# Patient Record
Sex: FEMALE | Race: WHITE | NOT HISPANIC OR LATINO | Employment: OTHER | URBAN - METROPOLITAN AREA
[De-identification: names, ages, dates, MRNs, and addresses within clinical notes are randomized per-mention and may not be internally consistent; named-entity substitution may affect disease eponyms.]

---

## 2015-06-02 LAB — HUMAN PAPILLOMAVIRUS (HPV): NORMAL

## 2017-03-09 ENCOUNTER — PATIENT MESSAGE (OUTPATIENT)
Dept: FAMILY MEDICINE | Facility: CLINIC | Age: 62
End: 2017-03-09

## 2017-03-10 ENCOUNTER — OFFICE VISIT (OUTPATIENT)
Dept: INTERNAL MEDICINE | Facility: CLINIC | Age: 62
End: 2017-03-10
Payer: COMMERCIAL

## 2017-03-10 ENCOUNTER — OFFICE VISIT (OUTPATIENT)
Dept: HEMATOLOGY/ONCOLOGY | Facility: CLINIC | Age: 62
End: 2017-03-10
Payer: COMMERCIAL

## 2017-03-10 VITALS
TEMPERATURE: 98 F | HEIGHT: 63 IN | BODY MASS INDEX: 30.66 KG/M2 | BODY MASS INDEX: 30.46 KG/M2 | RESPIRATION RATE: 16 BRPM | DIASTOLIC BLOOD PRESSURE: 61 MMHG | SYSTOLIC BLOOD PRESSURE: 124 MMHG | WEIGHT: 173.06 LBS | HEART RATE: 91 BPM | WEIGHT: 171.94 LBS | DIASTOLIC BLOOD PRESSURE: 70 MMHG | SYSTOLIC BLOOD PRESSURE: 137 MMHG | HEART RATE: 66 BPM

## 2017-03-10 DIAGNOSIS — R01.1 MURMUR, HEART: ICD-10-CM

## 2017-03-10 DIAGNOSIS — D05.10 DCIS (DUCTAL CARCINOMA IN SITU) OF BREAST, UNSPECIFIED LATERALITY: Primary | ICD-10-CM

## 2017-03-10 DIAGNOSIS — I10 ESSENTIAL HYPERTENSION: Primary | ICD-10-CM

## 2017-03-10 DIAGNOSIS — E78.5 HYPERLIPIDEMIA, UNSPECIFIED HYPERLIPIDEMIA TYPE: ICD-10-CM

## 2017-03-10 DIAGNOSIS — Z00.00 PREVENTATIVE HEALTH CARE: ICD-10-CM

## 2017-03-10 DIAGNOSIS — E03.9 HYPOTHYROIDISM, UNSPECIFIED TYPE: ICD-10-CM

## 2017-03-10 DIAGNOSIS — Z85.71 HX OF HODGKIN'S DISEASE: ICD-10-CM

## 2017-03-10 PROCEDURE — 3075F SYST BP GE 130 - 139MM HG: CPT | Mod: S$GLB,,, | Performed by: FAMILY MEDICINE

## 2017-03-10 PROCEDURE — 99999 PR PBB SHADOW E&M-EST. PATIENT-LVL III: CPT | Mod: PBBFAC,,, | Performed by: FAMILY MEDICINE

## 2017-03-10 PROCEDURE — 99213 OFFICE O/P EST LOW 20 MIN: CPT | Mod: S$GLB,,, | Performed by: INTERNAL MEDICINE

## 2017-03-10 PROCEDURE — 99999 PR PBB SHADOW E&M-EST. PATIENT-LVL III: CPT | Mod: PBBFAC,,, | Performed by: INTERNAL MEDICINE

## 2017-03-10 PROCEDURE — 1160F RVW MEDS BY RX/DR IN RCRD: CPT | Mod: S$GLB,,, | Performed by: INTERNAL MEDICINE

## 2017-03-10 PROCEDURE — 3078F DIAST BP <80 MM HG: CPT | Mod: S$GLB,,, | Performed by: FAMILY MEDICINE

## 2017-03-10 PROCEDURE — 3078F DIAST BP <80 MM HG: CPT | Mod: S$GLB,,, | Performed by: INTERNAL MEDICINE

## 2017-03-10 PROCEDURE — 3074F SYST BP LT 130 MM HG: CPT | Mod: S$GLB,,, | Performed by: INTERNAL MEDICINE

## 2017-03-10 PROCEDURE — 99215 OFFICE O/P EST HI 40 MIN: CPT | Mod: S$GLB,,, | Performed by: FAMILY MEDICINE

## 2017-03-10 PROCEDURE — 1160F RVW MEDS BY RX/DR IN RCRD: CPT | Mod: S$GLB,,, | Performed by: FAMILY MEDICINE

## 2017-03-10 RX ORDER — FLUTICASONE PROPIONATE 50 MCG
2 SPRAY, SUSPENSION (ML) NASAL DAILY
Qty: 16 G | Refills: 12 | Status: SHIPPED | OUTPATIENT
Start: 2017-03-10 | End: 2017-04-09

## 2017-03-10 RX ORDER — LEVOTHYROXINE SODIUM 75 UG/1
75 TABLET ORAL DAILY
Qty: 90 TABLET | Refills: 3 | Status: SHIPPED | OUTPATIENT
Start: 2017-03-10 | End: 2017-07-18

## 2017-03-10 RX ORDER — BENAZEPRIL HYDROCHLORIDE 10 MG/1
10 TABLET ORAL DAILY
Qty: 90 TABLET | Refills: 3 | Status: SHIPPED | OUTPATIENT
Start: 2017-03-10 | End: 2017-03-23 | Stop reason: SDUPTHER

## 2017-03-10 RX ORDER — ROSUVASTATIN CALCIUM 20 MG/1
20 TABLET, COATED ORAL NIGHTLY
Qty: 90 TABLET | Refills: 3 | Status: SHIPPED | OUTPATIENT
Start: 2017-03-10 | End: 2018-05-26 | Stop reason: SDUPTHER

## 2017-03-10 NOTE — PROGRESS NOTES
Subjective:       Patient ID: Dulce Root is a 61 y.o. female.    Chief Complaint: Follow-up    HPI Ms. Root is a very pleasant 61-year-old female with history of bilateral ductal carcinoma in situ and a remote history of Hodgkin's disease.    Her primary issue continues to be her pemphigus.  She is continuing on weekly methotrexate under the care of  her dermatologist Dr. Xie.      Otherwise, she's been feeling well with no new issues.She has some chronic exertional dyspnea which is unchanged, primarily with climbing stairs.    Her  recently fell off a ladder and broke his ankle  which required surgery with pins and plates, that has incapacitated him.        Breast history:  She  developed ductal      carcinoma in situ of the right breast in 09/2006, treated with lumpectomy     and radiation therapy.  In 08/2009, she was diagnosed with ductal carcinoma  in situ of the left breast and in 08/2009, she had bilateral mastectomy.      The left breast showed widespread high-grade DCIS and the right breast was    without evidence of malignancy.        She has a remote history of Hodgkin's disease, originally diagnosed in   1991, treated with radiation therapy and then treated with salvage   chemotherapy with ABVD on protocol E-5489 in 1996  Review of Systems   Constitutional: Negative for activity change, appetite change and fatigue.   Respiratory: Negative for shortness of breath.    Cardiovascular: Negative for chest pain.   Gastrointestinal: Negative for abdominal pain, diarrhea and nausea.   Genitourinary: Negative for flank pain.   Neurological: Negative for headaches.   Psychiatric/Behavioral: The patient is not nervous/anxious.        Objective:      Physical Exam   Constitutional: She appears well-developed and well-nourished.   Eyes: No scleral icterus.   Cardiovascular: Normal rate and regular rhythm.    Murmur (systolic) heard.  Pulmonary/Chest: She has wheezes in the right upper field.       The  bilateral breast reconstructions show no nodules or erythema   Abdominal: Soft. She exhibits no mass. There is no tenderness.   Lymphadenopathy:     She has no cervical adenopathy.     She has no axillary adenopathy.        Right: No supraclavicular adenopathy present.        Left: No supraclavicular adenopathy present.   Skin: No rash noted.       Assessment:     recent labs are unremarkable-CBC showed white count 13,100 , otherwise normal  1. DCIS (ductal carcinoma in situ) of breast, unspecified laterality    2. Hx of Hodgkin's disease        Plan:     We'll arrange for her to have an echo cardiogram and to be seen in cardiology for follow-up for late effects of treatment.    I'll see her again in one year.

## 2017-03-10 NOTE — PROGRESS NOTES
Subjective:       Patient ID: Dulce Root is a 61 y.o. female.    Chief Complaint: Annual Exam  Dulce Root 61 y.o. female is here for office visit to review care and physical exam, needs usual care.  HAs seen Oncology.  Saw EDUARDA Antonio RECENTLY ALSO.  Has IVIG and MTX, controling derm issue.  HAd good CMP, CBC recently.  Not exercising but did go to [facility], walked a lot.      HPI  Review of Systems   Constitutional: Negative for activity change, fatigue, fever and unexpected weight change.   HENT: Negative for congestion, hearing loss, postnasal drip and rhinorrhea.    Eyes: Negative for redness and visual disturbance.   Respiratory: Negative for chest tightness, shortness of breath and wheezing.    Cardiovascular: Negative for chest pain, palpitations and leg swelling.   Gastrointestinal: Negative for abdominal distention.   Genitourinary: Negative for decreased urine volume, dysuria, flank pain, hematuria, pelvic pain and urgency.   Musculoskeletal: Negative for back pain, gait problem, joint swelling and neck stiffness.   Skin: Negative for color change, rash and wound.   Neurological: Negative for dizziness, syncope, weakness and headaches.   Psychiatric/Behavioral: Negative for behavioral problems, confusion and sleep disturbance. The patient is not nervous/anxious.        Objective:      Physical Exam   Constitutional: She is oriented to person, place, and time. She appears well-developed and well-nourished. No distress.   HENT:   Head: Normocephalic.   Mouth/Throat: No oropharyngeal exudate.   Eyes: EOM are normal. Pupils are equal, round, and reactive to light. No scleral icterus.   Neck: Neck supple. No JVD present. No thyromegaly present.   Cardiovascular: Normal rate, regular rhythm and normal heart sounds.  Exam reveals no gallop and no friction rub.    No murmur heard.  LUSB holystolic murmur, 2/4   Pulmonary/Chest: Effort normal and breath sounds normal. She has no wheezes. She has no rales.    Abdominal: Soft. Bowel sounds are normal. She exhibits no distension and no mass. There is no tenderness. There is no guarding.   Musculoskeletal: Normal range of motion. She exhibits no edema.   Lymphadenopathy:     She has no cervical adenopathy.   Neurological: She is alert and oriented to person, place, and time. She has normal reflexes. She displays normal reflexes. No cranial nerve deficit. She exhibits normal muscle tone.   Skin: Skin is warm. No rash noted. No erythema.   Psychiatric: She has a normal mood and affect. Thought content normal.       Assessment:       No diagnosis found.    Plan:       Dulce was seen today for annual exam.    Diagnoses and all orders for this visit:    Essential hypertension  -     benazepril (LOTENSIN) 10 MG tablet; Take 1 tablet (10 mg total) by mouth once daily.    Hyperlipidemia, unspecified hyperlipidemia type  -     Lipid panel; Future  -     rosuvastatin (CRESTOR) 20 MG tablet; Take 1 tablet (20 mg total) by mouth nightly.    Hypothyroidism, unspecified type  -     TSH; Future  -     levothyroxine (SYNTHROID) 75 MCG tablet; Take 1 tablet (75 mcg total) by mouth once daily.    Preventative health care  - Had CRC scr, due again,  Has letter  - reviewed

## 2017-03-14 ENCOUNTER — PATIENT MESSAGE (OUTPATIENT)
Dept: HEMATOLOGY/ONCOLOGY | Facility: CLINIC | Age: 62
End: 2017-03-14

## 2017-03-15 ENCOUNTER — PATIENT MESSAGE (OUTPATIENT)
Dept: INTERNAL MEDICINE | Facility: CLINIC | Age: 62
End: 2017-03-15

## 2017-03-21 ENCOUNTER — DOCUMENTATION ONLY (OUTPATIENT)
Dept: CARDIOLOGY | Facility: CLINIC | Age: 62
End: 2017-03-21

## 2017-03-21 ENCOUNTER — HOSPITAL ENCOUNTER (OUTPATIENT)
Dept: CARDIOLOGY | Facility: CLINIC | Age: 62
Discharge: HOME | End: 2017-03-21
Payer: COMMERCIAL

## 2017-03-21 DIAGNOSIS — R01.1 MURMUR, HEART: ICD-10-CM

## 2017-03-21 LAB
AORTIC VALVE REGURGITATION: ABNORMAL
AORTIC VALVE STENOSIS: ABNORMAL
DIASTOLIC DYSFUNCTION: YES
ESTIMATED PA SYSTOLIC PRESSURE: 25
GLOBAL PERICARDIAL EFFUSION: ABNORMAL
RETIRED EF AND QEF - SEE NOTES: 55 (ref 55–65)
TRICUSPID VALVE REGURGITATION: ABNORMAL

## 2017-03-21 PROCEDURE — 93306 TTE W/DOPPLER COMPLETE: CPT | Mod: S$GLB,,, | Performed by: INTERNAL MEDICINE

## 2017-03-21 PROCEDURE — 96374 THER/PROPH/DIAG INJ IV PUSH: CPT | Mod: 59,S$GLB,, | Performed by: INTERNAL MEDICINE

## 2017-03-21 NOTE — PROGRESS NOTES
Patient identified by 2 identifiers. Denies previous reactions to blood transfusions, and no known holes in heart.  Allergies reviewed.  Procedure explained & consent obtained.  22 g IV placed to Rt hand, flushed w/ 10cc NS pre & post contrast administration.  3cc Optison administered, echo images obtained.  Pt tolerated procedure well.  IV D/C'ed, preasure dsg applied.  Pt D/C'ed to home.

## 2017-03-22 ENCOUNTER — DOCUMENTATION ONLY (OUTPATIENT)
Dept: INTERNAL MEDICINE | Facility: CLINIC | Age: 62
End: 2017-03-22

## 2017-03-22 ENCOUNTER — PATIENT MESSAGE (OUTPATIENT)
Dept: INTERNAL MEDICINE | Facility: CLINIC | Age: 62
End: 2017-03-22

## 2017-03-22 NOTE — PROGRESS NOTES
CAlled pt, no answer.  LM to call clinic to schedule appt with Cardiology, has aortic stenosis by recent ECHO

## 2017-03-23 ENCOUNTER — OFFICE VISIT (OUTPATIENT)
Dept: CARDIOLOGY | Facility: CLINIC | Age: 62
End: 2017-03-23
Payer: COMMERCIAL

## 2017-03-23 VITALS
WEIGHT: 173.5 LBS | SYSTOLIC BLOOD PRESSURE: 161 MMHG | HEIGHT: 63 IN | DIASTOLIC BLOOD PRESSURE: 87 MMHG | HEART RATE: 103 BPM | BODY MASS INDEX: 30.74 KG/M2

## 2017-03-23 DIAGNOSIS — I35.0 NONRHEUMATIC AORTIC VALVE STENOSIS: ICD-10-CM

## 2017-03-23 DIAGNOSIS — E78.5 HYPERLIPIDEMIA, UNSPECIFIED HYPERLIPIDEMIA TYPE: Primary | ICD-10-CM

## 2017-03-23 DIAGNOSIS — Z85.71 HX OF HODGKIN'S DISEASE: ICD-10-CM

## 2017-03-23 DIAGNOSIS — I10 ESSENTIAL HYPERTENSION: ICD-10-CM

## 2017-03-23 DIAGNOSIS — R07.9 CHEST PAIN, UNSPECIFIED TYPE: ICD-10-CM

## 2017-03-23 PROCEDURE — 3079F DIAST BP 80-89 MM HG: CPT | Mod: S$GLB,,, | Performed by: INTERNAL MEDICINE

## 2017-03-23 PROCEDURE — 1160F RVW MEDS BY RX/DR IN RCRD: CPT | Mod: S$GLB,,, | Performed by: INTERNAL MEDICINE

## 2017-03-23 PROCEDURE — 99204 OFFICE O/P NEW MOD 45 MIN: CPT | Mod: S$GLB,,, | Performed by: INTERNAL MEDICINE

## 2017-03-23 PROCEDURE — 93000 ELECTROCARDIOGRAM COMPLETE: CPT | Mod: S$GLB,,, | Performed by: INTERNAL MEDICINE

## 2017-03-23 PROCEDURE — 3077F SYST BP >= 140 MM HG: CPT | Mod: S$GLB,,, | Performed by: INTERNAL MEDICINE

## 2017-03-23 PROCEDURE — 99999 PR PBB SHADOW E&M-EST. PATIENT-LVL III: CPT | Mod: PBBFAC,,, | Performed by: INTERNAL MEDICINE

## 2017-03-23 RX ORDER — BENAZEPRIL HYDROCHLORIDE 20 MG/1
20 TABLET ORAL DAILY
Qty: 90 TABLET | Refills: 3 | Status: ON HOLD | OUTPATIENT
Start: 2017-03-23 | End: 2017-04-26 | Stop reason: HOSPADM

## 2017-03-23 RX ORDER — CYCLOSPORINE 0.5 MG/ML
1 EMULSION OPHTHALMIC 2 TIMES DAILY
Refills: 4 | COMMUNITY
Start: 2017-03-09 | End: 2021-01-26

## 2017-03-23 NOTE — PROGRESS NOTES
Subjective:    Patient ID:  Dulce oRot is a 61 y.o. female who presents for evaluation of new pt (new pt); ref from . [facility]; Results; and Shortness of Breath      HPI Comments: Pt referred for evaluation after a routine Echo showed severe AS. She has a h/o lymphoma and breast CA with chest radiation and chemotherapy in the remote past. No history of rheumatic fever or family h/o valvular disease. Presently she has occasional MARCH with the only significant episode was rushing [place] street [place] from the subway in [place] recently. Normal activities do not bother her. She denies any palpitations, dizziness, syncope or pre-syncope. She denies any chest pain, PND, orthopnea. She denies claudication, lower extremity edema.      Review of Systems   Constitution: Negative for weight gain and weight loss.   HENT: Negative.    Eyes: Negative.    Cardiovascular: Positive for dyspnea on exertion (occasional). Negative for chest pain, claudication, cyanosis, irregular heartbeat, leg swelling, near-syncope, orthopnea (no PND), palpitations and syncope.   Respiratory: Negative for cough, hemoptysis, shortness of breath and snoring.    Endocrine: Negative.    Skin: Negative.    Musculoskeletal: Positive for back pain. Negative for joint pain, muscle cramps, muscle weakness and myalgias.   Gastrointestinal: Negative for diarrhea, hematemesis, nausea and vomiting.   Genitourinary: Negative.    Neurological: Negative for dizziness, focal weakness, light-headedness, loss of balance, numbness, paresthesias and seizures.   Psychiatric/Behavioral: Negative.         Objective:    Physical Exam   Constitutional: She is oriented to person, place, and time. She appears well-developed and well-nourished.   Eyes: Pupils are equal, round, and reactive to light.   Neck: Normal range of motion. No thyromegaly present.   Cardiovascular: Normal rate, regular rhythm, S1 normal, S2 normal, intact distal pulses and normal pulses.   No extrasystoles are  present. PMI is not displaced.  Exam reveals no friction rub.    Murmur heard.   Harsh midsystolic murmur is present with a grade of 2/6  at the upper right sternal border radiating to the neck  Pulmonary/Chest: Effort normal and breath sounds normal. She has no wheezes. She has no rales. She exhibits no tenderness.   Abdominal: Soft. Bowel sounds are normal. She exhibits no distension and no mass. There is no tenderness.   Musculoskeletal: Normal range of motion. She exhibits no edema.   Neurological: She is alert and oriented to person, place, and time.   Skin: Skin is warm and dry.   Vitals reviewed.      Test(s) Reviewed  I have reviewed the following in detail:  [] Stress test   [] Angiography   [x] Echocardiogram   [] Labs   [x] Other:  ECG       Assessment:       1. Hyperlipidemia, unspecified hyperlipidemia type    2. Nonrheumatic aortic valve stenosis - Severe, JORI = 0.76 cm2, peak velocity = 3.85 m/s, mean gradient = 35.0 mmHg.    3. Essential hypertension    4. Hx of Hodgkin's disease - s/p chemo and radiation         Plan:       We discussed the progressive nature of aortic stenosis and the symptoms asssociated, ie. chest pain, syncope/pre-syncope and worsening dyspnea and a decline in exertional capacity.  We also discussed that at her age and the present degree of stenosis, it is likely there will be need for valve replacement in the future.  Will increase her benazepril to 20 mg daily  F/u in 3 months with repeat echo - sooner if symptoms develop

## 2017-03-28 DIAGNOSIS — R07.9 CHEST PAIN, UNSPECIFIED TYPE: Primary | ICD-10-CM

## 2017-04-24 PROBLEM — R07.9 CHEST PAIN: Status: ACTIVE | Noted: 2017-04-24

## 2017-04-25 ENCOUNTER — TELEPHONE (OUTPATIENT)
Dept: VASCULAR SURGERY | Facility: CLINIC | Age: 62
End: 2017-04-25

## 2017-04-25 PROBLEM — E78.5 DYSLIPIDEMIA: Status: ACTIVE | Noted: 2017-04-25

## 2017-04-25 NOTE — TELEPHONE ENCOUNTER
----- Message from Isrrael Smiley sent at 4/24/2017  4:55 PM CDT -----  Contact: [facility],Zuleyka Gardiner have a hospital consult please call back at [phone]

## 2017-04-27 ENCOUNTER — PATIENT MESSAGE (OUTPATIENT)
Dept: CARDIOLOGY | Facility: CLINIC | Age: 62
End: 2017-04-27

## 2017-04-27 DIAGNOSIS — I25.10 CORONARY ARTERY DISEASE, ANGINA PRESENCE UNSPECIFIED, UNSPECIFIED VESSEL OR LESION TYPE, UNSPECIFIED WHETHER NATIVE OR TRANSPLANTED HEART: Primary | ICD-10-CM

## 2017-04-27 NOTE — TELEPHONE ENCOUNTER
Called Pt and discussed the angiogram, her AS and the recommendation for AVR + CABG.   She would like to be referred to a surgeon at [facility] as all her other physicians are there as well as all her other medical issues have been addressed there.  Will get a disc of her angiogram and make arrangements for her to be seen by CT surgery.     See my note from 03/24 and 's consult from 04/24 in notes.

## 2017-04-28 ENCOUNTER — PATIENT MESSAGE (OUTPATIENT)
Dept: CARDIOLOGY | Facility: CLINIC | Age: 62
End: 2017-04-28

## 2017-04-28 ENCOUNTER — TELEPHONE (OUTPATIENT)
Dept: CARDIOLOGY | Facility: CLINIC | Age: 62
End: 2017-04-28

## 2017-04-28 DIAGNOSIS — I35.0 AORTIC VALVE STENOSIS, UNSPECIFIED ETIOLOGY: Primary | ICD-10-CM

## 2017-04-28 DIAGNOSIS — I25.10 CORONARY ARTERY DISEASE, ANGINA PRESENCE UNSPECIFIED, UNSPECIFIED VESSEL OR LESION TYPE, UNSPECIFIED WHETHER NATIVE OR TRANSPLANTED HEART: ICD-10-CM

## 2017-05-01 ENCOUNTER — TELEPHONE (OUTPATIENT)
Dept: INTERNAL MEDICINE | Facility: CLINIC | Age: 62
End: 2017-05-01

## 2017-05-01 NOTE — TELEPHONE ENCOUNTER
----- Message from Walt Cochran sent at 5/1/2017 10:17 AM CDT -----  Contact: self [phone]  Patient would like a call back from MD to discuss cough an d Xray that was done while in the hospital . please advise , Thanks !

## 2017-05-01 NOTE — TELEPHONE ENCOUNTER
Called pt, no answer so LM to come in in AM (may need x-ray and/or lab) or call cardiology today if concerned may have fluid in lungs

## 2017-05-16 ENCOUNTER — OFFICE VISIT (OUTPATIENT)
Dept: CARDIOTHORACIC SURGERY | Facility: CLINIC | Age: 62
End: 2017-05-16
Payer: COMMERCIAL

## 2017-05-16 ENCOUNTER — HOSPITAL ENCOUNTER (OUTPATIENT)
Dept: RADIOLOGY | Facility: OTHER | Age: 62
Discharge: HOME | End: 2017-05-16
Attending: THORACIC SURGERY (CARDIOTHORACIC VASCULAR SURGERY)
Payer: COMMERCIAL

## 2017-05-16 VITALS
DIASTOLIC BLOOD PRESSURE: 62 MMHG | TEMPERATURE: 98 F | BODY MASS INDEX: 29.77 KG/M2 | HEART RATE: 82 BPM | WEIGHT: 168 LBS | OXYGEN SATURATION: 100 % | SYSTOLIC BLOOD PRESSURE: 120 MMHG | HEIGHT: 63 IN

## 2017-05-16 DIAGNOSIS — I35.0 NONRHEUMATIC AORTIC VALVE STENOSIS: Primary | ICD-10-CM

## 2017-05-16 DIAGNOSIS — I25.10 CORONARY ARTERY DISEASE INVOLVING NATIVE CORONARY ARTERY OF NATIVE HEART WITHOUT ANGINA PECTORIS: ICD-10-CM

## 2017-05-16 DIAGNOSIS — I25.10 CORONARY ARTERY DISEASE INVOLVING NATIVE CORONARY ARTERY OF NATIVE HEART WITHOUT ANGINA PECTORIS: Primary | ICD-10-CM

## 2017-05-16 DIAGNOSIS — I10 ESSENTIAL HYPERTENSION: ICD-10-CM

## 2017-05-16 DIAGNOSIS — I25.83 CORONARY ARTERY DISEASE DUE TO LIPID RICH PLAQUE: ICD-10-CM

## 2017-05-16 DIAGNOSIS — I25.10 CORONARY ARTERY DISEASE DUE TO LIPID RICH PLAQUE: ICD-10-CM

## 2017-05-16 PROCEDURE — 3074F SYST BP LT 130 MM HG: CPT | Mod: S$GLB,,, | Performed by: THORACIC SURGERY (CARDIOTHORACIC VASCULAR SURGERY)

## 2017-05-16 PROCEDURE — 3078F DIAST BP <80 MM HG: CPT | Mod: S$GLB,,, | Performed by: THORACIC SURGERY (CARDIOTHORACIC VASCULAR SURGERY)

## 2017-05-16 PROCEDURE — 99999 PR PBB SHADOW E&M-EST. PATIENT-LVL III: CPT | Mod: PBBFAC,,, | Performed by: THORACIC SURGERY (CARDIOTHORACIC VASCULAR SURGERY)

## 2017-05-16 PROCEDURE — 71250 CT THORAX DX C-: CPT | Mod: TC

## 2017-05-16 PROCEDURE — 99205 OFFICE O/P NEW HI 60 MIN: CPT | Mod: S$GLB,,, | Performed by: THORACIC SURGERY (CARDIOTHORACIC VASCULAR SURGERY)

## 2017-05-16 PROCEDURE — 1160F RVW MEDS BY RX/DR IN RCRD: CPT | Mod: S$GLB,,, | Performed by: THORACIC SURGERY (CARDIOTHORACIC VASCULAR SURGERY)

## 2017-05-16 PROCEDURE — 99213 OFFICE O/P EST LOW 20 MIN: CPT | Mod: PBBFAC,25 | Performed by: THORACIC SURGERY (CARDIOTHORACIC VASCULAR SURGERY)

## 2017-05-16 PROCEDURE — 71250 CT THORAX DX C-: CPT | Mod: 26,,, | Performed by: RADIOLOGY

## 2017-05-16 RX ORDER — CLOBETASOL PROPIONATE 0.05 MG/G
1 GEL TOPICAL 2 TIMES DAILY
Refills: 3 | COMMUNITY
Start: 2017-04-27 | End: 2018-05-29

## 2017-05-16 RX ORDER — HYDROCODONE BITARTRATE AND HOMATROPINE METHYLBROMIDE ORAL SOLUTION 5; 1.5 MG/5ML; MG/5ML
LIQUID ORAL
Refills: 0 | COMMUNITY
Start: 2017-05-02 | End: 2017-06-27

## 2017-05-16 RX ORDER — VALACYCLOVIR HYDROCHLORIDE 1 G/1
TABLET, FILM COATED ORAL
Refills: 3 | COMMUNITY
Start: 2017-04-27 | End: 2022-05-31 | Stop reason: SDUPTHER

## 2017-05-16 RX ORDER — ALPRAZOLAM 0.5 MG/1
TABLET ORAL
COMMUNITY
Start: 2017-05-10 | End: 2017-06-27

## 2017-05-16 NOTE — LETTER
May 16, 2017      Gabe Collazo MD  [address] [facility] [address]           [facility]  [address]  [facility] [address]  Phone: [phone]          Patient: Dulce Root   MR Number: [id number]   YOB: 1955   Date of Visit: 5/16/2017       Dear Dr. Gabe Collazo:    Thank you for referring Dulce Root to me for evaluation. Attached you will find relevant portions of my assessment and plan of care.    If you have questions, please do not hesitate to call me. I look forward to following Dulce Root along with you.    Sincerely,    Raul Flores MD    Enclosure  CC:  No Recipients    If you would like to receive this communication electronically, please contact [email or web address] or [phone] to request more information on [email or web address] Link access.    For providers and/or their staff who would like to refer a patient to [facility], please contact us through our one-stop-shop provider referral line, [facility], at [phone].    If you feel you have received this communication in error or would no longer like to receive these types of communications, please e-mail [email or web address]

## 2017-05-16 NOTE — PROGRESS NOTES
"History & Physical    SUBJECTIVE:     History of Present Illness:  Patient is a 62 y.o. female presents in referral from Dr. Collazo with CAD and severe AS.  She has a past medical history of hodgkins lymphoma in 1991 and 1995 for which she received radiation to the neck, chest and abdomen in 1991.  When she had recurrence in 1995 she had chemotherapy.  She states she has had shortness of breath for several years with exertion but has always been told she had "lung damage" from the radiation.  However recently she began having a heavy feeling in her chest which prompted an ED visit to [facility].  She was admitted and underwent LHC and was found to have LM disease and RCA stenosis.  She has aortic stenosis with a JORI of 0.76 and a MG of 35.  She has persistent shortness of breath and intermittent feelings of "chest heaviness."  She also believes she may have some component of anxiety as these occur for which she has been taking Xanax.  She has not used NTG.      Chief Complaint   Patient presents with    Consult       Review of patient's allergies indicates:   Allergen Reactions    Sulfa (sulfonamide antibiotics) Shortness Of Breath, Itching and Other (See Comments)     elevated blood pressure    Iodinated contrast media - oral and iv dye     Iodine      Other reaction(s): Unknown    Latex      Other reaction(s): Itching    Norvasc  [amlodipine]      Other reaction(s): Swelling       Current Outpatient Prescriptions   Medication Sig Dispense Refill    albuterol 90 mcg/actuation inhaler Inhale 2 puffs into the lungs every 6 (six) hours as needed for Wheezing. 1 each 11    aspirin (ECOTRIN) 81 MG EC tablet Take 1 tablet (81 mg total) by mouth once daily.  0    brimonidine-timolol (COMBIGAN) 0.2-0.5 % Drop Place 1 drop into both eyes once daily.       calcium carbonate (OS-CALVIN) 600 mg (1,500 mg) Tab Take 600 mg by mouth 2 (two) times daily with meals.      difluprednate (DUREZOL) 0.05 % Drop ophthalmic solution " Place 1 drop into the left eye once daily. Instill 1 drop left eye daily      folic acid (FOLVITE) 1 MG tablet Take 1 mg by mouth once daily.   1    levothyroxine (SYNTHROID) 75 MCG tablet Take 1 tablet (75 mcg total) by mouth once daily. 90 tablet 3    lisinopril 10 MG tablet Take 1 tablet (10 mg total) by mouth every morning. 30 tablet 11    methotrexate 2.5 MG Tab Take 2.5 mg by mouth every 7 days. 10 tablets every 7 days on Fridiay  1    metoprolol succinate (TOPROL-XL) 50 MG 24 hr tablet Take 1 tablet (50 mg total) by mouth every evening. 30 tablet 11    RESTASIS 0.05 % ophthalmic emulsion Place 1 drop into both eyes 2 (two) times daily.  4    rosuvastatin (CRESTOR) 20 MG tablet Take 1 tablet (20 mg total) by mouth nightly. (Patient taking differently: Take 20 mg by mouth every other day. ) 90 tablet 3    valacyclovir (VALTREX) 500 MG tablet Take 500 mg by mouth 2 (two) times daily as needed.       No current facility-administered medications for this visit.        Past Medical History:   Diagnosis Date    Allergy     Breast cancer 2008    Hodgkin lymphoma     Hyperlipidemia     Hypertension     Osteoporosis     Thyroid disease      Past Surgical History:   Procedure Laterality Date    BREAST BIOPSY      BREAST RECONSTRUCTION      bilateral mastectomy    COSMETIC SURGERY      bereast reconstruction    EYE SURGERY      eye lids    LYMPHADENECTOMY      MASTECTOMY      SKIN BIOPSY      SPLENECTOMY, TOTAL      TUMOR REMOVAL       Family History   Problem Relation Age of Onset    Hypertension Mother     Hypertension Father     Cancer Neg Hx      Social History   Substance Use Topics    Smoking status: Former Smoker     Packs/day: 1.00     Years: 20.00     Types: Cigarettes     Quit date: 11/6/1981    Smokeless tobacco: None    Alcohol use No      Comment: 2 drinks/month        Review of Systems     Review of Systems   Constitutional: Negative for fever and malaise/fatigue.   HENT:  "Negative for congestion and sore throat.    Eyes: Negative for blurred vision, double vision and discharge.   Respiratory:  She c/o chronic cough for the past 2 months.  See HPI  Cardiovascular: Negative for palpitations, claudication, leg swelling and PND.  She has c/o chest tightness (see HPI)  Gastrointestinal: Negative for abdominal pain, constipation, diarrhea, nausea and vomiting.   Genitourinary: Negative for dysuria, frequency and urgency.   Musculoskeletal: Negative for back pain and myalgias.   Skin: Negative for itching and rash.   Neurological: Negative for dizziness, speech change, seizures, weakness and headaches.   Endo/Heme/Allergies: Does not bruise/bleed easily.   Psychiatric/Behavioral: Negative for depression. C/o anxiety since her diagnosis of CAD    OBJECTIVE:     Vital Signs (Most Recent)  Temp: 98.1 °F (36.7 °C) (05/16/17 1059)  Pulse: 82 (05/16/17 1059)  BP: 120/62 (05/16/17 1059)  SpO2: 100 % (05/16/17 1059)  5' 3" (1.6 m)  76.2 kg (168 lb)       Physical Exam     Physical Exam   Constitutional: oriented to person, place, and time, appears well-developed and well-nourished.   HENT:   Head: Normocephalic and atraumatic.   Eyes: Pupils are equal, round, and reactive to light.   Neck: Normal range of motion. Neck supple.   Cardiovascular: Normal rate, regular rhythm and normal heart sounds.  + RIDGE  Pulmonary/Chest: Effort normal and breath sounds normal.   Abdominal: Soft. Bowel sounds are normal.   Musculoskeletal: Normal range of motion.   Neurological: alert and oriented to person, place, and time.   Skin: Skin is warm and dry.   Psychiatric:  normal mood and affect, her behavior is normal.       Laboratory  reviewed    Diagnostic Results:  2D echo:  CORONARY CIRCULATION:  --  Left main: The vessel was normal sized with a 60% lesion seen in its mid  portion. --  LAD: The vessel was normal sized seen wrapping around the apex. It  gives rise to two diagonals. LAD and diagonals all have mild " disease, less than  20%. --  Circumflex: Circumflex gives rise to two trivial obtuse marginals.  Circumflex and its branches have mild disease less than 30%.  --  Ramus intermedius: The vessel was small sized, normal in appearance.  --  RCA: Right coronary artery is a normal dominant vessel with a short  discrete  60-70% lesion seen in its proximal/mid portion. The remainder has mild disease.    CONCLUSIONS:    NOTATIONS:  Significant left main lesion and mid RCA lesion.  Significant aortic valve stenosis.      2D echo:  1 - Severe aortic stenosis, JORI = 0.76 cm2, peak velocity = 3.85 m/s, mean gradient = 35.0 mmHg.     2 - Mild to moderate aortic regurgitation.     3 - Concentric remodeling.     4 - Normal left ventricular systolic function (EF 55-60%).     5 - Left ventricular diastolic dysfunction.     6 - The estimated PA systolic pressure is greater than 25 mmHg.     7 - Normal right ventricular systolic function .     8 - Trivial tricuspid regurgitation.     9 - Small pericardial effusion.     ASSESSMENT/PLAN:     62 year old female, referred by Dr. Collazo, with CAD and severe AS.  She has a history significant for Hodgkins X 2 with neck, chest, and abdominal radiation in 1991 and chemo in 1995 and history of breast cancer with breast reconstruction presents to discuss CABG/AVR.  Her STS score is 3.5%.  NYHA class II symptoms.    PLAN:    CTS Attending Note:    I have personally taken the history and examined this patient and agree with the NP's note as stated above. 61 yo F with severe MARCH. ECHO demonstrates AS, mean gradient 35. Sx seem more severe. Hx sig for mediastinal irradiation in 1991. LHC demonstrated mid LM lesion and mid RCA lesion. We discussed [place]/PCI vs sAVR/CABG. I am concerned that she may have some degeree of restrictive cardiomyopathy and AS due to her radiation. Plan non con CT to eval asc ao and lungs.

## 2017-05-16 NOTE — MR AVS SNAPSHOT
[facility]  [address]  [facility] [address]  Phone: [phone]                  Dulce Root   2017 10:30 AM   Appointment    Description:  Female : 1955   Provider:  Raul Flores MD   Department:  [facility] - Cardiovascular Surg                To Do List           Future Appointments        Provider Department Dept Phone    2017 10:30 AM Raul Flores MD [facility] - Cardi[facility] [phone]      Goals (5 Years of Data)     None      [facility]     [facility] On [facility] Care Line -  Assistance  Unless otherwise directed by your provider, please contact [facility], our nurse care line that is available for  assistance.     Registered nurses in the [facility] Center provide: appointment scheduling, clinical advisement, health education, and other advisory services.  Call: [phone] (toll free)               Medications           Message regarding Medications     Verify the changes and/or additions to your medication regime listed below are the same as discussed with your clinician today.  If any of these changes or additions are incorrect, please notify your healthcare provider.             Verify that the below list of medications is an accurate representation of the medications you are currently taking.  If none reported, the list may be blank. If incorrect, please contact your healthcare provider. Carry this list with you in case of emergency.           Current Medications     albuterol 90 mcg/actuation inhaler Inhale 2 puffs into the lungs every 6 (six) hours as needed for Wheezing.    aspirin (ECOTRIN) 81 MG EC tablet Take 1 tablet (81 mg total) by mouth once daily.    brimonidine-timolol (COMBIGAN) 0.2-0.5 % Drop Place 1 drop into both eyes once daily.     calcium carbonate (OS-CALVIN) 600 mg (1,500 mg) Tab Take 600 mg by mouth 2 (two) times daily with meals.    difluprednate (DUREZOL) 0.05 % Drop ophthalmic  solution Place 1 drop into the left eye once daily. Instill 1 drop left eye daily    folic acid (FOLVITE) 1 MG tablet Take 1 mg by mouth once daily.     levothyroxine (SYNTHROID) 75 MCG tablet Take 1 tablet (75 mcg total) by mouth once daily.    lisinopril 10 MG tablet Take 1 tablet (10 mg total) by mouth every morning.    methotrexate 2.5 MG Tab Take 2.5 mg by mouth every 7 days. 10 tablets every 7 days on Fridiay    metoprolol succinate (TOPROL-XL) 50 MG 24 hr tablet Take 1 tablet (50 mg total) by mouth every evening.    RESTASIS 0.05 % ophthalmic emulsion Place 1 drop into both eyes 2 (two) times daily.    rosuvastatin (CRESTOR) 20 MG tablet Take 1 tablet (20 mg total) by mouth nightly.    valacyclovir (VALTREX) 500 MG tablet Take 500 mg by mouth 2 (two) times daily as needed.           Clinical Reference Information           Allergies as of 5/16/2017     Sulfa (Sulfonamide Antibiotics)    Iodinated Contrast Media - Oral And Iv Dye    Iodine    Latex    Norvasc  [Amlodipine]      Immunizations Administered on Date of Encounter - 5/16/2017     None      Language Assistance Services     ATTENTION: Language assistance services are available, free of charge. Please call [phone].      ATENCIÓN: Si cesar lauren, tiene a goins disposición servicios gratuitos de asistencia lingüística. Llame al [phone].     ELIESER Ý: N?u b?n nói Ti?ng Vi?t, có các d?ch v? h? tr? ngôn ng? mi?n phí dành cho b?n. G?i s? [phone].         [facility] complies with applicable Federal civil rights laws and does not discriminate on the basis of race, color, national origin, age, disability, or sex.

## 2017-05-19 ENCOUNTER — PATIENT MESSAGE (OUTPATIENT)
Dept: CARDIOTHORACIC SURGERY | Facility: CLINIC | Age: 62
End: 2017-05-19

## 2017-05-23 ENCOUNTER — OFFICE VISIT (OUTPATIENT)
Dept: CARDIOTHORACIC SURGERY | Facility: CLINIC | Age: 62
End: 2017-05-23
Payer: COMMERCIAL

## 2017-05-23 VITALS
HEART RATE: 72 BPM | TEMPERATURE: 98 F | HEIGHT: 63 IN | OXYGEN SATURATION: 100 % | SYSTOLIC BLOOD PRESSURE: 116 MMHG | DIASTOLIC BLOOD PRESSURE: 60 MMHG | BODY MASS INDEX: 30.12 KG/M2 | WEIGHT: 170 LBS

## 2017-05-23 DIAGNOSIS — I35.0 NONRHEUMATIC AORTIC VALVE STENOSIS: Primary | ICD-10-CM

## 2017-05-23 PROCEDURE — 99215 OFFICE O/P EST HI 40 MIN: CPT | Mod: S$GLB,,, | Performed by: THORACIC SURGERY (CARDIOTHORACIC VASCULAR SURGERY)

## 2017-05-23 PROCEDURE — 3078F DIAST BP <80 MM HG: CPT | Mod: S$GLB,,, | Performed by: THORACIC SURGERY (CARDIOTHORACIC VASCULAR SURGERY)

## 2017-05-23 PROCEDURE — 3074F SYST BP LT 130 MM HG: CPT | Mod: S$GLB,,, | Performed by: THORACIC SURGERY (CARDIOTHORACIC VASCULAR SURGERY)

## 2017-05-23 PROCEDURE — 1160F RVW MEDS BY RX/DR IN RCRD: CPT | Mod: S$GLB,,, | Performed by: THORACIC SURGERY (CARDIOTHORACIC VASCULAR SURGERY)

## 2017-05-23 PROCEDURE — 99999 PR PBB SHADOW E&M-EST. PATIENT-LVL III: CPT | Mod: PBBFAC,,, | Performed by: THORACIC SURGERY (CARDIOTHORACIC VASCULAR SURGERY)

## 2017-05-23 NOTE — PROGRESS NOTES
"Subjective:       Patient ID: Dulce Root is a 62 y.o. female.    Chief Complaint: Follow-up    Patient is a 62 y.o. female presents in referral from Dr. Collazo with CAD and severe AS.  She has a past medical history of hodgkins lymphoma in 1991 and 1995 for which she received radiation to the neck, chest and abdomen in 1991.  When she had recurrence in 1995 she had chemotherapy.  She states she has had shortness of breath for several years with exertion but has always been told she had "lung damage" from the radiation.  However recently she began having a heavy feeling in her chest which prompted an ED visit to [facility].  She was admitted and underwent LHC and was found to have LM disease and RCA stenosis.  She has aortic stenosis with a JORI of 0.76 and a MG of 35.            Review of Systems   Constitutional: Positive for fatigue.   Respiratory: Positive for chest tightness.    Cardiovascular: Negative for chest pain, palpitations and leg swelling.   Neurological: Positive for dizziness.       Objective:      Physical Exam   Constitutional: She is oriented to person, place, and time. She appears well-developed and well-nourished.   Cardiovascular: Normal rate, regular rhythm and normal heart sounds.    Pulmonary/Chest: Effort normal and breath sounds normal.   Neurological: She is alert and oriented to person, place, and time.   Skin: Skin is warm and dry.   Psychiatric: She has a normal mood and affect.       Assessment:   1. AS  Plan:   Reviewed CT of chest and patient wants to be seen in valve clinic for low risk trail for TAVR      CTS Attending Note:    I have personally taken the history and examined this patient and agree with the NP's note as stated above.  CT reviewed. Aorta and lungs look ok. We discussed [place] vs sAVR. She would like to be evaluated for the low risk trial.   "

## 2017-06-07 ENCOUNTER — DOCUMENTATION ONLY (OUTPATIENT)
Dept: CARDIOLOGY | Facility: CLINIC | Age: 62
End: 2017-06-07

## 2017-06-07 NOTE — PROGRESS NOTES
Reviewed outside coronary angiogram.  Patient has non obstructive LM disease (less than 50%) and tight mid RCA stenosis.  If patient can be randomized in P3, will do mid RCA PCI with BMS or STEVEN at the time of TAVR or CABG RCA/AVR at time of SAVR..

## 2017-06-08 DIAGNOSIS — I35.0 AORTIC VALVE STENOSIS, UNSPECIFIED ETIOLOGY: Primary | ICD-10-CM

## 2017-06-12 PROBLEM — Z00.00 PREVENTATIVE HEALTH CARE: Status: RESOLVED | Noted: 2017-03-10 | Resolved: 2017-06-12

## 2017-06-14 ENCOUNTER — HOSPITAL ENCOUNTER (OUTPATIENT)
Dept: CARDIOLOGY | Facility: CLINIC | Age: 62
Discharge: HOME | End: 2017-06-14
Payer: COMMERCIAL

## 2017-06-14 DIAGNOSIS — I35.0 AORTIC VALVE STENOSIS, UNSPECIFIED ETIOLOGY: ICD-10-CM

## 2017-06-14 LAB
AORTIC VALVE REGURGITATION: ABNORMAL
AORTIC VALVE STENOSIS: ABNORMAL
DIASTOLIC DYSFUNCTION: NO
ESTIMATED PA SYSTOLIC PRESSURE: 25.09
RETIRED EF AND QEF - SEE NOTES: 65 (ref 55–65)
TRICUSPID VALVE REGURGITATION: ABNORMAL

## 2017-06-14 PROCEDURE — 93306 TTE W/DOPPLER COMPLETE: CPT | Mod: S$GLB,,, | Performed by: INTERNAL MEDICINE

## 2017-06-15 ENCOUNTER — DOCUMENTATION ONLY (OUTPATIENT)
Dept: CARDIOLOGY | Facility: CLINIC | Age: 62
End: 2017-06-15

## 2017-06-15 NOTE — PROGRESS NOTES
This patient's echo does not have high enough velocity or mean gradient to qualify for TAVR in the Partner 3 low risk trial.  I have told Dr. Flores's team that we are happy with them doing SAVR pluse CABG or we can do RCA PCI before or after SAVR if they want to do a mini.  We have not met the patient yet, however, nor discussed any of this with her.

## 2017-06-19 RX ORDER — BENAZEPRIL HYDROCHLORIDE 10 MG/1
10 TABLET ORAL DAILY
Qty: 90 TABLET | Refills: 3 | Status: SHIPPED | OUTPATIENT
Start: 2017-06-19 | End: 2017-07-18

## 2017-06-27 ENCOUNTER — OFFICE VISIT (OUTPATIENT)
Dept: CARDIOTHORACIC SURGERY | Facility: CLINIC | Age: 62
End: 2017-06-27
Payer: COMMERCIAL

## 2017-06-27 VITALS
DIASTOLIC BLOOD PRESSURE: 64 MMHG | SYSTOLIC BLOOD PRESSURE: 116 MMHG | HEIGHT: 63 IN | HEART RATE: 78 BPM | TEMPERATURE: 98 F | WEIGHT: 168 LBS | OXYGEN SATURATION: 100 % | BODY MASS INDEX: 29.77 KG/M2

## 2017-06-27 DIAGNOSIS — I25.10 CORONARY ARTERY DISEASE INVOLVING NATIVE CORONARY ARTERY OF NATIVE HEART WITHOUT ANGINA PECTORIS: ICD-10-CM

## 2017-06-27 DIAGNOSIS — I35.0 NONRHEUMATIC AORTIC VALVE STENOSIS: Primary | ICD-10-CM

## 2017-06-27 PROCEDURE — 99999 PR PBB SHADOW E&M-EST. PATIENT-LVL III: CPT | Mod: PBBFAC,,, | Performed by: THORACIC SURGERY (CARDIOTHORACIC VASCULAR SURGERY)

## 2017-06-27 PROCEDURE — 99215 OFFICE O/P EST HI 40 MIN: CPT | Mod: S$GLB,,, | Performed by: THORACIC SURGERY (CARDIOTHORACIC VASCULAR SURGERY)

## 2017-06-27 NOTE — LETTER
[facility]  [address]  [facility] [address]  Phone: [phone] June 27, 2017        Gabe Clolazo MD  [address] [place] [address]    Patient: Dulce Root   MR Number: [id number]   YOB: 1955   Date of Visit: 6/27/2017     Dear Dr. Collazo:    I had the pleasure of seeing your patient seeing Ms. Dulce Root in clinic today.  As you know, she is a very pleasant 62-year-old woman with a fairly significant past medical history of cancer and thoracic radiation.  Unfortunately, she has also developed aortic stenosis as well as coronary disease.  She has undergone a thoughtful and thorough evaluation, which included echocardiography as well as coronary angiography.  The angiogram demonstrated 60% lesions in the left main and in the right coronary.  The echo demonstrated moderate to severe aortic stenosis.  Ms. Root is steadfast in that she does not have symptoms.  She is quite apprehensive about having this surgically addressed.  We initially evaluated her for the low-risk transcatheter valve trial, but she did not qualify.  She and I met again today and discussed her options.  We discussed potential of treating at least the right coronary lesion percutaneously, versus medical management for now and evaluation in another six months.  She would like to hold off on having her cardiac issues surgically treated, at least for now.  I did offer her operation at this time, but she is currently uninterested in going that route. She would like to be reevaluated in six months, and at that time determine whether or not she would like to proceed with surgery or not. I recommended that she follow up with you in order to pursue that course, but I told her that I would be happy to see her back at any time should you or she wish me to see her again.      Thank you again for sending her to me.    Sincerely,        Raul Flores MD   Chief, Division of Thoracic &  Cardiovascular Surgery  [facility]    PEP/ecs         CC  Marky Manrique MD

## 2017-06-27 NOTE — PROGRESS NOTES
Pt seen and examined. Studies re-reviewed. We discussed her degree of AS, and her CAD. We discussed PCI vs CABG, and her reported lack of symptoms. I told her it would be reasonable to proceed with operation now, or she could wait. Waiting would entail some risk, but not likely high risk. She would like to wait 6 months.

## 2017-07-10 RX ORDER — LEVOTHYROXINE SODIUM 75 UG/1
TABLET ORAL
Qty: 90 TABLET | Refills: 3 | Status: SHIPPED | OUTPATIENT
Start: 2017-07-10 | End: 2018-07-11 | Stop reason: SDUPTHER

## 2017-07-17 ENCOUNTER — TELEPHONE (OUTPATIENT)
Dept: CARDIOLOGY | Facility: CLINIC | Age: 62
End: 2017-07-17

## 2017-07-17 NOTE — TELEPHONE ENCOUNTER
Pt had bp 201/111. Pt states came on suddenly with pain shooting to top of head. Came down to 175/102, pulse 90. Pt went to ED 7/14/2017 for increased BP. 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 Pt's head is hurting and throbbing. Please advise if pt should go back to ED for immediate treatment.

## 2017-07-17 NOTE — TELEPHONE ENCOUNTER
Called Pt and discussed her BP and symptoms.  She will increase the lisinopril to 20 mg and come in to the office tomorrow at 10 AM

## 2017-07-17 NOTE — TELEPHONE ENCOUNTER
----- Message from Myrna Whatley sent at 7/17/2017  1:56 PM CDT -----  Contact: pt [phone]  States she's calling rg having bp spikes and has been happening for the last week or so and can be reached at [phone]//[email or web address]

## 2017-07-18 ENCOUNTER — OFFICE VISIT (OUTPATIENT)
Dept: CARDIOLOGY | Facility: CLINIC | Age: 62
End: 2017-07-18
Payer: COMMERCIAL

## 2017-07-18 VITALS
WEIGHT: 168.63 LBS | BODY MASS INDEX: 29.88 KG/M2 | SYSTOLIC BLOOD PRESSURE: 79 MMHG | DIASTOLIC BLOOD PRESSURE: 54 MMHG | HEIGHT: 63 IN | HEART RATE: 84 BPM

## 2017-07-18 DIAGNOSIS — I10 ESSENTIAL HYPERTENSION: ICD-10-CM

## 2017-07-18 DIAGNOSIS — I25.10 CORONARY ARTERY DISEASE INVOLVING NATIVE CORONARY ARTERY OF NATIVE HEART WITHOUT ANGINA PECTORIS: ICD-10-CM

## 2017-07-18 DIAGNOSIS — Z85.71 HX OF HODGKIN'S DISEASE: ICD-10-CM

## 2017-07-18 DIAGNOSIS — I35.0 NONRHEUMATIC AORTIC VALVE STENOSIS: ICD-10-CM

## 2017-07-18 PROCEDURE — 99214 OFFICE O/P EST MOD 30 MIN: CPT | Mod: S$GLB,,, | Performed by: INTERNAL MEDICINE

## 2017-07-18 PROCEDURE — 99999 PR PBB SHADOW E&M-EST. PATIENT-LVL III: CPT | Mod: PBBFAC,,, | Performed by: INTERNAL MEDICINE

## 2017-07-18 RX ORDER — CLONIDINE HYDROCHLORIDE 0.1 MG/1
0.1 TABLET ORAL 2 TIMES DAILY PRN
Qty: 60 TABLET | Refills: 5 | Status: SHIPPED | OUTPATIENT
Start: 2017-07-18 | End: 2017-07-21 | Stop reason: SDUPTHER

## 2017-07-18 RX ORDER — PREDNISONE 10 MG/1
20 TABLET ORAL DAILY
Refills: 2 | COMMUNITY
Start: 2017-07-13 | End: 2017-08-15

## 2017-07-18 NOTE — PROGRESS NOTES
Subjective:    Patient ID:  Dulce Root is a 62 y.o. female who presents for evaluation of Hypertension      Pt has had 2 recent trips to the ER with high BP spikes with severe HA's. Her BP was 230/140 in the ambulance last night. Prior to that she took an extra lisinopril. This morning she has not taken her lisinopril as her BP is on the low side. She reports the BP has become an issue since starting prednisone for pemphigoid. She is on a taper and at 10 mg presently. No chest pain or SOB.no syncope.         Review of Systems   Constitution: Negative for weight gain and weight loss.   HENT: Positive for headaches.    Eyes: Negative.    Cardiovascular: Negative for chest pain, claudication, cyanosis, dyspnea on exertion, irregular heartbeat, leg swelling, near-syncope, orthopnea (no PND), palpitations and syncope.   Respiratory: Negative for cough, hemoptysis, shortness of breath and snoring.    Endocrine: Negative.    Skin: Negative.    Musculoskeletal: Negative for joint pain, muscle cramps, muscle weakness and myalgias.   Gastrointestinal: Negative for diarrhea, hematemesis, nausea and vomiting.   Genitourinary: Negative.    Neurological: Negative for dizziness, focal weakness, light-headedness, loss of balance, numbness, paresthesias and seizures.   Psychiatric/Behavioral: Negative.         Objective:    Physical Exam   Constitutional: She is oriented to person, place, and time. She appears well-developed and well-nourished.   Eyes: Pupils are equal, round, and reactive to light.   Neck: Normal range of motion. No thyromegaly present.   Cardiovascular: Normal rate, regular rhythm, S1 normal, S2 normal, intact distal pulses and normal pulses.   No extrasystoles are present. PMI is not displaced.  Exam reveals no friction rub.    Murmur heard.  High-pitched harsh midsystolic murmur is present with a grade of 2/6  at the upper right sternal border radiating to the neck  Pulmonary/Chest: Effort normal and breath  sounds normal. She has no wheezes. She has no rales. She exhibits no tenderness.   Abdominal: Soft. Bowel sounds are normal. She exhibits no distension and no mass. There is no tenderness.   Musculoskeletal: Normal range of motion. She exhibits no edema.   Neurological: She is alert and oriented to person, place, and time.   Skin: Skin is warm and dry.   Vitals reviewed.        Assessment:       1. Nonrheumatic aortic valve stenosis - Severe, JORI = 0.76 cm2, peak velocity = 3.85 m/s, mean gradient = 35.0 mmHg.    2. Essential hypertension    3. Coronary artery disease involving native coronary artery of native heart without angina pectoris    4. Hx of Hodgkin's disease - s/p chemo and radiation         Plan:       She will monitor BP and hold meds for systolic <100  Clonidine 0.1 mg prn for systolic >180  She has been evaluated for AVR and CABG (not a TAVR candidate) and last saw Dr. Flores 3 weeks ago and decided to wait 6 months  She is rethinking the wait  She will call next week and let us know how her BP is doing

## 2017-07-19 ENCOUNTER — PATIENT MESSAGE (OUTPATIENT)
Dept: CARDIOLOGY | Facility: CLINIC | Age: 62
End: 2017-07-19

## 2017-07-19 ENCOUNTER — TELEPHONE (OUTPATIENT)
Dept: CARDIOLOGY | Facility: CLINIC | Age: 62
End: 2017-07-19

## 2017-07-19 NOTE — TELEPHONE ENCOUNTER
----- Message from Rama Ulloa sent at 7/19/2017  9:34 AM CDT -----  Patient would like to talk to office concerning spikes in her blood pressure. Please call back at [phone].

## 2017-07-20 ENCOUNTER — NURSE TRIAGE (OUTPATIENT)
Dept: ADMINISTRATIVE | Facility: CLINIC | Age: 62
End: 2017-07-20

## 2017-07-20 ENCOUNTER — OFFICE VISIT (OUTPATIENT)
Dept: URGENT CARE | Facility: CLINIC | Age: 62
End: 2017-07-20
Payer: COMMERCIAL

## 2017-07-20 ENCOUNTER — TELEPHONE (OUTPATIENT)
Dept: INTERNAL MEDICINE | Facility: CLINIC | Age: 62
End: 2017-07-20

## 2017-07-20 ENCOUNTER — HOSPITAL ENCOUNTER (EMERGENCY)
Facility: HOSPITAL | Age: 62
Discharge: HOME | End: 2017-07-20
Attending: EMERGENCY MEDICINE
Payer: COMMERCIAL

## 2017-07-20 VITALS
OXYGEN SATURATION: 99 % | HEART RATE: 89 BPM | TEMPERATURE: 97 F | RESPIRATION RATE: 18 BRPM | SYSTOLIC BLOOD PRESSURE: 163 MMHG | DIASTOLIC BLOOD PRESSURE: 85 MMHG

## 2017-07-20 VITALS
RESPIRATION RATE: 20 BRPM | SYSTOLIC BLOOD PRESSURE: 100 MMHG | OXYGEN SATURATION: 99 % | HEART RATE: 79 BPM | DIASTOLIC BLOOD PRESSURE: 55 MMHG | TEMPERATURE: 98 F

## 2017-07-20 DIAGNOSIS — I35.0 NONRHEUMATIC AORTIC VALVE STENOSIS: ICD-10-CM

## 2017-07-20 DIAGNOSIS — I15.9 SECONDARY HYPERTENSION: Primary | ICD-10-CM

## 2017-07-20 DIAGNOSIS — I10 UNCONTROLLED STAGE 2 HYPERTENSION: Primary | ICD-10-CM

## 2017-07-20 LAB
ANISOCYTOSIS BLD QL SMEAR: SLIGHT
BASOPHILS # BLD AUTO: 0.02 K/UL
BASOPHILS NFR BLD: 0.2 %
DIFFERENTIAL METHOD: ABNORMAL
EOSINOPHIL # BLD AUTO: 0.2 K/UL
EOSINOPHIL NFR BLD: 1.9 %
ERYTHROCYTE [DISTWIDTH] IN BLOOD BY AUTOMATED COUNT: 16.9 %
HCT VFR BLD AUTO: 40.6 %
HGB BLD-MCNC: 14.1 G/DL
HOWELL-JOLLY BOD BLD QL SMEAR: ABNORMAL
HYPOCHROMIA BLD QL SMEAR: ABNORMAL
LYMPHOCYTES # BLD AUTO: 1.5 K/UL
LYMPHOCYTES NFR BLD: 12.5 %
MCH RBC QN AUTO: 33.3 PG
MCHC RBC AUTO-ENTMCNC: 34.7 G/DL
MCV RBC AUTO: 96 FL
MONOCYTES # BLD AUTO: 1 K/UL
MONOCYTES NFR BLD: 7.8 %
NEUTROPHILS # BLD AUTO: 9.5 K/UL
NEUTROPHILS NFR BLD: 77.6 %
OVALOCYTES BLD QL SMEAR: ABNORMAL
PLATELET # BLD AUTO: 321 K/UL
PLATELET BLD QL SMEAR: ABNORMAL
PMV BLD AUTO: 10.8 FL
POIKILOCYTOSIS BLD QL SMEAR: SLIGHT
RBC # BLD AUTO: 4.23 M/UL
WBC # BLD AUTO: 12.34 K/UL

## 2017-07-20 PROCEDURE — 99283 EMERGENCY DEPT VISIT LOW MDM: CPT | Mod: ,,, | Performed by: EMERGENCY MEDICINE

## 2017-07-20 PROCEDURE — 99283 EMERGENCY DEPT VISIT LOW MDM: CPT

## 2017-07-20 PROCEDURE — 25000003 PHARM REV CODE 250: Performed by: EMERGENCY MEDICINE

## 2017-07-20 PROCEDURE — 99204 OFFICE O/P NEW MOD 45 MIN: CPT | Mod: S$GLB,,, | Performed by: FAMILY MEDICINE

## 2017-07-20 PROCEDURE — 85025 COMPLETE CBC W/AUTO DIFF WBC: CPT

## 2017-07-20 PROCEDURE — 25000003 PHARM REV CODE 250: Performed by: STUDENT IN AN ORGANIZED HEALTH CARE EDUCATION/TRAINING PROGRAM

## 2017-07-20 RX ORDER — ACETAMINOPHEN 325 MG/1
650 TABLET ORAL
Status: COMPLETED | OUTPATIENT
Start: 2017-07-20 | End: 2017-07-20

## 2017-07-20 RX ORDER — CLONIDINE HYDROCHLORIDE 0.1 MG/1
0.1 TABLET ORAL
Status: COMPLETED | OUTPATIENT
Start: 2017-07-20 | End: 2017-07-20

## 2017-07-20 RX ADMIN — ACETAMINOPHEN 650 MG: 325 TABLET ORAL at 07:07

## 2017-07-20 RX ADMIN — CLONIDINE HYDROCHLORIDE 0.1 MG: 0.1 TABLET ORAL at 06:07

## 2017-07-20 NOTE — ED NOTES
Patient identifiers for Dulce Root checked and correct.    LOC: Patient is awake, alert, and aware of environment with an appropriate affect. Patient is oriented x 3 and speaking appropriately.  APPEARANCE: Patient resting comfortably and in no acute distress. Patient is clean and well groomed, patient's clothing is properly fastened.  HEENT: posterior HA   SKIN: The skin is warm and dry. Patient has normal skin turgor and moist mucus membranes. Skin is intact; no bruising or breakdown noted.  MUSKULOSKELETAL: Patient is moving all extremities well, no obvious deformities noted. Pulses intact.   RESPIRATORY: Airway is open and patent. Respirations are spontaneous and non-labored with normal effort and rate, BBS=clear  CARDIAC: Patient has a normal rate and rhythm. SR on cardiac monitor,No peripheral edema noted.   ABDOMEN: No distention noted. Bowel sounds active in all 4 quadrants. Soft and non-tender upon palpation.  NEUROLOGICAL:  Facial expression is symmetrical. Hand grasps are equal bilaterally. Normal sensation in all extremities when touched with finger.    Allergies reported:   Review of patient's allergies indicates:   Allergen Reactions    Norvasc  [amlodipine] Shortness Of Breath     unknown  Other reaction(s): Swelling    Sulfa (sulfonamide antibiotics) Shortness Of Breath, Itching and Other (See Comments)     elevated blood pressure    Iodinated contrast- oral and iv dye     Iodine      Other reaction(s): Unknown    Latex      Other reaction(s): Itching

## 2017-07-20 NOTE — TELEPHONE ENCOUNTER
Reason for Disposition   [1] Caller requesting NON-URGENT health information AND [2] PCP's office is the best resource    Protocols used: ST INFORMATION ONLY CALL-A-AH    Pt  would like Dr. Manrique or someone from his office to call him at [phone].  states they are at the urgent care [place] location which Dr. Burton told him to take his wife per  and the MD he told them to see does not work there per staff.  is worried about pts BP fluctuation and headaches and wants answers to why this is happening. Please call pt to advise.

## 2017-07-20 NOTE — TELEPHONE ENCOUNTER
Additional Information   Commented on: Systolic BP  while taking blood pressure medications and NOT dizzy, lightheaded or weak     EC/Patient states patient has been experiecing fluctuations in blood pressure   107/76 (this morning), asymptotic -250/150 (required recent ED visit).    Protocols used: ST LOW BLOOD PRESSURE-A-OH

## 2017-07-20 NOTE — PROGRESS NOTES
Subjective:       Patient ID: Dulce Root is a 62 y.o. female.    Vitals:  temperature is 96.9 °F (36.1 °C). Her blood pressure is 163/85 (abnormal) and her pulse is 89. Her respiration is 18 and oxygen saturation is 99%.     Chief Complaint: Hypertension    Patient has had high blood pressure to low blood pressure on and off since Friday. Also having severe stabbing headaches. Pt is frustrated bc she is getting conflicting information from her providers with regard to taking BP meds.  Pt is nervous bc all of her providers are out of town and not sure of what to do. She has been to two ER but feels nothing was done for her. Was instructed to come here but is not sure what we would be able to do      Hypertension   This is a recurrent problem. The current episode started in the past 7 days. The problem has been gradually worsening since onset. Associated symptoms include headaches. Pertinent negatives include no blurred vision, chest pain or shortness of breath.     Review of Systems   Constitution: Negative for chills and fever.   HENT: Positive for headaches. Negative for sore throat.    Eyes: Negative for blurred vision.   Cardiovascular: Negative for chest pain.   Respiratory: Negative for shortness of breath.    Skin: Negative for rash.   Musculoskeletal: Negative for back pain and joint pain.   Gastrointestinal: Negative for abdominal pain, diarrhea, nausea and vomiting.   Psychiatric/Behavioral: The patient is not nervous/anxious.        Objective:      Physical Exam   Constitutional: She is oriented to person, place, and time. She appears well-developed and well-nourished. She is cooperative.  Non-toxic appearance. She does not appear ill. She appears distressed (pt is very distressed abt the situation).   HENT:   Head: Normocephalic and atraumatic.   Right Ear: Hearing, tympanic membrane, external ear and ear canal normal.   Left Ear: Hearing, tympanic membrane, external ear and ear canal normal.   Nose:  Nose normal. No mucosal edema, rhinorrhea or nasal deformity. No epistaxis. Right sinus exhibits no maxillary sinus tenderness and no frontal sinus tenderness. Left sinus exhibits no maxillary sinus tenderness and no frontal sinus tenderness.   Mouth/Throat: Uvula is midline, oropharynx is clear and moist and mucous membranes are normal. No trismus in the jaw. Normal dentition. No uvula swelling. No posterior oropharyngeal erythema.   Eyes: Conjunctivae and lids are normal. Right eye exhibits no discharge. Left eye exhibits no discharge. No scleral icterus.   Sclera clear bilat   Neck: Trachea normal, normal range of motion, full passive range of motion without pain and phonation normal. Neck supple. Carotid bruit is present (questionable bruit ).   Cardiovascular: Normal rate, regular rhythm, intact distal pulses and normal pulses.    Murmur heard.  Pulmonary/Chest: Effort normal and breath sounds normal. No respiratory distress.   Abdominal: Soft. Normal appearance and bowel sounds are normal. She exhibits no distension, no pulsatile midline mass and no mass. There is no tenderness.   Musculoskeletal: Normal range of motion. She exhibits no edema or deformity.   Neurological: She is alert and oriented to person, place, and time. She exhibits normal muscle tone. Coordination normal.   Skin: Skin is warm, dry and intact. She is not diaphoretic. No pallor.   Psychiatric: She has a normal mood and affect. Her speech is normal and behavior is normal. Judgment and thought content normal. Cognition and memory are normal.   Nursing note and vitals reviewed.      Assessment:       1. Uncontrolled stage 2 hypertension        Plan:         Uncontrolled stage 2 hypertension       Pt at loss for what to do. Suggested that she go to the ER for proper tx of labile HTN . I d/w pt that if she didn't want to go to hospital today, I would take the medications as described by her cardiologist. Possibly increasing dose if necessary.  If no change in BP- I would go to the ER for proper control for HTN Urgency/emergency.  Pt may benefit from carotid US     Pt does need to f/u with cardiac surgeon to readdress surgery timeline.

## 2017-07-20 NOTE — TELEPHONE ENCOUNTER
----- Message from Melanie Frances sent at 7/20/2017  8:34 AM CDT -----  Contact: Humberto/shanna/[phone]  Arlon called in regards needing to talk with Dr Manrique, (Did not stated what is for) but  said that is URGENT matter. Please call and advise.        Thank you!!!

## 2017-07-20 NOTE — TELEPHONE ENCOUNTER
Patient advised per OOC protocol verbalized understanding, agreed with recommendations to be evaluated [place] [facility] [place] and f/u appointment arranged 7/24/17 with [facility]. Patient advised to call OOC again if symptoms persist, worsen with no obvious signs of improvement or if care advice is ineffective; patient had no further questions at end of call.

## 2017-07-20 NOTE — TELEPHONE ENCOUNTER
Reason for Disposition   Systolic BP  while taking blood pressure medications and NOT dizzy, lightheaded or weak    Protocols used: ST LOW BLOOD PRESSURE-A-OH

## 2017-07-21 RX ORDER — CLONIDINE HYDROCHLORIDE 0.1 MG/1
0.1 TABLET ORAL EVERY 6 HOURS PRN
Qty: 60 TABLET | Refills: 5 | Status: ON HOLD | OUTPATIENT
Start: 2017-07-21 | End: 2017-08-27 | Stop reason: HOSPADM

## 2017-07-21 RX ORDER — LISINOPRIL 20 MG/1
20 TABLET ORAL DAILY
Qty: 90 TABLET | Refills: 3 | Status: SHIPPED | OUTPATIENT
Start: 2017-07-21 | End: 2017-12-11 | Stop reason: DRUGHIGH

## 2017-07-21 NOTE — ED PROVIDER NOTES
"Encounter Date: 7/20/2017    SCRIBE #1 NOTE: I, Priya Aviles, am scribing for, and in the presence of,  Dr. Lan. I have scribed the following portions of the note - the Resident attestation.       History     Chief Complaint   Patient presents with    Hypertension     "having spike in BP since last week." Currently reports throbbing HA. On antihypertensives and taking daily.      Patient is a 62 year old female with aortic valve stenosis who presents to the ED with complaint of high blood pressure and headache. Patient has been seen frequently in the ED for similar complaints of labile blood pressure and resulting severe, throbbing headaches when she has blood pressure spikes. Patient saw Dr. Collazo in cardiology for her history of labile blood pressure, who advised clonidine for blood pressure spikes and to hold blood pressure medications if BP falls below 100. Patient was following this management and claimed that it was helping with her symptoms, but was told by her PCP, Dr. Manrique, not to take her clonidine. Patient then stopped taking her clonidine and experienced 3 episodes of blood pressure spikes with headache today. Patient and  are anxious about managing the patient's condition. Patient denies blurry vision, numbness/tingling, weakness, falls, syncope, wheezing or cough, SOB. Patient states that she is currently trying to schedule an appointment with Dr. Baxter for scheduling of heart surgery.            Review of patient's allergies indicates:   Allergen Reactions    Norvasc  [amlodipine] Shortness Of Breath     unknown  Other reaction(s): Swelling    Sulfa (sulfonamide antibiotics) Shortness Of Breath, Itching and Other (See Comments)     elevated blood pressure    Iodinated contrast- oral and iv dye     Iodine      Other reaction(s): Unknown    Latex      Other reaction(s): Itching     Past Medical History:   Diagnosis Date    Allergy     Breast cancer 2008    Hodgkin lymphoma     " Hyperlipidemia     Hypertension     Osteoporosis     Thyroid disease      Past Surgical History:   Procedure Laterality Date    BREAST BIOPSY      BREAST RECONSTRUCTION      bilateral mastectomy    COSMETIC SURGERY      bereast reconstruction    EYE SURGERY      eye lids    LYMPHADENECTOMY      MASTECTOMY      SKIN BIOPSY      SPLENECTOMY, TOTAL      TUMOR REMOVAL       Family History   Problem Relation Age of Onset    Hypertension Mother     Hypertension Father     Cancer Neg Hx      Social History   Substance Use Topics    Smoking status: Former Smoker     Packs/day: 1.00     Years: 20.00     Types: Cigarettes     Quit date: 11/6/1981    Smokeless tobacco: Never Used    Alcohol use No      Comment: 2 drinks/month     Review of Systems   Constitutional: Negative for chills, diaphoresis, fatigue and fever.   HENT: Negative for trouble swallowing.    Eyes: Negative for visual disturbance.   Respiratory: Negative for chest tightness and shortness of breath.    Cardiovascular: Negative for chest pain, palpitations and leg swelling.   Gastrointestinal: Negative for abdominal distention, abdominal pain, constipation, diarrhea, nausea and vomiting.   Neurological: Positive for headaches. Negative for dizziness, syncope, weakness, light-headedness and numbness.       Physical Exam     Initial Vitals [07/20/17 1609]   BP Pulse Resp Temp SpO2   (!) 193/85 99 18 98.1 °F (36.7 °C) 95 %      MAP       121         Physical Exam    Vitals reviewed.  Constitutional: She appears well-developed and well-nourished. She is not diaphoretic. No distress.   Patient very anxious about condition.   HENT:   Head: Normocephalic and atraumatic.   Mouth/Throat: Oropharynx is clear and moist.   Eyes: Conjunctivae and EOM are normal. Pupils are equal, round, and reactive to light.   Neck: No thyromegaly present. No JVD present.   Cardiovascular: Normal rate, regular rhythm and intact distal pulses. Exam reveals no friction rub.     Murmur heard.  Pulmonary/Chest: Breath sounds normal. No respiratory distress. She has no wheezes. She has no rhonchi. She has no rales.   Abdominal: Soft. Bowel sounds are normal. She exhibits no distension. There is no tenderness.   Musculoskeletal: She exhibits no edema or tenderness.   Neurological: She is alert. She has normal strength. No cranial nerve deficit or sensory deficit.   Skin: Skin is warm and dry. Capillary refill takes less than 2 seconds. No pallor.         ED Course   Procedures  Labs Reviewed   CBC W/ AUTO DIFFERENTIAL - Abnormal; Notable for the following:        Result Value    MCH 33.3 (*)     RDW 16.9 (*)     Gran # 9.5 (*)     Gran% 77.6 (*)     Lymph% 12.5 (*)     All other components within normal limits             Medical Decision Making:   History:   Old Medical Records: I decided to obtain old medical records.  Initial Assessment:   62 year old female with AVS with presents with HTN with systolic 230 associated with headache. Patient has been seen multiple times in this ED and other EDs for similar complaints. Patient follows with cardiologist who has her on a specific treatment regimen for her labile BP, and patient stopped following this regimen today. Patient does not appear to be an emergent case. Will draw core labs and likely discharge to follow up with cardiologist and Dr. Baxter for heart surgery.   Differential Diagnosis:   1) aortic valve stenosis  2) hypertensive urgency  3) stroke  Clinical Tests:   Lab Tests: Ordered and Reviewed            Scribe Attestation:   Scribe #1: I performed the above scribed service and the documentation accurately describes the services I performed. I attest to the accuracy of the note.    Attending Attestation:   Physician Attestation Statement for Resident:  As the supervising MD   Physician Attestation Statement: I have personally seen and examined this patient.   I agree with the above history. -: 62 y.o. female with longstanding HTN  presents for reevaluation of her blood pressure. She has been evaluated multiple times by ED and PCP. The patient seems to have been confused in regards to the appropriate mediation regiment fore her HTN. Although she was initially hypertensive, I have a very low suspicion of acute intracranial process as her symptoms have been persistent to the last 2 weeks and she has had a normal head CT during this time. I gave a dose of clonidine as recommended by her cardiology which resulted in the resolution of her hypertension. Wr encouraged her to continue to follow the recommendations of her cardiologist. She expressed understand and will follow up with CT surgery and cardiology as outpatient.   As the supervising MD I agree with the above PE.    As the supervising MD I agree with the above treatment, course, plan, and disposition.          Physician Attestation for Scribe:  Physician Attestation Statement for Scribe #1: I, Dr. Lan, reviewed documentation, as scribed by Priya Aviles in my presence, and it is both accurate and complete.                 ED Course     Clinical Impression:   The primary encounter diagnosis was Secondary hypertension. A diagnosis of Nonrheumatic aortic valve stenosis was also pertinent to this visit.                           Mendel Loredo MD  Resident  07/20/17 2691       Velma Lan MD  07/22/17 5499

## 2017-07-21 NOTE — DISCHARGE INSTRUCTIONS
Monitor Blood Pressure and hold meds for systolic <100  Clonidine 0.1 mg prn for systolic >180, blood pressure spikes associated with headaches.

## 2017-07-26 ENCOUNTER — PATIENT MESSAGE (OUTPATIENT)
Dept: CARDIOTHORACIC SURGERY | Facility: CLINIC | Age: 62
End: 2017-07-26

## 2017-08-08 DIAGNOSIS — I35.0 AORTIC VALVE STENOSIS, UNSPECIFIED ETIOLOGY: Primary | ICD-10-CM

## 2017-08-08 DIAGNOSIS — I25.10 CORONARY ARTERY DISEASE, ANGINA PRESENCE UNSPECIFIED, UNSPECIFIED VESSEL OR LESION TYPE, UNSPECIFIED WHETHER NATIVE OR TRANSPLANTED HEART: Primary | ICD-10-CM

## 2017-08-08 DIAGNOSIS — I35.0 AORTIC VALVE STENOSIS, UNSPECIFIED ETIOLOGY: ICD-10-CM

## 2017-08-08 DIAGNOSIS — I25.10 CORONARY ARTERY DISEASE, ANGINA PRESENCE UNSPECIFIED, UNSPECIFIED VESSEL OR LESION TYPE, UNSPECIFIED WHETHER NATIVE OR TRANSPLANTED HEART: ICD-10-CM

## 2017-08-15 ENCOUNTER — ANESTHESIA EVENT (OUTPATIENT)
Dept: SURGERY | Facility: HOSPITAL | Age: 62
DRG: 220 | End: 2017-08-15
Payer: COMMERCIAL

## 2017-08-15 ENCOUNTER — HOSPITAL ENCOUNTER (OUTPATIENT)
Dept: PREADMISSION TESTING | Facility: HOSPITAL | Age: 62
Discharge: HOME | End: 2017-08-15
Attending: ANESTHESIOLOGY
Payer: COMMERCIAL

## 2017-08-15 ENCOUNTER — OFFICE VISIT (OUTPATIENT)
Dept: CARDIOTHORACIC SURGERY | Facility: CLINIC | Age: 62
End: 2017-08-15
Payer: COMMERCIAL

## 2017-08-15 ENCOUNTER — HOSPITAL ENCOUNTER (OUTPATIENT)
Dept: VASCULAR SURGERY | Facility: CLINIC | Age: 62
Discharge: HOME | End: 2017-08-15
Attending: THORACIC SURGERY (CARDIOTHORACIC VASCULAR SURGERY)
Payer: COMMERCIAL

## 2017-08-15 ENCOUNTER — HOSPITAL ENCOUNTER (OUTPATIENT)
Dept: PULMONOLOGY | Facility: CLINIC | Age: 62
Discharge: HOME | End: 2017-08-15
Payer: COMMERCIAL

## 2017-08-15 ENCOUNTER — HOSPITAL ENCOUNTER (OUTPATIENT)
Dept: CARDIOLOGY | Facility: CLINIC | Age: 62
Discharge: HOME | End: 2017-08-15
Payer: COMMERCIAL

## 2017-08-15 ENCOUNTER — HOSPITAL ENCOUNTER (OUTPATIENT)
Dept: RADIOLOGY | Facility: HOSPITAL | Age: 62
Discharge: HOME | End: 2017-08-15
Attending: THORACIC SURGERY (CARDIOTHORACIC VASCULAR SURGERY)
Payer: COMMERCIAL

## 2017-08-15 VITALS
WEIGHT: 169.88 LBS | BODY MASS INDEX: 31.26 KG/M2 | TEMPERATURE: 98 F | SYSTOLIC BLOOD PRESSURE: 151 MMHG | HEIGHT: 62 IN | HEART RATE: 79 BPM | DIASTOLIC BLOOD PRESSURE: 71 MMHG | OXYGEN SATURATION: 100 %

## 2017-08-15 DIAGNOSIS — I35.0 AORTIC VALVE STENOSIS, UNSPECIFIED ETIOLOGY: ICD-10-CM

## 2017-08-15 DIAGNOSIS — Z01.818 PRE-OP EXAMINATION: ICD-10-CM

## 2017-08-15 DIAGNOSIS — I25.10 CORONARY ARTERY DISEASE, ANGINA PRESENCE UNSPECIFIED, UNSPECIFIED VESSEL OR LESION TYPE, UNSPECIFIED WHETHER NATIVE OR TRANSPLANTED HEART: ICD-10-CM

## 2017-08-15 DIAGNOSIS — I35.0 NONRHEUMATIC AORTIC VALVE STENOSIS: ICD-10-CM

## 2017-08-15 DIAGNOSIS — I25.10 CORONARY ARTERY DISEASE INVOLVING NATIVE CORONARY ARTERY OF NATIVE HEART WITHOUT ANGINA PECTORIS: Primary | ICD-10-CM

## 2017-08-15 LAB
PRE FEV1 FVC: 83
PRE FEV1: 1.65
PRE FVC: 2
PREDICTED FEV1 FVC: 81
PREDICTED FEV1: 2.24
PREDICTED FVC: 2.79

## 2017-08-15 PROCEDURE — 71020 XR CHEST PA AND LATERAL: CPT | Mod: 26,,, | Performed by: RADIOLOGY

## 2017-08-15 PROCEDURE — 71020 XR CHEST PA AND LATERAL: CPT | Mod: TC

## 2017-08-15 PROCEDURE — 99999 PR PBB SHADOW E&M-EST. PATIENT-LVL III: CPT | Mod: PBBFAC,,, | Performed by: THORACIC SURGERY (CARDIOTHORACIC VASCULAR SURGERY)

## 2017-08-15 PROCEDURE — 93000 ELECTROCARDIOGRAM COMPLETE: CPT | Mod: S$GLB,,, | Performed by: INTERNAL MEDICINE

## 2017-08-15 PROCEDURE — 99499 UNLISTED E&M SERVICE: CPT | Mod: S$GLB,,, | Performed by: THORACIC SURGERY (CARDIOTHORACIC VASCULAR SURGERY)

## 2017-08-15 PROCEDURE — 94010 BREATHING CAPACITY TEST: CPT | Mod: S$GLB,,, | Performed by: INTERNAL MEDICINE

## 2017-08-15 PROCEDURE — 93880 EXTRACRANIAL BILAT STUDY: CPT | Mod: S$GLB,,, | Performed by: SURGERY

## 2017-08-15 NOTE — ANESTHESIA PREPROCEDURE EVALUATION
08/15/2017  Pre-operative evaluation for Procedure(s) (LRB):  REPLACEMENT-VALVE-AORTIC (N/A)  AORTOCORONARY BYPASS-CABG (N/A)  HARVEST-VEIN-ENDOVASCULAR (N/A)    Dulce Root is a 62 y.o. female  referred by Dr. Collazo, with CAD, severe AS  (JORI of 0.76 and a MG of 35), HTN, HLD, pemphigoid (methotrexate), Hodgkins lymphoma X 2 (neck, chest, and abdominal radiation), and bilateral mastectomy with breast reconstruction who presents for above procedure.     Pt denies any recent illness, fever, chills, cough, chest pain, and liver or kidney disease. She does endorse chronic SOB for decades following her radiation; however, recently worsened requiring her to stop after a flight of stairs. She has a history of pemphigoid that causes her to have ulcers in her oropharynx and occasionally dysphagia. Pt currently has single non painful lesion and currently denies any dysphagia.     Prev airway: None on file    Drips:     Patient Active Problem List   Diagnosis    DCIS (ductal carcinoma in situ) of breast    Hx of Hodgkin's disease - s/p chemo and radiation    Essential hypertension    Hyperlipidemia    Hypothyroid    Scleritis    Pemphigoid    Nonrheumatic aortic valve stenosis - Severe, JORI = 0.76 cm2, peak velocity = 3.85 m/s, mean gradient = 35.0 mmHg.    Chest pain    Dyslipidemia    Coronary artery disease    Pre-op examination       Review of patient's allergies indicates:   Allergen Reactions    Norvasc  [amlodipine] Shortness Of Breath     unknown  Other reaction(s): Swelling    Sulfa (sulfonamide antibiotics) Shortness Of Breath, Itching and Other (See Comments)     elevated blood pressure    Iodinated contrast- oral and iv dye     Iodine      Other reaction(s): Unknown    Latex      Other reaction(s): Itching        Current Outpatient Prescriptions on File Prior to Encounter   Medication Sig  Dispense Refill    albuterol 90 mcg/actuation inhaler Inhale 2 puffs into the lungs every 6 (six) hours as needed for Wheezing. 1 each 11    aspirin (ECOTRIN) 81 MG EC tablet Take 1 tablet (81 mg total) by mouth once daily.  0    brimonidine-timolol (COMBIGAN) 0.2-0.5 % Drop Place 1 drop into both eyes once daily.       calcium carbonate (OS-CALVIN) 600 mg (1,500 mg) Tab Take 600 mg by mouth 2 (two) times daily with meals.      clobetasol (TEMOVATE) 0.05 % Gel Apply 1 application topically 2 (two) times daily. Apply to affected area  3    cloNIDine (CATAPRES) 0.1 MG tablet Take 1 tablet (0.1 mg total) by mouth every 6 (six) hours as needed (for systolic bp >160). 60 tablet 5    folic acid (FOLVITE) 1 MG tablet Take 1 mg by mouth once daily.   1    levothyroxine (SYNTHROID) 75 MCG tablet TAKE 1 TABLET EVERY DAY 90 tablet 3    lisinopril (PRINIVIL,ZESTRIL) 20 MG tablet Take 1 tablet (20 mg total) by mouth once daily. 90 tablet 3    methotrexate 2.5 MG Tab Take 2.5 mg by mouth every 7 days. 10 tablets every 7 days on Fridiay  1    metoprolol succinate (TOPROL-XL) 50 MG 24 hr tablet Take 1 tablet (50 mg total) by mouth every evening. 30 tablet 11    RESTASIS 0.05 % ophthalmic emulsion Place 1 drop into both eyes 2 (two) times daily.  4    rosuvastatin (CRESTOR) 20 MG tablet Take 1 tablet (20 mg total) by mouth nightly. (Patient taking differently: Take 20 mg by mouth every other day. ) 90 tablet 3    valacyclovir (VALTREX) 1000 MG tablet TAKE 1 TABLET EVERY 24 HOURS  3     No current facility-administered medications on file prior to encounter.        Past Surgical History:   Procedure Laterality Date    BREAST BIOPSY      BREAST RECONSTRUCTION      bilateral mastectomy    COSMETIC SURGERY      [facility] reconstruction    EYE SURGERY      eye lids    LYMPHADENECTOMY      MASTECTOMY      SKIN BIOPSY      SPLENECTOMY, TOTAL      TUMOR REMOVAL         Social History     Social History    Marital status:       Spouse name: N/A    Number of children: N/A    Years of education: N/A     Occupational History    Not on file.     Social History Main Topics    Smoking status: Former Smoker     Packs/day: 1.00     Years: 20.00     Types: Cigarettes     Quit date: 11/6/1981    Smokeless tobacco: Never Used    Alcohol use No      Comment: 2 drinks/month    Drug use: No    Sexual activity: Yes     Partners: Male     Other Topics Concern    Not on file     Social History Narrative    No narrative on file         Vital Signs Range (Last 24H):  Temp:  [36.7 °C (98 °F)]   Pulse:  [79]   BP: (151)/(71)   SpO2:  [100 %]       CBC:   Recent Labs      08/15/17   1025   WBC  7.15   RBC  3.61*   HGB  11.8*   HCT  34.9*   PLT  395*   MCV  97   MCH  32.7*   MCHC  33.8       CMP:   Recent Labs      08/15/17   1025   NA  143   K  4.4   CL  108   CO2  30*   BUN  21   CREATININE  0.7   GLU  97   CALCIUM  9.3   ALBUMIN  3.3*   PROT  6.5   ALKPHOS  82   ALT  16   AST  18   BILITOT  0.4       INR  Recent Labs      08/15/17   1025   INR  0.9   APTT  22.9           Diagnostic Studies:    US carotid 08/08/17:   RIGHT SIDE:   60 - 79% right ICA stenosis.   Heterogeneous plaque in the right internal carotid artery.   Heterogeneous plaque in the right common carotid artery.   Antegrade flow in the right vertebral artery.   LEFT SIDE:  60 - 79% left ICA stenosis.   Heterogeneous plaque in the left internal carotid artery.   Heterogeneous plaque in the left common carotid artery.  Elevated velocities noted in ECA.   Antegrade flow in the left vertebral artery.    CXR:  Stable radiographic appearance of the chest compared to multiple studies dating back to 7/2009.  No cardiac decompensation identified in this patient with a history of coronary artery atherosclerosis and aortic valve stenosis.    CT chest 05/16/17:   Postoperative changes are noted in the chest wall.  Regional osseous structures demonstrate no aggressive appearing lytic or  blastic lesions.   Impression       1.  Sequela of prior granulomatous infection.  2.  No acute intrathoracic process.  3.  Nonobstructing nephrolithiasis.         EK/15/17:   Normal sinus rhythm  LVH with QRS widening  Inferior infarct (cited on or before 24-OCT-2006)  Abnormal ECG  When compared with ECG of 23-MAR-2017 13:55,  No significant change was found  Confirmed by Cooper Brian MDole (    2D Echo:    17:   CONCLUSIONS     1 - Concentric LVH with normal left ventricular systolic function (EF 60-65%).     2 - No wall motion abnormalities.     3 - Normal left ventricular diastolic function.     4 - Normal right ventricular systolic function .     5 - Mild tricuspid regurgitation.     6 - The estimated PA systolic pressure is 25 mmHg.     7 - Moderate to severe aortic stenosis, JORI = 0.79 cm2, peak velocity = 3.84 m/s, mean gradient = 35 mmHg; mild AI.     Cath lab 17:     NOTATIONS:  Significant left main lesion and mid RCA lesion.  Significant aortic valve stenosis.      Anesthesia Evaluation    I have reviewed the Patient Summary Reports.    I have reviewed the Nursing Notes.      Review of Systems  Anesthesia Hx:  No problems with previous Anesthesia  History of prior surgery of interest to airway management or planning: Previous anesthesia: General Denies Family Hx of Anesthesia complications.   Denies Personal Hx of Anesthesia complications.   Social:  Former Smoker, No Alcohol Use    Hematology/Oncology:  Hematology Normal       -- Cancer in past history:  Breast and Other (see Oncology comments) chemotherapy and radiation    EENT/Dental:   denies chronic allergic rhinitis   Cardiovascular:   Hypertension CAD  CABG/stent   Denies Angina.         MARCH - has to stop after a flight of stairs but is able to walk a block or 2 without getting SOB   - history of palpitations    Pulmonary:   Denies COPD.  Denies Asthma.  Denies Shortness of breath.  Denies Sleep Apnea. - pt has chronic MARCH, has  been present for over a decade and progressively worsened over the last year.     - pt snores at night according to     Renal/:   Denies Chronic Renal Disease.     Hepatic/GI:   Denies GERD. Denies Liver Disease.    Musculoskeletal:   - pemphigoid- treated with methotrexate once a week and is treated by Dr. Polo at [facility].    Neurological:   Denies CVA. Denies Seizures.    Endocrine:   Hypothyroidism    Psych:   anxiety - over procedure and Pt takes xanax at home (0.5 mg- PRN).          Physical Exam  General:  Well nourished    Airway/Jaw/Neck:  Airway Findings: Mouth Opening: Normal Tongue: Normal  General Airway Assessment: Adult  Mallampati: II  TM Distance: Normal, at least 6 cm  Jaw/Neck Findings:     Neck ROM: Normal ROM      Dental:  DENTAL FINDINGS: Normal   Chest/Lungs:  Chest/Lungs Findings: Clear to auscultation     Heart/Vascular:  Heart Findings: Rate: Normal  Rhythm: Regular Rhythm  Sounds: Normal  Heart Murmur  Systolic Heart Murmur Description: Holosystolic, R Upper Sternal Border        Mental Status:  Mental Status Findings:  Alert and Oriented, Cooperative         Anesthesia Plan  Type of Anesthesia, risks & benefits discussed:  Anesthesia Type:  spinal  Patient's Preference:   Intra-op Monitoring Plan: arterial line, central line and standard ASA monitors  Intra-op Monitoring Plan Comments:   Post Op Pain Control Plan: multimodal analgesia and per primary service following discharge from PACU  Post Op Pain Control Plan Comments:   Induction:   IV  Beta Blocker:  Patient is on a Beta-Blocker and has received one dose within the past 24 hours (No further documentation required).       Informed Consent: Patient understands risks and agrees with Anesthesia plan.  Questions answered.   ASA Score: 4     Day of Surgery Review of History & Physical: I have interviewed and examined the patient. I have reviewed the patient's H&P dated:    H&P update referred to the surgeon.         Ready For  Surgery From Anesthesia Perspective.

## 2017-08-15 NOTE — PROGRESS NOTES
"History & Physical    SUBJECTIVE:     History of Present Illness:  Patient is a 62 y.o. female presents in referral from Dr. Collazo with CAD and severe AS.  She has a past medical history of hodgkins lymphoma in 1991 and 1995 for which she received radiation to the neck, chest and abdomen in 1991.  When she had recurrence in 1995 she had chemotherapy.  She states she has had shortness of breath for several years with exertion but has always been told she had "lung damage" from the radiation.  However recently she began having a heavy feeling in her chest which prompted an ED visit to [facility].  She was admitted and underwent LHC and was found to have LM disease and RCA stenosis.  She has aortic stenosis with a JORI of 0.76 and a MG of 35.  She has persistent shortness of breath and intermittent feelings of "chest heaviness."  She also believes she may have some component of anxiety as these occur for which she has been taking Xanax.  She has not used NTG.      Chief Complaint   Patient presents with    Pre-op Exam       Review of patient's allergies indicates:   Allergen Reactions    Sulfa (sulfonamide antibiotics) Shortness Of Breath, Itching and Other (See Comments)     elevated blood pressure    Iodinated contrast media - oral and iv dye     Iodine      Other reaction(s): Unknown    Latex      Other reaction(s): Itching    Norvasc  [amlodipine]      Other reaction(s): Swelling       Current Outpatient Prescriptions   Medication Sig Dispense Refill    aspirin (ECOTRIN) 81 MG EC tablet Take 1 tablet (81 mg total) by mouth once daily.  0    brimonidine-timolol (COMBIGAN) 0.2-0.5 % Drop Place 1 drop into both eyes once daily.       calcium carbonate (OS-CALVIN) 600 mg (1,500 mg) Tab Take 600 mg by mouth 2 (two) times daily with meals.      clobetasol (TEMOVATE) 0.05 % Gel Apply 1 application topically 2 (two) times daily. Apply to affected area  3    folic acid (FOLVITE) 1 MG tablet Take 1 mg by mouth once daily.   " 1    levothyroxine (SYNTHROID) 75 MCG tablet TAKE 1 TABLET EVERY DAY 90 tablet 3    lisinopril (PRINIVIL,ZESTRIL) 20 MG tablet Take 1 tablet (20 mg total) by mouth once daily. 90 tablet 3    methotrexate 2.5 MG Tab Take 2.5 mg by mouth every 7 days. 10 tablets every 7 days on Fridiay  1    metoprolol succinate (TOPROL-XL) 50 MG 24 hr tablet Take 1 tablet (50 mg total) by mouth every evening. 30 tablet 11    RESTASIS 0.05 % ophthalmic emulsion Place 1 drop into both eyes 2 (two) times daily.  4    rosuvastatin (CRESTOR) 20 MG tablet Take 1 tablet (20 mg total) by mouth nightly. (Patient taking differently: Take 20 mg by mouth every other day. ) 90 tablet 3    albuterol 90 mcg/actuation inhaler Inhale 2 puffs into the lungs every 6 (six) hours as needed for Wheezing. 1 each 11    cloNIDine (CATAPRES) 0.1 MG tablet Take 1 tablet (0.1 mg total) by mouth every 6 (six) hours as needed (for systolic bp >160). 60 tablet 5    valacyclovir (VALTREX) 1000 MG tablet TAKE 1 TABLET EVERY 24 HOURS  3     No current facility-administered medications for this visit.        Past Medical History:   Diagnosis Date    Allergy     Breast cancer 2008    Hodgkin lymphoma     Hyperlipidemia     Hypertension     Osteoporosis     Thyroid disease      Past Surgical History:   Procedure Laterality Date    BREAST BIOPSY      BREAST RECONSTRUCTION      bilateral mastectomy    COSMETIC SURGERY      bereast reconstruction    EYE SURGERY      eye lids    LYMPHADENECTOMY      MASTECTOMY      SKIN BIOPSY      SPLENECTOMY, TOTAL      TUMOR REMOVAL       Family History   Problem Relation Age of Onset    Hypertension Mother     Hypertension Father     Cancer Neg Hx      Social History   Substance Use Topics    Smoking status: Former Smoker     Packs/day: 1.00     Years: 20.00     Types: Cigarettes     Quit date: 11/6/1981    Smokeless tobacco: Never Used    Alcohol use No      Comment: 2 drinks/month        Review of  "Systems     Review of Systems   Constitutional: Negative for fever and malaise/fatigue.   HENT: Negative for congestion and sore throat.    Eyes: Negative for blurred vision, double vision and discharge.   Respiratory:  She c/o chronic cough for the past 2 months.  See HPI  Cardiovascular: Negative for palpitations, claudication, leg swelling and PND.  She has c/o chest tightness (see HPI)  Gastrointestinal: Negative for abdominal pain, constipation, diarrhea, nausea and vomiting.   Genitourinary: Negative for dysuria, frequency and urgency.   Musculoskeletal: Negative for back pain and myalgias.   Skin: Negative for itching and rash.   Neurological: Negative for dizziness, speech change, seizures, weakness and headaches.   Endo/Heme/Allergies: Does not bruise/bleed easily.   Psychiatric/Behavioral: Negative for depression. C/o anxiety since her diagnosis of CAD    OBJECTIVE:     Vital Signs (Most Recent)  Temp: 98 °F (36.7 °C) (08/15/17 1141)  Pulse: 79 (08/15/17 1141)  BP: (!) 151/71 (08/15/17 1141)  SpO2: 100 % (08/15/17 1141)  5' 2" (1.575 m)  77.1 kg (169 lb 14.4 oz)       Physical Exam     Physical Exam   Constitutional: oriented to person, place, and time, appears well-developed and well-nourished.   HENT:   Head: Normocephalic and atraumatic.   Eyes: Pupils are equal, round, and reactive to light.   Neck: Normal range of motion. Neck supple.   Cardiovascular: Normal rate, regular rhythm and normal heart sounds.  + RIDGE  Pulmonary/Chest: Effort normal and breath sounds normal.   Abdominal: Soft. Bowel sounds are normal.   Musculoskeletal: Normal range of motion.   Neurological: alert and oriented to person, place, and time.   Skin: Skin is warm and dry.   Psychiatric:  normal mood and affect, her behavior is normal.       Laboratory  reviewed    Diagnostic Results:  2D echo:  CORONARY CIRCULATION:  --  Left main: The vessel was normal sized with a 60% lesion seen in its mid  portion. --  LAD: The vessel was " normal sized seen wrapping around the apex. It  gives rise to two diagonals. LAD and diagonals all have mild disease, less than  20%. --  Circumflex: Circumflex gives rise to two trivial obtuse marginals.  Circumflex and its branches have mild disease less than 30%.  --  Ramus intermedius: The vessel was small sized, normal in appearance.  --  RCA: Right coronary artery is a normal dominant vessel with a short  discrete  60-70% lesion seen in its proximal/mid portion. The remainder has mild disease.    CONCLUSIONS:    NOTATIONS:  Significant left main lesion and mid RCA lesion.  Significant aortic valve stenosis.      2D echo:  1 - Severe aortic stenosis, JORI = 0.76 cm2, peak velocity = 3.85 m/s, mean gradient = 35.0 mmHg.     2 - Mild to moderate aortic regurgitation.     3 - Concentric remodeling.     4 - Normal left ventricular systolic function (EF 55-60%).     5 - Left ventricular diastolic dysfunction.     6 - The estimated PA systolic pressure is greater than 25 mmHg.     7 - Normal right ventricular systolic function .     8 - Trivial tricuspid regurgitation.     9 - Small pericardial effusion.     ASSESSMENT/PLAN:     62 year old female, referred by Dr. Collazo, with CAD and severe AS.  She has a history significant for Hodgkins X 2 with neck, chest, and abdominal radiation in 1991 and chemo in 1995 and history of breast cancer with breast reconstruction presents to discuss CABG/AVR.  Her STS score is 3.5%.  NYHA class II symptoms.    PLAN:    - Proceed with AVR/CABG on 8/21/2017.     CTS Attending Note:    I have personally taken the history and examined this patient and agree with the resident's note as stated above.Plan CABG 2 with LIMA-LAD and SVG-RCA, and AVR with Peever valve. Questions answered, and she wishes to proceed.

## 2017-08-15 NOTE — PROGRESS NOTES
"PREPARING FOR SURGERY  Your surgery has been scheduled for:   Day: Monday Date: 08/21/2017  Arrival Time: 5A  Start Time: 7A  You should report to the second floor surgery center, located on the [facility] side of the second floor of the [facility]. The phone number is [phone].    PLEASE NOTE  · If you are allergic to any medications, please inform your doctor or the nurse responsible for your care.  · Tell the doctor if you take aspirin, products containing aspirin, herbal medications or blood thinners, such as Coumadin, Pradaxa, or Plavix.  · Notify your doctor if you are diabetic and provide information about the medications you take.  · Arrange for someone to drive you home following surgery. You will not be allowed to leave the surgical facility alone or drive yourself home following sedation and anesthesia.  · If you have not already done so, please bring a list of your medications with you the day of your surgery.    BEFORE SURGERY  Stop taking all herbal medications 14 days prior to surgery  Stop taking aspirin, products containing aspirin 0 days before surgery  Stop taking blood thinners 0 days before surgery  Refrain from drinking alcohol beverages for 24 hours before and after surgery  Stop or limit smoking 0 days before surgery  Other: ________________________________________________________    THE NIGHT BEFORE SURGERY  Eat a light supper on the night before your surgery, no greasy or fatty foods.  DO NOT EAT OR DRINK ANYTHING AFTER MIDNIGHT, INCLUDING GUM, HARD CANDY, MINTS, OR CHEWING TOBACCO  Take a complete shower. Wash your body from the neck down to groin with Hibiclens (chlorhexidine gluconate) soap. Hibiclens soap may be purchased over the counter at the pharmacy. Keep the soap away from your eyes, ears, and mouth. After washing with Hibiclens, rinse thoroughly. You may also use any soap labeled "antibacterial". Shampoo your hair with your regular shampoo.    THE DAY OF " SURGERY  Take another shower with Hibiclens or any antibacterial soap, to reduce the change of infection.  Medications to take the morning of surgery: Metoprolol/Synthroid with a small sip of water. Do not take diuretics or fluid pills.  Diabetic medication instructions will be given by the preop center. They will call you before your surgery.  You may brush your teeth and rinse your mouth, but do not shallow any water.  Do not apply perfume, powder, body lotions or deodorant on the day of surgery.  Do not wear any makeup, including mascara and false eyelashes.  Nail polish should be removed.  Wear comfortable clothes, such as button front shirt and loose-fitting pants.  Leave all jewelry, including body piercings and valuables at home.  Hairpins and clasps must be removed before you enter the operating room.  You may wear glasses, dentures and hearing aids before and after surgery. They may need to be removed before going into the operating room. Contact lenses worn before surgery must be removed before entering the operating room. Please bring a case for your hearing aids, glasses and/or contacts.  Bring any devices you will need after surgery such as crutches or canes.  If you have sleep apnea, please bring your CPAP machine.  If you have an implantable device, such as a pacemaker or AICD, please bring the device information card, if you have one.    If you have any questions or concerns, please don't hesitate to call.    SAMMY RedmanN, RN  RN Clinician  Thoracic and Cardiovascular Surgery  [address] [place] [id number]  [phone] fax

## 2017-08-21 ENCOUNTER — HOSPITAL ENCOUNTER (INPATIENT)
Facility: HOSPITAL | Age: 62
LOS: 6 days | Discharge: HOME | DRG: 220 | End: 2017-08-27
Attending: THORACIC SURGERY (CARDIOTHORACIC VASCULAR SURGERY) | Admitting: THORACIC SURGERY (CARDIOTHORACIC VASCULAR SURGERY)
Payer: COMMERCIAL

## 2017-08-21 ENCOUNTER — ANESTHESIA (OUTPATIENT)
Dept: SURGERY | Facility: HOSPITAL | Age: 62
DRG: 220 | End: 2017-08-21
Payer: COMMERCIAL

## 2017-08-21 DIAGNOSIS — I35.0 AORTIC VALVE STENOSIS, UNSPECIFIED ETIOLOGY: ICD-10-CM

## 2017-08-21 DIAGNOSIS — R73.9 ACUTE HYPERGLYCEMIA: ICD-10-CM

## 2017-08-21 DIAGNOSIS — E03.9 ACQUIRED HYPOTHYROIDISM: ICD-10-CM

## 2017-08-21 DIAGNOSIS — I35.0 AORTIC STENOSIS: ICD-10-CM

## 2017-08-21 DIAGNOSIS — I35.0 NONRHEUMATIC AORTIC VALVE STENOSIS: ICD-10-CM

## 2017-08-21 DIAGNOSIS — Z01.818 PRE-OP TESTING: ICD-10-CM

## 2017-08-21 DIAGNOSIS — Z95.2 S/P AVR (AORTIC VALVE REPLACEMENT): ICD-10-CM

## 2017-08-21 DIAGNOSIS — I25.118 CORONARY ARTERY DISEASE OF NATIVE ARTERY OF NATIVE HEART WITH STABLE ANGINA PECTORIS: ICD-10-CM

## 2017-08-21 DIAGNOSIS — Z98.890 HISTORY OF OPEN HEART SURGERY: ICD-10-CM

## 2017-08-21 DIAGNOSIS — Z95.1 S/P CABG X 2: ICD-10-CM

## 2017-08-21 DIAGNOSIS — I49.9 CARDIAC RHYTHM DISORDER OR DISTURBANCE OR CHANGE: ICD-10-CM

## 2017-08-21 PROBLEM — R07.9 CHEST PAIN: Status: RESOLVED | Noted: 2017-04-24 | Resolved: 2017-08-21

## 2017-08-21 LAB
ALLENS TEST: ABNORMAL
ANION GAP SERPL CALC-SCNC: 6 MMOL/L
APTT BLDCRRT: 23.9 SEC
APTT BLDCRRT: 25.8 SEC
BASOPHILS # BLD AUTO: 0.01 K/UL
BASOPHILS NFR BLD: 0.1 %
BUN SERPL-MCNC: 15 MG/DL
CALCIUM SERPL-MCNC: 8.4 MG/DL
CHLORIDE SERPL-SCNC: 112 MMOL/L
CO2 SERPL-SCNC: 23 MMOL/L
CREAT SERPL-MCNC: 0.6 MG/DL
DELSYS: ABNORMAL
DIFFERENTIAL METHOD: ABNORMAL
EOSINOPHIL # BLD AUTO: 0 K/UL
EOSINOPHIL NFR BLD: 0.1 %
ERYTHROCYTE [DISTWIDTH] IN BLOOD BY AUTOMATED COUNT: 15.5 %
ERYTHROCYTE [SEDIMENTATION RATE] IN BLOOD BY WESTERGREN METHOD: 14 MM/H
ERYTHROCYTE [SEDIMENTATION RATE] IN BLOOD BY WESTERGREN METHOD: 14 MM/H
ERYTHROCYTE [SEDIMENTATION RATE] IN BLOOD BY WESTERGREN METHOD: 18 MM/H
EST. GFR  (AFRICAN AMERICAN): >60 ML/MIN/1.73 M^2
EST. GFR  (NON AFRICAN AMERICAN): >60 ML/MIN/1.73 M^2
FIO2: 100
FIO2: 100
FIO2: 40
FIO2: 40
FIO2: 70
FIO2: 80
FIO2: 80
FLOW: 60
FLOW: 60
GLUCOSE SERPL-MCNC: 102 MG/DL (ref 70–110)
GLUCOSE SERPL-MCNC: 122 MG/DL
GLUCOSE SERPL-MCNC: 137 MG/DL (ref 70–110)
GLUCOSE SERPL-MCNC: 138 MG/DL (ref 70–110)
GLUCOSE SERPL-MCNC: 150 MG/DL (ref 70–110)
GLUCOSE SERPL-MCNC: 182 MG/DL (ref 70–110)
GLUCOSE SERPL-MCNC: 202 MG/DL (ref 70–110)
HCO3 UR-SCNC: 20.8 MMOL/L (ref 24–28)
HCO3 UR-SCNC: 21.8 MMOL/L (ref 24–28)
HCO3 UR-SCNC: 24 MMOL/L (ref 24–28)
HCO3 UR-SCNC: 24 MMOL/L (ref 24–28)
HCO3 UR-SCNC: 24.1 MMOL/L (ref 24–28)
HCO3 UR-SCNC: 24.4 MMOL/L (ref 24–28)
HCO3 UR-SCNC: 25.6 MMOL/L (ref 24–28)
HCO3 UR-SCNC: 25.7 MMOL/L (ref 24–28)
HCO3 UR-SCNC: 27.5 MMOL/L (ref 24–28)
HCO3 UR-SCNC: 27.6 MMOL/L (ref 24–28)
HCT VFR BLD AUTO: 27.4 %
HCT VFR BLD CALC: 22 %PCV (ref 36–54)
HCT VFR BLD CALC: 22 %PCV (ref 36–54)
HCT VFR BLD CALC: 23 %PCV (ref 36–54)
HCT VFR BLD CALC: 24 %PCV (ref 36–54)
HCT VFR BLD CALC: 25 %PCV (ref 36–54)
HCT VFR BLD CALC: 26 %PCV (ref 36–54)
HCT VFR BLD CALC: 28 %PCV (ref 36–54)
HGB BLD-MCNC: 9.4 G/DL
INR PPP: 0.9
INR PPP: 1.2
LDH SERPL L TO P-CCNC: 0.85 MMOL/L (ref 0.36–1.25)
LYMPHOCYTES # BLD AUTO: 0.6 K/UL
LYMPHOCYTES NFR BLD: 3.2 %
MAGNESIUM SERPL-MCNC: 2.9 MG/DL
MCH RBC QN AUTO: 33 PG
MCHC RBC AUTO-ENTMCNC: 34.3 G/DL
MCV RBC AUTO: 96 FL
MIN VOL: 4.15
MODE: ABNORMAL
MONOCYTES # BLD AUTO: 0.7 K/UL
MONOCYTES NFR BLD: 3.6 %
NEUTROPHILS # BLD AUTO: 18.4 K/UL
NEUTROPHILS NFR BLD: 92.5 %
PCO2 BLDA: 33.6 MMHG (ref 35–45)
PCO2 BLDA: 34.3 MMHG (ref 35–45)
PCO2 BLDA: 37.6 MMHG (ref 35–45)
PCO2 BLDA: 38.7 MMHG (ref 35–45)
PCO2 BLDA: 40 MMHG (ref 35–45)
PCO2 BLDA: 40 MMHG (ref 35–45)
PCO2 BLDA: 40.9 MMHG (ref 35–45)
PCO2 BLDA: 42.5 MMHG (ref 35–45)
PCO2 BLDA: 43.7 MMHG (ref 35–45)
PCO2 BLDA: 44.1 MMHG (ref 35–45)
PEEP: 5
PH SMN: 7.3 [PH] (ref 7.35–7.45)
PH SMN: 7.37 [PH] (ref 7.35–7.45)
PH SMN: 7.38 [PH] (ref 7.35–7.45)
PH SMN: 7.39 [PH] (ref 7.35–7.45)
PH SMN: 7.4 [PH] (ref 7.35–7.45)
PH SMN: 7.41 [PH] (ref 7.35–7.45)
PH SMN: 7.42 [PH] (ref 7.35–7.45)
PH SMN: 7.44 [PH] (ref 7.35–7.45)
PH SMN: 7.45 [PH] (ref 7.35–7.45)
PH SMN: 7.45 [PH] (ref 7.35–7.45)
PHOSPHATE SERPL-MCNC: 2.8 MG/DL
PIP: 30
PIP: 32
PLATELET # BLD AUTO: 127 K/UL
PMV BLD AUTO: 10.7 FL
PO2 BLDA: 143 MMHG (ref 80–100)
PO2 BLDA: 300 MMHG (ref 80–100)
PO2 BLDA: 371 MMHG (ref 80–100)
PO2 BLDA: 38 MMHG (ref 40–60)
PO2 BLDA: 395 MMHG (ref 80–100)
PO2 BLDA: 402 MMHG (ref 80–100)
PO2 BLDA: 425 MMHG (ref 80–100)
PO2 BLDA: 454 MMHG (ref 80–100)
PO2 BLDA: 457 MMHG (ref 80–100)
PO2 BLDA: 94 MMHG (ref 80–100)
POC BE: -1 MMOL/L
POC BE: -3 MMOL/L
POC BE: -6 MMOL/L
POC BE: 0 MMOL/L
POC BE: 0 MMOL/L
POC BE: 1 MMOL/L
POC BE: 3 MMOL/L
POC BE: 3 MMOL/L
POC IONIZED CALCIUM: 0.97 MMOL/L (ref 1.06–1.42)
POC IONIZED CALCIUM: 1 MMOL/L (ref 1.06–1.42)
POC IONIZED CALCIUM: 1.03 MMOL/L (ref 1.06–1.42)
POC IONIZED CALCIUM: 1.05 MMOL/L (ref 1.06–1.42)
POC IONIZED CALCIUM: 1.05 MMOL/L (ref 1.06–1.42)
POC IONIZED CALCIUM: 1.13 MMOL/L (ref 1.06–1.42)
POC IONIZED CALCIUM: 1.21 MMOL/L (ref 1.06–1.42)
POC SATURATED O2: 100 % (ref 95–100)
POC SATURATED O2: 71 % (ref 95–100)
POC SATURATED O2: 96 % (ref 95–100)
POC SATURATED O2: 99 % (ref 95–100)
POC TCO2: 22 MMOL/L (ref 23–27)
POC TCO2: 23 MMOL/L (ref 23–27)
POC TCO2: 25 MMOL/L (ref 23–27)
POC TCO2: 25 MMOL/L (ref 23–27)
POC TCO2: 25 MMOL/L (ref 24–29)
POC TCO2: 26 MMOL/L (ref 23–27)
POC TCO2: 27 MMOL/L (ref 23–27)
POC TCO2: 27 MMOL/L (ref 23–27)
POC TCO2: 29 MMOL/L (ref 23–27)
POC TCO2: 29 MMOL/L (ref 23–27)
POCT GLUCOSE: 101 MG/DL (ref 70–110)
POTASSIUM BLD-SCNC: 3.5 MMOL/L (ref 3.5–5.1)
POTASSIUM BLD-SCNC: 4.3 MMOL/L (ref 3.5–5.1)
POTASSIUM BLD-SCNC: 4.8 MMOL/L (ref 3.5–5.1)
POTASSIUM BLD-SCNC: 5.1 MMOL/L (ref 3.5–5.1)
POTASSIUM BLD-SCNC: 5.2 MMOL/L (ref 3.5–5.1)
POTASSIUM BLD-SCNC: 5.3 MMOL/L (ref 3.5–5.1)
POTASSIUM BLD-SCNC: 5.5 MMOL/L (ref 3.5–5.1)
POTASSIUM SERPL-SCNC: 4.4 MMOL/L
PROTHROMBIN TIME: 10 SEC
PROTHROMBIN TIME: 12.3 SEC
PS: 10
RBC # BLD AUTO: 2.85 M/UL
SAMPLE: ABNORMAL
SITE: ABNORMAL
SODIUM BLD-SCNC: 138 MMOL/L (ref 136–145)
SODIUM BLD-SCNC: 138 MMOL/L (ref 136–145)
SODIUM BLD-SCNC: 139 MMOL/L (ref 136–145)
SODIUM BLD-SCNC: 140 MMOL/L (ref 136–145)
SODIUM BLD-SCNC: 140 MMOL/L (ref 136–145)
SODIUM BLD-SCNC: 141 MMOL/L (ref 136–145)
SODIUM BLD-SCNC: 143 MMOL/L (ref 136–145)
SODIUM SERPL-SCNC: 141 MMOL/L
SP02: 10
SP02: 100
SPONT RATE: 17
VT: 500
VT: 500
WBC # BLD AUTO: 19.86 K/UL

## 2017-08-21 PROCEDURE — 82803 BLOOD GASES ANY COMBINATION: CPT

## 2017-08-21 PROCEDURE — A4216 STERILE WATER/SALINE, 10 ML: HCPCS | Performed by: THORACIC SURGERY (CARDIOTHORACIC VASCULAR SURGERY)

## 2017-08-21 PROCEDURE — 36620 INSERTION CATHETER ARTERY: CPT | Mod: 59,,, | Performed by: ANESTHESIOLOGY

## 2017-08-21 PROCEDURE — 25000242 PHARM REV CODE 250 ALT 637 W/ HCPCS: Performed by: THORACIC SURGERY (CARDIOTHORACIC VASCULAR SURGERY)

## 2017-08-21 PROCEDURE — 63600175 PHARM REV CODE 636 W HCPCS: Performed by: STUDENT IN AN ORGANIZED HEALTH CARE EDUCATION/TRAINING PROGRAM

## 2017-08-21 PROCEDURE — 63600175 PHARM REV CODE 636 W HCPCS

## 2017-08-21 PROCEDURE — 27800595 HC HEART VALVES

## 2017-08-21 PROCEDURE — 36000712 HC OR TIME LEV V 1ST 15 MIN: Performed by: THORACIC SURGERY (CARDIOTHORACIC VASCULAR SURGERY)

## 2017-08-21 PROCEDURE — 37799 UNLISTED PX VASCULAR SURGERY: CPT

## 2017-08-21 PROCEDURE — 94799 UNLISTED PULMONARY SVC/PX: CPT

## 2017-08-21 PROCEDURE — S0028 INJECTION, FAMOTIDINE, 20 MG: HCPCS | Performed by: THORACIC SURGERY (CARDIOTHORACIC VASCULAR SURGERY)

## 2017-08-21 PROCEDURE — 63600175 PHARM REV CODE 636 W HCPCS: Performed by: ANESTHESIOLOGY

## 2017-08-21 PROCEDURE — 93010 ELECTROCARDIOGRAM REPORT: CPT | Mod: ,,, | Performed by: INTERNAL MEDICINE

## 2017-08-21 PROCEDURE — 25000003 PHARM REV CODE 250: Performed by: THORACIC SURGERY (CARDIOTHORACIC VASCULAR SURGERY)

## 2017-08-21 PROCEDURE — 5A1221Z PERFORMANCE OF CARDIAC OUTPUT, CONTINUOUS: ICD-10-PCS | Performed by: THORACIC SURGERY (CARDIOTHORACIC VASCULAR SURGERY)

## 2017-08-21 PROCEDURE — 94760 N-INVAS EAR/PLS OXIMETRY 1: CPT

## 2017-08-21 PROCEDURE — 33508 ENDOSCOPIC VEIN HARVEST: CPT | Mod: ,,, | Performed by: THORACIC SURGERY (CARDIOTHORACIC VASCULAR SURGERY)

## 2017-08-21 PROCEDURE — 36000713 HC OR TIME LEV V EA ADD 15 MIN: Performed by: THORACIC SURGERY (CARDIOTHORACIC VASCULAR SURGERY)

## 2017-08-21 PROCEDURE — 06BQ3ZZ EXCISION OF LEFT SAPHENOUS VEIN, PERCUTANEOUS APPROACH: ICD-10-PCS | Performed by: THORACIC SURGERY (CARDIOTHORACIC VASCULAR SURGERY)

## 2017-08-21 PROCEDURE — 85520 HEPARIN ASSAY: CPT

## 2017-08-21 PROCEDURE — 93005 ELECTROCARDIOGRAM TRACING: CPT

## 2017-08-21 PROCEDURE — 37000008 HC ANESTHESIA 1ST 15 MINUTES: Performed by: THORACIC SURGERY (CARDIOTHORACIC VASCULAR SURGERY)

## 2017-08-21 PROCEDURE — 0211093 BYPASS CORONARY ARTERY, TWO ARTERIES FROM CORONARY ARTERY WITH AUTOLOGOUS VENOUS TISSUE, OPEN APPROACH: ICD-10-PCS | Performed by: THORACIC SURGERY (CARDIOTHORACIC VASCULAR SURGERY)

## 2017-08-21 PROCEDURE — 33511 CABG VEIN TWO: CPT | Mod: 51,,, | Performed by: THORACIC SURGERY (CARDIOTHORACIC VASCULAR SURGERY)

## 2017-08-21 PROCEDURE — 63600175 PHARM REV CODE 636 W HCPCS: Performed by: THORACIC SURGERY (CARDIOTHORACIC VASCULAR SURGERY)

## 2017-08-21 PROCEDURE — 93312 ECHO TRANSESOPHAGEAL: CPT | Mod: 59,,, | Performed by: ANESTHESIOLOGY

## 2017-08-21 PROCEDURE — 85025 COMPLETE CBC W/AUTO DIFF WBC: CPT

## 2017-08-21 PROCEDURE — 33405 REPLACEMENT AORTIC VALVE OPN: CPT | Mod: ,,, | Performed by: THORACIC SURGERY (CARDIOTHORACIC VASCULAR SURGERY)

## 2017-08-21 PROCEDURE — 88305 TISSUE EXAM BY PATHOLOGIST: CPT | Mod: 26,,, | Performed by: PATHOLOGY

## 2017-08-21 PROCEDURE — 84295 ASSAY OF SERUM SODIUM: CPT

## 2017-08-21 PROCEDURE — 83735 ASSAY OF MAGNESIUM: CPT

## 2017-08-21 PROCEDURE — 27100088 HC CELL SAVER

## 2017-08-21 PROCEDURE — 85730 THROMBOPLASTIN TIME PARTIAL: CPT

## 2017-08-21 PROCEDURE — 25000003 PHARM REV CODE 250: Performed by: ANESTHESIOLOGY

## 2017-08-21 PROCEDURE — 27200953 HC CARDIOPLEGIA SYSTEM

## 2017-08-21 PROCEDURE — 88305 TISSUE EXAM BY PATHOLOGIST: CPT | Performed by: PATHOLOGY

## 2017-08-21 PROCEDURE — 85730 THROMBOPLASTIN TIME PARTIAL: CPT | Mod: 91

## 2017-08-21 PROCEDURE — 82330 ASSAY OF CALCIUM: CPT

## 2017-08-21 PROCEDURE — 36556 INSERT NON-TUNNEL CV CATH: CPT | Mod: 59,,, | Performed by: ANESTHESIOLOGY

## 2017-08-21 PROCEDURE — 20000000 HC ICU ROOM

## 2017-08-21 PROCEDURE — 94150 VITAL CAPACITY TEST: CPT

## 2017-08-21 PROCEDURE — 36592 COLLECT BLOOD FROM PICC: CPT

## 2017-08-21 PROCEDURE — 27000221 HC OXYGEN, UP TO 24 HOURS

## 2017-08-21 PROCEDURE — 27000175 HC ADULT BYPASS PUMP

## 2017-08-21 PROCEDURE — D9220A PRA ANESTHESIA: Mod: ,,, | Performed by: ANESTHESIOLOGY

## 2017-08-21 PROCEDURE — 80048 BASIC METABOLIC PNL TOTAL CA: CPT

## 2017-08-21 PROCEDURE — 84100 ASSAY OF PHOSPHORUS: CPT

## 2017-08-21 PROCEDURE — 94761 N-INVAS EAR/PLS OXIMETRY MLT: CPT

## 2017-08-21 PROCEDURE — 25000003 PHARM REV CODE 250: Performed by: STUDENT IN AN ORGANIZED HEALTH CARE EDUCATION/TRAINING PROGRAM

## 2017-08-21 PROCEDURE — 02RF0JZ REPLACEMENT OF AORTIC VALVE WITH SYNTHETIC SUBSTITUTE, OPEN APPROACH: ICD-10-PCS | Performed by: THORACIC SURGERY (CARDIOTHORACIC VASCULAR SURGERY)

## 2017-08-21 PROCEDURE — 94640 AIRWAY INHALATION TREATMENT: CPT

## 2017-08-21 PROCEDURE — 85610 PROTHROMBIN TIME: CPT | Mod: 91

## 2017-08-21 PROCEDURE — 27000191 HC C-V MONITORING

## 2017-08-21 PROCEDURE — 85610 PROTHROMBIN TIME: CPT

## 2017-08-21 PROCEDURE — 99900017 HC EXTUBATION W/PARAMETERS (STAT)

## 2017-08-21 PROCEDURE — C1729 CATH, DRAINAGE: HCPCS | Performed by: THORACIC SURGERY (CARDIOTHORACIC VASCULAR SURGERY)

## 2017-08-21 PROCEDURE — 76937 US GUIDE VASCULAR ACCESS: CPT | Mod: 26,,, | Performed by: ANESTHESIOLOGY

## 2017-08-21 PROCEDURE — 85014 HEMATOCRIT: CPT

## 2017-08-21 PROCEDURE — 99900035 HC TECH TIME PER 15 MIN (STAT)

## 2017-08-21 PROCEDURE — 37000009 HC ANESTHESIA EA ADD 15 MINS: Performed by: THORACIC SURGERY (CARDIOTHORACIC VASCULAR SURGERY)

## 2017-08-21 PROCEDURE — 83605 ASSAY OF LACTIC ACID: CPT

## 2017-08-21 PROCEDURE — 94002 VENT MGMT INPAT INIT DAY: CPT

## 2017-08-21 PROCEDURE — 27201423 OPTIME MED/SURG SUP & DEVICES STERILE SUPPLY: Performed by: THORACIC SURGERY (CARDIOTHORACIC VASCULAR SURGERY)

## 2017-08-21 PROCEDURE — 84132 ASSAY OF SERUM POTASSIUM: CPT

## 2017-08-21 PROCEDURE — 99222 1ST HOSP IP/OBS MODERATE 55: CPT | Mod: ,,, | Performed by: NURSE PRACTITIONER

## 2017-08-21 PROCEDURE — C9399 UNCLASSIFIED DRUGS OR BIOLOG: HCPCS | Performed by: ANESTHESIOLOGY

## 2017-08-21 DEVICE — IMPLANTABLE DEVICE: Type: IMPLANTABLE DEVICE | Site: HEART | Status: FUNCTIONAL

## 2017-08-21 RX ORDER — ROCURONIUM BROMIDE 10 MG/ML
INJECTION, SOLUTION INTRAVENOUS
Status: DISCONTINUED | OUTPATIENT
Start: 2017-08-21 | End: 2017-08-21

## 2017-08-21 RX ORDER — MUPIROCIN 20 MG/G
1 OINTMENT TOPICAL 2 TIMES DAILY
Status: DISCONTINUED | OUTPATIENT
Start: 2017-08-21 | End: 2017-08-23

## 2017-08-21 RX ORDER — NICARDIPINE HYDROCHLORIDE 0.2 MG/ML
5 INJECTION INTRAVENOUS CONTINUOUS
Status: DISCONTINUED | OUTPATIENT
Start: 2017-08-21 | End: 2017-08-23

## 2017-08-21 RX ORDER — AMINOCAPROIC ACID 250 MG/ML
INJECTION, SOLUTION INTRAVENOUS
Status: DISCONTINUED | OUTPATIENT
Start: 2017-08-21 | End: 2017-08-21

## 2017-08-21 RX ORDER — PAPAVERINE HYDROCHLORIDE 30 MG/ML
INJECTION INTRAMUSCULAR; INTRAVENOUS
Status: DISCONTINUED | OUTPATIENT
Start: 2017-08-21 | End: 2017-08-21 | Stop reason: HOSPADM

## 2017-08-21 RX ORDER — CEFAZOLIN SODIUM 1 G/3ML
INJECTION, POWDER, FOR SOLUTION INTRAMUSCULAR; INTRAVENOUS
Status: DISCONTINUED | OUTPATIENT
Start: 2017-08-21 | End: 2017-08-21

## 2017-08-21 RX ORDER — FENTANYL CITRATE 50 UG/ML
INJECTION, SOLUTION INTRAMUSCULAR; INTRAVENOUS
Status: COMPLETED
Start: 2017-08-21 | End: 2017-08-21

## 2017-08-21 RX ORDER — OXYCODONE HYDROCHLORIDE 5 MG/1
10 TABLET ORAL
Status: DISCONTINUED | OUTPATIENT
Start: 2017-08-21 | End: 2017-08-27 | Stop reason: HOSPADM

## 2017-08-21 RX ORDER — METOCLOPRAMIDE HYDROCHLORIDE 5 MG/ML
5 INJECTION INTRAMUSCULAR; INTRAVENOUS EVERY 6 HOURS PRN
Status: DISCONTINUED | OUTPATIENT
Start: 2017-08-21 | End: 2017-08-27 | Stop reason: HOSPADM

## 2017-08-21 RX ORDER — LACTULOSE 10 G/15ML
20 SOLUTION ORAL EVERY 6 HOURS PRN
Status: DISCONTINUED | OUTPATIENT
Start: 2017-08-21 | End: 2017-08-27 | Stop reason: HOSPADM

## 2017-08-21 RX ORDER — HEPARIN SODIUM 1000 [USP'U]/ML
INJECTION, SOLUTION INTRAVENOUS; SUBCUTANEOUS
Status: DISCONTINUED | OUTPATIENT
Start: 2017-08-21 | End: 2017-08-21

## 2017-08-21 RX ORDER — MIDAZOLAM HYDROCHLORIDE 1 MG/ML
INJECTION, SOLUTION INTRAMUSCULAR; INTRAVENOUS
Status: DISCONTINUED | OUTPATIENT
Start: 2017-08-21 | End: 2017-08-21

## 2017-08-21 RX ORDER — PROTAMINE SULFATE 10 MG/ML
INJECTION, SOLUTION INTRAVENOUS
Status: DISCONTINUED | OUTPATIENT
Start: 2017-08-21 | End: 2017-08-21

## 2017-08-21 RX ORDER — ONDANSETRON 2 MG/ML
4 INJECTION INTRAMUSCULAR; INTRAVENOUS EVERY 12 HOURS PRN
Status: DISCONTINUED | OUTPATIENT
Start: 2017-08-21 | End: 2017-08-27 | Stop reason: HOSPADM

## 2017-08-21 RX ORDER — POTASSIUM CHLORIDE 14.9 MG/ML
60 INJECTION INTRAVENOUS
Status: DISCONTINUED | OUTPATIENT
Start: 2017-08-21 | End: 2017-08-23

## 2017-08-21 RX ORDER — POTASSIUM CHLORIDE 29.8 MG/ML
40 INJECTION INTRAVENOUS
Status: DISCONTINUED | OUTPATIENT
Start: 2017-08-21 | End: 2017-08-23

## 2017-08-21 RX ORDER — FAMOTIDINE 10 MG/ML
20 INJECTION INTRAVENOUS 2 TIMES DAILY
Status: DISCONTINUED | OUTPATIENT
Start: 2017-08-21 | End: 2017-08-23

## 2017-08-21 RX ORDER — ASPIRIN 325 MG
325 TABLET ORAL DAILY
Status: DISCONTINUED | OUTPATIENT
Start: 2017-08-22 | End: 2017-08-27 | Stop reason: HOSPADM

## 2017-08-21 RX ORDER — ALBUMIN HUMAN 50 G/1000ML
25 SOLUTION INTRAVENOUS ONCE
Status: DISCONTINUED | OUTPATIENT
Start: 2017-08-21 | End: 2017-08-23

## 2017-08-21 RX ORDER — VALACYCLOVIR HYDROCHLORIDE 500 MG/1
1000 TABLET, FILM COATED ORAL DAILY
Status: DISCONTINUED | OUTPATIENT
Start: 2017-08-22 | End: 2017-08-27 | Stop reason: HOSPADM

## 2017-08-21 RX ORDER — IPRATROPIUM BROMIDE AND ALBUTEROL SULFATE 2.5; .5 MG/3ML; MG/3ML
3 SOLUTION RESPIRATORY (INHALATION) EVERY 4 HOURS
Status: DISCONTINUED | OUTPATIENT
Start: 2017-08-21 | End: 2017-08-27 | Stop reason: HOSPADM

## 2017-08-21 RX ORDER — ASPIRIN 325 MG
325 TABLET ORAL ONCE
Status: COMPLETED | OUTPATIENT
Start: 2017-08-21 | End: 2017-08-21

## 2017-08-21 RX ORDER — LIDOCAINE HYDROCHLORIDE 10 MG/ML
1 INJECTION, SOLUTION EPIDURAL; INFILTRATION; INTRACAUDAL; PERINEURAL ONCE
Status: COMPLETED | OUTPATIENT
Start: 2017-08-21 | End: 2017-08-21

## 2017-08-21 RX ORDER — BRIMONIDINE TARTRATE 2 MG/ML
1 SOLUTION/ DROPS OPHTHALMIC DAILY
Status: DISCONTINUED | OUTPATIENT
Start: 2017-08-21 | End: 2017-08-27 | Stop reason: HOSPADM

## 2017-08-21 RX ORDER — MUPIROCIN 20 MG/G
1 OINTMENT TOPICAL
Status: DISCONTINUED | OUTPATIENT
Start: 2017-08-21 | End: 2017-08-21 | Stop reason: HOSPADM

## 2017-08-21 RX ORDER — POTASSIUM CHLORIDE 14.9 MG/ML
20 INJECTION INTRAVENOUS
Status: DISCONTINUED | OUTPATIENT
Start: 2017-08-21 | End: 2017-08-23

## 2017-08-21 RX ORDER — LEVOTHYROXINE SODIUM 75 UG/1
75 TABLET ORAL DAILY
Status: DISCONTINUED | OUTPATIENT
Start: 2017-08-22 | End: 2017-08-27 | Stop reason: HOSPADM

## 2017-08-21 RX ORDER — FENTANYL CITRATE 50 UG/ML
50 INJECTION, SOLUTION INTRAMUSCULAR; INTRAVENOUS ONCE
Status: COMPLETED | OUTPATIENT
Start: 2017-08-21 | End: 2017-08-21

## 2017-08-21 RX ORDER — NOREPINEPHRINE BITARTRATE/D5W 4MG/250ML
0.02 PLASTIC BAG, INJECTION (ML) INTRAVENOUS CONTINUOUS
Status: DISCONTINUED | OUTPATIENT
Start: 2017-08-21 | End: 2017-08-23

## 2017-08-21 RX ORDER — PHENYLEPHRINE HYDROCHLORIDE 10 MG/ML
INJECTION INTRAVENOUS
Status: DISCONTINUED | OUTPATIENT
Start: 2017-08-21 | End: 2017-08-21

## 2017-08-21 RX ORDER — PROPOFOL 10 MG/ML
VIAL (ML) INTRAVENOUS
Status: DISCONTINUED | OUTPATIENT
Start: 2017-08-21 | End: 2017-08-21

## 2017-08-21 RX ORDER — POLYETHYLENE GLYCOL 3350 17 G/17G
17 POWDER, FOR SOLUTION ORAL DAILY
Status: DISCONTINUED | OUTPATIENT
Start: 2017-08-21 | End: 2017-08-27 | Stop reason: HOSPADM

## 2017-08-21 RX ORDER — HYDROMORPHONE HYDROCHLORIDE 1 MG/ML
1 INJECTION, SOLUTION INTRAMUSCULAR; INTRAVENOUS; SUBCUTANEOUS
Status: DISCONTINUED | OUTPATIENT
Start: 2017-08-21 | End: 2017-08-23

## 2017-08-21 RX ORDER — SODIUM CHLORIDE 9 MG/ML
INJECTION, SOLUTION INTRAVENOUS CONTINUOUS PRN
Status: DISCONTINUED | OUTPATIENT
Start: 2017-08-21 | End: 2017-08-21

## 2017-08-21 RX ORDER — FENTANYL CITRATE 50 UG/ML
25 INJECTION, SOLUTION INTRAMUSCULAR; INTRAVENOUS
Status: DISCONTINUED | OUTPATIENT
Start: 2017-08-21 | End: 2017-08-23

## 2017-08-21 RX ORDER — CALCIUM CARBONATE/VITAMIN D3 250-3.125
1 TABLET ORAL DAILY
Status: DISCONTINUED | OUTPATIENT
Start: 2017-08-22 | End: 2017-08-27 | Stop reason: HOSPADM

## 2017-08-21 RX ORDER — ROSUVASTATIN CALCIUM 20 MG/1
20 TABLET, COATED ORAL NIGHTLY
Status: DISCONTINUED | OUTPATIENT
Start: 2017-08-21 | End: 2017-08-27 | Stop reason: HOSPADM

## 2017-08-21 RX ORDER — LIDOCAINE HYDROCHLORIDE 10 MG/ML
1 INJECTION, SOLUTION EPIDURAL; INFILTRATION; INTRACAUDAL; PERINEURAL ONCE
Status: DISCONTINUED | OUTPATIENT
Start: 2017-08-21 | End: 2017-08-21 | Stop reason: HOSPADM

## 2017-08-21 RX ORDER — PROPOFOL 10 MG/ML
5 INJECTION, EMULSION INTRAVENOUS CONTINUOUS
Status: DISCONTINUED | OUTPATIENT
Start: 2017-08-21 | End: 2017-08-23

## 2017-08-21 RX ORDER — METOPROLOL TARTRATE 25 MG/1
25 TABLET, FILM COATED ORAL
Status: DISCONTINUED | OUTPATIENT
Start: 2017-08-21 | End: 2017-08-21 | Stop reason: HOSPADM

## 2017-08-21 RX ORDER — MAGNESIUM SULFATE HEPTAHYDRATE 40 MG/ML
2 INJECTION, SOLUTION INTRAVENOUS
Status: DISCONTINUED | OUTPATIENT
Start: 2017-08-21 | End: 2017-08-23

## 2017-08-21 RX ORDER — FENTANYL CITRATE 50 UG/ML
50 INJECTION, SOLUTION INTRAMUSCULAR; INTRAVENOUS
Status: DISCONTINUED | OUTPATIENT
Start: 2017-08-21 | End: 2017-08-23

## 2017-08-21 RX ORDER — CEFAZOLIN SODIUM 2 G/50ML
2 SOLUTION INTRAVENOUS
Status: COMPLETED | OUTPATIENT
Start: 2017-08-21 | End: 2017-08-23

## 2017-08-21 RX ORDER — OXYCODONE HYDROCHLORIDE 5 MG/1
5 TABLET ORAL
Status: DISCONTINUED | OUTPATIENT
Start: 2017-08-21 | End: 2017-08-27 | Stop reason: HOSPADM

## 2017-08-21 RX ORDER — FENTANYL CITRATE 50 UG/ML
INJECTION, SOLUTION INTRAMUSCULAR; INTRAVENOUS
Status: DISCONTINUED | OUTPATIENT
Start: 2017-08-21 | End: 2017-08-21

## 2017-08-21 RX ORDER — ACETAMINOPHEN 10 MG/ML
INJECTION, SOLUTION INTRAVENOUS
Status: DISCONTINUED | OUTPATIENT
Start: 2017-08-21 | End: 2017-08-21

## 2017-08-21 RX ORDER — TIMOLOL MALEATE 5 MG/ML
1 SOLUTION/ DROPS OPHTHALMIC DAILY
Status: DISCONTINUED | OUTPATIENT
Start: 2017-08-22 | End: 2017-08-27 | Stop reason: HOSPADM

## 2017-08-21 RX ORDER — SODIUM CHLORIDE, SODIUM LACTATE, POTASSIUM CHLORIDE, CALCIUM CHLORIDE 600; 310; 30; 20 MG/100ML; MG/100ML; MG/100ML; MG/100ML
1000 INJECTION, SOLUTION INTRAVENOUS
Status: DISCONTINUED | OUTPATIENT
Start: 2017-08-21 | End: 2017-08-23

## 2017-08-21 RX ORDER — ALPRAZOLAM 0.5 MG/1
0.5 TABLET ORAL 3 TIMES DAILY
COMMUNITY
End: 2018-06-15

## 2017-08-21 RX ORDER — ONDANSETRON 2 MG/ML
INJECTION INTRAMUSCULAR; INTRAVENOUS
Status: DISCONTINUED | OUTPATIENT
Start: 2017-08-21 | End: 2017-08-21

## 2017-08-21 RX ORDER — MUPIROCIN 20 MG/G
1 OINTMENT TOPICAL
Status: COMPLETED | OUTPATIENT
Start: 2017-08-21 | End: 2017-08-21

## 2017-08-21 RX ORDER — SODIUM CHLORIDE 0.9 % (FLUSH) 0.9 %
3 SYRINGE (ML) INJECTION EVERY 8 HOURS
Status: DISCONTINUED | OUTPATIENT
Start: 2017-08-21 | End: 2017-08-23

## 2017-08-21 RX ORDER — HEPARIN SODIUM 1000 [USP'U]/ML
INJECTION, SOLUTION INTRAVENOUS; SUBCUTANEOUS
Status: DISCONTINUED | OUTPATIENT
Start: 2017-08-21 | End: 2017-08-21 | Stop reason: HOSPADM

## 2017-08-21 RX ORDER — NOREPINEPHRINE BITARTRATE 1 MG/ML
INJECTION, SOLUTION INTRAVENOUS
Status: DISCONTINUED | OUTPATIENT
Start: 2017-08-21 | End: 2017-08-21

## 2017-08-21 RX ORDER — ALBUMIN HUMAN 50 G/1000ML
500 SOLUTION INTRAVENOUS
Status: DISCONTINUED | OUTPATIENT
Start: 2017-08-21 | End: 2017-08-24

## 2017-08-21 RX ADMIN — MIDAZOLAM HYDROCHLORIDE 3 MG: 1 INJECTION, SOLUTION INTRAMUSCULAR; INTRAVENOUS at 06:08

## 2017-08-21 RX ADMIN — SODIUM CHLORIDE, SODIUM GLUCONATE, SODIUM ACETATE, POTASSIUM CHLORIDE, MAGNESIUM CHLORIDE, SODIUM PHOSPHATE, DIBASIC, AND POTASSIUM PHOSPHATE: .53; .5; .37; .037; .03; .012; .00082 INJECTION, SOLUTION INTRAVENOUS at 07:08

## 2017-08-21 RX ADMIN — SUGAMMADEX 200 MG: 100 INJECTION, SOLUTION INTRAVENOUS at 02:08

## 2017-08-21 RX ADMIN — CEFAZOLIN SODIUM 2 G: 2 SOLUTION INTRAVENOUS at 09:08

## 2017-08-21 RX ADMIN — ONDANSETRON 4 MG: 2 INJECTION INTRAMUSCULAR; INTRAVENOUS at 07:08

## 2017-08-21 RX ADMIN — FENTANYL CITRATE 200 MCG: 50 INJECTION, SOLUTION INTRAMUSCULAR; INTRAVENOUS at 07:08

## 2017-08-21 RX ADMIN — Medication 3 ML: at 10:08

## 2017-08-21 RX ADMIN — ACETAMINOPHEN 1000 MG: 10 INJECTION, SOLUTION INTRAVENOUS at 02:08

## 2017-08-21 RX ADMIN — PHENYLEPHRINE HYDROCHLORIDE 50 MCG: 10 INJECTION INTRAVENOUS at 10:08

## 2017-08-21 RX ADMIN — CALCIUM CHLORIDE 500 MG: 100 INJECTION, SOLUTION INTRAVENOUS at 12:08

## 2017-08-21 RX ADMIN — FENTANYL CITRATE 50 MCG: 50 INJECTION, SOLUTION INTRAMUSCULAR; INTRAVENOUS at 04:08

## 2017-08-21 RX ADMIN — PHENYLEPHRINE HYDROCHLORIDE 50 MCG: 10 INJECTION INTRAVENOUS at 09:08

## 2017-08-21 RX ADMIN — NOREPINEPHRINE BITARTRATE 0.02 MCG/KG/MIN: 1 INJECTION, SOLUTION, CONCENTRATE INTRAVENOUS at 08:08

## 2017-08-21 RX ADMIN — IPRATROPIUM BROMIDE AND ALBUTEROL SULFATE 3 ML: .5; 3 SOLUTION RESPIRATORY (INHALATION) at 08:08

## 2017-08-21 RX ADMIN — FENTANYL CITRATE 50 MCG: 50 INJECTION, SOLUTION INTRAMUSCULAR; INTRAVENOUS at 06:08

## 2017-08-21 RX ADMIN — PROPOFOL 5 MCG/KG/MIN: 10 INJECTION, EMULSION INTRAVENOUS at 03:08

## 2017-08-21 RX ADMIN — Medication 3 ML: at 03:08

## 2017-08-21 RX ADMIN — FENTANYL CITRATE 50 MCG: 50 INJECTION INTRAMUSCULAR; INTRAVENOUS at 04:08

## 2017-08-21 RX ADMIN — ROCURONIUM BROMIDE 50 MG: 10 INJECTION, SOLUTION INTRAVENOUS at 08:08

## 2017-08-21 RX ADMIN — FENTANYL CITRATE 250 MCG: 50 INJECTION, SOLUTION INTRAMUSCULAR; INTRAVENOUS at 11:08

## 2017-08-21 RX ADMIN — POLYETHYLENE GLYCOL 3350 17 G: 17 POWDER, FOR SOLUTION ORAL at 03:08

## 2017-08-21 RX ADMIN — IPRATROPIUM BROMIDE AND ALBUTEROL SULFATE 3 ML: .5; 3 SOLUTION RESPIRATORY (INHALATION) at 03:08

## 2017-08-21 RX ADMIN — HYDROMORPHONE HYDROCHLORIDE 1 MG: 1 INJECTION, SOLUTION INTRAMUSCULAR; INTRAVENOUS; SUBCUTANEOUS at 09:08

## 2017-08-21 RX ADMIN — PHENYLEPHRINE HYDROCHLORIDE 50 MCG: 10 INJECTION INTRAVENOUS at 08:08

## 2017-08-21 RX ADMIN — CEFAZOLIN 2 G: 1 INJECTION, POWDER, FOR SOLUTION INTRAVENOUS at 01:08

## 2017-08-21 RX ADMIN — FENTANYL CITRATE 25 MCG: 50 INJECTION INTRAMUSCULAR; INTRAVENOUS at 04:08

## 2017-08-21 RX ADMIN — PROTAMINE SULFATE 200 MG: 10 INJECTION, SOLUTION INTRAVENOUS at 01:08

## 2017-08-21 RX ADMIN — CEFAZOLIN 2 G: 1 INJECTION, POWDER, FOR SOLUTION INTRAVENOUS at 08:08

## 2017-08-21 RX ADMIN — SODIUM CHLORIDE, SODIUM LACTATE, POTASSIUM CHLORIDE, AND CALCIUM CHLORIDE 1000 ML: .6; .31; .03; .02 INJECTION, SOLUTION INTRAVENOUS at 06:08

## 2017-08-21 RX ADMIN — CALCIUM CHLORIDE 1000 MG: 100 INJECTION, SOLUTION INTRAVENOUS at 01:08

## 2017-08-21 RX ADMIN — LIDOCAINE HYDROCHLORIDE 10 MG: 10 INJECTION, SOLUTION EPIDURAL; INFILTRATION; INTRACAUDAL; PERINEURAL at 06:08

## 2017-08-21 RX ADMIN — MUPIROCIN 1 G: 20 OINTMENT TOPICAL at 06:08

## 2017-08-21 RX ADMIN — HEPARIN SODIUM 22000 UNITS: 1000 INJECTION, SOLUTION INTRAVENOUS; SUBCUTANEOUS at 09:08

## 2017-08-21 RX ADMIN — PHENYLEPHRINE HYDROCHLORIDE 100 MCG: 10 INJECTION INTRAVENOUS at 08:08

## 2017-08-21 RX ADMIN — SODIUM CHLORIDE 1 UNITS/HR: 9 INJECTION, SOLUTION INTRAVENOUS at 05:08

## 2017-08-21 RX ADMIN — MUPIROCIN 1 G: 20 OINTMENT TOPICAL at 10:08

## 2017-08-21 RX ADMIN — ROCURONIUM BROMIDE 100 MG: 10 INJECTION, SOLUTION INTRAVENOUS at 07:08

## 2017-08-21 RX ADMIN — ROCURONIUM BROMIDE 50 MG: 10 INJECTION, SOLUTION INTRAVENOUS at 11:08

## 2017-08-21 RX ADMIN — FAMOTIDINE 20 MG: 10 INJECTION, SOLUTION INTRAVENOUS at 09:08

## 2017-08-21 RX ADMIN — SODIUM CHLORIDE: 0.9 INJECTION, SOLUTION INTRAVENOUS at 06:08

## 2017-08-21 RX ADMIN — AMINOCAPROIC ACID 10 G: 250 INJECTION, SOLUTION INTRAVENOUS at 08:08

## 2017-08-21 RX ADMIN — ASPIRIN 325 MG ORAL TABLET 325 MG: 325 PILL ORAL at 03:08

## 2017-08-21 RX ADMIN — FENTANYL CITRATE 25 MCG: 50 INJECTION INTRAMUSCULAR; INTRAVENOUS at 09:08

## 2017-08-21 RX ADMIN — FENTANYL CITRATE 250 MCG: 50 INJECTION, SOLUTION INTRAMUSCULAR; INTRAVENOUS at 01:08

## 2017-08-21 RX ADMIN — BRIMONIDINE TARTRATE 1 DROP: 2 SOLUTION OPHTHALMIC at 03:08

## 2017-08-21 RX ADMIN — FENTANYL CITRATE 250 MCG: 50 INJECTION, SOLUTION INTRAMUSCULAR; INTRAVENOUS at 08:08

## 2017-08-21 RX ADMIN — SODIUM CHLORIDE, SODIUM LACTATE, POTASSIUM CHLORIDE, AND CALCIUM CHLORIDE 1000 ML: .6; .31; .03; .02 INJECTION, SOLUTION INTRAVENOUS at 07:08

## 2017-08-21 RX ADMIN — PROPOFOL 50 MG: 10 INJECTION, EMULSION INTRAVENOUS at 07:08

## 2017-08-21 RX ADMIN — PHENYLEPHRINE HYDROCHLORIDE 100 MCG: 10 INJECTION INTRAVENOUS at 07:08

## 2017-08-21 NOTE — SUBJECTIVE & OBJECTIVE
PMH, PSH, FH, SH reviewed     Received in ICU, intubated. Admit BG upon arrival 101 without IV insulin infusing.     Review of Systems   Unable to obtain due to: Sedation,Intubation,Altered mental status,Critical illness,Reviewed ROS from note dated 8/15/17 per Dr. Flores    Current Medications and/or Treatments Impacting Glycemic Control  Pressors:    Autonomic Drugs     Start     Stop Route Frequency Ordered    08/21/17 1500  epinephrine 4 mg in sodium chloride 0.9% 250 mL infusion     Question Answer Comment   Titrate by: (in mcg/kg/min) 0.05    Titrate interval: (in minutes) 5    Titrate to maintain: (SBP or MAP or Cardiac Index) CARDIAC INDEX    Cardiac index greater than: (in L/min) 2.2    Maximum dose: (in mcg/kg/min) 2        -- IV Continuous 08/21/17 1452    08/21/17 1500  norepinephrine 4 mg in dextrose 5% 250 mL infusion (premix) (titrating)     Question Answer Comment   Titrate by: (in mcg/kg/min) 0.05    Titrate interval: (in minutes) 5    Titrate to maintain: (MAP or SBP) MAP    Greater than: (in mmHg) 60    Maximum dose: (in mcg/kg/min) 3        -- IV Continuous 08/21/17 1452        Hyperglycemia/Diabetes Medications:   Antihyperglycemics     Start     Stop Route Frequency Ordered    08/21/17 1500  insulin regular (Humulin R) 100 Units in sodium chloride 0.9% 100 mL infusion      -- IV Continuous 08/21/17 1452          PHYSICAL EXAMINATION:Vitals:    08/21/17 1615   BP: (!) 85/53   Pulse: 73   Resp: 14   Temp:      Body mass index is 29.23 kg/m².    Physical Exam   Constitutional:  Well developed, well nourished, NAD.  ENT: External ears no masses with nose patent   Neck:  Supple; trachea midline.   Cardiovascular: Normal heart sounds, no LE edema.     Lungs:  Normal effort; lungs anterior bilaterally clear to auscultation.  Abdomen:  Soft, no masses,  no hernias. Hypoactive BS  MS: No clubbing or cyanosis of nails noted; unable to assess gait.  Skin: Midsternal dsg C/D/I; No rashes, lesions, or  ulcers; no nodules.

## 2017-08-21 NOTE — ANESTHESIA PROCEDURE NOTES
EDUAR    Diagnosis: severe AS  Patient location during procedure: OR  Procedure start time: 8/21/2017 7:31 AM  Timeout: 8/21/2017 7:30 AM  Procedure end time: 8/21/2017 7:50 AM  Exam type: Baseline  Staffing  Anesthesiologist: GILL VELAZCO  Performed: anesthesiologist   Preanesthetic Checklist  Completed: patient identified, surgical consent, pre-op evaluation, timeout performed, risks and benefits discussed, monitors and equipment checked, anesthesia consent given, oxygen available, suction available, hand hygiene performed and patient being monitored  Setup & Induction  Patient preparation: bite block inserted  Probe Insertion: easy  Exam: complete  Exam         LVAD  Estimated Ejection Fraction: > 55% normal  Regional Wall Abnormalities: no RWMA            Right Heart  Right Ventricle: normal  Right Ventricle Function: normal    Intra Atrial Septum  PFO: no shunt by color flow doppler    lipomatous hypertrophy      Right Ventricle  Size: normal, Free Wall Thickness: normal    Aortic Valve:  Stenosis: severe  Morphology: trileaflet  JORI by Continuity Equation: 0.7 cm2  JORI by planimetry: 0.7 cm2  Regurgitation: mild to moderate aortic regurgitation     Mitral Valve:  Morphology:normal  Jet Description: none    Tricuspid Valve:  Morphology: normal  Regurgitation: none    Pulmonic Valve:  Morphology:normal  Regurgitation(color flow): none    Great Vessels  Ascending Aorta Atherosclerosis: 1=nl-min dz  Aortic Arch Atherosclerosis: 1=nl-min dz  IABP: no  Descending Aorta Atherosclerosis: 3=sessile dz (3-5mm)  Aorta    Descending aorta IABP: no    Effusions    Summary  Findings discussed with surgeon.    Other Findings   Normal biventricular systolic function, EF >55%  Severe AS, JORI = 0.7 by VTI  Mild to moderate AI

## 2017-08-21 NOTE — PROGRESS NOTES
Per Dr. Martinez pre-op labs, chest x-ray, ekg that was completed on 8/15/17 does not need to be re-done this morning.

## 2017-08-21 NOTE — PROGRESS NOTES
Received patient from OR.  Patient placed on 840 vent on documented settings.  Patient and vitals stable.  Will continue to monitor.       08/21/17 1500   Patient Assessment/Suction   Level of Consciousness (AVPU) responds to voice   PRE-TX-O2-ETCO2   O2 Device (Oxygen Therapy) ventilator  (Simultaneous filing. User may not have seen previous data.)   $ Is the patient on Oxygen? Yes   Oxygen Concentration (%) 100   SpO2 100 %  (Simultaneous filing. User may not have seen previous data.)   Pulse Oximetry Type Continuous  (Simultaneous filing. User may not have seen previous data.)   $ Pulse Oximetry - Multiple Charge Pulse Oximetry - Multiple   Pulse 79  (Simultaneous filing. User may not have seen previous data.)   Resp (!) 43  (Simultaneous filing. User may not have seen previous data.)   Vent Select   Conventional Vent Y   Intubation? OR   Ventilator Initiated Yes   $ Ventilator Initial Yes   Preset Conventional Ventilator Settings   Vent ID [id number]   Vent Type    Ventilation Type VC   Vent Mode SIMV   Humidity HME   Set Rate 14 bmp   Vt Set 500 mL   PEEP/CPAP 5 cmH20   Pressure Support 10 cmH20   Waveform RAMP   Peak Flow 60 L/min   Set Inspiratory Pressure 0 cmH20   Insp Time 0 Sec(s)   Plateau Set/Insp. Hold (sec) 0   Insp Rise Time  50 %   Trigger Sensitivity Flow/I-Trigger 3 L/min   P High 0 cm H2O   P Low 0 cm H2O   T High 0 sec   T Low 0 sec   Patient Ventilator Parameters   Resp Rate Total 17 br/min   Peak Airway Pressure 20 cmH2O   Mean Airway Pressure 8.6 cmH20   Plateau Pressure 0 cmH20   Exhaled Vt 517 mL   Total Ve 7.77 mL   Spont Ve 2.2 L   I:E Ratio Measured 1:2.70   Conventional Ventilator Alarms   Alarms On Y   Ve High Alarm 27 L/min   Ve Low Alarm 1.2 L/min   Resp Rate High Alarm 55 br/min   Press High Alarm 60 cmH2O   Apnea Rate 14   Apnea Volume (mL) 500 mL   Apnea Oxygen Concentration  100   Apnea Flow Rate (L/min) 60   T Apnea 30 sec(s)   Ready to Wean/Extubation Screen   FIO2<=50 (chart  decimal) (!) 1   MV<16L (chart vol.) 7.77   PEEP <=8 (chart #) 5   Ready to Wean Parameters   F/VT Ratio<105 (RSBI) (!) 83.17

## 2017-08-21 NOTE — ASSESSMENT & PLAN NOTE
Start IV insulin infusion protocol with BG >140. Requires intensive BG monitoring q1hr, has manjula for frequent BG monitoring. Optimal BG control through POD #3.

## 2017-08-21 NOTE — BRIEF OP NOTE
[facility]  Brief Operative Note    SUMMARY     Surgery Date: 8/21/2017     Surgeon(s) and Role:     * Evan Ribeiro MD - Fellow     * Raul Flores MD - Primary    Assisting Surgeon: None    Pre-op Diagnosis:  Aortic valve stenosis, unspecified etiology [I35.0]  Coronary artery disease, angina presence unspecified, unspecified vessel or lesion type, unspecified whether native or transplanted heart [I25.10]    Post-op Diagnosis:  Post-Op Diagnosis Codes:     * Aortic valve stenosis, unspecified etiology [I35.0]     * Coronary artery disease, angina presence unspecified, unspecified vessel or lesion type, unspecified whether native or transplanted heart [I25.10]    Procedure(s) (LRB):  1)  REPLACEMENT-VALVE-AORTIC with a [place] Medium Pericardial valve  05106  2)  AORTOCORONARY BYPASS-CABG X 2 with SVG-LAD and SVG-distal RCA  81714, 89737      Anesthesia: General        Description of the findings of the procedure: LIMA appeared to have been surgically divided at previous operation, or obliterated by radiation. Not usable in either case. Mean gradient 4 mm Hg post EDUAR.     Estimated Blood Loss: 100 mL         Specimens:   Specimen (12h ago through future)    Start     Ordered    08/21/17 1255  Specimen to Pathology - Surgery  Once     Comments:  Specimen to Pathology1) Aortic valve leaflets (perm)      08/21/17 1255    08/21/17 1136  Specimen to Pathology - Surgery  Once     Comments:  1)  Aortic valve leaflets -permenant      08/21/17 1136          Attestation:  I was present and scrubbed for the procedure.

## 2017-08-21 NOTE — H&P
History & Physical  Surgical Intensive Care    SUBJECTIVE:     Chief Complaint/Reason for Admission: s/p AVR and CABG x2     History of Present Illness:  Patient is a 62 y.o. female CAD, severe AS, HTN, HLD, pemphigoid (methotrexate), Hodgkins lymphoma (s/p radiation to neck, chest, and abdominal radiation 1991 &1995 and chemo 1995), and bilateral mastectomy. She presented to the ED weeks ago for 'chest heaviness' initially presumed to be associated with SOB and lung damage from radiation. Had LHC which revealed LM disease and RCA stenosis. S/p AVR and CABG x2 with SVG-LAD and SVG-distal RCA .     Patient tolerated the procedure well. [id number]. Was transferred to the ICU intubated and stable condition.On propofol for sedation. On levo, epi and cardene gtt. Minimal sanguinous output from mediastinal and pleural chest tubes.     PTA Medications   Medication Sig    alprazolam (XANAX) 0.5 MG tablet Take 0.5 mg by mouth 3 (three) times daily.    aspirin (ECOTRIN) 81 MG EC tablet Take 1 tablet (81 mg total) by mouth once daily.    brimonidine-timolol (COMBIGAN) 0.2-0.5 % Drop Place 1 drop into both eyes once daily.     calcium carbonate (OS-CALVIN) 600 mg (1,500 mg) Tab Take 600 mg by mouth 2 (two) times daily with meals.    clobetasol (TEMOVATE) 0.05 % Gel Apply 1 application topically 2 (two) times daily. Apply to affected area    cloNIDine (CATAPRES) 0.1 MG tablet Take 1 tablet (0.1 mg total) by mouth every 6 (six) hours as needed (for systolic bp >160).    folic acid (FOLVITE) 1 MG tablet Take 1 mg by mouth once daily.     levothyroxine (SYNTHROID) 75 MCG tablet TAKE 1 TABLET EVERY DAY    lisinopril (PRINIVIL,ZESTRIL) 20 MG tablet Take 1 tablet (20 mg total) by mouth once daily.    methotrexate 2.5 MG Tab Take 2.5 mg by mouth every 7 days. 10 tablets every 7 days on Fridiay    metoprolol succinate (TOPROL-XL) 50 MG 24 hr tablet Take 1 tablet (50 mg total) by mouth every evening.    RESTASIS 0.05 % ophthalmic  emulsion Place 1 drop into both eyes 2 (two) times daily.    rosuvastatin (CRESTOR) 20 MG tablet Take 1 tablet (20 mg total) by mouth nightly. (Patient taking differently: Take 20 mg by mouth every other day. )    valacyclovir (VALTREX) 1000 MG tablet TAKE 1 TABLET EVERY 24 HOURS    albuterol 90 mcg/actuation inhaler Inhale 2 puffs into the lungs every 6 (six) hours as needed for Wheezing.       Review of patient's allergies indicates:   Allergen Reactions    Norvasc  [amlodipine] Shortness Of Breath     unknown  Other reaction(s): Swelling    Sulfa (sulfonamide antibiotics) Shortness Of Breath, Itching and Other (See Comments)     elevated blood pressure    Iodinated contrast- oral and iv dye     Iodine      Other reaction(s): Unknown    Latex      Other reaction(s): Itching       Past Medical History:   Diagnosis Date    Allergy     Breast cancer 2008    Hodgkin lymphoma     Hyperlipidemia     Hypertension     Osteoporosis     Pemphigoid     Thyroid disease      Past Surgical History:   Procedure Laterality Date    BREAST BIOPSY      BREAST RECONSTRUCTION      bilateral mastectomy    COSMETIC SURGERY      bereast reconstruction    EYE SURGERY      eye lids    LYMPHADENECTOMY      MASTECTOMY      SKIN BIOPSY      SPLENECTOMY, TOTAL      TUMOR REMOVAL       Family History   Problem Relation Age of Onset    Hypertension Mother     Hypertension Father     Cancer Neg Hx      Social History   Substance Use Topics    Smoking status: Former Smoker     Packs/day: 1.00     Years: 20.00     Types: Cigarettes     Quit date: 11/6/1981    Smokeless tobacco: Never Used    Alcohol use No      Comment: 2 drinks/month        Review of Systems:  Patient intubated- unable to obtain at this time    OBJECTIVE:     Vital Signs (Most Recent)  Temp: (!) 94.8 °F (34.9 °C) (08/21/17 1500)  Pulse: 74 (08/21/17 1515)  Resp: 14 (08/21/17 1515)  BP: (!) 147/89 (08/21/17 1515)  SpO2: 100 % (08/21/17  2462)  Ventilator Data (Last 24H):     Vent Mode: SIMV  Oxygen Concentration (%):  [] 50  Resp Rate Total:  [14 br/min-17 br/min] 14 br/min  Vt Set:  [500 mL] 500 mL  PEEP/CPAP:  [5 cmH20] 5 cmH20  Pressure Support:  [10 cmH20] 10 cmH20  Mean Airway Pressure:  [8.6 rwE06-10 cmH20] 11 cmH20    Physical Exam:  Physical Exam   Constitutional: She is well-developed, well-nourished, and in no distress. No distress.   Intubated and sedated   HENT:   Head: Normocephalic and atraumatic.   Neck: Normal range of motion. Neck supple. No JVD present.   Cardiovascular: Normal rate, regular rhythm, normal heart sounds and intact distal pulses.    Pulmonary/Chest: Effort normal. No stridor. No respiratory distress. She has no wheezes.   Mild course breath sounds appreciated bilaterally. Sternal dressing in place. cdi   Abdominal: Soft. Bowel sounds are normal. She exhibits no distension and no mass. There is no tenderness.   Genitourinary:   Genitourinary Comments: Reid in place   Musculoskeletal:   Ace bandage covering entire L leg.    Neurological:   Intubated and sedated   Skin: Skin is warm and dry. No rash noted. No erythema.     Lines/Drains:       Peripheral IV - Single Lumen 08/21/17 0612 Right Hand (Active)   Site Assessment Clean;Intact;Dry;No redness;No swelling 8/21/2017  6:00 AM   Line Status Blood return noted;Flushed;Infusing 8/21/2017  6:00 AM   Dressing Status Clean;Intact;Dry 8/21/2017  6:00 AM   Dressing Intervention New dressing 8/21/2017  6:00 AM   Number of days: 0            Urethral Catheter 08/21/17 0900 (Active)   Output (mL) 75 mL 8/21/2017  3:00 PM   Number of days: 0            Y Chest Tube 1 and 2 08/21/17 1334 Left Pleural 19 Fr. 19 Fr. (Active)   Output (mL) 10 mL 8/21/2017  3:00 PM   Number of days: 0            Y Chest Tube 3 and 4 08/21/17 1334 Anterior Mediastinal 19 Fr. Anterior Mediastinal (Active)   Output (mL) 85 mL 8/21/2017  3:00 PM   Number of days: 0     Laboratory  CBC:   Recent  Labs  Lab 08/15/17  1025  08/21/17  1502   WBC 7.15  --   --    RBC 3.61*  --   --    HGB 11.8*  --   --    HCT 34.9*  < > 24*   *  --   --    MCV 97  --   --    MCH 32.7*  --   --    MCHC 33.8  --   --    < > = values in this interval not displayed.  BMP:   Recent Labs  Lab 08/15/17  1025   GLU 97      K 4.4      CO2 30*   BUN 21   CREATININE 0.7   CALCIUM 9.3     CMP:   Recent Labs  Lab 08/15/17  1025   GLU 97   CALCIUM 9.3   ALBUMIN 3.3*   PROT 6.5      K 4.4   CO2 30*      BUN 21   CREATININE 0.7   ALKPHOS 82   ALT 16   AST 18   BILITOT 0.4     LFTs:   Recent Labs  Lab 08/15/17  1025   ALT 16   AST 18   ALKPHOS 82   BILITOT 0.4   PROT 6.5   ALBUMIN 3.3*     Coagulation:   Recent Labs  Lab 08/21/17  1453   LABPROT 12.3   INR 1.2   APTT 25.8     ECHO 6/14/2017  CONCLUSIONS     1 - Concentric LVH with normal left ventricular systolic function (EF 60-65%).     2 - No wall motion abnormalities.     3 - Normal left ventricular diastolic function.     4 - Normal right ventricular systolic function .     5 - Mild tricuspid regurgitation.     6 - The estimated PA systolic pressure is 25 mmHg.     7 - Moderate to severe aortic stenosis, JORI = 0.79 cm2, peak velocity = 3.84 m/s, mean gradient = 35 mmHg; mild AI.   ASSESSMENT/PLAN:   Plan:  Neuro:   -Intubated and sedated  -Pain control: PRN dilaudid and fent    Pulmonary:   -Vent settings:  Vent Mode: SIMV  Oxygen Concentration (%):  [] 50  Resp Rate Total:  [14 br/min-17 br/min] 14 br/min  Vt Set:  [500 mL] 500 mL  PEEP/CPAP:  [5 cmH20] 5 cmH20  Pressure Support:  [10 cmH20] 10 cmH20  Mean Airway Pressure:  [8.6 jkC24-01 cmH20] 11 cmH20  -wean vent settings as tolerated- plan to extubate today    Cardiac:  S/p AVR and CABG x2. Hx of CAD and severe AS   - HDS  - epi and levo - wean as tolerated  -amicar gtt - wean as tolerated  -Minimal output from Y connected mediastinal (80) and pleural (10)chest tubes- monitor output closely  - ASA  and statin    /Renal:   - Reid: in place  - Monitor UOP closely   -BUN/Cr: 15/0.6  -Strict I/Os    Infectious Disease:   -Afebrile  -WBC 19.86  -Antibiotics:ancef, valavcylovir   -Cultures:    Hematology/Oncology:  -H/H -  9.4/27.4  -plt: 127  -EBL 100cc   -trend labs    Endocrine:  -endocrine following  -B   -levothyroxine 75mg    Fluids/Electrolytes/Nutrition/GI:   -Nutritional status: NPO  -Lyte replacement as needed  -Nausea meds PRN    -famotidine     Dispo: Continue care in ICU     DAVE Amin MD   General Surgery, PGY1  2017

## 2017-08-21 NOTE — CONSULTS
"[facility]  Endocrinology  Diabetes Consult Note    Consult Requested by: Raul Flores MD   Reason for admit: Coronary artery disease of native artery of native heart with stable angina pectoris    HISTORY OF PRESENT ILLNESS:  Reason for Consult: Management of Hyperglycemia     Surgical Procedure and Date: s/p CABG x 2 with AVR  8/21/17    HPI: Patient is a 62 y.o. female with a diagnosis of CAD and severe AS.  She has a past medical history of hodgkins lymphoma in 1991 and 1995 for which she received radiation to the neck, chest and abdomen in 1991.  When she had recurrence in 1995 she had chemotherapy.  She states she has had shortness of breath for several years with exertion but has always been told she had "lung damage" from the radiation.  However recently she began having a heavy feeling in her chest which prompted an ED visit to [facility].  She was admitted and underwent LHC and was found to have LM disease and RCA stenosis. She has persistent shortness of breath and intermittent feelings of "chest heaviness." Underwent cardiac surgery.   No personal or family hx of DM. Pre op A1c 5.7%.   Endocrine consulted for BG mgmt.           PMH, PSH, FH, SH reviewed     Received in ICU, intubated. Admit BG upon arrival 101 without IV insulin infusing.     Review of Systems   Unable to obtain due to: Sedation,Intubation,Altered mental status,Critical illness,Reviewed ROS from note dated 8/15/17 per Dr. Flores    Current Medications and/or Treatments Impacting Glycemic Control  Pressors:    Autonomic Drugs     Start     Stop Route Frequency Ordered    08/21/17 1500  epinephrine 4 mg in sodium chloride 0.9% 250 mL infusion     Question Answer Comment   Titrate by: (in mcg/kg/min) 0.05    Titrate interval: (in minutes) 5    Titrate to maintain: (SBP or MAP or Cardiac Index) CARDIAC INDEX    Cardiac index greater than: (in L/min) 2.2    Maximum dose: (in mcg/kg/min) 2        -- IV Continuous 08/21/17 " 1452    08/21/17 1500  norepinephrine 4 mg in dextrose 5% 250 mL infusion (premix) (titrating)     Question Answer Comment   Titrate by: (in mcg/kg/min) 0.05    Titrate interval: (in minutes) 5    Titrate to maintain: (MAP or SBP) MAP    Greater than: (in mmHg) 60    Maximum dose: (in mcg/kg/min) 3        -- IV Continuous 08/21/17 1452        Hyperglycemia/Diabetes Medications:   Antihyperglycemics     Start     Stop Route Frequency Ordered    08/21/17 1500  insulin regular (Humulin R) 100 Units in sodium chloride 0.9% 100 mL infusion      -- IV Continuous 08/21/17 1452          PHYSICAL EXAMINATION:Vitals:    08/21/17 1615   BP: (!) 85/53   Pulse: 73   Resp: 14   Temp:      Body mass index is 29.23 kg/m².    Physical Exam   Constitutional:  Well developed, well nourished, NAD.  ENT: External ears no masses with nose patent   Neck:  Supple; trachea midline.   Cardiovascular: Normal heart sounds, no LE edema.     Lungs:  Normal effort; lungs anterior bilaterally clear to auscultation.  Abdomen:  Soft, no masses,  no hernias. Hypoactive BS  MS: No clubbing or cyanosis of nails noted; unable to assess gait.  Skin: Midsternal dsg C/D/I; No rashes, lesions, or ulcers; no nodules.        Labs Reviewed and Include     Recent Labs  Lab 08/21/17  1453   *   CALCIUM 8.4*      K 4.4   CO2 23   *   BUN 15   CREATININE 0.6     Lab Results   Component Value Date    WBC 19.86 (H) 08/21/2017    HGB 9.4 (L) 08/21/2017    HCT 24 (L) 08/21/2017    MCV 96 08/21/2017     (L) 08/21/2017     No results for input(s): TSH, FREET4 in the last 168 hours.  Lab Results   Component Value Date    HGBA1C 5.7 (H) 08/15/2017       Nutritional status:   Body mass index is 29.23 kg/m².  Lab Results   Component Value Date    ALBUMIN 3.3 (L) 08/15/2017    ALBUMIN 4.9 07/14/2017    ALBUMIN 3.7 04/26/2017     No results found for: PREALBUMIN    Estimated Creatinine Clearance: 95.4 mL/min (based on Cr of  0.6).    Accu-Checks  Recent Labs      08/21/17   1454   POCTGLUCOSE  101        ASSESSMENT and PLAN    Acute hyperglycemia    Start IV insulin infusion protocol with BG >140. Requires intensive BG monitoring q1hr, has manjula for frequent BG monitoring. Optimal BG control through POD #3.          * Coronary artery disease of native artery of native heart with stable angina pectoris    avoid hypoglycemia  S/p CABG this admission          Aortic stenosis    avoid hypoglycemia          S/P CABG x 2    Per CTS. avoid hypoglycemia          S/P AVR (aortic valve replacement)    Per CTS. avoid hypoglycemia          Hypothyroid    Lab Results   Component Value Date    TSH 2.570 04/24/2017     Home dose: Synthroid 75 mcg QD              Plan discussed with RN at bedside.     DANIEL Baumann,ANP-C  Endocrinology  [facility]

## 2017-08-21 NOTE — TRANSFER OF CARE
"Anesthesia Transfer of Care Note    Patient: Dulce Root    Procedure(s) Performed: Procedure(s) (LRB):  REPLACEMENT-VALVE-AORTIC (N/A)  AORTOCORONARY BYPASS-CABG (N/A)  HARVEST-VEIN-ENDOVASCULAR (Left)    Patient location: ICU    Anesthesia Type: general    Transport from OR: Transported from OR intubated on 100% O2 by AMBU with adequate controlled ventilation    Post pain: adequate analgesia    Post assessment: no apparent anesthetic complications    Post vital signs: stable    Level of consciousness: sedated    Nausea/Vomiting: no nausea/vomiting    Complications: none    Transfer of care protocol was followed      Last vitals:   Visit Vitals  BP (!) 161/76   Pulse 85   Temp 36.9 °C (98.5 °F) (Oral)   Resp 18   Ht 5' 3" (1.6 m)   Wt 74.8 kg (165 lb)   SpO2 99%   Breastfeeding? No   BMI 29.23 kg/m²     "

## 2017-08-21 NOTE — ANESTHESIA PROCEDURE NOTES
Central Line    Diagnosis: CABG AVR   Patient location during procedure: done in OR  Procedure start time: 8/21/2017 7:10 AM  Timeout: 8/21/2017 7:10 AM  Procedure end time: 8/21/2017 7:23 AM  Staffing  Anesthesiologist: GILL VELAZCO  Resident/CRNA: MARY CUETO  Performed: resident/CRNA   Anesthesiologist was present at the time of the procedure.  Preanesthetic Checklist  Completed: patient identified, site marked, surgical consent, pre-op evaluation, timeout performed, IV checked, risks and benefits discussed, monitors and equipment checked and anesthesia consent given  Indication  Indication: hemodynamic monitoring, vascular access, med administration     Anesthesia   general anesthesia    Central Line  Skin Prep: skin prepped with ChloraPrep, skin prep agent completely dried prior to procedure  maximum sterile barriers used during central venous catheter insertion  hand hygiene performed prior to central venous catheter insertion  Location: right internal jugular,   Catheter type: quad lumen  Catheter Size: 8.5 Fr  Ultrasound: vascular probe with ultrasound  Vessel Caliber: large, patent, compressibility normal  Needle advanced into vessel with real time Ultrasound guidance.  Guidewire confirmed in vessel.  Sterile sheath used.  Image recorded and saved.   Manometry: Venous cannualation confirmed by visual estimation of blood vessel pressure using manometry.  Insertion Attempts: 1   Securement:line sutured, chlorhexidine patch, sterile dressing applied and blood return through all ports     Post-Procedure  X-Ray: successful placement  Adverse Events:none

## 2017-08-21 NOTE — OP NOTE
DATE OF PROCEDURE:  08/21/2017    ATTENDING SURGEON:  Raul Flores M.D.    ASSISTANT:  Evan Ribeiro M.D.(RES) (Cardiothoracic resident).    PREOPERATIVE DIAGNOSES:  1.  Aortic stenosis.  2.  Coronary artery disease.  3.  Hodgkin lymphoma.  4.  Mediastinal and chest radiation.  5.  Previous chemotherapy.  6.  Left main disease.  7.  History of breast cancer.  8.  Hypertension.  9.  Thyroid disease.    POSTOPERATIVE DIAGNOSES:  1.  Aortic stenosis.  2.  Coronary artery disease.  3.  Hodgkin lymphoma.  4.  Mediastinal and chest radiation.  5.  Previous chemotherapy.  6.  Left main disease.  7.  History of breast cancer.  8.  Hypertension.  9.  Thyroid disease.    OPERATIONS PERFORMED:  1.  Aortic valve replacement with a medium Perceval pericardial valve.  2.  Coronary artery bypass grafting x2, with saphenous vein graft to the distal   right coronary, and saphenous vein graft to the left anterior descending.    ANESTHESIA:  General endotracheal.    ESTIMATED BLOOD LOSS:  100 mL.    BRIEF HISTORY:  Ms. Root is a 62-year-old woman with the above past medical   history who initially presented with progressive dyspnea on exertion.  She also   had some chest tightness.  She underwent a thoughtful and thorough evaluation,   which included coronary angiography.  This study demonstrated left main disease   as well as right coronary disease.  She was also found to have aortic stenosis,   which was severe.  She was initially evaluated for the transcatheter valve, but   was in a low risk and was not eligible to participate in the trial.  She now   presents for aortic valve replacement and coronary artery bypass grafting.    PROCEDURE IN DETAIL:  After obtaining informed and written consent, the patient   was brought to the operating room, placed on the operating table in supine   position.  After induction of adequate general endotracheal anesthesia, the   patient's chest, abdomen pelvis and bilateral lower  extremities were prepped and   draped in usual sterile fashion.  An upper midline skin incision was made and a   median sternotomy was performed.  We attempted to dissect the left internal   mammary artery, but it appeared that it had been surgically divided at the time   of a previous [place] operation.  We elected to proceed with using vein for   both bypasses, as skin incision was made in the leg and the greater saphenous   vein was harvested using endoscopic technique.  Once the vein was out, the leg   was closed in multiple layers of absorbable suture material.  A pericardial well   was created.  The patient was systemically heparinized.  Cannulation sutures   were placed in the aorta and in the right atrial appendage.  The aortic cannula   was inserted, followed by the venous.  Antegrade and retrograde cardioplegia   catheters were placed.  The patient was then put on cardiopulmonary bypass.    Once on bypass, the aorta was  from the pulmonary artery.  The aortic   cross-clamp was applied, and the heart was arrested using cold blood enhanced   antegrade cardioplegia.  A prompt electromechanical arrest was achieved.  Once   the heart was arrested, there was some distention due to the aortic   insufficiency.  We promptly switched to retrograde cardioplegia and topical   cooling.  Once the cardioplegia was all in, we first addressed the distal right   coronary.  A site suitable for grafting was located, and an arteriotomy was   made.  The vein was brought up, its end was spatulated, and the anastomosis was   performed using a running 7-0 Prolene suture.  After completion of the   anastomosis, it was tested.  It was hemostatic.  Another dose of cardioplegia   was given retrograde and down this vein.  We then turned our attention to the   anterior wall.  The LAD was identified, and a site suitable for grafting was   located.  An arteriotomy was made, the vein was brought up, its end was   spatulated,  and the anastomosis was performed using a running 7-0 Prolene   suture.  After completion of the anastomosis, it was tested.  It was hemostatic.    Another dose of cardioplegia was given retrograde and down both veins.  We   then turned our attention to the aortic root, where a transverse aortotomy was   made.  Valve exposure sutures were placed in all 3 commissures.  The leaflets   were cut out, and extensive annular debridement was performed.  Once the annulus   was satisfactory, the ventricle was irrigated with a liter of cold saline.  Of   note, a left ventricular vent had been previously placed through the right   superior pulmonary vein.  Once the ventricle had been irrigated, the annulus was   sized and Perceval valve was selected.  Guide sutures were placed at the [place]   of all 3 commissures and then passed through the loop of the valve.  The valve   was slid into position.  It was deployed from the casarez.  It was evaluated and   appeared to be in good position.  The balloon was then brought up, and injecting   warm saline into the aortic root while insufflating the balloon to 4   atmospheres for 30 seconds, the valve was fixed into position.  The guide   sutures and the commissure sutures were then removed.  The aortotomy was then   closed in 2 layers using running 4-0 Prolene suture.  We then prepared to do the   2 venous proximals.  Both were done in a similar fashion.  Using a #11 blade,   we incised the aorta and an aortic punch to enlarge the aortotomies.  The veins   were checked for rotation and cut to an appropriate length.  Their ends were   spatulated and the proximal anastomoses were performed using running 5-0 Prolene   suture.  After completion of the proximals, the hot shot was given retrograde.    Root de-airing maneuvers were performed, and the aortic cross-clamp was   removed.  The patient was subsequently weaned from cardiopulmonary bypass.  The   patient did separate easily from bypass.   Once off bypass, all surgical sites   were inspected.  There was good hemostasis.  The valve was evaluated using EDUAR,   and appeared to be functioning properly.  There were no perivalvular leaks, and   the mean gradient was 4 mmHg.  The test dose of protamine was administered and   this was well tolerated.  The total dose was then given.  [place] through the   total dose, all cannulas were removed.  All surgical sites were again inspected,   and again there was good hemostasis.  Ventricular pacing wires were placed.    Two drains were placed in the mediastinum and one in the left chest.  After   again confirming adequate hemostasis, the patient's chest was closed using eight   #6 stainless steel wires to reapproximate the sternum.  The overlying soft   tissues were reapproximated using absorbable suture material.  The patient's   chest was washed and dried, and a dry dressing was applied.  The patient   tolerated the procedure well, there were no complications.  At the conclusion of   the case, sponge and instrument counts were correct.      KELSY  dd: 08/21/2017 14:07:08 (CDT)  td: 08/21/2017 16:37:45 (CDT)  Doc ID   #[id number]  Job ID #[id number]    CC:

## 2017-08-21 NOTE — ANESTHESIA PROCEDURE NOTES
Arterial    Diagnosis: CABG AVR     Patient location during procedure: done in OR  Procedure start time: 8/21/2017 7:01 AM  Timeout: 8/21/2017 7:01 AM  Procedure end time: 8/21/2017 7:09 AM  Staffing  Anesthesiologist: GILL VELAZCO  Resident/CRNA: MARY CUETO  Performed: resident/CRNA   Anesthesiologist was present at the time of the procedure.  Preanesthetic Checklist  Completed: patient identified, site marked, surgical consent, pre-op evaluation, timeout performed, IV checked, risks and benefits discussed, monitors and equipment checked and anesthesia consent givenArterial  Skin Prep: chlorhexidine gluconate  Local Infiltration: lidocaine  Orientation: left  Location: radial  Catheter Size: 20 G  Catheter placement by Anatomical landmarks. Heme positive aspiration all ports.Insertion Attempts: 1  Assessment  Dressing: secured with tape and tegaderm  Patient: Tolerated well

## 2017-08-21 NOTE — HPI
Reason for Consult: Management of Hyperglycemia     Surgical Procedure and Date: s/p CABG x 2 with AVR  8/21/17    HPI: Patient is a 62 y.o. female with a diagnosis of CAD and severe AS.  She has a past medical history of hodgkins lymphoma in 1991 and 1995 for which she received radiation to the neck, chest and abdomen in 1991.  When she had recurrence in 1995 she had chemotherapy. Now s/p  CABG x 2 with AVR  8/21/17 with stress hyperglycemia. Endocrine consulted for BG mgmt.

## 2017-08-22 LAB
ANION GAP SERPL CALC-SCNC: 9 MMOL/L
APTT BLDCRRT: 21.8 SEC
BASOPHILS # BLD AUTO: 0.01 K/UL
BASOPHILS NFR BLD: 0.1 %
BUN SERPL-MCNC: 16 MG/DL
CALCIUM SERPL-MCNC: 7.9 MG/DL
CHLORIDE SERPL-SCNC: 114 MMOL/L
CO2 SERPL-SCNC: 20 MMOL/L
CREAT SERPL-MCNC: 0.7 MG/DL
DIFFERENTIAL METHOD: ABNORMAL
EOSINOPHIL # BLD AUTO: 0 K/UL
EOSINOPHIL NFR BLD: 0 %
ERYTHROCYTE [DISTWIDTH] IN BLOOD BY AUTOMATED COUNT: 15.6 %
EST. GFR  (AFRICAN AMERICAN): >60 ML/MIN/1.73 M^2
EST. GFR  (NON AFRICAN AMERICAN): >60 ML/MIN/1.73 M^2
GLUCOSE SERPL-MCNC: 135 MG/DL (ref 70–110)
GLUCOSE SERPL-MCNC: 154 MG/DL
HCO3 UR-SCNC: 23.6 MMOL/L (ref 24–28)
HCT VFR BLD AUTO: 25.2 %
HCT VFR BLD CALC: <15 %PCV (ref 36–54)
HGB BLD-MCNC: 8.6 G/DL
INR PPP: 1
LYMPHOCYTES # BLD AUTO: 0.8 K/UL
LYMPHOCYTES NFR BLD: 4.4 %
MCH RBC QN AUTO: 33.5 PG
MCHC RBC AUTO-ENTMCNC: 34.1 G/DL
MCV RBC AUTO: 98 FL
MONOCYTES # BLD AUTO: 0.9 K/UL
MONOCYTES NFR BLD: 4.7 %
NEUTROPHILS # BLD AUTO: 16.7 K/UL
NEUTROPHILS NFR BLD: 90.5 %
PCO2 BLDA: 33.5 MMHG (ref 35–45)
PH SMN: 7.46 [PH] (ref 7.35–7.45)
PLATELET # BLD AUTO: 126 K/UL
PMV BLD AUTO: 10.6 FL
PO2 BLDA: 292 MMHG (ref 80–100)
POC BE: 0 MMOL/L
POC IONIZED CALCIUM: 1.05 MMOL/L (ref 1.06–1.42)
POC SATURATED O2: 100 % (ref 95–100)
POC TCO2: 25 MMOL/L (ref 23–27)
POCT GLUCOSE: 114 MG/DL (ref 70–110)
POCT GLUCOSE: 119 MG/DL (ref 70–110)
POCT GLUCOSE: 138 MG/DL (ref 70–110)
POCT GLUCOSE: 141 MG/DL (ref 70–110)
POCT GLUCOSE: 145 MG/DL (ref 70–110)
POCT GLUCOSE: 148 MG/DL (ref 70–110)
POCT GLUCOSE: 149 MG/DL (ref 70–110)
POCT GLUCOSE: 151 MG/DL (ref 70–110)
POCT GLUCOSE: 154 MG/DL (ref 70–110)
POCT GLUCOSE: 155 MG/DL (ref 70–110)
POCT GLUCOSE: 161 MG/DL (ref 70–110)
POCT GLUCOSE: 161 MG/DL (ref 70–110)
POCT GLUCOSE: 170 MG/DL (ref 70–110)
POCT GLUCOSE: 172 MG/DL (ref 70–110)
POCT GLUCOSE: 177 MG/DL (ref 70–110)
POCT GLUCOSE: 178 MG/DL (ref 70–110)
POCT GLUCOSE: 187 MG/DL (ref 70–110)
POTASSIUM BLD-SCNC: 3.9 MMOL/L (ref 3.5–5.1)
POTASSIUM SERPL-SCNC: 4 MMOL/L
PROTHROMBIN TIME: 10.9 SEC
RBC # BLD AUTO: 2.57 M/UL
SAMPLE: ABNORMAL
SODIUM BLD-SCNC: 143 MMOL/L (ref 136–145)
SODIUM SERPL-SCNC: 143 MMOL/L
WBC # BLD AUTO: 18.46 K/UL

## 2017-08-22 PROCEDURE — 80048 BASIC METABOLIC PNL TOTAL CA: CPT

## 2017-08-22 PROCEDURE — 85610 PROTHROMBIN TIME: CPT

## 2017-08-22 PROCEDURE — 99232 SBSQ HOSP IP/OBS MODERATE 35: CPT | Mod: ,,, | Performed by: NURSE PRACTITIONER

## 2017-08-22 PROCEDURE — 94799 UNLISTED PULMONARY SVC/PX: CPT

## 2017-08-22 PROCEDURE — 25000003 PHARM REV CODE 250: Performed by: THORACIC SURGERY (CARDIOTHORACIC VASCULAR SURGERY)

## 2017-08-22 PROCEDURE — 97802 MEDICAL NUTRITION INDIV IN: CPT

## 2017-08-22 PROCEDURE — 27000221 HC OXYGEN, UP TO 24 HOURS

## 2017-08-22 PROCEDURE — 99232 SBSQ HOSP IP/OBS MODERATE 35: CPT | Mod: ,,, | Performed by: ANESTHESIOLOGY

## 2017-08-22 PROCEDURE — 94761 N-INVAS EAR/PLS OXIMETRY MLT: CPT

## 2017-08-22 PROCEDURE — 20000000 HC ICU ROOM

## 2017-08-22 PROCEDURE — A4216 STERILE WATER/SALINE, 10 ML: HCPCS | Performed by: THORACIC SURGERY (CARDIOTHORACIC VASCULAR SURGERY)

## 2017-08-22 PROCEDURE — 63600175 PHARM REV CODE 636 W HCPCS: Performed by: THORACIC SURGERY (CARDIOTHORACIC VASCULAR SURGERY)

## 2017-08-22 PROCEDURE — 25000242 PHARM REV CODE 250 ALT 637 W/ HCPCS: Performed by: THORACIC SURGERY (CARDIOTHORACIC VASCULAR SURGERY)

## 2017-08-22 PROCEDURE — 94640 AIRWAY INHALATION TREATMENT: CPT

## 2017-08-22 PROCEDURE — 85730 THROMBOPLASTIN TIME PARTIAL: CPT

## 2017-08-22 PROCEDURE — P9045 ALBUMIN (HUMAN), 5%, 250 ML: HCPCS | Performed by: THORACIC SURGERY (CARDIOTHORACIC VASCULAR SURGERY)

## 2017-08-22 PROCEDURE — S0028 INJECTION, FAMOTIDINE, 20 MG: HCPCS | Performed by: THORACIC SURGERY (CARDIOTHORACIC VASCULAR SURGERY)

## 2017-08-22 PROCEDURE — 85025 COMPLETE CBC W/AUTO DIFF WBC: CPT

## 2017-08-22 PROCEDURE — 97161 PT EVAL LOW COMPLEX 20 MIN: CPT

## 2017-08-22 RX ORDER — INSULIN ASPART 100 [IU]/ML
0-5 INJECTION, SOLUTION INTRAVENOUS; SUBCUTANEOUS
Status: DISCONTINUED | OUTPATIENT
Start: 2017-08-22 | End: 2017-08-24

## 2017-08-22 RX ORDER — IBUPROFEN 200 MG
24 TABLET ORAL
Status: DISCONTINUED | OUTPATIENT
Start: 2017-08-22 | End: 2017-08-24

## 2017-08-22 RX ORDER — IBUPROFEN 200 MG
16 TABLET ORAL
Status: DISCONTINUED | OUTPATIENT
Start: 2017-08-22 | End: 2017-08-24

## 2017-08-22 RX ADMIN — ASPIRIN 325 MG ORAL TABLET 325 MG: 325 PILL ORAL at 10:08

## 2017-08-22 RX ADMIN — POTASSIUM CHLORIDE 20 MEQ: 200 INJECTION, SOLUTION INTRAVENOUS at 07:08

## 2017-08-22 RX ADMIN — IPRATROPIUM BROMIDE AND ALBUTEROL SULFATE 3 ML: .5; 3 SOLUTION RESPIRATORY (INHALATION) at 12:08

## 2017-08-22 RX ADMIN — OXYCODONE HYDROCHLORIDE 5 MG: 5 TABLET ORAL at 09:08

## 2017-08-22 RX ADMIN — Medication 3 ML: at 06:08

## 2017-08-22 RX ADMIN — ONDANSETRON 4 MG: 2 INJECTION INTRAMUSCULAR; INTRAVENOUS at 11:08

## 2017-08-22 RX ADMIN — Medication 3 ML: at 10:08

## 2017-08-22 RX ADMIN — OXYCODONE HYDROCHLORIDE 10 MG: 5 TABLET ORAL at 12:08

## 2017-08-22 RX ADMIN — ROSUVASTATIN CALCIUM 20 MG: 20 TABLET, FILM COATED ORAL at 09:08

## 2017-08-22 RX ADMIN — CEFAZOLIN SODIUM 2 G: 2 SOLUTION INTRAVENOUS at 05:08

## 2017-08-22 RX ADMIN — BRIMONIDINE TARTRATE 1 DROP: 2 SOLUTION OPHTHALMIC at 10:08

## 2017-08-22 RX ADMIN — IPRATROPIUM BROMIDE AND ALBUTEROL SULFATE 3 ML: .5; 3 SOLUTION RESPIRATORY (INHALATION) at 04:08

## 2017-08-22 RX ADMIN — MUPIROCIN 1 G: 20 OINTMENT TOPICAL at 10:08

## 2017-08-22 RX ADMIN — Medication 3 ML: at 02:08

## 2017-08-22 RX ADMIN — ALBUMIN (HUMAN) 500 ML: 12.5 SOLUTION INTRAVENOUS at 04:08

## 2017-08-22 RX ADMIN — CALCIUM CARBONATE-CHOLECALCIFEROL TAB 250 MG-125 UNIT 1 TABLET: 250-125 TAB at 10:08

## 2017-08-22 RX ADMIN — VALACYCLOVIR HYDROCHLORIDE 1000 MG: 500 TABLET, FILM COATED ORAL at 10:08

## 2017-08-22 RX ADMIN — CEFAZOLIN SODIUM 2 G: 2 SOLUTION INTRAVENOUS at 09:08

## 2017-08-22 RX ADMIN — FAMOTIDINE 20 MG: 10 INJECTION, SOLUTION INTRAVENOUS at 09:08

## 2017-08-22 RX ADMIN — POLYETHYLENE GLYCOL 3350 17 G: 17 POWDER, FOR SOLUTION ORAL at 10:08

## 2017-08-22 RX ADMIN — IPRATROPIUM BROMIDE AND ALBUTEROL SULFATE 3 ML: .5; 3 SOLUTION RESPIRATORY (INHALATION) at 11:08

## 2017-08-22 RX ADMIN — CEFAZOLIN SODIUM 2 G: 2 SOLUTION INTRAVENOUS at 03:08

## 2017-08-22 RX ADMIN — HYDROMORPHONE HYDROCHLORIDE 1 MG: 1 INJECTION, SOLUTION INTRAMUSCULAR; INTRAVENOUS; SUBCUTANEOUS at 02:08

## 2017-08-22 RX ADMIN — ALBUMIN (HUMAN) 500 ML: 12.5 SOLUTION INTRAVENOUS at 07:08

## 2017-08-22 RX ADMIN — IPRATROPIUM BROMIDE AND ALBUTEROL SULFATE 3 ML: .5; 3 SOLUTION RESPIRATORY (INHALATION) at 08:08

## 2017-08-22 RX ADMIN — HYDROMORPHONE HYDROCHLORIDE 1 MG: 1 INJECTION, SOLUTION INTRAMUSCULAR; INTRAVENOUS; SUBCUTANEOUS at 11:08

## 2017-08-22 RX ADMIN — FAMOTIDINE 20 MG: 10 INJECTION, SOLUTION INTRAVENOUS at 10:08

## 2017-08-22 RX ADMIN — MUPIROCIN 1 G: 20 OINTMENT TOPICAL at 09:08

## 2017-08-22 RX ADMIN — LEVOTHYROXINE SODIUM 75 MCG: 75 TABLET ORAL at 10:08

## 2017-08-22 RX ADMIN — ROSUVASTATIN CALCIUM 20 MG: 20 TABLET, FILM COATED ORAL at 12:08

## 2017-08-22 RX ADMIN — EPINEPHRINE 0.03 MCG/KG/MIN: 1 INJECTION PARENTERAL at 02:08

## 2017-08-22 RX ADMIN — HYDROMORPHONE HYDROCHLORIDE 1 MG: 1 INJECTION, SOLUTION INTRAMUSCULAR; INTRAVENOUS; SUBCUTANEOUS at 07:08

## 2017-08-22 RX ADMIN — OXYCODONE HYDROCHLORIDE 10 MG: 5 TABLET ORAL at 05:08

## 2017-08-22 NOTE — ANESTHESIA POSTPROCEDURE EVALUATION
"Anesthesia Post Evaluation    Patient: Dulce Root    Procedure(s) Performed: Procedure(s) (LRB):  REPLACEMENT-VALVE-AORTIC (N/A)  AORTOCORONARY BYPASS-CABG (N/A)  HARVEST-VEIN-ENDOVASCULAR (Left)    Final Anesthesia Type: general  Patient location during evaluation: ICU  Patient participation: Yes- Able to Participate  Level of consciousness: responds to stimulation  Post-procedure vital signs: reviewed and stable  Pain management: adequate  Airway patency: patent  PONV status at discharge: No PONV  Anesthetic complications: no      Cardiovascular status: hemodynamically stable  Respiratory status: unassisted  Hydration status: euvolemic  Follow-up not needed.        Visit Vitals  BP (!) 93/57   Pulse 90   Temp 36.6 °C (97.9 °F) (Oral)   Resp 14   Ht 5' 3" (1.6 m)   Wt 74.8 kg (165 lb)   SpO2 96%   Breastfeeding? No   BMI 29.23 kg/m²       Pain/Pawan Score: Pain Assessment Performed: Yes (8/21/2017 11:00 PM)  Presence of Pain: denies (8/21/2017 11:00 PM)  Pain Rating Prior to Med Admin: 7 (8/22/2017  5:27 AM)  Pain Rating Post Med Admin: 2 (8/22/2017  2:39 AM)      "

## 2017-08-22 NOTE — PLAN OF CARE
Problem: Physical Therapy Goal  Goal: Physical Therapy Goal  Goals to be met by: 17     Patient will increase functional independence with mobility by performin. Supine to sit with Stand-by Assistance - not met  2. Sit to stand transfer with Supervision - not met  3. Gait  x 220 feet with Supervision - not met    eval completed and goals appropriate. Lorenza Collins, PT 2017

## 2017-08-22 NOTE — PROGRESS NOTES
Progress Note  Surgical Intensive Care    Admit Date: 8/21/2017  Post-operative Day: 1 Day Post-Op  Hospital Day: 2    SUBJECTIVE:     Follow-up For:  Procedure(s) (LRB):  REPLACEMENT-VALVE-AORTIC (N/A)  AORTOCORONARY BYPASS-CABG (N/A)  HARVEST-VEIN-ENDOVASCULAR (Left)    HPI:Patient is a 62 y.o. female CAD, severe AS, HTN, HLD, pemphigoid (methotrexate), Hodgkins lymphoma (s/p radiation to neck, chest, and abdominal radiation 1991 &1995 and chemo 1995), and bilateral mastectomy. She presented to the ED weeks ago for 'chest heaviness' initially presumed to be associated with SOB and lung damage from radiation. Had LHC which revealed LM disease and RCA stenosis. S/p AVR and CABG x2 with SVG-LAD and SVG-distal RCA .      Patient tolerated the procedure well. [id number]. Was transferred to the ICU intubated and stable condition.On propofol for sedation. On levo, epi and cardene gtt. Minimal sanguinous output from mediastinal and pleural chest tubes.     Interval history: NAEON. VSS. Extubated overnight. Currently sating well on 3LNC.  On epi 0.03, wean as tolerated. Chest tube output minimal overnight.  Adequate UOP.     Continuous Infusions:   epinephrine 0.03 mcg/kg/min (08/22/17 0600)    insulin (HUMAN R) infusion (adults) 1.1 Units/hr (08/22/17 0600)    nicardipine Stopped (08/21/17 1500)    norepinephrine bitartrate-D5W Stopped (08/21/17 1500)    propofol Stopped (08/21/17 1700)     Scheduled Meds:   albumin human 5%  25 g Intravenous Once    albuterol-ipratropium 2.5mg-0.5mg/3mL  3 mL Nebulization Q4H    aspirin  325 mg Oral Daily    brimonidine 0.2%  1 drop Both Eyes Daily    calcium carbonate-vitamin D3 250-125 mg  1 tablet Oral Daily    ceFAZolin (ANCEF) IVPB  2 g Intravenous Q8H    famotidine (PF)  20 mg Intravenous BID    levothyroxine  75 mcg Oral Daily    mupirocin  1 g Nasal BID    polyethylene glycol  17 g Oral Daily    rosuvastatin  20 mg Oral Nightly    sodium chloride 0.9%  3 mL  Intravenous Q8H    timolol maleate 0.5%  1 drop Both Eyes Daily    valacyclovir  1,000 mg Oral Daily     PRN Meds:albumin human 5%, artificial tears, dextrose 50%, dextrose 50%, fentaNYL, fentaNYL, HYDROmorphone, lactated Ringers, lactulose, magnesium sulfate IVPB, metoclopramide HCl, ondansetron, oxycodone, oxycodone, potassium chloride **AND** potassium chloride **AND** potassium chloride, sodium phosphate IVPB, sodium phosphate IVPB, sodium phosphate IVPB    Review of patient's allergies indicates:   Allergen Reactions    Norvasc  [amlodipine] Shortness Of Breath     unknown  Other reaction(s): Swelling    Sulfa (sulfonamide antibiotics) Shortness Of Breath, Itching and Other (See Comments)     elevated blood pressure    Iodinated contrast- oral and iv dye     Iodine      Other reaction(s): Unknown    Latex      Other reaction(s): Itching       OBJECTIVE:     Vital Signs (Most Recent)  Temp: 97.9 °F (36.6 °C) (08/22/17 0300)  Pulse: 90 (08/22/17 0600)  Resp: 14 (08/22/17 0600)  BP: (!) 93/57 (08/22/17 0600)  SpO2: 96 % (08/22/17 0600)    Vital Signs Range (Last 24H):  Temp:  [94.8 °F (34.9 °C)-98.3 °F (36.8 °C)]   Pulse:  [71-90]   Resp:  [9-43]   BP: ()/(47-89)   SpO2:  [95 %-100 %]   Arterial Line BP: ()/(42-72)     I & O (Last 24H):  Intake/Output Summary (Last 24 hours) at 08/22/17 0637  Last data filed at 08/22/17 0600   Gross per 24 hour   Intake             7128 ml   Output             2272 ml   Net             4856 ml     Ventilator Data (Last 24H):     Vent Mode: Spont  Oxygen Concentration (%):  [] 40  Resp Rate Total:  [7.6 br/min-45 br/min] 32 br/min  Vt Set:  [500 mL] 500 mL  PEEP/CPAP:  [5 cmH20] 5 cmH20  Pressure Support:  [0 cmH20-10 cmH20] 0 cmH20  Mean Airway Pressure:  [6.4 irS85-41 cmH20] 6.4 cmH20    Hemodynamic Parameters (Last 24H):     Physical Exam:  Physical Exam   Constitutional: She is well-developed, well-nourished, and in no distress. No distress.  Extubated  HENT:   Head: Normocephalic and atraumatic.   Neck: Normal range of motion. Neck supple. No JVD present.   Cardiovascular: Normal rate, regular rhythm, normal heart sounds and intact distal pulses.    Pulmonary/Chest: Effort normal. No stridor. No respiratory distress. She has no wheezes. Mild course breath sounds appreciated bilaterally. Sternal dressing in place. cdi   Abdominal: Soft. Bowel sounds are normal. She exhibits no distension and no mass. There is no tenderness.   Genitourinary:   Genitourinary Comments: Reid in place   Musculoskeletal:   Ace bandage covering entire L leg.    Neurological: AAOx3     Lines/Drains:       Percutaneous Central Line Insertion/Assessment - quad lumen  08/21/17 0710 right internal jugular;other (see comments) (Active)   Dressing biopatch in place;dressing dry and intact 8/21/2017 11:00 PM   Securement secured w/ sutures 8/21/2017 11:00 PM   Additional Site Signs no erythema;no warmth;no edema;no pain;no palpable cord;no streak formation;no drainage 8/21/2017 11:00 PM   Distal Patency/Care infusing 8/21/2017 11:00 PM   Medial 1 Patency/Care infusing 8/21/2017 11:00 PM   Medial 2 Patency/Care infusing 8/21/2017 11:00 PM   Proximal Patency/Care normal saline locked 8/21/2017 11:00 PM   Waveform normal 8/21/2017 11:00 PM   Line Interventions line leveled/zeroed 8/21/2017 11:00 PM   Dressing Change Due 08/28/17 8/21/2017  4:00 PM   Daily Line Review Performed 8/21/2017 11:00 PM   Number of days: 0            Peripheral IV - Single Lumen 08/21/17 0612 Right Hand (Active)   Site Assessment Clean;Dry;Intact 8/21/2017 11:00 PM   Line Status Infusing 8/21/2017 11:00 PM   Dressing Status Clean;Dry;Intact 8/21/2017 11:00 PM   Dressing Intervention Dressing reinforced 8/21/2017  4:00 PM   Dressing Change Due 08/25/17 8/21/2017  4:00 PM   Site Change Due 08/25/17 8/21/2017  4:00 PM   Reason Not Rotated Not due 8/21/2017 11:00 PM   Number of days: 1            Peripheral IV -  Single Lumen 08/21/17 0712 Right Forearm (Active)   Site Assessment Clean;Dry;Intact;No redness;No swelling 8/21/2017 11:00 PM   Line Status Saline locked;Flushed 8/21/2017 11:00 PM   Dressing Status Clean;Dry;Intact 8/21/2017 11:00 PM   Dressing Intervention Dressing reinforced 8/21/2017  4:00 PM   Dressing Change Due 08/25/17 8/21/2017  4:00 PM   Site Change Due 08/25/17 8/21/2017  4:00 PM   Reason Not Rotated Not due 8/21/2017 11:00 PM   Number of days: 0            Arterial Line 08/21/17 0701 Left Radial (Active)   Site Assessment Clean;Dry;Intact 8/21/2017 11:00 PM   Line Status Pulsatile blood flow 8/21/2017 11:00 PM   Art Line Waveform Appropriate 8/21/2017 11:00 PM   Arterial Line Interventions Zeroed and calibrated;Leveled;Connections checked and tightened;Flushed per protocol 8/21/2017 11:00 PM   Color/Movement/Sensation Capillary refill less than 3 sec 8/21/2017 11:00 PM   Dressing Status Clean;Dry;Intact 8/21/2017 11:00 PM   Dressing Intervention Dressing reinforced 8/21/2017  4:00 PM   Dressing Change Due 08/25/17 8/21/2017  4:00 PM   Number of days: 0            Pacer Wires 08/21/17 1200 (Active)   Pacer Wire Status Ventricular wires connected to pacer 8/21/2017 11:00 PM   Site Assessment Other (Comment) 8/21/2017 11:00 PM   How Pacer Wires are Secured Atrial wires secured to dressing 8/21/2017 11:00 PM   Dressing Status Clean;Dry;Intact 8/21/2017 11:00 PM   Dressing Intervention Dressing reinforced 8/21/2017  4:00 PM   Dressing Change Due 08/23/17 8/21/2017  5:17 PM   Number of days: 0            Urethral Catheter 08/21/17 0900 (Active)   Site Assessment Clean;Intact 8/21/2017 11:00 PM   Collection Container Urimeter 8/21/2017 11:00 PM   Securement Method secured to top of thigh w/ adhesive device 8/21/2017 11:00 PM   Catheter Care Performed yes 8/21/2017 11:00 PM   Reason for Continuing Urinary Catheterization Critically ill in ICU requiring intensive monitoring;Post operative 8/21/2017 11:00 PM  "  CAUTI Prevention Bundle StatLock in place w 1" slack;Intact seal between catheter & drainage tubing;Drainage bag off the floor;Green sheeting clip in use;Drainage bag not overfilled (<2/3 full);No dependent loops or kinks;Drainage bag below bladder 8/21/2017  7:01 PM   Output (mL) 30 mL 8/22/2017  6:00 AM   Number of days: 0            Y Chest Tube 1 and 2 08/21/17 1334 Left Pleural 19 Fr. 19 Fr. (Active)   Function -20 cm H2O 8/21/2017 11:00 PM   Air Leak/Fluctuation air leak not present;fluctuation not present 8/21/2017 11:00 PM   Safety all tubing connections taped;2 rubber-tipped hemostats w/ patient;all connections secured;suction checked 8/21/2017 11:00 PM   Securement tubing anchored to body distal to insertion site w/ tape 8/21/2017 11:00 PM   Left Subcutaneous Emphysema none present 8/21/2017 11:00 PM   Right Subcutaneous Emphysema none present 8/21/2017 11:00 PM   Patency Intervention Milked;Stripped;Tip/tilt 8/21/2017 11:00 PM   Drainage Description 1 Sanguineous 8/21/2017 11:00 PM   Tube 1 Dressing Appearance occlusive gauze dressing intact;clean and dry 8/21/2017 11:00 PM   Tube 1 Dressing Care dressing reinforced 8/21/2017  4:00 PM   Site Assessment 1 Not assessed 8/21/2017 11:00 PM   Surrounding Skin 1 Unable to view 8/21/2017 11:00 PM   Drainage Description 2 Sanguineous 8/21/2017 11:00 PM   Tube 2 Dressing Appearance occlusive gauze dressing intact;clean and dry 8/21/2017 11:00 PM   Tube 2 Dressing Care dressing reinforced 8/21/2017  4:00 PM   Site Assessment 2 Not assessed 8/21/2017 11:00 PM   Surrounding Skin Unable to view 8/21/2017 11:00 PM   Output (mL) 14 mL 8/22/2017  6:00 AM   Number of days: 0            Y Chest Tube 3 and 4 08/21/17 1334 Anterior Mediastinal 19 Fr. Anterior Mediastinal (Active)   Function -20 cm H2O 8/21/2017 11:00 PM   Air Leak/Fluctuation air leak not present;fluctuation not present 8/21/2017 11:00 PM   Safety all tubing connections taped;2 rubber-tipped hemostats w/ " patient;all connections secured;suction checked 8/21/2017 11:00 PM   Securement tubing anchored to body distal to insertion site w/ tape 8/21/2017 11:00 PM   Left Subcutaneous Emphysema none present 8/21/2017 11:00 PM   Right Subcutaneous Emphysema none present 8/21/2017 11:00 PM   Patency Intervention Milked;Stripped;Tip/tilt 8/21/2017 11:00 PM   Drainage Description 3 Sanguineous 8/21/2017 11:00 PM   Tube 3 Dressing Appearance occlusive gauze dressing intact;clean and dry 8/21/2017 11:00 PM   Site Assessment 3 Not assessed 8/21/2017 11:00 PM   Surrounding Skin 3 Unable to view 8/21/2017 11:00 PM   Drainage Description 4 Sanguineous 8/21/2017 11:00 PM   Tube 4 Dressing Appearance occlusive gauze dressing intact;clean and dry 8/21/2017 11:00 PM   Site Assessment 4 Not assessed 8/21/2017 11:00 PM   Surrounding Skin 4 Unable to view 8/21/2017 11:00 PM   Output (mL) 150 mL 8/22/2017  6:00 AM   Number of days: 0       Laboratory (Last 24H):  CBC:    Recent Labs  Lab 08/22/17  0415   WBC 18.46*   HGB 8.6*   HCT 25.2*   *     CMP:    Recent Labs  Lab 08/22/17 0415   CALCIUM 7.9*      K 4.0   CO2 20*   *   BUN 16   CREATININE 0.7     ECHO 6/14/2017  CONCLUSIONS     1 - Concentric LVH with normal left ventricular systolic function (EF 60-65%).     2 - No wall motion abnormalities.     3 - Normal left ventricular diastolic function.     4 - Normal right ventricular systolic function .     5 - Mild tricuspid regurgitation.     6 - The estimated PA systolic pressure is 25 mmHg.     7 - Moderate to severe aortic stenosis, JORI = 0.79 cm2, peak velocity = 3.84 m/s, mean gradient = 35 mmHg; mild AI.    ASSESSMENT/PLAN:   Plan:  Neuro:   -AAOx3  -Pain control: PRN dilaudid and fent     Pulmonary:   -extubated yesterday   -sating well on 3L NC      Cardiac:  S/p AVR and CABG x2. Hx of CAD and severe AS   - HDS  - levo 0.03- wean as tolerated  -Overnight minimal output from Y connected mediastinal (282) and  pleural (14)chest tubes- monitor output closely  - ASA and statin     /Renal:   - Reid: in place  - Monitor UOP closely   -BUN/Cr: 16/0.7  -Strict I/Os     Infectious Disease:   -Afebrile  -WBC 18.46  -Antibiotics:ancef  -Cultures:     Hematology/Oncology:  -H/H - 8.6/25.2  -plt: 126   -trend labs     Endocrine:  -endocrine following  -B   -levothyroxine 75mg     Fluids/Electrolytes/Nutrition/GI:   -Nutritional status: NPO. Advance diet at tolerated  -Lyte replacement as needed  -Nausea meds PRN    -famotidine      Dispo: Continue care in ICU. OOB to chair today.      DAVE Amin MD   General Surgery, PGY1  2017

## 2017-08-22 NOTE — PLAN OF CARE
S/P AVR,CABG x2 on 8/21/17. CM visited with pt.  at bedside with eyes closed. Pt was informant for dc planning assessment. CM gave pt heart surgery pillow to use to splint incision to cough and deep breathe. On insulin and epi gtt. Will cont to follow.          08/22/17 0901   Discharge Assessment   Assessment Type Discharge Planning Assessment   Confirmed/corrected address and phone number on facesheet? Yes   Assessment information obtained from? Patient   Expected Length of Stay (days) 4.5   Communicated expected length of stay with patient/caregiver yes   Prior to hospitilization cognitive status: Alert/Oriented   Prior to hospitalization functional status: Independent   Current cognitive status: Alert/Oriented   Current Functional Status: Needs Assistance   Arrived From home or self-care   Lives With spouse   Able to Return to Prior Arrangements yes   Is patient able to care for self after discharge? Yes   How many people do you have in your home that can help with your care after discharge? 1   Who are your caregiver(s) and their phone number(s)? Ozzie/ [phone] cell    Patient's perception of discharge disposition home or selfcare   Readmission Within The Last 30 Days no previous admission in last 30 days   Patient currently being followed by outpatient case management? No   Patient currently receives home health services? No   Does the patient currently use HME? No   Patient currently receives private duty nursing? No   Patient currently receives any other outside agency services? No   Equipment Currently Used at Home none   Do you have any problems affording any of your prescribed medications? No   Is the patient taking medications as prescribed? yes   Do you have any financial concerns preventing you from receiving the healthcare you need? No   Does the patient have transportation to healthcare appointments? Yes   Transportation Available family or friend will provide   On Dialysis? No    Does the patient receive services at the [facility]? No   Are there any open cases? No   Discharge Plan A Home with family   Discharge Plan B Home with family;Home Health   Patient/Family In Agreement With Plan yes

## 2017-08-22 NOTE — PROGRESS NOTES
"[facility]  Endocrinology  Progress Note    Admit Date: 8/21/2017     Reason for Consult: Management of Hyperglycemia     Surgical Procedure and Date: s/p CABG x 2 with AVR  8/21/17    HPI: Patient is a 62 y.o. female with a diagnosis of CAD and severe AS.  She has a past medical history of hodgkins lymphoma in 1991 and 1995 for which she received radiation to the neck, chest and abdomen in 1991.  When she had recurrence in 1995 she had chemotherapy. Now s/p  CABG x 2 with AVR  8/21/17 with stress hyperglycemia. Endocrine consulted for BG mgmt.         Interval HPI:   Extubated, diet to advance this am, minimal insulin needs, no hypoglycemia    BP (!) 79/41 (BP Location: Right arm, Patient Position: Lying)   Pulse 96   Temp 98.3 °F (36.8 °C) (Oral)   Resp (!) 25   Ht 5' 3" (1.6 m)   Wt 84 kg (185 lb 3 oz)   SpO2 95%   Breastfeeding? No   BMI 32.80 kg/m²       Labs Reviewed and Include      Recent Labs  Lab 08/22/17  0415   *   CALCIUM 7.9*      K 4.0   CO2 20*   *   BUN 16   CREATININE 0.7     Lab Results   Component Value Date    WBC 18.46 (H) 08/22/2017    HGB 8.6 (L) 08/22/2017    HCT 25.2 (L) 08/22/2017    MCV 98 08/22/2017     (L) 08/22/2017     No results for input(s): TSH, FREET4 in the last 168 hours.  Lab Results   Component Value Date    HGBA1C 5.7 (H) 08/15/2017       Nutritional status:   Body mass index is 32.8 kg/m².  Lab Results   Component Value Date    ALBUMIN 3.3 (L) 08/15/2017    ALBUMIN 4.9 07/14/2017    ALBUMIN 3.7 04/26/2017     No results found for: PREALBUMIN    Estimated Creatinine Clearance: 86.6 mL/min (based on Cr of 0.7).    Accu-Checks  Recent Labs      08/21/17   2118  08/21/17   2213  08/21/17   2305  08/22/17   0002  08/22/17   0102  08/22/17   0157  08/22/17   0412  08/22/17   0532  08/22/17   0735  08/22/17   0805   POCTGLUCOSE  170*  155*  148*  170*  161*  119*  172*  151*  154*  138*       Current Medications and/or " Treatments Impacting Glycemic Control  Immunotherapy:  Immunosuppressants     None        Steroids:   Hormones     None        Pressors:    Autonomic Drugs     Start     Stop Route Frequency Ordered    08/21/17 1500  epinephrine 4 mg in sodium chloride 0.9% 250 mL infusion     Question Answer Comment   Titrate by: (in mcg/kg/min) 0.05    Titrate interval: (in minutes) 5    Titrate to maintain: (SBP or MAP or Cardiac Index) CARDIAC INDEX    Cardiac index greater than: (in L/min) 2.2    Maximum dose: (in mcg/kg/min) 2        -- IV Continuous 08/21/17 1452    08/21/17 1500  norepinephrine 4 mg in dextrose 5% 250 mL infusion (premix) (titrating)     Question Answer Comment   Titrate by: (in mcg/kg/min) 0.05    Titrate interval: (in minutes) 5    Titrate to maintain: (MAP or SBP) MAP    Greater than: (in mmHg) 60    Maximum dose: (in mcg/kg/min) 3        -- IV Continuous 08/21/17 1452        Hyperglycemia/Diabetes Medications: Antihyperglycemics     Start     Stop Route Frequency Ordered    08/22/17 0910  insulin aspart pen 0-5 Units      -- SubQ Before meals & nightly PRN 08/22/17 0811          ASSESSMENT and PLAN    Acute hyperglycemia    D/c insulin gtt, Start ac/hs bg monitoring with low dose correction           Aortic stenosis    S/p AVR          S/P CABG x 2    Per CTS. avoid hypoglycemia          S/P AVR (aortic valve replacement)    Per CTS. avoid hypoglycemia          Hypothyroid    Lab Results   Component Value Date    TSH 2.570 04/24/2017     Home dose: Synthroid 75 mcg QD          * Coronary artery disease of native artery of native heart with stable angina pectoris    avoid hypoglycemia  S/p CABG               Charito Campbell, SHYANN, NP  Endocrinology  [facility]

## 2017-08-22 NOTE — PROGRESS NOTES
Patient admitted to 6094 from OR. Patient connected to ICU telemetry monitoring/VS monitoring. Epi and Amicar infusing. Patient connected to ventilator. Labs being sent. VSS at this time. Will continue to monitor patient.     Skin note: No skin breakdown noted to bony prominences. Mepilex removed from sacrum and no skin breakdown present. Mepilex replaced.

## 2017-08-22 NOTE — PLAN OF CARE
Problem: Patient Care Overview  Goal: Plan of Care Review  Patient's vital signs stable overnight. Patient extubated around 2130 to nasal cannula. Patient currently on 3L nasal cannula with SaOw 96%. 1 L of LR given per prn order for hypotension overnight. Epi continues at 0.03. Insulin drip continues per order, blood glucose monitored Q1H. Prn pain medication given per order. Patient's  at bedside at beginning of shift. Plan of care reviewed with patient and spouse. Will continue to monitor closely.

## 2017-08-22 NOTE — PLAN OF CARE
Problem: Patient Care Overview  Goal: Plan of Care Review  Outcome: Ongoing (interventions implemented as appropriate)  Epi titrated off. 1L Albumin given for low urine output and hypotension. Patient up to the chair for 10 hours during the day. Tolerating clear liquid diet. Patient on 2L NC and sats >93%. Most recent CVP 11. VSS at this time. Will continue to monitor patient.

## 2017-08-22 NOTE — PLAN OF CARE
Problem: Occupational Therapy Goal  Goal: Occupational Therapy Goal  Goals to be met by: 9/1/17     Patient will increase functional independence with ADLs by performing:    Feeding with Grand.  UE Dressing with Supervision.  LE Dressing with Supervision.  Grooming while standing at sink with Supervision.  Toileting from toilet with Supervision for hygiene and clothing management.   Toilet transfer to toilet with Supervision.    Outcome: Ongoing (interventions implemented as appropriate)  OT eval completed, and above goals established. NEHAL Max  8/22/2017

## 2017-08-22 NOTE — PROGRESS NOTES
Patient extubated to 4L nasal cannula by respiratory therapist per MD order. Patient tolerating well with no signs of respiratory distress noted. Patient SaO2 currently 99%. Will continue to monitor closely.

## 2017-08-22 NOTE — PROGRESS NOTES
Progress Note  CTS    Admit Date: 8/21/2017  Post-operative Day: 1 Day Post-Op  Hospital Day: 2    SUBJECTIVE:     Follow-up For:  Procedure(s) (LRB):  REPLACEMENT-VALVE-AORTIC (N/A)  AORTOCORONARY BYPASS-CABG (N/A)  HARVEST-VEIN-ENDOVASCULAR (Left)    Interval history:  NAEON.  Extubated overnight with no difficulty.  Still on 0.03 of epi.  Pain controlled.  Sating well on 2L NC    Continuous Infusions:   epinephrine 0.03 mcg/kg/min (08/22/17 0805)    nicardipine Stopped (08/21/17 1500)    norepinephrine bitartrate-D5W Stopped (08/21/17 1500)    propofol Stopped (08/21/17 1700)     Scheduled Meds:   albumin human 5%  25 g Intravenous Once    albuterol-ipratropium 2.5mg-0.5mg/3mL  3 mL Nebulization Q4H    aspirin  325 mg Oral Daily    brimonidine 0.2%  1 drop Both Eyes Daily    calcium carbonate-vitamin D3 250-125 mg  1 tablet Oral Daily    ceFAZolin (ANCEF) IVPB  2 g Intravenous Q8H    famotidine (PF)  20 mg Intravenous BID    levothyroxine  75 mcg Oral Daily    mupirocin  1 g Nasal BID    polyethylene glycol  17 g Oral Daily    rosuvastatin  20 mg Oral Nightly    sodium chloride 0.9%  3 mL Intravenous Q8H    timolol maleate 0.5%  1 drop Both Eyes Daily    valacyclovir  1,000 mg Oral Daily     PRN Meds:albumin human 5%, artificial tears, dextrose 50%, dextrose 50%, fentaNYL, fentaNYL, glucose, glucose, HYDROmorphone, insulin aspart, lactated Ringers, lactulose, magnesium sulfate IVPB, metoclopramide HCl, ondansetron, oxycodone, oxycodone, potassium chloride **AND** potassium chloride **AND** potassium chloride, sodium phosphate IVPB, sodium phosphate IVPB, sodium phosphate IVPB    Review of patient's allergies indicates:   Allergen Reactions    Norvasc  [amlodipine] Shortness Of Breath     unknown  Other reaction(s): Swelling    Sulfa (sulfonamide antibiotics) Shortness Of Breath, Itching and Other (See Comments)     elevated blood pressure    Iodinated contrast- oral and iv dye     Iodine       Other reaction(s): Unknown    Latex      Other reaction(s): Itching       OBJECTIVE:     Vital Signs (Most Recent)  Temp: 98.3 °F (36.8 °C) (08/22/17 0700)  Pulse: 91 (08/22/17 0830)  Resp: 18 (08/22/17 0830)  BP: (!) 72/38 (08/22/17 0805)  SpO2: 99 % (08/22/17 0830)    Vital Signs Range (Last 24H):  Temp:  [94.8 °F (34.9 °C)-98.3 °F (36.8 °C)]   Pulse:  [71-91]   Resp:  [9-43]   BP: ()/(38-89)   SpO2:  [95 %-100 %]   Arterial Line BP: ()/(40-72)     I & O (Last 24H):  Intake/Output Summary (Last 24 hours) at 08/22/17 0943  Last data filed at 08/22/17 0800   Gross per 24 hour   Intake             6528 ml   Output             2362 ml   Net             4166 ml          Physical Exam:  Physical Exam   Constitutional: She is well-developed, well-nourished, and in no distress. No distress. Extubated  HENT:   Head: Normocephalic and atraumatic.   Neck: Normal range of motion. Neck supple. No JVD present.   Cardiovascular: Normal rate, regular rhythm, normal heart sounds and intact distal pulses.    Pulmonary/Chest: Effort normal. No stridor. No respiratory distress. She has no wheezes. Mild course breath sounds appreciated bilaterally. Sternal dressing in place. C/d/i.  CTs in place   Abdominal: Soft. Bowel sounds are normal. She exhibits no distension and no mass. There is no tenderness.   Genitourinary:   Genitourinary Comments: Reid in place   Musculoskeletal:   Ace bandage covering entire L leg.    Neurological: AAOx3     Wound/Incision:  clean, dry, intact    Lines/Drains:       Percutaneous Central Line Insertion/Assessment - quad lumen  08/21/17 0710 right internal jugular;other (see comments) (Active)   Dressing biopatch in place;dressing dry and intact 8/22/2017  7:00 AM   Securement secured w/ sutures 8/22/2017  7:00 AM   Additional Site Signs no drainage;no streak formation;no palpable cord;no pain;no edema;no warmth;no erythema 8/22/2017  7:00 AM   Distal Patency/Care infusing 8/22/2017  7:00 AM    Medial 1 Patency/Care infusing 8/22/2017  7:00 AM   Medial 2 Patency/Care infusing 8/22/2017  7:00 AM   Proximal Patency/Care normal saline locked 8/22/2017  7:00 AM   Waveform normal 8/22/2017  7:00 AM   Line Interventions line leveled/zeroed 8/22/2017  7:00 AM   Dressing Change Due 08/28/17 8/22/2017  7:00 AM   Daily Line Review Performed 8/22/2017  7:00 AM   Number of days: 1            Peripheral IV - Single Lumen 08/21/17 0612 Right Hand (Active)   Site Assessment Clean;Dry;Intact 8/22/2017  7:00 AM   Line Status Infusing 8/22/2017  7:00 AM   Dressing Status Clean;Dry;Intact 8/22/2017  7:00 AM   Dressing Intervention Dressing reinforced 8/22/2017  7:00 AM   Dressing Change Due 08/25/17 8/22/2017  7:00 AM   Site Change Due 08/25/17 8/22/2017  7:00 AM   Reason Not Rotated Not due 8/22/2017  7:00 AM   Number of days: 1            Peripheral IV - Single Lumen 08/21/17 0712 Right Forearm (Active)   Site Assessment Clean;Dry;Intact 8/22/2017  7:00 AM   Line Status Saline locked 8/22/2017  7:00 AM   Dressing Status Clean;Dry;Intact 8/22/2017  7:00 AM   Dressing Intervention Dressing reinforced 8/22/2017  7:00 AM   Dressing Change Due 08/25/17 8/22/2017  7:00 AM   Site Change Due 08/25/17 8/22/2017  7:00 AM   Reason Not Rotated Not due 8/22/2017  7:00 AM   Number of days: 1            Arterial Line 08/21/17 0701 Left Radial (Active)   Site Assessment Clean;Dry;Intact;No redness;No swelling 8/22/2017  7:00 AM   Line Status Pulsatile blood flow 8/22/2017  7:00 AM   Art Line Waveform Appropriate 8/22/2017  7:00 AM   Arterial Line Interventions Zeroed and calibrated;Leveled;Connections checked and tightened;Flushed per protocol;Line pulled back 8/22/2017  7:00 AM   Color/Movement/Sensation Capillary refill less than 3 sec 8/22/2017  7:00 AM   Dressing Type Transparent 8/22/2017  7:00 AM   Dressing Status Clean;Dry;Intact 8/22/2017  7:00 AM   Dressing Intervention Dressing reinforced 8/22/2017  7:00 AM   Dressing Change  "Due 08/25/17 8/22/2017  7:00 AM   Number of days: 1            Pacer Wires 08/21/17 1200 (Active)   Pacer Wire Status Ventricular wires connected to pacer 8/22/2017  7:00 AM   Site Assessment Other (Comment) 8/21/2017 11:00 PM   How Pacer Wires are Secured Ventricular wires secured to dressing 8/22/2017  7:00 AM   Dressing Status Clean;Dry;Intact 8/22/2017  7:00 AM   Dressing Intervention Dressing reinforced 8/22/2017  7:00 AM   Dressing Change Due 08/23/17 8/22/2017  7:00 AM   Number of days: 0            Urethral Catheter 08/21/17 0900 (Active)   Site Assessment Clean;Intact 8/22/2017  7:00 AM   Collection Container Urimeter 8/22/2017  7:00 AM   Securement Method secured to top of thigh w/ adhesive device 8/22/2017  7:00 AM   Catheter Care Performed yes 8/22/2017  7:00 AM   Reason for Continuing Urinary Catheterization Critically ill in ICU requiring intensive monitoring 8/22/2017  7:00 AM   CAUTI Prevention Bundle StatLock in place w 1" slack;Intact seal between catheter & drainage tubing;Drainage bag off the floor;Green sheeting clip in use;Drainage bag not overfilled (<2/3 full);No dependent loops or kinks;Drainage bag below bladder 8/22/2017  7:00 AM   Output (mL) 25 mL 8/22/2017  8:00 AM   Number of days: 1            Y Chest Tube 1 and 2 08/21/17 1334 Left Pleural 19 Fr. 19 Fr. (Active)   Function -20 cm H2O 8/22/2017  7:15 AM   Air Leak/Fluctuation air leak not present;fluctuation not present 8/22/2017  7:15 AM   Safety all tubing connections taped;all connections secured;suction checked 8/22/2017  7:15 AM   Securement tubing anchored to body distal to insertion site w/ tape 8/22/2017  7:15 AM   Left Subcutaneous Emphysema none present 8/22/2017  7:15 AM   Right Subcutaneous Emphysema none present 8/22/2017  7:15 AM   Patency Intervention Milked;Stripped;Tip/tilt 8/22/2017  7:15 AM   Drainage Description 1 Serosanguineous 8/22/2017  7:15 AM   Tube 1 Dressing Appearance occlusive gauze dressing intact;clean " and dry 8/22/2017  7:15 AM   Tube 1 Dressing Care dressing reinforced 8/22/2017  7:15 AM   Site Assessment 1 Not assessed 8/22/2017  7:15 AM   Surrounding Skin 1 Unable to view 8/22/2017  7:15 AM   Drainage Description 2 Serosanguineous 8/22/2017  7:15 AM   Tube 2 Dressing Appearance occlusive gauze dressing intact;clean and dry 8/22/2017  7:15 AM   Tube 2 Dressing Care dressing reinforced 8/22/2017  7:15 AM   Site Assessment 2 Not assessed 8/22/2017  7:15 AM   Surrounding Skin Unable to view 8/22/2017  7:15 AM   Output (mL) 5 mL 8/22/2017  7:00 AM   Number of days: 0            Y Chest Tube 3 and 4 08/21/17 1334 Anterior Mediastinal 19 Fr. Anterior Mediastinal (Active)   Function -20 cm H2O 8/22/2017  7:00 AM   Air Leak/Fluctuation air leak not present;fluctuation not present 8/22/2017  7:00 AM   Safety all tubing connections taped;all connections secured;suction checked 8/22/2017  7:00 AM   Securement tubing anchored to body distal to insertion site w/ tape 8/22/2017  7:00 AM   Left Subcutaneous Emphysema none present 8/22/2017  7:00 AM   Right Subcutaneous Emphysema none present 8/22/2017  7:00 AM   Patency Intervention Tip/tilt;Stripped;Milked 8/22/2017  7:00 AM   Drainage Description 3 Serosanguineous 8/22/2017  7:00 AM   Tube 3 Dressing Appearance occlusive gauze dressing intact;clean and dry 8/22/2017  7:00 AM   Tube 3 Dressing Care dressing reinforced 8/22/2017  7:00 AM   Site Assessment 3 Not assessed 8/22/2017  7:00 AM   Surrounding Skin 3 Unable to view 8/22/2017  7:00 AM   Drainage Description 4 Serosanguineous 8/22/2017  7:00 AM   Tube 4 Dressing Appearance occlusive gauze dressing intact;clean and dry 8/22/2017  3:00 AM   Tube 4 Dressing Care dressing reinforced 8/22/2017  7:00 AM   Site Assessment 4 Not assessed 8/22/2017  7:00 AM   Surrounding Skin 4 Unable to view 8/22/2017  7:00 AM   Output (mL) 40 mL 8/22/2017  7:00 AM   Number of days: 0       Laboratory (Last 24H):  CBC:    Recent Labs  Lab  08/22/17  0415   WBC 18.46*   HGB 8.6*   HCT 25.2*   *     CMP:    Recent Labs  Lab 08/22/17  0415   CALCIUM 7.9*      K 4.0   CO2 20*   *   BUN 16   CREATININE 0.7       Chest X-Ray: I personally reviewed the films and findings are: Slightly larger size in R sided pneumothorax        ASSESSMENT/PLAN:         Plan:  Neuro:   - AAOx3  - Cont PRN pain control    Pulmonary:   - Sating well on 2L NC  - Right apical pneumothorax, slightly larger than post-op.  No respiratory distress.  Will repeat xray at noon    Cardiac:  - HDS  - on 0.03 epi  - CT put out 34 (pleural) and 450 (mediastinal)  - CVP 6 this AM    Renal:   - Reid catheter  - strict I/Os  - Making good urine  - Start lasix later today    Infectious Disease:   - Cont post op abx    Hematology/Oncology:   - H/H stable     Endocrine:  - Endocrine following for insulin gtt  - levothyroxine 75mcg      Fluids/Electrolytes/Nutrition/GI:   - LR and albumin bolus overnight  - Cardiac diet today      Dispo:  - Continue ICU care, possible step down later today    Ras Martinez M.D.  General Surgery PGY2  [phone]

## 2017-08-22 NOTE — SUBJECTIVE & OBJECTIVE
"Interval HPI:   Extubated, diet to advance this am, minimal insulin needs, no hypoglycemia    BP (!) 79/41 (BP Location: Right arm, Patient Position: Lying)   Pulse 96   Temp 98.3 °F (36.8 °C) (Oral)   Resp (!) 25   Ht 5' 3" (1.6 m)   Wt 84 kg (185 lb 3 oz)   SpO2 95%   Breastfeeding? No   BMI 32.80 kg/m²     Labs Reviewed and Include      Recent Labs  Lab 08/22/17  0415   *   CALCIUM 7.9*      K 4.0   CO2 20*   *   BUN 16   CREATININE 0.7     Lab Results   Component Value Date    WBC 18.46 (H) 08/22/2017    HGB 8.6 (L) 08/22/2017    HCT 25.2 (L) 08/22/2017    MCV 98 08/22/2017     (L) 08/22/2017     No results for input(s): TSH, FREET4 in the last 168 hours.  Lab Results   Component Value Date    HGBA1C 5.7 (H) 08/15/2017       Nutritional status:   Body mass index is 32.8 kg/m².  Lab Results   Component Value Date    ALBUMIN 3.3 (L) 08/15/2017    ALBUMIN 4.9 07/14/2017    ALBUMIN 3.7 04/26/2017     No results found for: PREALBUMIN    Estimated Creatinine Clearance: 86.6 mL/min (based on Cr of 0.7).    Accu-Checks  Recent Labs      08/21/17   2118  08/21/17   2213  08/21/17   2305  08/22/17   0002  08/22/17   0102  08/22/17   0157  08/22/17   0412  08/22/17   0532  08/22/17   0735  08/22/17   0805   POCTGLUCOSE  170*  155*  148*  170*  161*  119*  172*  151*  154*  138*       Current Medications and/or Treatments Impacting Glycemic Control  Immunotherapy:  Immunosuppressants     None        Steroids:   Hormones     None        Pressors:    Autonomic Drugs     Start     Stop Route Frequency Ordered    08/21/17 1500  epinephrine 4 mg in sodium chloride 0.9% 250 mL infusion     Question Answer Comment   Titrate by: (in mcg/kg/min) 0.05    Titrate interval: (in minutes) 5    Titrate to maintain: (SBP or MAP or Cardiac Index) CARDIAC INDEX    Cardiac index greater than: (in L/min) 2.2    Maximum dose: (in mcg/kg/min) 2        -- IV Continuous 08/21/17 1452    08/21/17 1500  " norepinephrine 4 mg in dextrose 5% 250 mL infusion (premix) (titrating)     Question Answer Comment   Titrate by: (in mcg/kg/min) 0.05    Titrate interval: (in minutes) 5    Titrate to maintain: (MAP or SBP) MAP    Greater than: (in mmHg) 60    Maximum dose: (in mcg/kg/min) 3        -- IV Continuous 08/21/17 0982        Hyperglycemia/Diabetes Medications: Antihyperglycemics     Start     Stop Route Frequency Ordered    08/22/17 0910  insulin aspart pen 0-5 Units      -- SubQ Before meals & nightly PRN 08/22/17 0811

## 2017-08-22 NOTE — PT/OT/SLP EVAL
Occupational Therapy  Evaluation    Dulce Root   MRN: [id number]   Admitting Diagnosis: Coronary artery disease of native artery of native heart with stable angina pectoris    OT Date of Treatment: 08/22/17   OT Start Time: 1032  OT Stop Time: 1043  OT Total Time (min): 11 min    Billable Minutes:  Evaluation 11    Diagnosis: Coronary artery disease of native artery of native heart with stable angina pectoris   S/p AVR and CABG x 8/21/17    Past Medical History:   Diagnosis Date    Allergy     Breast cancer 2008    Hodgkin lymphoma     Hyperlipidemia     Hypertension     Osteoporosis     Pemphigoid     Thyroid disease       Past Surgical History:   Procedure Laterality Date    BREAST BIOPSY      BREAST RECONSTRUCTION      bilateral mastectomy    COSMETIC SURGERY      bereast reconstruction    EYE SURGERY      eye lids    LYMPHADENECTOMY      MASTECTOMY      SKIN BIOPSY      SPLENECTOMY, TOTAL      TUMOR REMOVAL         Referring physician: Quintin  Date referred to OT: 8/21/17    General Precautions: Standard, fall, sternal  Orthopedic Precautions:    Braces:      Do you have any cultural, spiritual, Christian conflicts, given your current situation?: none reported     Patient History:  Living Environment  Lives With: spouse  Living Arrangements: house (no concerns)  Transportation Available: family or friend will provide  Living Environment Comment: Pt's  is retired and will be able to assist at d/c  Equipment Currently Used at Home:  (RW not used)    Prior level of function:   Bed Mobility/Transfers: independent  Grooming: independent  Bathing: independent  Upper Body Dressing: independent  Lower Body Dressing: independent  Toileting: independent  Home Management Skills: independent  Driving License: Yes  Occupation: Full time employment  Type of Occupation:      Dominant hand: right    Subjective:  Communicated with RN prior to session.    Chief Complaint: pt very lethargic  during session  Patient/Family stated goals: to get better    Pain/Comfort  Pain Rating 1: 3/10  Location - Side 1: Bilateral  Location - Orientation 1: midline  Location 1: sternal  Pain Addressed 1: Pre-medicate for activity, Reposition, Distraction  Pain Rating Post-Intervention 1: 3/10    Objective:  Patient found with: arterial line, blood pressure cuff, central line, chest tube, braun catheter, peripheral IV, pulse ox (continuous), telemetry    Cognitive Exam:  Oriented to: Person, Place, Time and Situation  Follows Commands/attention: Follows multistep  commands  Communication: clear/fluent    Physical Exam:  Postural examination/scapula alignment: No postural abnormalities identified  Skin integrity: Visible skin intact  Edema: None noted     Sensation:   Intact    Upper Extremity Range of Motion:  Right Upper Extremity: WFL  Left Upper Extremity: WFL    Upper Extremity Strength:  Right Upper Extremity: WFL  Left Upper Extremity: WFL   Strength: good    Fine motor coordination:   Intact    Gross motor coordination: WFL    Functional Mobility:  Bed Mobility:   Pt UIC upon OT arrival    Transfers:  Sit <> Stand Assistance: Minimum Assistance (from b/s chair)  Sit <> Stand Assistive Device: No Assistive Device    Functional Ambulation: Mod A with B HHA x two steps forward and backwards    Activities of Daily Living:  Feeding Level of Assistance: Activity did not occur  Feeding adaptive equipment:   UE Dressing Level of Assistance: Total assistance  UE adaptive equipment:   LE Dressing Level of Assistance: Total assistance  LE adaptive equipment:   Grooming Position: Seated  Grooming Level of Assistance: Moderate assistance        Therapeutic Activities and Exercises:  Pt ed on role of OT and sternal precautions during ADL    AM-PAC 6 CLICK ADL  How much help from another person does this patient currently need?  1 = Unable, Total/Dependent Assistance  2 = A lot, Maximum/Moderate Assistance  3 = A little,  "Minimum/Contact Guard/Supervision  4 = None, Modified [place]/Independent    Putting on and taking off regular lower body clothing? : 1  Bathing (including washing, rinsing, drying)?: 1  Toileting, which includes using toilet, bedpan, or urinal? : 1  Putting on and taking off regular upper body clothing?: 1  Taking care of personal grooming such as brushing teeth?: 2  Eating meals?: 2  Total Score: 8    AM-PAC Raw Score CMS "G-Code Modifier Level of Impairment Assistance   6 % Total / Unable   7 - 9 CM 80 - 100% Maximal Assist   10-14 CL 60 - 80% Moderate Assist   15 - 19 CK 40 - 60% Moderate Assist   20 - 22 CJ 20 - 40% Minimal Assist   23 CI 1-20% SBA / CGA   24 CH 0% Independent/ Mod I       Patient left up in chair with all lines intact, call button in reach, rn notified and spouse present    Assessment:  Dulce Root is a 62 y.o. female with a medical diagnosis of Coronary artery disease of native artery of native heart with stable angina pectoris and presents with decreased endurance and strength s/p AVR/CABG x 2.    Rehab identified problem list/impairments: Rehab identified problem list/impairments: weakness, impaired endurance, impaired self care skills, gait instability, impaired functional mobilty, impaired balance, decreased upper extremity function, pain, impaired cardiopulmonary response to activity    Rehab potential is good.    Activity tolerance: Good    Discharge recommendations: Discharge Facility/Level Of Care Needs: home with home health     Barriers to discharge: Barriers to Discharge: None    Equipment recommendations: none     GOALS:    Occupational Therapy Goals        Problem: Occupational Therapy Goal    Goal Priority Disciplines Outcome Interventions   Occupational Therapy Goal     OT, PT/OT Ongoing (interventions implemented as appropriate)    Description:  Goals to be met by: 9/1/17     Patient will increase functional independence with ADLs by performing:    Feeding with " [place].  UE Dressing with Supervision.  LE Dressing with Supervision.  Grooming while standing at sink with Supervision.  Toileting from toilet with Supervision for hygiene and clothing management.   Toilet transfer to toilet with Supervision.                      PLAN:  Patient to be seen 5 x/week to address the above listed problems via self-care/home management, therapeutic activities, therapeutic exercises  Plan of Care expires: 09/21/17  Plan of Care reviewed with: patient, spouse         NEHAL Max  08/22/2017

## 2017-08-22 NOTE — CONSULTS
"  [facility]  Adult Nutrition  Consult Note    SUMMARY     Recommendations    Recommendation/Intervention: 1. Cont to ADAT to Cardiac; if meal intake is consistently <50%, order Boost Plus TID   2. CTSU RD to offer diet educ at f/u eval once pt is stepped down out of ICU  Goals: Diet advancement; maintain meal intake >/=75%  Nutrition Goal Status: new  Communication of RD Recs: reviewed with RN    Reason for Assessment    Reason for Assessment: physician consult  Diagnosis: other (see comments) (s/p AVR & CABG x 2)  Relevent Medical History: severe AS, CAD, HLD, HTN   Interdisciplinary Rounds: attended     General Information Comments: Pt extubated last night, on NC. Diet adv'd today. Spoke to pt this a.m. Appears tired & not up for having a detailed conversation. Kala'ing liquids well thus far.         Nutrition Prescription Ordered    Current Diet Order: Bariatric CL        Nutrition/Diet History       Typical Food/Fluid Intake: CL currently  Food Preferences: ROGER        Factors Affecting Nutritional Intake: other (see comments) (POD# 1)                Labs/Tests/Procedures/Meds       Pertinent Labs Reviewed: reviewed  Pertinent Labs Comments: Cl 114, CO2 20, Glu 154, Alb 3.3, Mg 2.9, A1C 5.7  Pertinent Medications Reviewed: reviewed  Pertinent Medications Comments: epi, insulin, KCl, albumin, famotidine, alla-carbonate Vit D3, statin    Physical Findings    Overall Physical Appearance: on oxygen therapy        Skin: incision (Chest incision)    Anthropometrics    Temp: 98.3 °F (36.8 °C)     Height: 5' 3" (160 cm)  Weight Method: Stated  Weight: 84 kg (185 lb 3 oz)     Ideal Body Weight (IBW), Female: 115 lb     % Ideal Body Weight, Female (lb): 161.03 lb  BMI (Calculated): 32.9  BMI Grade: 30 - 34.9- obesity - grade I     Usual Body Weight (UBW), k kg (admit wt)     % Usual Body Weight: 112.23  % Weight Change From Usual Weight: -12.23 %          Estimated/Assessed Needs    Weight Used For " Calorie Calculations: 84 kg (185 lb 3 oz)      Energy Calorie Requirements (kcal): 1717  Energy Need Method: Griffin-St Jeor (x 1.25 (PAL))      RMR (Griffin-St. Jeor Equation): 1374.12        Weight Used For Protein Calculations: 84 kg (185 lb 3 oz)  Protein Requirements:  gms (1-1.2 gms/kg)     Fluid Need Method: RDA Method (1ml/kcal or per MD)        RDA Method (mL): 1717         Assessment and Plan    No nutr dx @ this time.    Monitor and Evaluation    Food and Nutrient Intake: energy intake, food and beverage intake  Food and Nutrient Adminstration: diet order     Physical Activity and Function: nutrition-related ADLs and IADLs  Anthropometric Measurements: weight, weight change  Biochemical Data, Medical Tests and Procedures: electrolyte and renal panel, glucose/endocrine profile  Nutrition-Focused Physical Findings: overall appearance    Nutrition Risk    Level of Risk: other (see comments) (F/u 1 x weekly)    Nutrition Follow-Up    RD Follow-up?: Yes

## 2017-08-22 NOTE — PT/OT/SLP EVAL
Physical Therapy  Evaluation    Dulce Root   MRN: [id number]   Admitting Diagnosis: Coronary artery disease of native artery of native heart with stable angina pectoris    PT Received On: 08/22/17  PT Start Time: 1034     PT Stop Time: 1043    PT Total Time (min): 9 min       Billable Minutes:  Evaluation 9 min    Diagnosis: Coronary artery disease of native artery of native heart with stable angina pectoris  S/p MVR, CABG x 2 8/21/17    Past Medical History:   Diagnosis Date    Allergy     Breast cancer 2008    Hodgkin lymphoma     Hyperlipidemia     Hypertension     Osteoporosis     Pemphigoid     Thyroid disease       Past Surgical History:   Procedure Laterality Date    BREAST BIOPSY      BREAST RECONSTRUCTION      bilateral mastectomy    COSMETIC SURGERY      bereast reconstruction    EYE SURGERY      eye lids    LYMPHADENECTOMY      MASTECTOMY      SKIN BIOPSY      SPLENECTOMY, TOTAL      TUMOR REMOVAL         Referring physician: Evan Ribeiro  Date referred to PT: 8/21/17    General Precautions: Standard, fall, sternal  Orthopedic Precautions:     Braces:         Do you have any cultural, spiritual, Zoroastrianism conflicts, given your current situation?: none    Patient History:  Lives With: spouse (pt works full-time as . pt  is retired and will be able to assist. )  Living Arrangements: house (1 story, slab.)  Equipment Currently Used at Home:  (RW)  DME owned (not currently used): none    Previous Level of Function:  Ambulation Skills: independent  Transfer Skills: independent    Subjective:  Communicated with nurse prior to session.    Chief Complaint: pt c/o dizziness with standing.   Patient goals: to get better and go home.     Pain/Comfort  Pain Rating 1: 3/10  Location 1:  (chest)  Pain Rating Post-Intervention 1: 3/10      Objective:   Patient found with: telemetry, arterial line, pulse ox (continuous), blood pressure cuff, braun catheter, chest tube,  oxygen, central line     Cognitive Exam:  Oriented to: Person, Place, Time and Situation    Follows Commands/attention: Follows multistep  commands  Communication: clear/fluent  Safety awareness/insight to disability: intact    Physical Exam:    Lower Extremity Range of Motion:  Right Lower Extremity: WFL  Left Lower Extremity: WFL    Lower Extremity Strength:  Right Lower Extremity: WFL  Left Lower Extremity: WFL       Functional Mobility:  Bed Mobility:  Rolling/Turning to Left:  (not tested. pt was sitting in chair for treatment. )    Transfers:  Sit <> Stand Assistance: Minimum Assistance (pt needed verbal cues for functional mobility with sternal precautions. )  Sit <> Stand Assistive Device: No Assistive Device    Gait:   Gait Distance: 2 steps forward/backward  Assistance 1: Moderate assistance  Gait Assistive Device: Hand held assist      Therapeutic Activities and Exercises:  Pt and  received verbal and written instruction in sternal precautions and both verbally expressed understanding of such.     AM-PAC 6 CLICK MOBILITY  How much help from another person does this patient currently need?   1 = Unable, Total/Dependent Assistance  2 = A lot, Maximum/Moderate Assistance  3 = A little, Minimum/Contact Guard/Supervision  4 = None, Modified [place]/Independent    Turning over in bed (including adjusting bedclothes, sheets and blankets)?: 2  Sitting down on and standing up from a chair with arms (e.g., wheelchair, bedside commode, etc.): 3  Moving from lying on back to sitting on the side of the bed?: 2  Moving to and from a bed to a chair (including a wheelchair)?: 3  Need to walk in hospital room?: 2  Climbing 3-5 steps with a railing?: 1  Total Score: 13     AM-PAC Raw Score CMS G-Code Modifier Level of Impairment Assistance   6 % Total / Unable   7 - 9 CM 80 - 100% Maximal Assist   10 - 14 CL 60 - 80% Moderate Assist   15 - 19 CK 40 - 60% Moderate Assist   20 - 22 CJ 20 - 40% Minimal  Assist   23 CI 1-20% SBA / CGA   24 CH 0% Independent/ Mod I     Patient left up in chair with all lines intact, call button in reach and  notified.    Assessment:   Dulce Root is a 62 y.o. female with a medical diagnosis of Coronary artery disease of native artery of native heart with stable angina pectoris and presents with decreased mobility, transfers and decreased distance ambulated. Pt would benefit from cont PT to address deficits and will be able to discharge home with HHPT. Pt will benefit from skilled PT 5x/wk to progress to Independent status. Pt is s/p AVR, CABG x 2 17.    Rehab identified problem list/impairments: Rehab identified problem list/impairments: impaired endurance, impaired functional mobilty, gait instability, pain    Rehab potential is good.    Activity tolerance: Good    Discharge recommendations: Discharge Facility/Level Of Care Needs:  (no further PT will be needed after discharge.)     Barriers to discharge: Barriers to Discharge: None    Equipment recommendations: Equipment Needed After Discharge: none     GOALS:    Physical Therapy Goals        Problem: Physical Therapy Goal    Goal Priority Disciplines Outcome Goal Variances Interventions   Physical Therapy Goal     PT/OT, PT      Description:  Goals to be met by: 17     Patient will increase functional independence with mobility by performin. Supine to sit with Stand-by Assistance - not met  2. Sit to stand transfer with Supervision - not met  3. Gait  x 220 feet with Supervision - not met                      PLAN:    Patient to be seen 5 x/week to address the above listed problems via gait training, therapeutic activities, therapeutic exercises  Plan of Care expires: 17  Plan of Care reviewed with: patient, spouse          Lorenza Collins, PT  2017

## 2017-08-22 NOTE — PLAN OF CARE
Problem: Patient Care Overview  Goal: Plan of Care Review  Recommendations     Recommendation/Intervention: 1. Cont to ADAT to Cardiac; if meal intake is consistently <50%, order Boost Plus TID   2. CTSU RD to offer diet educ at f/u eval once pt is stepped down out of ICU  Goals: Diet advancement; maintain meal intake >/=75%  Nutrition Goal Status: new  Communication of RD Recs: reviewed with RN

## 2017-08-23 LAB
ANION GAP SERPL CALC-SCNC: 9 MMOL/L
ANISOCYTOSIS BLD QL SMEAR: SLIGHT
ANISOCYTOSIS BLD QL SMEAR: SLIGHT
BASOPHILS # BLD AUTO: 0.02 K/UL
BASOPHILS # BLD AUTO: 0.02 K/UL
BASOPHILS NFR BLD: 0.1 %
BASOPHILS NFR BLD: 0.1 %
BUN SERPL-MCNC: 19 MG/DL
BURR CELLS BLD QL SMEAR: ABNORMAL
CALCIUM SERPL-MCNC: 8 MG/DL
CHLORIDE SERPL-SCNC: 108 MMOL/L
CO2 SERPL-SCNC: 22 MMOL/L
CREAT SERPL-MCNC: 0.6 MG/DL
DIFFERENTIAL METHOD: ABNORMAL
DIFFERENTIAL METHOD: ABNORMAL
EOSINOPHIL # BLD AUTO: 0.1 K/UL
EOSINOPHIL # BLD AUTO: 0.1 K/UL
EOSINOPHIL NFR BLD: 0.3 %
EOSINOPHIL NFR BLD: 0.3 %
ERYTHROCYTE [DISTWIDTH] IN BLOOD BY AUTOMATED COUNT: 15.9 %
ERYTHROCYTE [DISTWIDTH] IN BLOOD BY AUTOMATED COUNT: 16 %
EST. GFR  (AFRICAN AMERICAN): >60 ML/MIN/1.73 M^2
EST. GFR  (NON AFRICAN AMERICAN): >60 ML/MIN/1.73 M^2
GLUCOSE SERPL-MCNC: 112 MG/DL
HCT VFR BLD AUTO: 19.6 %
HCT VFR BLD AUTO: 20.2 %
HGB BLD-MCNC: 6.6 G/DL
HGB BLD-MCNC: 6.8 G/DL
HOWELL-JOLLY BOD BLD QL SMEAR: ABNORMAL
HYPOCHROMIA BLD QL SMEAR: ABNORMAL
LYMPHOCYTES # BLD AUTO: 0.7 K/UL
LYMPHOCYTES # BLD AUTO: 0.9 K/UL
LYMPHOCYTES NFR BLD: 4.6 %
LYMPHOCYTES NFR BLD: 5.8 %
MAGNESIUM SERPL-MCNC: 1.8 MG/DL
MAGNESIUM SERPL-MCNC: 2 MG/DL
MCH RBC QN AUTO: 33.2 PG
MCH RBC QN AUTO: 33.3 PG
MCHC RBC AUTO-ENTMCNC: 33.7 G/DL
MCHC RBC AUTO-ENTMCNC: 33.7 G/DL
MCV RBC AUTO: 99 FL
MCV RBC AUTO: 99 FL
MONOCYTES # BLD AUTO: 1.3 K/UL
MONOCYTES # BLD AUTO: 1.3 K/UL
MONOCYTES NFR BLD: 8.5 %
MONOCYTES NFR BLD: 8.6 %
NEUTROPHILS # BLD AUTO: 13 K/UL
NEUTROPHILS # BLD AUTO: 13.3 K/UL
NEUTROPHILS NFR BLD: 85.3 %
NEUTROPHILS NFR BLD: 86.4 %
OVALOCYTES BLD QL SMEAR: ABNORMAL
OVALOCYTES BLD QL SMEAR: ABNORMAL
PHOSPHATE SERPL-MCNC: 1.5 MG/DL
PHOSPHATE SERPL-MCNC: 1.5 MG/DL
PLATELET # BLD AUTO: 79 K/UL
PLATELET # BLD AUTO: 79 K/UL
PLATELET BLD QL SMEAR: ABNORMAL
PMV BLD AUTO: 10.5 FL
PMV BLD AUTO: 10.9 FL
POCT GLUCOSE: 90 MG/DL (ref 70–110)
POIKILOCYTOSIS BLD QL SMEAR: SLIGHT
POIKILOCYTOSIS BLD QL SMEAR: SLIGHT
POLYCHROMASIA BLD QL SMEAR: ABNORMAL
POTASSIUM SERPL-SCNC: 3.9 MMOL/L
POTASSIUM SERPL-SCNC: 4 MMOL/L
RBC # BLD AUTO: 1.98 M/UL
RBC # BLD AUTO: 2.05 M/UL
SODIUM SERPL-SCNC: 139 MMOL/L
WBC # BLD AUTO: 15.45 K/UL
WBC # BLD AUTO: 15.53 K/UL

## 2017-08-23 PROCEDURE — 99231 SBSQ HOSP IP/OBS SF/LOW 25: CPT | Mod: ,,, | Performed by: NURSE PRACTITIONER

## 2017-08-23 PROCEDURE — 25000003 PHARM REV CODE 250: Performed by: THORACIC SURGERY (CARDIOTHORACIC VASCULAR SURGERY)

## 2017-08-23 PROCEDURE — A4216 STERILE WATER/SALINE, 10 ML: HCPCS | Performed by: THORACIC SURGERY (CARDIOTHORACIC VASCULAR SURGERY)

## 2017-08-23 PROCEDURE — 63600175 PHARM REV CODE 636 W HCPCS: Performed by: STUDENT IN AN ORGANIZED HEALTH CARE EDUCATION/TRAINING PROGRAM

## 2017-08-23 PROCEDURE — 84132 ASSAY OF SERUM POTASSIUM: CPT

## 2017-08-23 PROCEDURE — 25000003 PHARM REV CODE 250: Performed by: STUDENT IN AN ORGANIZED HEALTH CARE EDUCATION/TRAINING PROGRAM

## 2017-08-23 PROCEDURE — 25000242 PHARM REV CODE 250 ALT 637 W/ HCPCS: Performed by: THORACIC SURGERY (CARDIOTHORACIC VASCULAR SURGERY)

## 2017-08-23 PROCEDURE — 94799 UNLISTED PULMONARY SVC/PX: CPT

## 2017-08-23 PROCEDURE — 94760 N-INVAS EAR/PLS OXIMETRY 1: CPT

## 2017-08-23 PROCEDURE — 63600175 PHARM REV CODE 636 W HCPCS: Performed by: THORACIC SURGERY (CARDIOTHORACIC VASCULAR SURGERY)

## 2017-08-23 PROCEDURE — 20600001 HC STEP DOWN PRIVATE ROOM

## 2017-08-23 PROCEDURE — 99232 SBSQ HOSP IP/OBS MODERATE 35: CPT | Mod: ,,, | Performed by: ANESTHESIOLOGY

## 2017-08-23 PROCEDURE — 80048 BASIC METABOLIC PNL TOTAL CA: CPT

## 2017-08-23 PROCEDURE — 94640 AIRWAY INHALATION TREATMENT: CPT

## 2017-08-23 PROCEDURE — 85025 COMPLETE CBC W/AUTO DIFF WBC: CPT | Mod: 91

## 2017-08-23 PROCEDURE — 27000221 HC OXYGEN, UP TO 24 HOURS

## 2017-08-23 PROCEDURE — 83735 ASSAY OF MAGNESIUM: CPT | Mod: 91

## 2017-08-23 PROCEDURE — 84100 ASSAY OF PHOSPHORUS: CPT

## 2017-08-23 PROCEDURE — 25000003 PHARM REV CODE 250: Performed by: NURSE PRACTITIONER

## 2017-08-23 PROCEDURE — S0028 INJECTION, FAMOTIDINE, 20 MG: HCPCS | Performed by: THORACIC SURGERY (CARDIOTHORACIC VASCULAR SURGERY)

## 2017-08-23 PROCEDURE — 94761 N-INVAS EAR/PLS OXIMETRY MLT: CPT

## 2017-08-23 RX ORDER — FUROSEMIDE 10 MG/ML
20 INJECTION INTRAMUSCULAR; INTRAVENOUS ONCE
Status: COMPLETED | OUTPATIENT
Start: 2017-08-23 | End: 2017-08-23

## 2017-08-23 RX ORDER — ACETAMINOPHEN 325 MG/1
650 TABLET ORAL EVERY 6 HOURS PRN
Status: DISCONTINUED | OUTPATIENT
Start: 2017-08-23 | End: 2017-08-27 | Stop reason: HOSPADM

## 2017-08-23 RX ORDER — FAMOTIDINE 20 MG/1
20 TABLET, FILM COATED ORAL 2 TIMES DAILY
Status: DISCONTINUED | OUTPATIENT
Start: 2017-08-23 | End: 2017-08-27 | Stop reason: HOSPADM

## 2017-08-23 RX ORDER — FUROSEMIDE 10 MG/ML
20 INJECTION INTRAMUSCULAR; INTRAVENOUS 2 TIMES DAILY
Status: DISCONTINUED | OUTPATIENT
Start: 2017-08-23 | End: 2017-08-25

## 2017-08-23 RX ORDER — METOPROLOL TARTRATE 25 MG/1
25 TABLET, FILM COATED ORAL 2 TIMES DAILY
Status: DISCONTINUED | OUTPATIENT
Start: 2017-08-23 | End: 2017-08-25

## 2017-08-23 RX ADMIN — IPRATROPIUM BROMIDE AND ALBUTEROL SULFATE 3 ML: .5; 3 SOLUTION RESPIRATORY (INHALATION) at 08:08

## 2017-08-23 RX ADMIN — CALCIUM CARBONATE-CHOLECALCIFEROL TAB 250 MG-125 UNIT 1 TABLET: 250-125 TAB at 09:08

## 2017-08-23 RX ADMIN — FUROSEMIDE 20 MG: 10 INJECTION, SOLUTION INTRAMUSCULAR; INTRAVENOUS at 05:08

## 2017-08-23 RX ADMIN — Medication 3 ML: at 02:08

## 2017-08-23 RX ADMIN — VALACYCLOVIR HYDROCHLORIDE 1000 MG: 500 TABLET, FILM COATED ORAL at 09:08

## 2017-08-23 RX ADMIN — MUPIROCIN 1 G: 20 OINTMENT TOPICAL at 10:08

## 2017-08-23 RX ADMIN — METOPROLOL TARTRATE 25 MG: 25 TABLET ORAL at 07:08

## 2017-08-23 RX ADMIN — ASPIRIN 325 MG ORAL TABLET 325 MG: 325 PILL ORAL at 09:08

## 2017-08-23 RX ADMIN — FUROSEMIDE 20 MG: 10 INJECTION, SOLUTION INTRAMUSCULAR; INTRAVENOUS at 10:08

## 2017-08-23 RX ADMIN — POLYETHYLENE GLYCOL 3350 17 G: 17 POWDER, FOR SOLUTION ORAL at 09:08

## 2017-08-23 RX ADMIN — TIMOLOL MALEATE 1 DROP: 5 SOLUTION OPHTHALMIC at 10:08

## 2017-08-23 RX ADMIN — FUROSEMIDE 20 MG: 10 INJECTION, SOLUTION INTRAMUSCULAR; INTRAVENOUS at 01:08

## 2017-08-23 RX ADMIN — OXYCODONE HYDROCHLORIDE 5 MG: 5 TABLET ORAL at 03:08

## 2017-08-23 RX ADMIN — IPRATROPIUM BROMIDE AND ALBUTEROL SULFATE 3 ML: .5; 3 SOLUTION RESPIRATORY (INHALATION) at 11:08

## 2017-08-23 RX ADMIN — OXYCODONE HYDROCHLORIDE 5 MG: 5 TABLET ORAL at 08:08

## 2017-08-23 RX ADMIN — Medication 3 ML: at 06:08

## 2017-08-23 RX ADMIN — IPRATROPIUM BROMIDE AND ALBUTEROL SULFATE 3 ML: .5; 3 SOLUTION RESPIRATORY (INHALATION) at 05:08

## 2017-08-23 RX ADMIN — BRIMONIDINE TARTRATE 1 DROP: 2 SOLUTION OPHTHALMIC at 10:08

## 2017-08-23 RX ADMIN — ROSUVASTATIN CALCIUM 20 MG: 20 TABLET, FILM COATED ORAL at 07:08

## 2017-08-23 RX ADMIN — FAMOTIDINE 20 MG: 10 INJECTION, SOLUTION INTRAVENOUS at 09:08

## 2017-08-23 RX ADMIN — METOPROLOL TARTRATE 25 MG: 25 TABLET ORAL at 10:08

## 2017-08-23 RX ADMIN — LEVOTHYROXINE SODIUM 75 MCG: 75 TABLET ORAL at 09:08

## 2017-08-23 RX ADMIN — IPRATROPIUM BROMIDE AND ALBUTEROL SULFATE 3 ML: .5; 3 SOLUTION RESPIRATORY (INHALATION) at 03:08

## 2017-08-23 RX ADMIN — FAMOTIDINE 20 MG: 20 TABLET, FILM COATED ORAL at 07:08

## 2017-08-23 RX ADMIN — ACETAMINOPHEN 650 MG: 325 TABLET ORAL at 09:08

## 2017-08-23 RX ADMIN — CEFAZOLIN SODIUM 2 G: 2 SOLUTION INTRAVENOUS at 07:08

## 2017-08-23 NOTE — PLAN OF CARE
Problem: Patient Care Overview  Goal: Plan of Care Review  Patient's vital signs stable overnight. Patient continues on 2L nasal cannula with SaO2 %. One time dose of 20mg of lasix given once per order overnight for marginal urine output, CVP was 13. Prn pain medication given per order. H/H 6.6/19.6 this morning, MD notified. Patient turned Q2H. Plan of care reviewed with patient. Will continue to monitor.

## 2017-08-23 NOTE — PROGRESS NOTES
Progress Note  Surgical Intensive Care    Admit Date: 8/21/2017  Post-operative Day: 2 Days Post-Op  Hospital Day: 3    SUBJECTIVE:     Follow-up For:  Procedure(s) (LRB):  REPLACEMENT-VALVE-AORTIC (N/A)  AORTOCORONARY BYPASS-CABG (N/A)  HARVEST-VEIN-ENDOVASCULAR (Left)    Interval history: NAEON. VSS. Currently sating well on 2LNC.  Off all pressors. One time dose of 20mg of lasix given once per order overnight for marginal UOP. Appropriate response to lasix. Chest tube output minimal overnight.      Continuous Infusions:   epinephrine Stopped (08/22/17 1700)    nicardipine Stopped (08/21/17 1500)    norepinephrine bitartrate-D5W Stopped (08/21/17 1500)    propofol Stopped (08/21/17 1700)     Scheduled Meds:   albumin human 5%  25 g Intravenous Once    albuterol-ipratropium 2.5mg-0.5mg/3mL  3 mL Nebulization Q4H    aspirin  325 mg Oral Daily    brimonidine 0.2%  1 drop Both Eyes Daily    calcium carbonate-vitamin D3 250-125 mg  1 tablet Oral Daily    famotidine (PF)  20 mg Intravenous BID    furosemide  20 mg Intravenous BID    levothyroxine  75 mcg Oral Daily    metoprolol tartrate  25 mg Oral BID    mupirocin  1 g Nasal BID    polyethylene glycol  17 g Oral Daily    rosuvastatin  20 mg Oral Nightly    sodium chloride 0.9%  3 mL Intravenous Q8H    timolol maleate 0.5%  1 drop Both Eyes Daily    valacyclovir  1,000 mg Oral Daily     PRN Meds:acetaminophen, albumin human 5%, artificial tears, dextrose 50%, dextrose 50%, fentaNYL, fentaNYL, glucose, glucose, HYDROmorphone, insulin aspart, lactated Ringers, lactulose, magnesium sulfate IVPB, metoclopramide HCl, ondansetron, oxycodone, oxycodone, potassium chloride **AND** potassium chloride **AND** potassium chloride    Review of patient's allergies indicates:   Allergen Reactions    Norvasc  [amlodipine] Shortness Of Breath     unknown  Other reaction(s): Swelling    Sulfa (sulfonamide antibiotics) Shortness Of Breath, Itching and Other (See  Comments)     elevated blood pressure    Iodinated contrast- oral and iv dye     Iodine      Other reaction(s): Unknown    Latex      Other reaction(s): Itching       OBJECTIVE:     Vital Signs (Most Recent)  Temp: 99 °F (37.2 °C) (08/23/17 1100)  Pulse: 94 (08/23/17 1145)  Resp: (!) 28 (08/23/17 1145)  BP: (!) 114/54 (08/23/17 1100)  SpO2: 98 % (08/23/17 1145)    Vital Signs Range (Last 24H):  Temp:  [98 °F (36.7 °C)-99.9 °F (37.7 °C)]   Pulse:  []   Resp:  [11-31]   BP: ()/(45-62)   SpO2:  [94 %-100 %]   Arterial Line BP: ()/(39-55)     I & O (Last 24H):    Intake/Output Summary (Last 24 hours) at 08/23/17 1217  Last data filed at 08/23/17 1100   Gross per 24 hour   Intake             1854 ml   Output             1983 ml   Net             -129 ml     Ventilator Data (Last 24H):     Oxygen Concentration (%):  [28] 28    Hemodynamic Parameters (Last 24H):     Physical Exam:  Physical Exam   Constitutional: She is well-developed, well-nourished, and in no distress. No distress. Extubated  HENT:   Head: Normocephalic and atraumatic.   Neck: Normal range of motion. Neck supple. No JVD present.   Cardiovascular: Normal rate, regular rhythm, normal heart sounds and intact distal pulses.    Pulmonary/Chest: Effort normal. No stridor. No respiratory distress. She has no wheezes. Mild course breath sounds appreciated bilaterally. Sternal dressing in place. cdi   Abdominal: Soft. Bowel sounds are normal. She exhibits no distension and no mass. There is no tenderness.   Genitourinary:   Genitourinary Comments: Reid in place   Musculoskeletal:   Ace bandage covering entire L leg.    Neurological: AAOx3     Lines/Drains:       Percutaneous Central Line Insertion/Assessment - quad lumen  08/21/17 0710 right internal jugular;other (see comments) (Active)   Dressing biopatch in place;dressing dry and intact 8/21/2017 11:00 PM   Securement secured w/ sutures 8/21/2017 11:00 PM   Additional Site Signs no  erythema;no warmth;no edema;no pain;no palpable cord;no streak formation;no drainage 8/21/2017 11:00 PM   Distal Patency/Care infusing 8/21/2017 11:00 PM   Medial 1 Patency/Care infusing 8/21/2017 11:00 PM   Medial 2 Patency/Care infusing 8/21/2017 11:00 PM   Proximal Patency/Care normal saline locked 8/21/2017 11:00 PM   Waveform normal 8/21/2017 11:00 PM   Line Interventions line leveled/zeroed 8/21/2017 11:00 PM   Dressing Change Due 08/28/17 8/21/2017  4:00 PM   Daily Line Review Performed 8/21/2017 11:00 PM   Number of days: 0            Peripheral IV - Single Lumen 08/21/17 0612 Right Hand (Active)   Site Assessment Clean;Dry;Intact 8/21/2017 11:00 PM   Line Status Infusing 8/21/2017 11:00 PM   Dressing Status Clean;Dry;Intact 8/21/2017 11:00 PM   Dressing Intervention Dressing reinforced 8/21/2017  4:00 PM   Dressing Change Due 08/25/17 8/21/2017  4:00 PM   Site Change Due 08/25/17 8/21/2017  4:00 PM   Reason Not Rotated Not due 8/21/2017 11:00 PM   Number of days: 1            Peripheral IV - Single Lumen 08/21/17 0712 Right Forearm (Active)   Site Assessment Clean;Dry;Intact;No redness;No swelling 8/21/2017 11:00 PM   Line Status Saline locked;Flushed 8/21/2017 11:00 PM   Dressing Status Clean;Dry;Intact 8/21/2017 11:00 PM   Dressing Intervention Dressing reinforced 8/21/2017  4:00 PM   Dressing Change Due 08/25/17 8/21/2017  4:00 PM   Site Change Due 08/25/17 8/21/2017  4:00 PM   Reason Not Rotated Not due 8/21/2017 11:00 PM   Number of days: 0            Arterial Line 08/21/17 0701 Left Radial (Active)   Site Assessment Clean;Dry;Intact 8/21/2017 11:00 PM   Line Status Pulsatile blood flow 8/21/2017 11:00 PM   Art Line Waveform Appropriate 8/21/2017 11:00 PM   Arterial Line Interventions Zeroed and calibrated;Leveled;Connections checked and tightened;Flushed per protocol 8/21/2017 11:00 PM   Color/Movement/Sensation Capillary refill less than 3 sec 8/21/2017 11:00 PM   Dressing Status Clean;Dry;Intact  "8/21/2017 11:00 PM   Dressing Intervention Dressing reinforced 8/21/2017  4:00 PM   Dressing Change Due 08/25/17 8/21/2017  4:00 PM   Number of days: 0            Pacer Wires 08/21/17 1200 (Active)   Pacer Wire Status Ventricular wires connected to pacer 8/21/2017 11:00 PM   Site Assessment Other (Comment) 8/21/2017 11:00 PM   How Pacer Wires are Secured Atrial wires secured to dressing 8/21/2017 11:00 PM   Dressing Status Clean;Dry;Intact 8/21/2017 11:00 PM   Dressing Intervention Dressing reinforced 8/21/2017  4:00 PM   Dressing Change Due 08/23/17 8/21/2017  5:17 PM   Number of days: 0            Urethral Catheter 08/21/17 0900 (Active)   Site Assessment Clean;Intact 8/21/2017 11:00 PM   Collection Container Urimeter 8/21/2017 11:00 PM   Securement Method secured to top of thigh w/ adhesive device 8/21/2017 11:00 PM   Catheter Care Performed yes 8/21/2017 11:00 PM   Reason for Continuing Urinary Catheterization Critically ill in ICU requiring intensive monitoring;Post operative 8/21/2017 11:00 PM   CAUTI Prevention Bundle StatLock in place w 1" slack;Intact seal between catheter & drainage tubing;Drainage bag off the floor;Green sheeting clip in use;Drainage bag not overfilled (<2/3 full);No dependent loops or kinks;Drainage bag below bladder 8/21/2017  7:01 PM   Output (mL) 30 mL 8/22/2017  6:00 AM   Number of days: 0            Y Chest Tube 1 and 2 08/21/17 1334 Left Pleural 19 Fr. 19 Fr. (Active)   Function -20 cm H2O 8/21/2017 11:00 PM   Air Leak/Fluctuation air leak not present;fluctuation not present 8/21/2017 11:00 PM   Safety all tubing connections taped;2 rubber-tipped hemostats w/ patient;all connections secured;suction checked 8/21/2017 11:00 PM   Securement tubing anchored to body distal to insertion site w/ tape 8/21/2017 11:00 PM   Left Subcutaneous Emphysema none present 8/21/2017 11:00 PM   Right Subcutaneous Emphysema none present 8/21/2017 11:00 PM   Patency Intervention Milked;Stripped;Tip/tilt " 8/21/2017 11:00 PM   Drainage Description 1 Sanguineous 8/21/2017 11:00 PM   Tube 1 Dressing Appearance occlusive gauze dressing intact;clean and dry 8/21/2017 11:00 PM   Tube 1 Dressing Care dressing reinforced 8/21/2017  4:00 PM   Site Assessment 1 Not assessed 8/21/2017 11:00 PM   Surrounding Skin 1 Unable to view 8/21/2017 11:00 PM   Drainage Description 2 Sanguineous 8/21/2017 11:00 PM   Tube 2 Dressing Appearance occlusive gauze dressing intact;clean and dry 8/21/2017 11:00 PM   Tube 2 Dressing Care dressing reinforced 8/21/2017  4:00 PM   Site Assessment 2 Not assessed 8/21/2017 11:00 PM   Surrounding Skin Unable to view 8/21/2017 11:00 PM   Output (mL) 14 mL 8/22/2017  6:00 AM   Number of days: 0            Y Chest Tube 3 and 4 08/21/17 1334 Anterior Mediastinal 19 Fr. Anterior Mediastinal (Active)   Function -20 cm H2O 8/21/2017 11:00 PM   Air Leak/Fluctuation air leak not present;fluctuation not present 8/21/2017 11:00 PM   Safety all tubing connections taped;2 rubber-tipped hemostats w/ patient;all connections secured;suction checked 8/21/2017 11:00 PM   Securement tubing anchored to body distal to insertion site w/ tape 8/21/2017 11:00 PM   Left Subcutaneous Emphysema none present 8/21/2017 11:00 PM   Right Subcutaneous Emphysema none present 8/21/2017 11:00 PM   Patency Intervention Milked;Stripped;Tip/tilt 8/21/2017 11:00 PM   Drainage Description 3 Sanguineous 8/21/2017 11:00 PM   Tube 3 Dressing Appearance occlusive gauze dressing intact;clean and dry 8/21/2017 11:00 PM   Site Assessment 3 Not assessed 8/21/2017 11:00 PM   Surrounding Skin 3 Unable to view 8/21/2017 11:00 PM   Drainage Description 4 Sanguineous 8/21/2017 11:00 PM   Tube 4 Dressing Appearance occlusive gauze dressing intact;clean and dry 8/21/2017 11:00 PM   Site Assessment 4 Not assessed 8/21/2017 11:00 PM   Surrounding Skin 4 Unable to view 8/21/2017 11:00 PM   Output (mL) 150 mL 8/22/2017  6:00 AM   Number of days: 0        Laboratory (Last 24H):  CBC:    Recent Labs  Lab 17  0541   WBC 15.53*   HGB 6.6*   HCT 19.6*   PLT 79*     CMP:    Recent Labs  Lab 17  0428 17  0541   CALCIUM 8.0*  --      --    K 3.9 4.0   CO2 22*  --      --    BUN 19  --    CREATININE 0.6  --      ECHO 2017  CONCLUSIONS     1 - Concentric LVH with normal left ventricular systolic function (EF 60-65%).     2 - No wall motion abnormalities.     3 - Normal left ventricular diastolic function.     4 - Normal right ventricular systolic function .     5 - Mild tricuspid regurgitation.     6 - The estimated PA systolic pressure is 25 mmHg.     7 - Moderate to severe aortic stenosis, JORI = 0.79 cm2, peak velocity = 3.84 m/s, mean gradient = 35 mmHg; mild AI.    ASSESSMENT/PLAN:   Plan:  Neuro:   -AAOx3  -Pain control: PRN dilaudid and fent     Pulmonary:   -sating well on 2L NC      Cardiac:  S/p AVR and CABG x2. Hx of CAD and severe AS   - HDS  - off pressors  -Overnight minimal output from chest tubes   - ASA , statin and lopressor     /Renal:   - Reid: in place  - Monitor UOP closely - given one time dose of lasix overnight.  -BUN/Cr: 19/0.6  -Strict I/Os     Infectious Disease:   -Afebrile  -WBC 15.53 trending down   -Antibiotics:ancef  -Cultures:     Hematology/Oncology:  -H/H - 8.6/25.2  -plt: 79  -trend labs     Endocrine:  -endocrine following  -B  -levothyroxine 75mg     Fluids/Electrolytes/Nutrition/GI:   -Nutritional status: CLD  -Lyte replacement as needed  -Nausea meds PRN    -famotidine      Dispo: Continue care in ICU. OOB to chair today.      DAVE Amin MD   General Surgery, PGY1  2017

## 2017-08-23 NOTE — PROGRESS NOTES
Progress Note  CTS    Admit Date: 8/21/2017  Post-operative Day: 2 Days Post-Op  Hospital Day: 3    SUBJECTIVE:     Follow-up For:  Procedure(s) (LRB):  REPLACEMENT-VALVE-AORTIC (N/A)  AORTOCORONARY BYPASS-CABG (N/A)  HARVEST-VEIN-ENDOVASCULAR (Left)    Interval history:  NAEON.  Off epi.  Pain controlled.  Sating well on 2L NC.      Continuous Infusions:   epinephrine Stopped (08/22/17 1700)    nicardipine Stopped (08/21/17 1500)    norepinephrine bitartrate-D5W Stopped (08/21/17 1500)    propofol Stopped (08/21/17 1700)     Scheduled Meds:   albumin human 5%  25 g Intravenous Once    albuterol-ipratropium 2.5mg-0.5mg/3mL  3 mL Nebulization Q4H    aspirin  325 mg Oral Daily    brimonidine 0.2%  1 drop Both Eyes Daily    calcium carbonate-vitamin D3 250-125 mg  1 tablet Oral Daily    famotidine (PF)  20 mg Intravenous BID    furosemide  20 mg Intravenous BID    levothyroxine  75 mcg Oral Daily    metoprolol tartrate  25 mg Oral BID    mupirocin  1 g Nasal BID    polyethylene glycol  17 g Oral Daily    rosuvastatin  20 mg Oral Nightly    sodium chloride 0.9%  3 mL Intravenous Q8H    timolol maleate 0.5%  1 drop Both Eyes Daily    valacyclovir  1,000 mg Oral Daily     PRN Meds:acetaminophen, albumin human 5%, artificial tears, dextrose 50%, dextrose 50%, fentaNYL, fentaNYL, glucose, glucose, HYDROmorphone, insulin aspart, lactated Ringers, lactulose, magnesium sulfate IVPB, metoclopramide HCl, ondansetron, oxycodone, oxycodone, potassium chloride **AND** potassium chloride **AND** potassium chloride    Review of patient's allergies indicates:   Allergen Reactions    Norvasc  [amlodipine] Shortness Of Breath     unknown  Other reaction(s): Swelling    Sulfa (sulfonamide antibiotics) Shortness Of Breath, Itching and Other (See Comments)     elevated blood pressure    Iodinated contrast- oral and iv dye     Iodine      Other reaction(s): Unknown    Latex      Other reaction(s): Itching        OBJECTIVE:     Vital Signs (Most Recent)  Temp: 99 °F (37.2 °C) (08/23/17 1100)  Pulse: 89 (08/23/17 1300)  Resp: (!) 29 (08/23/17 1300)  BP: (!) 91/49 (08/23/17 1300)  SpO2: 95 % (08/23/17 1300)    Vital Signs Range (Last 24H):  Temp:  [98 °F (36.7 °C)-99.9 °F (37.7 °C)]   Pulse:  []   Resp:  [12-31]   BP: ()/(45-62)   SpO2:  [94 %-100 %]   Arterial Line BP: ()/(39-55)     I & O (Last 24H):    Intake/Output Summary (Last 24 hours) at 08/23/17 1344  Last data filed at 08/23/17 1300   Gross per 24 hour   Intake             1854 ml   Output             2028 ml   Net             -174 ml          Physical Exam:  Physical Exam   Constitutional: She is well-developed, well-nourished, and in no distress. No distress. Extubated  HENT:   Head: Normocephalic and atraumatic.   Neck: Normal range of motion. Neck supple. No JVD present.   Cardiovascular: Normal rate, regular rhythm, normal heart sounds and intact distal pulses.    Pulmonary/Chest: Effort normal. No stridor. No respiratory distress. She has no wheezes. Mild course breath sounds appreciated bilaterally. Sternal dressing in place. C/d/i.  CTs in place   Abdominal: Soft. Bowel sounds are normal. She exhibits no distension and no mass. There is no tenderness.   Genitourinary:   Genitourinary Comments: Reid in place   Musculoskeletal:   Ace bandage covering entire L leg.    Neurological: AAOx3     Wound/Incision:  clean, dry, intact    Lines/Drains:       Percutaneous Central Line Insertion/Assessment - quad lumen  08/21/17 0710 right internal jugular;other (see comments) (Active)   Dressing biopatch in place;dressing dry and intact 8/22/2017  7:00 AM   Securement secured w/ sutures 8/22/2017  7:00 AM   Additional Site Signs no drainage;no streak formation;no palpable cord;no pain;no edema;no warmth;no erythema 8/22/2017  7:00 AM   Distal Patency/Care infusing 8/22/2017  7:00 AM   Medial 1 Patency/Care infusing 8/22/2017  7:00 AM   Medial 2  Patency/Care infusing 8/22/2017  7:00 AM   Proximal Patency/Care normal saline locked 8/22/2017  7:00 AM   Waveform normal 8/22/2017  7:00 AM   Line Interventions line leveled/zeroed 8/22/2017  7:00 AM   Dressing Change Due 08/28/17 8/22/2017  7:00 AM   Daily Line Review Performed 8/22/2017  7:00 AM   Number of days: 1            Peripheral IV - Single Lumen 08/21/17 0612 Right Hand (Active)   Site Assessment Clean;Dry;Intact 8/22/2017  7:00 AM   Line Status Infusing 8/22/2017  7:00 AM   Dressing Status Clean;Dry;Intact 8/22/2017  7:00 AM   Dressing Intervention Dressing reinforced 8/22/2017  7:00 AM   Dressing Change Due 08/25/17 8/22/2017  7:00 AM   Site Change Due 08/25/17 8/22/2017  7:00 AM   Reason Not Rotated Not due 8/22/2017  7:00 AM   Number of days: 1            Peripheral IV - Single Lumen 08/21/17 0712 Right Forearm (Active)   Site Assessment Clean;Dry;Intact 8/22/2017  7:00 AM   Line Status Saline locked 8/22/2017  7:00 AM   Dressing Status Clean;Dry;Intact 8/22/2017  7:00 AM   Dressing Intervention Dressing reinforced 8/22/2017  7:00 AM   Dressing Change Due 08/25/17 8/22/2017  7:00 AM   Site Change Due 08/25/17 8/22/2017  7:00 AM   Reason Not Rotated Not due 8/22/2017  7:00 AM   Number of days: 1            Arterial Line 08/21/17 0701 Left Radial (Active)   Site Assessment Clean;Dry;Intact;No redness;No swelling 8/22/2017  7:00 AM   Line Status Pulsatile blood flow 8/22/2017  7:00 AM   Art Line Waveform Appropriate 8/22/2017  7:00 AM   Arterial Line Interventions Zeroed and calibrated;Leveled;Connections checked and tightened;Flushed per protocol;Line pulled back 8/22/2017  7:00 AM   Color/Movement/Sensation Capillary refill less than 3 sec 8/22/2017  7:00 AM   Dressing Type Transparent 8/22/2017  7:00 AM   Dressing Status Clean;Dry;Intact 8/22/2017  7:00 AM   Dressing Intervention Dressing reinforced 8/22/2017  7:00 AM   Dressing Change Due 08/25/17 8/22/2017  7:00 AM   Number of days: 1             "Pacer Wires 08/21/17 1200 (Active)   Pacer Wire Status Ventricular wires connected to pacer 8/22/2017  7:00 AM   Site Assessment Other (Comment) 8/21/2017 11:00 PM   How Pacer Wires are Secured Ventricular wires secured to dressing 8/22/2017  7:00 AM   Dressing Status Clean;Dry;Intact 8/22/2017  7:00 AM   Dressing Intervention Dressing reinforced 8/22/2017  7:00 AM   Dressing Change Due 08/23/17 8/22/2017  7:00 AM   Number of days: 0            Urethral Catheter 08/21/17 0900 (Active)   Site Assessment Clean;Intact 8/22/2017  7:00 AM   Collection Container Urimeter 8/22/2017  7:00 AM   Securement Method secured to top of thigh w/ adhesive device 8/22/2017  7:00 AM   Catheter Care Performed yes 8/22/2017  7:00 AM   Reason for Continuing Urinary Catheterization Critically ill in ICU requiring intensive monitoring 8/22/2017  7:00 AM   CAUTI Prevention Bundle StatLock in place w 1" slack;Intact seal between catheter & drainage tubing;Drainage bag off the floor;Green sheeting clip in use;Drainage bag not overfilled (<2/3 full);No dependent loops or kinks;Drainage bag below bladder 8/22/2017  7:00 AM   Output (mL) 25 mL 8/22/2017  8:00 AM   Number of days: 1            Y Chest Tube 1 and 2 08/21/17 1334 Left Pleural 19 Fr. 19 Fr. (Active)   Function -20 cm H2O 8/22/2017  7:15 AM   Air Leak/Fluctuation air leak not present;fluctuation not present 8/22/2017  7:15 AM   Safety all tubing connections taped;all connections secured;suction checked 8/22/2017  7:15 AM   Securement tubing anchored to body distal to insertion site w/ tape 8/22/2017  7:15 AM   Left Subcutaneous Emphysema none present 8/22/2017  7:15 AM   Right Subcutaneous Emphysema none present 8/22/2017  7:15 AM   Patency Intervention Milked;Stripped;Tip/tilt 8/22/2017  7:15 AM   Drainage Description 1 Serosanguineous 8/22/2017  7:15 AM   Tube 1 Dressing Appearance occlusive gauze dressing intact;clean and dry 8/22/2017  7:15 AM   Tube 1 Dressing Care dressing " reinforced 8/22/2017  7:15 AM   Site Assessment 1 Not assessed 8/22/2017  7:15 AM   Surrounding Skin 1 Unable to view 8/22/2017  7:15 AM   Drainage Description 2 Serosanguineous 8/22/2017  7:15 AM   Tube 2 Dressing Appearance occlusive gauze dressing intact;clean and dry 8/22/2017  7:15 AM   Tube 2 Dressing Care dressing reinforced 8/22/2017  7:15 AM   Site Assessment 2 Not assessed 8/22/2017  7:15 AM   Surrounding Skin Unable to view 8/22/2017  7:15 AM   Output (mL) 5 mL 8/22/2017  7:00 AM   Number of days: 0            Y Chest Tube 3 and 4 08/21/17 1334 Anterior Mediastinal 19 Fr. Anterior Mediastinal (Active)   Function -20 cm H2O 8/22/2017  7:00 AM   Air Leak/Fluctuation air leak not present;fluctuation not present 8/22/2017  7:00 AM   Safety all tubing connections taped;all connections secured;suction checked 8/22/2017  7:00 AM   Securement tubing anchored to body distal to insertion site w/ tape 8/22/2017  7:00 AM   Left Subcutaneous Emphysema none present 8/22/2017  7:00 AM   Right Subcutaneous Emphysema none present 8/22/2017  7:00 AM   Patency Intervention Tip/tilt;Stripped;Milked 8/22/2017  7:00 AM   Drainage Description 3 Serosanguineous 8/22/2017  7:00 AM   Tube 3 Dressing Appearance occlusive gauze dressing intact;clean and dry 8/22/2017  7:00 AM   Tube 3 Dressing Care dressing reinforced 8/22/2017  7:00 AM   Site Assessment 3 Not assessed 8/22/2017  7:00 AM   Surrounding Skin 3 Unable to view 8/22/2017  7:00 AM   Drainage Description 4 Serosanguineous 8/22/2017  7:00 AM   Tube 4 Dressing Appearance occlusive gauze dressing intact;clean and dry 8/22/2017  3:00 AM   Tube 4 Dressing Care dressing reinforced 8/22/2017  7:00 AM   Site Assessment 4 Not assessed 8/22/2017  7:00 AM   Surrounding Skin 4 Unable to view 8/22/2017  7:00 AM   Output (mL) 40 mL 8/22/2017  7:00 AM   Number of days: 0       Laboratory (Last 24H):  CBC:    Recent Labs  Lab 08/23/17  0541   WBC 15.53*   HGB 6.6*   HCT 19.6*   PLT 79*      CMP:    Recent Labs  Lab 08/23/17  0428 08/23/17  0541   CALCIUM 8.0*  --      --    K 3.9 4.0   CO2 22*  --      --    BUN 19  --    CREATININE 0.6  --        Chest X-Ray: I personally reviewed the films and findings are: Edema         ASSESSMENT/PLAN:         Plan:  Neuro:   - AAOx3  - Cont PRN pain control    Pulmonary:   - Sating well on 2L NC  - Right apical pneumothorax, improving     Cardiac:  - HDS  - Off epi  - CT put out 40 (pleural) and 220 (mediastinal)    Renal:   - Reid catheter  - strict I/Os  - Starting Lasix today    Infectious Disease:   - Cont post op abx    Hematology/Oncology:   - H/H decreased, monitoring     Endocrine:  - Endocrine following for insulin gtt  - levothyroxine 75mcg      Fluids/Electrolytes/Nutrition/GI:   - Cardiac diet today  - Replace lytes PRN    Dispo:  - Stepdown to floor     Ras Martinez M.D.  General Surgery PGY2  [phone]

## 2017-08-23 NOTE — ASSESSMENT & PLAN NOTE
BG goal 140-180. Recommend continuation of BG monitoring Ac/hs with low dose correction.  Will sign off as Bg currently controlled without insulin needs. Thank you for the consult. Please re-consult as needed or for additional BG concerns. Do not anticipate home BG management needs.   Lab Results   Component Value Date    HGBA1C 5.7 (H) 08/15/2017       Endocrine sign off.

## 2017-08-23 NOTE — PROGRESS NOTES
"[facility]  Endocrinology  Progress Note    Admit Date: 8/21/2017     Reason for Consult: Management of Hyperglycemia     Surgical Procedure and Date: s/p CABG x 2 with AVR  8/21/17    HPI: Patient is a 62 y.o. female with a diagnosis of CAD and severe AS.  She has a past medical history of hodgkins lymphoma in 1991 and 1995 for which she received radiation to the neck, chest and abdomen in 1991.  When she had recurrence in 1995 she had chemotherapy. Now s/p  CABG x 2 with AVR  8/21/17 with stress hyperglycemia. Endocrine consulted for BG mgmt.         Interval HPI:   OOB to chair. Tolerating clear liquids. No nausea. BG controlled yesterday with light PO intake. No hypoglycemia. Low grade fever.    BP (!) 114/54   Pulse 94   Temp 99 °F (37.2 °C) (Oral)   Resp (!) 28   Ht 5' 3" (1.6 m)   Wt 86.6 kg (190 lb 14.7 oz)   SpO2 98%   Breastfeeding? No   BMI 33.82 kg/m²       Labs Reviewed and Include      Recent Labs  Lab 08/23/17  0428 08/23/17  0541   *  --    CALCIUM 8.0*  --      --    K 3.9 4.0   CO2 22*  --      --    BUN 19  --    CREATININE 0.6  --      Lab Results   Component Value Date    WBC 15.53 (H) 08/23/2017    HGB 6.6 (L) 08/23/2017    HCT 19.6 (LL) 08/23/2017    MCV 99 (H) 08/23/2017    PLT 79 (L) 08/23/2017     No results for input(s): TSH, FREET4 in the last 168 hours.  Lab Results   Component Value Date    HGBA1C 5.7 (H) 08/15/2017       Nutritional status:   Body mass index is 33.82 kg/m².  Lab Results   Component Value Date    ALBUMIN 3.3 (L) 08/15/2017    ALBUMIN 4.9 07/14/2017    ALBUMIN 3.7 04/26/2017     No results found for: PREALBUMIN    Estimated Creatinine Clearance: 102.7 mL/min (based on Cr of 0.6).    Accu-Checks  Recent Labs      08/22/17   0102  08/22/17   0157  08/22/17   0412  08/22/17   0532  08/22/17   0735  08/22/17   0805  08/22/17   0936  08/22/17   1148  08/22/17   1713  08/22/17   2149   POCTGLUCOSE  161*  119*  172*  151*  154*  " 138*  145*  178*  141*  114*       Current Medications and/or Treatments Impacting Glycemic Control  Immunotherapy:  Immunosuppressants     None        Steroids:   Hormones     None        Pressors:    Autonomic Drugs     Start     Stop Route Frequency Ordered    08/21/17 1500  epinephrine 4 mg in sodium chloride 0.9% 250 mL infusion     Question Answer Comment   Titrate by: (in mcg/kg/min) 0.05    Titrate interval: (in minutes) 5    Titrate to maintain: (SBP or MAP or Cardiac Index) CARDIAC INDEX    Cardiac index greater than: (in L/min) 2.2    Maximum dose: (in mcg/kg/min) 2        -- IV Continuous 08/21/17 1452    08/21/17 1500  norepinephrine 4 mg in dextrose 5% 250 mL infusion (premix) (titrating)     Question Answer Comment   Titrate by: (in mcg/kg/min) 0.05    Titrate interval: (in minutes) 5    Titrate to maintain: (MAP or SBP) MAP    Greater than: (in mmHg) 60    Maximum dose: (in mcg/kg/min) 3        -- IV Continuous 08/21/17 1452        Hyperglycemia/Diabetes Medications: Antihyperglycemics     Start     Stop Route Frequency Ordered    08/22/17 0910  insulin aspart pen 0-5 Units      -- SubQ Before meals & nightly PRN 08/22/17 0811          ASSESSMENT and PLAN    Acute hyperglycemia    BG goal 140-180. Recommend continuation of BG monitoring Ac/hs with low dose correction.  Will sign off as Bg currently controlled without insulin needs. Thank you for the consult. Please re-consult as needed or for additional BG concerns. Do not anticipate home BG management needs.   Lab Results   Component Value Date    HGBA1C 5.7 (H) 08/15/2017       Endocrine sign off.             * Coronary artery disease of native artery of native heart with stable angina pectoris    avoid hypoglycemia  S/p CABG           S/P CABG x 2    Per CTS. avoid hypoglycemia          Aortic stenosis    S/p AVR          S/P AVR (aortic valve replacement)    Per CTS. avoid hypoglycemia          Hypothyroid    Lab Results   Component Value Date     TSH 2.570 04/24/2017     Home dose: Synthroid 75 mcg QD              Dona Keene, SALONI  Endocrinology  [facility]

## 2017-08-23 NOTE — SUBJECTIVE & OBJECTIVE
"Interval HPI:   OOB to chair. Tolerating clear liquids. No nausea. BG controlled yesterday with light PO intake. No hypoglycemia. Low grade fever.    BP (!) 114/54   Pulse 94   Temp 99 °F (37.2 °C) (Oral)   Resp (!) 28   Ht 5' 3" (1.6 m)   Wt 86.6 kg (190 lb 14.7 oz)   SpO2 98%   Breastfeeding? No   BMI 33.82 kg/m²     Labs Reviewed and Include      Recent Labs  Lab 08/23/17  0428 08/23/17  0541   *  --    CALCIUM 8.0*  --      --    K 3.9 4.0   CO2 22*  --      --    BUN 19  --    CREATININE 0.6  --      Lab Results   Component Value Date    WBC 15.53 (H) 08/23/2017    HGB 6.6 (L) 08/23/2017    HCT 19.6 (LL) 08/23/2017    MCV 99 (H) 08/23/2017    PLT 79 (L) 08/23/2017     No results for input(s): TSH, FREET4 in the last 168 hours.  Lab Results   Component Value Date    HGBA1C 5.7 (H) 08/15/2017       Nutritional status:   Body mass index is 33.82 kg/m².  Lab Results   Component Value Date    ALBUMIN 3.3 (L) 08/15/2017    ALBUMIN 4.9 07/14/2017    ALBUMIN 3.7 04/26/2017     No results found for: PREALBUMIN    Estimated Creatinine Clearance: 102.7 mL/min (based on Cr of 0.6).    Accu-Checks  Recent Labs      08/22/17   0102  08/22/17   0157  08/22/17   0412  08/22/17   0532  08/22/17   0735  08/22/17   0805  08/22/17   0936  08/22/17   1148  08/22/17   1713  08/22/17   2149   POCTGLUCOSE  161*  119*  172*  151*  154*  138*  145*  178*  141*  114*       Current Medications and/or Treatments Impacting Glycemic Control  Immunotherapy:  Immunosuppressants     None        Steroids:   Hormones     None        Pressors:    Autonomic Drugs     Start     Stop Route Frequency Ordered    08/21/17 1500  epinephrine 4 mg in sodium chloride 0.9% 250 mL infusion     Question Answer Comment   Titrate by: (in mcg/kg/min) 0.05    Titrate interval: (in minutes) 5    Titrate to maintain: (SBP or MAP or Cardiac Index) CARDIAC INDEX    Cardiac index greater than: (in L/min) 2.2    Maximum dose: (in mcg/kg/min) 2 "        -- IV Continuous 08/21/17 1452    08/21/17 1500  norepinephrine 4 mg in dextrose 5% 250 mL infusion (premix) (titrating)     Question Answer Comment   Titrate by: (in mcg/kg/min) 0.05    Titrate interval: (in minutes) 5    Titrate to maintain: (MAP or SBP) MAP    Greater than: (in mmHg) 60    Maximum dose: (in mcg/kg/min) 3        -- IV Continuous 08/21/17 1452        Hyperglycemia/Diabetes Medications: Antihyperglycemics     Start     Stop Route Frequency Ordered    08/22/17 0910  insulin aspart pen 0-5 Units      -- SubQ Before meals & nightly PRN 08/22/17 0811

## 2017-08-23 NOTE — PLAN OF CARE
Problem: Patient Care Overview  Goal: Plan of Care Review  Outcome: Ongoing (interventions implemented as appropriate)  Patient progressing toward all goals. Plan of care discussed with patient, no questions at this time. Patient ambulating independently with stand-by assist, fall precautions in place. VS WNL. Reid d/c and pt voided; Will monitor.

## 2017-08-24 DIAGNOSIS — Z95.2 HEART VALVE REPLACED BY TRANSPLANT: Primary | ICD-10-CM

## 2017-08-24 PROBLEM — I35.0 AORTIC STENOSIS: Status: RESOLVED | Noted: 2017-08-21 | Resolved: 2017-08-24

## 2017-08-24 PROBLEM — R73.9 ACUTE HYPERGLYCEMIA: Status: RESOLVED | Noted: 2017-08-21 | Resolved: 2017-08-24

## 2017-08-24 PROBLEM — I48.91 ATRIAL FIBRILLATION WITH CONTROLLED VENTRICULAR RESPONSE: Status: ACTIVE | Noted: 2017-08-24

## 2017-08-24 PROBLEM — E83.39 HYPOPHOSPHATEMIA: Status: ACTIVE | Noted: 2017-08-24

## 2017-08-24 PROBLEM — I35.0 NONRHEUMATIC AORTIC VALVE STENOSIS: Status: RESOLVED | Noted: 2017-03-23 | Resolved: 2017-08-24

## 2017-08-24 PROBLEM — D62 ACUTE BLOOD LOSS ANEMIA: Status: ACTIVE | Noted: 2017-08-24

## 2017-08-24 LAB
ABO + RH BLD: NORMAL
ANION GAP SERPL CALC-SCNC: 8 MMOL/L
ANISOCYTOSIS BLD QL SMEAR: ABNORMAL
BASOPHILS # BLD AUTO: 0.01 K/UL
BASOPHILS # BLD AUTO: ABNORMAL K/UL
BASOPHILS NFR BLD: 0.1 %
BASOPHILS NFR BLD: 1 %
BLD GP AB SCN CELLS X3 SERPL QL: NORMAL
BLD PROD TYP BPU: NORMAL
BLOOD UNIT EXPIRATION DATE: NORMAL
BLOOD UNIT TYPE CODE: 5100
BLOOD UNIT TYPE: NORMAL
BUN SERPL-MCNC: 25 MG/DL
CALCIUM SERPL-MCNC: 8.2 MG/DL
CHLORIDE SERPL-SCNC: 104 MMOL/L
CO2 SERPL-SCNC: 26 MMOL/L
CODING SYSTEM: NORMAL
CREAT SERPL-MCNC: 0.6 MG/DL
DIFFERENTIAL METHOD: ABNORMAL
DIFFERENTIAL METHOD: ABNORMAL
DISPENSE STATUS: NORMAL
EOSINOPHIL # BLD AUTO: 0.1 K/UL
EOSINOPHIL # BLD AUTO: ABNORMAL K/UL
EOSINOPHIL NFR BLD: 0 %
EOSINOPHIL NFR BLD: 0.3 %
ERYTHROCYTE [DISTWIDTH] IN BLOOD BY AUTOMATED COUNT: 16 %
ERYTHROCYTE [DISTWIDTH] IN BLOOD BY AUTOMATED COUNT: 16 %
EST. GFR  (AFRICAN AMERICAN): >60 ML/MIN/1.73 M^2
EST. GFR  (NON AFRICAN AMERICAN): >60 ML/MIN/1.73 M^2
GLUCOSE SERPL-MCNC: 98 MG/DL
HCT VFR BLD AUTO: 19.2 %
HCT VFR BLD AUTO: 19.4 %
HGB BLD-MCNC: 6.6 G/DL
HGB BLD-MCNC: 6.7 G/DL
HOWELL-JOLLY BOD BLD QL SMEAR: ABNORMAL
HYPOCHROMIA BLD QL SMEAR: ABNORMAL
LYMPHOCYTES # BLD AUTO: 0.9 K/UL
LYMPHOCYTES # BLD AUTO: ABNORMAL K/UL
LYMPHOCYTES NFR BLD: 4 %
LYMPHOCYTES NFR BLD: 5.8 %
MAGNESIUM SERPL-MCNC: 2 MG/DL
MCH RBC QN AUTO: 33.8 PG
MCH RBC QN AUTO: 34 PG
MCHC RBC AUTO-ENTMCNC: 34.4 G/DL
MCHC RBC AUTO-ENTMCNC: 34.5 G/DL
MCV RBC AUTO: 99 FL
MCV RBC AUTO: 99 FL
MONOCYTES # BLD AUTO: 1 K/UL
MONOCYTES # BLD AUTO: ABNORMAL K/UL
MONOCYTES NFR BLD: 1 %
MONOCYTES NFR BLD: 6.5 %
NEUTROPHILS # BLD AUTO: 12.8 K/UL
NEUTROPHILS NFR BLD: 87.3 %
NEUTROPHILS NFR BLD: 94 %
NRBC BLD-RTO: ABNORMAL /100 WBC
OVALOCYTES BLD QL SMEAR: ABNORMAL
PHOSPHATE SERPL-MCNC: 1.2 MG/DL
PLATELET # BLD AUTO: 58 K/UL
PLATELET # BLD AUTO: 59 K/UL
PMV BLD AUTO: 10.6 FL
PMV BLD AUTO: 11.6 FL
POCT GLUCOSE: 121 MG/DL (ref 70–110)
POIKILOCYTOSIS BLD QL SMEAR: SLIGHT
POLYCHROMASIA BLD QL SMEAR: ABNORMAL
POTASSIUM SERPL-SCNC: 3.8 MMOL/L
RBC # BLD AUTO: 1.95 M/UL
RBC # BLD AUTO: 1.97 M/UL
SODIUM SERPL-SCNC: 138 MMOL/L
TRANS ERYTHROCYTES VOL PATIENT: NORMAL ML
WBC # BLD AUTO: 14.29 K/UL
WBC # BLD AUTO: 14.74 K/UL

## 2017-08-24 PROCEDURE — 25000003 PHARM REV CODE 250: Performed by: THORACIC SURGERY (CARDIOTHORACIC VASCULAR SURGERY)

## 2017-08-24 PROCEDURE — 20600001 HC STEP DOWN PRIVATE ROOM

## 2017-08-24 PROCEDURE — 27000221 HC OXYGEN, UP TO 24 HOURS

## 2017-08-24 PROCEDURE — 25000242 PHARM REV CODE 250 ALT 637 W/ HCPCS: Performed by: THORACIC SURGERY (CARDIOTHORACIC VASCULAR SURGERY)

## 2017-08-24 PROCEDURE — 86901 BLOOD TYPING SEROLOGIC RH(D): CPT

## 2017-08-24 PROCEDURE — 85025 COMPLETE CBC W/AUTO DIFF WBC: CPT

## 2017-08-24 PROCEDURE — 36430 TRANSFUSION BLD/BLD COMPNT: CPT

## 2017-08-24 PROCEDURE — 85027 COMPLETE CBC AUTOMATED: CPT

## 2017-08-24 PROCEDURE — 84100 ASSAY OF PHOSPHORUS: CPT

## 2017-08-24 PROCEDURE — 93010 ELECTROCARDIOGRAM REPORT: CPT | Mod: ,,, | Performed by: INTERNAL MEDICINE

## 2017-08-24 PROCEDURE — 93010 ELECTROCARDIOGRAM REPORT: CPT | Mod: 76,,, | Performed by: INTERNAL MEDICINE

## 2017-08-24 PROCEDURE — P9021 RED BLOOD CELLS UNIT: HCPCS

## 2017-08-24 PROCEDURE — 86900 BLOOD TYPING SEROLOGIC ABO: CPT

## 2017-08-24 PROCEDURE — 25000003 PHARM REV CODE 250: Performed by: GENERAL PRACTICE

## 2017-08-24 PROCEDURE — 25000003 PHARM REV CODE 250: Performed by: STUDENT IN AN ORGANIZED HEALTH CARE EDUCATION/TRAINING PROGRAM

## 2017-08-24 PROCEDURE — 63600175 PHARM REV CODE 636 W HCPCS: Performed by: NURSE PRACTITIONER

## 2017-08-24 PROCEDURE — 86920 COMPATIBILITY TEST SPIN: CPT

## 2017-08-24 PROCEDURE — 85007 BL SMEAR W/DIFF WBC COUNT: CPT

## 2017-08-24 PROCEDURE — 94799 UNLISTED PULMONARY SVC/PX: CPT

## 2017-08-24 PROCEDURE — 80048 BASIC METABOLIC PNL TOTAL CA: CPT

## 2017-08-24 PROCEDURE — 93005 ELECTROCARDIOGRAM TRACING: CPT

## 2017-08-24 PROCEDURE — 94640 AIRWAY INHALATION TREATMENT: CPT

## 2017-08-24 PROCEDURE — 94761 N-INVAS EAR/PLS OXIMETRY MLT: CPT

## 2017-08-24 PROCEDURE — 63600175 PHARM REV CODE 636 W HCPCS: Performed by: STUDENT IN AN ORGANIZED HEALTH CARE EDUCATION/TRAINING PROGRAM

## 2017-08-24 PROCEDURE — 25000003 PHARM REV CODE 250: Performed by: NURSE PRACTITIONER

## 2017-08-24 PROCEDURE — 83735 ASSAY OF MAGNESIUM: CPT

## 2017-08-24 RX ORDER — HYDROCODONE BITARTRATE AND ACETAMINOPHEN 500; 5 MG/1; MG/1
TABLET ORAL
Status: DISCONTINUED | OUTPATIENT
Start: 2017-08-24 | End: 2017-08-25

## 2017-08-24 RX ORDER — METOPROLOL TARTRATE 1 MG/ML
5 INJECTION, SOLUTION INTRAVENOUS EVERY 5 MIN PRN
Status: COMPLETED | OUTPATIENT
Start: 2017-08-24 | End: 2017-08-24

## 2017-08-24 RX ORDER — ALPRAZOLAM 0.25 MG/1
0.25 TABLET ORAL 3 TIMES DAILY PRN
Status: DISCONTINUED | OUTPATIENT
Start: 2017-08-24 | End: 2017-08-27 | Stop reason: HOSPADM

## 2017-08-24 RX ADMIN — AMIODARONE HYDROCHLORIDE 1 MG/MIN: 1.8 INJECTION, SOLUTION INTRAVENOUS at 09:08

## 2017-08-24 RX ADMIN — AMIODARONE HYDROCHLORIDE 0.5 MG/MIN: 1.8 INJECTION, SOLUTION INTRAVENOUS at 03:08

## 2017-08-24 RX ADMIN — FAMOTIDINE 20 MG: 20 TABLET, FILM COATED ORAL at 08:08

## 2017-08-24 RX ADMIN — LEVOTHYROXINE SODIUM 75 MCG: 75 TABLET ORAL at 09:08

## 2017-08-24 RX ADMIN — ALPRAZOLAM 0.25 MG: 0.25 TABLET ORAL at 11:08

## 2017-08-24 RX ADMIN — IPRATROPIUM BROMIDE AND ALBUTEROL SULFATE 3 ML: .5; 3 SOLUTION RESPIRATORY (INHALATION) at 02:08

## 2017-08-24 RX ADMIN — METOPROLOL TARTRATE 5 MG: 5 INJECTION INTRAVENOUS at 03:08

## 2017-08-24 RX ADMIN — ASPIRIN 325 MG ORAL TABLET 325 MG: 325 PILL ORAL at 09:08

## 2017-08-24 RX ADMIN — POTASSIUM PHOSPHATE, MONOBASIC AND POTASSIUM PHOSPHATE, DIBASIC 30 MMOL: 224; 236 INJECTION, SOLUTION, CONCENTRATE INTRAVENOUS at 09:08

## 2017-08-24 RX ADMIN — CALCIUM CARBONATE-CHOLECALCIFEROL TAB 250 MG-125 UNIT 1 TABLET: 250-125 TAB at 09:08

## 2017-08-24 RX ADMIN — ALPRAZOLAM 0.25 MG: 0.25 TABLET ORAL at 08:08

## 2017-08-24 RX ADMIN — FAMOTIDINE 20 MG: 20 TABLET, FILM COATED ORAL at 09:08

## 2017-08-24 RX ADMIN — TIMOLOL MALEATE 1 DROP: 5 SOLUTION OPHTHALMIC at 09:08

## 2017-08-24 RX ADMIN — METOPROLOL TARTRATE 25 MG: 25 TABLET ORAL at 08:08

## 2017-08-24 RX ADMIN — FUROSEMIDE 20 MG: 10 INJECTION, SOLUTION INTRAMUSCULAR; INTRAVENOUS at 09:08

## 2017-08-24 RX ADMIN — BRIMONIDINE TARTRATE 1 DROP: 2 SOLUTION OPHTHALMIC at 09:08

## 2017-08-24 RX ADMIN — FUROSEMIDE 20 MG: 10 INJECTION, SOLUTION INTRAMUSCULAR; INTRAVENOUS at 06:08

## 2017-08-24 RX ADMIN — VALACYCLOVIR HYDROCHLORIDE 1000 MG: 500 TABLET, FILM COATED ORAL at 09:08

## 2017-08-24 RX ADMIN — IPRATROPIUM BROMIDE AND ALBUTEROL SULFATE 3 ML: .5; 3 SOLUTION RESPIRATORY (INHALATION) at 08:08

## 2017-08-24 RX ADMIN — POLYETHYLENE GLYCOL 3350 17 G: 17 POWDER, FOR SOLUTION ORAL at 09:08

## 2017-08-24 RX ADMIN — IPRATROPIUM BROMIDE AND ALBUTEROL SULFATE 3 ML: .5; 3 SOLUTION RESPIRATORY (INHALATION) at 11:08

## 2017-08-24 RX ADMIN — METOPROLOL TARTRATE 25 MG: 25 TABLET ORAL at 09:08

## 2017-08-24 RX ADMIN — ROSUVASTATIN CALCIUM 20 MG: 20 TABLET, FILM COATED ORAL at 08:08

## 2017-08-24 RX ADMIN — AMIODARONE HYDROCHLORIDE 150 MG: 1.5 INJECTION, SOLUTION INTRAVENOUS at 08:08

## 2017-08-24 RX ADMIN — IPRATROPIUM BROMIDE AND ALBUTEROL SULFATE 3 ML: .5; 3 SOLUTION RESPIRATORY (INHALATION) at 04:08

## 2017-08-24 NOTE — PROGRESS NOTES
Dr. Flores and Christina at bedside, notified unable to place 3rd PIV for PRBCs, Amio IV and Potassium Phos IVPB. NP orders to attempt to place PIV on L side. NP orders to HOLD Pot PHos IV until 3rd IV placed. Orders to start PRBCs and Amiodarone IV now. Orders carried out. Will continue to monitor.

## 2017-08-24 NOTE — PROGRESS NOTES
"Results for RIA MORILLO (MRN [id number]) as of 8/24/2017 02:37   Ref. Range 8/23/2017 04:28 8/23/2017 04:41 8/23/2017 05:41   Hemoglobin Latest Ref Range: 12.0 - 16.0 g/dL 6.8 (L)  6.6 (L)   Hematocrit Latest Ref Range: 37.0 - 48.5 % 20.2 (L)  19.6 (LL)     Pt's H&H trend showed above. Received in report that team is aware and not transfusing as Pt was asymptomatic. At 0215 PCT and RN attempted to get Pt OOB to restroom. Pt stated, "I feel weird, like really weak and tired." Pt also noted to be more pale then previously noted. MD Hanna notified. Okay to redraw CBC to see what H&H is currently. Instructed by MD to call back with results.   "

## 2017-08-24 NOTE — PROGRESS NOTES
08/24/17 0219   Vital Signs   Temp 98.8 °F (37.1 °C)   Temp src Oral   Pulse 86   Heart Rate Source Monitor   Resp 16   SpO2 97 %   Flow (L/min) 1   O2 Device (Oxygen Therapy) nasal cannula   /64   MAP (mmHg) 84   BP Location Right arm   BP Method Automatic   Patient Position Lying     Results for RIA MORILLO (MRN [id number]) as of 8/24/2017 02:37   Ref. Range 8/24/2017 01:47   Hemoglobin Latest Ref Range: 12.0 - 16.0 g/dL 6.7 (L)   Hematocrit Latest Ref Range: 37.0 - 48.5 % 19.4 (LL)     MD Hanna notified of new H&H. MD also notified that Pt's HR is fluctuating frequently between 80's-100's and Pt endorses palpitations. VSS. EKG completed and showed LBB, consistent with previous EKG. No new orders received. Will continue to monitor.

## 2017-08-24 NOTE — PROGRESS NOTES
Progress Note  Cardiothoracic Surgery    Admit Date: 8/21/2017  Post-operative Day: 3 Days Post-Op  Hospital Day: 4    SUBJECTIVE:  Atrial fib during the night.  Added amio infusion this morning.       Follow-up For: Procedure(s) (LRB):  REPLACEMENT-VALVE-AORTIC (N/A)  AORTOCORONARY BYPASS-CABG (N/A)  HARVEST-VEIN-ENDOVASCULAR (Left)    Scheduled Meds:   albuterol-ipratropium 2.5mg-0.5mg/3mL  3 mL Nebulization Q4H    aspirin  325 mg Oral Daily    brimonidine 0.2%  1 drop Both Eyes Daily    calcium carbonate-vitamin D3 250-125 mg  1 tablet Oral Daily    famotidine  20 mg Oral BID    furosemide  20 mg Intravenous BID    levothyroxine  75 mcg Oral Daily    metoprolol tartrate  25 mg Oral BID    polyethylene glycol  17 g Oral Daily    potassium phosphate IVPB  30 mmol Intravenous Once    rosuvastatin  20 mg Oral Nightly    timolol maleate 0.5%  1 drop Both Eyes Daily    valacyclovir  1,000 mg Oral Daily     Infusions/Drips:   amiodarone 1 mg/min (08/24/17 0903)    amiodarone       PRN Meds: sodium chloride, sodium chloride, acetaminophen, alprazolam, artificial tears, dextrose 50%, dextrose 50%, glucose, glucose, insulin aspart, lactulose, metoclopramide HCl, ondansetron, oxycodone, oxycodone    Review of patient's allergies indicates:   Allergen Reactions    Norvasc  [amlodipine] Shortness Of Breath     unknown  Other reaction(s): Swelling    Sulfa (sulfonamide antibiotics) Shortness Of Breath, Itching and Other (See Comments)     elevated blood pressure    Iodinated contrast- oral and iv dye     Iodine      Other reaction(s): Unknown    Latex      Other reaction(s): Itching       OBJECTIVE:     Vital Signs (Most Recent)  Temp: 98.3 °F (36.8 °C) (08/24/17 0845)  Pulse: 98 (08/24/17 0845)  Resp: 16 (08/24/17 0845)  BP: (!) 140/73 (08/24/17 0900)  SpO2: 100 % (08/24/17 0845)    Admission Weight: 74.8 kg (165 lb) (08/21/17 0559)   Most Recent Weight: 86.6 kg (190 lb 14.7 oz) (08/23/17 0500)    Vital  Signs Range (Last 24H):  Temp:  [97.5 °F (36.4 °C)-99.3 °F (37.4 °C)]   Pulse:  []   Resp:  [12-29]   BP: ()/(46-73)   SpO2:  [94 %-100 %]     I & O (Last 24H):  Intake/Output Summary (Last 24 hours) at 08/24/17 1028  Last data filed at 08/24/17 0630   Gross per 24 hour   Intake              300 ml   Output              422 ml   Net             -122 ml     Physical Exam:  Physical Exam   Constitutional: Patient pale appearing and without fevers   HENT:   Head: Normocephalic and atraumatic.   Eyes: Pupils are equal, round, and reactive to light.   Neck: Normal range of motion. Neck supple.   Cardiovascular: irregularly irregular rhythm  Pulmonary/Chest: Effort normal and breath sounds normal.   Abdominal: Soft. Bowel sounds are normal.   Musculoskeletal: Normal range of motion.   Neurological: Alert and oriented to person, place, and time.   Skin: Skin is warm and dry. pale  Psychiatric: Normal mood and affect. Behavior is normal.     Wound/Incision:  clean, dry, intact    Laboratory:  CBC:   Recent Labs  Lab 08/24/17  0405   WBC 14.29*   RBC 1.95*   HGB 6.6*   HCT 19.2*   PLT 59*   MCV 99*   MCH 33.8*   MCHC 34.4     BMP:   Recent Labs  Lab 08/24/17  0405   GLU 98      K 3.8      CO2 26   BUN 25*   CREATININE 0.6   CALCIUM 8.2*   MG 2.0         ASSESSMENT/PLAN:     Assessment:   Active Hospital Problems    Diagnosis    *S/P AVR (aortic valve replacement)    Hypophosphatemia    Acute blood loss anemia    Atrial fibrillation with controlled ventricular response    S/P CABG x 2     SVG LAD due to absent LIMA after chest radiation and lymph node biopsy  SVG RCA      Acute hyperglycemia    Coronary artery disease of native artery of native heart with stable angina pectoris    Dyslipidemia    Hypothyroid    Hyperlipidemia    Essential hypertension    Hx of Hodgkin's disease - s/p chemo and radiation    DCIS (ductal carcinoma in situ) of breast       Plan:   Sternal  precautions  Pt/Ot  Ambulate  T&C and give 3 units of PRBC's  accuchecks QID  Daily weights  1800 alla ADA diet  Continue statin  Continue thyroid replacement  bp checks and adjust medications accordingtly  Amiodarone for atrial fib IV and transition to PO tomorrow  Monitor electrolytes and replace prn

## 2017-08-24 NOTE — PLAN OF CARE
Problem: Patient Care Overview  Goal: Plan of Care Review  Outcome: Ongoing (interventions implemented as appropriate)  Pt c/o weakness and palpitations overnight, MD notified. See note for details. Pt's WBC elevated at 15.53, okay to remove RIJ overnight per MD Hanna. H&H 6.7 & 19.4 Sternal precautions and the use of the IS continue to be educated on and encouraged. Meds and Pleural CT's to suction. V-wires I&S. Pt remained free from falls/trauma/injury. VSS. Denies any CP, SOB, palpitations, and dizziness. Pt c/o pain that is full relieved by PRN pain medication. Turning/repositioning independently in bed. Plan of care reviewed with patient and all questions answered, verbalizes understanding. No acute distress noted. Will continue to monitor.

## 2017-08-24 NOTE — CONSULTS
"Cardiac Rehab     Dulce Root   [id number]   8/24/2017       Activity taught: Yes    Cardiac Rehab Phase Taught: Phase I & II    Risk Factors-Modifiable: nutrition, hyperlipidemia, hypertension, sedentary lifestyle, stress, exercise    Risk Factors-Non modifiable: age    Teaching Method: Verbal, Written and Living with Heart Disease book.    Understanding/Response:  Pt lives in [place] and would prefer to go to [facility] - external order given to pt for [facility]. Explained to pt that she would see her physician for post op visit before beginning cardiac rehab program. Pt drowsy during visit but verbalized understanding. No family present at bedside. All questions answered. Pt understands their cardiac rehab order is good for 12 months.       Comments: S/P AVR. Discussed cardiac rehab and risk factor modification. Team to refer patient to [facility] - external order provided to pt.. Educational materials were used in the process and given to the patient. They included "Your Guide to Living with Heart Disease", Phase Two Cardiac Rehabilitation information along with a sample Mediterranean diet.The patient expressed understanding of the teaching and expressed desire to take a role in modifying the risk factors when they return home.    KYUNG Robles RN  Cardiac Rehab Nurse      "

## 2017-08-24 NOTE — PROGRESS NOTES
Pt's HR noted to be fluctuating between 's and PT appears to be in AFib. MD Hanna notified and STAT EKG ordered.     EKG showed A-fib with a LBB. MD Hanna notified. Orders given for Metoprolol 5mg q5min x3. Orders carried out.     0433- Pt's rhythm appears to be sinus rhythm. MD notified and STAT EKG confirmed sinus rhythm with a first degree AV block.

## 2017-08-24 NOTE — PLAN OF CARE
Problem: Patient Care Overview  Goal: Plan of Care Review  Discussed Plan of Care with patient. VS stable. Ambulates well with 1-person assist. Fall precautions in place. Sternal precautions reviewed. Bleeding precautions reviewed and maintained. Pain denied. Amiodarone infusion initiated as patient was in AFib (rate controlled). H?H 6.6/ 19.2; 3Units PRBCs administered. Potassium Phos IVPB administered. IS encouraged. Blood glucose monitoring discontinued. Patient remained free of falls/ injury. All questions and concerns were addressed. Will continue to monitor patient.

## 2017-08-24 NOTE — SUBJECTIVE & OBJECTIVE
"Interval HPI:   Overnight events: afib overnight, started Amio infusion IV. D/c'd IV insulin yesterday AM, BG at or below goal, has not required correction scale coverage.   Eating:  < 50% of cardiac diet  Nausea: No  Hypoglycemia and intervention: No  Fever: No  TPN and/or TF: No    /68 (BP Location: Right arm, Patient Position: Lying)   Pulse 99   Temp 97.9 °F (36.6 °C) (Oral)   Resp 18   Ht 5' 3" (1.6 m)   Wt 86.6 kg (190 lb 14.7 oz)   SpO2 99%   Breastfeeding? No   BMI 33.82 kg/m²     Labs Reviewed and Include      Recent Labs  Lab 08/24/17  0405   GLU 98   CALCIUM 8.2*      K 3.8   CO2 26      BUN 25*   CREATININE 0.6     Lab Results   Component Value Date    WBC 14.29 (H) 08/24/2017    HGB 6.6 (L) 08/24/2017    HCT 19.2 (LL) 08/24/2017    MCV 99 (H) 08/24/2017    PLT 59 (L) 08/24/2017     No results for input(s): TSH, FREET4 in the last 168 hours.  Lab Results   Component Value Date    HGBA1C 5.7 (H) 08/15/2017       Nutritional status:   Body mass index is 33.82 kg/m².  Lab Results   Component Value Date    ALBUMIN 3.3 (L) 08/15/2017    ALBUMIN 4.9 07/14/2017    ALBUMIN 3.7 04/26/2017     No results found for: PREALBUMIN    Estimated Creatinine Clearance: 102.7 mL/min (based on Cr of 0.6).    Accu-Checks  Recent Labs      08/22/17   0412  08/22/17   0532  08/22/17   0735  08/22/17   0805  08/22/17   0936  08/22/17   1148  08/22/17   1713  08/22/17   2149  08/23/17   1745  08/23/17   2318   POCTGLUCOSE  172*  151*  154*  138*  145*  178*  141*  114*  90  121*       Hyperglycemia/Diabetes Medications: Antihyperglycemics     Start     Stop Route Frequency Ordered    08/22/17 0910  insulin aspart pen 0-5 Units      -- SubQ Before meals & nightly PRN 08/22/17 0811        "

## 2017-08-25 LAB
ANION GAP SERPL CALC-SCNC: 10 MMOL/L
BASOPHILS # BLD AUTO: 0.02 K/UL
BASOPHILS NFR BLD: 0.1 %
BUN SERPL-MCNC: 18 MG/DL
CALCIUM SERPL-MCNC: 9.1 MG/DL
CHLORIDE SERPL-SCNC: 102 MMOL/L
CO2 SERPL-SCNC: 28 MMOL/L
CREAT SERPL-MCNC: 0.6 MG/DL
DIFFERENTIAL METHOD: ABNORMAL
EOSINOPHIL # BLD AUTO: 0.1 K/UL
EOSINOPHIL NFR BLD: 0.5 %
ERYTHROCYTE [DISTWIDTH] IN BLOOD BY AUTOMATED COUNT: 17.9 %
EST. GFR  (AFRICAN AMERICAN): >60 ML/MIN/1.73 M^2
EST. GFR  (NON AFRICAN AMERICAN): >60 ML/MIN/1.73 M^2
GLUCOSE SERPL-MCNC: 97 MG/DL
HCT VFR BLD AUTO: 34.8 %
HGB BLD-MCNC: 12.4 G/DL
LYMPHOCYTES # BLD AUTO: 1.3 K/UL
LYMPHOCYTES NFR BLD: 8.1 %
MAGNESIUM SERPL-MCNC: 1.9 MG/DL
MCH RBC QN AUTO: 30.9 PG
MCHC RBC AUTO-ENTMCNC: 35.6 G/DL
MCV RBC AUTO: 87 FL
MONOCYTES # BLD AUTO: 1.4 K/UL
MONOCYTES NFR BLD: 8.8 %
NEUTROPHILS # BLD AUTO: 12.7 K/UL
NEUTROPHILS NFR BLD: 82.5 %
PHOSPHATE SERPL-MCNC: 1.9 MG/DL
PLATELET # BLD AUTO: 78 K/UL
PLATELET BLD QL SMEAR: ABNORMAL
PMV BLD AUTO: 11.5 FL
POTASSIUM SERPL-SCNC: 4.1 MMOL/L
RBC # BLD AUTO: 4.01 M/UL
SODIUM SERPL-SCNC: 140 MMOL/L
WBC # BLD AUTO: 15.5 K/UL

## 2017-08-25 PROCEDURE — 94640 AIRWAY INHALATION TREATMENT: CPT

## 2017-08-25 PROCEDURE — 25000003 PHARM REV CODE 250: Performed by: NURSE PRACTITIONER

## 2017-08-25 PROCEDURE — 99900035 HC TECH TIME PER 15 MIN (STAT)

## 2017-08-25 PROCEDURE — 27000221 HC OXYGEN, UP TO 24 HOURS

## 2017-08-25 PROCEDURE — 36415 COLL VENOUS BLD VENIPUNCTURE: CPT

## 2017-08-25 PROCEDURE — 20600001 HC STEP DOWN PRIVATE ROOM

## 2017-08-25 PROCEDURE — 63600175 PHARM REV CODE 636 W HCPCS: Performed by: NURSE PRACTITIONER

## 2017-08-25 PROCEDURE — 85025 COMPLETE CBC W/AUTO DIFF WBC: CPT

## 2017-08-25 PROCEDURE — 80048 BASIC METABOLIC PNL TOTAL CA: CPT

## 2017-08-25 PROCEDURE — 94761 N-INVAS EAR/PLS OXIMETRY MLT: CPT

## 2017-08-25 PROCEDURE — 25000242 PHARM REV CODE 250 ALT 637 W/ HCPCS: Performed by: THORACIC SURGERY (CARDIOTHORACIC VASCULAR SURGERY)

## 2017-08-25 PROCEDURE — 63600175 PHARM REV CODE 636 W HCPCS: Performed by: STUDENT IN AN ORGANIZED HEALTH CARE EDUCATION/TRAINING PROGRAM

## 2017-08-25 PROCEDURE — 83735 ASSAY OF MAGNESIUM: CPT

## 2017-08-25 PROCEDURE — 25000003 PHARM REV CODE 250: Performed by: THORACIC SURGERY (CARDIOTHORACIC VASCULAR SURGERY)

## 2017-08-25 PROCEDURE — 97116 GAIT TRAINING THERAPY: CPT

## 2017-08-25 PROCEDURE — 25000003 PHARM REV CODE 250: Performed by: STUDENT IN AN ORGANIZED HEALTH CARE EDUCATION/TRAINING PROGRAM

## 2017-08-25 PROCEDURE — 84100 ASSAY OF PHOSPHORUS: CPT

## 2017-08-25 RX ORDER — FUROSEMIDE 10 MG/ML
40 INJECTION INTRAMUSCULAR; INTRAVENOUS 3 TIMES DAILY
Status: DISCONTINUED | OUTPATIENT
Start: 2017-08-25 | End: 2017-08-27 | Stop reason: HOSPADM

## 2017-08-25 RX ORDER — FERROUS SULFATE 325(65) MG
325 TABLET, DELAYED RELEASE (ENTERIC COATED) ORAL DAILY
Status: DISCONTINUED | OUTPATIENT
Start: 2017-08-25 | End: 2017-08-27 | Stop reason: HOSPADM

## 2017-08-25 RX ORDER — METOPROLOL TARTRATE 50 MG/1
50 TABLET ORAL 2 TIMES DAILY
Status: DISCONTINUED | OUTPATIENT
Start: 2017-08-25 | End: 2017-08-27 | Stop reason: HOSPADM

## 2017-08-25 RX ORDER — METOPROLOL TARTRATE 50 MG/1
50 TABLET ORAL 2 TIMES DAILY
Status: DISCONTINUED | OUTPATIENT
Start: 2017-08-25 | End: 2017-08-25

## 2017-08-25 RX ORDER — LISINOPRIL 10 MG/1
10 TABLET ORAL DAILY
Status: DISCONTINUED | OUTPATIENT
Start: 2017-08-25 | End: 2017-08-27 | Stop reason: HOSPADM

## 2017-08-25 RX ORDER — SODIUM,POTASSIUM PHOSPHATES 280-250MG
2 POWDER IN PACKET (EA) ORAL
Status: DISCONTINUED | OUTPATIENT
Start: 2017-08-25 | End: 2017-08-26

## 2017-08-25 RX ORDER — METOPROLOL TARTRATE 25 MG/1
25 TABLET, FILM COATED ORAL ONCE
Status: COMPLETED | OUTPATIENT
Start: 2017-08-25 | End: 2017-08-25

## 2017-08-25 RX ORDER — AMIODARONE HYDROCHLORIDE 200 MG/1
400 TABLET ORAL 2 TIMES DAILY
Status: DISCONTINUED | OUTPATIENT
Start: 2017-08-25 | End: 2017-08-27 | Stop reason: HOSPADM

## 2017-08-25 RX ADMIN — VALACYCLOVIR HYDROCHLORIDE 1000 MG: 500 TABLET, FILM COATED ORAL at 10:08

## 2017-08-25 RX ADMIN — ASPIRIN 325 MG ORAL TABLET 325 MG: 325 PILL ORAL at 10:08

## 2017-08-25 RX ADMIN — AMIODARONE HYDROCHLORIDE 400 MG: 200 TABLET ORAL at 10:08

## 2017-08-25 RX ADMIN — LEVOTHYROXINE SODIUM 75 MCG: 75 TABLET ORAL at 10:08

## 2017-08-25 RX ADMIN — CALCIUM CARBONATE-CHOLECALCIFEROL TAB 250 MG-125 UNIT 1 TABLET: 250-125 TAB at 10:08

## 2017-08-25 RX ADMIN — METOPROLOL TARTRATE 25 MG: 25 TABLET ORAL at 10:08

## 2017-08-25 RX ADMIN — FERROUS SULFATE TAB EC 325 MG (65 MG FE EQUIVALENT) 325 MG: 325 (65 FE) TABLET DELAYED RESPONSE at 03:08

## 2017-08-25 RX ADMIN — FUROSEMIDE 20 MG: 10 INJECTION, SOLUTION INTRAMUSCULAR; INTRAVENOUS at 10:08

## 2017-08-25 RX ADMIN — LISINOPRIL 10 MG: 10 TABLET ORAL at 03:08

## 2017-08-25 RX ADMIN — IPRATROPIUM BROMIDE AND ALBUTEROL SULFATE 3 ML: .5; 3 SOLUTION RESPIRATORY (INHALATION) at 01:08

## 2017-08-25 RX ADMIN — FAMOTIDINE 20 MG: 20 TABLET, FILM COATED ORAL at 10:08

## 2017-08-25 RX ADMIN — IPRATROPIUM BROMIDE AND ALBUTEROL SULFATE 3 ML: .5; 3 SOLUTION RESPIRATORY (INHALATION) at 03:08

## 2017-08-25 RX ADMIN — IPRATROPIUM BROMIDE AND ALBUTEROL SULFATE 3 ML: .5; 3 SOLUTION RESPIRATORY (INHALATION) at 07:08

## 2017-08-25 RX ADMIN — POTASSIUM & SODIUM PHOSPHATES POWDER PACK 280-160-250 MG 2 PACKET: 280-160-250 PACK at 09:08

## 2017-08-25 RX ADMIN — AMIODARONE HYDROCHLORIDE 0.5 MG/MIN: 1.8 INJECTION, SOLUTION INTRAVENOUS at 04:08

## 2017-08-25 RX ADMIN — ALPRAZOLAM 0.25 MG: 0.25 TABLET ORAL at 09:08

## 2017-08-25 RX ADMIN — POTASSIUM & SODIUM PHOSPHATES POWDER PACK 280-160-250 MG 2 PACKET: 280-160-250 PACK at 04:08

## 2017-08-25 RX ADMIN — METOPROLOL TARTRATE 50 MG: 50 TABLET, FILM COATED ORAL at 09:08

## 2017-08-25 RX ADMIN — TIMOLOL MALEATE 1 DROP: 5 SOLUTION OPHTHALMIC at 10:08

## 2017-08-25 RX ADMIN — FUROSEMIDE 40 MG: 10 INJECTION, SOLUTION INTRAVENOUS at 03:08

## 2017-08-25 RX ADMIN — IPRATROPIUM BROMIDE AND ALBUTEROL SULFATE 3 ML: .5; 3 SOLUTION RESPIRATORY (INHALATION) at 11:08

## 2017-08-25 RX ADMIN — FUROSEMIDE 40 MG: 10 INJECTION, SOLUTION INTRAVENOUS at 09:08

## 2017-08-25 RX ADMIN — METOPROLOL TARTRATE 25 MG: 25 TABLET ORAL at 05:08

## 2017-08-25 RX ADMIN — ROSUVASTATIN CALCIUM 20 MG: 20 TABLET, FILM COATED ORAL at 09:08

## 2017-08-25 RX ADMIN — AMIODARONE HYDROCHLORIDE 400 MG: 200 TABLET ORAL at 09:08

## 2017-08-25 RX ADMIN — BRIMONIDINE TARTRATE 1 DROP: 2 SOLUTION OPHTHALMIC at 10:08

## 2017-08-25 RX ADMIN — FAMOTIDINE 20 MG: 20 TABLET, FILM COATED ORAL at 09:08

## 2017-08-25 NOTE — PLAN OF CARE
Problem: Patient Care Overview  Goal: Individualization & Mutuality  Dx:CAD  Hx:HTN, HLD, Breast CA, Lymphoma w/ radiation    8/21: Patient admitted to SICU s/p CABG x2 and AVR, extubated, 3 L LR   8/22: OOB to chair,  1L albumin, epi weaned off    Nursing  MAP 60-80  Accu Checks AC/HS   Plan of care discussed with patient.  Patient ambulating with assistance, fall precautions in place.Continuing to encourage sternal precautions, IS, and ambulation. Patient has no complaints of pain. VS stable. AAOx4. Discussed medications and care. Patient has no questions at this time. Will continue to monitor.

## 2017-08-25 NOTE — PROGRESS NOTES
Progress Note  Cardiothoracic Surgery    Admit Date: 8/21/2017  Post-operative Day: 4 Days Post-Op  Hospital Day: 5    SUBJECTIVE:  No acute events overnight.  Feeling better today after receiving PRBC's.  SR per monitor.     Follow-up For: Procedure(s) (LRB):  REPLACEMENT-VALVE-AORTIC (N/A)  AORTOCORONARY BYPASS-CABG (N/A)  HARVEST-VEIN-ENDOVASCULAR (Left)    Scheduled Meds:   albuterol-ipratropium 2.5mg-0.5mg/3mL  3 mL Nebulization Q4H    amiodarone  400 mg Oral BID    aspirin  325 mg Oral Daily    brimonidine 0.2%  1 drop Both Eyes Daily    calcium carbonate-vitamin D3 250-125 mg  1 tablet Oral Daily    famotidine  20 mg Oral BID    ferrous sulfate  325 mg Oral Daily    furosemide  40 mg Intravenous TID    levothyroxine  75 mcg Oral Daily    lisinopril  10 mg Oral Daily    metoprolol tartrate  50 mg Oral BID    polyethylene glycol  17 g Oral Daily    potassium, sodium phosphates  2 packet Oral QID (AC & HS)    rosuvastatin  20 mg Oral Nightly    timolol maleate 0.5%  1 drop Both Eyes Daily    valacyclovir  1,000 mg Oral Daily     Infusions/Drips:   PRN Meds: acetaminophen, alprazolam, artificial tears, lactulose, metoclopramide HCl, ondansetron, oxycodone, oxycodone    Review of patient's allergies indicates:   Allergen Reactions    Norvasc  [amlodipine] Shortness Of Breath     unknown  Other reaction(s): Swelling    Sulfa (sulfonamide antibiotics) Shortness Of Breath, Itching and Other (See Comments)     elevated blood pressure    Iodinated contrast- oral and iv dye     Iodine      Other reaction(s): Unknown    Latex      Other reaction(s): Itching       OBJECTIVE:     Vital Signs (Most Recent)  Temp: 97.5 °F (36.4 °C) (08/25/17 1100)  Pulse: 87 (08/25/17 1145)  Resp: 18 (08/25/17 1145)  BP: 117/60 (08/25/17 1100)  SpO2: 95 % (08/25/17 1145)    Admission Weight: 74.8 kg (165 lb) (08/21/17 0559)   Most Recent Weight: 80.2 kg (176 lb 12.9 oz) (08/25/17 0500)    Vital Signs Range (Last  24H):  Temp:  [97.5 °F (36.4 °C)-98.7 °F (37.1 °C)]   Pulse:  [80-94]   Resp:  [16-20]   BP: (117-169)/(60-89)   SpO2:  [90 %-100 %]     I & O (Last 24H):  Intake/Output Summary (Last 24 hours) at 08/25/17 1208  Last data filed at 08/25/17 0500   Gross per 24 hour   Intake          2499.26 ml   Output             2071 ml   Net           428.26 ml     Physical Exam:  General: no distress  Lungs:  normal respiratory effort  Heart: regular rate and rhythm  Abdomen: soft/nontender   Extremities: warm, well perfused  Skin: Skin color, texture, turgor normal. No rashes or lesions    Wound/Incision:  clean, dry, intact    Laboratory:  CBC:   Recent Labs  Lab 08/25/17  0817   WBC 15.50*   RBC 4.01   HGB 12.4   HCT 34.8*   PLT 78*   MCV 87   MCH 30.9   MCHC 35.6     BMP:   Recent Labs  Lab 08/25/17  0817   GLU 97      K 4.1      CO2 28   BUN 18   CREATININE 0.6   CALCIUM 9.1   MG 1.9       ASSESSMENT/PLAN:     Assessment:   Active Hospital Problems    Diagnosis    *S/P AVR (aortic valve replacement)    Hypophosphatemia    Acute blood loss anemia    Atrial fibrillation with controlled ventricular response    S/P CABG x 2     SVG LAD due to absent LIMA after chest radiation and lymph node biopsy  SVG RCA      Coronary artery disease of native artery of native heart with stable angina pectoris    Dyslipidemia    Hypothyroid    Hyperlipidemia    Essential hypertension    Hx of Hodgkin's disease - s/p chemo and radiation       Plan:   Sternal precautions  PT/OT  Ambulate  Monitor electrolytes and replace  Monitor H&H and transfuse prn  NSR per monitor, transition to oral amiodarone  Continue statin  Continue thyroid replacement  Add lisinopril to regimen for tighter BP control  Increase lasix to 40 TID  DC mediastinal chest tubes  Discharge planning ongoing

## 2017-08-25 NOTE — PT/OT/SLP PROGRESS
Physical Therapy  Treatment    Dulce Root   MRN: [id number]   Admitting Diagnosis: S/P AVR (aortic valve replacement)    PT Received On: 08/25/17  PT Start Time: 1300     PT Stop Time: 1316    PT Total Time (min): 16 min       Billable Minutes:  Gait [id number]    Treatment Type: Treatment  PT/PTA: PT             General Precautions: Standard, fall, sternal  Orthopedic Precautions: N/A   Braces: N/A    Do you have any cultural, spiritual, Voodoo conflicts, given your current situation?: none    Subjective:  Communicated with pt and nurse prior to session.  Pt agreeable to therapy but stated she was tired and had been sitting up in chair all morning and has already gone for a walk in the hallway    Pain/Comfort  Pain Rating 1: 0/10    Objective:   Patient found with: chest tube, peripheral IV, telemetry, oxygen    Functional Mobility:  Bed Mobility:   Rolling R and L: mod I  Bridging and scooting: mod I  Supine to sit: CGA with HOB slightly elevated (pt has bed that is able to elevate HOB)  Sit to supine: min assist for LE management  Skilled verbal and tactile instruction for proper technique    Transfers:  Sit <> Stand Assistance: Stand By Assistance  Sit <> Stand Assistive Device: No Assistive Device   Performed from bed using sternal pillow    Gait:   Gait Distance: 154 feet  Assistance 1: Stand by Assistance, Contact Guard Assistance  Gait Assistive Device: No device  Gait Pattern: reciprocal  Gait Deviation(s): decreased seven, increased time in double stance, decreased velocity of limb motion, decreased step length, decreased toe-to-floor clearance  Performed in room and hallway demonstrating ability to maneuver in small spaces and making turns      Balance:   Static Sit: FAIR+: Able to take MINIMAL challenges from all directions  Dynamic Sit: FAIR+: Maintains balance through MINIMAL excursions of active trunk motion  Static Stand: FAIR: Maintains without assist but unable to take challenges  Dynamic  stand: FAIR: Needs CONTACT GUARD during gait     Therapeutic Activities and Exercises:  PT provided pt and spouse education concerning importance of OOB activity and to continue amb with assistance.     AM-PAC 6 CLICK MOBILITY  How much help from another person does this patient currently need?   1 = Unable, Total/Dependent Assistance  2 = A lot, Maximum/Moderate Assistance  3 = A little, Minimum/Contact Guard/Supervision  4 = None, Modified [place]/Independent    Turning over in bed (including adjusting bedclothes, sheets and blankets)?: 4  Sitting down on and standing up from a chair with arms (e.g., wheelchair, bedside commode, etc.): 3  Moving from lying on back to sitting on the side of the bed?: 3  Moving to and from a bed to a chair (including a wheelchair)?: 3  Need to walk in hospital room?: 3  Climbing 3-5 steps with a railing?: 2  Total Score: 18    AM-PAC Raw Score CMS G-Code Modifier Level of Impairment Assistance   6 % Total / Unable   7 - 9 CM 80 - 100% Maximal Assist   10 - 14 CL 60 - 80% Moderate Assist   15 - 19 CK 40 - 60% Moderate Assist   20 - 22 CJ 20 - 40% Minimal Assist   23 CI 1-20% SBA / CGA   24 CH 0% Independent/ Mod I     Patient left left sidelying with call button in reach and spouse present.    Assessment:  Dulce Root is a 62 y.o. female with a medical diagnosis of S/P AVR (aortic valve replacement) and presents with generalized weakness, impaired balance, gait, and endurance requiring min assist for bed mobility, SBA for transfers, and SBA/CGA for amb without AD at this time. Pt reported increased fatigue today due to sitting up in chair all morning and already going for a walk down the hallway today. Pt will benefit from continued PT treatment to address remaining impairments and improve functional mobility and independence.       Rehab identified problem list/impairments: Rehab identified problem list/impairments: weakness, impaired endurance, gait instability,  impaired balance, impaired cardiopulmonary response to activity    Rehab potential is good.    Activity tolerance: Good    Discharge recommendations: Discharge Facility/Level Of Care Needs: home with home health     Barriers to discharge: Barriers to Discharge: None    Equipment recommendations: Equipment Needed After Discharge: none     GOALS:    Physical Therapy Goals        Problem: Physical Therapy Goal    Goal Priority Disciplines Outcome Goal Variances Interventions   Physical Therapy Goal     PT/OT, PT Ongoing (interventions implemented as appropriate)     Description:  Goals to be met by: 17     Patient will increase functional independence with mobility by performin. Supine to sit with Stand-by Assistance - not met  2. Sit to stand transfer with Supervision - not met  3. Gait  x 220 feet with Supervision - not met                      PLAN:    Patient to be seen 5 x/week  to address the above listed problems via gait training, therapeutic activities, therapeutic exercises  Plan of Care expires: 17  Plan of Care reviewed with: patient, spouse         Maricruz Collier, PT  2017

## 2017-08-25 NOTE — PROGRESS NOTES
08/25/17 0513 08/25/17 0515   Vital Signs   Pulse --  85   BP (!) 169/85 --    BP Location Right arm --    BP Method Manual --    Patient Position Lying --      Notified Dr. Camp with CTS of pt BP elevated all night. Patient has no PRN or 0600 BP medications scheduled. MD orders to administer 0900 dose of 25mg PO lopressor NOW (0522). Will administer and continue to monitor.

## 2017-08-25 NOTE — PROGRESS NOTES
Spoke with Christina Stewart NP about Amio gtt since pt is getting first PO dose this AM. NP ordered to give PO dose and stop gtt 1 hr after PO dose.

## 2017-08-25 NOTE — PLAN OF CARE
Problem: Physical Therapy Goal  Goal: Physical Therapy Goal  Goals to be met by: 17     Patient will increase functional independence with mobility by performin. Supine to sit with Stand-by Assistance - not met  2. Sit to stand transfer with Supervision - not met  3. Gait  x 220 feet with Supervision - not met     Outcome: Ongoing (interventions implemented as appropriate)  Goals remain appropriate

## 2017-08-25 NOTE — PLAN OF CARE
Problem: Patient Care Overview  Goal: Plan of Care Review  Outcome: Ongoing (interventions implemented as appropriate)  Patient verbalizes no complaints overnight. Denies chest pain, SOB, or other pain discomfort. Amiodarone gtt infusing per MD order. Meds x2 and Pleural CT in place with minimal drainage overnight. Patient remains free of falls or injury. No significant events. Patient verbalizes complete understanding of plan of care. Will continue to monitor.

## 2017-08-26 LAB
ANION GAP SERPL CALC-SCNC: 15 MMOL/L
ANISOCYTOSIS BLD QL SMEAR: SLIGHT
BASOPHILS # BLD AUTO: 0.02 K/UL
BASOPHILS NFR BLD: 0.2 %
BUN SERPL-MCNC: 18 MG/DL
CALCIUM SERPL-MCNC: 9.8 MG/DL
CHLORIDE SERPL-SCNC: 96 MMOL/L
CO2 SERPL-SCNC: 31 MMOL/L
CREAT SERPL-MCNC: 0.7 MG/DL
DACRYOCYTES BLD QL SMEAR: ABNORMAL
DIFFERENTIAL METHOD: ABNORMAL
EOSINOPHIL # BLD AUTO: 0.3 K/UL
EOSINOPHIL NFR BLD: 2 %
ERYTHROCYTE [DISTWIDTH] IN BLOOD BY AUTOMATED COUNT: 17.6 %
EST. GFR  (AFRICAN AMERICAN): >60 ML/MIN/1.73 M^2
EST. GFR  (NON AFRICAN AMERICAN): >60 ML/MIN/1.73 M^2
GLUCOSE SERPL-MCNC: 91 MG/DL
HCT VFR BLD AUTO: 41.9 %
HGB BLD-MCNC: 14.5 G/DL
HYPOCHROMIA BLD QL SMEAR: ABNORMAL
LYMPHOCYTES # BLD AUTO: 1.6 K/UL
LYMPHOCYTES NFR BLD: 12.3 %
MAGNESIUM SERPL-MCNC: 2.1 MG/DL
MCH RBC QN AUTO: 31.4 PG
MCHC RBC AUTO-ENTMCNC: 34.6 G/DL
MCV RBC AUTO: 91 FL
MONOCYTES # BLD AUTO: 1.3 K/UL
MONOCYTES NFR BLD: 9.6 %
NEUTROPHILS # BLD AUTO: 10.1 K/UL
NEUTROPHILS NFR BLD: 75.9 %
OVALOCYTES BLD QL SMEAR: ABNORMAL
PHOSPHATE SERPL-MCNC: 2.3 MG/DL
PLATELET # BLD AUTO: 125 K/UL
PMV BLD AUTO: 10.3 FL
POIKILOCYTOSIS BLD QL SMEAR: SLIGHT
POLYCHROMASIA BLD QL SMEAR: ABNORMAL
POTASSIUM SERPL-SCNC: 3.3 MMOL/L
RBC # BLD AUTO: 4.62 M/UL
SCHISTOCYTES BLD QL SMEAR: ABNORMAL
SODIUM SERPL-SCNC: 142 MMOL/L
WBC # BLD AUTO: 13.31 K/UL

## 2017-08-26 PROCEDURE — 94761 N-INVAS EAR/PLS OXIMETRY MLT: CPT

## 2017-08-26 PROCEDURE — 25000242 PHARM REV CODE 250 ALT 637 W/ HCPCS: Performed by: THORACIC SURGERY (CARDIOTHORACIC VASCULAR SURGERY)

## 2017-08-26 PROCEDURE — 25000003 PHARM REV CODE 250: Performed by: THORACIC SURGERY (CARDIOTHORACIC VASCULAR SURGERY)

## 2017-08-26 PROCEDURE — 94640 AIRWAY INHALATION TREATMENT: CPT

## 2017-08-26 PROCEDURE — 85025 COMPLETE CBC W/AUTO DIFF WBC: CPT

## 2017-08-26 PROCEDURE — 80048 BASIC METABOLIC PNL TOTAL CA: CPT

## 2017-08-26 PROCEDURE — 20600001 HC STEP DOWN PRIVATE ROOM

## 2017-08-26 PROCEDURE — 63600175 PHARM REV CODE 636 W HCPCS: Performed by: NURSE PRACTITIONER

## 2017-08-26 PROCEDURE — 25000003 PHARM REV CODE 250: Performed by: NURSE PRACTITIONER

## 2017-08-26 PROCEDURE — 63600175 PHARM REV CODE 636 W HCPCS: Performed by: STUDENT IN AN ORGANIZED HEALTH CARE EDUCATION/TRAINING PROGRAM

## 2017-08-26 PROCEDURE — 84100 ASSAY OF PHOSPHORUS: CPT

## 2017-08-26 PROCEDURE — 83735 ASSAY OF MAGNESIUM: CPT

## 2017-08-26 PROCEDURE — 36415 COLL VENOUS BLD VENIPUNCTURE: CPT

## 2017-08-26 PROCEDURE — 99900035 HC TECH TIME PER 15 MIN (STAT)

## 2017-08-26 RX ORDER — POTASSIUM CHLORIDE 20 MEQ/1
40 TABLET, EXTENDED RELEASE ORAL 3 TIMES DAILY
Status: DISCONTINUED | OUTPATIENT
Start: 2017-08-26 | End: 2017-08-27 | Stop reason: HOSPADM

## 2017-08-26 RX ORDER — HYDRALAZINE HYDROCHLORIDE 20 MG/ML
10 INJECTION INTRAMUSCULAR; INTRAVENOUS EVERY 6 HOURS PRN
Status: DISCONTINUED | OUTPATIENT
Start: 2017-08-26 | End: 2017-08-27 | Stop reason: HOSPADM

## 2017-08-26 RX ADMIN — IPRATROPIUM BROMIDE AND ALBUTEROL SULFATE 3 ML: .5; 3 SOLUTION RESPIRATORY (INHALATION) at 07:08

## 2017-08-26 RX ADMIN — AMIODARONE HYDROCHLORIDE 400 MG: 200 TABLET ORAL at 10:08

## 2017-08-26 RX ADMIN — ROSUVASTATIN CALCIUM 20 MG: 20 TABLET, FILM COATED ORAL at 09:08

## 2017-08-26 RX ADMIN — CALCIUM CARBONATE-CHOLECALCIFEROL TAB 250 MG-125 UNIT 1 TABLET: 250-125 TAB at 10:08

## 2017-08-26 RX ADMIN — BRIMONIDINE TARTRATE 1 DROP: 2 SOLUTION OPHTHALMIC at 10:08

## 2017-08-26 RX ADMIN — ASPIRIN 325 MG ORAL TABLET 325 MG: 325 PILL ORAL at 10:08

## 2017-08-26 RX ADMIN — FAMOTIDINE 20 MG: 20 TABLET, FILM COATED ORAL at 09:08

## 2017-08-26 RX ADMIN — HYDRALAZINE HYDROCHLORIDE 10 MG: 20 INJECTION INTRAMUSCULAR; INTRAVENOUS at 06:08

## 2017-08-26 RX ADMIN — IPRATROPIUM BROMIDE AND ALBUTEROL SULFATE 3 ML: .5; 3 SOLUTION RESPIRATORY (INHALATION) at 12:08

## 2017-08-26 RX ADMIN — LISINOPRIL 10 MG: 10 TABLET ORAL at 10:08

## 2017-08-26 RX ADMIN — FERROUS SULFATE TAB EC 325 MG (65 MG FE EQUIVALENT) 325 MG: 325 (65 FE) TABLET DELAYED RESPONSE at 10:08

## 2017-08-26 RX ADMIN — VALACYCLOVIR HYDROCHLORIDE 1000 MG: 500 TABLET, FILM COATED ORAL at 10:08

## 2017-08-26 RX ADMIN — FUROSEMIDE 40 MG: 10 INJECTION, SOLUTION INTRAVENOUS at 05:08

## 2017-08-26 RX ADMIN — IPRATROPIUM BROMIDE AND ALBUTEROL SULFATE 3 ML: .5; 3 SOLUTION RESPIRATORY (INHALATION) at 11:08

## 2017-08-26 RX ADMIN — LEVOTHYROXINE SODIUM 75 MCG: 75 TABLET ORAL at 10:08

## 2017-08-26 RX ADMIN — OXYCODONE HYDROCHLORIDE 5 MG: 5 TABLET ORAL at 06:08

## 2017-08-26 RX ADMIN — POTASSIUM CHLORIDE 40 MEQ: 1500 TABLET, EXTENDED RELEASE ORAL at 09:08

## 2017-08-26 RX ADMIN — FAMOTIDINE 20 MG: 20 TABLET, FILM COATED ORAL at 10:08

## 2017-08-26 RX ADMIN — POTASSIUM & SODIUM PHOSPHATES POWDER PACK 280-160-250 MG 2 PACKET: 280-160-250 PACK at 05:08

## 2017-08-26 RX ADMIN — AMIODARONE HYDROCHLORIDE 400 MG: 200 TABLET ORAL at 09:08

## 2017-08-26 RX ADMIN — METOPROLOL TARTRATE 50 MG: 50 TABLET, FILM COATED ORAL at 10:08

## 2017-08-26 RX ADMIN — IPRATROPIUM BROMIDE AND ALBUTEROL SULFATE 3 ML: .5; 3 SOLUTION RESPIRATORY (INHALATION) at 03:08

## 2017-08-26 RX ADMIN — FUROSEMIDE 40 MG: 10 INJECTION, SOLUTION INTRAVENOUS at 09:08

## 2017-08-26 RX ADMIN — POTASSIUM CHLORIDE 40 MEQ: 1500 TABLET, EXTENDED RELEASE ORAL at 02:08

## 2017-08-26 RX ADMIN — FUROSEMIDE 40 MG: 10 INJECTION, SOLUTION INTRAVENOUS at 02:08

## 2017-08-26 RX ADMIN — HYDRALAZINE HYDROCHLORIDE 10 MG: 20 INJECTION INTRAMUSCULAR; INTRAVENOUS at 09:08

## 2017-08-26 RX ADMIN — TIMOLOL MALEATE 1 DROP: 5 SOLUTION OPHTHALMIC at 10:08

## 2017-08-26 RX ADMIN — METOPROLOL TARTRATE 50 MG: 50 TABLET, FILM COATED ORAL at 09:08

## 2017-08-26 NOTE — PROGRESS NOTES
Progress Note  Cardiothoracic Surgery    Admit Date: 8/21/2017  Post-operative Day: 5 Days Post-Op  Hospital Day: 6    SUBJECTIVE:  No acute events overnight.  SR per monitor.     Follow-up For: Procedure(s) (LRB):  REPLACEMENT-VALVE-AORTIC (N/A)  AORTOCORONARY BYPASS-CABG (N/A)  HARVEST-VEIN-ENDOVASCULAR (Left)    Scheduled Meds:   albuterol-ipratropium 2.5mg-0.5mg/3mL  3 mL Nebulization Q4H    amiodarone  400 mg Oral BID    aspirin  325 mg Oral Daily    brimonidine 0.2%  1 drop Both Eyes Daily    calcium carbonate-vitamin D3 250-125 mg  1 tablet Oral Daily    famotidine  20 mg Oral BID    ferrous sulfate  325 mg Oral Daily    furosemide  40 mg Intravenous TID    levothyroxine  75 mcg Oral Daily    lisinopril  10 mg Oral Daily    metoprolol tartrate  50 mg Oral BID    polyethylene glycol  17 g Oral Daily    potassium chloride SA  40 mEq Oral TID    rosuvastatin  20 mg Oral Nightly    timolol maleate 0.5%  1 drop Both Eyes Daily    valacyclovir  1,000 mg Oral Daily     Infusions/Drips:   PRN Meds: acetaminophen, alprazolam, artificial tears, hydrALAZINE, lactulose, metoclopramide HCl, ondansetron, oxycodone, oxycodone    Review of patient's allergies indicates:   Allergen Reactions    Norvasc  [amlodipine] Shortness Of Breath     unknown  Other reaction(s): Swelling    Sulfa (sulfonamide antibiotics) Shortness Of Breath, Itching and Other (See Comments)     elevated blood pressure    Iodinated contrast- oral and iv dye     Iodine      Other reaction(s): Unknown    Latex      Other reaction(s): Itching       OBJECTIVE:     Vital Signs (Most Recent)  Temp: 97.8 °F (36.6 °C) (08/26/17 0800)  Pulse: 91 (08/26/17 1119)  Resp: 20 (08/26/17 1109)  BP: (!) 113/55 (08/26/17 0800)  SpO2: 96 % (08/26/17 1109)    Admission Weight: 74.8 kg (165 lb) (08/21/17 0559)   Most Recent Weight: 77 kg (169 lb 12.1 oz) (08/26/17 0620)    Vital Signs Range (Last 24H):  Temp:  [97.4 °F (36.3 °C)-98 °F (36.7 °C)]   Pulse:   []   Resp:  [16-20]   BP: (103-159)/(53-74)   SpO2:  [92 %-99 %]     I & O (Last 24H):  Intake/Output Summary (Last 24 hours) at 08/26/17 1154  Last data filed at 08/26/17 0620   Gross per 24 hour   Intake              480 ml   Output             1540 ml   Net            -1060 ml     Physical Exam:  General: no distress  Head: normocephalic  Heart: regular rate and rhythm  Abdomen: soft/nontender   Extremities: warm, well perfused    Wound/Incision:  clean, dry, intact    Laboratory:  CBC:   Recent Labs  Lab 08/25/17  0817 08/26/17  0550   WBC 15.50*  --    RBC 4.01 4.62   HGB 12.4 14.5   HCT 34.8* 41.9   PLT 78* 125*   MCV 87 91   MCH 30.9 31.4*   MCHC 35.6 34.6     BMP:   Recent Labs  Lab 08/26/17  0550   GLU 91      K 3.3*   CL 96   CO2 31*   BUN 18   CREATININE 0.7   CALCIUM 9.8   MG 2.1       ASSESSMENT/PLAN:     Assessment:   Active Hospital Problems    Diagnosis    *S/P AVR (aortic valve replacement)    Hypophosphatemia    Acute blood loss anemia    Atrial fibrillation with controlled ventricular response    S/P CABG x 2     SVG LAD due to absent LIMA after chest radiation and lymph node biopsy  SVG RCA      Coronary artery disease of native artery of native heart with stable angina pectoris    Dyslipidemia    Hypothyroid    Hyperlipidemia    Essential hypertension    Hx of Hodgkin's disease - s/p chemo and radiation         Hypokalemia    Plan:   Sternal precautions  PT/OT  Ambulate  Monitor electrolytes and replace  Monitor H&H and transfuse prn  NSR per monitor, transition to oral amiodarone  Continue statin  Continue thyroid replacement  Continue bp optimization  continue lasix 40 TID  DC pleural chest tubes  Discharge planning ongoing

## 2017-08-27 VITALS
OXYGEN SATURATION: 97 % | HEART RATE: 85 BPM | BODY MASS INDEX: 29.18 KG/M2 | SYSTOLIC BLOOD PRESSURE: 120 MMHG | RESPIRATION RATE: 18 BRPM | HEIGHT: 63 IN | DIASTOLIC BLOOD PRESSURE: 60 MMHG | WEIGHT: 164.69 LBS | TEMPERATURE: 98 F

## 2017-08-27 LAB
ANION GAP SERPL CALC-SCNC: 12 MMOL/L
BASOPHILS # BLD AUTO: 0.02 K/UL
BASOPHILS NFR BLD: 0.2 %
BUN SERPL-MCNC: 26 MG/DL
CALCIUM SERPL-MCNC: 9.3 MG/DL
CHLORIDE SERPL-SCNC: 99 MMOL/L
CO2 SERPL-SCNC: 28 MMOL/L
CREAT SERPL-MCNC: 0.7 MG/DL
DIFFERENTIAL METHOD: ABNORMAL
EOSINOPHIL # BLD AUTO: 0.4 K/UL
EOSINOPHIL NFR BLD: 2.8 %
ERYTHROCYTE [DISTWIDTH] IN BLOOD BY AUTOMATED COUNT: 17.5 %
EST. GFR  (AFRICAN AMERICAN): >60 ML/MIN/1.73 M^2
EST. GFR  (NON AFRICAN AMERICAN): >60 ML/MIN/1.73 M^2
GLUCOSE SERPL-MCNC: 93 MG/DL
HCT VFR BLD AUTO: 38 %
HGB BLD-MCNC: 13 G/DL
LYMPHOCYTES # BLD AUTO: 0.9 K/UL
LYMPHOCYTES NFR BLD: 7.2 %
MAGNESIUM SERPL-MCNC: 1.9 MG/DL
MCH RBC QN AUTO: 30.5 PG
MCHC RBC AUTO-ENTMCNC: 34.2 G/DL
MCV RBC AUTO: 89 FL
MONOCYTES # BLD AUTO: 2.2 K/UL
MONOCYTES NFR BLD: 18 %
NEUTROPHILS # BLD AUTO: 8.9 K/UL
NEUTROPHILS NFR BLD: 71.8 %
PHOSPHATE SERPL-MCNC: 3.2 MG/DL
PLATELET # BLD AUTO: 167 K/UL
PMV BLD AUTO: 10.9 FL
POTASSIUM SERPL-SCNC: 4.1 MMOL/L
RBC # BLD AUTO: 4.26 M/UL
SODIUM SERPL-SCNC: 139 MMOL/L
WBC # BLD AUTO: 12.41 K/UL

## 2017-08-27 PROCEDURE — 83735 ASSAY OF MAGNESIUM: CPT

## 2017-08-27 PROCEDURE — 25000003 PHARM REV CODE 250: Performed by: THORACIC SURGERY (CARDIOTHORACIC VASCULAR SURGERY)

## 2017-08-27 PROCEDURE — 94761 N-INVAS EAR/PLS OXIMETRY MLT: CPT

## 2017-08-27 PROCEDURE — 63600175 PHARM REV CODE 636 W HCPCS: Performed by: NURSE PRACTITIONER

## 2017-08-27 PROCEDURE — 36415 COLL VENOUS BLD VENIPUNCTURE: CPT

## 2017-08-27 PROCEDURE — 80048 BASIC METABOLIC PNL TOTAL CA: CPT

## 2017-08-27 PROCEDURE — 84100 ASSAY OF PHOSPHORUS: CPT

## 2017-08-27 PROCEDURE — 85025 COMPLETE CBC W/AUTO DIFF WBC: CPT

## 2017-08-27 PROCEDURE — 25000242 PHARM REV CODE 250 ALT 637 W/ HCPCS: Performed by: THORACIC SURGERY (CARDIOTHORACIC VASCULAR SURGERY)

## 2017-08-27 PROCEDURE — 25000003 PHARM REV CODE 250: Performed by: NURSE PRACTITIONER

## 2017-08-27 PROCEDURE — 94640 AIRWAY INHALATION TREATMENT: CPT

## 2017-08-27 PROCEDURE — 99900035 HC TECH TIME PER 15 MIN (STAT)

## 2017-08-27 RX ORDER — METOPROLOL TARTRATE 50 MG/1
50 TABLET ORAL 2 TIMES DAILY
Qty: 60 TABLET | Refills: 11 | Status: SHIPPED | OUTPATIENT
Start: 2017-08-27 | End: 2018-05-31 | Stop reason: SDUPTHER

## 2017-08-27 RX ORDER — FUROSEMIDE 20 MG/1
20 TABLET ORAL 2 TIMES DAILY
Qty: 60 TABLET | Refills: 11 | Status: SHIPPED | OUTPATIENT
Start: 2017-08-27 | End: 2017-09-19 | Stop reason: ALTCHOICE

## 2017-08-27 RX ORDER — AMIODARONE HYDROCHLORIDE 400 MG/1
400 TABLET ORAL 2 TIMES DAILY
Qty: 60 TABLET | Refills: 11 | Status: SHIPPED | OUTPATIENT
Start: 2017-08-27 | End: 2017-09-19 | Stop reason: ALTCHOICE

## 2017-08-27 RX ORDER — POTASSIUM CHLORIDE 750 MG/1
10 TABLET, EXTENDED RELEASE ORAL 3 TIMES DAILY
Qty: 30 TABLET | Refills: 5 | Status: SHIPPED | OUTPATIENT
Start: 2017-08-27 | End: 2017-09-19 | Stop reason: ALTCHOICE

## 2017-08-27 RX ORDER — ASPIRIN 325 MG
325 TABLET ORAL DAILY
Refills: 0 | Status: ON HOLD | COMMUNITY
Start: 2017-08-27 | End: 2019-02-16 | Stop reason: HOSPADM

## 2017-08-27 RX ORDER — OXYCODONE HYDROCHLORIDE 5 MG/1
5 TABLET ORAL
Qty: 40 TABLET | Refills: 0 | Status: SHIPPED | OUTPATIENT
Start: 2017-08-27 | End: 2017-09-19 | Stop reason: ALTCHOICE

## 2017-08-27 RX ORDER — POLYETHYLENE GLYCOL 3350 17 G/17G
17 POWDER, FOR SOLUTION ORAL DAILY
Qty: 1 EACH | Refills: 0 | COMMUNITY
Start: 2017-08-27 | End: 2017-09-19 | Stop reason: ALTCHOICE

## 2017-08-27 RX ADMIN — BRIMONIDINE TARTRATE 1 DROP: 2 SOLUTION OPHTHALMIC at 10:08

## 2017-08-27 RX ADMIN — TIMOLOL MALEATE 1 DROP: 5 SOLUTION OPHTHALMIC at 10:08

## 2017-08-27 RX ADMIN — FERROUS SULFATE TAB EC 325 MG (65 MG FE EQUIVALENT) 325 MG: 325 (65 FE) TABLET DELAYED RESPONSE at 10:08

## 2017-08-27 RX ADMIN — IPRATROPIUM BROMIDE AND ALBUTEROL SULFATE 3 ML: .5; 3 SOLUTION RESPIRATORY (INHALATION) at 11:08

## 2017-08-27 RX ADMIN — ASPIRIN 325 MG ORAL TABLET 325 MG: 325 PILL ORAL at 10:08

## 2017-08-27 RX ADMIN — FUROSEMIDE 40 MG: 10 INJECTION, SOLUTION INTRAVENOUS at 04:08

## 2017-08-27 RX ADMIN — AMIODARONE HYDROCHLORIDE 400 MG: 200 TABLET ORAL at 10:08

## 2017-08-27 RX ADMIN — IPRATROPIUM BROMIDE AND ALBUTEROL SULFATE 3 ML: .5; 3 SOLUTION RESPIRATORY (INHALATION) at 07:08

## 2017-08-27 RX ADMIN — METOPROLOL TARTRATE 50 MG: 50 TABLET, FILM COATED ORAL at 10:08

## 2017-08-27 RX ADMIN — POTASSIUM CHLORIDE 40 MEQ: 1500 TABLET, EXTENDED RELEASE ORAL at 04:08

## 2017-08-27 RX ADMIN — LISINOPRIL 10 MG: 10 TABLET ORAL at 10:08

## 2017-08-27 RX ADMIN — LEVOTHYROXINE SODIUM 75 MCG: 75 TABLET ORAL at 10:08

## 2017-08-27 RX ADMIN — FAMOTIDINE 20 MG: 20 TABLET, FILM COATED ORAL at 10:08

## 2017-08-27 RX ADMIN — IPRATROPIUM BROMIDE AND ALBUTEROL SULFATE 3 ML: .5; 3 SOLUTION RESPIRATORY (INHALATION) at 03:08

## 2017-08-27 RX ADMIN — VALACYCLOVIR HYDROCHLORIDE 1000 MG: 500 TABLET, FILM COATED ORAL at 10:08

## 2017-08-27 RX ADMIN — CALCIUM CARBONATE-CHOLECALCIFEROL TAB 250 MG-125 UNIT 1 TABLET: 250-125 TAB at 10:08

## 2017-08-27 NOTE — PLAN OF CARE
Problem: Patient Care Overview  Goal: Individualization & Mutuality  Dx:CAD  Hx:HTN, HLD, Breast CA, Lymphoma w/ radiation    8/21: Patient admitted to SICU s/p CABG x2 and AVR, extubated, 3 L LR   8/22: OOB to chair,  1L albumin, epi weaned off    Nursing  MAP 60-80  Accu Checks AC/HS   Plan of care discussed with patient.  Patient ambulating with assistance, fall precautions in place.Continuing to encourage sternal precautions, IS, and ambulation. VS stable. AAOx4. Patient has no complaints of pain. Discussed medications and care. Patient has no questions at this time. Will continue to monitor.

## 2017-08-27 NOTE — PROGRESS NOTES
Patient is ready for discharge. Patient stable alert and oriented. IVs removed. No complaints of pain or signs of distress. VS stable. AAOx4. Discussed discharge plan. Reviewed medications and side effects, appointments, and answered questions with patient and family. Amio, oxycodone, lopressor, lasix, and potassiuim RX given to patient. Left per wheelchair with transport and .

## 2017-08-28 NOTE — PT/OT/SLP DISCHARGE
Physical Therapy Discharge Summary    Dulce Root  MRN: [id number]   S/P AVR (aortic valve replacement)   Patient Discharged from acute Physical Therapy on 17.  Please refer to prior PT noted date on 17 for functional status.     Assessment:   Patient appropriate for care in another setting.  GOALS:    Physical Therapy Goals     Not on file          Multidisciplinary Problems (Resolved)        Problem: Physical Therapy Goal    Goal Priority Disciplines Outcome Goal Variances Interventions   Physical Therapy Goal   (Resolved)     PT/OT, PT Outcome(s) achieved     Description:  Goals to be met by: 17     Patient will increase functional independence with mobility by performin. Supine to sit with Stand-by Assistance - not met  2. Sit to stand transfer with Supervision - not met  3. Gait  x 220 feet with Supervision - not met                    Reasons for Discontinuation of Therapy Services  Transfer to alternate level of care.      Plan:  Patient Discharged to: Home no PT services needed.    Maricruz Collier, PT  17

## 2017-08-29 NOTE — DISCHARGE SUMMARY
[facility]  DISCHARGE SUMMARY  Cardiothoracic Surgery      Admit Date:  8/21/2017    Discharge Date and Time:  8/27/2017    Attending Physician:  Raul Flores MD    Discharge Provider:  Ras Martinez MD     Reason for Admission:  S/P AVR (aortic valve replacement)     Procedures Performed:  Procedure(s) (LRB):  REPLACEMENT-VALVE-AORTIC (N/A)  AORTOCORONARY BYPASS-CABG (N/A)  HARVEST-VEIN-ENDOVASCULAR (Left)    Hospital Course:  Please see the preoperative H&P and other available documentation for full details related to history prior to this admission.  Briefly, Dulce Root is a 62 y.o. female who was admitted following scheduled elective surgery for S/P AVR (aortic valve replacement)    Following a complete preoperative discussion of the risks and benefits of surgery with signed informed consent, the patient was taken to the operating room on 8/21/2017 and underwent the above stated procedures.  The patient tolerated surgery well and there were no complications.  Please see the operative report for full intraoperative findings and details.  Postoperatively, the patient did well and was brought from the OR to the SICU intubated and hemodynamically stable.  She was extubated on the night of POD 0.  She was stepped down to the floor on POD 2.  Post operative course was complicated by A fib and post op anemia requiring transfusion.  Chest tubes were removed when drainage was appropriately low.  Diet was advanced as tolerated and the patient's pain was controlled on oral pain medications without problem.  Currently, the patient is doing well  and is stable and appropriate for discharge home at this time.      Consults:  Endocrine     Significant Diagnostic Studies:     Recent Labs  Lab 08/27/17  0437   WBC 12.41   HGB 13.0   HCT 38.0        Recent Labs  Lab 08/27/17  0437      K 4.1   CL 99   CO2 28   BUN 26*   CREATININE 0.7   GLU 93   CALCIUM 9.3   MG 1.9   PHOS 3.2   No results  for input(s): INR, PTT, LABHEPA, LACTATE, TROPONINI, CPK, CPKMB, MB, BNP in the last 72 hours.No results for input(s): PH, PCO2, PO2, HCO3 in the last 72 hours.      Final Diagnoses:   Principal Problem:  S/P AVR (aortic valve replacement)   Secondary Diagnoses:    Active Hospital Problems    Diagnosis  POA    *S/P AVR (aortic valve replacement) [Z95.2]  Not Applicable    Hypophosphatemia [E83.39]  No    Acute blood loss anemia [D62]  No    Atrial fibrillation with controlled ventricular response [I48.91]  No    S/P CABG x 2 [Z95.1]  Not Applicable     SVG LAD due to absent LIMA after chest radiation and lymph node biopsy  SVG RCA      Coronary artery disease of native artery of native heart with stable angina pectoris [I25.118]  Yes    Dyslipidemia [E78.5]  Yes    Hypothyroid [E03.9]  Yes    Hyperlipidemia [E78.5]  Yes    Essential hypertension [I10]  Yes    Hx of Hodgkin's disease - s/p chemo and radiation [Z85.71]  Not Applicable      Resolved Hospital Problems    Diagnosis Date Resolved POA    Aortic stenosis [I35.0] 08/24/2017 Yes    Acute hyperglycemia [R73.9] 08/24/2017 No    Nonrheumatic aortic valve stenosis - Severe, JORI = 0.76 cm2, peak velocity = 3.85 m/s, mean gradient = 35.0 mmHg. [I35.0] 08/24/2017 Yes       Discharged Condition:  Good    Disposition:  Home or Self Care    Follow Up/Patient Instructions:     Medications:  Reconciled Home Medications:  Discharge Medication List as of 8/27/2017 12:37 PM      START taking these medications    Details   amiodarone (PACERONE) 400 MG tablet Take 1 tablet (400 mg total) by mouth 2 (two) times daily., Starting Sun 8/27/2017, Until Mon 8/27/2018, Print      aspirin 325 MG tablet Take 1 tablet (325 mg total) by mouth once daily., Starting Sun 8/27/2017, Until Mon 8/27/2018, OTC      furosemide (LASIX) 20 MG tablet Take 1 tablet (20 mg total) by mouth 2 (two) times daily., Starting Sun 8/27/2017, Until Mon 8/27/2018, Print      metoprolol tartrate  (LOPRESSOR) 50 MG tablet Take 1 tablet (50 mg total) by mouth 2 (two) times daily., Starting Sun 8/27/2017, Until Mon 8/27/2018, Print      oxycodone (ROXICODONE) 5 MG immediate release tablet Take 1 tablet (5 mg total) by mouth every 3 (three) hours as needed., Starting Sun 8/27/2017, Print      polyethylene glycol (GLYCOLAX) 17 gram PwPk Take 17 g by mouth once daily., Starting Sun 8/27/2017, OTC      potassium chloride SA (K-DUR,KLOR-CON) 10 MEQ tablet Take 1 tablet (10 mEq total) by mouth 3 (three) times daily., Starting Sun 8/27/2017, Print         CONTINUE these medications which have NOT CHANGED    Details   alprazolam (XANAX) 0.5 MG tablet Take 0.5 mg by mouth 3 (three) times daily., Historical Med      brimonidine-timolol (COMBIGAN) 0.2-0.5 % Drop Place 1 drop into both eyes once daily. , Until Discontinued, Historical Med      calcium carbonate (OS-CALVIN) 600 mg (1,500 mg) Tab Take 600 mg by mouth 2 (two) times daily with meals., Until Discontinued, Historical Med      clobetasol (TEMOVATE) 0.05 % Gel Apply 1 application topically 2 (two) times daily. Apply to affected area, Starting 4/27/2017, Until Discontinued, Historical Med      folic acid (FOLVITE) 1 MG tablet Take 1 mg by mouth once daily. , Starting 4/23/2015, Until Discontinued, Historical Med      levothyroxine (SYNTHROID) 75 MCG tablet TAKE 1 TABLET EVERY DAY, Normal      lisinopril (PRINIVIL,ZESTRIL) 20 MG tablet Take 1 tablet (20 mg total) by mouth once daily., Starting Fri 7/21/2017, Until Sat 7/21/2018, Normal      methotrexate 2.5 MG Tab Take 2.5 mg by mouth every 7 days. 10 tablets every 7 days on Fridiay, Starting 5/1/2015, Until Discontinued, Historical Med      RESTASIS 0.05 % ophthalmic emulsion Place 1 drop into both eyes 2 (two) times daily., Starting 3/9/2017, Until Discontinued, Historical Med      rosuvastatin (CRESTOR) 20 MG tablet Take 1 tablet (20 mg total) by mouth nightly., Starting 3/10/2017, Until Discontinued, Normal       valacyclovir (VALTREX) 1000 MG tablet TAKE 1 TABLET EVERY 24 HOURS, Historical Med      albuterol 90 mcg/actuation inhaler Inhale 2 puffs into the lungs every 6 (six) hours as needed for Wheezing., Starting 7/13/2015, Until Discontinued, Normal         STOP taking these medications       aspirin (ECOTRIN) 81 MG EC tablet Comments:   Reason for Stopping:         cloNIDine (CATAPRES) 0.1 MG tablet Comments:   Reason for Stopping:         metoprolol succinate (TOPROL-XL) 50 MG 24 hr tablet Comments:   Reason for Stopping:               Discharge Procedure Orders  Diet general     Other restrictions (specify):   Order Comments: Sternal precautions.  No driving until clinic visit     Call MD for:  temperature >100.4     Call MD for:  severe uncontrolled pain     Call MD for:  redness, tenderness, or signs of infection (pain, swelling, redness, odor or green/yellow discharge around incision site)     No dressing needed       Follow-up Information     Raul Flores MD In 2 weeks.    Specialties:  Cardiothoracic Surgery, Transplant  Contact information:  [address] [place] [address] [id number] [phone]                   Ras Martinez MD

## 2017-08-29 NOTE — PHYSICIAN QUERY
PT Name: Dulce Root  MR #: [id number]    Physician Query Form - Atrial Fibrillation Specificity     CDS/: Yamil Hair Jr, RN               Contact information:[email or web address]     This form is a permanent document in the medical record.     Query Date: August 29, 2017    By submitting this query, we are merely seeking further clarification of documentation. Please utilize your independent clinical judgment when addressing the question(s) below.    The medical record contains the following:   Indicators     Supporting Clinical Findings Location in Medical Record   x Atrial Fibrillation Pt's HR noted to be fluctuating between 's and PT appears to be in AFib. MD Hanna notified and STAT EKG ordered    Atrial fibrillation with controlled ventricular response 8/24 Nursing Note      8/24 Cardiothoracic Surgery Progress Note   x EKG results Atrial fibrillation    Sinus rhythm with 1st degree A-V block 8/24 EKG Report    8/24 EKG Report    Medication     x Treatment EKG showed A-fib with a LBB. MD Hanna notified. Orders given for Metoprolol 5mg q5min x3. Orders carried out.      0433- Pt's rhythm appears to be sinus rhythm. MD notified and STAT EKG confirmed sinus rhythm with a first degree AV block.  8/24 Nursing Note        8/24 Nursing Note    Other         Provider, please further specify the Atrial Fibrillation diagnosis.    [ X ] Paroxysmal  [  ] Other (please specify): ____________________________  [  ] Clinically Undetermined    Please document in your progress notes daily for the duration of treatment until resolved, and include in your discharge summary.

## 2017-08-30 DIAGNOSIS — Z95.1 S/P CABG (CORONARY ARTERY BYPASS GRAFT): ICD-10-CM

## 2017-08-30 DIAGNOSIS — Z95.2 S/P AVR: Primary | ICD-10-CM

## 2017-09-02 ENCOUNTER — NURSE TRIAGE (OUTPATIENT)
Dept: ADMINISTRATIVE | Facility: CLINIC | Age: 62
End: 2017-09-02

## 2017-09-02 NOTE — TELEPHONE ENCOUNTER
Reason for Disposition   Blood in urine, but all triage questions negative  (Exception: could be normal menstrual bleeding)    Protocols used: ST URINE - BLOOD IN-A-    Pt calling with concerns of blood noted in urine, especially while wiping.  Pt states it has been going on for 2 days and becoming worse.  Care advice given.  Pt request to speak with On Call MD regarding medical issue; Pt states she just had open heart surgery 2 weeks ago.  On Call Surg Pager; Spoke to On Call MD stated for pt to get further evaluated by MD.  Called and notified pt for further eval with MD at nearby .

## 2017-09-04 ENCOUNTER — NURSE TRIAGE (OUTPATIENT)
Dept: ADMINISTRATIVE | Facility: CLINIC | Age: 62
End: 2017-09-04

## 2017-09-04 NOTE — TELEPHONE ENCOUNTER
Reason for Disposition   Caller has URGENT medication question about med that PCP prescribed and triager unable to answer question    Protocols used: ST MEDICATION QUESTION CALL-A-AH  pt states her BP has been running low x several days/ today was 73/53, 94/53 and now 109/62. Pt due to take PM cardiac meds but concerned about  Her BP dropping    Referred to on call for Dr Mark White RN

## 2017-09-19 ENCOUNTER — HOSPITAL ENCOUNTER (OUTPATIENT)
Dept: CARDIOLOGY | Facility: CLINIC | Age: 62
Discharge: HOME | End: 2017-09-19
Payer: COMMERCIAL

## 2017-09-19 ENCOUNTER — OFFICE VISIT (OUTPATIENT)
Dept: CARDIOTHORACIC SURGERY | Facility: CLINIC | Age: 62
End: 2017-09-19
Payer: COMMERCIAL

## 2017-09-19 ENCOUNTER — HOSPITAL ENCOUNTER (OUTPATIENT)
Dept: RADIOLOGY | Facility: HOSPITAL | Age: 62
Discharge: HOME | End: 2017-09-19
Attending: THORACIC SURGERY (CARDIOTHORACIC VASCULAR SURGERY)
Payer: COMMERCIAL

## 2017-09-19 VITALS
OXYGEN SATURATION: 98 % | HEIGHT: 63 IN | DIASTOLIC BLOOD PRESSURE: 65 MMHG | HEART RATE: 66 BPM | SYSTOLIC BLOOD PRESSURE: 120 MMHG | WEIGHT: 163.63 LBS | TEMPERATURE: 98 F | BODY MASS INDEX: 28.99 KG/M2

## 2017-09-19 DIAGNOSIS — Z95.1 S/P CABG (CORONARY ARTERY BYPASS GRAFT): ICD-10-CM

## 2017-09-19 DIAGNOSIS — Z95.2 S/P AVR: ICD-10-CM

## 2017-09-19 DIAGNOSIS — Z95.1 S/P CABG X 2: Primary | ICD-10-CM

## 2017-09-19 DIAGNOSIS — Z95.2 S/P AVR (AORTIC VALVE REPLACEMENT): ICD-10-CM

## 2017-09-19 PROCEDURE — 93000 ELECTROCARDIOGRAM COMPLETE: CPT | Mod: S$GLB,,, | Performed by: INTERNAL MEDICINE

## 2017-09-19 PROCEDURE — 99024 POSTOP FOLLOW-UP VISIT: CPT | Mod: S$GLB,,, | Performed by: THORACIC SURGERY (CARDIOTHORACIC VASCULAR SURGERY)

## 2017-09-19 PROCEDURE — 99999 PR PBB SHADOW E&M-EST. PATIENT-LVL III: CPT | Mod: PBBFAC,,, | Performed by: THORACIC SURGERY (CARDIOTHORACIC VASCULAR SURGERY)

## 2017-09-19 PROCEDURE — 71020 XR CHEST PA AND LATERAL: CPT | Mod: TC

## 2017-09-19 PROCEDURE — 71020 XR CHEST PA AND LATERAL: CPT | Mod: 26,,, | Performed by: RADIOLOGY

## 2017-09-19 NOTE — PROGRESS NOTES
Patient seen and examined. Patient is progressively increasing activity. No significant complaints.     Sternum: stable, incision CDI  Chest xray: Acceptable post op chest  EKG: NSR     Assessment:  1)  REPLACEMENT-VALVE-AORTIC with a Prasad Medium Pericardial valve    2)  AORTOCORONARY BYPASS-CABG X 2 with SVG-LAD and SVG-distal RCA       Plan:  Can begin driving as long as he has power steering  Can begin cardiac rehab in the next couple of weeks  We will refer to cardiology to assume care- Dr. Mary MEDEL lasix  DC potassium  DC amiodarone       No scheduled appointment, RTC prn    CTS Attending Note:    I have personally taken the history and examined this patient and agree with the NP's note as stated above.

## 2017-09-19 NOTE — LETTER
September 19, 2017        Gabe Collazo MD  [address] [facility] [address]  [facility] [address]             [facility] - Cardiovascular Surg  [address]  [facility] [address]  Phone: [phone]   Patient: Dulce Root   MR Number: [id number]   YOB: 1955   Date of Visit: 9/19/2017       Dear Dr. Collazo:    Thank you for referring Dulce Root to me for evaluation. Below are the relevant portions of my assessment and plan of care.            If you have questions, please do not hesitate to call me. I look forward to following Dulce along with you.    Sincerely,      MD JOSEPH Ferguson MD George F. Isa, MD

## 2017-09-26 ENCOUNTER — HOSPITAL ENCOUNTER (OUTPATIENT)
Dept: RADIOLOGY | Facility: HOSPITAL | Age: 62
Discharge: HOME | End: 2017-09-26
Attending: INTERNAL MEDICINE
Payer: COMMERCIAL

## 2017-09-26 ENCOUNTER — OFFICE VISIT (OUTPATIENT)
Dept: CARDIOLOGY | Facility: CLINIC | Age: 62
End: 2017-09-26
Payer: COMMERCIAL

## 2017-09-26 ENCOUNTER — TELEPHONE (OUTPATIENT)
Dept: CARDIOLOGY | Facility: CLINIC | Age: 62
End: 2017-09-26

## 2017-09-26 ENCOUNTER — PATIENT MESSAGE (OUTPATIENT)
Dept: CARDIOLOGY | Facility: CLINIC | Age: 62
End: 2017-09-26

## 2017-09-26 ENCOUNTER — NURSE TRIAGE (OUTPATIENT)
Dept: ADMINISTRATIVE | Facility: CLINIC | Age: 62
End: 2017-09-26

## 2017-09-26 VITALS
HEART RATE: 74 BPM | WEIGHT: 166.25 LBS | DIASTOLIC BLOOD PRESSURE: 74 MMHG | SYSTOLIC BLOOD PRESSURE: 132 MMHG | BODY MASS INDEX: 29.46 KG/M2 | HEIGHT: 63 IN

## 2017-09-26 DIAGNOSIS — Z95.1 S/P CABG X 2: ICD-10-CM

## 2017-09-26 DIAGNOSIS — I25.118 CORONARY ARTERY DISEASE OF NATIVE ARTERY OF NATIVE HEART WITH STABLE ANGINA PECTORIS: ICD-10-CM

## 2017-09-26 DIAGNOSIS — R05.9 COUGH: ICD-10-CM

## 2017-09-26 DIAGNOSIS — I10 ESSENTIAL HYPERTENSION: ICD-10-CM

## 2017-09-26 DIAGNOSIS — Z95.2 S/P AVR (AORTIC VALVE REPLACEMENT): ICD-10-CM

## 2017-09-26 PROCEDURE — 71020 XR CHEST PA AND LATERAL: CPT | Mod: TC,PO

## 2017-09-26 PROCEDURE — 99214 OFFICE O/P EST MOD 30 MIN: CPT | Mod: S$GLB,,, | Performed by: INTERNAL MEDICINE

## 2017-09-26 PROCEDURE — 3075F SYST BP GE 130 - 139MM HG: CPT | Mod: S$GLB,,, | Performed by: INTERNAL MEDICINE

## 2017-09-26 PROCEDURE — 3008F BODY MASS INDEX DOCD: CPT | Mod: S$GLB,,, | Performed by: INTERNAL MEDICINE

## 2017-09-26 PROCEDURE — 71020 XR CHEST PA AND LATERAL: CPT | Mod: 26,,, | Performed by: RADIOLOGY

## 2017-09-26 PROCEDURE — 3078F DIAST BP <80 MM HG: CPT | Mod: S$GLB,,, | Performed by: INTERNAL MEDICINE

## 2017-09-26 PROCEDURE — 99999 PR PBB SHADOW E&M-EST. PATIENT-LVL III: CPT | Mod: PBBFAC,,, | Performed by: INTERNAL MEDICINE

## 2017-09-26 RX ORDER — LEVOFLOXACIN 500 MG/1
500 TABLET, FILM COATED ORAL DAILY
Qty: 10 TABLET | Refills: 0 | Status: SHIPPED | OUTPATIENT
Start: 2017-09-26 | End: 2017-10-13

## 2017-09-26 NOTE — PROGRESS NOTES
Subjective:    Patient ID:  Dulce Root is a 62 y.o. female who presents for follow-up of Coronary Artery Disease (edema L foot- cough) and aortic valve disorder (follow up)      Pt here for f/u after AVR and 2V CABG 5 weeks ago. She has done well but over the past few weeks has developed a cough productive of thick, brownish, gray sputum. She has not had fevers or significant SOB.         Review of Systems   Constitution: Negative for weight gain and weight loss.   HENT: Negative.    Eyes: Negative.    Cardiovascular: Negative for chest pain, claudication, cyanosis, dyspnea on exertion, irregular heartbeat, leg swelling, near-syncope, orthopnea (no PND), palpitations and syncope.   Respiratory: Positive for cough and sputum production. Negative for hemoptysis, shortness of breath and snoring.    Endocrine: Negative.    Skin: Negative.    Musculoskeletal: Negative for joint pain, muscle cramps, muscle weakness and myalgias.   Gastrointestinal: Negative for diarrhea, hematemesis, nausea and vomiting.   Genitourinary: Negative.    Neurological: Negative for dizziness, focal weakness, light-headedness, loss of balance, numbness, paresthesias and seizures.   Psychiatric/Behavioral: Negative.         Objective:    Physical Exam   Constitutional: She is oriented to person, place, and time. She appears well-developed and well-nourished.   Eyes: Pupils are equal, round, and reactive to light.   Neck: Normal range of motion. No thyromegaly present.   Cardiovascular: Normal rate, regular rhythm, S1 normal, S2 normal, intact distal pulses and normal pulses.   No extrasystoles are present. PMI is not displaced.  Exam reveals no friction rub.    Murmur heard.   Medium-pitched systolic murmur is present with a grade of 1/6  at the upper right sternal border  Pulmonary/Chest: Effort normal and breath sounds normal. She has no wheezes. She has no rales. She exhibits no tenderness.   Abdominal: Soft. Bowel sounds are normal. She  exhibits no distension and no mass. There is no tenderness.   Musculoskeletal: Normal range of motion. She exhibits no edema.   Neurological: She is alert and oriented to person, place, and time.   Skin: Skin is warm and dry.   Vitals reviewed.      Test(s) Reviewed  I have reviewed the following in detail:  [] Stress test   [] Angiography   [] Echocardiogram   [] Labs   [x] Other:  Hospital record; Op-report       Assessment:       1. Coronary artery disease of native artery of native heart with stable angina pectoris    2. Essential hypertension    3. S/P AVR (aortic valve replacement)    4. S/P CABG x 2    5. Cough         Plan:       Doing well but for what sounds like a bronchitis  CXR  levaquin 500 mg daily x 10 days  Continue other meds  Cardiac rehab  F/u 1 month

## 2017-09-26 NOTE — TELEPHONE ENCOUNTER
Reason for Disposition   Difficulty breathing or wheezing now     Patient states she is panting, and her heart is beating fast. She has taken Benadryl 50mg per advice of pharmacist.    Protocols used: ST HIVES-A-OH

## 2017-09-26 NOTE — TELEPHONE ENCOUNTER
Test(s) Reviewed  I have reviewed the following in detail:  [] Stress test   [] Angiography   [] Echocardiogram   [] Labs   [x] Other:  CXR     Call Pt and tell her tests are ok  No pneumonia

## 2017-09-27 ENCOUNTER — TELEPHONE (OUTPATIENT)
Dept: CARDIOLOGY | Facility: CLINIC | Age: 62
End: 2017-09-27

## 2017-09-27 RX ORDER — AZITHROMYCIN 250 MG/1
500 TABLET, FILM COATED ORAL DAILY
COMMUNITY
End: 2017-09-27

## 2017-09-27 RX ORDER — AZITHROMYCIN 250 MG/1
TABLET, FILM COATED ORAL
Qty: 6 TABLET | Refills: 0 | Status: SHIPPED | OUTPATIENT
Start: 2017-09-27 | End: 2017-10-02

## 2017-09-27 NOTE — TELEPHONE ENCOUNTER
----- Message from Mey Pandey sent at 9/27/2017 11:54 AM CDT -----  Contact: Pt  Pt is requesting to speak to the nurse. Pt states that she had an allergic reaction to the antibiotics that were prescribed for her yesterday. Pt states that she had to go to the ER. Pls call pt back at [phone].

## 2017-09-27 NOTE — TELEPHONE ENCOUNTER
Spoke with patient she was not given an alternative antibiotic in ER. She took benedryl. Got some iv fluids. Feeling better. Please advise if new med needed.

## 2017-10-13 ENCOUNTER — OFFICE VISIT (OUTPATIENT)
Dept: CARDIOLOGY | Facility: CLINIC | Age: 62
End: 2017-10-13
Payer: COMMERCIAL

## 2017-10-13 ENCOUNTER — TELEPHONE (OUTPATIENT)
Dept: CARDIOLOGY | Facility: CLINIC | Age: 62
End: 2017-10-13

## 2017-10-13 VITALS
DIASTOLIC BLOOD PRESSURE: 73 MMHG | HEIGHT: 63 IN | HEART RATE: 64 BPM | BODY MASS INDEX: 27.97 KG/M2 | WEIGHT: 157.88 LBS | SYSTOLIC BLOOD PRESSURE: 123 MMHG

## 2017-10-13 DIAGNOSIS — I25.10 CORONARY ARTERY DISEASE INVOLVING NATIVE CORONARY ARTERY OF NATIVE HEART WITHOUT ANGINA PECTORIS: ICD-10-CM

## 2017-10-13 DIAGNOSIS — Z95.2 S/P AVR (AORTIC VALVE REPLACEMENT): ICD-10-CM

## 2017-10-13 DIAGNOSIS — I10 ESSENTIAL HYPERTENSION: ICD-10-CM

## 2017-10-13 DIAGNOSIS — R07.9 CHEST PAIN, UNSPECIFIED TYPE: ICD-10-CM

## 2017-10-13 DIAGNOSIS — Z95.1 S/P CABG X 2: ICD-10-CM

## 2017-10-13 DIAGNOSIS — I65.23 CAROTID STENOSIS, BILATERAL: ICD-10-CM

## 2017-10-13 DIAGNOSIS — H53.9 MONOCULAR VISUAL DISTURBANCE: ICD-10-CM

## 2017-10-13 PROBLEM — I48.0 PAROXYSMAL ATRIAL FIBRILLATION: Status: ACTIVE | Noted: 2017-08-24

## 2017-10-13 PROCEDURE — 93000 ELECTROCARDIOGRAM COMPLETE: CPT | Mod: S$GLB,,, | Performed by: INTERNAL MEDICINE

## 2017-10-13 PROCEDURE — 99214 OFFICE O/P EST MOD 30 MIN: CPT | Mod: S$GLB,,, | Performed by: INTERNAL MEDICINE

## 2017-10-13 PROCEDURE — 99999 PR PBB SHADOW E&M-EST. PATIENT-LVL III: CPT | Mod: PBBFAC,,, | Performed by: INTERNAL MEDICINE

## 2017-10-13 NOTE — TELEPHONE ENCOUNTER
----- Message from Mally Walker sent at 10/13/2017  9:56 AM CDT -----  Contact: self  Patient [phone] is calling/recently had bypass and valve replacement surgery on 08 21 17/last night she lost her vision (on and off)/saw her Ophthalmologist last night 10 12 17 and nothing is wrong with her vision/she was advised to speak with her cardiologist/please call

## 2017-10-13 NOTE — PROGRESS NOTES
"Subjective:    Patient ID:  Dulce Root is a 62 y.o. female who presents for follow-up of Coronary Artery Disease (episode of black spots) and aortic valve disease      Pt Last night Pt had a large dark spot in her R central visual field. It would come and go several times. She seems to think it may have been associated with standing up but not sure. 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 They were not sure if that was the cause and recommended she see the cardiologist. She has not had a recurrence today. She reports her BP has been somewhat erratic. Denies ant heart rhythm issues. She does have a pre-op carotid study describing 60-79% bilateral ICA stenosis.        Review of Systems   Constitution: Negative for weight gain and weight loss.   HENT: Negative.    Eyes: Positive for visual disturbance.   Cardiovascular: Negative for chest pain, claudication, cyanosis, dyspnea on exertion, irregular heartbeat, leg swelling, near-syncope, orthopnea (no PND), palpitations and syncope.   Respiratory: Negative for cough, hemoptysis, shortness of breath and snoring.    Endocrine: Negative.    Skin: Negative.    Musculoskeletal: Negative for joint pain, muscle cramps, muscle weakness and myalgias.   Gastrointestinal: Negative for diarrhea, hematemesis, nausea and vomiting.   Genitourinary: Negative.    Neurological: Negative for dizziness, focal weakness, light-headedness, loss of balance, numbness, paresthesias and seizures.   Psychiatric/Behavioral: Negative.         Objective:    Physical Exam   Constitutional: She is oriented to person, place, and time. She appears well-developed and well-nourished.   Eyes: Pupils are equal, round, and reactive to light.   Neck: Normal range of motion. No thyromegaly present.   Cardiovascular: Normal rate, regular rhythm, S1 normal, S2 normal, intact distal pulses and normal " pulses.   No extrasystoles are present. PMI is not displaced.  Exam reveals no friction rub.    Murmur heard.   Medium-pitched systolic murmur is present with a grade of 1/6  at the upper right sternal border  Pulmonary/Chest: Effort normal and breath sounds normal. She has no wheezes. She has no rales. She exhibits no tenderness.   Abdominal: Soft. Bowel sounds are normal. She exhibits no distension and no mass. There is no tenderness.   Musculoskeletal: Normal range of motion. She exhibits no edema.   Neurological: She is alert and oriented to person, place, and time.   Skin: Skin is warm and dry.   Vitals reviewed.      Test(s) Reviewed  I have reviewed the following in detail:  [] Stress test   [] Angiography   [] Echocardiogram   [] Labs   [x] Other:  Carotid US; ECG       Assessment:       1. Coronary artery disease involving native coronary artery of native heart without angina pectoris    2. Essential hypertension    3. S/P AVR (aortic valve replacement) - pericardial valve    4. S/P CABG x 2    5. Carotid stenosis, bilateral    6. Monocular visual disturbance         Plan:       Holter  CTA head, neck  Echo

## 2017-10-16 ENCOUNTER — PATIENT MESSAGE (OUTPATIENT)
Dept: CARDIOLOGY | Facility: CLINIC | Age: 62
End: 2017-10-16

## 2017-10-16 ENCOUNTER — TELEPHONE (OUTPATIENT)
Dept: CARDIOLOGY | Facility: CLINIC | Age: 62
End: 2017-10-16

## 2017-10-16 DIAGNOSIS — R07.9 CHEST PAIN, UNSPECIFIED TYPE: Primary | ICD-10-CM

## 2017-10-16 DIAGNOSIS — I25.10 CORONARY ARTERY DISEASE, ANGINA PRESENCE UNSPECIFIED, UNSPECIFIED VESSEL OR LESION TYPE, UNSPECIFIED WHETHER NATIVE OR TRANSPLANTED HEART: Primary | ICD-10-CM

## 2017-10-16 NOTE — TELEPHONE ENCOUNTER
Spoke with ct scan- patient needs iodine allergy prep called in per protocol- 50mg prednisone 13 hours before - 7 hours before and 1 hour before with benadryl 50mg 1 hour before.  [facility]- patient aware and understands.  Ct scanned scheduled tomorrow at 3:15pm. Aware to fast 4 hours prior.

## 2017-10-17 ENCOUNTER — TELEPHONE (OUTPATIENT)
Dept: CARDIOLOGY | Facility: CLINIC | Age: 62
End: 2017-10-17

## 2017-10-17 NOTE — TELEPHONE ENCOUNTER
----- Message from Carmen Vora sent at 10/17/2017 10:09 AM CDT -----  Contact: [phone]  Patient is requesting a call back from the nurse stated she need to reschedule her CT and also have some questions for the nurse.    Please call the patient upon request at phone number [phone].

## 2017-10-18 ENCOUNTER — TELEPHONE (OUTPATIENT)
Dept: CARDIOLOGY | Facility: CLINIC | Age: 62
End: 2017-10-18

## 2017-10-18 NOTE — TELEPHONE ENCOUNTER
Please advise: patient was at the clinic today to have a CT done. They were unable to inject the dye because they could not get a vein. Patient stated that she was stuck 9 times . With no success. Would you like a different test done.

## 2017-10-19 DIAGNOSIS — I65.23 CAROTID STENOSIS, BILATERAL: Primary | ICD-10-CM

## 2017-10-19 NOTE — TELEPHONE ENCOUNTER
Ct scan scheduled for tomorrow at [facility] for 8am arrive for 7:30am they will give her the iodine allergy prep prior to procedure.  Patient aware

## 2017-10-24 ENCOUNTER — CLINICAL SUPPORT (OUTPATIENT)
Dept: CARDIOLOGY | Facility: CLINIC | Age: 62
End: 2017-10-24
Payer: COMMERCIAL

## 2017-10-24 DIAGNOSIS — Z95.2 S/P AVR (AORTIC VALVE REPLACEMENT): ICD-10-CM

## 2017-10-24 DIAGNOSIS — H53.9 MONOCULAR VISUAL DISTURBANCE: ICD-10-CM

## 2017-10-24 DIAGNOSIS — I25.10 CORONARY ARTERY DISEASE INVOLVING NATIVE CORONARY ARTERY OF NATIVE HEART WITHOUT ANGINA PECTORIS: ICD-10-CM

## 2017-10-24 DIAGNOSIS — I10 ESSENTIAL HYPERTENSION: ICD-10-CM

## 2017-10-24 DIAGNOSIS — I65.23 CAROTID STENOSIS, BILATERAL: ICD-10-CM

## 2017-10-24 DIAGNOSIS — Z95.1 S/P CABG X 2: ICD-10-CM

## 2017-10-24 LAB
DIASTOLIC DYSFUNCTION: YES
ESTIMATED PA SYSTOLIC PRESSURE: 38.76
GLOBAL PERICARDIAL EFFUSION: ABNORMAL
RETIRED EF AND QEF - SEE NOTES: 55 (ref 55–65)
TRICUSPID VALVE REGURGITATION: ABNORMAL

## 2017-10-24 PROCEDURE — 93224 XTRNL ECG REC UP TO 48 HRS: CPT | Mod: S$GLB,,, | Performed by: INTERNAL MEDICINE

## 2017-10-24 PROCEDURE — 93306 TTE W/DOPPLER COMPLETE: CPT | Mod: S$GLB,,, | Performed by: INTERNAL MEDICINE

## 2017-10-25 DIAGNOSIS — I77.9 CAROTID ARTERY DISEASE, UNSPECIFIED LATERALITY: Primary | ICD-10-CM

## 2017-10-30 ENCOUNTER — PATIENT MESSAGE (OUTPATIENT)
Dept: CARDIOLOGY | Facility: CLINIC | Age: 62
End: 2017-10-30

## 2017-10-31 ENCOUNTER — TELEPHONE (OUTPATIENT)
Dept: CARDIOLOGY | Facility: CLINIC | Age: 62
End: 2017-10-31

## 2017-10-31 NOTE — TELEPHONE ENCOUNTER
May reduce the metoprolol to 25 mg (1/2 tab) 2x a day    Test(s) Reviewed  I have reviewed the following in detail:  [] Stress test   [] Angiography   [x] Echocardiogram   [] Labs   [x] Other:  Holter     Holter normal  Echo ok, unchanged from previous  Await CTA neck

## 2017-10-31 NOTE — TELEPHONE ENCOUNTER
Also, I need to have my blood pressure medicine checked.  I take Lisinipril 20 mg in the morning.  I am supposed to take the Metropolol 50 mg twice a day.  If I take the 2 together, my bp gets really low and I get dizzy and can't walk if I get up.  I started taking It later in the day and then 1 before bedtime.  Two days I skipped the second one and I have felt better.  I don't want to not take a needed prescription but I am wondering if 2 metropolol is too much.     What about the Echo and Holter Monitor tests?   Thank you,   Dulce Root

## 2017-11-01 ENCOUNTER — PATIENT MESSAGE (OUTPATIENT)
Dept: CARDIOLOGY | Facility: CLINIC | Age: 62
End: 2017-11-01

## 2017-11-02 ENCOUNTER — TELEPHONE (OUTPATIENT)
Dept: CARDIOLOGY | Facility: CLINIC | Age: 62
End: 2017-11-02

## 2017-11-02 NOTE — TELEPHONE ENCOUNTER
Telephone     10/16/2017  [facility] - Cardiology      Gabe Collazo MD   Cardiology    Conversation   (Newest Message First)   Me          10/16/17 8:06 AM   Note      - patient needs iodine allergy prep called in per protocol- 50mg prednisone 13 hours before - 7 hours before and 1 hour before with benadryl 50mg 1 hour before.  [facility] patient aware and understands.  Ct scanned scheduled tomorrow at 3:15pm. Aware to fast 4 hours prior.      Additional Documentation     Encounter Info:    Billing Info,    History,    Detailed Report,    Patient Education,    Care Plan,    Allergies       Orders Placed      None   Medication Renewals and Changes        None      Medication List   Visit Diagnoses        None      Problem List

## 2017-11-07 ENCOUNTER — TELEPHONE (OUTPATIENT)
Dept: CARDIOLOGY | Facility: CLINIC | Age: 62
End: 2017-11-07

## 2017-11-07 ENCOUNTER — TELEPHONE (OUTPATIENT)
Dept: VASCULAR SURGERY | Facility: CLINIC | Age: 62
End: 2017-11-07

## 2017-11-07 DIAGNOSIS — I77.9 CAROTID ARTERY DISEASE, UNSPECIFIED LATERALITY: Primary | ICD-10-CM

## 2017-11-07 DIAGNOSIS — I65.29 STENOSIS OF CAROTID ARTERY, UNSPECIFIED LATERALITY: Primary | ICD-10-CM

## 2017-11-07 NOTE — TELEPHONE ENCOUNTER
----- Message from Chelsey Blair sent at 11/6/2017  4:36 PM CST -----  Contact: self   Placed call to pod, patient missed a call from your office. Please call back at [phone]

## 2017-11-07 NOTE — TELEPHONE ENCOUNTER
Called Pt with results of CTA of neck.  1. Short segment approximately 80% stenosis of the proximal right ICA.  2. Short segment approximately 50% stenosis of the proximal left ICA.    She prefers to see surgeon on [facility].    Will arrange referral to vascular surgery at [facility].

## 2017-11-07 NOTE — TELEPHONE ENCOUNTER
Spoke to the pt ,appt has been scheduled for 11/16 @8;00 at pt request . This appt was mailed to the pt today

## 2017-11-07 NOTE — TELEPHONE ENCOUNTER
Patient needs to be seen at [facility] per patient request( recent Valve surgery Dr. Flores) would like one of his partners to see patient ASAP for right cartotid stenosis recent CTA neck done. Patient having symptoms please set up ASAP per Dr. Collazo. Referral placed.

## 2017-11-16 ENCOUNTER — INITIAL CONSULT (OUTPATIENT)
Dept: VASCULAR SURGERY | Facility: CLINIC | Age: 62
End: 2017-11-16
Payer: COMMERCIAL

## 2017-11-16 ENCOUNTER — HOSPITAL ENCOUNTER (OUTPATIENT)
Dept: VASCULAR SURGERY | Facility: CLINIC | Age: 62
Discharge: HOME | End: 2017-11-16
Payer: COMMERCIAL

## 2017-11-16 VITALS
HEIGHT: 63 IN | TEMPERATURE: 98 F | DIASTOLIC BLOOD PRESSURE: 53 MMHG | HEART RATE: 69 BPM | SYSTOLIC BLOOD PRESSURE: 92 MMHG | BODY MASS INDEX: 27.64 KG/M2 | WEIGHT: 156 LBS

## 2017-11-16 DIAGNOSIS — Z95.1 S/P CABG X 2: ICD-10-CM

## 2017-11-16 DIAGNOSIS — Z95.2 S/P AVR (AORTIC VALVE REPLACEMENT): ICD-10-CM

## 2017-11-16 DIAGNOSIS — I77.9 CAROTID ARTERY DISEASE, UNSPECIFIED LATERALITY: ICD-10-CM

## 2017-11-16 DIAGNOSIS — I65.23 CAROTID STENOSIS, BILATERAL: Primary | ICD-10-CM

## 2017-11-16 PROCEDURE — 99999 PR PBB SHADOW E&M-EST. PATIENT-LVL III: CPT | Mod: PBBFAC,,, | Performed by: SURGERY

## 2017-11-16 PROCEDURE — 99245 OFF/OP CONSLTJ NEW/EST HI 55: CPT | Mod: S$GLB,,, | Performed by: SURGERY

## 2017-11-16 PROCEDURE — 93880 EXTRACRANIAL BILAT STUDY: CPT | Mod: S$GLB,,, | Performed by: SURGERY

## 2017-11-16 NOTE — PROGRESS NOTES
Patient ID: Dulce Root is a 62 y.o. female.    I. HISTORY     Chief Complaint: Consult      62 F who is here for evaluation bilateral carotid stenosis with recent episode of right eye visual deficit.   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     Mid 2017, patient had an episode of central visual deficit that came/went away 3-4 times. This episode was not accompanied by any other lateralizing neurological sx such as, slurred speech, facial droop, weakness of extremities. She works for an ophthalmologist and was immediately evaluated where no obvious embolus was noted. Following day, patient was evaluated by cardiologist where CTA and carotid DUS revealed bilateral carotid stenosis 60-79%. Scans also revealed large right vitreous humor. She is here for evaluation of this carotis stenosis.     She has not had any further episodes of right eye visual deficits since the episode in October. She does have orthostatic hypotension where she is dizzy when she stands up suddenly. Her HTN meds have been decreased already by cardiologist.     She quit smoking in .  Referred from Dr Collazo, Cardiology  PCP - KYUNG Manrique    Review of Systems   Constitution: Negative.   HENT: Negative.    Eyes: Positive for blurred vision and visual disturbance.   Cardiovascular: Positive for near-syncope.   Respiratory: Negative.    Endocrine: Negative.    Hematologic/Lymphatic: Negative.    Skin: Negative.    Musculoskeletal: Negative.    Gastrointestinal: Negative.    Genitourinary: Negative.    Neurological: Positive for dizziness.   Psychiatric/Behavioral: Negative.        II. PHYSICAL EXAM   Right Arm BP - Sittin/53 (17)  Left Arm BP - Sittin/56 (17)  Pulse: 69  Temp: 97.5 °F (36.4 °C)  Body mass index is 27.63 kg/m².        Physical Exam    Constitutional: She is oriented to person, place, and time. She appears well-developed and well-nourished.   HENT:   Head: Normocephalic and atraumatic.   No significant neck skin change given her prior hx of radiation   Eyes: Conjunctivae are normal. Pupils are equal, round, and reactive to light.   Neck: Normal range of motion. Neck supple.   Cardiovascular: Normal rate and regular rhythm.    Pulses:       Radial pulses are 1+ on the right side, and 1+ on the left side.   No carotid bruit  Bilateral radial 1+ and equal   Pulmonary/Chest: Effort normal and breath sounds normal.   Neurological: She is alert and oriented to person, place, and time.   Skin: Skin is warm.   Nursing note and vitals reviewed.      III. ASSESSMENT & PLAN (MEDICAL DECISION MAKING)     Problem List Items Addressed This Visit     S/P AVR (aortic valve replacement) - pericardial valve    S/P CABG x 2    Carotid stenosis, bilateral - Primary              Imaging Results:    Carotid Duplex: 11/16/17  R L   Right  cm/s   ICA/CCA: 2.3 Left  cm/s   ICA/CCA 3.0     CTA head/neck 10/20/17 - reviewed and measured by me - 60-70% right ICA stenosis, 50-60% left ICA    Assessment/Diagnosis and Plan:    1. Bilateral carotid stenosis - right visual deficit likely not related to carotid disease but rather from vitreous humor. Right ICA stenosis likely around 60-70%. She is on ASA/statin. Con't current therapy. F/u carotid DUS in 1 yr.   2. Orthostatic hypotension - on metoprolol and lisinopril. Dose already adjusted by cardiologist. Her office BP was 90/50's. Patient likely needs further dosage adjustment. Per cardiologist.     Mark Horton MD  Vascular/Endovascular Surgery Fellow      Vascular Surgery Staff  I have seen and examined the patient and reviewed the residents note. I agree with their assessment and plan.    Dulce Root is a 62 y.o. female here for evaluation of asymptomatic carotid stenosis.   Denies any history of arm or leg  numbness or weakness, facial droop, vision changes or slurred speech.   No significant complaints today. Had a transient blurred right eye visison that ahs not recurred.    Bilateral moderate ICA stenosis is asymptomatic and there is no role for intervention.  Follow up 1 year with repeat duplex    Marva Ribeiro MD FACS [facility]  Vascular & Endovascular Surgery

## 2017-11-16 NOTE — LETTER
November 16, 2017      Gabe Collazo MD  [address] [facility] [address] [place] [address]           [facility] - Vascular Surgery  [address]  [place] [address]  Phone: [phone]  Fax: [phone]          Patient: Dulce Root   MR Number: [id number]   YOB: 1955   Date of Visit: 11/16/2017       Dear Dr. Gabe Collazo:    Thank you for referring Dulce Root to me for evaluation. Attached you will find relevant portions of my assessment and plan of care.    If you have questions, please do not hesitate to call me. I look forward to following Dulce Root along with you.    Sincerely,    Marva Ribeiro MD    Enclosure  CC:  No Recipients    If you would like to receive this communication electronically, please contact [email or web address] or [phone] to request more information on [employer] access.    For providers and/or their staff who would like to refer a patient to [facility], please contact us through our one-stop-shop provider referral line, [facility], at [phone].    If you feel you have received this communication in error or would no longer like to receive these types of communications, please e-mail [email or web address]

## 2017-11-27 ENCOUNTER — PATIENT MESSAGE (OUTPATIENT)
Dept: CARDIOLOGY | Facility: CLINIC | Age: 62
End: 2017-11-27

## 2017-12-05 ENCOUNTER — PATIENT MESSAGE (OUTPATIENT)
Dept: CARDIOLOGY | Facility: CLINIC | Age: 62
End: 2017-12-05

## 2017-12-05 NOTE — TELEPHONE ENCOUNTER
have discussed my blood pressure medication with you before, however I am still experiencing dizziness and feeling faint after 20mg of Lisinipril.  I had a rx of 10mg of Lisinipril previously and started taking those instead.  I felt much better and did not get dizzy or feel faint.  I am taking the Metropolol dose in half as you instructed.     Could you refill the Lisinipril 10mg, the 20mg's do not break evenly.     Thank you,   Dulce Root    This encounter

## 2017-12-11 RX ORDER — LISINOPRIL 10 MG/1
10 TABLET ORAL DAILY
Qty: 90 TABLET | Refills: 1 | Status: SHIPPED | OUTPATIENT
Start: 2017-12-11 | End: 2017-12-12 | Stop reason: SDUPTHER

## 2017-12-11 RX ORDER — LISINOPRIL 10 MG/1
10 TABLET ORAL DAILY
COMMUNITY
End: 2017-12-11 | Stop reason: SDUPTHER

## 2017-12-12 ENCOUNTER — PATIENT MESSAGE (OUTPATIENT)
Dept: CARDIOLOGY | Facility: CLINIC | Age: 62
End: 2017-12-12

## 2017-12-12 RX ORDER — LISINOPRIL 10 MG/1
10 TABLET ORAL DAILY
Qty: 90 TABLET | Refills: 1 | Status: SHIPPED | OUTPATIENT
Start: 2017-12-12 | End: 2018-02-27 | Stop reason: SDUPTHER

## 2018-02-23 ENCOUNTER — PATIENT MESSAGE (OUTPATIENT)
Dept: HEMATOLOGY/ONCOLOGY | Facility: CLINIC | Age: 63
End: 2018-02-23

## 2018-02-27 ENCOUNTER — OFFICE VISIT (OUTPATIENT)
Dept: CARDIOLOGY | Facility: CLINIC | Age: 63
End: 2018-02-27
Payer: COMMERCIAL

## 2018-02-27 VITALS
BODY MASS INDEX: 26.37 KG/M2 | HEIGHT: 63 IN | WEIGHT: 148.81 LBS | SYSTOLIC BLOOD PRESSURE: 142 MMHG | HEART RATE: 72 BPM | DIASTOLIC BLOOD PRESSURE: 78 MMHG

## 2018-02-27 DIAGNOSIS — I65.23 CAROTID STENOSIS, BILATERAL: ICD-10-CM

## 2018-02-27 DIAGNOSIS — I10 ESSENTIAL HYPERTENSION: Primary | ICD-10-CM

## 2018-02-27 DIAGNOSIS — I25.10 CORONARY ARTERY DISEASE INVOLVING NATIVE CORONARY ARTERY OF NATIVE HEART WITHOUT ANGINA PECTORIS: ICD-10-CM

## 2018-02-27 DIAGNOSIS — Z95.2 S/P AVR (AORTIC VALVE REPLACEMENT): ICD-10-CM

## 2018-02-27 DIAGNOSIS — Z95.1 S/P CABG X 2: ICD-10-CM

## 2018-02-27 PROCEDURE — 99999 PR PBB SHADOW E&M-EST. PATIENT-LVL III: CPT | Mod: PBBFAC,,, | Performed by: INTERNAL MEDICINE

## 2018-02-27 PROCEDURE — 99214 OFFICE O/P EST MOD 30 MIN: CPT | Mod: S$GLB,,, | Performed by: INTERNAL MEDICINE

## 2018-02-27 PROCEDURE — 3008F BODY MASS INDEX DOCD: CPT | Mod: S$GLB,,, | Performed by: INTERNAL MEDICINE

## 2018-02-27 RX ORDER — LISINOPRIL 20 MG/1
20 TABLET ORAL DAILY
Qty: 90 TABLET | Refills: 3 | Status: SHIPPED | OUTPATIENT
Start: 2018-02-27 | End: 2018-05-18 | Stop reason: SDUPTHER

## 2018-02-27 NOTE — PROGRESS NOTES
Subjective:    Patient ID:  Dulce Root is a 62 y.o. female who presents for follow-up of Hypertension; Coronary Artery Disease; Carotid Artery Disease; and Valvular Heart Disease      Pt here for f/u. She is still having some issues with her BP. She has gone back to lisinopril 20 mg daily. However, she is only using the metoprolol tartrate once daily. Also she reports she had been using Rogaine for her hair loss which may be what has contributed to drops in BP at times. Otherwise she has been doing ok and is walking up to a mile for exercise w/o problem.         Review of Systems   Constitution: Negative for weight gain and weight loss.   HENT: Negative.    Eyes: Negative.  Negative for vision loss in left eye and vision loss in right eye.   Cardiovascular: Positive for palpitations. Negative for chest pain, claudication, cyanosis, dyspnea on exertion, irregular heartbeat, leg swelling, near-syncope, orthopnea (no PND) and syncope.   Respiratory: Negative for cough, hemoptysis, shortness of breath and snoring.    Endocrine: Negative.    Skin: Negative.    Musculoskeletal: Negative for joint pain, muscle cramps, muscle weakness and myalgias.   Gastrointestinal: Negative for diarrhea, hematemesis, nausea and vomiting.   Genitourinary: Negative.    Neurological: Negative for dizziness, focal weakness, light-headedness, loss of balance, numbness, paresthesias and seizures.   Psychiatric/Behavioral: Negative.         Objective:    Physical Exam   Constitutional: She is oriented to person, place, and time. She appears well-developed and well-nourished.   Eyes: Pupils are equal, round, and reactive to light.   Neck: Normal range of motion. No thyromegaly present.   Cardiovascular: Normal rate, regular rhythm, S1 normal, S2 normal, intact distal pulses and normal pulses.   No extrasystoles are present. PMI is not displaced.  Exam reveals no friction rub.    Murmur heard.   Medium-pitched systolic murmur is present with a  grade of 2/6  at the upper right sternal border  Pulses:       Carotid pulses are on the right side with bruit, and on the left side with bruit.  Pulmonary/Chest: Effort normal and breath sounds normal. She has no wheezes. She has no rales. She exhibits no tenderness.   Abdominal: Soft. Bowel sounds are normal. She exhibits no distension and no mass. There is no tenderness.   Musculoskeletal: Normal range of motion. She exhibits no edema.   Neurological: She is alert and oriented to person, place, and time.   Skin: Skin is warm and dry.   Vitals reviewed.        Assessment:       1. Essential hypertension    2. Coronary artery disease involving native coronary artery of native heart without angina pectoris    3. S/P AVR (aortic valve replacement) - pericardial valve    4. S/P CABG x 2    5. Carotid stenosis, bilateral         Plan:       We discussed her medicines and BP  She will take the lisinopril 20 mg in the morning and start taking the metoprolol tartrate 25 mg bid  We discussed not using the Rogaine (minoxidil)   F/u 4 months

## 2018-02-27 NOTE — PT/OT/SLP DISCHARGE
Occupational Therapy Discharge Summary    Dulce Root  MRN: [id number]   Principal Problem: S/P AVR (aortic valve replacement)      Patient Discharged from acute Occupational Therapy on 8/28/17.      Assessment:      Patient was discharged unexpectedly.  Information required to complete an accurate discharge summary is unknown.  Refer to therapy initial evaluation and last progress note for initial and most recent functional status and goal achievement.  Recommendations made may be found in medical record.    Objective:     GOALS:    Occupational Therapy Goals     Not on file          Multidisciplinary Problems (Resolved)        Problem: Occupational Therapy Goal    Goal Priority Disciplines Outcome Interventions   Occupational Therapy Goal   (Resolved)     OT, PT/OT Outcome(s) achieved    Description:  Goals to be met by: 9/1/17     Patient will increase functional independence with ADLs by performing:    Feeding with [place].  UE Dressing with Supervision.  LE Dressing with Supervision.  Grooming while standing at sink with Supervision.  Toileting from toilet with Supervision for hygiene and clothing management.   Toilet transfer to toilet with Supervision.                      Reasons for Discontinuation of Therapy Services  Transfer to alternate level of care.      Plan:     Patient Discharged to: Home with Home Health Service    NEHAL Max  2/27/2018

## 2018-03-12 ENCOUNTER — TELEPHONE (OUTPATIENT)
Dept: INTERNAL MEDICINE | Facility: CLINIC | Age: 63
End: 2018-03-12

## 2018-03-12 ENCOUNTER — PATIENT MESSAGE (OUTPATIENT)
Dept: INTERNAL MEDICINE | Facility: CLINIC | Age: 63
End: 2018-03-12

## 2018-03-13 ENCOUNTER — PATIENT MESSAGE (OUTPATIENT)
Dept: CARDIOLOGY | Facility: CLINIC | Age: 63
End: 2018-03-13

## 2018-03-14 ENCOUNTER — TELEPHONE (OUTPATIENT)
Dept: INTERNAL MEDICINE | Facility: CLINIC | Age: 63
End: 2018-03-14

## 2018-03-14 ENCOUNTER — PATIENT MESSAGE (OUTPATIENT)
Dept: HEMATOLOGY/ONCOLOGY | Facility: CLINIC | Age: 63
End: 2018-03-14

## 2018-03-14 NOTE — TELEPHONE ENCOUNTER
----- Message from Zuly Yang sent at 3/14/2018  1:56 PM CDT -----  Contact: Patient [phone]  Patient needs to schedule her annual for 04/12/2018.    Please call and advise.    Thank You

## 2018-04-11 NOTE — PROGRESS NOTES
Subjective:       Patient ID: Dulce Root is a 62 y.o. female.    Chief Complaint: No chief complaint on file.    HPI Ms. Root is a very pleasant 61-year-old female with history of bilateral ductal carcinoma in situ and a remote history of Hodgkin's disease.    In August 2017 she underwent aortic valve replacement and coronary artery bypass grafting by Dr. Baxter.  She has been gradually recovering from that.  She's been exercising regularly and has lost some weight and has been able to reduce her blood pressure medications.  She has some occasional left-sided chest pain which comes and goes.  Has been getting better as well.  She's not having any shortness of breath.  Appetite has been good but she is eating less than she had before her surgery.        Breast history:  She  developed ductal      carcinoma in situ of the right breast in 09/2006, treated with lumpectomy     and radiation therapy.  In 08/2009, she was diagnosed with ductal carcinoma  in situ of the left breast and in 08/2009, she had bilateral mastectomy.      The left breast showed widespread high-grade DCIS and the right breast was    without evidence of malignancy.        She has a remote history of Hodgkin's disease, originally diagnosed in   1991, treated with radiation therapy and then treated with salvage   chemotherapy with ABVD on protocol E-5489 in 1996  Review of Systems   Constitutional: Positive for unexpected weight change. Negative for activity change, appetite change and fatigue.   Eyes: Negative for visual disturbance.   Respiratory: Negative for cough and shortness of breath.    Cardiovascular: Positive for chest pain (occasional left chest wall).   Gastrointestinal: Negative for abdominal pain, diarrhea and nausea.   Genitourinary: Negative for flank pain and frequency.   Musculoskeletal: Negative for back pain.   Skin: Negative for rash.   Neurological: Negative for headaches.   Hematological: Negative for adenopathy.    Psychiatric/Behavioral: Negative for dysphoric mood. The patient is not nervous/anxious.        Objective:      Physical Exam   Constitutional: She is oriented to person, place, and time. She appears well-developed and well-nourished.   Eyes: No scleral icterus.   Cardiovascular: Normal rate and regular rhythm.    Murmur (systolic) heard.  Pulmonary/Chest:       The bilateral breast reconstructions show no nodules or erythema   Abdominal: Soft. She exhibits no mass. There is no tenderness.   Lymphadenopathy:     She has no cervical adenopathy.     She has no axillary adenopathy.        Right: No supraclavicular adenopathy present.        Left: No supraclavicular adenopathy present.   Neurological: She is alert and oriented to person, place, and time.   Skin: No rash noted.   Psychiatric: She has a normal mood and affect. Her behavior is normal. Thought content normal.       Assessment:     CBC and metabolic profile last August were normal.  1. Ductal carcinoma in situ (DCIS) of breast, unspecified laterality    2. Hx of Hodgkin's disease - s/p chemo and radiation        Plan:         I'll see her again in one year.        Distress Screening Results: Psychosocial Distress screening score of Distress Score: 2 noted and reviewed. No intervention indicated.

## 2018-04-12 ENCOUNTER — PATIENT MESSAGE (OUTPATIENT)
Dept: ADMINISTRATIVE | Facility: OTHER | Age: 63
End: 2018-04-12

## 2018-04-12 ENCOUNTER — OFFICE VISIT (OUTPATIENT)
Dept: INTERNAL MEDICINE | Facility: CLINIC | Age: 63
End: 2018-04-12
Payer: COMMERCIAL

## 2018-04-12 ENCOUNTER — OFFICE VISIT (OUTPATIENT)
Dept: HEMATOLOGY/ONCOLOGY | Facility: CLINIC | Age: 63
End: 2018-04-12
Payer: COMMERCIAL

## 2018-04-12 VITALS
TEMPERATURE: 98 F | RESPIRATION RATE: 17 BRPM | BODY MASS INDEX: 25.77 KG/M2 | WEIGHT: 145.5 LBS | DIASTOLIC BLOOD PRESSURE: 62 MMHG | HEART RATE: 77 BPM | SYSTOLIC BLOOD PRESSURE: 131 MMHG

## 2018-04-12 VITALS
BODY MASS INDEX: 25.82 KG/M2 | HEIGHT: 63 IN | SYSTOLIC BLOOD PRESSURE: 118 MMHG | DIASTOLIC BLOOD PRESSURE: 68 MMHG | WEIGHT: 145.75 LBS

## 2018-04-12 DIAGNOSIS — I48.0 PAROXYSMAL ATRIAL FIBRILLATION: ICD-10-CM

## 2018-04-12 DIAGNOSIS — Z00.00 PREVENTATIVE HEALTH CARE: Primary | ICD-10-CM

## 2018-04-12 DIAGNOSIS — I77.9 BILATERAL CAROTID ARTERY DISEASE: ICD-10-CM

## 2018-04-12 DIAGNOSIS — Z85.71 HX OF HODGKIN'S DISEASE: ICD-10-CM

## 2018-04-12 DIAGNOSIS — I10 ESSENTIAL HYPERTENSION: ICD-10-CM

## 2018-04-12 DIAGNOSIS — D05.10 DUCTAL CARCINOMA IN SITU (DCIS) OF BREAST, UNSPECIFIED LATERALITY: Primary | ICD-10-CM

## 2018-04-12 DIAGNOSIS — I25.10 CORONARY ARTERY DISEASE INVOLVING NATIVE CORONARY ARTERY OF NATIVE HEART WITHOUT ANGINA PECTORIS: ICD-10-CM

## 2018-04-12 DIAGNOSIS — E78.00 PURE HYPERCHOLESTEROLEMIA: ICD-10-CM

## 2018-04-12 DIAGNOSIS — E03.9 ACQUIRED HYPOTHYROIDISM: ICD-10-CM

## 2018-04-12 PROCEDURE — 99396 PREV VISIT EST AGE 40-64: CPT | Mod: S$GLB,,, | Performed by: FAMILY MEDICINE

## 2018-04-12 PROCEDURE — 3078F DIAST BP <80 MM HG: CPT | Mod: CPTII,S$GLB,, | Performed by: INTERNAL MEDICINE

## 2018-04-12 PROCEDURE — 99999 PR PBB SHADOW E&M-EST. PATIENT-LVL III: CPT | Mod: PBBFAC,,, | Performed by: FAMILY MEDICINE

## 2018-04-12 PROCEDURE — 99999 PR PBB SHADOW E&M-EST. PATIENT-LVL III: CPT | Mod: PBBFAC,,, | Performed by: INTERNAL MEDICINE

## 2018-04-12 PROCEDURE — 3074F SYST BP LT 130 MM HG: CPT | Mod: CPTII,S$GLB,, | Performed by: FAMILY MEDICINE

## 2018-04-12 PROCEDURE — 3078F DIAST BP <80 MM HG: CPT | Mod: CPTII,S$GLB,, | Performed by: FAMILY MEDICINE

## 2018-04-12 PROCEDURE — 99213 OFFICE O/P EST LOW 20 MIN: CPT | Mod: S$GLB,,, | Performed by: INTERNAL MEDICINE

## 2018-04-12 PROCEDURE — 3075F SYST BP GE 130 - 139MM HG: CPT | Mod: CPTII,S$GLB,, | Performed by: INTERNAL MEDICINE

## 2018-04-12 NOTE — PROGRESS NOTES
Subjective:       Patient ID: Dulce Root is a 62 y.o. female.    Chief Complaint: Annual Exam   Dulce Root 62 y.o. female is here for office visit to review care and physical exam, states actually feeling well, little allergy issue, but that's all.  Notes MARCH improving.  Main concern today is has noted BP goes quit low and then has symptoms, dizziness, light headedness.  HPI  Review of Systems   Constitutional: Negative for activity change, fatigue, fever and unexpected weight change.   HENT: Positive for rhinorrhea. Negative for congestion, hearing loss, postnasal drip and trouble swallowing.    Eyes: Positive for visual disturbance. Negative for discharge and redness.   Respiratory: Negative for chest tightness, shortness of breath and wheezing.    Cardiovascular: Negative for chest pain, palpitations and leg swelling.   Gastrointestinal: Negative for abdominal distention, blood in stool, constipation, diarrhea and vomiting.   Endocrine: Negative for polydipsia and polyuria.   Genitourinary: Negative for decreased urine volume, difficulty urinating, dysuria, flank pain, hematuria, menstrual problem, pelvic pain and urgency.   Musculoskeletal: Negative for arthralgias, back pain, gait problem, joint swelling, neck pain and neck stiffness.   Skin: Negative for color change, rash and wound.   Neurological: Negative for dizziness, syncope, weakness and headaches.   Psychiatric/Behavioral: Negative for behavioral problems, confusion, dysphoric mood and sleep disturbance. The patient is not nervous/anxious.        Objective:      Physical Exam   Constitutional: She is oriented to person, place, and time. She appears well-developed and well-nourished. No distress.   HENT:   Head: Normocephalic.   Mouth/Throat: No oropharyngeal exudate.   Eyes: EOM are normal. Pupils are equal, round, and reactive to light. No scleral icterus.   Neck: Neck supple. No JVD present. No thyromegaly present.   Cardiovascular: Normal  rate, regular rhythm and normal heart sounds.  Exam reveals no gallop and no friction rub.    No murmur heard.  Pulmonary/Chest: Effort normal and breath sounds normal. She has no wheezes. She has no rales.   Abdominal: Soft. Bowel sounds are normal. She exhibits no distension and no mass. There is no tenderness. There is no guarding.   Musculoskeletal: Normal range of motion. She exhibits no edema.   Lymphadenopathy:     She has no cervical adenopathy.   Neurological: She is alert and oriented to person, place, and time. She has normal reflexes. She displays normal reflexes. No cranial nerve deficit. She exhibits normal muscle tone.   Skin: Skin is warm. No rash noted. No erythema.   Psychiatric: She has a normal mood and affect. Thought content normal.       Assessment:       1. Preventative health care    2. Paroxysmal atrial fibrillation - single post-op episode    3. Pure hypercholesterolemia    4. Essential hypertension    5. Coronary artery disease involving native coronary artery of native heart without angina pectoris    6. Bilateral carotid artery disease    7. Acquired hypothyroidism    8. Hx of Hodgkin's disease - s/p chemo and radiation        Plan:       Dulce was seen today for annual exam.    Diagnoses and all orders for this visit:    Preventative health care  -     Comprehensive metabolic panel; Future  -     Lipid panel; Future  -     CBC auto differential; Future  -     Hemoglobin A1c; Future  -     TSH; Future  -     Brain natriuretic peptide; Future    Paroxysmal atrial fibrillation - single post-op episode  - Chart reviewed  Pure hypercholesterolemia  - Follow  Essential hypertension  -     Hypertension Digital Medicine (HDMP) Enrollment Order  -     Hypertension Digital Medicine (HDMP): Assign Onboarding Questionnaires  -     Comprehensive metabolic panel; Future  -     Brain natriuretic peptide; Future    Coronary artery disease involving native coronary artery of native heart without  angina pectoris  -     Brain natriuretic peptide; Future    Bilateral carotid artery disease  - Had u/s recently  Acquired hypothyroidism  - Follow  Hx of Hodgkin's disease - s/p chemo and radiation  - Sees Onc

## 2018-04-13 ENCOUNTER — PATIENT MESSAGE (OUTPATIENT)
Dept: CARDIOLOGY | Facility: CLINIC | Age: 63
End: 2018-04-13

## 2018-04-13 ENCOUNTER — PATIENT MESSAGE (OUTPATIENT)
Dept: INTERNAL MEDICINE | Facility: CLINIC | Age: 63
End: 2018-04-13

## 2018-04-16 ENCOUNTER — TELEPHONE (OUTPATIENT)
Dept: CARDIOLOGY | Facility: CLINIC | Age: 63
End: 2018-04-16

## 2018-04-16 ENCOUNTER — PATIENT MESSAGE (OUTPATIENT)
Dept: INTERNAL MEDICINE | Facility: CLINIC | Age: 63
End: 2018-04-16

## 2018-04-16 NOTE — TELEPHONE ENCOUNTER
5/26/2017    Iggy Hughes Ronald Hoyos  [address] [facility] [place] [address]    Dear Iggy:    [facility] wants to ensure your discharge home is safe and you or your loved ones have had all of your questions answered regarding your care after you leave the hospital.    Below is a list of resources and contact information should you have any questions regarding your hospital stay, follow-up instructions, or active medical symptoms.    Questions or Concerns Regarding… Contact   Medical Questions Related to Your Discharge  (7 days a week, 8am-5pm) Contact a Nurse Care Coordinator   [phone]   Medical Questions Not Related to Your Discharge  (24 hours a day / 7 days a week)  Contact the Nurse Health Line   [phone]    Medications or Discharge Instructions Refer to your discharge packet   or contact your [facility] Primary Care Provider   [phone]   Follow-up Appointment(s) Schedule your appointment via Tag[employer]   or contact Scheduling [phone]   Billing Review your statement via Tagito  or contact Billing [phone]   Medical Records Review your records via Tagito   or contact Medical Records [phone]     You may receive a telephone call within two days of discharge. This call is to make certain you understand your discharge instructions and have the opportunity to have any questions answered. You can also easily access your medical information, test results and upcoming appointments via the [employer] free online health management tool. You can learn more and sign up at [employer]/Tagito. For assistance setting up your Tag[employer] account, please call [phone].    Once again, we want to ensure your discharge home is safe and that you have a clear understanding of any next steps in your care. If you have any questions or concerns, please do not hesitate to contact us, we are here for you. Thank you for choosing [facility] for your healthcare needs.    Sincerely,    Your [facility] Healthcare Team          Spoke with Dr. Collazo advise patient to contact her dentist for the antibiotics from now on. unfamilar with infectious disease meds etc. notifed [facility] pharmacy.

## 2018-04-16 NOTE — TELEPHONE ENCOUNTER
----- Message from Trenton Rivas sent at 4/13/2018  4:31 PM CDT -----  Contact: Keri with [facility]  Keri with [facility], [phone]. Calling in regards to Amoxicillin 500 mg. Says there's a medication interaction with methotrexate 2.5 MG Tab. Please advise. Thanks.

## 2018-04-19 ENCOUNTER — PATIENT MESSAGE (OUTPATIENT)
Dept: ADMINISTRATIVE | Facility: OTHER | Age: 63
End: 2018-04-19

## 2018-04-30 ENCOUNTER — PATIENT OUTREACH (OUTPATIENT)
Dept: OTHER | Facility: OTHER | Age: 63
End: 2018-04-30

## 2018-04-30 DIAGNOSIS — I10 ESSENTIAL HYPERTENSION: Primary | ICD-10-CM

## 2018-04-30 NOTE — PROGRESS NOTES
Mrs. Dulce Root is a 62 y.o. female who is newly enrolled in the [facility].     The following information was reviewed/updated:  Preferred pharmacy   [facility] [id number] [place] [address] of [address]  [address] [address]  Phone: [phone] Fax: [phone]    [id number] - [place], [place] - [address] [place]  [address] [address]  Phone: [phone] Fax: [phone]    Patient prefers a 90 days supply  Review of patient's allergies indicates:   Allergen Reactions    Levaquin [levofloxacin] Hives    Norvasc  [amlodipine] Shortness Of Breath and Other (See Comments)     unknown  Other reaction(s): Swelling  unknown  unknown  Other reaction(s): Swelling    Sulfa (sulfonamide antibiotics) Shortness Of Breath, Itching and Other (See Comments)     elevated blood pressure    Iodinated contrast- oral and iv dye     Iodine      Other reaction(s): Unknown    Latex      Other reaction(s): Itching     Current Outpatient Prescriptions on File Prior to Visit   Medication Sig Dispense Refill    albuterol 90 mcg/actuation inhaler Inhale 2 puffs into the lungs every 6 (six) hours as needed for Wheezing. 1 each 11    alprazolam (XANAX) 0.5 MG tablet Take 0.5 mg by mouth 3 (three) times daily.      aspirin 325 MG tablet Take 1 tablet (325 mg total) by mouth once daily.  0    brimonidine-timolol (COMBIGAN) 0.2-0.5 % Drop Place 1 drop into both eyes once daily.       calcium carbonate (OS-CALVIN) 600 mg (1,500 mg) Tab Take 600 mg by mouth 2 (two) times daily with meals.      clobetasol (TEMOVATE) 0.05 % Gel Apply 1 application topically 2 (two) times daily. Apply to affected area  3    folic acid (FOLVITE) 1 MG tablet Take 1 mg by mouth once daily.   1    levothyroxine (SYNTHROID) 75 MCG tablet TAKE 1 TABLET EVERY DAY 90 tablet 3    lisinopril (PRINIVIL,ZESTRIL) 20 MG tablet  Take 1 tablet (20 mg total) by mouth once daily. 90 tablet 3    methotrexate 2.5 MG Tab Take 2.5 mg by mouth every 7 days. 10 tablets every 7 days on Fridiay  1    metoprolol tartrate (LOPRESSOR) 50 MG tablet Take 1 tablet (50 mg total) by mouth 2 (two) times daily. (Patient taking differently: Take 50 mg by mouth once daily. ) 60 tablet 11    RESTASIS 0.05 % ophthalmic emulsion Place 1 drop into both eyes 2 (two) times daily.  4    rosuvastatin (CRESTOR) 20 MG tablet Take 1 tablet (20 mg total) by mouth nightly. (Patient taking differently: Take 20 mg by mouth every other day. ) 90 tablet 3    valacyclovir (VALTREX) 1000 MG tablet TAKE 1 TABLET EVERY 24 HOURS  3     No current facility-administered medications on file prior to visit.        IF DEPRESSED: Not Indicated       IF ROBYN screen positive: Not Indicated      Reviewed non-pharmacologic therapies and impact on BP:    1. Low-sodium diet- 11 mmHg reduction 2-4 weeks. I have reviewed D.A.S.H diet sodium restrictions (< 2000mg/day). She does not feel that she is doing well in this area. Endorses use of salt when preparing meals she also consumes frozen meals on occasion. Patient does eat out at least 7 or 8 times per week. States that schedule is often hectic.     2. Exercise- 7 mmHg reduction 4-12 weeks. I have recommended patient engage in exercise as tolerated at least 30 minutes 5x per week to improve cardiovascular health. Patient has been walking daily at lunch. She started this last August after heart surgery (valve replacement and 2 bypasses).     3. Alcohol intake- 3 mmHg reduction 4-12 weeks. I have discussed with patient the maximum recommended number of 1 drink(s) per day for men/women. Rarely consumes alcohol maybe 2 drinks per month.     Explained that we expect patient to obtain several blood pressures per week at random times of day.   Our goal is to get  BP to consistently below 130/80mmHg and make the process convenient so patient can avoid  "extra trips to the office. Getting your blood pressure below 130/80mmHg (definition of control) will reduce your risk for heart attack, kidney failure, stroke and death (as well as kidney failure, eye disease, & dementia).     Patient is not meeting the goal already.   When asked what the patient thinks is causing BP to be elevated, she states: stress and diet are contributing factors. Patient has not been feeling well for the last 2 weeks. Describes headaches accompanied by a racing heart.  Symptoms cause patient to wake up at night. She has an upcoming appointment with Dr. Gill for further evaluation. Reports difficulty with blood pressure control in the past. Previously prescribed clonidine prn to be taken when SBP is >160 mmHg however, she has not taken it since her surgery. She endorses adherence to medication regimen for the "most part". She admits that timing and dosing of metoprolol vary. Discussed the importance of not abruptly stopping metoprolol. Patient advised to take medications at the same time each day. Patient resumed metoprolol 50 mg twice daily yesterday because she was concerned with elevated readings and symptoms.     Last 5 Patient Entered Readings                                      Current 30 Day Average: 136/78     Recent Readings 5/18/2018 5/18/2018 5/18/2018 5/17/2018 5/17/2018    SBP (mmHg) 154 104 140 163 162    DBP (mmHg) 81 69 77 83 85    Pulse 77 84 82 75 89        Instructed patient not to allow anyone else to use phone and BP cuff.   I'm not available for emergencies. Patient will call [facility] on-call ([phone] or [phone]) or 911 if needed.     Discussed appropriate BP measuring technique:  Before taking your blood pressure  Find a quiet place. You will need to listen for your heartbeat.  Roll up the sleeve on your left arm or remove any tight-sleeved clothing, if needed. (It's best to take your blood pressure from your left arm if you are right-handed.You can use the " other arm if you have been told by your health care provider to do so.) Patient   Rest in a chair next to a table for 5 to 10 minutes. (Your left arm should rest comfortably at heart level.)  Sit up straight with your back against the chair, legs uncrossed and on the ground.  Rest your forearm on the table with the palm of your hand facing up.  You should not talk, read the newspaper, or watch television during this process.      Patient and I agreed that she will continue to monitor blood pressure and sodium intake, and continue to remain adherent to medications.     Medication regimen is as follows:  Lisinopril 10 mg twice daily at 8 AM and 10 PM  Metoprolol 50 mg twice daily at 10 AM and 10 PM.     I will plan to follow-up with the patient in 2 weeks.   Emailed patient link to [facility] webpage and my contact information in case she has any questions.

## 2018-04-30 NOTE — LETTER
Tsering Serrano PharmD  [address] [place] [address]     Dear Dulce Root,    Welcome to the [facility]!         My name is Tsering Serrano PharmD and I am your dedicated Digital Medicine clinician.  As an expert in medication management, I will help ensure that the medications you are taking continue to provide you with the intended benefits.      I am Lashay Barksdale and I will be your health  for the duration of the program.  My  job is to help you identify lifestyle changes to improve your blood pressure control.  We will talk about nutrition, exercise, and other ways that you may be able to adjust your current habits to better your health. Together, we will work to improve your overall health and encourage you to meet your goals for a healthier lifestyle.    What we expect from YOU:    You will need to take blood pressure readings multiple times a week and no less than one reading per week.   It is important that you take your measurements at different times during the day, when possible.     What you should expect from your Digital Medicine Care Team:   We will provide you with education about high blood pressure, including lifestyle changes that could help you to control your blood pressure.   We will review your weekly readings and provide you with monthly blood pressure progress reports after you have been in the program for more than 30 days.   We will send monthly progress reports on your blood pressure control to your physician so they can follow along with your progress as well.    You will be able to reach me by phone at [phone] or through your [facility] account by clicking my name under Care Team on the right side of the home screen.    I look forward to working with you to achieve your blood pressure goals!    Sincerely,    Tsering Serrano PharmD  Your personal clinician    Please visit [email or web address]  to learn more about high blood pressure and what you can do lower your blood pressure.                                                                                           Dulce Root  [address] [facility] [place] [address]

## 2018-05-01 ENCOUNTER — PATIENT MESSAGE (OUTPATIENT)
Dept: CARDIOLOGY | Facility: CLINIC | Age: 63
End: 2018-05-01

## 2018-05-04 ENCOUNTER — TELEPHONE (OUTPATIENT)
Dept: INTERNAL MEDICINE | Facility: CLINIC | Age: 63
End: 2018-05-04

## 2018-05-04 NOTE — TELEPHONE ENCOUNTER
Called patient no answer left message to call back and schedule an appointment to follow up with Dr Manrique and address high BP readings, no answer left message to call back.

## 2018-05-07 ENCOUNTER — PATIENT MESSAGE (OUTPATIENT)
Dept: CARDIOLOGY | Facility: CLINIC | Age: 63
End: 2018-05-07

## 2018-05-07 NOTE — TELEPHONE ENCOUNTER
Patient Email     2018  [facility] Cardiology      Gabe Collazo MD   Cardiology    Conversation: Prescription   (Newest Message First)              18 2:55 PM   Gabe Collazo MD routed this conversation to Me   Gabe Collazo MD          18 2:50 PM   Note      Amoxicillin 500 mg  #4  Rx0  Take 30-60 minutes prior to procedure                 18 2:33 PM   You routed this conversation to MD Dulce Medina   to Gabe Collazo MD           18 2:22 PM   I have a dentist appointment on .  I was told I needed to take an antibiotic before the procedure.   Could you please call that in to [facility] [place].     Thank you,   Dulce Root    Additional Documentation     Encounter Info:    Billing Info,    History,    Detailed Report,    Patient Education,    Care Plan,    Allergies       Patient Demographics     Patient Name  Simin Rootanne VITA Sex  Female   1955 [facility]   Address  [address] [place] [id number] Phone  [phone] (Home) *Preferred*  [phone] (Mobile)   Orders Placed      None   Medication Renewals and Changes        None      Medication List   Visit Diagnoses        None      Problem List

## 2018-05-10 ENCOUNTER — OFFICE VISIT (OUTPATIENT)
Dept: INTERNAL MEDICINE | Facility: CLINIC | Age: 63
End: 2018-05-10
Payer: COMMERCIAL

## 2018-05-10 VITALS
SYSTOLIC BLOOD PRESSURE: 110 MMHG | WEIGHT: 147.94 LBS | OXYGEN SATURATION: 97 % | BODY MASS INDEX: 26.21 KG/M2 | DIASTOLIC BLOOD PRESSURE: 82 MMHG | HEIGHT: 63 IN | HEART RATE: 79 BPM

## 2018-05-10 DIAGNOSIS — Z00.00 PREVENTATIVE HEALTH CARE: ICD-10-CM

## 2018-05-10 DIAGNOSIS — Z85.71 HX OF HODGKIN'S DISEASE: ICD-10-CM

## 2018-05-10 DIAGNOSIS — I48.0 PAROXYSMAL ATRIAL FIBRILLATION: ICD-10-CM

## 2018-05-10 DIAGNOSIS — R55 SYNCOPE, UNSPECIFIED SYNCOPE TYPE: Primary | ICD-10-CM

## 2018-05-10 DIAGNOSIS — I10 ESSENTIAL HYPERTENSION: ICD-10-CM

## 2018-05-10 DIAGNOSIS — Z95.2 S/P AVR (AORTIC VALVE REPLACEMENT): ICD-10-CM

## 2018-05-10 DIAGNOSIS — I25.10 CORONARY ARTERY DISEASE INVOLVING NATIVE CORONARY ARTERY OF NATIVE HEART WITHOUT ANGINA PECTORIS: ICD-10-CM

## 2018-05-10 PROCEDURE — 99999 PR PBB SHADOW E&M-EST. PATIENT-LVL IV: CPT | Mod: PBBFAC,,, | Performed by: FAMILY MEDICINE

## 2018-05-10 PROCEDURE — 99215 OFFICE O/P EST HI 40 MIN: CPT | Mod: S$GLB,,, | Performed by: FAMILY MEDICINE

## 2018-05-10 RX ORDER — DOXYCYCLINE HYCLATE 100 MG
500 TABLET ORAL 2 TIMES DAILY
COMMUNITY
End: 2018-05-23

## 2018-05-10 NOTE — PROGRESS NOTES
"Subjective:       Patient ID: Dulce Root is a 63 y.o. female.    Chief Complaint: Blood Pressure Check  Dulce Root 63 y.o. female is here for office visit to review care and physical exam, reports being frustrated with BP.  Is in digital program and is followed closely.  Presently reports using an ACEi and BBr BID.  Bps high quite often, but the alarming thing is having dramatic drops in BP, has "heart beating forceful/fast? And gets dizzy to the point of thinks she "will pass out."  HPI  Review of Systems   Constitutional: Negative for activity change, fatigue, fever and unexpected weight change.   HENT: Negative for congestion, hearing loss, postnasal drip and rhinorrhea.    Eyes: Negative for redness and visual disturbance.   Respiratory: Negative for chest tightness, shortness of breath and wheezing.    Cardiovascular: Negative for chest pain, palpitations and leg swelling.   Gastrointestinal: Negative for abdominal distention.   Genitourinary: Negative for decreased urine volume, dysuria, flank pain, hematuria, pelvic pain and urgency.   Musculoskeletal: Negative for back pain, gait problem, joint swelling and neck stiffness.   Skin: Negative for color change, rash and wound.   Neurological: Negative for dizziness, syncope, weakness and headaches.   Psychiatric/Behavioral: Negative for behavioral problems, confusion and sleep disturbance. The patient is not nervous/anxious.        Objective:      Physical Exam   Constitutional: She is oriented to person, place, and time. She appears well-developed and well-nourished. No distress.   HENT:   Head: Normocephalic.   Mouth/Throat: No oropharyngeal exudate.   Eyes: EOM are normal. Pupils are equal, round, and reactive to light. No scleral icterus.   Neck: Neck supple. No JVD present. No thyromegaly present.   Cardiovascular: Normal rate, regular rhythm and normal heart sounds.  Exam reveals no gallop and no friction rub.    No murmur heard.  Pulmonary/Chest: " Effort normal and breath sounds normal. She has no wheezes. She has no rales.   Abdominal: Soft. Bowel sounds are normal. She exhibits no distension and no mass. There is no tenderness. There is no guarding.   Musculoskeletal: Normal range of motion. She exhibits no edema.   Lymphadenopathy:     She has no cervical adenopathy.   Neurological: She is alert and oriented to person, place, and time. She has normal reflexes. She displays normal reflexes. No cranial nerve deficit. She exhibits normal muscle tone.   Skin: Skin is warm. No rash noted. No erythema.   Psychiatric: She has a normal mood and affect. Thought content normal.       Assessment:       1. Syncope, unspecified syncope type    2. Paroxysmal atrial fibrillation - single post-op episode    3. S/P AVR (aortic valve replacement) - pericardial valve    4. Essential hypertension    5. Hx of Hodgkin's disease - s/p chemo and radiation    6. Coronary artery disease involving native coronary artery of native heart without angina pectoris    7. Preventative health care        Plan:       Dulce was seen today for blood pressure check.    Diagnoses and all orders for this visit:    Syncope, unspecified syncope type  -     Ambulatory Referral to Cardiology    Paroxysmal atrial fibrillation - single post-op episode  - See EP  S/P AVR (aortic valve replacement) - pericardial valve  - Chart reviewed  Essential hypertension  - Follow, see EP  Hx of Hodgkin's disease - s/p chemo and radiation  - Chart reviewed  Coronary artery disease involving native coronary artery of native heart without angina pectoris  - Sees [facility]

## 2018-05-18 RX ORDER — METHOTREXATE 25 MG/ML
0.8 INJECTION INTRA-ARTERIAL; INTRAMUSCULAR; INTRATHECAL; INTRAVENOUS WEEKLY
Refills: 3 | COMMUNITY
Start: 2018-04-23 | End: 2018-11-13

## 2018-05-18 RX ORDER — LISINOPRIL 20 MG/1
10 TABLET ORAL DAILY
Qty: 90 TABLET | Refills: 3
Start: 2018-05-18 | End: 2018-06-06 | Stop reason: ALTCHOICE

## 2018-05-20 ENCOUNTER — NURSE TRIAGE (OUTPATIENT)
Dept: ADMINISTRATIVE | Facility: CLINIC | Age: 63
End: 2018-05-20

## 2018-05-20 NOTE — TELEPHONE ENCOUNTER
Reason for Disposition   Shock suspected (e.g., cold/pale/clammy skin, too weak to stand)    Protocols used:  DIZZINESS-A-AH    Pt c/o dizziness that began this morning at approx 10 am this morning with 1 episode of emesis.  Pt checked BP before dizziness and it was approx 158/80. Pt states while on phone she fells somewhat SOB. Pt advised per protocol and verbalizes understanding.  New BP is 179/105 and pulse 80. Pt advised to call 911 per protocol and verbalizes understanding.

## 2018-05-21 ENCOUNTER — TELEPHONE (OUTPATIENT)
Dept: ELECTROPHYSIOLOGY | Facility: CLINIC | Age: 63
End: 2018-05-21

## 2018-05-21 NOTE — TELEPHONE ENCOUNTER
Spoke with patient to make sure she didn't have any outside records prior to her appt with Dr Gill. States she has only seen cardiology in the [facility] system.

## 2018-05-22 PROBLEM — I44.7 LBBB (LEFT BUNDLE BRANCH BLOCK): Status: ACTIVE | Noted: 2018-05-22

## 2018-05-22 PROBLEM — R55 SYNCOPE: Status: ACTIVE | Noted: 2018-05-22

## 2018-05-23 ENCOUNTER — HOSPITAL ENCOUNTER (OUTPATIENT)
Dept: CARDIOLOGY | Facility: CLINIC | Age: 63
Discharge: HOME | End: 2018-05-23
Payer: COMMERCIAL

## 2018-05-23 ENCOUNTER — INITIAL CONSULT (OUTPATIENT)
Dept: ELECTROPHYSIOLOGY | Facility: CLINIC | Age: 63
End: 2018-05-23
Payer: COMMERCIAL

## 2018-05-23 VITALS
SYSTOLIC BLOOD PRESSURE: 152 MMHG | HEART RATE: 71 BPM | BODY MASS INDEX: 26.98 KG/M2 | DIASTOLIC BLOOD PRESSURE: 76 MMHG | HEIGHT: 62 IN | WEIGHT: 146.63 LBS

## 2018-05-23 DIAGNOSIS — Z95.2 S/P AVR (AORTIC VALVE REPLACEMENT): ICD-10-CM

## 2018-05-23 DIAGNOSIS — D05.10 DUCTAL CARCINOMA IN SITU (DCIS) OF BREAST, UNSPECIFIED LATERALITY: ICD-10-CM

## 2018-05-23 DIAGNOSIS — I48.0 PAROXYSMAL ATRIAL FIBRILLATION: ICD-10-CM

## 2018-05-23 DIAGNOSIS — R53.83 OTHER FATIGUE: Primary | ICD-10-CM

## 2018-05-23 DIAGNOSIS — E03.9 ACQUIRED HYPOTHYROIDISM: ICD-10-CM

## 2018-05-23 DIAGNOSIS — I25.10 CORONARY ARTERY DISEASE INVOLVING NATIVE CORONARY ARTERY OF NATIVE HEART WITHOUT ANGINA PECTORIS: ICD-10-CM

## 2018-05-23 DIAGNOSIS — Z85.71 HX OF HODGKIN'S DISEASE: Primary | ICD-10-CM

## 2018-05-23 DIAGNOSIS — Z95.1 S/P CABG X 2: ICD-10-CM

## 2018-05-23 DIAGNOSIS — I48.0 PAF (PAROXYSMAL ATRIAL FIBRILLATION): ICD-10-CM

## 2018-05-23 DIAGNOSIS — R55 SYNCOPE, UNSPECIFIED SYNCOPE TYPE: ICD-10-CM

## 2018-05-23 DIAGNOSIS — I10 ESSENTIAL HYPERTENSION: ICD-10-CM

## 2018-05-23 DIAGNOSIS — I44.7 LBBB (LEFT BUNDLE BRANCH BLOCK): ICD-10-CM

## 2018-05-23 PROCEDURE — 99999 PR PBB SHADOW E&M-EST. PATIENT-LVL III: CPT | Mod: PBBFAC,,, | Performed by: INTERNAL MEDICINE

## 2018-05-23 PROCEDURE — 99244 OFF/OP CNSLTJ NEW/EST MOD 40: CPT | Mod: S$GLB,,, | Performed by: INTERNAL MEDICINE

## 2018-05-23 PROCEDURE — 93000 ELECTROCARDIOGRAM COMPLETE: CPT | Mod: S$GLB,,, | Performed by: INTERNAL MEDICINE

## 2018-05-23 NOTE — LETTER
May 23, 2018      Marky Manrique MD  [address]  [facility] [address]           [facility] Arr[facility]ia  [address]  [facility] [address]  Phone: [phone]  Fax: [phone]          Patient: Dulce Root   MR Number: [id number]   YOB: 1955   Date of Visit: 5/23/2018       Dear Dr. Marky Manrique:    Thank you for referring Dulce Root to me for evaluation. Attached you will find relevant portions of my assessment and plan of care.    If you have questions, please do not hesitate to call me. I look forward to following Dulce Root along with you.    Sincerely,    Palomo Gill MD    Enclosure  CC:  No Recipients    If you would like to receive this communication electronically, please contact [email or web address] or [phone] to request more information on [employer] Link access.    For providers and/or their staff who would like to refer a patient to [facility], please contact us through our one-stop-shop provider referral line, [facility], at [phone].    If you feel you have received this communication in error or would no longer like to receive these types of communications, please e-mail [email or web address]

## 2018-05-23 NOTE — PROGRESS NOTES
"Subjective:    Patient ID:  Dulce Root is a 63 y.o. female who presents for evaluation of Loss of Consciousness      HPI 64 yo female with Aortic Stenosis, CAD, CABGx2 + AVR (pericardial) 8/21/17, atrial fibrillation (post-operative), Htn, hypothyroidism, LBBB (not present prior to surgery), Lymphoma s/p XRT + chemotherapy, Ductal Carcinoma in Situ (bilateral mastectomy).  Primary cardiologist is Dr. Collazo.  Noted to have labile blood pressure.  Is managed by [facility].  When SBP is in the 100's, she feels very poorly, with dizziness, noting "black vision" around her.   This has been occurring over the past 5-6 months.  Worse over the past few weeks.  Also feels poorly and will take her blood pressure, and it is elevated.  This is improved with Xanax.  She notes increasing stress and anxiety.  Some time wakes up in the middle of the night with palpitations and elevated blood pressure.    Echo 10/24/17 normal  Biventricular structure and function, small pericardial effusion.  Holter 10/24/17 nsr without significant arrhythmias.  LUE is bigger than RUE since her bilateral mastectomy.    Review of Systems   Constitution: Negative. Negative for weakness and malaise/fatigue.   Cardiovascular: Positive for palpitations. Negative for chest pain, dyspnea on exertion, irregular heartbeat, leg swelling, near-syncope, orthopnea, paroxysmal nocturnal dyspnea and syncope.   Respiratory: Negative for cough and shortness of breath.    Neurological: Negative for dizziness and light-headedness.   All other systems reviewed and are negative.       Objective:    Physical Exam   Constitutional: She is oriented to person, place, and time. She appears well-developed and well-nourished.   Eyes: Conjunctivae are normal. No scleral icterus.   Neck: No JVD present. No tracheal deviation present.   Cardiovascular: Normal rate and regular rhythm.  PMI is not displaced.    Pulmonary/Chest: Effort normal and breath sounds normal. No " respiratory distress.   Abdominal: Soft. There is no hepatosplenomegaly. There is no tenderness.   Musculoskeletal: She exhibits no edema or tenderness.   Neurological: She is alert and oriented to person, place, and time.   Skin: Skin is warm and dry. No rash noted.   Psychiatric: She has a normal mood and affect. Her behavior is normal.         Assessment:       1. Hx of Hodgkin's disease - s/p chemo and radiation    2. Syncope, unspecified syncope type    3. Coronary artery disease involving native coronary artery of native heart without angina pectoris    4. Essential hypertension    5. Paroxysmal atrial fibrillation - single post-op episode    6. S/P AVR (aortic valve replacement) - pericardial valve    7. S/P CABG x 2    8. Ductal carcinoma in situ (DCIS) of breast, unspecified laterality    9. Acquired hypothyroidism    10. LBBB (left bundle branch block)         Plan:           Labile blood pressures.  I suspect this is related to intermittent elevated sympathetic tone.  My suspicion is this may be related to anxiety.  I indicated that she should speak with Dr. Manrique regarding management of her anxiety.  Can f/u PRN with EP.

## 2018-05-24 ENCOUNTER — PATIENT MESSAGE (OUTPATIENT)
Dept: CARDIOLOGY | Facility: CLINIC | Age: 63
End: 2018-05-24

## 2018-05-24 ENCOUNTER — PATIENT OUTREACH (OUTPATIENT)
Dept: OTHER | Facility: OTHER | Age: 63
End: 2018-05-24

## 2018-05-25 DIAGNOSIS — I48.0 PAF (PAROXYSMAL ATRIAL FIBRILLATION): Primary | ICD-10-CM

## 2018-05-25 NOTE — PROGRESS NOTES
Last 5 Patient Entered Readings                                      Current 30 Day Average: 139/79     Recent Readings 5/25/2018 5/25/2018 5/25/2018 5/24/2018 5/24/2018    SBP (mmHg) 121 123 123 165 188    DBP (mmHg) 67 78 78 106 107    Pulse 84 94 94 94 98          Digital Medicine: Health  Introduction    Introduced Ms. Dulce Root to Digital Medicine. Discussed health  role and recommended lifestyle modifications.    Lifestyle Assessment:  Current Dietary Habits(i.e. low sodium, food labels, dining out): Deferred. Reinforced importance of low sodium less than 2000 mg sodium per day; verbalizes understading. Will want to complete dietary recall at next call if patient open to doing so.     Exercise: States she has been integrating walking while at work in addition to deep breathing techniques. Educated patient on benefit of deep breathing regularly for stress mgmt; verbalizes understanding and reviewed technique. Encouraged patient to continue with light exercise of walking in addition to breathing over next few weeks. Agrees to try to implement deep breathing for 3-5 min each day.     Alcohol/Tobacco:Deferred.     Medication Adherence: has not been compliant with the medication regiment due to the following:  self adjusts medication due to fears of blood pressure dropping too low, specifically adjusts metroprolol due to feelings of dizziness and lightheadedness associated with readings below 120 systolic. States she is feeling more nervous about her blood pressure as she continues to have spikes in her readings and seed different physicans. Notes her morning blood pressure readings are better when she has taken xanax the night before.  States she does not like to do this, but has been doing so recently. Specifically states she doesn't feel comfortable with current regimen and would like to discuss changes by coordinating a plan with all members of care team. Task placed for Anita Serrano.     Other  goals: Patient agreeable to implementing deep breathing strategies regularly to help with decreasing anxiety in addition to maintaining low sodium diet. Will follow up with patient in a few weeks to assess progress. No further questions/concerns at this time; encouraged to call should she need anything sooner.     Reviewed AHA/AACE recommendations:  Limit sodium intake to <2000mg/day  Perform 150 minutes of physical activity per week    Reviewed the importance of self-monitoring, medication adherence, and that the health  can be used as a resource for lifestyle modifications to help reduce or maintain a healthy lifestyle.  Reviewed that the Digital Medicine team is not available for emergencies and instructed the patient to call 911 or [facility] ([phone] or [phone]) if one arises.

## 2018-05-26 DIAGNOSIS — E78.5 HYPERLIPIDEMIA, UNSPECIFIED HYPERLIPIDEMIA TYPE: ICD-10-CM

## 2018-05-28 RX ORDER — ROSUVASTATIN CALCIUM 20 MG/1
TABLET, COATED ORAL
Qty: 90 TABLET | Refills: 3 | Status: SHIPPED | OUTPATIENT
Start: 2018-05-28 | End: 2019-08-30 | Stop reason: SDUPTHER

## 2018-05-29 ENCOUNTER — OFFICE VISIT (OUTPATIENT)
Dept: CARDIOLOGY | Facility: CLINIC | Age: 63
End: 2018-05-29
Payer: COMMERCIAL

## 2018-05-29 VITALS
WEIGHT: 145.5 LBS | HEART RATE: 78 BPM | BODY MASS INDEX: 25.78 KG/M2 | DIASTOLIC BLOOD PRESSURE: 100 MMHG | HEIGHT: 63 IN | SYSTOLIC BLOOD PRESSURE: 160 MMHG

## 2018-05-29 DIAGNOSIS — Z95.1 S/P CABG X 2: ICD-10-CM

## 2018-05-29 DIAGNOSIS — Z95.2 S/P AVR (AORTIC VALVE REPLACEMENT): ICD-10-CM

## 2018-05-29 DIAGNOSIS — I48.0 PAROXYSMAL ATRIAL FIBRILLATION: ICD-10-CM

## 2018-05-29 DIAGNOSIS — I10 ESSENTIAL HYPERTENSION: ICD-10-CM

## 2018-05-29 DIAGNOSIS — I25.10 CORONARY ARTERY DISEASE INVOLVING NATIVE CORONARY ARTERY OF NATIVE HEART WITHOUT ANGINA PECTORIS: Primary | ICD-10-CM

## 2018-05-29 PROCEDURE — 99214 OFFICE O/P EST MOD 30 MIN: CPT | Mod: S$GLB,,, | Performed by: INTERNAL MEDICINE

## 2018-05-29 PROCEDURE — 99999 PR PBB SHADOW E&M-EST. PATIENT-LVL III: CPT | Mod: PBBFAC,,, | Performed by: INTERNAL MEDICINE

## 2018-05-29 RX ORDER — CITALOPRAM 10 MG/1
10 TABLET ORAL DAILY
Qty: 30 TABLET | Refills: 1 | Status: SHIPPED | OUTPATIENT
Start: 2018-05-29 | End: 2018-07-02 | Stop reason: SDUPTHER

## 2018-05-29 NOTE — PROGRESS NOTES
Subjective:    Patient ID:  Dulce Root is a 63 y.o. female who presents for follow-up of Coronary Artery Disease (elevated bp readings); Hypertension; and Valvular Heart Disease      Pt here for f/u. She has been having on going problems with her BP. Her log shows fairly good control most dayd but has days with poor control. She believes may be due to anxiety but has been discouraged from using xanax by her PCP. She reports had good results in the past with celexa but she stopped it on her own because she was on a lot of meds. We have tried to up the BP meds in the past only to bottom her out at times. She admits to being under a lot of stress at home.         Review of Systems   Constitution: Negative for weight gain and weight loss.   HENT: Negative.    Eyes: Negative.    Cardiovascular: Negative for chest pain, claudication, cyanosis, dyspnea on exertion, irregular heartbeat, leg swelling, near-syncope, orthopnea (no PND), palpitations and syncope.   Respiratory: Negative for cough, hemoptysis, shortness of breath and snoring.    Endocrine: Negative.    Skin: Negative.    Musculoskeletal: Negative for joint pain, muscle cramps, muscle weakness and myalgias.   Gastrointestinal: Negative for diarrhea, hematemesis, nausea and vomiting.   Genitourinary: Negative.    Neurological: Negative for dizziness, focal weakness, light-headedness, loss of balance, numbness, paresthesias and seizures.   Psychiatric/Behavioral: The patient is nervous/anxious.         Objective:    Physical Exam   Constitutional: She is oriented to person, place, and time. She appears well-developed and well-nourished.   Eyes: Pupils are equal, round, and reactive to light.   Neck: Normal range of motion. No thyromegaly present.   Cardiovascular: Normal rate, regular rhythm, S1 normal, S2 normal, intact distal pulses and normal pulses.   No extrasystoles are present. PMI is not displaced.  Exam reveals no friction rub.    Murmur heard.    Medium-pitched systolic murmur is present with a grade of 1/6  at the upper right sternal border  Pulmonary/Chest: Effort normal and breath sounds normal. She has no wheezes. She has no rales. She exhibits no tenderness.   Abdominal: Soft. Bowel sounds are normal. She exhibits no distension and no mass. There is no tenderness.   Musculoskeletal: Normal range of motion. She exhibits no edema.   Neurological: She is alert and oriented to person, place, and time.   Skin: Skin is warm and dry.   Vitals reviewed.        Assessment:       1. Coronary artery disease involving native coronary artery of native heart without angina pectoris    2. S/P CABG x 2    3. S/P AVR (aortic valve replacement) - pericardial valve    4. Paroxysmal atrial fibrillation - single post-op episode    5. Essential hypertension         Plan:       We discussed her BP and that most days it is relatively well controlled. It appears to be less well controlled when her anxiety level is high.  We discussed going back on celexa and would she like to get back on it as it had helped in the past.   Will get her started and she will address the issue with her PCP to continue or change to another drug.   F/u here in 1 month

## 2018-05-31 ENCOUNTER — PATIENT OUTREACH (OUTPATIENT)
Dept: OTHER | Facility: OTHER | Age: 63
End: 2018-05-31

## 2018-05-31 DIAGNOSIS — I10 ESSENTIAL HYPERTENSION: Primary | ICD-10-CM

## 2018-05-31 RX ORDER — METOPROLOL TARTRATE 50 MG/1
25 TABLET ORAL 2 TIMES DAILY
Qty: 30 TABLET | Refills: 11
Start: 2018-05-31 | End: 2018-06-06 | Stop reason: ALTCHOICE

## 2018-05-31 NOTE — PROGRESS NOTES
HPI:  Called patient for follow-up. States that she is doing well. Started citalopram 2 days ago for anxiety and has been sleeping better. Patient felt lightheaded today when blood pressure was 99/66 mmHg. Patient concerned that blood pressure will drop to low if she takes her medications consistently. Patient seen cardiologist and should continue metoprolol.     Last 5 Patient Entered Readings                                      Current 30 Day Average: 142/81     Recent Readings 5/31/2018 5/31/2018 5/31/2018 5/31/2018 5/31/2018    SBP (mmHg) 112 113 113 99 99    DBP (mmHg) 66 62 62 60 60    Pulse 73 75 75 75 75        Assessment:  Per 30-day average blood pressure is not well controlled however, readings are trending down since receiving treatment fir anxiety.     Plan:  · Reduce metoprolol to 12.5 mg twice daily. Patient advised to hold second dose of lisinopril if blood pressure is <100/60 mmHg.   · Will continue to monitor and follow-up in 2 weeks to assess BP readings and medication regimen.     Current medication regimen:  Hypertension Medications             lisinopril (PRINIVIL,ZESTRIL) 20 MG tablet Take 0.5 tablets (10 mg total) by mouth once daily.    metoprolol tartrate (LOPRESSOR) 50 MG tablet Take 0.5 tablets (25 mg total) by mouth 2 (two) times daily.

## 2018-06-01 ENCOUNTER — PATIENT MESSAGE (OUTPATIENT)
Dept: CARDIOLOGY | Facility: CLINIC | Age: 63
End: 2018-06-01

## 2018-06-06 ENCOUNTER — PATIENT MESSAGE (OUTPATIENT)
Dept: ADMINISTRATIVE | Facility: OTHER | Age: 63
End: 2018-06-06

## 2018-06-06 ENCOUNTER — PATIENT OUTREACH (OUTPATIENT)
Dept: OTHER | Facility: OTHER | Age: 63
End: 2018-06-06

## 2018-06-06 DIAGNOSIS — I10 ESSENTIAL HYPERTENSION: Primary | ICD-10-CM

## 2018-06-06 RX ORDER — METOPROLOL SUCCINATE 25 MG/1
TABLET, EXTENDED RELEASE ORAL
Qty: 30 TABLET | Refills: 3 | Status: SHIPPED | OUTPATIENT
Start: 2018-06-06 | End: 2018-06-21 | Stop reason: SDUPTHER

## 2018-06-06 RX ORDER — BENAZEPRIL HYDROCHLORIDE 5 MG/1
5 TABLET ORAL DAILY
Qty: 90 TABLET | Refills: 1 | Status: SHIPPED | OUTPATIENT
Start: 2018-06-06 | End: 2018-06-06 | Stop reason: SDUPTHER

## 2018-06-06 RX ORDER — BENAZEPRIL HYDROCHLORIDE 5 MG/1
5 TABLET ORAL DAILY
Qty: 90 TABLET | Refills: 1 | Status: SHIPPED | OUTPATIENT
Start: 2018-06-06 | End: 2018-08-06 | Stop reason: SDUPTHER

## 2018-06-06 RX ORDER — METOPROLOL SUCCINATE 25 MG/1
TABLET, EXTENDED RELEASE ORAL
Qty: 30 TABLET | Refills: 3 | Status: SHIPPED | OUTPATIENT
Start: 2018-06-06 | End: 2018-06-06 | Stop reason: SDUPTHER

## 2018-06-06 NOTE — PROGRESS NOTES
"HPI:  Called patient to discuss concern regarding current regimen and blood pressure fluctuations. States that she has been afraid to take both lisinopril and metoprolol as she feels that her blood pressure drops too low. Spoke with cardiologist and she was told to hold both medications if "blood pressure was low". Patient requesting a consistent regimen and would like to discuss blood pressure goals in further detail. Previously on benazepril and tolerated medication with no complaints.     Last 5 Patient Entered Readings                                      Current 30 Day Average: 141/80     Recent Readings 6/6/2018 6/6/2018 6/6/2018 6/6/2018 6/6/2018    SBP (mmHg) 171 149 149 148 148    DBP (mmHg) 89 78 78 86 86    Pulse 80 79 79 78 78        Assessment:  Per 30-day average blood pressure is not well controlled. Will optimize medication regimen. Blood pressure labile on lisinopril and metoprolol tartrate.    Plan:  · Start metoprolol succinate and benazepril. Patient advised to stop lisinopril and metoprolol tartrate.   · Will continue to monitor and follow-up in 2 weeks to assess BP readings and medication regimen.     Current medication regimen:  Hypertension Medications             benazepril (LOTENSIN) 5 MG tablet Take 1 tablet (5 mg total) by mouth once daily.    metoprolol succinate (TOPROL-XL) 25 MG 24 hr tablet Take one-half tablet (12.5 mg) by mouth once daily.            "

## 2018-06-15 ENCOUNTER — OFFICE VISIT (OUTPATIENT)
Dept: FAMILY MEDICINE | Facility: CLINIC | Age: 63
End: 2018-06-15
Payer: COMMERCIAL

## 2018-06-15 ENCOUNTER — TELEPHONE (OUTPATIENT)
Dept: FAMILY MEDICINE | Facility: CLINIC | Age: 63
End: 2018-06-15

## 2018-06-15 VITALS
WEIGHT: 144 LBS | TEMPERATURE: 99 F | BODY MASS INDEX: 25.52 KG/M2 | SYSTOLIC BLOOD PRESSURE: 98 MMHG | DIASTOLIC BLOOD PRESSURE: 57 MMHG | HEART RATE: 78 BPM | HEIGHT: 63 IN

## 2018-06-15 DIAGNOSIS — J01.10 ACUTE NON-RECURRENT FRONTAL SINUSITIS: Primary | ICD-10-CM

## 2018-06-15 DIAGNOSIS — J01.10 ACUTE NON-RECURRENT FRONTAL SINUSITIS: ICD-10-CM

## 2018-06-15 DIAGNOSIS — D05.10 DUCTAL CARCINOMA IN SITU (DCIS) OF BREAST, UNSPECIFIED LATERALITY: ICD-10-CM

## 2018-06-15 PROCEDURE — 99999 PR PBB SHADOW E&M-EST. PATIENT-LVL III: CPT | Mod: PBBFAC,,, | Performed by: NURSE PRACTITIONER

## 2018-06-15 PROCEDURE — 99213 OFFICE O/P EST LOW 20 MIN: CPT | Mod: S$GLB,,, | Performed by: NURSE PRACTITIONER

## 2018-06-15 RX ORDER — AMOXICILLIN 500 MG/1
500 TABLET, FILM COATED ORAL EVERY 12 HOURS
Qty: 20 TABLET | Refills: 0 | Status: SHIPPED | OUTPATIENT
Start: 2018-06-15 | End: 2018-06-15 | Stop reason: SDUPTHER

## 2018-06-15 RX ORDER — HYDROCODONE POLISTIREX AND CHLORPHENIRAMINE POLISTIREX 10; 8 MG/5ML; MG/5ML
5 SUSPENSION, EXTENDED RELEASE ORAL EVERY 12 HOURS PRN
Qty: 240 ML | Refills: 0 | Status: SHIPPED | OUTPATIENT
Start: 2018-06-15 | End: 2018-06-25

## 2018-06-15 RX ORDER — AMOXICILLIN 500 MG/1
500 TABLET, FILM COATED ORAL EVERY 12 HOURS
Qty: 20 TABLET | Refills: 0 | Status: SHIPPED | OUTPATIENT
Start: 2018-06-15 | End: 2018-06-25

## 2018-06-15 RX ORDER — HYDROCODONE POLISTIREX AND CHLORPHENIRAMINE POLISTIREX 10; 8 MG/5ML; MG/5ML
5 SUSPENSION, EXTENDED RELEASE ORAL EVERY 12 HOURS PRN
Qty: 240 ML | Refills: 0 | Status: SHIPPED | OUTPATIENT
Start: 2018-06-15 | End: 2018-06-15 | Stop reason: SDUPTHER

## 2018-06-15 NOTE — PROGRESS NOTES
"Subjective:      Patient ID: Dulce Root is a 63 y.o. female.    Chief Complaint: Cough and Nasal Congestion    Cough   This is a new problem. The current episode started 1 to 4 weeks ago. The problem has been gradually worsening. The cough is productive of sputum. Associated symptoms include headaches, nasal congestion, postnasal drip, rhinorrhea and a sore throat. Pertinent negatives include no chest pain, chills, ear congestion, ear pain, fever, rash, shortness of breath or wheezing. Associated symptoms comments: Sinus pressure. Nothing aggravates the symptoms. Treatments tried: OTC meds. The treatment provided mild relief. immunocompromised     Review of Systems   Constitutional: Negative for chills, fatigue and fever.   HENT: Positive for congestion, postnasal drip, rhinorrhea, sinus pressure and sore throat. Negative for ear pain and sinus pain.    Respiratory: Positive for cough. Negative for shortness of breath and wheezing.    Cardiovascular: Negative for chest pain, palpitations and leg swelling.   Skin: Negative for rash and wound.   Neurological: Positive for headaches. Negative for weakness and numbness.   Psychiatric/Behavioral: The patient is not nervous/anxious.        Objective:     BP (!) 98/57   Pulse 78   Temp 98.5 °F (36.9 °C) (Oral)   Ht 5' 3" (1.6 m)   Wt 65.3 kg (144 lb)   BMI 25.51 kg/m²     Physical Exam   Constitutional: She appears well-developed and well-nourished.   HENT:   Head: Normocephalic.   Right Ear: Tympanic membrane normal.   Left Ear: Tympanic membrane normal.   Nose: Mucosal edema and rhinorrhea (nasal mucosa erythematous and boggy) present. Right sinus exhibits frontal sinus tenderness. Right sinus exhibits no maxillary sinus tenderness. Left sinus exhibits frontal sinus tenderness. Left sinus exhibits no maxillary sinus tenderness.   Mouth/Throat: Posterior oropharyngeal erythema (clear, post nasal drainage noted to posterior oropharynx) present. No oropharyngeal " exudate or posterior oropharyngeal edema.   Eyes: Conjunctivae and lids are normal. Pupils are equal, round, and reactive to light.   Neck: Normal range of motion and full passive range of motion without pain. Neck supple.   Cardiovascular: Normal rate and regular rhythm.    Murmur heard.   Systolic murmur is present   Pulmonary/Chest: Effort normal and breath sounds normal. No respiratory distress. She has no decreased breath sounds. She has no wheezes. She has no rhonchi. She has no rales.   Lymphadenopathy:     She has no cervical adenopathy.   Skin: Skin is warm and dry. No rash noted.   Psychiatric: She has a normal mood and affect. Her behavior is normal. Judgment and thought content normal.     Assessment:     1. Acute non-recurrent frontal sinusitis    2. Ductal carcinoma in situ (DCIS) of breast, unspecified laterality        Plan:     Problem List Items Addressed This Visit        Oncology    DCIS (ductal carcinoma in situ) of breast      Other Visit Diagnoses     Acute non-recurrent frontal sinusitis    -  Primary    Relevant Medications    amoxicillin (AMOXIL) 500 MG Tab    hydrocodone-chlorpheniramine (TUSSIONEX) 10-8 mg/5 mL suspension      Hydrate well  Postpone methotrexate until complete with antibiotic  Symptomatic care discussed and educational handout provided  Report to ER if symptoms worsen    Follow-up if symptoms worsen or fail to improve.

## 2018-06-15 NOTE — PATIENT INSTRUCTIONS

## 2018-06-15 NOTE — TELEPHONE ENCOUNTER
----- Message from Myrna Whatley sent at 6/15/2018  1:00 PM CDT -----  Contact: Pt   States she's rtn nurses call and can be reached at [phone]//thanks/dbw

## 2018-06-21 ENCOUNTER — PATIENT OUTREACH (OUTPATIENT)
Dept: OTHER | Facility: OTHER | Age: 63
End: 2018-06-21

## 2018-06-21 DIAGNOSIS — I10 ESSENTIAL HYPERTENSION: ICD-10-CM

## 2018-06-21 RX ORDER — METOPROLOL SUCCINATE 25 MG/1
TABLET, EXTENDED RELEASE ORAL
Qty: 30 TABLET | Refills: 3 | Status: SHIPPED | OUTPATIENT
Start: 2018-06-21 | End: 2018-07-24 | Stop reason: SDUPTHER

## 2018-06-21 NOTE — PROGRESS NOTES
"HPI:  Called patient for follow-up. Tolerating change in medication regimen. Patient experienced "fluttering last night and increased metoprolol to one full tablet and states that she feels fine. Taking benazepril in the evening and readings have been elevated prior to medication regimen. Patient denies s/s of hypertension at this time.     Last 5 Patient Entered Readings                                      Current 30 Day Average: 139/81     Recent Readings 6/21/2018 6/21/2018 6/21/2018 6/21/2018 6/21/2018    SBP (mmHg) 152 162 162 162 151    DBP (mmHg) 89 86 86 86 111    Pulse 83 86 86 86 86        Assessment:  Per 30-day average blood pressure is not at goal.     Plan:  · Increase metoprolol succinate to 25 mg once every evening. Continue benazepril however, patient advised to switch timing to morning.  · Encouraged patient to check blood pressure 1 hour or more after medications.  · Discussed increasing benazepril at next encounter if needed.  · Will continue to monitor and follow-up in 1 week as requested by patient.     Current medication regimen:  Hypertension Medications             benazepril (LOTENSIN) 5 MG tablet Take 1 tablet (5 mg total) by mouth once daily.    metoprolol succinate (TOPROL-XL) 25 MG 24 hr tablet Take one tablet (25 mg) by mouth daily.              "

## 2018-06-25 ENCOUNTER — PATIENT OUTREACH (OUTPATIENT)
Dept: OTHER | Facility: OTHER | Age: 63
End: 2018-06-25

## 2018-06-27 ENCOUNTER — LAB VISIT (OUTPATIENT)
Dept: LAB | Facility: HOSPITAL | Age: 63
End: 2018-06-27
Attending: FAMILY MEDICINE
Payer: COMMERCIAL

## 2018-06-27 DIAGNOSIS — Z00.00 PREVENTATIVE HEALTH CARE: ICD-10-CM

## 2018-06-27 LAB — HEMOCCULT STL QL IA: NEGATIVE

## 2018-06-27 PROCEDURE — 82274 ASSAY TEST FOR BLOOD FECAL: CPT

## 2018-06-28 ENCOUNTER — PATIENT OUTREACH (OUTPATIENT)
Dept: OTHER | Facility: OTHER | Age: 63
End: 2018-06-28

## 2018-06-28 NOTE — PROGRESS NOTES
HPI:  Dulce Root is a 63 y.o. female being followed by the hypertension digital medicine program. States that she feels better than the previous weeks before. Currently taking medication as directed and denies medication side effects. Patient is aware that blood pressure readings are slightly above goal however, she expressed concern with making further adjustments at this time. Patient denies s/s of hypertension at this time.     Last 5 Patient Entered Readings                                      Current 30 Day Average: 137/80     Recent Readings 7/4/2018 6/26/2018 6/24/2018 6/23/2018 6/23/2018    SBP (mmHg) 137 139 122 140 148    DBP (mmHg) 77 81 71 81 89    Pulse 83 82 78 78 89        Assessment:  Per 30-day average blood pressure is consistently above goal <130/80 mmHg. Recommend further adjustment of benazepril however, will defer at this time per patient's request.     Plan:  · Continue current regimen.   · Will continue to monitor and follow-up in 3 weeks to assess BP readings and medication regimen.     Current medication regimen:  Hypertension Medications             benazepril (LOTENSIN) 5 MG tablet Take 1 tablet (5 mg total) by mouth once daily.    metoprolol succinate (TOPROL-XL) 25 MG 24 hr tablet Take one tablet (25 mg) by mouth daily.

## 2018-07-02 ENCOUNTER — PATIENT MESSAGE (OUTPATIENT)
Dept: INTERNAL MEDICINE | Facility: CLINIC | Age: 63
End: 2018-07-02

## 2018-07-02 RX ORDER — CITALOPRAM 10 MG/1
10 TABLET ORAL DAILY
Qty: 90 TABLET | Refills: 3 | Status: SHIPPED | OUTPATIENT
Start: 2018-07-02 | End: 2018-07-03 | Stop reason: SDUPTHER

## 2018-07-03 RX ORDER — CITALOPRAM 10 MG/1
10 TABLET ORAL DAILY
Qty: 90 TABLET | Refills: 3 | Status: SHIPPED | OUTPATIENT
Start: 2018-07-03 | End: 2019-07-12 | Stop reason: SDUPTHER

## 2018-07-11 RX ORDER — LEVOTHYROXINE SODIUM 75 UG/1
TABLET ORAL
Qty: 90 TABLET | Refills: 3 | Status: SHIPPED | OUTPATIENT
Start: 2018-07-11 | End: 2019-07-12 | Stop reason: SDUPTHER

## 2018-07-16 PROBLEM — Z00.00 PREVENTATIVE HEALTH CARE: Status: RESOLVED | Noted: 2017-03-10 | Resolved: 2018-07-16

## 2018-07-24 ENCOUNTER — PATIENT MESSAGE (OUTPATIENT)
Dept: ADMINISTRATIVE | Facility: OTHER | Age: 63
End: 2018-07-24

## 2018-07-24 DIAGNOSIS — I10 ESSENTIAL HYPERTENSION: ICD-10-CM

## 2018-07-24 RX ORDER — METOPROLOL SUCCINATE 25 MG/1
TABLET, EXTENDED RELEASE ORAL
Qty: 30 TABLET | Refills: 3 | Status: SHIPPED | OUTPATIENT
Start: 2018-07-24 | End: 2018-11-21 | Stop reason: SDUPTHER

## 2018-07-27 ENCOUNTER — PATIENT OUTREACH (OUTPATIENT)
Dept: OTHER | Facility: OTHER | Age: 63
End: 2018-07-27

## 2018-07-27 DIAGNOSIS — I10 ESSENTIAL HYPERTENSION: ICD-10-CM

## 2018-07-27 NOTE — PROGRESS NOTES
HPI:  Called patient for follow-up. Tolerating medication regimen with no complaints. She endorses adherence to medication regimen as prescribed. States that she does not feel the need to check her blood pressure as often because she feels better than before. Readings remain above goal and patient is unsure of cause.     Last 5 Patient Entered Readings                                      Current 30 Day Average: 139/68     Recent Readings 7/28/2018 7/28/2018 7/27/2018 7/13/2018 7/13/2018    SBP (mmHg) 138 138 147 136 141    DBP (mmHg) 76 76 81 69 80    Pulse 85 85 83 80 87        Patient denies s/s of hypotension (lightheadedness, dizziness, nausea, fatigue) associated with low readings. Instructed patient to inform me if this occurs, patient confirms understanding.    Patient denies s/s of hypertension (SOB, CP, severe headaches, changes in vision) associated with high readings. Instructed patient to go to the ED if BP >180/110 and accompanied by hypertensive s/s, patient confirms understanding.    Assessment:  Per 30-day average blood pressure is not at goal of <130/80 mmHg despite combination therapy.     Plan:  Increase benazepril to 10 mg once daily. Patient advised to take two tablets every morning. She will also check her blood pressure every other day until next follow-up.  Patients health , Lashay Barksdale, will be following up every 3-4 weeks.   I will continue to monitor regularly and will follow-up in 1 weeks, sooner if blood pressure begins to trend upward or downward.     Patient has my contact information and knows to call with any concerns or clinical changes.      Current medication regimen:  Hypertension Medications             benazepril (LOTENSIN) 5 MG tablet Take 1 tablet (5 mg total) by mouth once daily.    metoprolol succinate (TOPROL-XL) 25 MG 24 hr tablet Take one tablet (25 mg) by mouth daily.

## 2018-08-06 RX ORDER — BENAZEPRIL HYDROCHLORIDE 5 MG/1
10 TABLET ORAL DAILY
Qty: 90 TABLET | Refills: 1
Start: 2018-08-06 | End: 2018-08-22

## 2018-08-15 ENCOUNTER — PATIENT OUTREACH (OUTPATIENT)
Dept: OTHER | Facility: OTHER | Age: 63
End: 2018-08-15

## 2018-08-15 DIAGNOSIS — I10 ESSENTIAL HYPERTENSION: ICD-10-CM

## 2018-08-15 NOTE — PROGRESS NOTES
HPI:  Called patient for follow-up. Stopped benazepril 10 mg as blood pressure was 92/59 mmHg and patient felt fatigued. Blood pressure 107/70 mmHg today and patient felt a little tired however, symptoms resolved quickly. Anxiety has improved and patient feels better about blood pressure readings.     Last 5 Patient Entered Readings                                      Current 30 Day Average: 125/72     Recent Readings 8/22/2018 8/11/2018 8/10/2018 8/10/2018 8/7/2018    SBP (mmHg) 107 131 103 92 123    DBP (mmHg) 70 73 65 59 69    Pulse 75 82 71 74 84          Patient denies s/s of hypotension (lightheadedness, dizziness, nausea, fatigue) associated with low readings. Instructed patient to inform me if this occurs, patient confirms understanding.    Patient denies s/s of hypertension (SOB, CP, severe headaches, changes in vision) associated with high readings. Instructed patient to go to the ED if BP >180/110 and accompanied by hypertensive s/s, patient confirms understanding.    Assessment:  Per 30-day average blood pressure is at goal.     Plan:  Continue reduced dose of benazepril.   Patients health , Lashay Barksdale, will be following up every 3-4 weeks.   I will continue to monitor regularly and will follow-up in 8 weeks, sooner if blood pressure begins to trend upward or downward.     Current medication regimen:  Hypertension Medications             benazepril (LOTENSIN) 5 MG tablet Take 1 tablet (5 mg total) by mouth once daily.    metoprolol succinate (TOPROL-XL) 25 MG 24 hr tablet Take one tablet (25 mg) by mouth daily.        Patient has my contact information and knows to call with any concerns or clinical changes.

## 2018-08-20 ENCOUNTER — PATIENT OUTREACH (OUTPATIENT)
Dept: OTHER | Facility: OTHER | Age: 63
End: 2018-08-20

## 2018-08-20 NOTE — PROGRESS NOTES
Last 5 Patient Entered Readings                                      Current 30 Day Average: 128/73     Recent Readings 8/11/2018 8/10/2018 8/10/2018 8/7/2018 8/7/2018    SBP (mmHg) 131 103 92 123 103    DBP (mmHg) 73 65 59 69 60    Pulse 82 71 74 84 78          Digital Medicine: Health  Follow Up    Lifestyle Modifications:    1.Dietary Modifications (Sodium intake <2,000mg/day, food labels, dining out): Patient reports no dietary changes and declines further coaching at this time. Reinforced importance of continued sodium monitoring.     2.Physical Activity: Deferred.     3.Medication Therapy: Patient has not  been compliant with the medication regimen.    4.Patient has the following medication side effects/concerns: Patient reports feeling weak and lightheaded with readings of 92/59 and 103/65 mmHg. States that this occurred after increasing benazepril to 10 mg and that since 8/10 she has resumed 5mg dose instead as she did not feel comfortable. Has not had any other symptoms or concerns since this.     Follow up with Ms. Dulce Root completed. No further questions or concerns. Will continue follow up to achieve health goals.

## 2018-08-22 RX ORDER — BENAZEPRIL HYDROCHLORIDE 5 MG/1
5 TABLET ORAL DAILY
Qty: 90 TABLET | Refills: 1
Start: 2018-08-22 | End: 2018-08-23 | Stop reason: SDUPTHER

## 2018-08-23 ENCOUNTER — PATIENT MESSAGE (OUTPATIENT)
Dept: ADMINISTRATIVE | Facility: OTHER | Age: 63
End: 2018-08-23

## 2018-08-23 RX ORDER — BENAZEPRIL HYDROCHLORIDE 5 MG/1
5 TABLET ORAL DAILY
Qty: 90 TABLET | Refills: 1 | Status: SHIPPED | OUTPATIENT
Start: 2018-08-23 | End: 2019-01-04 | Stop reason: DRUGHIGH

## 2018-09-17 ENCOUNTER — PATIENT OUTREACH (OUTPATIENT)
Dept: OTHER | Facility: OTHER | Age: 63
End: 2018-09-17

## 2018-09-17 NOTE — PROGRESS NOTES
"Last 5 Patient Entered Readings                                      Current 30 Day Average: 121/68     Recent Readings 9/15/2018 9/15/2018 9/15/2018 9/15/2018 9/15/2018    SBP (mmHg) 104 82 82 101 75    DBP (mmHg) 58 47 47 58 50    Pulse 74 73 73 75 75          Digital Medicine: Health  Follow Up    Lifestyle Modifications:    1.Dietary Modifications (Sodium intake <2,000mg/day, food labels, dining out): Patient reports she continues to monitor sodium intake. States she did have low blood pressure readings over the weekend with light-headedness, and that she believes she did not drink enough water. Discussed importance of small salty snack in addition to hydration; patient verbalizes understanding and states drinking enough water continues to be a goal of hers. Hydration remains her primary goal for next 4 weeks.     2.Physical Activity: Deferred.     3.Medication Therapy: Patient has been compliant with the medication regimen.    4.Patient has the following medication side effects/concerns: Patient reports light-headedness with low readings on 9/15 of 75/50, 101/58, 82/47 mmHg. States these resolved on their own and "that sometimes this just happens but it never lasts long". Encouraged to resume regular readings with focus on healthy eating and proper hydration. Denies any other questions/concerns at this time.     Follow up with Ms. Dulce Root completed. No further questions or concerns. Will continue to follow up to achieve health goals.    "

## 2018-09-18 ENCOUNTER — PATIENT OUTREACH (OUTPATIENT)
Dept: OTHER | Facility: OTHER | Age: 63
End: 2018-09-18

## 2018-09-18 NOTE — PROGRESS NOTES
Last 5 Patient Entered Readings                                      Current 30 Day Average: 121/68     Recent Readings 9/15/2018 9/15/2018 9/15/2018 9/15/2018 9/15/2018    SBP (mmHg) 104 82 82 101 75    DBP (mmHg) 58 47 47 58 50    Pulse 74 73 73 75 75      Patient's BP average is controlled based on 2017 ACC/AHA HTN guidelines of goal BP <130/80.      Patient has s/s of hypotension (lightheadedness, dizziness, nausea, fatigue) associated with low readings however she does not want to make adjustments in medications at ths time and asked to wait a week. Instructed patient to inform me if this occurs, patient confirms understanding.      Patient denies s/s of hypertension (SOB, CP, severe headaches, changes in vision) associated with high readings. Instructed patient to go to the ED if BP > 180/110 and accompanied by hypertensive s/s, patient confirms understanding.     Patient's health , Lashay Barksdale, will be following up every 3-4 weeks. I will continue to monitor regularly and will follow up in 1 week, sooner if BP begins to trend upward or downward.    Patient has my contact information and knows to call with any concerns or clinical changes.     Current HTN regimen:  Hypertension Medications             benazepril (LOTENSIN) 5 MG tablet Take 1 tablet (5 mg total) by mouth once daily.    metoprolol succinate (TOPROL-XL) 25 MG 24 hr tablet Take one tablet (25 mg) by mouth daily.

## 2018-10-05 NOTE — TELEPHONE ENCOUNTER
----- Message from Charissa Valero sent at 6/15/2018 11:31 AM CDT -----   Patient proscription was sent to the wrong pharmacy. Needs to be sent [facility]   
Please resend meds from today. Canceled at other pharm  
no

## 2018-10-08 ENCOUNTER — PATIENT OUTREACH (OUTPATIENT)
Dept: OTHER | Facility: OTHER | Age: 63
End: 2018-10-08

## 2018-10-08 NOTE — PROGRESS NOTES
HPI:  Called patient for follow-up. States that she is doing ok. Discussed fluctuation in readings. Patient states higher reading that she had gotten busy and forgotten to take her medication. Discussed lower reading on 9/15/18 and patient states that she can't quite remember. Questioned patient about adequate hydration and she does admit that on some days she does struggle with drinking water.     Last 5 Patient Entered Readings                                      Current 30 Day Average: 126/70     Recent Readings 10/5/2018 10/4/2018 9/21/2018 9/21/2018 9/15/2018    SBP (mmHg) 139 114 145 145 104    DBP (mmHg) 81 64 77 77 58    Pulse 79 83 86 86 74        Patient denies s/s of hypotension (lightheadedness, dizziness, nausea, fatigue) associated with low readings. Instructed patient to inform me if this occurs, patient confirms understanding.    Patient denies s/s of hypertension (SOB, CP, severe headaches, changes in vision) associated with high readings. Instructed patient to go to the ED if BP >180/110 and accompanied by hypertensive s/s, patient confirms understanding.    Assessment:  Per 30-day average blood pressure is goal with some fluctuations.     Plan:  Continue current medication regimen.  Recommend keeping water bottle within vicinity and take small sips frequently. Discuss further with HC.   Patients health , Lashay Barksdale, will be following up every 3-4 weeks.  Needs new PCP.   I will continue to monitor regularly and will follow-up in 4 weeks, sooner if blood pressure  begins to trend upward or downward.     Current medication regimen:  Hypertension Medications             benazepril (LOTENSIN) 5 MG tablet Take 1 tablet (5 mg total) by mouth once daily.    metoprolol succinate (TOPROL-XL) 25 MG 24 hr tablet Take one tablet (25 mg) by mouth daily.        Patient has my contact information and knows to call with any concerns or clinical changes.

## 2018-10-15 ENCOUNTER — PATIENT OUTREACH (OUTPATIENT)
Dept: OTHER | Facility: OTHER | Age: 63
End: 2018-10-15

## 2018-10-15 NOTE — PROGRESS NOTES
Last 5 Patient Entered Readings                                      Current 30 Day Average: 120/68     Recent Readings 10/8/2018 10/5/2018 10/4/2018 9/21/2018 9/21/2018    SBP (mmHg) 97 139 114 145 145    DBP (mmHg) 61 81 64 77 77    Pulse 79 79 83 86 86          Digital Medicine: Health  Follow Up    Lifestyle Modifications:    1.Dietary Modifications (Sodium intake <2,000mg/day, food labels, dining out): Patient states she'd like to continue focusing on hydration. States she's been making more of a conscious effort to drink water by having it with her meals. Discussed having water bottle handy to help promote drinking water throughout the day. States she likely gets less than 40 ounces and would like to focus on trying to get at least 40 ounces of water most days per week. Encouraged her to aim for a glass of water with each  Meal in addition to one water bottle per day to help with hydration.     2.Physical Activity: Deferred.     3.Medication Therapy: Patient has been compliant with the medication regimen.    4.Patient has the following medication side effects/concerns: No side effects/concerns per patient.     Follow up with Ms. Dulce Root completed. No further questions or concerns. Will continue to follow up to achieve health goals.

## 2018-11-06 NOTE — PROGRESS NOTES
Last 5 Patient Entered Readings                                      Current 30 Day Average: 121/71     Recent Readings 11/1/2018 11/1/2018 10/27/2018 10/25/2018 10/8/2018    SBP (mmHg) 108 105 139 141 97    DBP (mmHg) 68 69 71 85 61    Pulse 69 74 88 88 79          11/6/18: Chart reviewed. HC is working with patient to increase intake of fluids. Per 30-day average blood pressure is at goal. I will continue monitoring and follow-up in 6 weeks, sooner if needed.

## 2018-11-12 ENCOUNTER — TELEPHONE (OUTPATIENT)
Dept: FAMILY MEDICINE | Facility: CLINIC | Age: 63
End: 2018-11-12

## 2018-11-12 NOTE — TELEPHONE ENCOUNTER
Pt was informed that Dr Hernandez will accept her back as a patient. Has appointment scheduled today with Ivet Valenzuela NP

## 2018-11-12 NOTE — TELEPHONE ENCOUNTER
----- Message from Aureliano Mejia sent at 11/10/2018 10:14 AM CST -----  Contact: Self/ [phone]  Pt has been having lt side pain since last night, wants to be seen today. Pt was told we have no testing available on Sat.pls call/thanks.

## 2018-11-12 NOTE — TELEPHONE ENCOUNTER
Pt is requesting to be accepted back under Dr. Hernandez's care. Pt states she was previously [facility]. Please contact pt.

## 2018-11-13 ENCOUNTER — OFFICE VISIT (OUTPATIENT)
Dept: FAMILY MEDICINE | Facility: CLINIC | Age: 63
End: 2018-11-13
Payer: COMMERCIAL

## 2018-11-13 VITALS
HEART RATE: 86 BPM | DIASTOLIC BLOOD PRESSURE: 73 MMHG | WEIGHT: 146.38 LBS | HEIGHT: 63 IN | BODY MASS INDEX: 25.94 KG/M2 | TEMPERATURE: 98 F | SYSTOLIC BLOOD PRESSURE: 124 MMHG

## 2018-11-13 DIAGNOSIS — R31.9 URINARY TRACT INFECTION WITH HEMATURIA, SITE UNSPECIFIED: ICD-10-CM

## 2018-11-13 DIAGNOSIS — R31.9 HEMATURIA, UNSPECIFIED TYPE: ICD-10-CM

## 2018-11-13 DIAGNOSIS — N39.0 URINARY TRACT INFECTION WITH HEMATURIA, SITE UNSPECIFIED: ICD-10-CM

## 2018-11-13 DIAGNOSIS — R10.9 LEFT FLANK PAIN: ICD-10-CM

## 2018-11-13 LAB
BACTERIA #/AREA URNS HPF: ABNORMAL /HPF
BILIRUB UR QL STRIP: NEGATIVE
CLARITY UR: CLEAR
COLOR UR: YELLOW
GLUCOSE UR QL STRIP: NEGATIVE
HGB UR QL STRIP: ABNORMAL
KETONES UR QL STRIP: NEGATIVE
LEUKOCYTE ESTERASE UR QL STRIP: ABNORMAL
MICROSCOPIC COMMENT: ABNORMAL
NITRITE UR QL STRIP: NEGATIVE
PH UR STRIP: 6 [PH] (ref 5–8)
PROT UR QL STRIP: NEGATIVE
RBC #/AREA URNS HPF: 8 /HPF (ref 0–4)
SP GR UR STRIP: 1.01 (ref 1–1.03)
SQUAMOUS #/AREA URNS HPF: 3 /HPF
URN SPEC COLLECT METH UR: ABNORMAL
WBC #/AREA URNS HPF: 6 /HPF (ref 0–5)

## 2018-11-13 PROCEDURE — 3078F DIAST BP <80 MM HG: CPT | Mod: CPTII,S$GLB,, | Performed by: NURSE PRACTITIONER

## 2018-11-13 PROCEDURE — 99999 PR PBB SHADOW E&M-EST. PATIENT-LVL III: CPT | Mod: PBBFAC,,, | Performed by: NURSE PRACTITIONER

## 2018-11-13 PROCEDURE — 99213 OFFICE O/P EST LOW 20 MIN: CPT | Mod: S$GLB,,, | Performed by: NURSE PRACTITIONER

## 2018-11-13 PROCEDURE — 81000 URINALYSIS NONAUTO W/SCOPE: CPT | Mod: PO

## 2018-11-13 PROCEDURE — 3074F SYST BP LT 130 MM HG: CPT | Mod: CPTII,S$GLB,, | Performed by: NURSE PRACTITIONER

## 2018-11-13 PROCEDURE — 3008F BODY MASS INDEX DOCD: CPT | Mod: CPTII,S$GLB,, | Performed by: NURSE PRACTITIONER

## 2018-11-13 RX ORDER — NITROFURANTOIN 25; 75 MG/1; MG/1
100 CAPSULE ORAL 2 TIMES DAILY
Qty: 14 CAPSULE | Refills: 0 | Status: SHIPPED | OUTPATIENT
Start: 2018-11-13 | End: 2018-11-20

## 2018-11-13 RX ORDER — FLUCONAZOLE 200 MG/1
200 TABLET ORAL ONCE
Qty: 1 TABLET | Refills: 0 | Status: SHIPPED | OUTPATIENT
Start: 2018-11-13 | End: 2018-11-13

## 2018-11-13 NOTE — PROGRESS NOTES
Subjective:       Patient ID: Dulce Root is a 63 y.o. female.    Chief Complaint: Back Pain    Flank Pain   This is a new problem. The current episode started in the past 7 days. The problem occurs constantly. The problem has been waxing and waning since onset. The pain is present in the costovertebral angle. The quality of the pain is described as aching. The pain does not radiate. The pain is mild. Pertinent negatives include no abdominal pain, chest pain, fever or headaches. (Hematuria) She has tried nothing for the symptoms.       Review of Systems   Constitutional: Negative for fatigue, fever and unexpected weight change.   HENT: Negative for ear pain and sore throat.    Eyes: Negative for pain and visual disturbance.   Respiratory: Negative for cough and shortness of breath.    Cardiovascular: Negative for chest pain and palpitations.   Gastrointestinal: Negative for abdominal pain, diarrhea and vomiting.   Genitourinary: Positive for flank pain and hematuria.   Musculoskeletal: Negative for arthralgias and myalgias.   Skin: Negative for color change and rash.   Neurological: Negative for dizziness and headaches.   Psychiatric/Behavioral: Negative for dysphoric mood and sleep disturbance. The patient is not nervous/anxious.        Vitals:    11/13/18 0825   BP: 124/73   Pulse: 86   Temp: 98.2 °F (36.8 °C)       Objective:     Current Outpatient Medications   Medication Sig Dispense Refill    aspirin 325 MG tablet Take 1 tablet (325 mg total) by mouth once daily.  0    benazepril (LOTENSIN) 5 MG tablet Take 1 tablet (5 mg total) by mouth once daily. 90 tablet 1    brimonidine-timolol (COMBIGAN) 0.2-0.5 % Drop Place 1 drop into both eyes once daily.       calcium carbonate (OS-CALVIN) 600 mg (1,500 mg) Tab Take 600 mg by mouth 2 (two) times daily with meals.      citalopram (CELEXA) 10 MG tablet Take 1 tablet (10 mg total) by mouth once daily. 90 tablet 3    folic acid (FOLVITE) 1 MG tablet Take 1 mg by  mouth once daily.   1    levothyroxine (SYNTHROID) 75 MCG tablet TAKE 1 TABLET EVERY DAY 90 tablet 3    metoprolol succinate (TOPROL-XL) 25 MG 24 hr tablet Take one tablet (25 mg) by mouth daily. 30 tablet 3    RESTASIS 0.05 % ophthalmic emulsion Place 1 drop into both eyes 2 (two) times daily.  4    rosuvastatin (CRESTOR) 20 MG tablet TAKE 1 TABLET NIGHTLY 90 tablet 3    valacyclovir (VALTREX) 1000 MG tablet TAKE 1 TABLET EVERY 24 HOURS  3    fluconazole (DIFLUCAN) 200 MG Tab Take 1 tablet (200 mg total) by mouth once. for 1 dose 1 tablet 0    nitrofurantoin, macrocrystal-monohydrate, (MACROBID) 100 MG capsule Take 1 capsule (100 mg total) by mouth 2 (two) times daily. for 7 days 14 capsule 0     No current facility-administered medications for this visit.        Physical Exam   Constitutional: She is oriented to person, place, and time. She appears well-developed and well-nourished. No distress.   HENT:   Head: Normocephalic and atraumatic.   Eyes: EOM are normal. Pupils are equal, round, and reactive to light.   Neck: Normal range of motion. Neck supple.   Cardiovascular: Normal rate and regular rhythm.   Pulmonary/Chest: Effort normal and breath sounds normal.   Abdominal: Soft. Normal appearance and bowel sounds are normal. There is CVA tenderness.   Musculoskeletal: Normal range of motion.   Neurological: She is alert and oriented to person, place, and time.   Skin: Skin is warm and dry. No rash noted.   Psychiatric: She has a normal mood and affect. Judgment normal.   Nursing note and vitals reviewed.      Lab Results   Component Value Date    COLORU Yellow 11/13/2018    APPEARANCEUA Clear 11/13/2018    GLUCUA Negative 11/13/2018    SPECGRAV 1.010 11/13/2018    PHUR 6.0 11/13/2018    WBCUR trace 07/24/2014    RBCUR 250 07/24/2014    NITRITE Negative 11/13/2018    KETONESU Negative 11/13/2018    BILIRUBINUA Negative 11/13/2018    UROBILINOGEN Negative 08/15/2017    PROTEINUR trace 07/24/2014    OCCULTUA  3+ (A) 11/13/2018    LEUKOCYTESUR Trace (A) 11/13/2018    GLUCOSEUR nl 07/24/2014       Lab Results   Component Value Date    RBCUA 8 (H) 11/13/2018    WBCUA 6 (H) 11/13/2018    BACTERIA Rare 11/13/2018    SQUAMEPITHEL 3 11/13/2018    MICROCMT SEE COMMENT 11/13/2018       Assessment:       1. Urinary tract infection with hematuria, site unspecified    2. Left flank pain    3. Hematuria, unspecified type        Plan:   Urinary tract infection with hematuria, site unspecified    Left flank pain  -     Urinalysis    Hematuria, unspecified type  -     Urinalysis    Other orders  -     Urinalysis Microscopic  -     nitrofurantoin, macrocrystal-monohydrate, (MACROBID) 100 MG capsule; Take 1 capsule (100 mg total) by mouth 2 (two) times daily. for 7 days  Dispense: 14 capsule; Refill: 0  -     fluconazole (DIFLUCAN) 200 MG Tab; Take 1 tablet (200 mg total) by mouth once. for 1 dose  Dispense: 1 tablet; Refill: 0        No Follow-up on file.    There are no Patient Instructions on file for this visit.

## 2018-11-14 ENCOUNTER — PATIENT OUTREACH (OUTPATIENT)
Dept: OTHER | Facility: OTHER | Age: 63
End: 2018-11-14

## 2018-11-14 NOTE — PROGRESS NOTES
Last 5 Patient Entered Readings                                      Current 30 Day Average: 129/73     Recent Readings 11/1/2018 11/1/2018 10/27/2018 10/25/2018 10/8/2018    SBP (mmHg) 108 105 139 141 97    DBP (mmHg) 68 69 71 85 61    Pulse 69 74 88 88 79        Digital Medicine: Health  Follow Up    Lifestyle Modifications:    1.Dietary Modifications (Sodium intake <2,000mg/day, food labels, dining out): Patient reports she has been doing better with hydration and feels she's noticed some improvement in blood pressure and energy since doing so. Encouraged to get at least 50ounces of fluid, ideally water per day. Will continue to focus on this.     2.Physical Activity: Deferred.     3.Medication Therapy: Patient has been compliant with the medication regimen.    4.Patient has the following medication side effects/concerns: Denies side effects/concerns.     Follow up with Ms. Dulce Root completed. No further questions or concerns. Will continue to follow up to achieve health goals.

## 2018-11-21 DIAGNOSIS — I10 ESSENTIAL HYPERTENSION: ICD-10-CM

## 2018-11-21 RX ORDER — METOPROLOL SUCCINATE 25 MG/1
TABLET, EXTENDED RELEASE ORAL
Qty: 30 TABLET | Refills: 3 | Status: CANCELLED | OUTPATIENT
Start: 2018-11-21

## 2018-11-21 RX ORDER — METOPROLOL SUCCINATE 25 MG/1
TABLET, EXTENDED RELEASE ORAL
Qty: 90 TABLET | Refills: 0 | Status: SHIPPED | OUTPATIENT
Start: 2018-11-21 | End: 2019-01-14

## 2018-11-21 NOTE — TELEPHONE ENCOUNTER
----- Message from Marzena Suarez sent at 11/21/2018  3:17 PM CST -----  Contact: Patient  Patient called to request a refill on:    1. What is the name of the medication you are requesting? Metoprolol  2. What is the dose? 25 mg  3. How do you take the medication? Orally, topically, etc? orally  4. How often do you take this medication? 1 tablet daily  5. Do you need a 30 day or 90 day supply? 90 day  6. How many refills are you requesting? 2  7. What is your preferred pharmacy and location of the pharmacy?     [facility]/pharmacy #[id number] - [place], [place] - [address] [place]  [address] [address]  Phone: [phone] Fax: [phone]    8. Who can we contact with further questions? Dulce [phone]    Thanks,  Marzena

## 2018-12-13 ENCOUNTER — PATIENT OUTREACH (OUTPATIENT)
Dept: OTHER | Facility: OTHER | Age: 63
End: 2018-12-13

## 2018-12-13 NOTE — PROGRESS NOTES
Last 5 Patient Entered Readings                                      Current 30 Day Average: 107/88     Recent Readings 11/21/2018 11/21/2018 11/21/2018 11/21/2018 11/20/2018    SBP (mmHg) 83 158 157 160 130    DBP (mmHg) 50 99 94 96 80    Pulse 80 92 85 92 82        12/13-Digital Medicine: Health  Follow Up  Left voicemail to follow up with  Dulce M [place].  Current BP average 107/88 mmHg is at goal, [<130/80].

## 2018-12-17 ENCOUNTER — PATIENT OUTREACH (OUTPATIENT)
Dept: OTHER | Facility: OTHER | Age: 63
End: 2018-12-17

## 2018-12-17 NOTE — PROGRESS NOTES
"HPI:  Called patient for follow-up she endorses adherence to medication regimen. Discussed low blood pressure reading yesterday and patient reports feeling "badly". Symptoms improved after eating olives and drinking a glass of water. Patient's battery was low on device and she was unable repeat her blood pressure reading. She did hold her evening dose of metoprolol. Blood pressure today 146/87 mmHg prior to taking medication.     Last 5 Patient Entered Readings                                      Current 30 Day Average: 119/60     Recent Readings 1/4/2019 1/3/2019 1/3/2019 1/3/2019 1/3/2019    SBP (mmHg) 146 82 82 82 82    DBP (mmHg) 87 47 47 43 42    Pulse 83 67 67 71 72        Patient denies s/s of hypotension (lightheadedness, dizziness, nausea, fatigue) associated with low readings. Instructed patient to inform me if this occurs, patient confirms understanding.    Patient denies s/s of hypertension (SOB, CP, severe headaches, changes in vision) associated with high readings. Instructed patient to go to the ED if BP >180/110 and accompanied by hypertensive s/s, patient confirms understanding.    Assessment:  Patient's current 30-day average is at goal of <130/80 mmHg based on ACC/AHA HTN guidelines. Blood pressure very labile and patient continues to experience symptomatic hypotension.     Plan:  Stop benazepril and continue metoprolol.  Patient will check blood pressure at least 2 to 3 times weekly until next follow-up.   Discussed charging device every 2 to 3 weeks for accurate readings.   Patients health , Annel Shaffer, will be following up every 3-4 weeks.   I will continue to monitor regularly and will follow-up in 2 weeks, sooner if blood pressure begins to trend upward or downward.     Current medication regimen:  Hypertension Medications             metoprolol succinate (TOPROL-XL) 25 MG 24 hr tablet Take one tablet (25 mg) by mouth daily.        Patient has my contact information and knows to " call with any concerns or clinical changes.

## 2019-01-14 ENCOUNTER — PATIENT MESSAGE (OUTPATIENT)
Dept: ADMINISTRATIVE | Facility: OTHER | Age: 64
End: 2019-01-14

## 2019-01-14 ENCOUNTER — PATIENT OUTREACH (OUTPATIENT)
Dept: OTHER | Facility: OTHER | Age: 64
End: 2019-01-14

## 2019-01-14 DIAGNOSIS — I10 ESSENTIAL HYPERTENSION: ICD-10-CM

## 2019-01-14 RX ORDER — METOPROLOL SUCCINATE 25 MG/1
TABLET, EXTENDED RELEASE ORAL
Qty: 90 TABLET | Refills: 0 | Status: ON HOLD | OUTPATIENT
Start: 2019-01-14 | End: 2019-02-16 | Stop reason: HOSPADM

## 2019-01-14 NOTE — PROGRESS NOTES
HPI:  Called patient for follow-up. She endorses adherence to metoprolol succinate nightly with no complaints. Stopped benazepril and has not had further episodes of hypotension.     Last 5 Patient Entered Readings                                      Current 30 Day Average: 132/68     Recent Readings 1/12/2019 1/10/2019 1/9/2019 1/8/2019 1/6/2019    SBP (mmHg) 137 131 129 141 141    DBP (mmHg) 71 72 76 82 81    Pulse 75 85 77 84 78        Patient denies s/s of hypotension (lightheadedness, dizziness, nausea, fatigue) associated with low readings. Instructed patient to inform me if this occurs, patient confirms understanding.    Patient denies s/s of hypertension (SOB, CP, severe headaches, changes in vision) associated with high readings. Instructed patient to go to the ED if BP >180/110 and accompanied by hypertensive s/s, patient confirms understanding.    Assessment:  Patient's current 30-day average is slightly above goal of <130/80 mmHg based on ACC/AHA HTN guidelines.     Plan:  Metoprolol changed to 12.5 mg twice daily to alleviate daytime spikes since stopping ACEI.   Patients health , Annel Shaffer, will be following up every 3-4 weeks.   I will continue to monitor regularly and will follow-up in 4 weeks, sooner if blood pressure begins to trend upward or downward.     Current medication regimen:  Hypertension Medications             metoprolol succinate (TOPROL-XL) 25 MG 24 hr tablet Take one-half tablet (12.5 mg) by mouth twice daily.        Patient has my contact information and knows to call with any concerns or clinical changes.

## 2019-02-10 PROBLEM — J90 PLEURAL EFFUSION: Status: ACTIVE | Noted: 2019-02-10

## 2019-02-10 PROBLEM — J18.9 COMMUNITY ACQUIRED PNEUMONIA: Status: ACTIVE | Noted: 2019-02-10

## 2019-02-11 NOTE — PROGRESS NOTES
Last 5 Patient Entered Readings                                      Current 30 Day Average: 142/88     Recent Readings 2/9/2019 2/9/2019 2/8/2019 2/8/2019 2/7/2019    SBP (mmHg) 155 153 153 155 120    DBP (mmHg) 92 85 97 100 66    Pulse 106 95 90 92 86        2/11/19: Patient currently inpatient for CAP. I will follow-up after discharge.

## 2019-02-12 PROBLEM — I50.21 ACUTE SYSTOLIC HEART FAILURE: Status: ACTIVE | Noted: 2019-02-12

## 2019-02-13 PROBLEM — R06.03 ACUTE RESPIRATORY DISTRESS: Status: ACTIVE | Noted: 2019-02-10

## 2019-02-18 ENCOUNTER — TELEPHONE (OUTPATIENT)
Dept: CARDIOLOGY | Facility: CLINIC | Age: 64
End: 2019-02-18

## 2019-02-18 NOTE — TELEPHONE ENCOUNTER
----- Message from Ralf Kevin sent at 2/18/2019  2:53 PM CST -----  Type:  Sooner Apoointment Request    Caller is requesting a sooner appointment.  Caller declined first available appointment listed below.  Caller will not accept being placed on the waitlist and is requesting a message be sent to doctor.    Name of Caller:  Patient  When is the first available appointment?  05/23  Symptoms:  2 Week Post Hospital FU  Best Call Back Number:  [phone]  Additional Information:

## 2019-02-25 ENCOUNTER — PATIENT OUTREACH (OUTPATIENT)
Dept: OTHER | Facility: OTHER | Age: 64
End: 2019-02-25

## 2019-02-25 RX ORDER — METOPROLOL SUCCINATE 25 MG/1
12.5 TABLET, EXTENDED RELEASE ORAL DAILY
Qty: 90 TABLET | Refills: 1 | Status: SHIPPED | OUTPATIENT
Start: 2019-02-25 | End: 2019-05-08

## 2019-02-25 NOTE — PROGRESS NOTES
HPI:  Called Ms. Dulce Root for hypertension follow-up. Adherent to medication regimen most of the time. Patient has been taking metoprolol 12.5 mg twice daily. States that she takes an additional dose if readings are in the 140's. She continues to hold ACEI secondary to labile BP. Patient is scheduled to follow-up up with PCP on 2/27/19. Morning reading elevated and patient states that it was prior to medication.     Last 5 Patient Entered Readings                                      Current 30 Day Average: 133/80     Recent Readings 2/26/2019 2/25/2019 2/24/2019 2/23/2019 2/23/2019    SBP (mmHg) 147 134 154 115 97    DBP (mmHg) 86 87 92 85 58    Pulse 84 83 99 107 87          Patient denies s/s of hypotension (lightheadedness, dizziness, nausea, fatigue) associated with low readings. Instructed patient to inform me if this occurs, patient confirms understanding.    Patient denies s/s of hypertension (SOB, CP, severe headaches, changes in vision) associated with high readings. Instructed patient to go to the ED if BP >180/110 and accompanied by hypertensive s/s, patient confirms understanding.    Assessment:  Patient's current 30-day average is approaching goal of <130/80 mmHg.    Plan:  Continue current regimen. Advised to hold ACEI until BP is more stable.   Patients health , Annel Shaffer, will be following up every 3-4 weeks.   I will continue to monitor regularly and will follow-up in 3 to 4 weeks, sooner if blood pressure begins to trend upward or downward.     Current medication regimen:  Hypertension Medications             metoprolol succinate (TOPROL-XL) 25 MG 24 hr tablet Take 0.5 tablets (12.5 mg total) by mouth once daily.        Patient has my contact information and knows to call with any concerns or clinical changes.

## 2019-02-26 ENCOUNTER — PATIENT OUTREACH (OUTPATIENT)
Dept: ADMINISTRATIVE | Facility: HOSPITAL | Age: 64
End: 2019-02-26

## 2019-02-27 ENCOUNTER — OFFICE VISIT (OUTPATIENT)
Dept: FAMILY MEDICINE | Facility: CLINIC | Age: 64
End: 2019-02-27
Payer: COMMERCIAL

## 2019-02-27 VITALS
SYSTOLIC BLOOD PRESSURE: 130 MMHG | HEIGHT: 63 IN | WEIGHT: 141 LBS | TEMPERATURE: 98 F | DIASTOLIC BLOOD PRESSURE: 72 MMHG | HEART RATE: 86 BPM | BODY MASS INDEX: 24.98 KG/M2

## 2019-02-27 DIAGNOSIS — E78.00 PURE HYPERCHOLESTEROLEMIA: ICD-10-CM

## 2019-02-27 DIAGNOSIS — Z95.1 S/P CABG X 2: ICD-10-CM

## 2019-02-27 DIAGNOSIS — Z95.5 STENTED CORONARY ARTERY: ICD-10-CM

## 2019-02-27 DIAGNOSIS — Z11.59 NEED FOR HEPATITIS C SCREENING TEST: ICD-10-CM

## 2019-02-27 DIAGNOSIS — I10 ESSENTIAL HYPERTENSION: Primary | ICD-10-CM

## 2019-02-27 PROCEDURE — 99214 PR OFFICE/OUTPT VISIT, EST, LEVL IV, 30-39 MIN: ICD-10-PCS | Mod: S$GLB,,, | Performed by: FAMILY MEDICINE

## 2019-02-27 PROCEDURE — 99999 PR PBB SHADOW E&M-EST. PATIENT-LVL IV: CPT | Mod: PBBFAC,,, | Performed by: FAMILY MEDICINE

## 2019-02-27 PROCEDURE — 99214 OFFICE O/P EST MOD 30 MIN: CPT | Mod: S$GLB,,, | Performed by: FAMILY MEDICINE

## 2019-02-27 PROCEDURE — 99999 PR PBB SHADOW E&M-EST. PATIENT-LVL IV: ICD-10-PCS | Mod: PBBFAC,,, | Performed by: FAMILY MEDICINE

## 2019-02-27 RX ORDER — FOLIC ACID 1 MG/1
1 TABLET ORAL
COMMUNITY
Start: 2015-04-23 | End: 2019-02-27

## 2019-02-27 RX ORDER — DOXYCYCLINE 100 MG/1
100 CAPSULE ORAL 2 TIMES DAILY
Refills: 0 | COMMUNITY
Start: 2019-01-30 | End: 2019-02-27

## 2019-02-27 RX ORDER — ALPRAZOLAM 0.5 MG/1
0.5 TABLET ORAL
COMMUNITY
End: 2019-03-07

## 2019-02-27 RX ORDER — METHOTREXATE 25 MG/ML
50 INJECTION, SOLUTION INTRA-ARTERIAL; INTRAMUSCULAR; INTRAVENOUS
COMMUNITY
End: 2019-02-27

## 2019-02-27 NOTE — PATIENT INSTRUCTIONS
Take 1/2 of a metoprolol twice a day in the AM and PM.     Take a lisinopril at noon with lunch.

## 2019-02-27 NOTE — PROGRESS NOTES
Subjective:      Patient ID: Dulce Root is a 63 y.o. female.    Chief Complaint: Hospital Follow Up      The patient was recently hospitalized at [facility] for CAD.  The discharge summary from the hospitalization is copied below for reference and completeness.      Transitional Care Note    Family and/or Caretaker present at visit?  No.  Diagnostic tests reviewed/disposition: No diagnosic tests pending after this hospitalization.  Disease/illness education: she understands what she had.  Home health/community services discussion/referrals: Patient does not have home health established from hospital visit.  They do not need home health.  If needed, we will set up home health for the patient.   Establishment or re-establishment of referral orders for community resources: No other necessary community resources.   Discussion with other health care providers: No discussion with other health care providers necessary.     Problem List Items Addressed This Visit     Essential hypertension - Primary    Hyperlipidemia    Overview     The patient presents with hyperlipidemia.  The patient reports tolerating the medication well and is in excellent compliance.  There have been no medication side effects.  The patient denies chest pain, neuropathy, and myalgias.  The patient has reduced fat intake and has been exercising.  Current treatment has included the medications listed in the med card.    Lab Results   Component Value Date    CHOL 146 04/12/2018    CHOL 168 01/20/2015    CHOL 166 05/13/2013       Lab Results   Component Value Date    HDL 58 04/12/2018    HDL 42 01/20/2015    HDL 50 05/13/2013       Lab Results   Component Value Date    LDLCALC 70.2 04/12/2018    LDLCALC 95.0 01/20/2015    LDLCALC 89.0 05/13/2013       Lab Results   Component Value Date    TRIG 89 04/12/2018    TRIG 155 (H) 01/20/2015    TRIG 136 05/13/2013       Lab Results   Component Value Date    CHOLHDL 39.7 04/12/2018    CHOLHDL 25.0  01/20/2015    CHOLHDL 30.1 05/13/2013     Lab Results   Component Value Date    ALT 36 02/11/2019    AST 64 (H) 02/11/2019    ALKPHOS 77 02/11/2019    BILITOT 0.6 02/11/2019              S/P CABG x 2    Overview     8/17 CABG X 2 with SVG-LAD and SVG-distal RCA  SVG LAD due to absent LIMA after chest radiation and lymph node biopsy           Stented coronary artery    Overview     She had 2 stents placed in 2/2019.  She has had CAD and bypasses in the past in 2017.             Other Visit Diagnoses     Need for hepatitis C screening test            She has had a pulse in the low 100's and her bp has been controlled. She has increased the toprol to 12.5 mg po bid and she has had improvement in this. She is usually taking her lisinopril.  We discussed parameters to take it most of the time.    Christopher Tariq MD   Physician   Hospital Medicine   Discharge Summary   Signed                             []Hide copied text    []Hover for details      [facility] Medicine  Discharge Summary        Patient Name: Dulce Root  MRN: [id number]  Admission Date: 2/10/2019  Hospital Length of Stay: 6 days  Discharge Date and Time:  02/16/2019 12:26 PM  Attending Physician: Dr. Tariq  Discharging Provider: Christopher Tariq MD  Primary Care Provider: Patrick Hernandez MD        HPI:   The patient is a pleasant 64 y/o F here for SOB with assocaited cough, nature of which is progressive and moderate. She notes the duration of her current symptoms including SOB and cough have been ongoing for about 2-3 weeks. She completed a Zpack and then course of Doxy w/o improvement. She notes subjective fever and chills and productive cough. She denies overt CP. No weight changes/No orthopnea nor PND or edema. She has a hx of CAD and is s/p CABG in 2017 w/AVR at that time. Last TTE from 2017 w/pEF as well, per chart review. Also, she has a hx of ductal carcinoma in situ of the right breast in 09/2006, treated with lumpectomy and  radiation therapy.  In 08/2009, she was diagnosed with ductal carcinoma in situ of the left breast and in 08/2009, she had bilateral mastectomy. The left breast showed widespread high-grade DCIS and the right breast was without evidence of malignancy.  She also has a hx of Hodgkin's dz in early 90s s/p XRT/Chemo. She has some chronic changes on CXR to RUL, specifically bulky mediastinal calcified lymphadenopathy. A CXR today showed a large right pleural effusion and [facility] consulted for possible CAP and effusion.            Procedure(s) (LRB):  Left heart cath (Right)  ANGIOGRAM, CORONARY ARTERY (N/A)  Bypass graft study  Fractional Flow [place] (FFR), Coronary  ARTERIOGRAM, AORTIC ROOT (N/A)  Stent, Drug Eluting, Multi Vessel, Coronary       Hospital Course:   The patient was admitted for a large right pleural effusion, respiratory distress and suspected CAP. She was placed on IV abx and pulmonary was consulted. A repeat TTE was ordered as well which showed a change in her EF (from 2017 EF) to 35%. Cardiology was consulted. She underwent a R sided thoracentesis on 2/11 with about 400cc of yellow pleural fluid removed.  Patient clinically improved. Cardiology evaluated the patient and recommended diuresis and bumex was instituted. She diuresed well. Cardiology recommended proceeding with further ischemic workup with [facility]. Patient had a successful drug-eluting stent to the ostial circumflex and left main. She will be brought back for a staged PCI to RCA in 3 weeks. Patient clinically was doing better. Pleural fluid was consistent with CHF. Patient was eventually discharged home in stable condition.            Consults:           Consults (From admission, onward)         Status Ordering Provider       Inpatient consult to Cardiology  Once     Provider:  Augie Sam MD    Completed ANDREI BELL       Inpatient consult to Pulmonology  Once     Provider:  Jimbo Acosta Jr., DO    Completed ANDREI BELL  F.             No new Assessment & Plan notes have been filed under this hospital service since the last note was generated.  Service: Hospital Medicine           Final Active Diagnoses:     Diagnosis Date Noted POA    PRINCIPAL PROBLEM:  Acute respiratory distress [R06.03] 02/10/2019 Yes    Stented coronary artery [Z95.5]   Not Applicable    Community acquired pneumonia [J18.9]   Unknown    Hypoxia [R09.02]   Unknown    Abnormal EKG [R94.31]   Yes    Elevated brain natriuretic peptide (BNP) level [R79.89]   Yes    Acute systolic heart failure [I50.21] 02/12/2019 Yes    Bilateral pleural effusion [J90] 02/10/2019 Yes    S/P CABG x 2 [Z95.1] 08/21/2017 Not Applicable    S/P AVR (aortic valve replacement) - pericardial valve [Z95.2] 08/21/2017 Not Applicable    Hypothyroid [E03.9] 11/06/2012 Yes    Essential hypertension [I10] 11/06/2012 Yes       Problems Resolved During this Admission:         Discharged Condition: stable     Disposition: Home or Self Care     Follow Up:      Follow-up Information      Arturo Frank MD.    Specialty:  Hospitalist  Why:  Someone will call you within 48 hours for a follow up appointment   Contact information:  [address]  DISCHARGE CLINIC  [facility] [address]  [phone]                 Sujit Chaudhry MD In 2 weeks.    Specialties:  INTERVENTIONAL CARDIOLOGY, Cardiology  Why:  Call the office to schedule follow up appt for 2 weeks from now  Contact information:  [address] [facility] [address]  [phone]                 Brady Vasques MD In 2 weeks.    Specialty:  Pulmonary Disease  Why:  Call the office to schedule follow up appt for 2 wweks from now  Contact information:  [address]  [address]  [facility] [address]  [phone]                      Patient Instructions:           Ambulatory Referral to Discharge Clinic   Referral Priority: Routine Referral Type: Consultation   Referral Reason: Specialty Services Required   Number of Visits  Requested: 1      Diet Cardiac      Call MD for:  temperature >100.4      Call MD for:  persistent nausea and vomiting or diarrhea      Call MD for:  severe uncontrolled pain      Call MD for:  difficulty breathing or increased cough      Call MD for:  persistent dizziness, light-headedness, or visual disturbances      Call MD for:  increased confusion or weakness      No dressing needed      Activity as tolerated         Significant Diagnostic Studies:       Imaging Results                   CTA Chest Non-Coronary (Final result)  Result time 02/10/19 14:42:39                Final result by VANE Bello MD (02/10/19 14:42:39)                               Impression:        No pulmonary embolus seen.     Large pleural effusion on the right, small on the left with dependent atelectasis bilaterally, generalized patchy mosaic pattern ground-glass density suggesting interstitial edema versus underinflation, and streaky thickening along peribronchial vascular structures extending into the upper lobes bilaterally indicating infiltrate.     Bulky mediastinal calcified lymphadenopathy from prior granulomatous disease or chronic treated fungal disease or lymphoma.        Electronically signed by:     Bing Bello MD  Date:                                            02/10/2019  Time:                                            14:42                         Narrative:     EXAMINATION:  CTA CHEST NON CORONARY     CLINICAL HISTORY:  Chest pain, acute, PE suspected, high pretest prob;Hypoxemia     TECHNIQUE:  Low dose axial images, sagittal and coronal reformations were obtained from the thoracic inlet to the lung bases following the IV administration of 100 mL of Omnipaque 350.  Contrast timing was optimized to evaluate the pulmonary arteries.  MIP images were performed.  .  Automated exposure control used.     COMPARISON:  None     FINDINGS:  Postoperative changes are noted indicating prior sternotomy.  No  pulmonary embolus seen.  There is a vascular stent at the aortic origin.  There is extensive arterial vascular calcification.  There are bulky conglomerate calcified lymph nodes in the right paramediastinal paratracheal region and prevascular region consistent with prior granulomatous disease.  No soft tissue density lymphadenopathy evident.  The pericardium, trachea, esophagus are normal.  Soft tissues of the surrounding chest wall are normal.  There are scattered surgical clips in the upper abdomen.  There is a large right-sided pleural effusion and a small left-sided pleural effusion with compressive atelectasis bilaterally.  There is streaky peribronchovascular infiltrate extending along bronchovascular structures within the upper lobes bilaterally.  Patchy geographic pattern ground-glass density noted throughout the lungs.  No discrete nodule evident.  Bones grossly intact otherwise.                                                  X-Ray Chest PA And Lateral (Final result)  Result time 02/10/19 12:48:45                Final result by VANE Bello MD (02/10/19 12:48:45)                               Impression:        Underinflation, bilateral medial basilar streaky density indicating atelectasis with superimposed infiltrate not excluded.        Electronically signed by:     Bing Bello MD  Date:                                            02/10/2019  Time:                                            12:48                     Narrative:     EXAMINATION:  XR CHEST PA AND LATERAL     CLINICAL HISTORY:  Cough     TECHNIQUE:  PA and lateral views of the chest were performed.     COMPARISON:  September 26, 2017     FINDINGS:  Postoperative changes are noted indicating prior CABG.  Intracardiac stent noted.  There is bulky calcified masses in the right para mediastinum and AP window region likely conglomerate lymphadenopathy from prior granulomatous disease, fungal disease, or treated lymphoma.  The  cardiomediastinal silhouette is otherwise normal.  There is arterial vascular calcification.  Mildly prominent interstitial markings noted throughout the lungs.  There is nearly bilaterally symmetric apical pleural based thickening.  Lungs are underinflated and there is mild bilateral basilar atelectasis.  Small areas of infiltrate at the medial lung bases not excluded..                                                    Pending Diagnostic Studies:      None          Medications:  Reconciled Home Medications:       Medication List       START taking these medications    aspirin 81 MG EC tablet  Commonly known as:  ECOTRIN  Take 1 tablet (81 mg total) by mouth once daily.  Replaces:  aspirin 325 MG tablet      benzonatate 100 MG capsule  Commonly known as:  TESSALON  Take 1 capsule (100 mg total) by mouth 3 (three) times daily as needed for Cough.      clopidogrel 75 mg tablet  Commonly known as:  PLAVIX  Take 1 tablet (75 mg total) by mouth once daily.      furosemide 20 MG tablet  Commonly known as:  LASIX  Take 0.5 tablets (10 mg total) by mouth daily as needed (for >2lb weight gain).      lisinopril 2.5 MG tablet  Commonly known as:  PRINIVIL,ZESTRIL  Take 1 tablet (2.5 mg total) by mouth once daily.          CHANGE how you take these medications    metoprolol succinate 25 MG 24 hr tablet  Commonly known as:  TOPROL-XL  Take 0.5 tablets (12.5 mg total) by mouth once daily.  What changed:    · how much to take  · how to take this  · when to take this  · additional instructions          CONTINUE taking these medications    calcium carbonate 600 mg calcium (1,500 mg) Tab  Commonly known as:  OS-CALVIN  Take 600 mg by mouth 2 (two) times daily with meals.      citalopram 10 MG tablet  Commonly known as:  CELEXA  Take 1 tablet (10 mg total) by mouth once daily.      COMBIGAN 0.2-0.5 % Drop  Generic drug:  brimonidine-timolol  Place 1 drop into both eyes once daily.      folic acid 1 MG tablet  Commonly known as:   FOLVITE  Take 1 mg by mouth once daily.      levothyroxine 75 MCG tablet  Commonly known as:  SYNTHROID  TAKE 1 TABLET EVERY DAY      RESTASIS 0.05 % ophthalmic emulsion  Generic drug:  cycloSPORINE  Place 1 drop into both eyes 2 (two) times daily.      rosuvastatin 20 MG tablet  Commonly known as:  CRESTOR  TAKE 1 TABLET NIGHTLY      valACYclovir 1000 MG tablet  Commonly known as:  VALTREX  TAKE 1 TABLET EVERY 24 HOURS          STOP taking these medications    aspirin 325 MG tablet  Replaced by:  aspirin 81 MG EC tablet                Indwelling Lines/Drains at time of discharge:       Lines/Drains/Airways            None             Time spent on the discharge of patient: >30 minutes  Patient was seen and examined on the date of discharge and determined to be suitable for discharge.           Christopher Tariq MD  Department of Hospital Medicine  [facility]          Electronically signed by Christopher Tariq MD at 2/16/2019  2:04 PM   Dr. Chaudhry and Dr. Hernandez were her doctors in the hospital.     Past Medical History:  Past Medical History:   Diagnosis Date    Allergy     Breast cancer 2008    Hodgkin lymphoma     Hyperlipidemia     Hypertension     Osteoporosis     Pemphigoid     Thyroid disease      Past Surgical History:   Procedure Laterality Date    ANGIOGRAM, CORONARY ARTERY N/A 2/15/2019    [facility] CATH    AORTOCORONARY BYPASS-CABG N/A 8/21/2017    [facility] OR [address] [place] [address]    ARTERIOGRAM, AORTIC ROOT N/A 2/15/2019    [facility]    BREAST BIOPSY      BREAST RECONSTRUCTION      bilateral mastectomy    Bypass graft study  2/15/2019    [facility] at [facility]    COSMETIC SURGERY      bereast reconstruction    EYE SURGERY      eye lids    Fractional Flow [place] (FFR), Coronary  2/15/2019    [facility] at [facility] CATH    HARVEST-VEIN-ENDOVASCULAR Left  8/21/2017    [facility] at [facility] OR 2ND FLR    HEART CATH-LEFT Right 4/25/2017    [facility] CATH    Left heart cath Right 2/15/2019    [facility]    LYMPHADENECTOMY      MASTECTOMY      REPLACEMENT-VALVE-AORTIC N/A 8/21/2017    Per[facility] at [facility] OR 2ND FLR    SKIN BIOPSY      SPLENECTOMY, TOTAL      Stent, Drug Eluting, Multi Vessel, Coronary  2/15/2019    [facility] at [facility]    TUMOR REMOVAL       Review of patient's allergies indicates:   Allergen Reactions    Amlodipine Shortness Of Breath and Other (See Comments)     unknown  Other reaction(s): Swelling  unknown  unknown  Other reaction(s): Swelling  unknown  Other reaction(s): Swelling  unknown    Levofloxacin Hives and Swelling    Sulfa (sulfonamide antibiotics) Shortness Of Breath, Itching and Other (See Comments)     elevated blood pressure    Ciprofloxacin Itching    Iodinated contrast- oral and iv dye     Iodine      Other reaction(s): Unknown    Latex      Other reaction(s): Itching    Latex, natural rubber Itching     Current Outpatient Medications on File Prior to Visit   Medication Sig Dispense Refill    aspirin (ECOTRIN) 81 MG EC tablet Take 1 tablet (81 mg total) by mouth once daily.  0    brimonidine-timolol (COMBIGAN) 0.2-0.5 % Drop Place 1 drop into both eyes once daily.       calcium carbonate (OS-CALVIN) 600 mg (1,500 mg) Tab Take 600 mg by mouth 2 (two) times daily with meals.      citalopram (CELEXA) 10 MG tablet Take 1 tablet (10 mg total) by mouth once daily. 90 tablet 3    clopidogrel (PLAVIX) 75 mg tablet Take 1 tablet (75 mg total) by mouth once daily. 30 tablet 1    digoxin (LANOXIN) 125 mcg tablet Take 1 tablet (125 mcg total) by mouth once daily. 30 tablet 11    levothyroxine (SYNTHROID) 75 MCG tablet TAKE 1 TABLET EVERY DAY 90 tablet 3    metoprolol succinate (TOPROL-XL) 25 MG 24  hr tablet Take 0.5 tablets (12.5 mg total) by mouth once daily. 90 tablet 1    multivitamin with minerals tablet Take 1 tablet by mouth once daily.      RESTASIS 0.05 % ophthalmic emulsion Place 1 drop into both eyes 2 (two) times daily.  4    rosuvastatin (CRESTOR) 20 MG tablet TAKE 1 TABLET NIGHTLY (Patient taking differently: TAKE 1 TABLET every OTHER night) 90 tablet 3    valacyclovir (VALTREX) 1000 MG tablet TAKE 1 TABLET EVERY 24 HOURS  3    ALPRAZolam (XANAX) 0.5 MG tablet Take 0.5 mg by mouth.      [] benzonatate (TESSALON) 100 MG capsule Take 1 capsule (100 mg total) by mouth 3 (three) times daily as needed for Cough. 20 capsule 0    [DISCONTINUED] doxycycline (VIBRAMYCIN) 100 MG Cap Take 100 mg by mouth 2 (two) times daily.  0    [DISCONTINUED] folic acid (FOLVITE) 1 MG tablet Take 1 mg by mouth.      [DISCONTINUED] methotrexate 25 mg/mL injection Inject 50 mg/m2 into the muscle.       No current facility-administered medications on file prior to visit.      Social History     Socioeconomic History    Marital status:      Spouse name: Not on file    Number of children: Not on file    Years of education: Not on file    Highest education level: Not on file   Social Needs    Financial resource strain: Not on file    Food insecurity - worry: Not on file    Food insecurity - inability: Not on file    Transportation needs - medical: Not on file    Transportation needs - non-medical: Not on file   Occupational History    Not on file   Tobacco Use    Smoking status: Former Smoker     Packs/day: 1.00     Years: 20.00     Pack years: 20.00     Types: Cigarettes     Last attempt to quit: 1981     Years since quittin.3    Smokeless tobacco: Never Used   Substance and Sexual Activity    Alcohol use: No     Comment: 2 drinks/month    Drug use: No    Sexual activity: Yes     Partners: Male   Other Topics Concern    Not on file   Social History Narrative    Not on file  "    Family History   Problem Relation Age of Onset    Hypertension Mother     Hypertension Father     Cancer Neg Hx        Review of Systems   Constitutional: Negative for fever.   Respiratory: Negative for cough, chest tightness, shortness of breath and wheezing.    Cardiovascular: Positive for palpitations. Negative for chest pain and leg swelling.   Gastrointestinal: Negative for abdominal distention, abdominal pain and blood in stool.   Genitourinary: Negative for dysuria and hematuria.       Objective:     /72   Pulse 86   Temp 98.3 °F (36.8 °C) (Oral)   Ht 5' 3" (1.6 m)   Wt 64 kg (141 lb)   BMI 24.98 kg/m²     Physical Exam   Constitutional: She appears well-developed and well-nourished. She is cooperative.   HENT:   Head: Normocephalic and atraumatic.   Right Ear: Tympanic membrane, external ear and ear canal normal.   Left Ear: Tympanic membrane, external ear and ear canal normal.   Nose: Nose normal.   Mouth/Throat: Uvula is midline and mucous membranes are normal. No oral lesions. No oropharyngeal exudate, posterior oropharyngeal edema or posterior oropharyngeal erythema.   Eyes: EOM and lids are normal. Pupils are equal, round, and reactive to light. Right eye exhibits no discharge. Left eye exhibits no discharge. Right conjunctiva is not injected. Right conjunctiva has no hemorrhage. Left conjunctiva is not injected. Left conjunctiva has no hemorrhage. No scleral icterus. Right eye exhibits no nystagmus. Left eye exhibits no nystagmus.   Neck: Normal range of motion and full passive range of motion without pain. Neck supple. No JVD present. No tracheal tenderness present. Carotid bruit is not present. No tracheal deviation present. No thyroid mass and no thyromegaly present.   Cardiovascular: Normal rate, regular rhythm, S1 normal and S2 normal.   No murmur heard.  Pulses:       Carotid pulses are 2+ on the right side, and 2+ on the left side.       Radial pulses are 2+ on the right side, " and 2+ on the left side.        Posterior tibial pulses are 2+ on the right side, and 2+ on the left side.   Pulmonary/Chest: Effort normal and breath sounds normal. No respiratory distress. She has no wheezes. She has no rhonchi. She has no rales.   Abdominal: Soft. Normal appearance, normal aorta and bowel sounds are normal. She exhibits no distension, no abdominal bruit, no pulsatile midline mass and no mass. There is no hepatosplenomegaly. There is no tenderness. There is no rebound.   Musculoskeletal:        Right knee: She exhibits no swelling. No tenderness found.        Left knee: She exhibits no swelling. No tenderness found.   Lymphadenopathy:        Head (right side): No submental and no submandibular adenopathy present.        Head (left side): No submental and no submandibular adenopathy present.     She has no cervical adenopathy.   Neurological: She is alert. She has normal strength. No cranial nerve deficit or sensory deficit.   Skin: Skin is warm and dry. No rash noted. No cyanosis. Nails show no clubbing.   Psychiatric: She has a normal mood and affect. Her speech is normal and behavior is normal. Thought content normal. Cognition and memory are normal.     Assessment:     1. Essential hypertension    2. Stented coronary artery    3. S/P CABG x 2    4. Need for hepatitis C screening test    5. Pure hypercholesterolemia        Plan:     Problem List Items Addressed This Visit     Essential hypertension - Primary    Relevant Orders    Comprehensive metabolic panel (Completed)    Hyperlipidemia    Relevant Orders    Lipid panel (Completed)    S/P CABG x 2    Stented coronary artery      Other Visit Diagnoses     Need for hepatitis C screening test        Relevant Orders    Hepatitis C antibody        I instructed on how to take her bp meds at this time. She has some lasix and will be tracking her weights daily and take a spot dose prn. She is on the digital HTN list and is tracking her bp this way.   RTC in 3 months.

## 2019-03-01 ENCOUNTER — LAB VISIT (OUTPATIENT)
Dept: LAB | Facility: HOSPITAL | Age: 64
End: 2019-03-01
Attending: FAMILY MEDICINE
Payer: COMMERCIAL

## 2019-03-01 DIAGNOSIS — E78.00 PURE HYPERCHOLESTEROLEMIA: ICD-10-CM

## 2019-03-01 DIAGNOSIS — Z11.59 NEED FOR HEPATITIS C SCREENING TEST: ICD-10-CM

## 2019-03-01 DIAGNOSIS — I10 ESSENTIAL HYPERTENSION: ICD-10-CM

## 2019-03-01 LAB
ALBUMIN SERPL BCP-MCNC: 3.5 G/DL
ALP SERPL-CCNC: 82 U/L
ALT SERPL W/O P-5'-P-CCNC: 15 U/L
ANION GAP SERPL CALC-SCNC: 4 MMOL/L
AST SERPL-CCNC: 24 U/L
BILIRUB SERPL-MCNC: 0.6 MG/DL
BUN SERPL-MCNC: 20 MG/DL
CALCIUM SERPL-MCNC: 9.9 MG/DL
CHLORIDE SERPL-SCNC: 102 MMOL/L
CHOLEST SERPL-MCNC: 166 MG/DL
CHOLEST/HDLC SERPL: 3.3 {RATIO}
CO2 SERPL-SCNC: 31 MMOL/L
CREAT SERPL-MCNC: 0.6 MG/DL
EST. GFR  (AFRICAN AMERICAN): >60 ML/MIN/1.73 M^2
EST. GFR  (NON AFRICAN AMERICAN): >60 ML/MIN/1.73 M^2
GLUCOSE SERPL-MCNC: 83 MG/DL
HDLC SERPL-MCNC: 51 MG/DL
HDLC SERPL: 30.7 %
LDLC SERPL CALC-MCNC: 98.8 MG/DL
NONHDLC SERPL-MCNC: 115 MG/DL
POTASSIUM SERPL-SCNC: 4.6 MMOL/L
PROT SERPL-MCNC: 7.4 G/DL
SODIUM SERPL-SCNC: 137 MMOL/L
TRIGL SERPL-MCNC: 81 MG/DL

## 2019-03-01 PROCEDURE — 86803 HEPATITIS C AB TEST: CPT

## 2019-03-01 PROCEDURE — 36415 COLL VENOUS BLD VENIPUNCTURE: CPT | Mod: PO

## 2019-03-01 PROCEDURE — 80053 COMPREHEN METABOLIC PANEL: CPT

## 2019-03-01 PROCEDURE — 80061 LIPID PANEL: CPT

## 2019-03-04 LAB — HCV AB SERPL QL IA: NEGATIVE

## 2019-03-05 NOTE — PROGRESS NOTES
The labs indicate no sign of viral hepatitis c on the screening test.    The electrolytes and cholesterol panel are all fine at this time.  Please recheck these in 1 year.

## 2019-03-11 ENCOUNTER — PATIENT MESSAGE (OUTPATIENT)
Dept: FAMILY MEDICINE | Facility: CLINIC | Age: 64
End: 2019-03-11

## 2019-03-12 ENCOUNTER — TELEPHONE (OUTPATIENT)
Dept: CARDIOLOGY | Facility: CLINIC | Age: 64
End: 2019-03-12

## 2019-03-12 NOTE — TELEPHONE ENCOUNTER
Spoke to seun, stated pt. Procedure wasn't approved and a [id number] is needed. Informed her I will informed Dr. Chaudhry.

## 2019-03-12 NOTE — TELEPHONE ENCOUNTER
----- Message from Nohemi Amanda sent at 3/12/2019  3:57 PM CDT -----  Good Afternoon,     This patients procedure is still not approved for tomorrow.  It is under medical director review now.  The review nurse said Dr. Chaudhry can call and do a peer to peer or his nurse can call and speak to a review nurse to get this approved. The phone number for the peer to peer and nurse review is (aim) [phone].  Please let me know either way.  Thank you.

## 2019-03-12 NOTE — TELEPHONE ENCOUNTER
----- Message from Ralf Kevin sent at 3/12/2019  3:57 PM CDT -----  Type: Needs Medical Advice    Who Called:  Sydney    Best Call Back Number: [phone]    Additional Information: Caller states that she would like a callback regarding the patient's procedure on 03/13 and that the patient's authorization wasn't approved.

## 2019-03-14 DIAGNOSIS — I20.0 ANGINA PECTORIS, CRESCENDO: ICD-10-CM

## 2019-03-14 DIAGNOSIS — Z95.1 S/P CABG (CORONARY ARTERY BYPASS GRAFT): Primary | ICD-10-CM

## 2019-03-14 DIAGNOSIS — Z95.1 S/P CABG X 2: ICD-10-CM

## 2019-03-20 PROBLEM — Z95.1 S/P CABG (CORONARY ARTERY BYPASS GRAFT): Status: ACTIVE | Noted: 2019-03-20

## 2019-03-20 NOTE — PROGRESS NOTES
Last 5 Patient Entered Readings                                      Current 30 Day Average: 126/74     Recent Readings 3/16/2019 3/14/2019 3/13/2019 3/12/2019 3/11/2019    SBP (mmHg) 123 121 126 129 124    DBP (mmHg) 64 67 69 91 73    Pulse 71 77 82 82 78        Digital Medicine: Health  Follow Up    Lifestyle Modifications:    1.Dietary Modifications (Sodium intake <2,000mg/day, food labels, dining out):Patient report she still watching her sodium intake.     2.Physical Activity: Patient reports her physical activity has not changed.    3.Medication Therapy: Patient has beencompliant with the medication regimen.    4.Patient has the following medication side effects/concerns: Patient denies any s/s of hypotension (dizziness, LH,nausea, or fatigue) with low readings. Patient denies any s/s of hypertension (CP,SOB,severe headaches, or change in vision  (Frequency/Alleviating factors/Precipitating factors, etc.)     Follow up with Mrs. Dulce Root completed. No further questions or concerns.     Plan: Will continue to follow up to achieve health goals.

## 2019-03-22 ENCOUNTER — PATIENT MESSAGE (OUTPATIENT)
Dept: FAMILY MEDICINE | Facility: CLINIC | Age: 64
End: 2019-03-22

## 2019-03-22 DIAGNOSIS — I10 ESSENTIAL HYPERTENSION: Primary | ICD-10-CM

## 2019-03-22 RX ORDER — LISINOPRIL 2.5 MG/1
2.5 TABLET ORAL DAILY
Qty: 30 TABLET | Refills: 0 | Status: SHIPPED | OUTPATIENT
Start: 2019-03-22 | End: 2019-04-22 | Stop reason: SDUPTHER

## 2019-03-26 ENCOUNTER — PATIENT OUTREACH (OUTPATIENT)
Dept: OTHER | Facility: OTHER | Age: 64
End: 2019-03-26

## 2019-03-26 NOTE — PROGRESS NOTES
HPI:  Called Ms. Dulce Root for hypertension follow-up. Patient reports adherence to medication regimen with no complaints. Scheduled to have stent placed on Monday and has no additional questions or concerns at this time.     Last 5 Patient Entered Readings                                      Current 30 Day Average: 130/74     Recent Readings 3/22/2019 3/21/2019 3/20/2019 3/16/2019 3/14/2019    SBP (mmHg) 121 124 157 123 121    DBP (mmHg) 59 72 85 64 67    Pulse 77 69 73 71 77        Patient denies s/s of hypotension (lightheadedness, dizziness, nausea, fatigue) associated with low readings. Instructed patient to inform me if this occurs, patient confirms understanding.    Patient denies s/s of hypertension (SOB, CP, severe headaches, changes in vision) associated with high readings. Instructed patient to go to the ED if BP >180/110 and accompanied by hypertensive s/s, patient confirms understanding.    Assessment:  Patient's current 30-day average is right at goal of <130/80 mmHg.    Plan:  Continue current regimen.  Patients health , Annel Shaffer, will be following up every 3-4 weeks.   I will continue to monitor regularly and will follow-up in 4 to 5 weeks, sooner if blood pressure begins to trend upward or downward.     Current medication regimen:  Hypertension Medications             lisinopril (PRINIVIL,ZESTRIL) 2.5 MG tablet Take 1 tablet (2.5 mg total) by mouth once daily.    metoprolol succinate (TOPROL-XL) 25 MG 24 hr tablet Take 0.5 tablets (12.5 mg total) by mouth once daily.        Patient has my contact information and knows to call with any concerns or clinical changes.

## 2019-04-17 ENCOUNTER — PATIENT MESSAGE (OUTPATIENT)
Dept: FAMILY MEDICINE | Facility: CLINIC | Age: 64
End: 2019-04-17

## 2019-04-17 ENCOUNTER — PATIENT MESSAGE (OUTPATIENT)
Dept: CARDIOLOGY | Facility: CLINIC | Age: 64
End: 2019-04-17

## 2019-04-17 DIAGNOSIS — I10 ESSENTIAL HYPERTENSION: ICD-10-CM

## 2019-04-17 RX ORDER — CLOPIDOGREL BISULFATE 75 MG/1
75 TABLET ORAL DAILY
Qty: 30 TABLET | Refills: 1 | Status: SHIPPED | OUTPATIENT
Start: 2019-04-17 | End: 2019-04-17

## 2019-04-17 RX ORDER — CLOPIDOGREL BISULFATE 75 MG/1
75 TABLET ORAL DAILY
Qty: 30 TABLET | Refills: 12 | Status: SHIPPED | OUTPATIENT
Start: 2019-04-17 | End: 2020-01-28 | Stop reason: SDUPTHER

## 2019-04-17 RX ORDER — LISINOPRIL 2.5 MG/1
2.5 TABLET ORAL DAILY
Qty: 30 TABLET | Refills: 0 | Status: CANCELLED | OUTPATIENT
Start: 2019-04-17

## 2019-04-17 RX ORDER — LISINOPRIL 2.5 MG/1
2.5 TABLET ORAL DAILY
Qty: 30 TABLET | Refills: 0 | OUTPATIENT
Start: 2019-04-17

## 2019-04-17 NOTE — TELEPHONE ENCOUNTER
The patient is requesting an ACE inhibitor to be refilled.   These have been found to have a small risk of cancer if used over a long time.   I am changing folks if they agree to using other meds.   Please discuss with the patient and see if they are agreeable to change to something different and follow up in 2-3 weeks on the blood pressure with my nurse practitioner to ensure that the BP is still controlled?  If so, change the medicine to losartan 25 mg a day and give #30 of each and book an appt for a physical with one of the NP's in 2 weeks.      Medications Ordered This Encounter   Medications    clopidogrel (PLAVIX) 75 mg tablet     Sig: Take 1 tablet (75 mg total) by mouth once daily.     Dispense:  30 tablet     Refill:  1    varicella-zoster gE-AS01B, PF, (SHINGRIX, PF,) 50 mcg/0.5 mL injection     Sig: Inject 0.5 mLs into the muscle once. for 1 dose     Dispense:  0.5 mL     Refill:  0     No orders of the defined types were placed in this encounter.

## 2019-04-17 NOTE — TELEPHONE ENCOUNTER
----- Message from Akila Washington sent at 4/17/2019  3:43 PM CDT -----  Contact: self [phone]  .Type:  RX Refill Request    Who Called: Dulce Root  Refill or New Rx:new  RX Name and Strength:Plavix  How is the patient currently taking it? (ex. 1XDay):Daily  Is this a 30 day or 90 day RX:30  Preferred Pharmacy with phone number:.    [facility]/pharmacy #[id number] - [place], [place]  [address] & [address] [address]  Phone: [phone] Fax: [phone]      Local or Mail Order:local  Ordering Provider:Dr. Hernandez  Would the patient rather a call back or a response via [facility]? Call back  Best Call Back Number:[phone] or [phone]  Additional Information:         .Type:  RX Refill Request    Who Called: Dulce Root  Refill or New Rx:refill  RX Name and Strength:Lisinopril   How is the patient currently taking it? (ex. 1XDay):Daily  Is this a 30 day or 90 day RX:30  Preferred Pharmacy with phone number:.  [facility]/pharmacy #[id number] - [place], [place]  [address] [place] [address] & [address] [address]  Phone: [phone] Fax: [phone]      Local or Mail Order:Local  Ordering Provider:Dr. Hernandez  Would the patient rather a call back or a response via [facility]? Call back  Best Call Back Number:[phone]  Additional Information:

## 2019-04-17 NOTE — TELEPHONE ENCOUNTER
The patient is requesting an ACE inhibitor to be refilled.   These have been found to have a small risk of cancer if used over a long time.   I am changing folks if they agree to using other meds.   Please discuss with the patient and see if they are agreeable to change to something different and follow up in 2-3 weeks on the blood pressure with my nurse practitioner to ensure that the BP is still controlled?  If so, change the medicine to losartan 25 mg a day and give #30 of each and book an appt for a physical with one of the NP's in 2 weeks.

## 2019-04-18 DIAGNOSIS — I10 ESSENTIAL HYPERTENSION: ICD-10-CM

## 2019-04-22 ENCOUNTER — TELEPHONE (OUTPATIENT)
Dept: FAMILY MEDICINE | Facility: CLINIC | Age: 64
End: 2019-04-22

## 2019-04-22 RX ORDER — LISINOPRIL 2.5 MG/1
TABLET ORAL
Qty: 30 TABLET | Refills: 0 | Status: SHIPPED | OUTPATIENT
Start: 2019-04-22 | End: 2019-04-28

## 2019-04-22 NOTE — TELEPHONE ENCOUNTER
----- Message from Kimberley Flores sent at 4/22/2019  3:30 PM CDT -----  Type:  RX Refill Request    Who Called: [facility]   Refill or New Rx:refill  RX Name and Strength: lisinopril (PRINIVIL,ZESTRIL) 2.5 MG tablet  How is the patient currently taking it? (ex. 1XDay): 1 x a day   Is this a 30 day or 90 day RX:30  Preferred Pharmacy with phone number:   [facility]/pharmacy #[id number] - [place] - [address] [place]  [address] [place] [address]  Phone: [phone] Fax: [phone]  Local or Mail Order: local   Ordering Provider: Dr. Hernandez   Would the patient rather a call back or a response via [facility]? Call back   Best Call Back Number: [phone] (home)   Additional Information:

## 2019-04-23 NOTE — TELEPHONE ENCOUNTER
Previously you had asked if patient wanted to change from Lisinopril. Are you not wanting to change the medication at this time?

## 2019-04-28 RX ORDER — TELMISARTAN 20 MG/1
20 TABLET ORAL DAILY
Qty: 90 TABLET | Refills: 3 | Status: SHIPPED | OUTPATIENT
Start: 2019-04-28 | End: 2020-01-28 | Stop reason: SDUPTHER

## 2019-04-28 NOTE — TELEPHONE ENCOUNTER
Stop the lisinopril order at the pharmacy.     Discontinuing will prevent future dispenses.     [facility]/pharmacy #[id number] - [place], [place] - [address] ([phone])       The patient is currently on an ACE inhibitor medication named lisinopril and that type of medicine has been found to have a small risk of cancer.  Due to this, I would like to stop it and start the following medication.  A different blood pressure medicine may treat the blood pressure a little differently than the other medication that is being discontinued.  Therefore, we will need to schedule the patient to be seen in the clinic by my nurse practitioner so that the medication can be adjusted if needed.      I have signed for the following medication to be sent to the pharmacy.  Please call the patient and ask the patient to change to the new medication and stop the old one above.     No orders of the defined types were placed in this encounter.      Medications Ordered This Encounter   Medications    telmisartan (MICARDIS) 20 MG Tab     Sig: Take 1 tablet (20 mg total) by mouth once daily.     Dispense:  90 tablet     Refill:  3       At the same time, if there are any health maintenance items that need to be completed, please arrange for that to be done through the NP at the time of the visit.      Health Maintenance Due   Topic Date Due    TETANUS VACCINE  05/05/1974    Pneumococcal Vaccine (Highest Risk) (1 of 3 - PCV13) 05/05/1975    Zoster Vaccine  05/05/2016

## 2019-05-03 ENCOUNTER — PATIENT MESSAGE (OUTPATIENT)
Dept: HEMATOLOGY/ONCOLOGY | Facility: CLINIC | Age: 64
End: 2019-05-03

## 2019-05-08 ENCOUNTER — PATIENT MESSAGE (OUTPATIENT)
Dept: FAMILY MEDICINE | Facility: CLINIC | Age: 64
End: 2019-05-08

## 2019-05-08 RX ORDER — METOPROLOL SUCCINATE 25 MG/1
12.5 TABLET, EXTENDED RELEASE ORAL 2 TIMES DAILY
Qty: 90 TABLET | Refills: 3 | Status: SHIPPED | OUTPATIENT
Start: 2019-05-08 | End: 2019-05-21

## 2019-05-10 ENCOUNTER — TELEPHONE (OUTPATIENT)
Dept: HEMATOLOGY/ONCOLOGY | Facility: CLINIC | Age: 64
End: 2019-05-10

## 2019-05-10 ENCOUNTER — PATIENT OUTREACH (OUTPATIENT)
Dept: OTHER | Facility: OTHER | Age: 64
End: 2019-05-10

## 2019-05-10 NOTE — PROGRESS NOTES
Last 5 Patient Entered Readings                                      Current 30 Day Average: 135/77     Recent Readings 5/9/2019 5/9/2019 5/7/2019 5/5/2019 5/5/2019    SBP (mmHg) 138 133 146 138 148    DBP (mmHg) 78 74 81 79 85    Pulse 84 84 81 86 81        HC called today to complete introduction. BP is slightly above goal but stable. WCB in 1 week.

## 2019-05-10 NOTE — PROGRESS NOTES
Last 5 Patient Entered Readings                                      Current 30 Day Average: 135/77     Recent Readings 5/9/2019 5/9/2019 5/7/2019 5/5/2019 5/5/2019    SBP (mmHg) 138 133 146 138 148    DBP (mmHg) 78 74 81 79 85    Pulse 84 84 81 86 81          Digital Medicine: Health  Follow Up/Introduction of new health      Left voicemail to follow up with Yazmin Dulce GORMAN Ivett.  Current BP average 135/77 mmHg is not at goal, 130/80.

## 2019-05-15 ENCOUNTER — TELEPHONE (OUTPATIENT)
Dept: HEMATOLOGY/ONCOLOGY | Facility: CLINIC | Age: 64
End: 2019-05-15

## 2019-05-15 NOTE — PROGRESS NOTES
Subjective:       Patient ID: Dulce Root is a 64 y.o. female.    Chief Complaint: No chief complaint on file.    HPI Ms. Root is a very pleasant 64-year-old female with history of bilateral ductal carcinoma in situ and a remote history of Hodgkin's disease.    She was hospitalized for a week in February with heart failure and had angiography with 2 stents placed.  She then returned had a 3rd stent placed subsequently.  She has had her medications adjusted and overall has been doing much better.  She is not able to walk without getting short of breath and just went to [employer].  Appetite and bowel function has been good and she has no unusual pain.    Breast history:  She  developed ductal      carcinoma in situ of the right breast in 09/2006, treated with lumpectomy     and radiation therapy.  In 08/2009, she was diagnosed with ductal carcinoma  in situ of the left breast and in 08/2009, she had bilateral mastectomy.      The left breast showed widespread high-grade DCIS and the right breast was    without evidence of malignancy.        She has a remote history of Hodgkin's disease, originally diagnosed in   1991, treated with radiation therapy and then treated with salvage   chemotherapy with ABVD on protocol E-5489 in 1996.    In August 2017 she underwent aortic valve replacement and coronary artery bypass grafting by Dr. Baxter.  Review of Systems   Constitutional: Negative for activity change, appetite change, fatigue and unexpected weight change.   Eyes: Negative for visual disturbance.   Respiratory: Positive for shortness of breath (With exertion). Negative for cough.    Cardiovascular: Negative for chest pain.   Gastrointestinal: Negative for abdominal pain, diarrhea and nausea.   Genitourinary: Negative for flank pain and frequency.   Musculoskeletal: Negative for back pain.   Skin: Negative for rash.   Neurological: Negative for headaches.   Hematological: Negative for adenopathy.    Psychiatric/Behavioral: Negative for dysphoric mood. The patient is not nervous/anxious.        Objective:      Physical Exam   Constitutional: She is oriented to person, place, and time. She appears well-developed and well-nourished.   Eyes: No scleral icterus.   Cardiovascular: Normal rate and regular rhythm.   Pulmonary/Chest:       The bilateral breast reconstructions show no nodules or erythema   Abdominal: Soft. She exhibits no mass. There is no tenderness.   Lymphadenopathy:     She has no cervical adenopathy.     She has no axillary adenopathy.        Right: No supraclavicular adenopathy present.        Left: No supraclavicular adenopathy present.   Neurological: She is alert and oriented to person, place, and time.   Skin: No rash noted.   Psychiatric: She has a normal mood and affect. Her behavior is normal. Thought content normal.       Assessment:       1. Ductal carcinoma in situ (DCIS) of breast, unspecified laterality        Plan:         Return to clinic in 1 year.

## 2019-05-15 NOTE — TELEPHONE ENCOUNTER
Called and spoke with patient in regards to moving her apt time to 12 as  will be in a meeting at 11. Patient agreed, apt time changed

## 2019-05-16 ENCOUNTER — OFFICE VISIT (OUTPATIENT)
Dept: HEMATOLOGY/ONCOLOGY | Facility: CLINIC | Age: 64
End: 2019-05-16
Payer: COMMERCIAL

## 2019-05-16 VITALS
SYSTOLIC BLOOD PRESSURE: 124 MMHG | OXYGEN SATURATION: 97 % | HEIGHT: 63 IN | HEART RATE: 78 BPM | DIASTOLIC BLOOD PRESSURE: 59 MMHG | WEIGHT: 147.69 LBS | TEMPERATURE: 98 F | RESPIRATION RATE: 20 BRPM | BODY MASS INDEX: 26.17 KG/M2

## 2019-05-16 DIAGNOSIS — D05.10 DUCTAL CARCINOMA IN SITU (DCIS) OF BREAST, UNSPECIFIED LATERALITY: Primary | ICD-10-CM

## 2019-05-16 PROCEDURE — 3008F BODY MASS INDEX DOCD: CPT | Mod: CPTII,S$GLB,, | Performed by: INTERNAL MEDICINE

## 2019-05-16 PROCEDURE — 99213 OFFICE O/P EST LOW 20 MIN: CPT | Mod: S$GLB,,, | Performed by: INTERNAL MEDICINE

## 2019-05-16 PROCEDURE — 3074F SYST BP LT 130 MM HG: CPT | Mod: CPTII,S$GLB,, | Performed by: INTERNAL MEDICINE

## 2019-05-16 PROCEDURE — 3078F DIAST BP <80 MM HG: CPT | Mod: CPTII,S$GLB,, | Performed by: INTERNAL MEDICINE

## 2019-05-16 PROCEDURE — 99213 PR OFFICE/OUTPT VISIT, EST, LEVL III, 20-29 MIN: ICD-10-PCS | Mod: S$GLB,,, | Performed by: INTERNAL MEDICINE

## 2019-05-16 PROCEDURE — 99999 PR PBB SHADOW E&M-EST. PATIENT-LVL IV: ICD-10-PCS | Mod: PBBFAC,,, | Performed by: INTERNAL MEDICINE

## 2019-05-16 PROCEDURE — 3008F PR BODY MASS INDEX (BMI) DOCUMENTED: ICD-10-PCS | Mod: CPTII,S$GLB,, | Performed by: INTERNAL MEDICINE

## 2019-05-16 PROCEDURE — 99999 PR PBB SHADOW E&M-EST. PATIENT-LVL IV: CPT | Mod: PBBFAC,,, | Performed by: INTERNAL MEDICINE

## 2019-05-16 PROCEDURE — 3078F PR MOST RECENT DIASTOLIC BLOOD PRESSURE < 80 MM HG: ICD-10-PCS | Mod: CPTII,S$GLB,, | Performed by: INTERNAL MEDICINE

## 2019-05-16 PROCEDURE — 3074F PR MOST RECENT SYSTOLIC BLOOD PRESSURE < 130 MM HG: ICD-10-PCS | Mod: CPTII,S$GLB,, | Performed by: INTERNAL MEDICINE

## 2019-05-18 DIAGNOSIS — I10 ESSENTIAL HYPERTENSION: ICD-10-CM

## 2019-05-18 RX ORDER — LISINOPRIL 2.5 MG/1
TABLET ORAL
Qty: 30 TABLET | Refills: 0 | OUTPATIENT
Start: 2019-05-18

## 2019-05-21 ENCOUNTER — PATIENT OUTREACH (OUTPATIENT)
Dept: OTHER | Facility: OTHER | Age: 64
End: 2019-05-21

## 2019-05-21 DIAGNOSIS — I10 ESSENTIAL HYPERTENSION: Primary | ICD-10-CM

## 2019-05-21 RX ORDER — METOPROLOL SUCCINATE 25 MG/1
25 TABLET, EXTENDED RELEASE ORAL DAILY
Qty: 90 TABLET | Refills: 3
Start: 2019-05-21 | End: 2020-01-28 | Stop reason: SDUPTHER

## 2019-05-21 NOTE — PROGRESS NOTES
HPI:  Called Ms. Dulce Root for hypertension follow-up. Patient currently taking telmisartan in the morning and metoprolol in the evening with no complaints. She is unsure what caused BP spikes as she is feeling great and has not made any changes. Charged cuff yesterday and readings slightly elevated in the morning prior to medication and much lower after dose.     Last 5 Patient Entered Readings                                      Current 30 Day Average: 139/79     Recent Readings 5/21/2019 5/21/2019 5/20/2019 5/20/2019 5/19/2019    SBP (mmHg) 130 147 167 126 164    DBP (mmHg) 81 80 97 76 84    Pulse 86 82 84 81 91          Patient denies s/s of hypotension (lightheadedness, dizziness, nausea, fatigue) associated with low readings. Instructed patient to inform me if this occurs, patient confirms understanding.    Patient denies s/s of hypertension (SOB, CP, severe headaches, changes in vision) associated with high readings. Instructed patient to go to the ED if BP >180/110 and accompanied by hypertensive s/s, patient confirms understanding.    Assessment:  Patient's current 30-day average is slightly above goal of <130/80 mmHg.    Plan:  Continue current regimen.  Encouraged patient to continue checking BP at least 1 hour or more after medications.   Patients health , Oxana Mccormack, will be following up every 3-4 weeks.   I will continue to monitor regularly and will follow-up in 4 weeks, sooner if blood pressure begins to trend upward or downward.     Current medication regimen:  Hypertension Medications             metoprolol succinate (TOPROL-XL) 25 MG 24 hr tablet Take 1 tablet (25 mg total) by mouth once daily.    telmisartan (MICARDIS) 20 MG Tab Take 1 tablet (20 mg total) by mouth once daily.        Patient has my contact information and knows to call with any concerns or clinical changes.

## 2019-05-24 NOTE — PROGRESS NOTES
Last 5 Patient Entered Readings                                      Current 30 Day Average: 139/79     Recent Readings 5/22/2019 5/21/2019 5/21/2019 5/21/2019 5/21/2019    SBP (mmHg) 119 152 164 130 147    DBP (mmHg) 68 73 88 81 80    Pulse 83 85 86 86 82          Digital Medicine: Health  Follow Up/Introduction of new health      Left voicemail to follow up with Mrs. Dulce Root.  Current BP average 139/79 mmHg is not at goal, 130/80.    Sending [employer] message.

## 2019-05-31 NOTE — PROGRESS NOTES
Last 5 Patient Entered Readings                                      Current 30 Day Average: 140/78     Recent Readings 5/30/2019 5/22/2019 5/21/2019 5/21/2019 5/21/2019    SBP (mmHg) 146 119 152 164 130    DBP (mmHg) 76 68 73 88 81    Pulse 78 83 85 86 86          Digital Medicine: Health  Follow Up/Introduction of new health      Left voicemail to follow up with Yazmin Dulce GORMAN Ivett.  Current BP average 140/78 mmHg is not at goal, 130/80.

## 2019-06-18 ENCOUNTER — PATIENT OUTREACH (OUTPATIENT)
Dept: OTHER | Facility: OTHER | Age: 64
End: 2019-06-18

## 2019-06-18 NOTE — PROGRESS NOTES
HPI:  Called Mrs. Dulce Root for hypertension digital medicine follow-up. Patient reports adherence to medication regimen with no complaints. Discussed upward trend in BP and patient reports increased neck pain and headache that has been bothersome. Feels that this is contributing to higher readings BP yesterday on the lower end of normal and patient feels that initial reading was an error. Repeat BP WNL. Patient has not charged cuff and will do so today.     Last 5 Patient Entered Readings                                      Current 30 Day Average: 137/71     Recent Readings 6/27/2019 6/27/2019 6/26/2019 6/25/2019 6/25/2019    SBP (mmHg) 109 97 139 148 142    DBP (mmHg) 64 47 76 79 76    Pulse 86 86 93 90 80          Patient denies s/s of hypotension (lightheadedness, dizziness, nausea, fatigue) associated with low readings. Instructed patient to inform me if this occurs, patient confirms understanding.    Patient denies s/s of hypertension (SOB, CP, severe headaches, changes in vision) associated with high readings. Instructed patient to go to the ED if BP >180/110 and accompanied by hypertensive s/s, patient confirms understanding.    Assessment:  Patient's current 30-day average is slightly above goal of <130/80 mmHg.       Plan:  Continue current regimen.  Charge cuff and monitor BP. Can increase telmisartan if readings remain elevated.   Patients health , Oxana Mccormack, will follow-up as scheduled.    I will continue to monitor regularly and will follow-up in 2 to 3 weeks, sooner if blood pressure begins to trend upward or downward.     Current medication regimen:  Hypertension Medications             metoprolol succinate (TOPROL-XL) 25 MG 24 hr tablet Take 1 tablet (25 mg total) by mouth once daily.    telmisartan (MICARDIS) 20 MG Tab Take 1 tablet (20 mg total) by mouth once daily.        Patient has my contact information and knows to call with any concerns or clinical changes.

## 2019-07-12 ENCOUNTER — PATIENT OUTREACH (OUTPATIENT)
Dept: OTHER | Facility: OTHER | Age: 64
End: 2019-07-12

## 2019-07-12 RX ORDER — LEVOTHYROXINE SODIUM 75 UG/1
TABLET ORAL
Qty: 90 TABLET | Refills: 3 | Status: SHIPPED | OUTPATIENT
Start: 2019-07-12 | End: 2020-04-07 | Stop reason: SDUPTHER

## 2019-07-12 RX ORDER — CITALOPRAM 10 MG/1
TABLET ORAL
Qty: 90 TABLET | Refills: 3 | Status: SHIPPED | OUTPATIENT
Start: 2019-07-12 | End: 2020-04-07 | Stop reason: SDUPTHER

## 2019-07-12 NOTE — PROGRESS NOTES
Called patient for follow-up on readings after charging cuff. Call disconnected and unable to leave a voicemail. Sent message stating that readings are stable and within range. Continue current regime and monitoring. Will follow-up as scheduled.    Last 5 Patient Entered Readings                                      Current 30 Day Average: 127/68     Recent Readings 7/6/2019 6/29/2019 6/28/2019 6/28/2019 6/27/2019    SBP (mmHg) 118 117 113 111 109    DBP (mmHg) 67 64 70 65 64    Pulse 80 80 85 87 86          Hypertension Medications             metoprolol succinate (TOPROL-XL) 25 MG 24 hr tablet Take 1 tablet (25 mg total) by mouth once daily.    telmisartan (MICARDIS) 20 MG Tab Take 1 tablet (20 mg total) by mouth once daily.

## 2019-07-15 ENCOUNTER — PATIENT OUTREACH (OUTPATIENT)
Dept: OTHER | Facility: OTHER | Age: 64
End: 2019-07-15

## 2019-07-15 NOTE — PROGRESS NOTES
Last 5 Patient Entered Readings                                      Current 30 Day Average: 127/68     Recent Readings 7/6/2019 6/29/2019 6/28/2019 6/28/2019 6/27/2019    SBP (mmHg) 118 117 113 111 109    DBP (mmHg) 67 64 70 65 64    Pulse 80 80 85 87 86            Digital Medicine: Health  Follow Up    Left voicemail to follow up with Yazmin Dulce GORMAN Ivett.  Current BP average 127/68 mmHg is at goal, 130/80.

## 2019-07-23 NOTE — PROGRESS NOTES
Last 5 Patient Entered Readings                                      Current 30 Day Average: 127/70     Recent Readings 7/18/2019 7/18/2019 7/6/2019 6/29/2019 6/28/2019    SBP (mmHg) 144 145 118 117 113    DBP (mmHg) 80 82 67 64 70    Pulse 90 88 80 80 85          Digital Medicine: Health  Follow Up    Left voicemail to follow up with  Dulce GORMAN Ivett.  Current BP average 127/70 mmHg is at goal, 130/80.

## 2019-07-30 NOTE — PROGRESS NOTES
Last 5 Patient Entered Readings                                      Current 30 Day Average: 136/77     Recent Readings 7/26/2019 7/18/2019 7/18/2019 7/6/2019 6/29/2019    SBP (mmHg) 147 144 145 118 117    DBP (mmHg) 79 80 82 67 64    Pulse 80 90 88 80 80          Digital Medicine: Health  Follow Up    Left voicemail to follow up with Mrs. Dulce GORMAN Root.  Current BP average 136/77 mmHg is not at goal, 130/80.    Sending [employer] Message.

## 2019-08-06 ENCOUNTER — PATIENT OUTREACH (OUTPATIENT)
Dept: ADMINISTRATIVE | Facility: HOSPITAL | Age: 64
End: 2019-08-06

## 2019-08-15 NOTE — PROGRESS NOTES
Last 5 Patient Entered Readings                                      Current 30 Day Average: 127/68     Recent Readings 8/3/2019 8/3/2019 8/1/2019 8/1/2019 8/1/2019    SBP (mmHg) 98 105 117 111 85    DBP (mmHg) 58 60 63 72 48    Pulse 72 81 82 80 77          Digital Medicine: Health  Follow Up    Left voicemail to follow up with  Dulce GORMAN Ivett.  Current BP average 127/68 mmHg is at goal, 130/80.

## 2019-08-16 ENCOUNTER — OFFICE VISIT (OUTPATIENT)
Dept: CARDIOLOGY | Facility: CLINIC | Age: 64
End: 2019-08-16
Payer: COMMERCIAL

## 2019-08-16 VITALS
HEIGHT: 63 IN | HEART RATE: 89 BPM | DIASTOLIC BLOOD PRESSURE: 66 MMHG | SYSTOLIC BLOOD PRESSURE: 134 MMHG | BODY MASS INDEX: 26.72 KG/M2 | WEIGHT: 150.81 LBS

## 2019-08-16 DIAGNOSIS — I48.0 PAROXYSMAL ATRIAL FIBRILLATION: ICD-10-CM

## 2019-08-16 DIAGNOSIS — Z95.5 STENTED CORONARY ARTERY: Primary | ICD-10-CM

## 2019-08-16 DIAGNOSIS — I25.810 CORONARY ARTERY DISEASE INVOLVING CORONARY BYPASS GRAFT OF NATIVE HEART WITHOUT ANGINA PECTORIS: ICD-10-CM

## 2019-08-16 DIAGNOSIS — E78.5 DYSLIPIDEMIA: ICD-10-CM

## 2019-08-16 DIAGNOSIS — I10 ESSENTIAL HYPERTENSION: ICD-10-CM

## 2019-08-16 DIAGNOSIS — I44.7 LBBB (LEFT BUNDLE BRANCH BLOCK): ICD-10-CM

## 2019-08-16 DIAGNOSIS — I65.23 CAROTID STENOSIS, BILATERAL: ICD-10-CM

## 2019-08-16 DIAGNOSIS — Z95.1 S/P CABG X 2: ICD-10-CM

## 2019-08-16 DIAGNOSIS — Z95.2 S/P AVR (AORTIC VALVE REPLACEMENT): ICD-10-CM

## 2019-08-16 PROCEDURE — 3075F PR MOST RECENT SYSTOLIC BLOOD PRESS GE 130-139MM HG: ICD-10-PCS | Mod: CPTII,S$GLB,, | Performed by: INTERNAL MEDICINE

## 2019-08-16 PROCEDURE — 3008F PR BODY MASS INDEX (BMI) DOCUMENTED: ICD-10-PCS | Mod: CPTII,S$GLB,, | Performed by: INTERNAL MEDICINE

## 2019-08-16 PROCEDURE — 99214 PR OFFICE/OUTPT VISIT, EST, LEVL IV, 30-39 MIN: ICD-10-PCS | Mod: S$GLB,,, | Performed by: INTERNAL MEDICINE

## 2019-08-16 PROCEDURE — 99999 PR PBB SHADOW E&M-EST. PATIENT-LVL III: CPT | Mod: PBBFAC,,, | Performed by: INTERNAL MEDICINE

## 2019-08-16 PROCEDURE — 3078F PR MOST RECENT DIASTOLIC BLOOD PRESSURE < 80 MM HG: ICD-10-PCS | Mod: CPTII,S$GLB,, | Performed by: INTERNAL MEDICINE

## 2019-08-16 PROCEDURE — 3008F BODY MASS INDEX DOCD: CPT | Mod: CPTII,S$GLB,, | Performed by: INTERNAL MEDICINE

## 2019-08-16 PROCEDURE — 3078F DIAST BP <80 MM HG: CPT | Mod: CPTII,S$GLB,, | Performed by: INTERNAL MEDICINE

## 2019-08-16 PROCEDURE — 3075F SYST BP GE 130 - 139MM HG: CPT | Mod: CPTII,S$GLB,, | Performed by: INTERNAL MEDICINE

## 2019-08-16 PROCEDURE — 99999 PR PBB SHADOW E&M-EST. PATIENT-LVL III: ICD-10-PCS | Mod: PBBFAC,,, | Performed by: INTERNAL MEDICINE

## 2019-08-16 PROCEDURE — 99214 OFFICE O/P EST MOD 30 MIN: CPT | Mod: S$GLB,,, | Performed by: INTERNAL MEDICINE

## 2019-08-16 RX ORDER — ALBUTEROL SULFATE 90 UG/1
2 AEROSOL, METERED RESPIRATORY (INHALATION)
Status: DISCONTINUED | OUTPATIENT
Start: 2019-08-16 | End: 2022-11-22 | Stop reason: CLARIF

## 2019-08-16 NOTE — PROGRESS NOTES
Subjective:    Patient ID:  Dulce Root is a 64 y.o. female who presents for follow-up of AVR F/U      HPI  Here for follow up of CABG-PCI (2/19)/AVR/LBBB/DLP. Walking daily, no further angina as prior to surgery.       Review of Systems   Constitution: Negative for malaise/fatigue.   Eyes: Negative for blurred vision.   Cardiovascular: Negative for chest pain, claudication, cyanosis, dyspnea on exertion, irregular heartbeat, leg swelling, near-syncope, orthopnea, palpitations, paroxysmal nocturnal dyspnea and syncope.   Respiratory: Negative for cough and shortness of breath.    Hematologic/Lymphatic: Does not bruise/bleed easily.   Musculoskeletal: Negative for back pain, falls, joint pain, muscle cramps, muscle weakness and myalgias.   Gastrointestinal: Negative for abdominal pain, change in bowel habit, nausea and vomiting.   Genitourinary: Negative for urgency.   Neurological: Negative for dizziness, focal weakness and light-headedness.       Past Medical History:   Diagnosis Date    Allergy     Breast cancer 2008    Carotid stenosis, bilateral 10/13/2017    Coronary artery disease     Coronary artery disease involving coronary bypass graft of native heart without angina pectoris 8/16/2019 2/19  1.  Left main is a small vessel with a 60%-65% ostial lesion. 2.  LAD is a medium-sized vessel diffuse 70% proximally  Ramus intermedius is a medium size vessel with a 40%-50% ostial lesion. 3.  Circumflex 60%-70% ostial  remainder of circumflex and obtuse marginal normal in appearance. Right coronary artery is normal size vessel, short discrete 70%mid 4.  Vein graft to right coronary is occ    Coronary artery disease involving native coronary artery of native heart without angina pectoris 6/27/2017    DCIS (ductal carcinoma in situ) of breast 11/6/2012    Essential hypertension 11/6/2012    Hodgkin lymphoma     Hx of Hodgkin's disease - s/p chemo and radiation 11/6/2012    Hyperlipidemia      Hyperlipidemia 11/6/2012    The patient presents with hyperlipidemia.  The patient reports tolerating the medication well and is in excellent compliance.  There have been no medication side effects.  The patient denies chest pain, neuropathy, and myalgias.  The patient has reduced fat intake and has been exercising.  Current treatment has included the medications listed in the med card.   Lab Results Component Value Date  CH    Hypertension     LBBB (left bundle branch block) 5/22/2018    Osteoporosis     Paroxysmal atrial fibrillation - single post-op episode 8/24/2017    Pemphigoid     S/P AVR (aortic valve replacement) - pericardial valve 8/21/2017    S/P CABG x 2 8/21/2017 8/17 CABG X 2 with SVG-LAD and SVG-distal RCA  SVG LAD due to absent LIMA after chest radiation and lymph node biopsy     Stented coronary artery     She had 2 stents placed in 2/2019.  She has had CAD and bypasses in the past in 2017.      Thyroid disease           Objective:     Vitals:    08/16/19 1056   BP: 134/66   Pulse: 89        Physical Exam   Constitutional: She is oriented to person, place, and time. She appears well-developed and well-nourished.   HENT:   Head: Normocephalic.   Eyes: Conjunctivae are normal.   Neck: Normal range of motion. Neck supple. No JVD present.   Cardiovascular: Normal rate, regular rhythm, normal heart sounds and intact distal pulses.   Pulses:       Carotid pulses are 2+ on the right side, and 2+ on the left side.       Radial pulses are 2+ on the right side, and 2+ on the left side.        Dorsalis pedis pulses are 2+ on the right side, and 2+ on the left side.        Posterior tibial pulses are 2+ on the right side, and 2+ on the left side.   Well healed midline sternal incision.     Pulmonary/Chest: Effort normal and breath sounds normal.   Abdominal: Soft. Bowel sounds are normal.   Musculoskeletal: She exhibits no edema or tenderness.   Neurological: She is alert and oriented to person,  place, and time. Gait normal.   Skin: Skin is warm, dry and intact. No cyanosis. Nails show no clubbing.   Psychiatric: She has a normal mood and affect. Her speech is normal and behavior is normal. Thought content normal.   Nursing note and vitals reviewed.            ..    Chemistry        Component Value Date/Time     04/01/2019 0645    K 4.3 04/01/2019 0645     04/01/2019 0645    CO2 28 04/01/2019 0645    BUN 18 04/01/2019 0645    CREATININE 0.48 (L) 04/01/2019 0645     04/01/2019 0645        Component Value Date/Time    CALCIUM 9.7 04/01/2019 0645    ALKPHOS 82 03/01/2019 0902    AST 24 03/01/2019 0902    ALT 15 03/01/2019 0902    BILITOT 0.6 03/01/2019 0902    ESTGFRAFRICA >60 04/01/2019 0645    EGFRNONAA >60 04/01/2019 0645            ..  Lab Results   Component Value Date    CHOL 166 03/01/2019    CHOL 146 04/12/2018    CHOL 168 01/20/2015     Lab Results   Component Value Date    HDL 51 03/01/2019    HDL 58 04/12/2018    HDL 42 01/20/2015     Lab Results   Component Value Date    LDLCALC 98.8 03/01/2019    LDLCALC 70.2 04/12/2018    LDLCALC 95.0 01/20/2015     Lab Results   Component Value Date    TRIG 81 03/01/2019    TRIG 89 04/12/2018    TRIG 155 (H) 01/20/2015     Lab Results   Component Value Date    CHOLHDL 30.7 03/01/2019    CHOLHDL 39.7 04/12/2018    CHOLHDL 25.0 01/20/2015     ..  Lab Results   Component Value Date    WBC 5.58 04/01/2019    HGB 12.6 04/01/2019    HCT 38.6 04/01/2019    MCV 89 04/01/2019     04/01/2019       Test(s) Reviewed  I have reviewed the following in detail:  [] Stress test   [] Angiography   [x] Echocardiogram   [x] Labs   [] Other:       Assessment:         ICD-10-CM ICD-9-CM   1. Stented coronary artery Z95.5 V45.82   2. S/P CABG x 2 Z95.1 V45.81   3. S/P AVR (aortic valve replacement) - pericardial valve Z95.2 V43.3   4. Dyslipidemia E78.5 272.4   5. Paroxysmal atrial fibrillation - single post-op episode I48.0 427.31   6. LBBB (left bundle  branch block) I44.7 426.3   7. Coronary artery disease involving coronary bypass graft of native heart without angina pectoris I25.810 414.05   8. Carotid stenosis, bilateral I65.23 433.10     433.30   9. Essential hypertension I10 401.9     Problem List Items Addressed This Visit     Stented coronary artery - Primary    Overview     2/19   ostial circumflex synergy 30 x 12 with a balloon in the ramus and LAD as a T-technique.  Synergy left main, using a 3.5 x 8 post-dilated to 4.0,             S/P CABG x 2    Overview     8/17 CABG X 2 with SVG-LAD and SVG-distal RCA  SVG LAD due to absent LIMA after chest radiation and lymph node biopsy           S/P AVR (aortic valve replacement) - pericardial valve    Paroxysmal atrial fibrillation - single post-op episode    LBBB (left bundle branch block)    Essential hypertension    Dyslipidemia    Coronary artery disease involving coronary bypass graft of native heart without angina pectoris    Overview     2/19   1.  Left main is a small vessel with a 60%-65% ostial lesion.  2.  LAD is a medium-sized vessel diffuse 70% proximally  Ramus intermedius is a medium size vessel with a 40%-50% ostial lesion.  3.  Circumflex 60%-70% ostial  remainder of circumflex and obtuse marginal normal in appearance. Right coronary artery is normal size vessel, short discrete 70%mid  4.  Vein graft to right coronary is occluded.  5.  Vein graft to LAD is patent.  Large vein graft to the ostial lesion of approximately 60%.   Fractional flow reserve of the vein graft to LAD was 0.83.   Fractional flow reserve of the circumflex was 0.77 at 1 minute.   Fractional flow reserve of the ramus was negative.            Carotid stenosis, bilateral           Plan:           Return to clinic 6 months   Low level/low impact aerobic exercise 5x's/wk. Heart healthy diet and risk factor modification.    See labs and med orders.      Portions of this note may have been created with voice recognition software.   Grammatical, syntax and spelling errors may be inevitable.

## 2019-08-30 ENCOUNTER — PATIENT MESSAGE (OUTPATIENT)
Dept: FAMILY MEDICINE | Facility: CLINIC | Age: 64
End: 2019-08-30

## 2019-08-30 DIAGNOSIS — E78.5 HYPERLIPIDEMIA, UNSPECIFIED HYPERLIPIDEMIA TYPE: ICD-10-CM

## 2019-08-30 RX ORDER — ROSUVASTATIN CALCIUM 20 MG/1
TABLET, COATED ORAL
Qty: 45 TABLET | Refills: 1 | Status: SHIPPED | OUTPATIENT
Start: 2019-08-30 | End: 2020-01-28 | Stop reason: SDUPTHER

## 2019-08-30 NOTE — PROGRESS NOTES
Last 5 Patient Entered Readings                                      Current 30 Day Average: 110/60     Recent Readings 8/22/2019 8/3/2019 8/3/2019 8/1/2019 8/1/2019    SBP (mmHg) 115 98 105 117 111    DBP (mmHg) 59 58 60 63 72    Pulse 77 72 81 82 80        Digital Medicine: Health  Follow Up    Lifestyle Modifications:    Physical Activity: patient stated that she has gotten out of her habit of walking regularly at work due to the heat, I suggested that she walk inside at work or go to a store or the mall and walk laps in the AC, patient stated she knew this was an option but that things have been hectic, she stated she would get back into her walking routine soon though. Patient denied wanting to adjust her PA goals at this time.     Medication Therapy: Patient has been compliant with the medication regimen.    Follow up with Mrs. Dulce Root completed. No further questions or concerns. Will continue to follow up to achieve health goals.

## 2019-08-30 NOTE — TELEPHONE ENCOUNTER
I have signed for the following orders AND/OR meds.  Please call the patient and ask the patient to schedule the testing AND/OR inform about any medications that were sent.      No orders of the defined types were placed in this encounter.      Medications Ordered This Encounter   Medications    rosuvastatin (CRESTOR) 20 MG tablet     Sig: TAKE 1 TABLET every OTHER night     Dispense:  45 tablet     Refill:  1

## 2019-09-27 ENCOUNTER — TELEPHONE (OUTPATIENT)
Dept: FAMILY MEDICINE | Facility: CLINIC | Age: 64
End: 2019-09-27

## 2019-10-11 ENCOUNTER — PATIENT OUTREACH (OUTPATIENT)
Dept: OTHER | Facility: OTHER | Age: 64
End: 2019-10-11

## 2019-10-11 NOTE — PROGRESS NOTES
Digital Medicine: Health  Follow-Up    Called to follow up with patient, current average 124/64 is at goal, <130/<80. Patient stated she is having tech issues with her device not connecting, she refused to be transferred to tech support or to have them reach out to her. She requested the support line number so she could call at a time that worked for her. Patients recent readings all appear to be the same reading over and over again.         Follow Up  Follow-up reason(s): reading review      Readings are missing.   tech support needed.      Assessment/Plan    SDOH    INTERVENTION(S)  encouragement/support    PLAN  patient verbalizes understanding and additional monitoring needed    Patient refused tech support at this time, but requested the number to call on her own when she has the time. Patient agreed to resolve the issue soon. I will follow up in 1-2 weeks to see if patient has been able to resolve the issue.     10/29/19 Patient's readings have resumes, it appears she has reached out to tech support on her own. Pushing next outreach another 5 weeks, patient's current BP average 124/64 is at goal <130/<80.       There are no preventive care reminders to display for this patient.    Last 5 Patient Entered Readings                                      Current 30 Day Average: 126/64     Recent Readings 10/9/2019 10/6/2019 10/6/2019 10/6/2019 9/19/2019    SBP (mmHg) 126 126 126 126 126    DBP (mmHg) 64 64 64 64 64    Pulse 79 79 79 79 79

## 2019-11-07 DIAGNOSIS — Z12.11 COLON CANCER SCREENING: ICD-10-CM

## 2019-11-11 ENCOUNTER — PATIENT OUTREACH (OUTPATIENT)
Dept: OTHER | Facility: OTHER | Age: 64
End: 2019-11-11

## 2019-11-14 ENCOUNTER — CLINICAL SUPPORT (OUTPATIENT)
Dept: CARDIOLOGY | Facility: CLINIC | Age: 64
End: 2019-11-14
Attending: INTERNAL MEDICINE
Payer: COMMERCIAL

## 2019-11-14 ENCOUNTER — OFFICE VISIT (OUTPATIENT)
Dept: FAMILY MEDICINE | Facility: CLINIC | Age: 64
End: 2019-11-14
Payer: COMMERCIAL

## 2019-11-14 ENCOUNTER — HOSPITAL ENCOUNTER (OUTPATIENT)
Dept: RADIOLOGY | Facility: HOSPITAL | Age: 64
Discharge: HOME | End: 2019-11-14
Attending: NURSE PRACTITIONER
Payer: COMMERCIAL

## 2019-11-14 VITALS
HEART RATE: 88 BPM | SYSTOLIC BLOOD PRESSURE: 132 MMHG | WEIGHT: 156.06 LBS | DIASTOLIC BLOOD PRESSURE: 70 MMHG | BODY MASS INDEX: 27.65 KG/M2 | TEMPERATURE: 98 F | HEIGHT: 63 IN | OXYGEN SATURATION: 99 %

## 2019-11-14 VITALS
SYSTOLIC BLOOD PRESSURE: 130 MMHG | HEART RATE: 71 BPM | HEIGHT: 63 IN | WEIGHT: 150 LBS | DIASTOLIC BLOOD PRESSURE: 60 MMHG | BODY MASS INDEX: 26.58 KG/M2

## 2019-11-14 DIAGNOSIS — I44.7 LBBB (LEFT BUNDLE BRANCH BLOCK): ICD-10-CM

## 2019-11-14 DIAGNOSIS — I48.0 PAROXYSMAL ATRIAL FIBRILLATION: ICD-10-CM

## 2019-11-14 DIAGNOSIS — Z95.1 S/P CABG X 2: ICD-10-CM

## 2019-11-14 DIAGNOSIS — J02.9 PHARYNGITIS, UNSPECIFIED ETIOLOGY: Primary | ICD-10-CM

## 2019-11-14 DIAGNOSIS — Z95.2 S/P AVR (AORTIC VALVE REPLACEMENT): ICD-10-CM

## 2019-11-14 DIAGNOSIS — Z95.5 STENTED CORONARY ARTERY: ICD-10-CM

## 2019-11-14 DIAGNOSIS — E78.5 DYSLIPIDEMIA: ICD-10-CM

## 2019-11-14 DIAGNOSIS — R05.9 COUGH: ICD-10-CM

## 2019-11-14 DIAGNOSIS — M54.50 ACUTE MIDLINE LOW BACK PAIN WITHOUT SCIATICA: ICD-10-CM

## 2019-11-14 PROCEDURE — 93306 TTE W/DOPPLER COMPLETE: CPT | Mod: S$GLB,,, | Performed by: INTERNAL MEDICINE

## 2019-11-14 PROCEDURE — 3075F SYST BP GE 130 - 139MM HG: CPT | Mod: CPTII,S$GLB,, | Performed by: NURSE PRACTITIONER

## 2019-11-14 PROCEDURE — 87081 CULTURE SCREEN ONLY: CPT

## 2019-11-14 PROCEDURE — 3008F PR BODY MASS INDEX (BMI) DOCUMENTED: ICD-10-PCS | Mod: CPTII,S$GLB,, | Performed by: NURSE PRACTITIONER

## 2019-11-14 PROCEDURE — 99999 PR PBB SHADOW E&M-EST. PATIENT-LVL I: CPT | Mod: PBBFAC,,,

## 2019-11-14 PROCEDURE — 93306 TRANSTHORACIC ECHO (TTE) COMPLETE (CUPID ONLY): ICD-10-PCS | Mod: S$GLB,,, | Performed by: INTERNAL MEDICINE

## 2019-11-14 PROCEDURE — 99999 PR PBB SHADOW E&M-EST. PATIENT-LVL II: CPT | Mod: PBBFAC,,,

## 2019-11-14 PROCEDURE — 3008F BODY MASS INDEX DOCD: CPT | Mod: CPTII,S$GLB,, | Performed by: NURSE PRACTITIONER

## 2019-11-14 PROCEDURE — 99999 PR PBB SHADOW E&M-EST. PATIENT-LVL V: ICD-10-PCS | Mod: PBBFAC,,, | Performed by: NURSE PRACTITIONER

## 2019-11-14 PROCEDURE — 99999 PR PBB SHADOW E&M-EST. PATIENT-LVL II: ICD-10-PCS | Mod: PBBFAC,,,

## 2019-11-14 PROCEDURE — 99214 PR OFFICE/OUTPT VISIT, EST, LEVL IV, 30-39 MIN: ICD-10-PCS | Mod: S$GLB,,, | Performed by: NURSE PRACTITIONER

## 2019-11-14 PROCEDURE — 3078F PR MOST RECENT DIASTOLIC BLOOD PRESSURE < 80 MM HG: ICD-10-PCS | Mod: CPTII,S$GLB,, | Performed by: NURSE PRACTITIONER

## 2019-11-14 PROCEDURE — 3075F PR MOST RECENT SYSTOLIC BLOOD PRESS GE 130-139MM HG: ICD-10-PCS | Mod: CPTII,S$GLB,, | Performed by: NURSE PRACTITIONER

## 2019-11-14 PROCEDURE — 99214 OFFICE O/P EST MOD 30 MIN: CPT | Mod: S$GLB,,, | Performed by: NURSE PRACTITIONER

## 2019-11-14 PROCEDURE — 99999 PR PBB SHADOW E&M-EST. PATIENT-LVL V: CPT | Mod: PBBFAC,,, | Performed by: NURSE PRACTITIONER

## 2019-11-14 PROCEDURE — 93880 EXTRACRANIAL BILAT STUDY: CPT | Mod: S$GLB,,, | Performed by: INTERNAL MEDICINE

## 2019-11-14 PROCEDURE — 99999 PR PBB SHADOW E&M-EST. PATIENT-LVL I: ICD-10-PCS | Mod: PBBFAC,,,

## 2019-11-14 PROCEDURE — 93880 CV US DOPPLER CAROTID (CUPID ONLY): ICD-10-PCS | Mod: S$GLB,,, | Performed by: INTERNAL MEDICINE

## 2019-11-14 PROCEDURE — 3078F DIAST BP <80 MM HG: CPT | Mod: CPTII,S$GLB,, | Performed by: NURSE PRACTITIONER

## 2019-11-14 RX ORDER — AZELASTINE 1 MG/ML
1 SPRAY, METERED NASAL 2 TIMES DAILY
Qty: 30 ML | Refills: 0 | Status: SHIPPED | OUTPATIENT
Start: 2019-11-14 | End: 2019-12-05 | Stop reason: SDUPTHER

## 2019-11-14 RX ORDER — FLUTICASONE PROPIONATE 50 MCG
2 SPRAY, SUSPENSION (ML) NASAL DAILY
Qty: 9.9 ML | Refills: 0 | Status: SHIPPED | OUTPATIENT
Start: 2019-11-14 | End: 2019-12-05 | Stop reason: SDUPTHER

## 2019-11-14 NOTE — PATIENT INSTRUCTIONS
Viral Pharyngitis (Sore Throat)    You (or your child, if your child is the patient) have pharyngitis (sore throat). This infection is caused by a virus. It can cause throat pain that is worse when swallowing, aching all over, headache, and fever. The infection may be spread by coughing, kissing, or touching others after touching your mouth or nose. Antibiotic medications do not work against viruses, so they are not used for treating this condition.  Home care  · If your symptoms are severe, rest at home. Return to work or school when you feel well enough.   · Drink plenty of fluids to avoid dehydration.  · For children: Use acetaminophen for fever, fussiness or discomfort. In infants over six months of age, you may use ibuprofen instead of acetaminophen. (NOTE: If your child has chronic liver or kidney disease or ever had a stomach ulcer or GI bleeding, talk with your doctor before using these medicines.) (NOTE: Aspirin should never be used in anyone under 18 years of age who is ill with a fever. It may cause severe liver damage.)   · For adults: You may use acetaminophen or ibuprofen to control pain or fever, unless another medicine was prescribed for this. (NOTE: If you have chronic liver or kidney disease or ever had a stomach ulcer or GI bleeding, talk with your doctor before using these medicines.)  · Throat lozenges or numbing throat sprays can help reduce pain. Gargling with warm salt water will also help reduce throat pain. For this, dissolve 1/2 teaspoon of salt in 1 glass of warm water. To help soothe a sore throat, children can sip on juice or a popsicle. Children 5 years and older can also suck on a lollipop or hard candy.  · Avoid salty or spicy foods, which can be irritating to the throat.  Follow-up care  Follow up with your healthcare provider or our staff if you are not improving over the next week.  When to seek medical advice  Call your healthcare provider right away if any of these  occur:  · Fever as directed by your doctor.  For children, seek care if:  ¨ Your child is of any age and has repeated fevers above 104°F (40°C).  ¨ Your child is younger than 2 years of age and has a fever of 100.4°F (38°C) that continues for more than 1 day.  ¨ Your child is 2 years old or older and has a fever of 100.4°F (38°C) that continues for more than 3 days.  · New or worsening ear pain, sinus pain, or headache  · Painful lumps in the back of neck  · Stiff neck  · Lymph nodes are getting larger  · Inability to swallow liquids, excessive drooling, or inability to open mouth wide due to throat pain  · Signs of dehydration (very dark urine or no urine, sunken eyes, dizziness)  · Trouble breathing or noisy breathing  · Muffled voice  · New rash  · Child appears to be getting sicker  Date Last Reviewed: 4/13/2015  © 1546-7651 [employer]. [address] [place] [address]. All rights reserved. This information is not intended as a substitute for professional medical care. Always follow your healthcare professional's instructions.        Viral Upper Respiratory Illness (Adult)  You have a viral upper respiratory illness (URI), which is another term for the common cold. This illness is contagious during the first few days. It is spread through the air by coughing and sneezing. It may also be spread by direct contact (touching the sick person and then touching your own eyes, nose, or mouth). Frequent handwashing will decrease risk of spread. Most viral illnesses go away within 7 to 10 days with rest and simple home remedies. Sometimes the illness may last for several weeks. Antibiotics will not kill a virus, and they are generally not prescribed for this condition.    Home care  · If symptoms are severe, rest at home for the first 2 to 3 days. When you resume activity, don't let yourself get too tired.  · Avoid being exposed to cigarette smoke (yours or others).  · You may use acetaminophen or  ibuprofen to control pain and fever, unless another medicine was prescribed. (Note: If you have chronic liver or kidney disease, have ever had a stomach ulcer or gastrointestinal bleeding, or are taking blood-thinning medicines, talk with your healthcare provider before using these medicines.) Aspirin should never be given to anyone under 18 years of age who is ill with a viral infection or fever. It may cause severe liver or brain damage.  · Your appetite may be poor, so a light diet is fine. Avoid dehydration by drinking 6 to 8 glasses of fluids per day (water, soft drinks, juices, tea, or soup). Extra fluids will help loosen secretions in the nose and lungs.  · Over-the-counter cold medicines will not shorten the length of time youre sick, but they may be helpful for the following symptoms: cough, sore throat, and nasal and sinus congestion. (Note: Do not use decongestants if you have high blood pressure.)  Follow-up care  Follow up with your healthcare provider, or as advised.  When to seek medical advice  Call your healthcare provider right away if any of these occur:  · Cough with lots of colored sputum (mucus)  · Severe headache; face, neck, or ear pain  · Difficulty swallowing due to throat pain  · Fever of 100.4°F (38°C)  Call 911, or get immediate medical care  Call emergency services right away if any of these occur:  · Chest pain, shortness of breath, wheezing, or difficulty breathing  · Coughing up blood  · Inability to swallow due to throat pain  Date Last Reviewed: 9/13/2015  © 3405-9974 [employer]. [address] [place] [address], [place] [address]. All rights reserved. This information is not intended as a substitute for professional medical care. Always follow your healthcare professional's instructions.

## 2019-11-14 NOTE — PROGRESS NOTES
Subjective:       Patient ID: Dulce Root is a 64 y.o. female.    Chief Complaint: URI (X4 days)    Patient who is new to me presents for URI. She will finished a z pack tomorrow.     Sore Throat    This is a new problem. The current episode started in the past 7 days (4 days). The problem has been unchanged. Neither side of throat is experiencing more pain than the other. There has been no fever. Associated symptoms include congestion (uses flonase and it helps), coughing and trouble swallowing. Pertinent negatives include no abdominal pain, diarrhea, drooling, headaches, shortness of breath or vomiting. She has had no exposure to mono. Treatments tried: dayquil and night quil.     Review of Systems   Constitutional: Negative for chills, fatigue and fever.   HENT: Positive for congestion (uses flonase and it helps), sore throat and trouble swallowing. Negative for drooling, sinus pressure, sinus pain and sneezing.    Respiratory: Positive for cough. Negative for chest tightness, shortness of breath and wheezing.    Cardiovascular: Negative for chest pain, palpitations and leg swelling.   Gastrointestinal: Negative for abdominal distention, abdominal pain, constipation, diarrhea, nausea and vomiting.   Genitourinary: Negative for decreased urine volume, difficulty urinating, dysuria, frequency and urgency.   Musculoskeletal: Positive for back pain. Negative for arthralgias, gait problem, joint swelling and myalgias.        She was unable to bend over to  her shoes yesterday   Skin: Negative for rash and wound.   Neurological: Negative for dizziness, light-headedness, numbness and headaches.       Objective:      Physical Exam   Constitutional: She is oriented to person, place, and time. She appears well-developed and well-nourished.   HENT:   Head: Normocephalic and atraumatic.   Right Ear: External ear normal.   Left Ear: External ear normal.   Nose: Mucosal edema and rhinorrhea present. Right sinus  exhibits no maxillary sinus tenderness and no frontal sinus tenderness. Left sinus exhibits no maxillary sinus tenderness and no frontal sinus tenderness.   Mouth/Throat: Posterior oropharyngeal erythema present.   Eyes: Pupils are equal, round, and reactive to light.   Neck: Normal range of motion.   Cardiovascular: Normal rate, regular rhythm, normal heart sounds and intact distal pulses.   Pulmonary/Chest: Effort normal and breath sounds normal.   Abdominal: Soft. Bowel sounds are normal.   Musculoskeletal:        Lumbar back: She exhibits decreased range of motion and pain.   Lymphadenopathy:     She has cervical adenopathy.   Neurological: She is alert and oriented to person, place, and time.   Skin: Skin is warm and dry.   Nursing note and vitals reviewed.      Assessment:       1. Pharyngitis, unspecified etiology    2. Cough    3. Acute midline low back pain without sciatica        Plan:       Dulce was seen today for uri.    Diagnoses and all orders for this visit:    Pharyngitis, unspecified etiology  -     POCT Rapid Strep A  -     Strep A culture, throat  -     (Magic mouthwash) 1:1:1 Benadryl 12.5mg/5ml liq, aluminum & magnesium hydroxide-simehticone (Maalox), lidocaine viscous 2%; Swish and spit 10 mLs every 4 (four) hours as needed. for mouth sores  -     azelastine (ASTELIN) 137 mcg (0.1 %) nasal spray; 1 spray (137 mcg total) by Nasal route 2 (two) times daily.  -     fluticasone propionate (FLONASE) 50 mcg/actuation nasal spray; 2 sprays (100 mcg total) by Each Nostril route once daily.    Cough  -     X-Ray Chest PA And Lateral; Future    Acute midline low back pain without sciatica  Discussed RICE therapy.    I counseled the patient on general home care guidelines for cough and congestion including increasing fluid intake, getting plenty of rest and use of OTC cough and cold medications.  I recommended guafenesin for congestion and dextromethorphan as directed for cough.  A brand like Mucinex  DM is recommended.  Avoidance of decongestants is recommended for patients with heart problems and hypertension.  Extra vitamin C may also benefit.  Return to clinic if symptoms last longer than 10 days or sooner if worsen with symptoms like fever > 100.4, severe sinus pain or headache, thick yellow nasal discharge or sputum, dehydration or lethargy.

## 2019-11-15 ENCOUNTER — HOSPITAL ENCOUNTER (OUTPATIENT)
Dept: RADIOLOGY | Facility: HOSPITAL | Age: 64
Discharge: HOME | End: 2019-11-15
Attending: NURSE PRACTITIONER
Payer: COMMERCIAL

## 2019-11-15 LAB
ASCENDING AORTA: 2.38 CM
AV INDEX (PROSTH): 0.55
AV MEAN GRADIENT: 12 MMHG
AV PEAK GRADIENT: 22 MMHG
AV VALVE AREA: 2.17 CM2
AV VELOCITY RATIO: 0.52
BSA FOR ECHO PROCEDURE: 1.74 M2
CV ECHO LV RWT: 0.41 CM
DOP CALC AO PEAK VEL: 2.36 M/S
DOP CALC AO VTI: 37.86 CM
DOP CALC LVOT AREA: 4 CM2
DOP CALC LVOT DIAMETER: 2.25 CM
DOP CALC LVOT PEAK VEL: 1.22 M/S
DOP CALC LVOT STROKE VOLUME: 82.34 CM3
DOP CALCLVOT PEAK VEL VTI: 20.72 CM
E WAVE DECELERATION TIME: 128.22 MSEC
E/A RATIO: 1.14
E/E' RATIO: 16 M/S
ECHO LV POSTERIOR WALL: 0.97 CM (ref 0.6–1.1)
FRACTIONAL SHORTENING: 11 % (ref 28–44)
INTERVENTRICULAR SEPTUM: 0.94 CM (ref 0.6–1.1)
LA MAJOR: 4.5 CM
LA MINOR: 4.41 CM
LA WIDTH: 3.44 CM
LEFT ARM DIASTOLIC BLOOD PRESSURE: 60 MMHG
LEFT ARM SYSTOLIC BLOOD PRESSURE: 136 MMHG
LEFT ATRIUM SIZE: 3.98 CM
LEFT ATRIUM VOLUME INDEX: 30.3 ML/M2
LEFT ATRIUM VOLUME: 51.84 CM3
LEFT CBA DIAS: 14 CM/S
LEFT CBA SYS: 73 CM/S
LEFT CCA DIST DIAS: 21 CM/S
LEFT CCA DIST SYS: 70 CM/S
LEFT CCA MID DIAS: 16 CM/S
LEFT CCA MID SYS: 87 CM/S
LEFT CCA PROX DIAS: 23 CM/S
LEFT CCA PROX SYS: 122 CM/S
LEFT ECA DIAS: 64 CM/S
LEFT ECA SYS: 566 CM/S
LEFT ICA DIST DIAS: 39 CM/S
LEFT ICA DIST SYS: 160 CM/S
LEFT ICA MID DIAS: 66 CM/S
LEFT ICA MID SYS: 246 CM/S
LEFT ICA PROX DIAS: 66 CM/S
LEFT ICA PROX SYS: 226 CM/S
LEFT INTERNAL DIMENSION IN SYSTOLE: 4.22 CM (ref 2.1–4)
LEFT VENTRICLE DIASTOLIC VOLUME INDEX: 60.51 ML/M2
LEFT VENTRICLE DIASTOLIC VOLUME: 103.54 ML
LEFT VENTRICLE MASS INDEX: 91 G/M2
LEFT VENTRICLE SYSTOLIC VOLUME INDEX: 46.6 ML/M2
LEFT VENTRICLE SYSTOLIC VOLUME: 79.66 ML
LEFT VENTRICULAR INTERNAL DIMENSION IN DIASTOLE: 4.72 CM (ref 3.5–6)
LEFT VENTRICULAR MASS: 155.59 G
LEFT VERTEBRAL DIAS: 16 CM/S
LEFT VERTEBRAL SYS: 113 CM/S
LV LATERAL E/E' RATIO: 10.4 M/S
LV SEPTAL E/E' RATIO: 34.67 M/S
MV PEAK A VEL: 0.91 M/S
MV PEAK E VEL: 1.04 M/S
OHS CV CAROTID RIGHT ICA EDV HIGHEST: 62
OHS CV CAROTID ULTRASOUND LEFT ICA/CCA RATIO: 2.02
OHS CV CAROTID ULTRASOUND RIGHT ICA/CCA RATIO: 2.99
OHS CV PV CAROTID LEFT HIGHEST CCA: 122
OHS CV PV CAROTID LEFT HIGHEST ICA: 246
OHS CV PV CAROTID RIGHT HIGHEST CCA: 103
OHS CV PV CAROTID RIGHT HIGHEST ICA: 308
OHS CV US CAROTID LEFT HIGHEST EDV: 66
PISA TR MAX VEL: 3.4 M/S
RA MAJOR: 4.23 CM
RA PRESSURE: 3 MMHG
RA WIDTH: 3.2 CM
RIGHT ARM DIASTOLIC BLOOD PRESSURE: 60 MMHG
RIGHT ARM SYSTOLIC BLOOD PRESSURE: 130 MMHG
RIGHT CBA DIAS: 22 CM/S
RIGHT CBA SYS: 89 CM/S
RIGHT CCA DIST DIAS: 17 CM/S
RIGHT CCA DIST SYS: 94 CM/S
RIGHT CCA MID DIAS: 22 CM/S
RIGHT CCA MID SYS: 97 CM/S
RIGHT CCA PROX DIAS: 11 CM/S
RIGHT CCA PROX SYS: 103 CM/S
RIGHT ECA DIAS: 29 CM/S
RIGHT ECA SYS: 217 CM/S
RIGHT ICA DIST DIAS: 43 CM/S
RIGHT ICA DIST SYS: 172 CM/S
RIGHT ICA MID DIAS: 47 CM/S
RIGHT ICA MID SYS: 172 CM/S
RIGHT ICA PROX DIAS: 62 CM/S
RIGHT ICA PROX SYS: 308 CM/S
RIGHT VENTRICULAR END-DIASTOLIC DIMENSION: 3.63 CM
RIGHT VERTEBRAL DIAS: 23 CM/S
RIGHT VERTEBRAL SYS: 113 CM/S
RV TISSUE DOPPLER FREE WALL SYSTOLIC VELOCITY 1 (APICAL 4 CHAMBER VIEW): 7 CM/S
SINUS: 2.3 CM
STJ: 2.7 CM
TDI LATERAL: 0.1 M/S
TDI SEPTAL: 0.03 M/S
TDI: 0.07 M/S
TR MAX PG: 46 MMHG
TRICUSPID ANNULAR PLANE SYSTOLIC EXCURSION: 2.3 CM
TV REST PULMONARY ARTERY PRESSURE: 49 MMHG

## 2019-11-15 PROCEDURE — 71046 XR CHEST PA AND LATERAL: ICD-10-PCS | Mod: 26,,, | Performed by: RADIOLOGY

## 2019-11-15 PROCEDURE — 71046 X-RAY EXAM CHEST 2 VIEWS: CPT | Mod: TC,PO

## 2019-11-15 PROCEDURE — 71046 X-RAY EXAM CHEST 2 VIEWS: CPT | Mod: 26,,, | Performed by: RADIOLOGY

## 2019-11-16 LAB — BACTERIA THROAT CULT: NORMAL

## 2019-11-20 ENCOUNTER — TELEPHONE (OUTPATIENT)
Dept: FAMILY MEDICINE | Facility: CLINIC | Age: 64
End: 2019-11-20

## 2019-11-20 ENCOUNTER — OFFICE VISIT (OUTPATIENT)
Dept: FAMILY MEDICINE | Facility: CLINIC | Age: 64
End: 2019-11-20
Payer: COMMERCIAL

## 2019-11-20 VITALS
SYSTOLIC BLOOD PRESSURE: 144 MMHG | BODY MASS INDEX: 27.06 KG/M2 | WEIGHT: 152.75 LBS | DIASTOLIC BLOOD PRESSURE: 84 MMHG | HEART RATE: 89 BPM | OXYGEN SATURATION: 95 %

## 2019-11-20 DIAGNOSIS — L12.9 PEMPHIGOID: Primary | ICD-10-CM

## 2019-11-20 DIAGNOSIS — M54.32 LEFT SCIATIC NERVE PAIN: ICD-10-CM

## 2019-11-20 PROCEDURE — 99999 PR PBB SHADOW E&M-EST. PATIENT-LVL IV: CPT | Mod: PBBFAC,,, | Performed by: PHYSICIAN ASSISTANT

## 2019-11-20 PROCEDURE — 3008F BODY MASS INDEX DOCD: CPT | Mod: CPTII,S$GLB,, | Performed by: PHYSICIAN ASSISTANT

## 2019-11-20 PROCEDURE — 3077F PR MOST RECENT SYSTOLIC BLOOD PRESSURE >= 140 MM HG: ICD-10-PCS | Mod: CPTII,S$GLB,, | Performed by: PHYSICIAN ASSISTANT

## 2019-11-20 PROCEDURE — 3077F SYST BP >= 140 MM HG: CPT | Mod: CPTII,S$GLB,, | Performed by: PHYSICIAN ASSISTANT

## 2019-11-20 PROCEDURE — 3079F PR MOST RECENT DIASTOLIC BLOOD PRESSURE 80-89 MM HG: ICD-10-PCS | Mod: CPTII,S$GLB,, | Performed by: PHYSICIAN ASSISTANT

## 2019-11-20 PROCEDURE — 99214 OFFICE O/P EST MOD 30 MIN: CPT | Mod: S$GLB,,, | Performed by: PHYSICIAN ASSISTANT

## 2019-11-20 PROCEDURE — 3079F DIAST BP 80-89 MM HG: CPT | Mod: CPTII,S$GLB,, | Performed by: PHYSICIAN ASSISTANT

## 2019-11-20 PROCEDURE — 99214 PR OFFICE/OUTPT VISIT, EST, LEVL IV, 30-39 MIN: ICD-10-PCS | Mod: S$GLB,,, | Performed by: PHYSICIAN ASSISTANT

## 2019-11-20 PROCEDURE — 3008F PR BODY MASS INDEX (BMI) DOCUMENTED: ICD-10-PCS | Mod: CPTII,S$GLB,, | Performed by: PHYSICIAN ASSISTANT

## 2019-11-20 PROCEDURE — 99999 PR PBB SHADOW E&M-EST. PATIENT-LVL IV: ICD-10-PCS | Mod: PBBFAC,,, | Performed by: PHYSICIAN ASSISTANT

## 2019-11-20 RX ORDER — TIZANIDINE 4 MG/1
4 TABLET ORAL NIGHTLY PRN
Qty: 10 TABLET | Refills: 0 | Status: SHIPPED | OUTPATIENT
Start: 2019-11-20 | End: 2019-11-30

## 2019-11-20 RX ORDER — METHYLPREDNISOLONE 4 MG/1
TABLET ORAL
Qty: 1 PACKAGE | Refills: 0 | Status: SHIPPED | OUTPATIENT
Start: 2019-11-20 | End: 2019-11-26

## 2019-11-20 NOTE — PROGRESS NOTES
Subjective:       Patient ID: Dulce Root is a 64 y.o. female    Chief Complaint: Pain (back and leg)    HPI   The patient is new to me, PCP is Dr. Hernandez.  She presents today complaining of left buttock pain that radiates down the back of her left leg to her left calf.  She describes the pain as a burning pain. She denies any numbness or tingling, no leg weakness.  She has a history of CAD with bypass and stents.     She has recently been started back on her Methotrexate for Penphigoid.     Review of Systems   Constitutional: Negative for activity change, chills and fever.   HENT: Negative for congestion and sore throat.    Eyes: Negative for visual disturbance.   Respiratory: Negative for cough and shortness of breath.    Cardiovascular: Negative for chest pain and palpitations.   Gastrointestinal: Negative for abdominal pain, diarrhea, nausea and vomiting.   Endocrine: Negative for polydipsia and polyuria.   Musculoskeletal: Positive for back pain. Negative for myalgias.   Skin: Negative for rash.   Neurological: Negative for headaches.   Psychiatric/Behavioral: The patient is not nervous/anxious.         Objective:   Physical Exam   Constitutional: She is oriented to person, place, and time. She appears well-developed and well-nourished. No distress.   HENT:   Head: Normocephalic and atraumatic.   Eyes: Pupils are equal, round, and reactive to light. EOM are normal.   Neck: Normal range of motion. Neck supple.   Cardiovascular: Normal rate, regular rhythm, normal heart sounds and intact distal pulses. Exam reveals no gallop and no friction rub.   No murmur heard.  Pulmonary/Chest: Effort normal and breath sounds normal. No respiratory distress. She has no wheezes. She has no rales. She exhibits no tenderness.   Musculoskeletal: Normal range of motion. She exhibits no edema, tenderness (No midline or paraspinal muscle tenderness) or deformity.   Neurological: She is alert and oriented to person, place, and  time.   Skin: Skin is warm and dry. No rash noted. She is not diaphoretic.   Psychiatric: She has a normal mood and affect. Her behavior is normal. Judgment and thought content normal.        Assessment:       1. Pemphigoid     2. Left sciatic nerve pain  Ambulatory Referral to Physical/Occupational Therapy    methylPREDNISolone (MEDROL DOSEPACK) 4 mg tablet    tiZANidine (ZANAFLEX) 4 MG tablet        Plan:       Pemphigoid  - continue Rheumatology    Left sciatic nerve pain  -     Ambulatory Referral to Physical/Occupational Therapy  -     methylPREDNISolone (MEDROL DOSEPACK) 4 mg tablet; use as directed  Dispense: 1 Package; Refill: 0  -     tiZANidine (ZANAFLEX) 4 MG tablet; Take 1 tablet (4 mg total) by mouth nightly as needed.  Dispense: 10 tablet; Refill: 0

## 2019-11-20 NOTE — TELEPHONE ENCOUNTER
----- Message from Akila Washington sent at 11/20/2019  8:07 AM CST -----  Contact: self [phone]  .Type:  Same Day Appointment Request    Caller is requesting a same day appointment.  Caller declined first available appointment listed below.    Name of Russo Dulce Root  When is the first available appointment?11/21/19  Symptoms:Severe Back Pain  Best Call Back Number:[phone]  Additional Information:

## 2019-11-20 NOTE — PATIENT INSTRUCTIONS

## 2019-11-22 ENCOUNTER — TELEPHONE (OUTPATIENT)
Dept: FAMILY MEDICINE | Facility: CLINIC | Age: 64
End: 2019-11-22

## 2019-11-22 DIAGNOSIS — M54.9 BACK PAIN, UNSPECIFIED BACK LOCATION, UNSPECIFIED BACK PAIN LATERALITY, UNSPECIFIED CHRONICITY: Primary | ICD-10-CM

## 2019-11-22 RX ORDER — LIDOCAINE 50 MG/G
1 PATCH TOPICAL DAILY
Qty: 30 PATCH | Refills: 11 | Status: SHIPPED | OUTPATIENT
Start: 2019-11-22 | End: 2020-04-07

## 2019-11-22 NOTE — TELEPHONE ENCOUNTER
Please let the patient know that I cannot call in any controlled substances.  She should not take NSAIDs because it will interact with her Plavix.  There is no oral medication that I can call in that will help with the pain that would be safe. She can continue to take Tylenol.  I will also go ahead and call in some lidocaine patches for her to apply.  I recommend that she go to the emergency room if she has severe or worsening pain.

## 2019-11-22 NOTE — TELEPHONE ENCOUNTER
Called patient and advised as per Ashlie Fleming.  Advised patient to avoid NSAIDS, and if pain continues with out relief or gets worse to go the ER. virgilio verbalized understanding and will continue to use tylenol/ heat and ice.

## 2019-11-25 ENCOUNTER — TELEPHONE (OUTPATIENT)
Dept: FAMILY MEDICINE | Facility: CLINIC | Age: 64
End: 2019-11-25

## 2019-11-25 ENCOUNTER — DOCUMENTATION ONLY (OUTPATIENT)
Dept: FAMILY MEDICINE | Facility: CLINIC | Age: 64
End: 2019-11-25

## 2019-11-25 NOTE — TELEPHONE ENCOUNTER
----- Message from Sade Brar sent at 11/25/2019  7:51 AM CST -----  Contact: pt   Type:  Same Day Appointment Request    Caller is requesting a same day appointment.  Caller declined first available appointment listed below.    Name of Caller:RIA MORILLO  When is the first available appointment? 01/06/2020  Symptoms: left leg pain   Best Call Back Number: [phone]  Additional Information:

## 2019-11-25 NOTE — TELEPHONE ENCOUNTER
----- Message from Hermila Richey MA sent at 11/25/2019  7:51 AM CST -----  Contact: pt      ----- Message -----  From: Wallace Yin  Sent: 11/23/2019   9:25 AM CST  To: Hetal BHAKTA Staff    Pt called in severe back pain. States lidocaine patches and muscle relaxers previously called in are not helping. Requests call back  ASAP. Pt of Dr. Hernandez who has canceled last 5 apts.    [phone]

## 2019-11-25 NOTE — PROGRESS NOTES
PA initiated for Lidocaine 5% Patch  CMM: [id number] - [id number]  SKIP  ----------------------------  11/25/19: DENIED  Must try and fail at least 3 of the following pharmacologic therapies: Tylenol, Nsaids, muscle relaxants, opioids, MORILLO-2 inhibitors, Ultram, Neuronitin, tricyclic antidepressants.

## 2019-11-26 ENCOUNTER — OFFICE VISIT (OUTPATIENT)
Dept: FAMILY MEDICINE | Facility: CLINIC | Age: 64
End: 2019-11-26
Payer: COMMERCIAL

## 2019-11-26 VITALS
HEIGHT: 63 IN | HEART RATE: 60 BPM | SYSTOLIC BLOOD PRESSURE: 88 MMHG | WEIGHT: 150 LBS | DIASTOLIC BLOOD PRESSURE: 56 MMHG | TEMPERATURE: 98 F | BODY MASS INDEX: 26.58 KG/M2

## 2019-11-26 DIAGNOSIS — M54.50 LOW BACK PAIN, NON-SPECIFIC: ICD-10-CM

## 2019-11-26 PROCEDURE — 99213 PR OFFICE/OUTPT VISIT, EST, LEVL III, 20-29 MIN: ICD-10-PCS | Mod: S$GLB,,, | Performed by: INTERNAL MEDICINE

## 2019-11-26 PROCEDURE — 3008F BODY MASS INDEX DOCD: CPT | Mod: CPTII,S$GLB,, | Performed by: INTERNAL MEDICINE

## 2019-11-26 PROCEDURE — 99999 PR PBB SHADOW E&M-EST. PATIENT-LVL V: CPT | Mod: PBBFAC,,, | Performed by: INTERNAL MEDICINE

## 2019-11-26 PROCEDURE — 3078F PR MOST RECENT DIASTOLIC BLOOD PRESSURE < 80 MM HG: ICD-10-PCS | Mod: CPTII,S$GLB,, | Performed by: INTERNAL MEDICINE

## 2019-11-26 PROCEDURE — 3008F PR BODY MASS INDEX (BMI) DOCUMENTED: ICD-10-PCS | Mod: CPTII,S$GLB,, | Performed by: INTERNAL MEDICINE

## 2019-11-26 PROCEDURE — 3074F SYST BP LT 130 MM HG: CPT | Mod: CPTII,S$GLB,, | Performed by: INTERNAL MEDICINE

## 2019-11-26 PROCEDURE — 99999 PR PBB SHADOW E&M-EST. PATIENT-LVL V: ICD-10-PCS | Mod: PBBFAC,,, | Performed by: INTERNAL MEDICINE

## 2019-11-26 PROCEDURE — 99213 OFFICE O/P EST LOW 20 MIN: CPT | Mod: S$GLB,,, | Performed by: INTERNAL MEDICINE

## 2019-11-26 PROCEDURE — 3078F DIAST BP <80 MM HG: CPT | Mod: CPTII,S$GLB,, | Performed by: INTERNAL MEDICINE

## 2019-11-26 PROCEDURE — 3074F PR MOST RECENT SYSTOLIC BLOOD PRESSURE < 130 MM HG: ICD-10-PCS | Mod: CPTII,S$GLB,, | Performed by: INTERNAL MEDICINE

## 2019-11-26 RX ORDER — METHYLPREDNISOLONE 4 MG/1
TABLET ORAL
Qty: 21 TABLET | Refills: 0 | Status: SHIPPED | OUTPATIENT
Start: 2019-11-26 | End: 2019-12-10

## 2019-11-26 RX ORDER — FOLIC ACID 1 MG/1
TABLET ORAL
COMMUNITY
Start: 2019-11-18 | End: 2021-01-29

## 2019-11-26 RX ORDER — LIFITEGRAST 50 MG/ML
1 SOLUTION/ DROPS OPHTHALMIC 2 TIMES DAILY
Refills: 3 | COMMUNITY
Start: 2019-10-14 | End: 2021-02-17

## 2019-11-26 NOTE — PROGRESS NOTES
Assessment/Plan:    Low back pain, non-specific  Acute, L sided lower back pain with radiation to L lower leg x 2 weeks. Evaluated and started conservative management. Has since developed neuropathy and fasciculations of leg. Concern for spinal cord involvement. Start on steroid pack and will get MRI. Discussed with patient that if she soul have worsening neuropathy, or development of motor weakness, urinary or fecal incontinence, to report to ED.     _____________________________________________________________________________________________________________________________________________________    Orders this visit:    Low back pain, non-specific  -     MRI Lumbar Spine W WO Contrast; Future; Expected date: 11/26/2019  -     methylPREDNISolone (MEDROL DOSEPACK) 4 mg tablet; follow package directions  Dispense: 21 tablet; Refill: 0      Follow up if symptoms worsen or fail to improve.    Shayla Pino MD  _____________________________________________________________________________________________________________________________________________________    HPI:    Patient is in clinic today as an established patient with a new complaint of L leg pain. Symptom started in lower back, but then has moved to hip and leg, down to calf. Described a burning pain. No longer having back pain but hip and leg pain continues. Pain is intermittent. Worse with movement, especially when she gets up to walk. Symptoms have been present for the past 2 weeks. Evaluated in another clinic one week ago and diagnosed with sciatica. Was given a medrol dose pack and muscle relaxer. Muscle relaxer makes her too sleepy to take. Some relief with tylenol. Using ice and heat as well. Now having numbness/tingling of bottom of L foot and has noticed some fasciculations/muscle twitches in lateral calf. Denies motor weakness. Denies urinary or fecal incontinence.    Past Medical History:  Past Medical History:   Diagnosis Date    Allergy      Breast cancer 2008    Carotid stenosis, bilateral 10/13/2017    Coronary artery disease     Coronary artery disease involving coronary bypass graft of native heart without angina pectoris 8/16/2019 2/19  1.  Left main is a small vessel with a 60%-65% ostial lesion. 2.  LAD is a medium-sized vessel diffuse 70% proximally  Ramus intermedius is a medium size vessel with a 40%-50% ostial lesion. 3.  Circumflex 60%-70% ostial  remainder of circumflex and obtuse marginal normal in appearance. Right coronary artery is normal size vessel, short discrete 70%mid 4.  Vein graft to right coronary is occ    Coronary artery disease involving native coronary artery of native heart without angina pectoris 6/27/2017    DCIS (ductal carcinoma in situ) of breast 11/6/2012    Essential hypertension 11/6/2012    Hodgkin lymphoma     Hx of Hodgkin's disease - s/p chemo and radiation 11/6/2012    Hyperlipidemia     Hyperlipidemia 11/6/2012    The patient presents with hyperlipidemia.  The patient reports tolerating the medication well and is in excellent compliance.  There have been no medication side effects.  The patient denies chest pain, neuropathy, and myalgias.  The patient has reduced fat intake and has been exercising.  Current treatment has included the medications listed in the med card.   Lab Results Component Value Date  CH    Hypertension     LBBB (left bundle branch block) 5/22/2018    Osteoporosis     Paroxysmal atrial fibrillation - single post-op episode 8/24/2017    Pemphigoid     S/P AVR (aortic valve replacement) - pericardial valve 08/21/2017    Perceval aortic tissue valve PVS23. 23mm    serial # [id number]    S/P CABG x 2 8/21/2017 8/17 CABG X 2 with SVG-LAD and SVG-distal RCA  SVG LAD due to absent LIMA after chest radiation and lymph node biopsy     Stented coronary artery     She had 2 stents placed in 2/2019.  She has had CAD and bypasses in the past in 2017.      Thyroid disease      Past  Surgical History:   Procedure Laterality Date    ARTERIOGRAPHY OF AORTIC ROOT N/A 2/15/2019    Procedure: ARTERIOGRAM, AORTIC ROOT;  Surgeon: Sujit Chaudhry MD;  Location: [facility] CATH;  Service: Cardiology;  Laterality: N/A;    BREAST BIOPSY      BREAST RECONSTRUCTION      bilateral mastectomy    CARDIAC CATHETERIZATION      stents x 2    CARDIAC SURGERY  2017    Aortic valve replacement , CABG 2 vessel    CORONARY ANGIOGRAPHY N/A 2/15/2019    Procedure: ANGIOGRAM, CORONARY ARTERY;  Surgeon: Sujit Chaudhry MD;  Location: [facility] CATH;  Service: Cardiology;  Laterality: N/A;    CORONARY BYPASS GRAFT ANGIOGRAPHY  2/15/2019    Procedure: Bypass graft study;  Surgeon: Sujit Chaudhry MD;  Location: [facility] CATH;  Service: Cardiology;;    COSMETIC SURGERY      bereast reconstruction    EYE SURGERY      eye lids    LEFT HEART CATHETERIZATION Right 2/15/2019    Procedure: Left heart cath;  Surgeon: Sujit Chaudhry MD;  Location: [facility] CATH;  Service: Cardiology;  Laterality: Right;    LYMPHADENECTOMY      MASTECTOMY      SKIN BIOPSY      SPLENECTOMY, TOTAL      TUMOR REMOVAL       Review of patient's allergies indicates:   Allergen Reactions    Amlodipine Shortness Of Breath and Other (See Comments)     unknown  Other reaction(s): Swelling  unknown  unknown  Other reaction(s): Swelling  unknown  Other reaction(s): Swelling  unknown    Levofloxacin Hives and Swelling    Sulfa (sulfonamide antibiotics) Shortness Of Breath, Itching and Other (See Comments)     elevated blood pressure    Ciprofloxacin Itching    Iodinated contrast media     Iodine      Other reaction(s): Unknown    Latex      Other reaction(s): Itching    Latex, natural rubber Itching     Social History     Tobacco Use    Smoking status: Former Smoker     Packs/day: 1.00     Years: 20.00     Pack years: 20.00     Types: Cigarettes     Last attempt to quit: 1981     Years since quittin.0    Smokeless tobacco: Never Used   Substance  Use Topics    Alcohol use: No     Comment: 2 drinks/month    Drug use: No     Family History   Problem Relation Age of Onset    Hypertension Mother     Hypertension Father     Cancer Neg Hx      Current Outpatient Medications on File Prior to Visit   Medication Sig Dispense Refill    ALBUTEROL, REFILL, INHL Inhale into the lungs.      aspirin (ECOTRIN) 81 MG EC tablet Take 1 tablet (81 mg total) by mouth once daily.  0    azelastine (ASTELIN) 137 mcg (0.1 %) nasal spray 1 spray (137 mcg total) by Nasal route 2 (two) times daily. 30 mL 0    brimonidine-timolol (COMBIGAN) 0.2-0.5 % Drop Place 1 drop into both eyes 2 (two) times daily.       calcium carbonate (OS-CALVIN) 600 mg (1,500 mg) Tab Take 600 mg by mouth 2 (two) times daily with meals.      citalopram (CELEXA) 10 MG tablet TAKE 1 TABLET BY MOUTH EVERY DAY 90 tablet 3    digoxin (LANOXIN) 125 mcg tablet Take 1 tablet (125 mcg total) by mouth once daily. 30 tablet 11    fluticasone propionate (FLONASE) 50 mcg/actuation nasal spray 2 sprays (100 mcg total) by Each Nostril route once daily. 9.9 mL 0    levothyroxine (SYNTHROID) 75 MCG tablet TAKE 1 TABLET EVERY DAY 90 tablet 3    lidocaine (LIDODERM) 5 % Place 1 patch onto the skin once daily. Remove & Discard patch within 12 hours or as directed by MD 30 patch 11    methotrexate (TREXALL) 10 MG tablet Take 40 mg by mouth once a week.      metoprolol succinate (TOPROL-XL) 25 MG 24 hr tablet Take 1 tablet (25 mg total) by mouth once daily. 90 tablet 3    multivitamin with minerals tablet Take 1 tablet by mouth once daily.      rosuvastatin (CRESTOR) 20 MG tablet TAKE 1 TABLET every OTHER night 45 tablet 1    telmisartan (MICARDIS) 20 MG Tab Take 1 tablet (20 mg total) by mouth once daily. 90 tablet 3    tiZANidine (ZANAFLEX) 4 MG tablet Take 1 tablet (4 mg total) by mouth nightly as needed. 10 tablet 0    valacyclovir (VALTREX) 1000 MG tablet TAKE 1 TABLET EVERY 24 HOURS; prn  3    XIIDRA 5 %  "Dpet Place 1 drop into both eyes 2 (two) times daily.  3    (Magic mouthwash) 1:1:1 Benadryl 12.5mg/5ml liq, aluminum & magnesium hydroxide-simehticone (Maalox), lidocaine viscous 2% Swish and spit 10 mLs every 4 (four) hours as needed. for mouth sores (Patient not taking: Reported on 11/26/2019) 90 mL 0    acetaminophen (TYLENOL) 325 MG tablet Take 1 tablet (325 mg total) by mouth every 6 (six) hours as needed for Pain.      clopidogrel (PLAVIX) 75 mg tablet Take 1 tablet (75 mg total) by mouth once daily. 30 tablet 12    folic acid (FOLVITE) 1 MG tablet       RESTASIS 0.05 % ophthalmic emulsion Place 1 drop into both eyes 2 (two) times daily.  4    [DISCONTINUED] methylPREDNISolone (MEDROL DOSEPACK) 4 mg tablet use as directed (Patient not taking: Reported on 11/26/2019) 1 Package 0     Current Facility-Administered Medications on File Prior to Visit   Medication Dose Route Frequency Provider Last Rate Last Dose    albuterol inhaler 2 puff  2 puff Inhalation 1 time in Clinic/HOD Sujit Chaudhry MD           Review of Systems   Constitutional: Negative for fatigue and fever.   Musculoskeletal: Positive for arthralgias and myalgias. Negative for gait problem and joint swelling.   Neurological: Positive for dizziness and numbness. Negative for tremors and weakness.       Vitals:    11/26/19 0912   BP: (!) 88/56   Pulse: 60   Temp: 97.7 °F (36.5 °C)   Weight: 68 kg (150 lb)   Height: 5' 3" (1.6 m)       Wt Readings from Last 3 Encounters:   11/26/19 68 kg (150 lb)   11/20/19 69.3 kg (152 lb 12.5 oz)   11/14/19 70.8 kg (156 lb 1.4 oz)       Physical Exam   Constitutional: She is oriented to person, place, and time. She appears well-developed and well-nourished. No distress.   HENT:   Head: Normocephalic and atraumatic.   Eyes: Conjunctivae and EOM are normal.   Neck: Normal range of motion. Neck supple.   Cardiovascular: Normal rate, regular rhythm, normal heart sounds and intact distal pulses.   No murmur " heard.  Pulmonary/Chest: Effort normal and breath sounds normal. No respiratory distress.   Abdominal: There is no tenderness.   Musculoskeletal: Normal range of motion. She exhibits no edema, tenderness or deformity.   Neurological: She is alert and oriented to person, place, and time. She displays normal reflexes. No cranial nerve deficit or sensory deficit. She exhibits normal muscle tone. Coordination normal.   Skin: Skin is warm and dry. No rash noted.   Psychiatric: She has a normal mood and affect.

## 2019-11-26 NOTE — ASSESSMENT & PLAN NOTE
Acute, L sided lower back pain with radiation to L lower leg x 2 weeks. Evaluated and started conservative management. Has since developed neuropathy and fasciculations of leg. Concern for spinal cord involvement. Start on steroid pack and will get MRI. Discussed with patient that if she soul have worsening neuropathy, or development of motor weakness, urinary or fecal incontinence, to report to ED.

## 2019-11-27 ENCOUNTER — TELEPHONE (OUTPATIENT)
Dept: FAMILY MEDICINE | Facility: CLINIC | Age: 64
End: 2019-11-27

## 2019-11-27 NOTE — TELEPHONE ENCOUNTER
PA for Lidocaine 5% Patch has been  DENIED  Must try and fail at least 3 of the following pharmacologic therapies: Tylenol, Nsaids, muscle relaxants, opioids, MORILLO-2 inhibitors, Ultram, Neuronitin, tricyclic antidepressants.  Please advise

## 2019-12-05 DIAGNOSIS — J02.9 PHARYNGITIS, UNSPECIFIED ETIOLOGY: ICD-10-CM

## 2019-12-05 RX ORDER — FLUTICASONE PROPIONATE 50 MCG
SPRAY, SUSPENSION (ML) NASAL
Qty: 16 ML | Refills: 0 | Status: SHIPPED | OUTPATIENT
Start: 2019-12-05 | End: 2020-04-07

## 2019-12-05 RX ORDER — AZELASTINE 1 MG/ML
SPRAY, METERED NASAL
Qty: 30 ML | Refills: 0 | Status: SHIPPED | OUTPATIENT
Start: 2019-12-05 | End: 2020-04-07

## 2019-12-06 ENCOUNTER — HOSPITAL ENCOUNTER (OUTPATIENT)
Dept: RADIOLOGY | Facility: HOSPITAL | Age: 64
Discharge: HOME | End: 2019-12-06
Attending: INTERNAL MEDICINE
Payer: COMMERCIAL

## 2019-12-06 DIAGNOSIS — M54.50 LOW BACK PAIN, NON-SPECIFIC: ICD-10-CM

## 2019-12-06 PROCEDURE — 72148 MRI LUMBAR SPINE W/O DYE: CPT | Mod: 26,,, | Performed by: RADIOLOGY

## 2019-12-06 PROCEDURE — 72148 MRI LUMBAR SPINE W/O DYE: CPT | Mod: TC,PO

## 2019-12-06 PROCEDURE — 72148 MRI LUMBAR SPINE WITHOUT CONTRAST: ICD-10-PCS | Mod: 26,,, | Performed by: RADIOLOGY

## 2019-12-09 ENCOUNTER — PATIENT MESSAGE (OUTPATIENT)
Dept: FAMILY MEDICINE | Facility: CLINIC | Age: 64
End: 2019-12-09

## 2019-12-09 ENCOUNTER — TELEPHONE (OUTPATIENT)
Dept: FAMILY MEDICINE | Facility: CLINIC | Age: 64
End: 2019-12-09

## 2019-12-09 DIAGNOSIS — N28.9 RENAL LESION: ICD-10-CM

## 2019-12-09 DIAGNOSIS — N28.89 RENAL MASS: ICD-10-CM

## 2019-12-09 DIAGNOSIS — M54.50 LOW BACK PAIN, NON-SPECIFIC: Primary | ICD-10-CM

## 2019-12-09 DIAGNOSIS — N85.8 UTERINE MASS: ICD-10-CM

## 2019-12-09 RX ORDER — HYDROCODONE BITARTRATE AND ACETAMINOPHEN 5; 325 MG/1; MG/1
1 TABLET ORAL EVERY 6 HOURS PRN
Qty: 20 TABLET | Refills: 0 | Status: SHIPPED | OUTPATIENT
Start: 2019-12-09 | End: 2019-12-19

## 2019-12-09 NOTE — TELEPHONE ENCOUNTER
MRI of back showing herniated disc with some nerve involvement. Plan to refer to neurosurgery for further evalaution.    MRI also showed a spot on L kidney, likely a cyst. I would like to confirm this with US.    Also showed a lesion in uterus that is likely fibroids. Will also get US to confirm this.    I have signed for the following orders AND/OR meds.  Please call the patient and ask the patient to schedule the testing AND/OR inform about any medications that were sent.      Orders Placed This Encounter   Procedures    US Retroperitoneal Complete (Kidney and     Standing Status:   Future     Standing Expiration Date:   12/9/2020     Order Specific Question:   Reason for Exam:     Answer:   L renal lesion. Confirm benign cyst     Order Specific Question:   May the Radiologist modify the order per protocol to meet the clinical needs of the patient?     Answer:   Yes    US Pelvis Complete Non OB     Standing Status:   Future     Standing Expiration Date:   12/9/2020     Order Specific Question:   Reason for Exam:     Answer:   uterine mass. Confirm as fibroids     Order Specific Question:   May the Radiologist modify the order per protocol to meet the clinical needs of the patient?     Answer:   Yes    Ambulatory referral to Neurosurgery     Referral Priority:   Routine     Referral Type:   Consultation     Referral Reason:   Specialty Services Required     Referred to Provider:   Vimal Villegas MD     Requested Specialty:   Neurosurgery     Number of Visits Requested:   1

## 2019-12-09 NOTE — TELEPHONE ENCOUNTER
Short course of low dose pain medication prescribed. Has tylenol included in medication so do not continue to take OTC tylenol while taking this.

## 2019-12-09 NOTE — TELEPHONE ENCOUNTER
Called patient and scheduled US for 12/19/2019.    Patient stated that in the meantime, she is in a lot of pain and tylenol only helps a little bit, she wants to know if there is anything you can prescribe her that was work a little better. Please advise.

## 2019-12-10 ENCOUNTER — TELEPHONE (OUTPATIENT)
Dept: NEUROSURGERY | Facility: CLINIC | Age: 64
End: 2019-12-10

## 2019-12-10 ENCOUNTER — PATIENT OUTREACH (OUTPATIENT)
Dept: OTHER | Facility: OTHER | Age: 64
End: 2019-12-10

## 2019-12-10 ENCOUNTER — PATIENT MESSAGE (OUTPATIENT)
Dept: CARDIOLOGY | Facility: CLINIC | Age: 64
End: 2019-12-10

## 2019-12-10 ENCOUNTER — OFFICE VISIT (OUTPATIENT)
Dept: NEUROSURGERY | Facility: CLINIC | Age: 64
End: 2019-12-10
Payer: COMMERCIAL

## 2019-12-10 VITALS
WEIGHT: 154.13 LBS | DIASTOLIC BLOOD PRESSURE: 82 MMHG | HEIGHT: 63 IN | HEART RATE: 83 BPM | BODY MASS INDEX: 27.31 KG/M2 | SYSTOLIC BLOOD PRESSURE: 149 MMHG

## 2019-12-10 DIAGNOSIS — M54.16 LUMBAR RADICULOPATHY: ICD-10-CM

## 2019-12-10 DIAGNOSIS — M51.36 DEGENERATIVE DISC DISEASE, LUMBAR: Primary | ICD-10-CM

## 2019-12-10 DIAGNOSIS — M48.062 LUMBAR STENOSIS WITH NEUROGENIC CLAUDICATION: ICD-10-CM

## 2019-12-10 PROCEDURE — 3079F DIAST BP 80-89 MM HG: CPT | Mod: CPTII,S$GLB,, | Performed by: NEUROLOGICAL SURGERY

## 2019-12-10 PROCEDURE — 3008F BODY MASS INDEX DOCD: CPT | Mod: CPTII,S$GLB,, | Performed by: NEUROLOGICAL SURGERY

## 2019-12-10 PROCEDURE — 3008F PR BODY MASS INDEX (BMI) DOCUMENTED: ICD-10-PCS | Mod: CPTII,S$GLB,, | Performed by: NEUROLOGICAL SURGERY

## 2019-12-10 PROCEDURE — 3077F SYST BP >= 140 MM HG: CPT | Mod: CPTII,S$GLB,, | Performed by: NEUROLOGICAL SURGERY

## 2019-12-10 PROCEDURE — 99204 OFFICE O/P NEW MOD 45 MIN: CPT | Mod: S$GLB,,, | Performed by: NEUROLOGICAL SURGERY

## 2019-12-10 PROCEDURE — 3077F PR MOST RECENT SYSTOLIC BLOOD PRESSURE >= 140 MM HG: ICD-10-PCS | Mod: CPTII,S$GLB,, | Performed by: NEUROLOGICAL SURGERY

## 2019-12-10 PROCEDURE — 99204 PR OFFICE/OUTPT VISIT, NEW, LEVL IV, 45-59 MIN: ICD-10-PCS | Mod: S$GLB,,, | Performed by: NEUROLOGICAL SURGERY

## 2019-12-10 PROCEDURE — 99999 PR PBB SHADOW E&M-EST. PATIENT-LVL III: CPT | Mod: PBBFAC,,, | Performed by: NEUROLOGICAL SURGERY

## 2019-12-10 PROCEDURE — 3079F PR MOST RECENT DIASTOLIC BLOOD PRESSURE 80-89 MM HG: ICD-10-PCS | Mod: CPTII,S$GLB,, | Performed by: NEUROLOGICAL SURGERY

## 2019-12-10 PROCEDURE — 99999 PR PBB SHADOW E&M-EST. PATIENT-LVL III: ICD-10-PCS | Mod: PBBFAC,,, | Performed by: NEUROLOGICAL SURGERY

## 2019-12-10 RX ORDER — METHOCARBAMOL 750 MG/1
750 TABLET, FILM COATED ORAL 3 TIMES DAILY PRN
Qty: 60 TABLET | Refills: 0 | Status: SHIPPED | OUTPATIENT
Start: 2019-12-10 | End: 2020-01-23 | Stop reason: SDUPTHER

## 2019-12-10 RX ORDER — METHYLPREDNISOLONE 4 MG/1
TABLET ORAL
Qty: 1 PACKAGE | Refills: 0 | Status: SHIPPED | OUTPATIENT
Start: 2019-12-10 | End: 2019-12-31

## 2019-12-10 NOTE — PROGRESS NOTES
Subjective:      Patient ID: Dulce Root is a 64 y.o. female.    Chief Complaint: Lumbar Spine Pain (L-Spine) ( LBP) and Extremity Pain (Left leg )    Patient is here today for evaluation of lower back pain and left lower extremity symptoms as a new patient with an MRI    States that approximately 2 months ago she began having lower back pain radiating down the left lower extremity to the outside portion of her left foot.  Does not remember any inciting events.  The pain is worse with walking and better with rest.  It is a constant achy type sensation.  She has numbness and tingling to the 4th and 5th toe on the left side.  Leg pain is worsened back pain.  She has taken to Medrol dose packs without any lasting relief.  She also has been prescribed Norco which she takes on an as-needed basis without much relief  Denies any weakness  Denies any bowel bladder symptoms  Ambulates unassisted    Review of Systems   Constitutional: Negative for activity change, appetite change and chills.   HENT: Negative for hearing loss, sore throat and tinnitus.    Eyes: Negative for pain, discharge and itching.   Cardiovascular: Negative for chest pain.   Gastrointestinal: Negative for abdominal pain.   Endocrine: Negative for cold intolerance and heat intolerance.   Genitourinary: Negative for difficulty urinating and dysuria.   Musculoskeletal: Positive for back pain and gait problem.   Allergic/Immunologic: Negative for environmental allergies.   Neurological: Positive for weakness. Negative for dizziness, tremors, light-headedness and headaches.   Hematological: Negative for adenopathy.   Psychiatric/Behavioral: Negative for agitation, behavioral problems and confusion.         Objective:       Neurosurgery Physical Exam  Ortho/SPM Exam    Nursing note and vitals reviewed  Gen:Oriented to person, place, and time.             Appears stated age   Skin: no rashes or lesions identified   Head:Normocephalic and atraumatic.  Nose:  Nose normal.    Eyes: EOM are normal. Pupils are equal, round, and reactive to light.   Neck: Neck supple. No masses or lesions palpated  Cardiovascular: Intact distal pulses.    Abdominal: Soft.   Neurological: Alert and oriented to person, place, and time.  No cranial nerve deficit.  Coordination normal. Normal muscle tone  Psychiatric: Normal mood and affect. Behavior is normal.      Back:  None  Paraspinal muscle spasms   None  Pain with flexion and extention   WNL  Range of motion     Positive 15° on the left Straight leg raise     Motor   Right Right Left Left  Level Group   5  5  L2 Hip flexor (Psoas)   5  5  L3 Leg extension (Quads)   5  5  L4 Dorsiflexion & foot inversion (Tibialis Anterior)   5  5  L5 Great toe extension ( EHL)   5  5 4+ S1 Foot eversion (Gastroc, PL & PB)     Sensation  NL Decreased (R/L/BL) Level Sensation    X  L2 Anterio-medial thigh   X  L3 Medial thigh around knee   X  L4 Medial foot   X  L5 Dorsum foot   \ Diminished light touch on the left S1 Lateral foot     Reflex  2+  Patellar tendon (L4)   2+  Achilles tendon (S1)       Results for orders placed during the hospital encounter of 12/06/19   MRI Lumbar Spine Without Contrast    Narrative EXAMINATION:  MRI LUMBAR SPINE WITHOUT CONTRAST    CLINICAL HISTORY:  Low back pain, prior surgery, new or progressive sx; Low back pain    TECHNIQUE:  Multiplanar, multisequence MR images were acquired from the thoracolumbar junction to the sacrum without the administration of contrast.    COMPARISON:  None.    FINDINGS:  Vertebral body alignment appears adequate.  Vertebral body heights appear well preserved.  Intervertebral disc heights appear relatively well preserved.  No STIR signal abnormality is noted to suggest an aggressive bone marrow replacement process or recent fracture.  Abnormal uterine masses are noted suggestive of fibroids but not entirely specific with 1 of at least 3 cm identified.  If further evaluation is desired a female  pelvis ultrasound could be performed.  Multiple colonic diverticula are noted.    A signal intensity is noted at the lower pole of the left kidney anteriorly of 7 mm nonspecific on this examination but statistically favored relate to a cyst.  If confirmation is desired to exclude solid components ultrasound could be performed.    At the T12-L1 level, minimal broad-based bulge is noted paracentric to the right of 1-2 mm without significant neural foraminal narrowing.  Minimal central canal narrowing is noted.    At the L1-L2 level, broad base bulge is noted of 3-4 mm.  Mild bilateral neural foraminal narrowing is noted with mild thecal sac narrowing to 14 mm.  Ligamentous hypertrophy appears to be present.  Right-sided neural foraminal narrowing is slightly greater than left.    At the L2-L3 level, a central broad-based protrusion appears to be present of 6 mm with bilateral lateral recess stenosis.  Moderate central canal narrowing is noted to 9 mm.  Minimal bilateral neural foraminal narrowing is noted.    At the L3-L4 level and broad-based protrusion is noted centrally of 6 mm.  Bilateral lateral recess stenosis is noted.  Moderate central canal stenosis is noted to 9 mm.  The descending nerve roots within the lateral recesses bilaterally may be affected.  Mild bulging is noted towards each neural foramen with mild bilateral neural foraminal narrowing.    At the L4-L5 level, broad-based bulge appears to be present of 6 mm along with bulging towards each neural foramen.  Facet arthropathy and ligamentous hypertrophy appears to be present.  Moderate to severe central canal stenosis is noted with very little fluid surrounding nerve roots and central canal narrowing to 5 mm.  Moderate bilateral neural foraminal stenosis appears to be present.  Contact of the exiting nerve roots bilaterally may be present particularly on the left.    At the L5-S1 level a disc extrusion appears to be present into the left lateral recess  that descends from the disc plane by 9 mm and extends posterior to the disc plane by 8 mm.  Left lateral recess stenosis is noted with probable contact of the descending nerve root within the left lateral recess.  Moderate thecal sac narrowing is noted to 9 mm.  Mild to moderate right neural foraminal stenosis is noted with probable contact of the exiting nerve root.  Mild left neural foraminal stenosis is noted.      Impression 1.  Multilevel degenerative change with central canal note narrowing most notably at the L4-L5 level to 5 mm.  Disc protrusion is noted at the L5-S1 level into the left lateral recess likely affecting the descending nerve roots.  Broad-based protrusion is also noted at the L2-L3 level centrally and at the L3-L4 level centrally causing bilateral lateral recess stenosis as described above    2.  Left renal signal intensity nonspecific on this examination but statistically favored relate to a cyst.  If confirmation is desired ultrasound could be performed.    3.  Uterine mass that is nonspecific but suggestive a fibroid, if confirmation is desired female pelvis ultrasound evaluation could be performed with follow-up for size stability.    4.  Multiple colonic diverticula are noted in the partially visualized colon      Electronically signed by: Evan Anton MD  Date:    12/06/2019  Time:    16:29      No results found for this or any previous visit.  Results for orders placed during the hospital encounter of 07/10/13   X-Ray Lumbar Spine AP And Lateral    Narrative Comparison: None.    Findings:The SI joints are unremarkable. The joint alignment is within normal limits.  No acute fractures identified.  No evidence of a marrow replacement process.    Impression  No significant osseous abnormalities.      Electronically signed by: Fredy Winchester MD  Date:     07/10/13  Time:    13:56           I  reviewed all pertinent imaging regarding this case.  Assessment:     1. Degenerative disc disease,  lumbar      Plan:     Degenerative disc disease, lumbar  -     methocarbamol (ROBAXIN) 750 MG Tab; Take 1 tablet (750 mg total) by mouth 3 (three) times daily as needed (muscle spasms).  Dispense: 60 tablet; Refill: 0  -     methylPREDNISolone (MEDROL DOSEPACK) 4 mg tablet; use as directed  Dispense: 1 Package; Refill: 0     Patient has a large herniated disc on the left-sided L5-S1 which correspond with her symptoms.  She may very well benefit from a minimally invasive 1 level microdiskectomy.  She does however have a history of multiple medical issues and is on blood thinners currently.  I have explained that if she were to consider surgery she would need to be cleared from medical standpoint and be off of blood thinners prior to proceeding with an operation.  In the meantime she may try epidural steroid injection to help alleviate the symptoms and potentially let this disc heal.  I am unsure it will with because it seems to be sequestered behind the vertebral body of S1.  She states that as long as she is not having weakness she is willing to give conservative treatment try.    She will limit her bending lifting twisting.  I will refer her for an epidural steroid injection.  She will follow-up after to discuss findings and discuss any future treatments    Thank you for the referral   Please call with any questions    Vimal Villegas MD  Neurosurgery

## 2019-12-10 NOTE — TELEPHONE ENCOUNTER
Request for Cardiac Clearance    Dulce M Ivett  is having back pain injection and needs clearance.     Please advise on any medication holds. Pt. Taking ASA & Plavix    Please indicate if patient is cleared from a Cardiac standpoint.

## 2019-12-10 NOTE — LETTER
December 10, 2019      Shayla Pino MD  [address]  [place] [address]           [facility]  [address] [place] [address]  Phone: [phone]  Fax: [phone]          Patient: Dulce Root   MR Number: [id number]   YOB: 1955   Date of Visit: 12/10/2019       Dear Dr. Shayla Pino:    Thank you for referring Dulce Root to me for evaluation. Attached you will find relevant portions of my assessment and plan of care.    If you have questions, please do not hesitate to call me. I look forward to following Dulce Root along with you.    Sincerely,    Vimal Villegas MD    Enclosure  CC:  No Recipients    If you would like to receive this communication electronically, please contact [email or web address] or [phone] to request more information on [employer] Link access.    For providers and/or their staff who would like to refer a patient to [facility], please contact us through our one-stop-shop provider referral line, [facility], at [phone].    If you feel you have received this communication in error or would no longer like to receive these types of communications, please e-mail [email or web address]

## 2019-12-10 NOTE — PROGRESS NOTES
Digital Medicine: Health  Follow-Up    Called to follow up with patient, current BP average 131/63 is almost at goal <130/<80. Patient is experiencing extreme pain from herniated disks in her back. Patient is going through many MD appts to try to address her health issues (not related to BP).         Follow Up  Follow-up reason(s): reading review      Readings are trending up due to pain.        INTERVENTION(S)  encouragement/support, denied further coaching, denied resources and denied questions    PLAN  patient unable to make changes at this time, await resolution of current disease process and continue monitoring    Patient denied health coaching, goal setting, and resources. Patient agreed to continue to take regular readings, [facility] care team will continue to monitor and I will follow up in 8 weeks.           There are no preventive care reminders to display for this patient.    Last 5 Patient Entered Readings                                      Current 30 Day Average: 131/63     Recent Readings 12/5/2019 11/27/2019 11/27/2019 11/27/2019 11/21/2019    SBP (mmHg) 136 148 142 144 158    DBP (mmHg) 74 81 78 77 73    Pulse 85 91 86 93 91                  Screenings    SDOH

## 2019-12-10 NOTE — TELEPHONE ENCOUNTER
Patient indicated she would contact PCP/ CARDIOLOGY  to retrieve blood thinner clearance to move forth w/ injection

## 2019-12-11 ENCOUNTER — INITIAL CONSULT (OUTPATIENT)
Dept: VASCULAR SURGERY | Facility: CLINIC | Age: 64
End: 2019-12-11
Payer: COMMERCIAL

## 2019-12-11 ENCOUNTER — PATIENT MESSAGE (OUTPATIENT)
Dept: CARDIOLOGY | Facility: CLINIC | Age: 64
End: 2019-12-11

## 2019-12-11 ENCOUNTER — TELEPHONE (OUTPATIENT)
Dept: PAIN MEDICINE | Facility: CLINIC | Age: 64
End: 2019-12-11

## 2019-12-11 VITALS
BODY MASS INDEX: 27.11 KG/M2 | WEIGHT: 153 LBS | HEIGHT: 63 IN | SYSTOLIC BLOOD PRESSURE: 180 MMHG | HEART RATE: 92 BPM | DIASTOLIC BLOOD PRESSURE: 97 MMHG

## 2019-12-11 DIAGNOSIS — I65.23 BILATERAL CAROTID ARTERY STENOSIS: Primary | ICD-10-CM

## 2019-12-11 PROCEDURE — 3080F DIAST BP >= 90 MM HG: CPT | Mod: CPTII,S$GLB,, | Performed by: THORACIC SURGERY (CARDIOTHORACIC VASCULAR SURGERY)

## 2019-12-11 PROCEDURE — 3080F PR MOST RECENT DIASTOLIC BLOOD PRESSURE >= 90 MM HG: ICD-10-PCS | Mod: CPTII,S$GLB,, | Performed by: THORACIC SURGERY (CARDIOTHORACIC VASCULAR SURGERY)

## 2019-12-11 PROCEDURE — 3008F BODY MASS INDEX DOCD: CPT | Mod: CPTII,S$GLB,, | Performed by: THORACIC SURGERY (CARDIOTHORACIC VASCULAR SURGERY)

## 2019-12-11 PROCEDURE — 99999 PR PBB SHADOW E&M-EST. PATIENT-LVL III: CPT | Mod: PBBFAC,,, | Performed by: THORACIC SURGERY (CARDIOTHORACIC VASCULAR SURGERY)

## 2019-12-11 PROCEDURE — 99214 PR OFFICE/OUTPT VISIT, EST, LEVL IV, 30-39 MIN: ICD-10-PCS | Mod: S$GLB,,, | Performed by: THORACIC SURGERY (CARDIOTHORACIC VASCULAR SURGERY)

## 2019-12-11 PROCEDURE — 3077F SYST BP >= 140 MM HG: CPT | Mod: CPTII,S$GLB,, | Performed by: THORACIC SURGERY (CARDIOTHORACIC VASCULAR SURGERY)

## 2019-12-11 PROCEDURE — 3077F PR MOST RECENT SYSTOLIC BLOOD PRESSURE >= 140 MM HG: ICD-10-PCS | Mod: CPTII,S$GLB,, | Performed by: THORACIC SURGERY (CARDIOTHORACIC VASCULAR SURGERY)

## 2019-12-11 PROCEDURE — 3008F PR BODY MASS INDEX (BMI) DOCUMENTED: ICD-10-PCS | Mod: CPTII,S$GLB,, | Performed by: THORACIC SURGERY (CARDIOTHORACIC VASCULAR SURGERY)

## 2019-12-11 PROCEDURE — 99999 PR PBB SHADOW E&M-EST. PATIENT-LVL III: ICD-10-PCS | Mod: PBBFAC,,, | Performed by: THORACIC SURGERY (CARDIOTHORACIC VASCULAR SURGERY)

## 2019-12-11 PROCEDURE — 99214 OFFICE O/P EST MOD 30 MIN: CPT | Mod: S$GLB,,, | Performed by: THORACIC SURGERY (CARDIOTHORACIC VASCULAR SURGERY)

## 2019-12-11 NOTE — TELEPHONE ENCOUNTER
Call patient regarding referred appointment to Pain Management.  The appointment is scheduled for 12/12/2019 at 9.40 with Dr. Niño at [facility].

## 2019-12-12 ENCOUNTER — OFFICE VISIT (OUTPATIENT)
Dept: PAIN MEDICINE | Facility: CLINIC | Age: 64
End: 2019-12-12
Payer: COMMERCIAL

## 2019-12-12 VITALS — DIASTOLIC BLOOD PRESSURE: 87 MMHG | HEART RATE: 81 BPM | SYSTOLIC BLOOD PRESSURE: 157 MMHG

## 2019-12-12 DIAGNOSIS — I65.23 BILATERAL CAROTID ARTERY STENOSIS: Primary | ICD-10-CM

## 2019-12-12 DIAGNOSIS — M54.16 LUMBAR RADICULOPATHY: Primary | ICD-10-CM

## 2019-12-12 PROCEDURE — 99244 PR OFFICE CONSULTATION,LEVEL IV: ICD-10-PCS | Mod: S$GLB,,, | Performed by: PAIN MEDICINE

## 2019-12-12 PROCEDURE — 99999 PR PBB SHADOW E&M-EST. PATIENT-LVL III: CPT | Mod: PBBFAC,,, | Performed by: PAIN MEDICINE

## 2019-12-12 PROCEDURE — 99244 OFF/OP CNSLTJ NEW/EST MOD 40: CPT | Mod: S$GLB,,, | Performed by: PAIN MEDICINE

## 2019-12-12 PROCEDURE — 99999 PR PBB SHADOW E&M-EST. PATIENT-LVL III: ICD-10-PCS | Mod: PBBFAC,,, | Performed by: PAIN MEDICINE

## 2019-12-12 NOTE — PROGRESS NOTES
Chief Pain Complaint:  Leg Pain    This note was created using voice recognition, there may be errors that were missed during proofreading.    History of Present Illness:   This patient is a 64 y.o. female who presents today complaining of the above noted pain/s. The patient describes the pain as follows.  Ms. Root  Is new patient clinic with complaints of left-sided posterior leg pain which radiates down to her foot.  This started approximately 5-6 weeks ago with no inciting event.  She currently rates her pain as a 5/10 and describes a  Sharp and stabbing and throbbing sensation in the posterior lateral left calf.  Her symptoms are worse with laying down her somewhat improved with heat and ice.  She denies having had physical therapy, surgery and injections in her lumbar spine.  She has been evaluated by Neurosurgery and has had a lumbar MRI which shows a herniated disc at L5-S1.  Unfortunately she has had valve replacement in addition to stents placed most recently in August 2019 is currently on Plavix.  She denies having numbness weakness in the leg and denies having bowel bladder difficulty.    Previous Therapy:  Medications:  Tylenol, Celexa, Norco, lidocaine topical, Robaxin, Medrol Dosepak  Injections: None  Surgeries: None  Physical Therapy: None    Past Surgical History:   Procedure Laterality Date    ARTERIOGRAPHY OF AORTIC ROOT N/A 2/15/2019    Procedure: ARTERIOGRAM, AORTIC ROOT;  Surgeon: Sujit Chaudhry MD;  Location: [facility] CATH;  Service: Cardiology;  Laterality: N/A;    BREAST BIOPSY      BREAST RECONSTRUCTION      bilateral mastectomy    CARDIAC CATHETERIZATION      stents x 2    CARDIAC SURGERY  08/2017    Aortic valve replacement , CABG 2 vessel    CORONARY ANGIOGRAPHY N/A 2/15/2019    Procedure: ANGIOGRAM, CORONARY ARTERY;  Surgeon: Sujit Chaudhry MD;  Location: [facility] CATH;  Service: Cardiology;  Laterality: N/A;    CORONARY BYPASS GRAFT ANGIOGRAPHY  2/15/2019    Procedure: Bypass graft  study;  Surgeon: Sujit Chaudhry MD;  Location: [facility] CATH;  Service: Cardiology;;    COSMETIC SURGERY      bereast reconstruction    EYE SURGERY      eye lids    LEFT HEART CATHETERIZATION Right 2/15/2019    Procedure: Left heart cath;  Surgeon: Sujit Chaudhry MD;  Location: [facility] CATH;  Service: Cardiology;  Laterality: Right;    LYMPHADENECTOMY      MASTECTOMY      SKIN BIOPSY      SPLENECTOMY, TOTAL      TUMOR REMOVAL         Family History   Problem Relation Age of Onset    Hypertension Mother     Hypertension Father     Cancer Neg Hx        Social History     Socioeconomic History    Marital status:      Spouse name: Not on file    Number of children: Not on file    Years of education: Not on file    Highest education level: Not on file   Occupational History    Not on file   Social Needs    Financial resource strain: Not on file    Food insecurity:     Worry: Not on file     Inability: Not on file    Transportation needs:     Medical: Not on file     Non-medical: Not on file   Tobacco Use    Smoking status: Former Smoker     Packs/day: 1.00     Years: 20.00     Pack years: 20.00     Types: Cigarettes     Last attempt to quit: 1981     Years since quittin.1    Smokeless tobacco: Never Used   Substance and Sexual Activity    Alcohol use: No     Comment: 2 drinks/month    Drug use: No    Sexual activity: Yes     Partners: Male   Lifestyle    Physical activity:     Days per week: Not on file     Minutes per session: Not on file    Stress: Only a little   Relationships    Social connections:     Talks on phone: Not on file     Gets together: Not on file     Attends Anabaptist service: Not on file     Active member of club or organization: Not on file     Attends meetings of clubs or organizations: Not on file     Relationship status: Not on file   Other Topics Concern    Not on file   Social History Narrative    Live w/ spouse and  1 dog       Imaging / Labs / Studies  (reviewed on 12/12/2019):    Results for orders placed during the hospital encounter of 12/06/19   MRI Lumbar Spine Without Contrast    Narrative EXAMINATION:  MRI LUMBAR SPINE WITHOUT CONTRAST    CLINICAL HISTORY:  Low back pain, prior surgery, new or progressive sx; Low back pain    TECHNIQUE:  Multiplanar, multisequence MR images were acquired from the thoracolumbar junction to the sacrum without the administration of contrast.    COMPARISON:  None.    FINDINGS:  Vertebral body alignment appears adequate.  Vertebral body heights appear well preserved.  Intervertebral disc heights appear relatively well preserved.  No STIR signal abnormality is noted to suggest an aggressive bone marrow replacement process or recent fracture.  Abnormal uterine masses are noted suggestive of fibroids but not entirely specific with 1 of at least 3 cm identified.  If further evaluation is desired a female pelvis ultrasound could be performed.  Multiple colonic diverticula are noted.    A signal intensity is noted at the lower pole of the left kidney anteriorly of 7 mm nonspecific on this examination but statistically favored relate to a cyst.  If confirmation is desired to exclude solid components ultrasound could be performed.    At the T12-L1 level, minimal broad-based bulge is noted paracentric to the right of 1-2 mm without significant neural foraminal narrowing.  Minimal central canal narrowing is noted.    At the L1-L2 level, broad base bulge is noted of 3-4 mm.  Mild bilateral neural foraminal narrowing is noted with mild thecal sac narrowing to 14 mm.  Ligamentous hypertrophy appears to be present.  Right-sided neural foraminal narrowing is slightly greater than left.    At the L2-L3 level, a central broad-based protrusion appears to be present of 6 mm with bilateral lateral recess stenosis.  Moderate central canal narrowing is noted to 9 mm.  Minimal bilateral neural foraminal narrowing is noted.    At the L3-L4 level and  broad-based protrusion is noted centrally of 6 mm.  Bilateral lateral recess stenosis is noted.  Moderate central canal stenosis is noted to 9 mm.  The descending nerve roots within the lateral recesses bilaterally may be affected.  Mild bulging is noted towards each neural foramen with mild bilateral neural foraminal narrowing.    At the L4-L5 level, broad-based bulge appears to be present of 6 mm along with bulging towards each neural foramen.  Facet arthropathy and ligamentous hypertrophy appears to be present.  Moderate to severe central canal stenosis is noted with very little fluid surrounding nerve roots and central canal narrowing to 5 mm.  Moderate bilateral neural foraminal stenosis appears to be present.  Contact of the exiting nerve roots bilaterally may be present particularly on the left.    At the L5-S1 level a disc extrusion appears to be present into the left lateral recess that descends from the disc plane by 9 mm and extends posterior to the disc plane by 8 mm.  Left lateral recess stenosis is noted with probable contact of the descending nerve root within the left lateral recess.  Moderate thecal sac narrowing is noted to 9 mm.  Mild to moderate right neural foraminal stenosis is noted with probable contact of the exiting nerve root.  Mild left neural foraminal stenosis is noted.      Impression 1.  Multilevel degenerative change with central canal note narrowing most notably at the L4-L5 level to 5 mm.  Disc protrusion is noted at the L5-S1 level into the left lateral recess likely affecting the descending nerve roots.  Broad-based protrusion is also noted at the L2-L3 level centrally and at the L3-L4 level centrally causing bilateral lateral recess stenosis as described above    2.  Left renal signal intensity nonspecific on this examination but statistically favored relate to a cyst.  If confirmation is desired ultrasound could be performed.    3.  Uterine mass that is nonspecific but  suggestive a fibroid, if confirmation is desired female pelvis ultrasound evaluation could be performed with follow-up for size stability.    4.  Multiple colonic diverticula are noted in the partially visualized colon     Results for orders placed during the hospital encounter of 07/10/13   X-Ray Lumbar Spine AP And Lateral    Narrative Comparison: None.    Findings:The SI joints are unremarkable. The joint alignment is within normal limits.  No acute fractures identified.  No evidence of a marrow replacement process.    Impression  No significant osseous abnormalities.     Review of Systems:  Review of Systems   Constitutional: Negative for fever.   Eyes: Negative for blurred vision.   Respiratory: Negative for cough and wheezing.    Cardiovascular: Negative for chest pain and orthopnea.   Gastrointestinal: Negative for constipation, diarrhea, nausea and vomiting.   Genitourinary: Negative for dysuria.   Musculoskeletal: Negative for back pain.        Left lower extremity pain   Skin: Negative for itching and rash.   Neurological: Negative for weakness.   Endo/Heme/Allergies: Does not bruise/bleed easily.       Physical Exam:  BP (!) 157/87   Pulse 81  (reviewed on 12/12/2019)\  General    Constitutional: She is oriented to person, place, and time. She appears well-developed and well-nourished.   HENT:   Head: Normocephalic and atraumatic.   Eyes: EOM are normal.   Neck: Neck supple.   Pulmonary/Chest: Effort normal.   Abdominal: She exhibits no distension.   Neurological: She is alert and oriented to person, place, and time. No cranial nerve deficit.   Psychiatric: She has a normal mood and affect.     General Musculoskeletal Exam   Gait: normal     Back (L-Spine & T-Spine) / Neck (C-Spine) Exam     Back (L-Spine & T-Spine) Range of Motion   Extension: normal   Flexion: normal   Lateral bend right: normal   Lateral bend left: normal   Rotation right: normal   Rotation left: normal     Spinal Sensation   Right  Side Sensation  L-Spine Level: normal  Left Side Sensation  L-Spine Level: normal    Comments:   Sensation intact to light touch bilateral lower extremities, straight leg raise positive on the left, negative on the right; no increase in pain lumbar flexion-extension left right lateral bending      Muscle Strength   Right Lower Extremity   Hip Flexion: 5/5   Quadriceps:  5/5   Hamstrin/5   Left Lower Extremity   Hip Flexion: 5/5   Quadriceps:  5/5   Hamstrin/5     Reflexes     Left Side  Quadriceps:  2+  Achilles:  2+  Ankle Clonus:  absent    Right Side   Quadriceps:  2+  Achilles:  2+  Ankle Clonus:  absent      Assessment  Lumbar Radiculopathy  Lumbar Spondylosis    1. 64 y.o. year old patient with PMH of   Past Medical History:   Diagnosis Date    Allergy     Breast cancer     Carotid stenosis, bilateral 10/13/2017    Coronary artery disease     Coronary artery disease involving coronary bypass graft of native heart without angina pectoris 2019  1.  Left main is a small vessel with a 60%-65% ostial lesion. 2.  LAD is a medium-sized vessel diffuse 70% proximally  Ramus intermedius is a medium size vessel with a 40%-50% ostial lesion. 3.  Circumflex 60%-70% ostial  remainder of circumflex and obtuse marginal normal in appearance. Right coronary artery is normal size vessel, short discrete 70%mid 4.  Vein graft to right coronary is occ    Coronary artery disease involving native coronary artery of native heart without angina pectoris 2017    DCIS (ductal carcinoma in situ) of breast 2012    Essential hypertension 2012    Hodgkin lymphoma     Hx of Hodgkin's disease - s/p chemo and radiation 2012    Hyperlipidemia     Hyperlipidemia 2012    The patient presents with hyperlipidemia.  The patient reports tolerating the medication well and is in excellent compliance.  There have been no medication side effects.  The patient denies chest pain, neuropathy,  and myalgias.  The patient has reduced fat intake and has been exercising.  Current treatment has included the medications listed in the med card.   Lab Results Component Value Date  CH    Hypertension     LBBB (left bundle branch block) 5/22/2018    Osteoporosis     Paroxysmal atrial fibrillation - single post-op episode 8/24/2017    Pemphigoid     S/P AVR (aortic valve replacement) - pericardial valve 08/21/2017    Perceval aortic tissue valve PVS23. 23mm    serial # [id number]    S/P CABG x 2 8/21/2017 8/17 CABG X 2 with SVG-LAD and SVG-distal RCA  SVG LAD due to absent LIMA after chest radiation and lymph node biopsy     Stented coronary artery     She had 2 stents placed in 2/2019.  She has had CAD and bypasses in the past in 2017.      Thyroid disease       presenting with pain located  Left lower extremity  2. Pain Generators / Etiology: Lumbar Radiculopathy and Lumbar Spondylosis  3. Failed Meds (E- Effective, NE- Not Effective): Tylenol-minimally effective, Norco -minimally effective, Robaxin -minimally effective  4. Physical Therapy - None  5. Psychological comorbidities - None  6. Anticoagulants / Antiplatelets: Aspirin 81mg and Plavix     PLAN:  1. Medications: Continue Robaxin, Norco, Tylenol as prescribed; will add gabapentin 300 mg 3 times daily   2. PT -  Will hold off on physical therapy at this time as she needs to limit bending twisting and lifting  3. Psychological -  none  4. Labs - obtain  none  5. Imaging - obtain  None; reviewed lumbar MRI patient today in clinic  6. Interventions -  Will plan to schedule for left L5/S1 transforaminal epidural steroid injection however patient will need to stop aspirin and Plavix for approximately 5-7 days; we will reach out to her cardiologist to discuss if this will be possible  7. Referrals - none  8. Records - none  9. Follow up visit -  Follow up in clinic 6 weeks after the injection  10. Patient Questions - answered all of the patient's  questions regarding diagnosis, therapy, and treatment  11.  This condition does not require this patient to take time off of work    KATELYN Niño MD  Interventional Pain  [facility] [place]

## 2019-12-12 NOTE — H&P (VIEW-ONLY)
Chief Pain Complaint:  Leg Pain    This note was created using voice recognition, there may be errors that were missed during proofreading.    History of Present Illness:   This patient is a 64 y.o. female who presents today complaining of the above noted pain/s. The patient describes the pain as follows.  Ms. Root  Is new patient clinic with complaints of left-sided posterior leg pain which radiates down to her foot.  This started approximately 5-6 weeks ago with no inciting event.  She currently rates her pain as a 5/10 and describes a  Sharp and stabbing and throbbing sensation in the posterior lateral left calf.  Her symptoms are worse with laying down her somewhat improved with heat and ice.  She denies having had physical therapy, surgery and injections in her lumbar spine.  She has been evaluated by Neurosurgery and has had a lumbar MRI which shows a herniated disc at L5-S1.  Unfortunately she has had valve replacement in addition to stents placed most recently in August 2019 is currently on Plavix.  She denies having numbness weakness in the leg and denies having bowel bladder difficulty.    Previous Therapy:  Medications:  Tylenol, Celexa, Norco, lidocaine topical, Robaxin, Medrol Dosepak  Injections: None  Surgeries: None  Physical Therapy: None    Past Surgical History:   Procedure Laterality Date    ARTERIOGRAPHY OF AORTIC ROOT N/A 2/15/2019    Procedure: ARTERIOGRAM, AORTIC ROOT;  Surgeon: Sujit Chaudhry MD;  Location: [facility] [place] CATH;  Service: Cardiology;  Laterality: N/A;    BREAST BIOPSY      BREAST RECONSTRUCTION      bilateral mastectomy    CARDIAC CATHETERIZATION      stents x 2    CARDIAC SURGERY  08/2017    Aortic valve replacement , CABG 2 vessel    CORONARY ANGIOGRAPHY N/A 2/15/2019    Procedure: ANGIOGRAM, CORONARY ARTERY;  Surgeon: Sujit Chaudhry MD;  Location: [facility] [place] CATH;  Service: Cardiology;  Laterality: N/A;    CORONARY BYPASS GRAFT ANGIOGRAPHY  2/15/2019    Procedure: Bypass graft  study;  Surgeon: Sujit Chaudhry MD;  Location: [facility] CATH;  Service: Cardiology;;    COSMETIC SURGERY      bereast reconstruction    EYE SURGERY      eye lids    LEFT HEART CATHETERIZATION Right 2/15/2019    Procedure: Left heart cath;  Surgeon: Sujit Chaudhry MD;  Location: [facility] CATH;  Service: Cardiology;  Laterality: Right;    LYMPHADENECTOMY      MASTECTOMY      SKIN BIOPSY      SPLENECTOMY, TOTAL      TUMOR REMOVAL         Family History   Problem Relation Age of Onset    Hypertension Mother     Hypertension Father     Cancer Neg Hx        Social History     Socioeconomic History    Marital status:      Spouse name: Not on file    Number of children: Not on file    Years of education: Not on file    Highest education level: Not on file   Occupational History    Not on file   Social Needs    Financial resource strain: Not on file    Food insecurity:     Worry: Not on file     Inability: Not on file    Transportation needs:     Medical: Not on file     Non-medical: Not on file   Tobacco Use    Smoking status: Former Smoker     Packs/day: 1.00     Years: 20.00     Pack years: 20.00     Types: Cigarettes     Last attempt to quit: 1981     Years since quittin.1    Smokeless tobacco: Never Used   Substance and Sexual Activity    Alcohol use: No     Comment: 2 drinks/month    Drug use: No    Sexual activity: Yes     Partners: Male   Lifestyle    Physical activity:     Days per week: Not on file     Minutes per session: Not on file    Stress: Only a little   Relationships    Social connections:     Talks on phone: Not on file     Gets together: Not on file     Attends Restorationist service: Not on file     Active member of club or organization: Not on file     Attends meetings of clubs or organizations: Not on file     Relationship status: Not on file   Other Topics Concern    Not on file   Social History Narrative    Live w/ spouse and  1 dog       Imaging / Labs / Studies  (reviewed on 12/12/2019):    Results for orders placed during the hospital encounter of 12/06/19   MRI Lumbar Spine Without Contrast    Narrative EXAMINATION:  MRI LUMBAR SPINE WITHOUT CONTRAST    CLINICAL HISTORY:  Low back pain, prior surgery, new or progressive sx; Low back pain    TECHNIQUE:  Multiplanar, multisequence MR images were acquired from the thoracolumbar junction to the sacrum without the administration of contrast.    COMPARISON:  None.    FINDINGS:  Vertebral body alignment appears adequate.  Vertebral body heights appear well preserved.  Intervertebral disc heights appear relatively well preserved.  No STIR signal abnormality is noted to suggest an aggressive bone marrow replacement process or recent fracture.  Abnormal uterine masses are noted suggestive of fibroids but not entirely specific with 1 of at least 3 cm identified.  If further evaluation is desired a female pelvis ultrasound could be performed.  Multiple colonic diverticula are noted.    A signal intensity is noted at the lower pole of the left kidney anteriorly of 7 mm nonspecific on this examination but statistically favored relate to a cyst.  If confirmation is desired to exclude solid components ultrasound could be performed.    At the T12-L1 level, minimal broad-based bulge is noted paracentric to the right of 1-2 mm without significant neural foraminal narrowing.  Minimal central canal narrowing is noted.    At the L1-L2 level, broad base bulge is noted of 3-4 mm.  Mild bilateral neural foraminal narrowing is noted with mild thecal sac narrowing to 14 mm.  Ligamentous hypertrophy appears to be present.  Right-sided neural foraminal narrowing is slightly greater than left.    At the L2-L3 level, a central broad-based protrusion appears to be present of 6 mm with bilateral lateral recess stenosis.  Moderate central canal narrowing is noted to 9 mm.  Minimal bilateral neural foraminal narrowing is noted.    At the L3-L4 level and  broad-based protrusion is noted centrally of 6 mm.  Bilateral lateral recess stenosis is noted.  Moderate central canal stenosis is noted to 9 mm.  The descending nerve roots within the lateral recesses bilaterally may be affected.  Mild bulging is noted towards each neural foramen with mild bilateral neural foraminal narrowing.    At the L4-L5 level, broad-based bulge appears to be present of 6 mm along with bulging towards each neural foramen.  Facet arthropathy and ligamentous hypertrophy appears to be present.  Moderate to severe central canal stenosis is noted with very little fluid surrounding nerve roots and central canal narrowing to 5 mm.  Moderate bilateral neural foraminal stenosis appears to be present.  Contact of the exiting nerve roots bilaterally may be present particularly on the left.    At the L5-S1 level a disc extrusion appears to be present into the left lateral recess that descends from the disc plane by 9 mm and extends posterior to the disc plane by 8 mm.  Left lateral recess stenosis is noted with probable contact of the descending nerve root within the left lateral recess.  Moderate thecal sac narrowing is noted to 9 mm.  Mild to moderate right neural foraminal stenosis is noted with probable contact of the exiting nerve root.  Mild left neural foraminal stenosis is noted.      Impression 1.  Multilevel degenerative change with central canal note narrowing most notably at the L4-L5 level to 5 mm.  Disc protrusion is noted at the L5-S1 level into the left lateral recess likely affecting the descending nerve roots.  Broad-based protrusion is also noted at the L2-L3 level centrally and at the L3-L4 level centrally causing bilateral lateral recess stenosis as described above    2.  Left renal signal intensity nonspecific on this examination but statistically favored relate to a cyst.  If confirmation is desired ultrasound could be performed.    3.  Uterine mass that is nonspecific but  suggestive a fibroid, if confirmation is desired female pelvis ultrasound evaluation could be performed with follow-up for size stability.    4.  Multiple colonic diverticula are noted in the partially visualized colon     Results for orders placed during the hospital encounter of 07/10/13   X-Ray Lumbar Spine AP And Lateral    Narrative Comparison: None.    Findings:The SI joints are unremarkable. The joint alignment is within normal limits.  No acute fractures identified.  No evidence of a marrow replacement process.    Impression  No significant osseous abnormalities.     Review of Systems:  Review of Systems   Constitutional: Negative for fever.   Eyes: Negative for blurred vision.   Respiratory: Negative for cough and wheezing.    Cardiovascular: Negative for chest pain and orthopnea.   Gastrointestinal: Negative for constipation, diarrhea, nausea and vomiting.   Genitourinary: Negative for dysuria.   Musculoskeletal: Negative for back pain.        Left lower extremity pain   Skin: Negative for itching and rash.   Neurological: Negative for weakness.   Endo/Heme/Allergies: Does not bruise/bleed easily.       Physical Exam:  BP (!) 157/87   Pulse 81  (reviewed on 12/12/2019)\  General    Constitutional: She is oriented to person, place, and time. She appears well-developed and well-nourished.   HENT:   Head: Normocephalic and atraumatic.   Eyes: EOM are normal.   Neck: Neck supple.   Pulmonary/Chest: Effort normal.   Abdominal: She exhibits no distension.   Neurological: She is alert and oriented to person, place, and time. No cranial nerve deficit.   Psychiatric: She has a normal mood and affect.     General Musculoskeletal Exam   Gait: normal     Back (L-Spine & T-Spine) / Neck (C-Spine) Exam     Back (L-Spine & T-Spine) Range of Motion   Extension: normal   Flexion: normal   Lateral bend right: normal   Lateral bend left: normal   Rotation right: normal   Rotation left: normal     Spinal Sensation   Right  Side Sensation  L-Spine Level: normal  Left Side Sensation  L-Spine Level: normal    Comments:   Sensation intact to light touch bilateral lower extremities, straight leg raise positive on the left, negative on the right; no increase in pain lumbar flexion-extension left right lateral bending      Muscle Strength   Right Lower Extremity   Hip Flexion: 5/5   Quadriceps:  5/5   Hamstrin/5   Left Lower Extremity   Hip Flexion: 5/5   Quadriceps:  5/5   Hamstrin/5     Reflexes     Left Side  Quadriceps:  2+  Achilles:  2+  Ankle Clonus:  absent    Right Side   Quadriceps:  2+  Achilles:  2+  Ankle Clonus:  absent      Assessment  Lumbar Radiculopathy  Lumbar Spondylosis    1. 64 y.o. year old patient with PMH of   Past Medical History:   Diagnosis Date    Allergy     Breast cancer     Carotid stenosis, bilateral 10/13/2017    Coronary artery disease     Coronary artery disease involving coronary bypass graft of native heart without angina pectoris 2019  1.  Left main is a small vessel with a 60%-65% ostial lesion. 2.  LAD is a medium-sized vessel diffuse 70% proximally  Ramus intermedius is a medium size vessel with a 40%-50% ostial lesion. 3.  Circumflex 60%-70% ostial  remainder of circumflex and obtuse marginal normal in appearance. Right coronary artery is normal size vessel, short discrete 70%mid 4.  Vein graft to right coronary is occ    Coronary artery disease involving native coronary artery of native heart without angina pectoris 2017    DCIS (ductal carcinoma in situ) of breast 2012    Essential hypertension 2012    Hodgkin lymphoma     Hx of Hodgkin's disease - s/p chemo and radiation 2012    Hyperlipidemia     Hyperlipidemia 2012    The patient presents with hyperlipidemia.  The patient reports tolerating the medication well and is in excellent compliance.  There have been no medication side effects.  The patient denies chest pain, neuropathy,  and myalgias.  The patient has reduced fat intake and has been exercising.  Current treatment has included the medications listed in the med card.   Lab Results Component Value Date  CH    Hypertension     LBBB (left bundle branch block) 5/22/2018    Osteoporosis     Paroxysmal atrial fibrillation - single post-op episode 8/24/2017    Pemphigoid     S/P AVR (aortic valve replacement) - pericardial valve 08/21/2017    Perceval aortic tissue valve PVS23. 23mm    serial # [id number]    S/P CABG x 2 8/21/2017 8/17 CABG X 2 with SVG-LAD and SVG-distal RCA  SVG LAD due to absent LIMA after chest radiation and lymph node biopsy     Stented coronary artery     She had 2 stents placed in 2/2019.  She has had CAD and bypasses in the past in 2017.      Thyroid disease       presenting with pain located  Left lower extremity  2. Pain Generators / Etiology: Lumbar Radiculopathy and Lumbar Spondylosis  3. Failed Meds (E- Effective, NE- Not Effective): Tylenol-minimally effective, Norco -minimally effective, Robaxin -minimally effective  4. Physical Therapy - None  5. Psychological comorbidities - None  6. Anticoagulants / Antiplatelets: Aspirin 81mg and Plavix     PLAN:  1. Medications: Continue Robaxin, Norco, Tylenol as prescribed; will add gabapentin 300 mg 3 times daily   2. PT -  Will hold off on physical therapy at this time as she needs to limit bending twisting and lifting  3. Psychological -  none  4. Labs - obtain  none  5. Imaging - obtain  None; reviewed lumbar MRI patient today in clinic  6. Interventions -  Will plan to schedule for left L5/S1 transforaminal epidural steroid injection however patient will need to stop aspirin and Plavix for approximately 5-7 days; we will reach out to her cardiologist to discuss if this will be possible  7. Referrals - none  8. Records - none  9. Follow up visit -  Follow up in clinic 6 weeks after the injection  10. Patient Questions - answered all of the patient's  questions regarding diagnosis, therapy, and treatment  11.  This condition does not require this patient to take time off of work    KATELYN Niño MD  Interventional Pain  [facility] [place]

## 2019-12-12 NOTE — PATIENT INSTRUCTIONS
-will attempt to schedule for left L5/S1 transforaminal epidural steroid injection   -Will start gabapentin 300 mg 3 times daily   - continue all other medications prescribed   -follow up in clinic 6 weeks after the injection

## 2019-12-12 NOTE — LETTER
December 12, 2019      Vimal Villegas MD  [address] [facility] [address]           [facility]  [address] [place] [id number]  Phone: [phone]  Fax: [phone]          Patient: Dulce Root   MR Number: [id number]   YOB: 1955   Date of Visit: 12/12/2019       Dear Dr. Vimal Villegas:    Thank you for referring Dulce Root to me for evaluation. Attached you will find relevant portions of my assessment and plan of care.    If you have questions, please do not hesitate to call me. I look forward to following Dulce Root along with you.    Sincerely,    Canelo Niño MD    Enclosure  CC:  No Recipients    If you would like to receive this communication electronically, please contact [email or web address] or [phone] to request more information on [employer] access.    For providers and/or their staff who would like to refer a patient to [facility], please contact us through our one-stop-shop provider referral line, [facility] , at [phone].    If you feel you have received this communication in error or would no longer like to receive these types of communications, please e-mail [email or web address]

## 2019-12-13 ENCOUNTER — TELEPHONE (OUTPATIENT)
Dept: PAIN MEDICINE | Facility: CLINIC | Age: 64
End: 2019-12-13

## 2019-12-13 NOTE — TELEPHONE ENCOUNTER
----- Message from Zack Fong LPN sent at 12/12/2019  5:04 PM CST -----  Good evening!    Dr. Niño would like to move forward with Left L5/S1 Lumbar TF SKIP for patient. Pt is currently on Plavix and will need to d/c 7 days prior to having this injection along with all other blood thinners including aspirin. Please advise or contact me at ext [phone] with questions. Thanks//rd

## 2019-12-16 ENCOUNTER — PATIENT MESSAGE (OUTPATIENT)
Dept: VASCULAR SURGERY | Facility: CLINIC | Age: 64
End: 2019-12-16

## 2019-12-16 ENCOUNTER — PATIENT MESSAGE (OUTPATIENT)
Dept: PAIN MEDICINE | Facility: CLINIC | Age: 64
End: 2019-12-16

## 2019-12-18 ENCOUNTER — TELEPHONE (OUTPATIENT)
Dept: RADIOLOGY | Facility: HOSPITAL | Age: 64
End: 2019-12-18

## 2019-12-18 NOTE — PROGRESS NOTES
Digital Medicine: Clinician Follow-Up    Called patient for digital medicine follow-up. Reports increased pain secondary to a herniated disc. Attributes upward trend in blood pressure to pain and says that she has also, been on steroids for several weeks. Patient is scheduled to follow-up with her surgeon in the upcoming days to assess other treatment options.      The history is provided by the patient. No  was used.     Follow Up  Follow-up reason(s): reading review              Assessment:  Patient's current 30-day average is not at goal of <130/80 mmHg secondary to increased pain and use of steroids.        Plan:  Continue current regimen.  Await resolution of acute process (back pain).   Patients health , Oxana Mccormack, will follow-up as scheduled.    I will continue to monitor regularly and will follow-up in 6 weeks, sooner if blood pressure begins to trend upward or downward.       Patient has my contact information and knows to call with any concerns or clinical changes.            There are no preventive care reminders to display for this patient.    Last 5 Patient Entered Readings                                      Current 30 Day Average: 147/75     Recent Readings 12/5/2019 11/27/2019 11/27/2019 11/27/2019 11/21/2019    SBP (mmHg) 136 148 142 144 158    DBP (mmHg) 74 81 78 77 73    Pulse 85 91 86 93 91        Hypertension Medications             metoprolol succinate (TOPROL-XL) 25 MG 24 hr tablet Take 1 tablet (25 mg total) by mouth once daily.    telmisartan (MICARDIS) 20 MG Tab Take 1 tablet (20 mg total) by mouth once daily.

## 2019-12-19 ENCOUNTER — HOSPITAL ENCOUNTER (OUTPATIENT)
Dept: RADIOLOGY | Facility: HOSPITAL | Age: 64
Discharge: HOME | End: 2019-12-19
Attending: INTERNAL MEDICINE
Payer: COMMERCIAL

## 2019-12-19 ENCOUNTER — PATIENT MESSAGE (OUTPATIENT)
Dept: PAIN MEDICINE | Facility: CLINIC | Age: 64
End: 2019-12-19

## 2019-12-19 DIAGNOSIS — N85.8 UTERINE MASS: ICD-10-CM

## 2019-12-19 DIAGNOSIS — N28.9 RENAL LESION: ICD-10-CM

## 2019-12-19 PROCEDURE — 76830 TRANSVAGINAL US NON-OB: CPT | Mod: 26,,, | Performed by: RADIOLOGY

## 2019-12-19 PROCEDURE — 76830 US PELVIS COMP WITH TRANSVAG NON-OB (XPD): ICD-10-PCS | Mod: 26,,, | Performed by: RADIOLOGY

## 2019-12-19 PROCEDURE — 76770 US EXAM ABDO BACK WALL COMP: CPT | Mod: TC,PO

## 2019-12-19 PROCEDURE — 76770 US RETROPERITONEAL COMPLETE: ICD-10-PCS | Mod: 26,,, | Performed by: RADIOLOGY

## 2019-12-19 PROCEDURE — 76770 US EXAM ABDO BACK WALL COMP: CPT | Mod: 26,,, | Performed by: RADIOLOGY

## 2019-12-19 PROCEDURE — 76830 TRANSVAGINAL US NON-OB: CPT | Mod: TC,PO

## 2019-12-19 PROCEDURE — 76856 US EXAM PELVIC COMPLETE: CPT | Mod: 26,,, | Performed by: RADIOLOGY

## 2019-12-19 PROCEDURE — 76856 US PELVIS COMP WITH TRANSVAG NON-OB (XPD): ICD-10-PCS | Mod: 26,,, | Performed by: RADIOLOGY

## 2019-12-20 NOTE — PRE-PROCEDURE INSTRUCTIONS
Spoke with   patient    regarding procedure scheduled on 12/31/19  Arrival time not yet approved, will update patient with changes  Has patient been sick with fever or on antibiotics within the last 7 days? no  Has patient received a vaccination within the last 7 days? no  Has the patient stopped all medications as directed? Yes, last dose of aspirin, plavix, oscal, multivitamin all will be taken on 12/23/19. Ok per dr manriquez for patient to continue taking her folic acid with her methotrexate.  Does the patient have a ride to and from procedure and someone reliable to remain with patient? Yes,   Is the patient diabetic? no  Is the patient receiving sedation? undecided  Is the patient instructed to remain NPO beginning at midnight the night before their procedure? yes  Procedure location confirmed with patient? yes

## 2019-12-21 NOTE — PROGRESS NOTES
OFFICE VISIT      This patient was referred to me by Dr Chaudhry    History of present illness:  The patient is a 64-year-old female with carotid artery stenosis.  Ultrasound showed a 70-79% stenosis of the right internal carotid artery of 60-69% stenosis of the left internal carotid artery.  The patient denies any symptoms of amaurosis fugax TIAs or strokes.      Past Medical History:   Diagnosis Date    Allergy     Breast cancer 2008    Carotid stenosis, bilateral 10/13/2017    Coronary artery disease     Coronary artery disease involving coronary bypass graft of native heart without angina pectoris 8/16/2019 2/19  1.  Left main is a small vessel with a 60%-65% ostial lesion. 2.  LAD is a medium-sized vessel diffuse 70% proximally  Ramus intermedius is a medium size vessel with a 40%-50% ostial lesion. 3.  Circumflex 60%-70% ostial  remainder of circumflex and obtuse marginal normal in appearance. Right coronary artery is normal size vessel, short discrete 70%mid 4.  Vein graft to right coronary is occ    Coronary artery disease involving native coronary artery of native heart without angina pectoris 6/27/2017    DCIS (ductal carcinoma in situ) of breast 11/6/2012    Essential hypertension 11/6/2012    Hodgkin lymphoma     Hx of Hodgkin's disease - s/p chemo and radiation 11/6/2012    Hyperlipidemia     Hyperlipidemia 11/6/2012    The patient presents with hyperlipidemia.  The patient reports tolerating the medication well and is in excellent compliance.  There have been no medication side effects.  The patient denies chest pain, neuropathy, and myalgias.  The patient has reduced fat intake and has been exercising.  Current treatment has included the medications listed in the med card.   Lab Results Component Value Date  CH    Hypertension     LBBB (left bundle branch block) 5/22/2018    Osteoporosis     Paroxysmal atrial fibrillation - single post-op episode 8/24/2017    Pemphigoid     S/P AVR  (aortic valve replacement) - pericardial valve 08/21/2017    Perceval aortic tissue valve PVS23. 23mm    serial # [id number]    S/P CABG x 2 8/21/2017 8/17 CABG X 2 with SVG-LAD and SVG-distal RCA  SVG LAD due to absent LIMA after chest radiation and lymph node biopsy     Stented coronary artery     She had 2 stents placed in 2/2019.  She has had CAD and bypasses in the past in 2017.      Thyroid disease        Past Surgical History:   Procedure Laterality Date    ARTERIOGRAPHY OF AORTIC ROOT N/A 2/15/2019    Procedure: ARTERIOGRAM, AORTIC ROOT;  Surgeon: Sujit Chaudhry MD;  Location: [facility] CATH;  Service: Cardiology;  Laterality: N/A;    BREAST BIOPSY      BREAST RECONSTRUCTION      bilateral mastectomy    CARDIAC CATHETERIZATION      stents x 2    CARDIAC SURGERY  08/2017    Aortic valve replacement , CABG 2 vessel    CORONARY ANGIOGRAPHY N/A 2/15/2019    Procedure: ANGIOGRAM, CORONARY ARTERY;  Surgeon: Sujit Chaudhry MD;  Location: [facility] CATH;  Service: Cardiology;  Laterality: N/A;    CORONARY BYPASS GRAFT ANGIOGRAPHY  2/15/2019    Procedure: Bypass graft study;  Surgeon: Sujit Chaudhry MD;  Location: [facility] CATH;  Service: Cardiology;;    COSMETIC SURGERY      bereast reconstruction    EYE SURGERY      eye lids    LEFT HEART CATHETERIZATION Right 2/15/2019    Procedure: Left heart cath;  Surgeon: Sujit Chaudhyr MD;  Location: [facility] CATH;  Service: Cardiology;  Laterality: Right;    LYMPHADENECTOMY      MASTECTOMY      SKIN BIOPSY      SPLENECTOMY, TOTAL      TUMOR REMOVAL         Review of patient's allergies indicates:   Allergen Reactions    Amlodipine Shortness Of Breath and Other (See Comments)     unknown  Other reaction(s): Swelling  unknown  unknown  Other reaction(s): Swelling  unknown  Other reaction(s): Swelling  unknown    Levofloxacin Hives and Swelling    Sulfa (sulfonamide antibiotics) Shortness Of Breath, Itching and Other (See Comments)     elevated blood pressure     Ciprofloxacin Itching    Iodinated contrast media     Iodine      Other reaction(s): Unknown    Latex      Other reaction(s): Itching    Latex, natural rubber Itching       Current Outpatient Medications   Medication Sig Dispense Refill    (Magic mouthwash) 1:1:1 Benadryl 12.5mg/5ml liq, aluminum & magnesium hydroxide-simehticone (Maalox), lidocaine viscous 2% Swish and spit 10 mLs every 4 (four) hours as needed. for mouth sores (Patient not taking: Reported on 12/12/2019) 90 mL 0    acetaminophen (TYLENOL) 325 MG tablet Take 1 tablet (325 mg total) by mouth every 6 (six) hours as needed for Pain.      ALBUTEROL, REFILL, INHL Inhale into the lungs.      aspirin (ECOTRIN) 81 MG EC tablet Take 1 tablet (81 mg total) by mouth once daily.  0    azelastine (ASTELIN) 137 mcg (0.1 %) nasal spray USE 1 SPRAY IN EACH NOSTRIL TWICE A DAY (Patient not taking: Reported on 12/12/2019) 30 mL 0    brimonidine-timolol (COMBIGAN) 0.2-0.5 % Drop Place 1 drop into both eyes 2 (two) times daily.       calcium carbonate (OS-CALVIN) 600 mg (1,500 mg) Tab Take 600 mg by mouth 2 (two) times daily with meals.      citalopram (CELEXA) 10 MG tablet TAKE 1 TABLET BY MOUTH EVERY DAY 90 tablet 3    digoxin (LANOXIN) 125 mcg tablet Take 1 tablet (125 mcg total) by mouth once daily. 30 tablet 11    fluticasone propionate (FLONASE) 50 mcg/actuation nasal spray USE 2 SPRAYS IN EACH NOSTRIL ONCE DAILY. (Patient not taking: Reported on 12/12/2019) 16 mL 0    folic acid (FOLVITE) 1 MG tablet       levothyroxine (SYNTHROID) 75 MCG tablet TAKE 1 TABLET EVERY DAY 90 tablet 3    lidocaine (LIDODERM) 5 % Place 1 patch onto the skin once daily. Remove & Discard patch within 12 hours or as directed by MD 30 patch 11    methocarbamol (ROBAXIN) 750 MG Tab Take 1 tablet (750 mg total) by mouth 3 (three) times daily as needed (muscle spasms). 60 tablet 0    methotrexate (TREXALL) 10 MG tablet Take 40 mg by mouth once a week.       methylPREDNISolone (MEDROL DOSEPACK) 4 mg tablet use as directed 1 Package 0    metoprolol succinate (TOPROL-XL) 25 MG 24 hr tablet Take 1 tablet (25 mg total) by mouth once daily. 90 tablet 3    multivitamin with minerals tablet Take 1 tablet by mouth once daily.      RESTASIS 0.05 % ophthalmic emulsion Place 1 drop into both eyes 2 (two) times daily.  4    rosuvastatin (CRESTOR) 20 MG tablet TAKE 1 TABLET every OTHER night 45 tablet 1    telmisartan (MICARDIS) 20 MG Tab Take 1 tablet (20 mg total) by mouth once daily. 90 tablet 3    valacyclovir (VALTREX) 1000 MG tablet TAKE 1 TABLET EVERY 24 HOURS; prn  3    XIIDRA 5 % Dpet Place 1 drop into both eyes 2 (two) times daily.  3    clopidogrel (PLAVIX) 75 mg tablet Take 1 tablet (75 mg total) by mouth once daily. 30 tablet 12     Current Facility-Administered Medications   Medication Dose Route Frequency Provider Last Rate Last Dose    albuterol inhaler 2 puff  2 puff Inhalation 1 time in Clinic/HOD Sujit Chaudhry MD           Family History   Problem Relation Age of Onset    Hypertension Mother     Hypertension Father     Cancer Neg Hx        Social History     Socioeconomic History    Marital status:      Spouse name: Not on file    Number of children: Not on file    Years of education: Not on file    Highest education level: Not on file   Occupational History    Not on file   Social Needs    Financial resource strain: Not on file    Food insecurity:     Worry: Not on file     Inability: Not on file    Transportation needs:     Medical: Not on file     Non-medical: Not on file   Tobacco Use    Smoking status: Former Smoker     Packs/day: 1.00     Years: 20.00     Pack years: 20.00     Types: Cigarettes     Last attempt to quit: 1981     Years since quittin.1    Smokeless tobacco: Never Used   Substance and Sexual Activity    Alcohol use: No     Comment: 2 drinks/month    Drug use: No    Sexual activity: Yes     Partners:  Male   Lifestyle    Physical activity:     Days per week: Not on file     Minutes per session: Not on file    Stress: Only a little   Relationships    Social connections:     Talks on phone: Not on file     Gets together: Not on file     Attends Judaism service: Not on file     Active member of club or organization: Not on file     Attends meetings of clubs or organizations: Not on file     Relationship status: Not on file   Other Topics Concern    Not on file   Social History Narrative    Live w/ spouse and  1 dog        REVIEW OF SYSTEMS:  She denies any symptoms of amaurosis fugax, TIAs or strokes.  All other systems are reviewed and are negative.        PHYSICAL EXAM:  Physical Exam    Awake, alert and oriented.  Head is normocephalic without any masses.  Atraumatic.  Pupils are equal round and reactive. Sclerae anicteric.  Neck is supple without jugular vein distension or masses.  Trachea is midline.  Heart has a regular rate and rhythm.  Lungs are clear.  Abdomen is soft and nontender.  Extremities are warm and well perfused without edema or hyperpigmentation.  Neurologic:  She is awake alert and oriented.  She has no lateralizing neurologic deficits.  Gait is within normal limits.    Performed Procedure     CV US DOPPLER CAROTID (CUPID ONLY)   Conclusion     · There is 70-79% right Internal Carotid Stenosis.  · There is 60-69% left Internal Carotid Stenosis.  · Calcific plaque seen bilateral distal common carotid, proximal internal external carotid arteries.  · 60-69% stenosis seen in the right external carotid artery.  · Greater than 90% stenosis seen in the left external carotid artery.  · Antegrade flow bilateral vertebral arteries.           Performed Procedure     TRANSTHORACIC ECHO (TTE) COMPLETE   Conclusion     · Moderately decreased left ventricular systolic function. The estimated ejection fraction is 35%  · Normal LV diastolic function.  · Low normal right ventricular systolic  function.  · Mild mitral regurgitation.  · Mild tricuspid regurgitation.  · Normal central venous pressure (3 mm Hg).  · The estimated PA systolic pressure is 49 mm Hg  · Pulmonary hypertension present.            IMPRESSION:  1..  Moderate to severe asymptomatic left internal carotid artery stenosis.  2.  Moderate asymptomatic right internal carotid artery stenosis.    3.  Coronary artery disease  4.  Status post coronary artery bypass graft  5.  Hodgkin's lymphoma  6.  Hyperlipidemia  7.  Hypertension.  8.  Breast cancer      RECOMMENDATIONS:  The patient is asymptomatic with a 70-79% stenosis of the left internal carotid artery by duplex scanning criteria.  We will get a CT angiogram to better assess the degree of stenosis.  If the patient has less than 80% stenosis then we will observe her and get another carotid ultrasound in 6 months.        Mehul Otto MD

## 2019-12-31 ENCOUNTER — HOSPITAL ENCOUNTER (OUTPATIENT)
Facility: HOSPITAL | Age: 64
Discharge: HOME | End: 2019-12-31
Attending: PAIN MEDICINE | Admitting: PAIN MEDICINE
Payer: COMMERCIAL

## 2019-12-31 VITALS
HEART RATE: 75 BPM | BODY MASS INDEX: 27.15 KG/M2 | TEMPERATURE: 98 F | WEIGHT: 153.25 LBS | OXYGEN SATURATION: 98 % | RESPIRATION RATE: 16 BRPM | SYSTOLIC BLOOD PRESSURE: 111 MMHG | HEIGHT: 63 IN | DIASTOLIC BLOOD PRESSURE: 55 MMHG

## 2019-12-31 DIAGNOSIS — M54.16 LUMBAR RADICULOPATHY: Primary | ICD-10-CM

## 2019-12-31 PROCEDURE — 64483 NJX AA&/STRD TFRM EPI L/S 1: CPT | Mod: LT,,, | Performed by: PAIN MEDICINE

## 2019-12-31 PROCEDURE — 64483 PR EPIDURAL INJ, ANES/STEROID, TRANSFORAMINAL, LUMB/SACR, SNGL LEVL: ICD-10-PCS | Mod: LT,,, | Performed by: PAIN MEDICINE

## 2019-12-31 PROCEDURE — 25500020 PHARM REV CODE 255: Performed by: PAIN MEDICINE

## 2019-12-31 PROCEDURE — 99152 MOD SED SAME PHYS/QHP 5/>YRS: CPT | Mod: ,,, | Performed by: PAIN MEDICINE

## 2019-12-31 PROCEDURE — A9585 GADOBUTROL INJECTION: HCPCS | Performed by: PAIN MEDICINE

## 2019-12-31 PROCEDURE — 64483 NJX AA&/STRD TFRM EPI L/S 1: CPT | Performed by: PAIN MEDICINE

## 2019-12-31 PROCEDURE — 63600175 PHARM REV CODE 636 W HCPCS: Performed by: PAIN MEDICINE

## 2019-12-31 PROCEDURE — 99152 PR MOD CONSCIOUS SEDATION, SAME PHYS, 5+ YRS, FIRST 15 MIN: ICD-10-PCS | Mod: ,,, | Performed by: PAIN MEDICINE

## 2019-12-31 RX ORDER — MIDAZOLAM HYDROCHLORIDE 1 MG/ML
INJECTION, SOLUTION INTRAMUSCULAR; INTRAVENOUS
Status: DISCONTINUED | OUTPATIENT
Start: 2019-12-31 | End: 2019-12-31 | Stop reason: HOSPADM

## 2019-12-31 RX ORDER — DEXAMETHASONE SODIUM PHOSPHATE 10 MG/ML
INJECTION INTRAMUSCULAR; INTRAVENOUS
Status: DISCONTINUED | OUTPATIENT
Start: 2019-12-31 | End: 2019-12-31 | Stop reason: HOSPADM

## 2019-12-31 RX ORDER — FENTANYL CITRATE 50 UG/ML
INJECTION, SOLUTION INTRAMUSCULAR; INTRAVENOUS
Status: DISCONTINUED | OUTPATIENT
Start: 2019-12-31 | End: 2019-12-31 | Stop reason: HOSPADM

## 2019-12-31 RX ORDER — GADOBUTROL 604.72 MG/ML
INJECTION INTRAVENOUS
Status: DISCONTINUED | OUTPATIENT
Start: 2019-12-31 | End: 2019-12-31 | Stop reason: HOSPADM

## 2019-12-31 RX ORDER — GABAPENTIN 300 MG/1
CAPSULE ORAL
Qty: 90 CAPSULE | Refills: 3 | Status: SHIPPED | OUTPATIENT
Start: 2019-12-31 | End: 2020-03-24

## 2019-12-31 RX ADMIN — SODIUM CHLORIDE, SODIUM LACTATE, POTASSIUM CHLORIDE, AND CALCIUM CHLORIDE 250 ML: .6; .31; .03; .02 INJECTION, SOLUTION INTRAVENOUS at 11:12

## 2019-12-31 NOTE — DISCHARGE INSTRUCTIONS

## 2019-12-31 NOTE — OP NOTE
"Procedure: Lumbar Transforaminal Epidural Steroid Injection under Fluoroscopic Guidance (supraneural approach)    Level: L5/S1     Side: Left    PROCEDURE DATE: 12/31/2019    Pre-operative Diagnosis: Lumbar Radiculopathy  Post-operative Diagnosis: Lumbar Radiculopathy    Provider: KATELYN Niño MD  Assistant(s): None    Anesthesia: Local, IV Sedation    >> 1 mg of VERSED    >> 25 mcg of FENTANYL     Indication: Low back pain with radiculopathy consistent with distribution of targeted nerve. Symptoms unresponsive to conservative treatments. Fluoroscopy was used to optimize visualization of needle placement and to maximize safety.     Procedure Description / Technique:  The patient was seen and identified in the preoperative area. Risks, benefits, complications, and alternatives were discussed with the patient. The patient agreed to proceed with the procedure and signed the consent. The site and side of the procedure was identified and marked. An IV was started. The patient was taken to the procedural suite.    The patient was positioned in prone orientation on procedure table and a pillow was placed under the abdomen to reduce lumbar lordosis. A time out was performed prior to any intervention. The procedure, site, side, and allergies were stated and agreed to by all present. The lumbosacral area was widely prepped with ChloraPrep. The procedural site was draped in usual sterile fashion. Vital signs were closely monitored throughout this procedure. Conscious sedation was used for this procedure to decrease patient anxiety.    The target area was visualized under fluoroscopy. The cephalocaudal angle of the fluoroscope was adjusted as to align the vertebral end plates. The fluoroscopic arm was rotated ipsilaterally to an angle of approximately 30 degrees until the "briseida dog" outline came into view and the tip of the inferior superior articular process pointed towards the midline, 6:00 position of the above pedicle. " "A 25 gauge 3.5 inch spinal needle was directed towards the "chin" of the "briseida dog" (adjacent to the pars interarticularis and inferior to the pedicle). The needle was advanced until OS was met at the inferior border of the pedicle / pars interface. The needle was adjusted so that it would pass inferior to the osseous border. The fluoroscope was then placed in the lateral position and the needle was slowly advanced until it rested in the posterior 1/3rd of the vertebral foramen. AP fluoroscopy was checked and the needle tip rested at the 6:00 position under the pedicle. No paresthesia was elicited during needle placement. With the needle tip in its final position, gentle aspiration was negative for blood and CSF. Gadavist (gadobutrol) 1 mmol/mL (1 to 2 mL) was injected under live fluoroscopy. Microbore tubing was used for injection. There was no pain or paresthesia on injection. The contrast clearly delineated the targeted nerve root on AP fluoroscopy. No vascular uptake was seen. A solution containing 1 mL of 1% PF Lidocaine and 1 mL of Dexamethasone (10 mg/mL) was mixed and 2 mL was injected slowly at each level targeted. There was minimal resistance on injection. No pain or paresthesia was elicited on injection. The stylet was replaced and the needle was withdrawn intact. This procedure was performed for each of the above indicated levels.     Description of Findings: Not applicable    Prosthetic devices, grafts, tissues, or devices implanted: None    Specimen Removed: No    Estimated Blood Loss: minimal    COMPLICATIONS: None    DISPOSITION / PLANS: The patient was transferred to the recovery area in a stable condition for observation. The patient was reexamined prior to discharge. There was no evidence of acute neurologic injury following the procedure.  Patient was discharged from the recovery room after meeting discharge criteria. Home discharge instructions were given to the patient by the staff.    "

## 2019-12-31 NOTE — PLAN OF CARE
Patient's BP 90/54 x2 to left arm. Notified Dr. Niño, 250 mL bolus of LR ordered and administered. Will continue to monitor.

## 2019-12-31 NOTE — PLAN OF CARE
Discharge instructions reviewed with patient, verbalized understanding. 1 injection site to left lower back; clean, dry and intact. Patient complaining of lower back pain 10/10 that radiates down left leg. Unable to contact Dr. Niño to notify. Will try again. No new orders at this time. Ice pack applied.

## 2020-01-02 NOTE — DISCHARGE SUMMARY
[facility]  Short Stay  Discharge Summary    Admit Date: 12/31/2019    Discharge Date and Time: 12/31/2019 12:21 PM      Discharge Attending Physician: KATELYN Niño MD     Hospital Course (synopsis of major diagnoses, care, treatment, and services provided during the course of the hospital stay): Patient was admitted to Pre-op where informed consent was signed.  The patient was then taken to the procedure suite where the procedure was performed.  The patient was then return to the Pre-Op area and discharge was performed.     Final Diagnoses:    Principal Problem: <principal problem not specified>   Secondary Diagnoses:   Active Hospital Problems    Diagnosis  POA    Lumbar radiculopathy [M54.16]  Yes      Resolved Hospital Problems   No resolved problems to display.       Discharged Condition: good    Disposition: Home or Self Care    Follow up/Patient Instructions:    Medications:  Reconciled Home Medications:      Medication List      START taking these medications    gabapentin 300 MG capsule  Commonly known as:  NEURONTIN  Take 1 capsule at bedtime for one week, then take 1 capsule at breakfast and bedtime for 1 week, then take 1 capsule three times daily.        CONTINUE taking these medications    (MAGIC MOUTHWASH) 1:1:1 BENADRYL 12.5MG/5ML LIQ, ALUMINUM & MAGNESIUM  Swish and spit 10 mLs every 4 (four) hours as needed. for mouth sores     acetaminophen 325 MG tablet  Commonly known as:  TYLENOL  Take 1 tablet (325 mg total) by mouth every 6 (six) hours as needed for Pain.     ALBUTEROL (REFILL) INHL  Inhale into the lungs.     aspirin 81 MG EC tablet  Commonly known as:  ECOTRIN  Take 1 tablet (81 mg total) by mouth once daily.     azelastine 137 mcg (0.1 %) nasal spray  Commonly known as:  ASTELIN  USE 1 SPRAY IN EACH NOSTRIL TWICE A DAY     calcium carbonate 600 mg calcium (1,500 mg) Tab  Commonly known as:  OS-CALVIN  Take 600 mg by mouth 2 (two) times daily with meals.     citalopram 10  MG tablet  Commonly known as:  CELEXA  TAKE 1 TABLET BY MOUTH EVERY DAY     clopidogrel 75 mg tablet  Commonly known as:  PLAVIX  Take 1 tablet (75 mg total) by mouth once daily.     Combigan 0.2-0.5 % Drop  Generic drug:  brimonidine-timolol  Place 1 drop into both eyes 2 (two) times daily.     digoxin 125 mcg tablet  Commonly known as:  LANOXIN  Take 1 tablet (125 mcg total) by mouth once daily.     fluticasone propionate 50 mcg/actuation nasal spray  Commonly known as:  FLONASE  USE 2 SPRAYS IN EACH NOSTRIL ONCE DAILY.     folic acid 1 MG tablet  Commonly known as:  FOLVITE     levothyroxine 75 MCG tablet  Commonly known as:  SYNTHROID  TAKE 1 TABLET EVERY DAY     lidocaine 5 %  Commonly known as:  LIDODERM  Place 1 patch onto the skin once daily. Remove & Discard patch within 12 hours or as directed by MD     methocarbamol 750 MG Tab  Commonly known as:  ROBAXIN  Take 1 tablet (750 mg total) by mouth 3 (three) times daily as needed (muscle spasms).     methotrexate 10 MG tablet  Commonly known as:  TREXALL  Take 40 mg by mouth once a week.     metoprolol succinate 25 MG 24 hr tablet  Commonly known as:  TOPROL-XL  Take 1 tablet (25 mg total) by mouth once daily.     multivitamin with minerals tablet  Take 1 tablet by mouth once daily.     Restasis 0.05 % ophthalmic emulsion  Generic drug:  cycloSPORINE  Place 1 drop into both eyes 2 (two) times daily.     rosuvastatin 20 MG tablet  Commonly known as:  CRESTOR  TAKE 1 TABLET every OTHER night     telmisartan 20 MG Tab  Commonly known as:  MICARDIS  Take 1 tablet (20 mg total) by mouth once daily.     valACYclovir 1000 MG tablet  Commonly known as:  VALTREX  TAKE 1 TABLET EVERY 24 HOURS; prn     Xiidra 5 % Dpet  Generic drug:  lifitegrast  Place 1 drop into both eyes 2 (two) times daily.        ASK your doctor about these medications    methylPREDNISolone 4 mg tablet  Commonly known as:  MEDROL DOSEPACK  use as directed  Ask about: Should I take this medication?           Discharge Procedure Orders   Diet general     Call MD for:  severe uncontrolled pain     Call MD for:  difficulty breathing, headache or visual disturbances     Call MD for:  redness, tenderness, or signs of infection (pain, swelling, redness, odor or green/yellow discharge around incision site)     Activity as tolerated

## 2020-01-10 ENCOUNTER — TELEPHONE (OUTPATIENT)
Dept: VASCULAR SURGERY | Facility: CLINIC | Age: 65
End: 2020-01-10

## 2020-01-10 DIAGNOSIS — I65.23 BILATERAL CAROTID ARTERY STENOSIS: Primary | ICD-10-CM

## 2020-01-13 ENCOUNTER — PATIENT MESSAGE (OUTPATIENT)
Dept: VASCULAR SURGERY | Facility: CLINIC | Age: 65
End: 2020-01-13

## 2020-01-13 NOTE — TELEPHONE ENCOUNTER
Pt given CTA results and Dr Otto's recommendations to repeat study in 6 months. Pt verbalized understanding.

## 2020-01-23 ENCOUNTER — PATIENT MESSAGE (OUTPATIENT)
Dept: PAIN MEDICINE | Facility: CLINIC | Age: 65
End: 2020-01-23

## 2020-01-23 ENCOUNTER — PATIENT MESSAGE (OUTPATIENT)
Dept: NEUROSURGERY | Facility: CLINIC | Age: 65
End: 2020-01-23

## 2020-01-23 DIAGNOSIS — M51.36 DEGENERATIVE DISC DISEASE, LUMBAR: Primary | ICD-10-CM

## 2020-01-23 DIAGNOSIS — M51.36 DEGENERATIVE DISC DISEASE, LUMBAR: ICD-10-CM

## 2020-01-23 RX ORDER — METHOCARBAMOL 750 MG/1
750 TABLET, FILM COATED ORAL EVERY 8 HOURS PRN
Qty: 60 TABLET | Refills: 0 | Status: SHIPPED | OUTPATIENT
Start: 2020-01-23 | End: 2020-01-23 | Stop reason: SDUPTHER

## 2020-01-23 RX ORDER — METHOCARBAMOL 750 MG/1
750 TABLET, FILM COATED ORAL EVERY 8 HOURS PRN
Qty: 60 TABLET | Refills: 2 | Status: SHIPPED | OUTPATIENT
Start: 2020-01-23 | End: 2020-08-25

## 2020-01-24 ENCOUNTER — PATIENT OUTREACH (OUTPATIENT)
Dept: ADMINISTRATIVE | Facility: OTHER | Age: 65
End: 2020-01-24

## 2020-01-28 ENCOUNTER — OFFICE VISIT (OUTPATIENT)
Dept: CARDIOLOGY | Facility: CLINIC | Age: 65
End: 2020-01-28
Payer: COMMERCIAL

## 2020-01-28 ENCOUNTER — PATIENT MESSAGE (OUTPATIENT)
Dept: NEUROSURGERY | Facility: CLINIC | Age: 65
End: 2020-01-28

## 2020-01-28 VITALS
WEIGHT: 156.31 LBS | SYSTOLIC BLOOD PRESSURE: 119 MMHG | DIASTOLIC BLOOD PRESSURE: 70 MMHG | HEIGHT: 63 IN | HEART RATE: 81 BPM | BODY MASS INDEX: 27.7 KG/M2

## 2020-01-28 DIAGNOSIS — I25.810 CORONARY ARTERY DISEASE INVOLVING CORONARY BYPASS GRAFT OF NATIVE HEART WITHOUT ANGINA PECTORIS: Primary | ICD-10-CM

## 2020-01-28 DIAGNOSIS — E78.5 HYPERLIPIDEMIA, UNSPECIFIED HYPERLIPIDEMIA TYPE: ICD-10-CM

## 2020-01-28 DIAGNOSIS — I10 ESSENTIAL HYPERTENSION: ICD-10-CM

## 2020-01-28 DIAGNOSIS — I44.7 LBBB (LEFT BUNDLE BRANCH BLOCK): ICD-10-CM

## 2020-01-28 DIAGNOSIS — E78.2 MIXED HYPERLIPIDEMIA: ICD-10-CM

## 2020-01-28 DIAGNOSIS — R55 SYNCOPE, UNSPECIFIED SYNCOPE TYPE: ICD-10-CM

## 2020-01-28 DIAGNOSIS — I48.0 PAROXYSMAL ATRIAL FIBRILLATION: ICD-10-CM

## 2020-01-28 DIAGNOSIS — Z95.1 S/P CABG X 2: ICD-10-CM

## 2020-01-28 DIAGNOSIS — Z95.2 S/P AVR (AORTIC VALVE REPLACEMENT): ICD-10-CM

## 2020-01-28 DIAGNOSIS — I65.23 CAROTID STENOSIS, BILATERAL: ICD-10-CM

## 2020-01-28 DIAGNOSIS — Z95.5 STENTED CORONARY ARTERY: ICD-10-CM

## 2020-01-28 PROCEDURE — 3008F BODY MASS INDEX DOCD: CPT | Mod: CPTII,S$GLB,, | Performed by: INTERNAL MEDICINE

## 2020-01-28 PROCEDURE — 99214 PR OFFICE/OUTPT VISIT, EST, LEVL IV, 30-39 MIN: ICD-10-PCS | Mod: S$GLB,,, | Performed by: INTERNAL MEDICINE

## 2020-01-28 PROCEDURE — 99999 PR PBB SHADOW E&M-EST. PATIENT-LVL III: ICD-10-PCS | Mod: PBBFAC,,, | Performed by: INTERNAL MEDICINE

## 2020-01-28 PROCEDURE — 99214 OFFICE O/P EST MOD 30 MIN: CPT | Mod: S$GLB,,, | Performed by: INTERNAL MEDICINE

## 2020-01-28 PROCEDURE — 99999 PR PBB SHADOW E&M-EST. PATIENT-LVL III: CPT | Mod: PBBFAC,,, | Performed by: INTERNAL MEDICINE

## 2020-01-28 PROCEDURE — 3078F PR MOST RECENT DIASTOLIC BLOOD PRESSURE < 80 MM HG: ICD-10-PCS | Mod: CPTII,S$GLB,, | Performed by: INTERNAL MEDICINE

## 2020-01-28 PROCEDURE — 3078F DIAST BP <80 MM HG: CPT | Mod: CPTII,S$GLB,, | Performed by: INTERNAL MEDICINE

## 2020-01-28 PROCEDURE — 3074F SYST BP LT 130 MM HG: CPT | Mod: CPTII,S$GLB,, | Performed by: INTERNAL MEDICINE

## 2020-01-28 PROCEDURE — 3008F PR BODY MASS INDEX (BMI) DOCUMENTED: ICD-10-PCS | Mod: CPTII,S$GLB,, | Performed by: INTERNAL MEDICINE

## 2020-01-28 PROCEDURE — 3074F PR MOST RECENT SYSTOLIC BLOOD PRESSURE < 130 MM HG: ICD-10-PCS | Mod: CPTII,S$GLB,, | Performed by: INTERNAL MEDICINE

## 2020-01-28 RX ORDER — ROSUVASTATIN CALCIUM 20 MG/1
TABLET, COATED ORAL
Qty: 90 TABLET | Refills: 6 | Status: SHIPPED | OUTPATIENT
Start: 2020-01-28 | End: 2020-04-07 | Stop reason: SDUPTHER

## 2020-01-28 RX ORDER — TELMISARTAN 20 MG/1
20 TABLET ORAL DAILY
Qty: 90 TABLET | Refills: 3 | Status: SHIPPED | OUTPATIENT
Start: 2020-01-28 | End: 2020-04-07 | Stop reason: SDUPTHER

## 2020-01-28 RX ORDER — CLOPIDOGREL BISULFATE 75 MG/1
75 TABLET ORAL DAILY
Qty: 90 TABLET | Refills: 3 | Status: ON HOLD | OUTPATIENT
Start: 2020-01-28 | End: 2020-02-27 | Stop reason: HOSPADM

## 2020-01-28 RX ORDER — METOPROLOL SUCCINATE 25 MG/1
25 TABLET, EXTENDED RELEASE ORAL DAILY
Qty: 90 TABLET | Refills: 3
Start: 2020-01-28 | End: 2020-04-07 | Stop reason: SDUPTHER

## 2020-01-28 NOTE — PROGRESS NOTES
Subjective:    Patient ID:  Dulce Root is a 64 y.o. female who presents for follow-up of CAD F/U; Results; and Medication Refill      HPI  Here for follow up of CABG-PCI (2/19)/AVR/LBBB/DLP/carotid stenosis.Limited by back pain. No angina.     Review of Systems   Constitution: Negative for malaise/fatigue.   Eyes: Negative for blurred vision.   Cardiovascular: Negative for chest pain, claudication, cyanosis, dyspnea on exertion, irregular heartbeat, leg swelling, near-syncope, orthopnea, palpitations, paroxysmal nocturnal dyspnea and syncope.   Respiratory: Negative for cough and shortness of breath.    Hematologic/Lymphatic: Does not bruise/bleed easily.   Musculoskeletal: Negative for back pain, falls, joint pain, muscle cramps, muscle weakness and myalgias.   Gastrointestinal: Negative for abdominal pain, change in bowel habit, nausea and vomiting.   Genitourinary: Negative for urgency.   Neurological: Negative for dizziness, focal weakness and light-headedness.       Past Medical History:   Diagnosis Date    Allergy     Breast cancer 2008    Carotid stenosis, bilateral 10/13/2017    Coronary artery disease     Coronary artery disease involving coronary bypass graft of native heart without angina pectoris 8/16/2019 2/19  1.  Left main is a small vessel with a 60%-65% ostial lesion. 2.  LAD is a medium-sized vessel diffuse 70% proximally  Ramus intermedius is a medium size vessel with a 40%-50% ostial lesion. 3.  Circumflex 60%-70% ostial  remainder of circumflex and obtuse marginal normal in appearance. Right coronary artery is normal size vessel, short discrete 70%mid 4.  Vein graft to right coronary is occ    Coronary artery disease involving native coronary artery of native heart without angina pectoris 6/27/2017    DCIS (ductal carcinoma in situ) of breast 11/6/2012    Essential hypertension 11/6/2012    Hodgkin lymphoma     Hx of Hodgkin's disease - s/p chemo and radiation 11/6/2012     Hyperlipidemia     Hyperlipidemia 11/6/2012    The patient presents with hyperlipidemia.  The patient reports tolerating the medication well and is in excellent compliance.  There have been no medication side effects.  The patient denies chest pain, neuropathy, and myalgias.  The patient has reduced fat intake and has been exercising.  Current treatment has included the medications listed in the med card.   Lab Results Component Value Date  CH    Hypertension     LBBB (left bundle branch block) 5/22/2018    Osteoporosis     Paroxysmal atrial fibrillation - single post-op episode 8/24/2017    Pemphigoid     S/P AVR (aortic valve replacement) - pericardial valve 08/21/2017    Perceval aortic tissue valve PVS23. 23mm    serial # [id number]    S/P CABG x 2 8/21/2017 8/17 CABG X 2 with SVG-LAD and SVG-distal RCA  SVG LAD due to absent LIMA after chest radiation and lymph node biopsy     Stented coronary artery     She had 2 stents placed in 2/2019.  She has had CAD and bypasses in the past in 2017.      Thyroid disease      Patient Active Problem List   Diagnosis    DCIS (ductal carcinoma in situ) of breast    Hx of Hodgkin's disease - s/p chemo and radiation    Essential hypertension    Hyperlipidemia    Hypothyroid    Scleritis    Pemphigoid    Dyslipidemia    S/P AVR (aortic valve replacement) - pericardial valve    S/P CABG x 2    Hypophosphatemia    Acute blood loss anemia    Paroxysmal atrial fibrillation - single post-op episode    Carotid stenosis, bilateral    Syncope    LBBB (left bundle branch block)    Acute respiratory distress    Bilateral pleural effusion    Acute systolic heart failure    Abnormal EKG    Elevated brain natriuretic peptide (BNP) level    Stented coronary artery    Hypoxia    Coronary artery disease involving coronary bypass graft of native heart without angina pectoris    Low back pain, non-specific    Lumbar radiculopathy        Objective:     Vitals:     01/28/20 1126   BP: 119/70   Pulse: 81        Physical Exam   Constitutional: She is oriented to person, place, and time. She appears well-developed and well-nourished.   HENT:   Head: Normocephalic.   Eyes: Conjunctivae are normal.   Neck: Normal range of motion. Neck supple. No JVD present.   Cardiovascular: Normal rate, regular rhythm, normal heart sounds and intact distal pulses.   Pulses:       Carotid pulses are 2+ on the right side, and 2+ on the left side.       Radial pulses are 2+ on the right side, and 2+ on the left side.        Dorsalis pedis pulses are 2+ on the right side, and 2+ on the left side.        Posterior tibial pulses are 2+ on the right side, and 2+ on the left side.   Well healed midline sternal incision.     Pulmonary/Chest: Effort normal and breath sounds normal.   Abdominal: Soft. Bowel sounds are normal.   Musculoskeletal: She exhibits no edema or tenderness.   Neurological: She is alert and oriented to person, place, and time. Gait normal.   Skin: Skin is warm, dry and intact. No cyanosis. Nails show no clubbing.   Psychiatric: She has a normal mood and affect. Her speech is normal and behavior is normal. Thought content normal.   Nursing note and vitals reviewed.            ..    Chemistry        Component Value Date/Time     11/14/2019 0852    K 4.5 11/14/2019 0852     11/14/2019 0852    CO2 26 11/14/2019 0852    BUN 19 (H) 12/23/2019 1503    CREATININE 0.43 (L) 12/23/2019 1503     11/14/2019 0852        Component Value Date/Time    CALCIUM 9.2 11/14/2019 0852    ALKPHOS 96 11/14/2019 0852    AST 24 11/14/2019 0852    ALT 20 11/14/2019 0852    BILITOT 0.3 11/14/2019 0852    ESTGFRAFRICA >60 12/23/2019 1503    EGFRNONAA >60 12/23/2019 1503            ..  Lab Results   Component Value Date    CHOL 120 11/14/2019    CHOL 166 03/01/2019    CHOL 146 04/12/2018     Lab Results   Component Value Date    HDL 37 (L) 11/14/2019    HDL 51 03/01/2019    HDL 58 04/12/2018      Lab Results   Component Value Date    LDLCALC 68.2 11/14/2019    LDLCALC 98.8 03/01/2019    LDLCALC 70.2 04/12/2018     Lab Results   Component Value Date    TRIG 74 11/14/2019    TRIG 81 03/01/2019    TRIG 89 04/12/2018     Lab Results   Component Value Date    CHOLHDL 30.8 11/14/2019    CHOLHDL 30.7 03/01/2019    CHOLHDL 39.7 04/12/2018     ..  Lab Results   Component Value Date    WBC 9.93 11/14/2019    HGB 12.5 11/14/2019    HCT 40.4 11/14/2019    MCV 96 11/14/2019     11/14/2019       Test(s) Reviewed  I have reviewed the following in detail:  [] Stress test   [] Angiography   [x] Echocardiogram   [x] Labs   [x] Other:  Neck CTA     Assessment:         ICD-10-CM ICD-9-CM   1. Coronary artery disease involving coronary bypass graft of native heart without angina pectoris I25.810 414.05   2. Carotid stenosis, bilateral I65.23 433.10     433.30   3. Stented coronary artery Z95.5 V45.82   4. S/P CABG x 2 Z95.1 V45.81   5. S/P AVR (aortic valve replacement) - pericardial valve Z95.2 V43.3   6. Essential hypertension I10 401.9   7. Mixed hyperlipidemia E78.2 272.2   8. LBBB (left bundle branch block) I44.7 426.3   9. Paroxysmal atrial fibrillation - single post-op episode I48.0 427.31   10. Syncope, unspecified syncope type R55 780.2     Problem List Items Addressed This Visit     Syncope    Stented coronary artery    Overview     2/19   ostial circumflex synergy 3.0 x 12 with a balloon in the ramus and LAD as a T-technique.  Synergy left main, using a 3.5 x 8 post-dilated to 4.0,    RCA  Synergy 2.75 x 16 post dilated 2.75          S/P CABG x 2    Overview     8/17 CABG X 2 with SVG-LAD and SVG-distal RCA  SVG LAD due to absent LIMA after chest radiation and lymph node biopsy           S/P AVR (aortic valve replacement) - pericardial valve    Paroxysmal atrial fibrillation - single post-op episode    LBBB (left bundle branch block)    Hyperlipidemia    Overview     The patient presents with  hyperlipidemia.  The patient reports tolerating the medication well and is in excellent compliance.  There have been no medication side effects.  The patient denies chest pain, neuropathy, and myalgias.  The patient has reduced fat intake and has been exercising.  Current treatment has included the medications listed in the med card.    Lab Results   Component Value Date    CHOL 146 04/12/2018    CHOL 168 01/20/2015    CHOL 166 05/13/2013       Lab Results   Component Value Date    HDL 58 04/12/2018    HDL 42 01/20/2015    HDL 50 05/13/2013       Lab Results   Component Value Date    LDLCALC 70.2 04/12/2018    LDLCALC 95.0 01/20/2015    LDLCALC 89.0 05/13/2013       Lab Results   Component Value Date    TRIG 89 04/12/2018    TRIG 155 (H) 01/20/2015    TRIG 136 05/13/2013       Lab Results   Component Value Date    CHOLHDL 39.7 04/12/2018    CHOLHDL 25.0 01/20/2015    CHOLHDL 30.1 05/13/2013     Lab Results   Component Value Date    ALT 36 02/11/2019    AST 64 (H) 02/11/2019    ALKPHOS 77 02/11/2019    BILITOT 0.6 02/11/2019              Essential hypertension    Coronary artery disease involving coronary bypass graft of native heart without angina pectoris - Primary    Overview     2/19   1.  Left main is a small vessel with a 60%-65% ostial lesion.  2.  LAD is a medium-sized vessel diffuse 70% proximally  Ramus intermedius is a medium size vessel with a 40%-50% ostial lesion.  3.  Circumflex 60%-70% ostial  remainder of circumflex and obtuse marginal normal in appearance. Right coronary artery is normal size vessel, short discrete 70%mid  4.  Vein graft to right coronary is occluded.  5.  Vein graft to LAD is patent.  Large vein graft to the ostial lesion of approximately 60%.   Fractional flow reserve of the vein graft to LAD was 0.83.   Fractional flow reserve of the circumflex was 0.77 at 1 minute.   Fractional flow reserve of the ramus was negative.            Carotid stenosis, bilateral           Plan:            Return to clinic 9 months   Low level/low impact aerobic exercise 5x's/wk. Heart healthy diet and risk factor modification.    See labs and med orders.  F/o Dr Otto as scheduled    Portions of this note may have been created with voice recognition software.  Grammatical, syntax and spelling errors may be inevitable.

## 2020-01-31 ENCOUNTER — PATIENT OUTREACH (OUTPATIENT)
Dept: OTHER | Facility: OTHER | Age: 65
End: 2020-01-31

## 2020-02-03 ENCOUNTER — PATIENT MESSAGE (OUTPATIENT)
Dept: NEUROSURGERY | Facility: CLINIC | Age: 65
End: 2020-02-03

## 2020-02-10 ENCOUNTER — PATIENT OUTREACH (OUTPATIENT)
Dept: OTHER | Facility: OTHER | Age: 65
End: 2020-02-10

## 2020-02-11 ENCOUNTER — HOSPITAL ENCOUNTER (OUTPATIENT)
Dept: RADIOLOGY | Facility: HOSPITAL | Age: 65
Discharge: HOME | End: 2020-02-11
Attending: NEUROLOGICAL SURGERY
Payer: COMMERCIAL

## 2020-02-11 ENCOUNTER — OFFICE VISIT (OUTPATIENT)
Dept: NEUROSURGERY | Facility: CLINIC | Age: 65
End: 2020-02-11
Payer: COMMERCIAL

## 2020-02-11 VITALS
WEIGHT: 159.38 LBS | SYSTOLIC BLOOD PRESSURE: 151 MMHG | RESPIRATION RATE: 18 BRPM | BODY MASS INDEX: 28.24 KG/M2 | HEART RATE: 87 BPM | HEIGHT: 63 IN | DIASTOLIC BLOOD PRESSURE: 83 MMHG

## 2020-02-11 DIAGNOSIS — M48.062 LUMBAR STENOSIS WITH NEUROGENIC CLAUDICATION: ICD-10-CM

## 2020-02-11 DIAGNOSIS — M51.36 DEGENERATIVE DISC DISEASE, LUMBAR: Primary | ICD-10-CM

## 2020-02-11 DIAGNOSIS — M54.16 LUMBAR RADICULOPATHY: ICD-10-CM

## 2020-02-11 DIAGNOSIS — M51.36 DEGENERATIVE DISC DISEASE, LUMBAR: ICD-10-CM

## 2020-02-11 PROCEDURE — 3079F DIAST BP 80-89 MM HG: CPT | Mod: CPTII,S$GLB,, | Performed by: NEUROLOGICAL SURGERY

## 2020-02-11 PROCEDURE — 99213 OFFICE O/P EST LOW 20 MIN: CPT | Mod: S$GLB,,, | Performed by: NEUROLOGICAL SURGERY

## 2020-02-11 PROCEDURE — 99999 PR PBB SHADOW E&M-EST. PATIENT-LVL IV: ICD-10-PCS | Mod: PBBFAC,,, | Performed by: NEUROLOGICAL SURGERY

## 2020-02-11 PROCEDURE — 3008F BODY MASS INDEX DOCD: CPT | Mod: CPTII,S$GLB,, | Performed by: NEUROLOGICAL SURGERY

## 2020-02-11 PROCEDURE — 3008F PR BODY MASS INDEX (BMI) DOCUMENTED: ICD-10-PCS | Mod: CPTII,S$GLB,, | Performed by: NEUROLOGICAL SURGERY

## 2020-02-11 PROCEDURE — 3079F PR MOST RECENT DIASTOLIC BLOOD PRESSURE 80-89 MM HG: ICD-10-PCS | Mod: CPTII,S$GLB,, | Performed by: NEUROLOGICAL SURGERY

## 2020-02-11 PROCEDURE — 3077F PR MOST RECENT SYSTOLIC BLOOD PRESSURE >= 140 MM HG: ICD-10-PCS | Mod: CPTII,S$GLB,, | Performed by: NEUROLOGICAL SURGERY

## 2020-02-11 PROCEDURE — 3077F SYST BP >= 140 MM HG: CPT | Mod: CPTII,S$GLB,, | Performed by: NEUROLOGICAL SURGERY

## 2020-02-11 PROCEDURE — 71046 X-RAY EXAM CHEST 2 VIEWS: CPT | Mod: 26,,, | Performed by: RADIOLOGY

## 2020-02-11 PROCEDURE — 71046 X-RAY EXAM CHEST 2 VIEWS: CPT | Mod: TC

## 2020-02-11 PROCEDURE — 99213 PR OFFICE/OUTPT VISIT, EST, LEVL III, 20-29 MIN: ICD-10-PCS | Mod: S$GLB,,, | Performed by: NEUROLOGICAL SURGERY

## 2020-02-11 PROCEDURE — 71046 XR CHEST PA AND LATERAL PRE-OP: ICD-10-PCS | Mod: 26,,, | Performed by: RADIOLOGY

## 2020-02-11 PROCEDURE — 99999 PR PBB SHADOW E&M-EST. PATIENT-LVL IV: CPT | Mod: PBBFAC,,, | Performed by: NEUROLOGICAL SURGERY

## 2020-02-11 RX ORDER — TRAMADOL HYDROCHLORIDE 50 MG/1
50 TABLET ORAL EVERY 6 HOURS PRN
Qty: 60 TABLET | Refills: 0 | Status: ON HOLD | OUTPATIENT
Start: 2020-02-11 | End: 2020-02-27 | Stop reason: HOSPADM

## 2020-02-11 NOTE — H&P (VIEW-ONLY)
Subjective:      Patient ID: Dulce Root is a 64 y.o. female.    Chief Complaint: Pre-op Exam (Left sided L5-S1 )    Here for F/U LBP     Pain 7/10 in the back down the L LE   Numbness to the outside portion of the L LE   No right symptoms   States symptoms have becoming progressively worsened feels as though her foot may be coming weak with her gait  Taking gabapentin at night with some relief   Recently had a injection with  with some relief  Works as an  and works mostly at a desk     Note:  Patient takes Plavix and would need to stop this prior to considering any operation      Review of Systems   Constitutional: Negative for activity change, appetite change and chills.   HENT: Negative for hearing loss, sore throat and tinnitus.    Eyes: Negative for pain, discharge and itching.   Cardiovascular: Negative for chest pain.   Gastrointestinal: Negative for abdominal pain.   Endocrine: Negative for cold intolerance and heat intolerance.   Genitourinary: Negative for difficulty urinating and dysuria.   Musculoskeletal: Positive for back pain and gait problem.   Allergic/Immunologic: Negative for environmental allergies.   Neurological: Positive for weakness. Negative for dizziness, tremors, light-headedness and headaches.   Hematological: Negative for adenopathy.   Psychiatric/Behavioral: Negative for agitation, behavioral problems and confusion.         Objective:       Neurosurgery Physical Exam  Ortho/SPM Exam    Nursing note and vitals reviewed  Gen:Oriented to person, place, and time.             Appears stated age   Skin: no rashes or lesions identified   Head:Normocephalic and atraumatic.  Nose: Nose normal.    Eyes: EOM are normal. Pupils are equal, round, and reactive to light.   Neck: Neck supple. No masses or lesions palpated  Cardiovascular: Intact distal pulses.    Abdominal: Soft.   Neurological: Alert and oriented to person, place, and time.  No cranial nerve deficit.   Coordination normal. Normal muscle tone  Psychiatric: Normal mood and affect. Behavior is normal.      Back:  None Tenderness bilaterally Paraspinal muscle spasms   None  Pain with flexion and extention   WNL  Range of motion    Neg  Straight leg raise     Motor   Right Right Left Left  Level Group   5  5  L2 Hip flexor (Psoas)   5  5  L3 Leg extension (Quads)   5  5  L4 Dorsiflexion & foot inversion (Tibialis Anterior)   5   4+ L5 Great toe extension ( EHL)   5   4+ S1 Foot eversion (Gastroc, PL & PB)     Sensation  NL Decreased (R/L/BL) Level Sensation    X  L2 Anterio-medial thigh   X  L3 Medial thigh around knee   X  L4 Medial foot   X  L5 Dorsum foot    Diminished to light touch on the left S1 Lateral foot     Reflex  2+  Patellar tendon (L4)   2+  Achilles tendon (S1)       Results for orders placed during the hospital encounter of 12/06/19   MRI Lumbar Spine Without Contrast    Narrative EXAMINATION:  MRI LUMBAR SPINE WITHOUT CONTRAST    CLINICAL HISTORY:  Low back pain, prior surgery, new or progressive sx; Low back pain    TECHNIQUE:  Multiplanar, multisequence MR images were acquired from the thoracolumbar junction to the sacrum without the administration of contrast.    COMPARISON:  None.    FINDINGS:  Vertebral body alignment appears adequate.  Vertebral body heights appear well preserved.  Intervertebral disc heights appear relatively well preserved.  No STIR signal abnormality is noted to suggest an aggressive bone marrow replacement process or recent fracture.  Abnormal uterine masses are noted suggestive of fibroids but not entirely specific with 1 of at least 3 cm identified.  If further evaluation is desired a female pelvis ultrasound could be performed.  Multiple colonic diverticula are noted.    A signal intensity is noted at the lower pole of the left kidney anteriorly of 7 mm nonspecific on this examination but statistically favored relate to a cyst.  If confirmation is desired to exclude solid  components ultrasound could be performed.    At the T12-L1 level, minimal broad-based bulge is noted paracentric to the right of 1-2 mm without significant neural foraminal narrowing.  Minimal central canal narrowing is noted.    At the L1-L2 level, broad base bulge is noted of 3-4 mm.  Mild bilateral neural foraminal narrowing is noted with mild thecal sac narrowing to 14 mm.  Ligamentous hypertrophy appears to be present.  Right-sided neural foraminal narrowing is slightly greater than left.    At the L2-L3 level, a central broad-based protrusion appears to be present of 6 mm with bilateral lateral recess stenosis.  Moderate central canal narrowing is noted to 9 mm.  Minimal bilateral neural foraminal narrowing is noted.    At the L3-L4 level and broad-based protrusion is noted centrally of 6 mm.  Bilateral lateral recess stenosis is noted.  Moderate central canal stenosis is noted to 9 mm.  The descending nerve roots within the lateral recesses bilaterally may be affected.  Mild bulging is noted towards each neural foramen with mild bilateral neural foraminal narrowing.    At the L4-L5 level, broad-based bulge appears to be present of 6 mm along with bulging towards each neural foramen.  Facet arthropathy and ligamentous hypertrophy appears to be present.  Moderate to severe central canal stenosis is noted with very little fluid surrounding nerve roots and central canal narrowing to 5 mm.  Moderate bilateral neural foraminal stenosis appears to be present.  Contact of the exiting nerve roots bilaterally may be present particularly on the left.    At the L5-S1 level a disc extrusion appears to be present into the left lateral recess that descends from the disc plane by 9 mm and extends posterior to the disc plane by 8 mm.  Left lateral recess stenosis is noted with probable contact of the descending nerve root within the left lateral recess.  Moderate thecal sac narrowing is noted to 9 mm.  Mild to moderate right  neural foraminal stenosis is noted with probable contact of the exiting nerve root.  Mild left neural foraminal stenosis is noted.      Impression 1.  Multilevel degenerative change with central canal note narrowing most notably at the L4-L5 level to 5 mm.  Disc protrusion is noted at the L5-S1 level into the left lateral recess likely affecting the descending nerve roots.  Broad-based protrusion is also noted at the L2-L3 level centrally and at the L3-L4 level centrally causing bilateral lateral recess stenosis as described above    2.  Left renal signal intensity nonspecific on this examination but statistically favored relate to a cyst.  If confirmation is desired ultrasound could be performed.    3.  Uterine mass that is nonspecific but suggestive a fibroid, if confirmation is desired female pelvis ultrasound evaluation could be performed with follow-up for size stability.    4.  Multiple colonic diverticula are noted in the partially visualized colon      Electronically signed by: Evan Anton MD  Date:    12/06/2019  Time:    16:29      No results found for this or any previous visit.  Results for orders placed during the hospital encounter of 07/10/13   X-Ray Lumbar Spine AP And Lateral    Narrative Comparison: None.    Findings:The SI joints are unremarkable. The joint alignment is within normal limits.  No acute fractures identified.  No evidence of a marrow replacement process.    Impression  No significant osseous abnormalities.      Electronically signed by: Fredy Winchester MD  Date:     07/10/13  Time:    13:56       .  Assessment:     1. Degenerative disc disease, lumbar    2. Lumbar radiculopathy    3. Lumbar stenosis with neurogenic claudication      Plan:     Degenerative disc disease, lumbar  -     traMADol (ULTRAM) 50 mg tablet; Take 1 tablet (50 mg total) by mouth every 6 (six) hours as needed for Pain.  Dispense: 60 tablet; Refill: 0    Lumbar radiculopathy  -     traMADol (ULTRAM) 50 mg tablet;  Take 1 tablet (50 mg total) by mouth every 6 (six) hours as needed for Pain.  Dispense: 60 tablet; Refill: 0    Lumbar stenosis with neurogenic claudication  -     traMADol (ULTRAM) 50 mg tablet; Take 1 tablet (50 mg total) by mouth every 6 (six) hours as needed for Pain.  Dispense: 60 tablet; Refill: 0     Patient has severe stenosis at L4-5 with a large herniated disc on the left-sided L5-S1 which corresponds with the symptoms\  She understands that she has degenerative changes throughout her spine  We have agreed that the primary focus of any surgical procedure would be to focus on getting the left leg better  Would recommend decompression on the midline at L4-5 with L5-S1 with a diskectomy on the left at this level  Patient agreed to the surgery and signed a consent in the office today    The patient was informed of all benefits and potential risk of the operation including but not limited to:  Pain, infection, bleeding, coma, paralysis, death.  Cerebrospinal fluid leak, failure of any instrumentation, the need for additional procedures in the future. No guarantee was given that this procedure would alleviate all of the symptoms.    Prior to surgery she will need to be off her Plavix she states that she would be able to get in touch with her cardiologist to make sure that it would be safer to stop this prior to any surgical procedure.  Surgery will take approximately 2 hr and she will be in the prone position    She would need to be off of Plavix 5-10 days following the procedure  In that time she could be on Lovenox if medically necessary to bridge therapy    Tentatively planned for Thursday February 27th    Thank you for the referral   Please call with any questions    Vimal Villegas MD  Neurosurgery     Disclaimer: This note was prepared using a voice recognition system and is likely to have sound alike errors within the text.

## 2020-02-12 ENCOUNTER — PATIENT MESSAGE (OUTPATIENT)
Dept: SURGERY | Facility: HOSPITAL | Age: 65
End: 2020-02-12

## 2020-02-12 ENCOUNTER — TELEPHONE (OUTPATIENT)
Dept: FAMILY MEDICINE | Facility: CLINIC | Age: 65
End: 2020-02-12

## 2020-02-12 ENCOUNTER — TELEPHONE (OUTPATIENT)
Dept: CARDIOLOGY | Facility: CLINIC | Age: 65
End: 2020-02-12

## 2020-02-12 NOTE — TELEPHONE ENCOUNTER
----- Message from Gloria Montilla LPN sent at 2/12/2020  9:05 AM CST -----  Hello    Requesting clearance/ risk assessment, pt is scheduled for laminectomy surgery with Dr. Villegas on 2/27/2020    Thanks     Gloria Montilla  Neurosurgery

## 2020-02-13 ENCOUNTER — LAB VISIT (OUTPATIENT)
Dept: LAB | Facility: HOSPITAL | Age: 65
End: 2020-02-13
Attending: FAMILY MEDICINE
Payer: COMMERCIAL

## 2020-02-13 DIAGNOSIS — Z12.11 COLON CANCER SCREENING: ICD-10-CM

## 2020-02-13 PROCEDURE — 82274 ASSAY TEST FOR BLOOD FECAL: CPT

## 2020-02-14 ENCOUNTER — OFFICE VISIT (OUTPATIENT)
Dept: FAMILY MEDICINE | Facility: CLINIC | Age: 65
End: 2020-02-14
Payer: COMMERCIAL

## 2020-02-14 VITALS
HEIGHT: 63 IN | HEART RATE: 81 BPM | TEMPERATURE: 98 F | DIASTOLIC BLOOD PRESSURE: 65 MMHG | BODY MASS INDEX: 28.49 KG/M2 | WEIGHT: 160.81 LBS | SYSTOLIC BLOOD PRESSURE: 114 MMHG

## 2020-02-14 DIAGNOSIS — D64.9 MILD ANEMIA: ICD-10-CM

## 2020-02-14 DIAGNOSIS — J06.9 UPPER RESPIRATORY TRACT INFECTION, UNSPECIFIED TYPE: ICD-10-CM

## 2020-02-14 DIAGNOSIS — Z01.818 PREOPERATIVE CLEARANCE: Primary | ICD-10-CM

## 2020-02-14 PROCEDURE — 99999 PR PBB SHADOW E&M-EST. PATIENT-LVL III: ICD-10-PCS | Mod: PBBFAC,,, | Performed by: NURSE PRACTITIONER

## 2020-02-14 PROCEDURE — 3008F BODY MASS INDEX DOCD: CPT | Mod: CPTII,S$GLB,, | Performed by: NURSE PRACTITIONER

## 2020-02-14 PROCEDURE — 93010 ELECTROCARDIOGRAM REPORT: CPT | Mod: S$GLB,,, | Performed by: INTERNAL MEDICINE

## 2020-02-14 PROCEDURE — 93005 EKG 12-LEAD: ICD-10-PCS | Mod: S$GLB,,, | Performed by: NURSE PRACTITIONER

## 2020-02-14 PROCEDURE — 3078F PR MOST RECENT DIASTOLIC BLOOD PRESSURE < 80 MM HG: ICD-10-PCS | Mod: CPTII,S$GLB,, | Performed by: NURSE PRACTITIONER

## 2020-02-14 PROCEDURE — 93005 ELECTROCARDIOGRAM TRACING: CPT | Mod: S$GLB,,, | Performed by: NURSE PRACTITIONER

## 2020-02-14 PROCEDURE — 3078F DIAST BP <80 MM HG: CPT | Mod: CPTII,S$GLB,, | Performed by: NURSE PRACTITIONER

## 2020-02-14 PROCEDURE — 99999 PR PBB SHADOW E&M-EST. PATIENT-LVL III: CPT | Mod: PBBFAC,,, | Performed by: NURSE PRACTITIONER

## 2020-02-14 PROCEDURE — 3074F PR MOST RECENT SYSTOLIC BLOOD PRESSURE < 130 MM HG: ICD-10-PCS | Mod: CPTII,S$GLB,, | Performed by: NURSE PRACTITIONER

## 2020-02-14 PROCEDURE — 3074F SYST BP LT 130 MM HG: CPT | Mod: CPTII,S$GLB,, | Performed by: NURSE PRACTITIONER

## 2020-02-14 PROCEDURE — 3008F PR BODY MASS INDEX (BMI) DOCUMENTED: ICD-10-PCS | Mod: CPTII,S$GLB,, | Performed by: NURSE PRACTITIONER

## 2020-02-14 PROCEDURE — 99214 PR OFFICE/OUTPT VISIT, EST, LEVL IV, 30-39 MIN: ICD-10-PCS | Mod: S$GLB,,, | Performed by: NURSE PRACTITIONER

## 2020-02-14 PROCEDURE — 93010 EKG 12-LEAD: ICD-10-PCS | Mod: S$GLB,,, | Performed by: INTERNAL MEDICINE

## 2020-02-14 PROCEDURE — 99214 OFFICE O/P EST MOD 30 MIN: CPT | Mod: S$GLB,,, | Performed by: NURSE PRACTITIONER

## 2020-02-14 RX ORDER — CEFDINIR 300 MG/1
300 CAPSULE ORAL 2 TIMES DAILY
Qty: 14 CAPSULE | Refills: 0 | Status: SHIPPED | OUTPATIENT
Start: 2020-02-14 | End: 2020-02-21

## 2020-02-14 NOTE — PROGRESS NOTES
Subjective:       Patient ID: Dulce Root is a 64 y.o. female.    Chief Complaint: Pre-op Exam    HPI     she comes in for preop clearance. she is scheduled to have a laminectomy done by Dr. Villegas on February 27, 2020.  Medical history includes hypertension, hyperlipidemia, aortic valve replacement, coronary artery disease, postop episode of AFib, systolic heart failure, hypothyroidism, and breast cancer.. Preop testing includes CBC, CMP, chest x-ray. she denies shortness of breath or chest pain, no dizziness or syncope, no headaches or blurry vision. Denies fever. sheis currently on blood thinners.  She is followed by cardiology who will clear her for surgery and give preop and postop instructions regarding Plavix use.    She also complains of cough, congestion, headache, sore throat.  This has been ongoing for the last several days.  She has green and yellow sputum.  She has been taking over-the-counter sinus medication without improvement.  She denies shortness of breath or chest pain, no dizziness or syncope.    Review of Systems   Constitutional: Negative for fatigue, fever and unexpected weight change.   HENT: Positive for congestion and sore throat. Negative for ear pain.    Eyes: Negative for pain and visual disturbance.   Respiratory: Positive for cough. Negative for shortness of breath.    Cardiovascular: Negative for chest pain and palpitations.   Gastrointestinal: Negative for abdominal pain, diarrhea and vomiting.   Musculoskeletal: Negative for arthralgias and myalgias.   Skin: Negative for color change and rash.   Neurological: Positive for headaches. Negative for dizziness.   Psychiatric/Behavioral: Negative for dysphoric mood and sleep disturbance. The patient is not nervous/anxious.        Vitals:    02/14/20 0823   BP: 114/65   Pulse: 81   Temp: 97.8 °F (36.6 °C)       Objective:     Current Outpatient Medications   Medication Sig Dispense Refill    acetaminophen (TYLENOL) 325 MG tablet Take 1  tablet (325 mg total) by mouth every 6 (six) hours as needed for Pain.      aspirin (ECOTRIN) 81 MG EC tablet Take 1 tablet (81 mg total) by mouth once daily.  0    brimonidine-timolol (COMBIGAN) 0.2-0.5 % Drop Place 1 drop into both eyes 2 (two) times daily.       calcium carbonate (OS-CALVIN) 600 mg (1,500 mg) Tab Take 600 mg by mouth 2 (two) times daily with meals.      citalopram (CELEXA) 10 MG tablet TAKE 1 TABLET BY MOUTH EVERY DAY 90 tablet 3    clopidogrel (PLAVIX) 75 mg tablet Take 1 tablet (75 mg total) by mouth once daily. 90 tablet 3    folic acid (FOLVITE) 1 MG tablet       gabapentin (NEURONTIN) 300 MG capsule Take 1 capsule at bedtime for one week, then take 1 capsule at breakfast and bedtime for 1 week, then take 1 capsule three times daily. 90 capsule 3    levothyroxine (SYNTHROID) 75 MCG tablet TAKE 1 TABLET EVERY DAY 90 tablet 3    lidocaine (LIDODERM) 5 % Place 1 patch onto the skin once daily. Remove & Discard patch within 12 hours or as directed by MD 30 patch 11    methotrexate (TREXALL) 10 MG tablet Take 40 mg by mouth once a week.      metoprolol succinate (TOPROL-XL) 25 MG 24 hr tablet Take 1 tablet (25 mg total) by mouth once daily. 90 tablet 3    multivitamin with minerals tablet Take 1 tablet by mouth once daily.      RESTASIS 0.05 % ophthalmic emulsion Place 1 drop into both eyes 2 (two) times daily.  4    rosuvastatin (CRESTOR) 20 MG tablet TAKE 1 TABLET every OTHER night 90 tablet 6    telmisartan (MICARDIS) 20 MG Tab Take 1 tablet (20 mg total) by mouth once daily. 90 tablet 3    valacyclovir (VALTREX) 1000 MG tablet TAKE 1 TABLET EVERY 24 HOURS; prn  3    XIIDRA 5 % Dpet Place 1 drop into both eyes 2 (two) times daily.  3    (Magic mouthwash) 1:1:1 Benadryl 12.5mg/5ml liq, aluminum & magnesium hydroxide-simehticone (Maalox), lidocaine viscous 2% Swish and spit 10 mLs every 4 (four) hours as needed. for mouth sores (Patient not taking: Reported on 2/14/2020) 90 mL 0     ALBUTEROL, REFILL, INHL Inhale into the lungs.      azelastine (ASTELIN) 137 mcg (0.1 %) nasal spray USE 1 SPRAY IN EACH NOSTRIL TWICE A DAY (Patient not taking: Reported on 12/12/2019) 30 mL 0    cefdinir (OMNICEF) 300 MG capsule Take 1 capsule (300 mg total) by mouth 2 (two) times daily. for 7 days 14 capsule 0    digoxin (LANOXIN) 125 mcg tablet TAKE 1 TABLET (125 MCG TOTAL) BY MOUTH ONCE DAILY. 30 tablet 11    fluticasone propionate (FLONASE) 50 mcg/actuation nasal spray USE 2 SPRAYS IN EACH NOSTRIL ONCE DAILY. 16 mL 0    methocarbamol (ROBAXIN) 750 MG Tab Take 1 tablet (750 mg total) by mouth every 8 (eight) hours as needed (muscle spasms). (Patient not taking: Reported on 2/14/2020) 60 tablet 2    traMADol (ULTRAM) 50 mg tablet Take 1 tablet (50 mg total) by mouth every 6 (six) hours as needed for Pain. (Patient not taking: Reported on 2/14/2020) 60 tablet 0     Current Facility-Administered Medications   Medication Dose Route Frequency Provider Last Rate Last Dose    albuterol inhaler 2 puff  2 puff Inhalation 1 time in Clinic/HOD Sujit Chaudhry MD           Physical Exam   Constitutional: She is oriented to person, place, and time. She appears well-developed and well-nourished. No distress.   HENT:   Head: Normocephalic and atraumatic.   Nose: Mucosal edema and rhinorrhea present.   Mouth/Throat: Posterior oropharyngeal edema and posterior oropharyngeal erythema present.   Eyes: Pupils are equal, round, and reactive to light. EOM are normal.   Neck: Normal range of motion. Neck supple.   Cardiovascular: Normal rate and regular rhythm.   Pulmonary/Chest: Effort normal and breath sounds normal.   Dry cough   Musculoskeletal: Normal range of motion.   Neurological: She is alert and oriented to person, place, and time.   Skin: Skin is warm and dry. No rash noted.   Psychiatric: She has a normal mood and affect. Judgment normal.   Nursing note and vitals reviewed.        EKG shows NSR with IVCD, HR  77    Assessment:       1. Preoperative clearance    2. Upper respiratory tract infection, unspecified type    3. Mild anemia        Plan:   Preoperative clearance  -     CBC auto differential; Future; Expected date: 02/14/2020  -     EKG 12-lead    Upper respiratory tract infection, unspecified type    Mild anemia  -     CBC auto differential; Future; Expected date: 02/14/2020    Other orders  -     cefdinir (OMNICEF) 300 MG capsule; Take 1 capsule (300 mg total) by mouth 2 (two) times daily. for 7 days  Dispense: 14 capsule; Refill: 0    pt is unable to be cleared at this time, she is mildly anemic which is new for her. She also has an URI and needs treatment prior to surgery.     No follow-ups on file.    Patient Instructions   Over the counter allegra or zyrtec once daily

## 2020-02-14 NOTE — Clinical Note
She was seen for preop clearance. She was not cleared due to respiratory illness and mild anemia. She will return this week to repeat labs

## 2020-02-18 ENCOUNTER — LAB VISIT (OUTPATIENT)
Dept: LAB | Facility: HOSPITAL | Age: 65
End: 2020-02-18
Attending: NURSE PRACTITIONER
Payer: COMMERCIAL

## 2020-02-18 DIAGNOSIS — D64.9 MILD ANEMIA: ICD-10-CM

## 2020-02-18 DIAGNOSIS — Z01.818 PREOPERATIVE CLEARANCE: ICD-10-CM

## 2020-02-18 LAB
BASOPHILS # BLD AUTO: 0.07 K/UL (ref 0–0.2)
BASOPHILS NFR BLD: 1.2 % (ref 0–1.9)
DIFFERENTIAL METHOD: ABNORMAL
EOSINOPHIL # BLD AUTO: 0.3 K/UL (ref 0–0.5)
EOSINOPHIL NFR BLD: 4.3 % (ref 0–8)
ERYTHROCYTE [DISTWIDTH] IN BLOOD BY AUTOMATED COUNT: 17.2 % (ref 11.5–14.5)
HCT VFR BLD AUTO: 36.5 % (ref 37–48.5)
HEMOCCULT STL QL IA: NEGATIVE
HGB BLD-MCNC: 11.7 G/DL (ref 12–16)
IMM GRANULOCYTES # BLD AUTO: 0.02 K/UL (ref 0–0.04)
IMM GRANULOCYTES NFR BLD AUTO: 0.3 % (ref 0–0.5)
LYMPHOCYTES # BLD AUTO: 1.4 K/UL (ref 1–4.8)
LYMPHOCYTES NFR BLD: 24.6 % (ref 18–48)
MCH RBC QN AUTO: 31.9 PG (ref 27–31)
MCHC RBC AUTO-ENTMCNC: 32.1 G/DL (ref 32–36)
MCV RBC AUTO: 100 FL (ref 82–98)
MONOCYTES # BLD AUTO: 0.6 K/UL (ref 0.3–1)
MONOCYTES NFR BLD: 10.9 % (ref 4–15)
NEUTROPHILS # BLD AUTO: 3.4 K/UL (ref 1.8–7.7)
NEUTROPHILS NFR BLD: 58.7 % (ref 38–73)
NRBC BLD-RTO: 0 /100 WBC
PLATELET # BLD AUTO: 318 K/UL (ref 150–350)
PMV BLD AUTO: 10.2 FL (ref 9.2–12.9)
RBC # BLD AUTO: 3.67 M/UL (ref 4–5.4)
WBC # BLD AUTO: 5.78 K/UL (ref 3.9–12.7)

## 2020-02-18 PROCEDURE — 36415 COLL VENOUS BLD VENIPUNCTURE: CPT | Mod: PO

## 2020-02-18 PROCEDURE — 85025 COMPLETE CBC W/AUTO DIFF WBC: CPT

## 2020-02-18 NOTE — PROGRESS NOTES
Dulce,    The stool FIT test was negative.  Check this annually if you are using this method to screen the colon for cancer.  If a colonoscopy is desired at some point as the test of choice to screen for colon cancer, let me know.    Health Maintenance Items that appear to be due include:  TETANUS VACCINE due on 05/05/1974  Pneumococcal Vaccine (Highest Risk)(2 of 3 - PPSV23) due on 12/05/2019

## 2020-02-20 ENCOUNTER — PATIENT OUTREACH (OUTPATIENT)
Dept: ADMINISTRATIVE | Facility: HOSPITAL | Age: 65
End: 2020-02-20

## 2020-02-20 ENCOUNTER — PATIENT MESSAGE (OUTPATIENT)
Dept: SURGERY | Facility: HOSPITAL | Age: 65
End: 2020-02-20

## 2020-02-20 ENCOUNTER — PATIENT MESSAGE (OUTPATIENT)
Dept: FAMILY MEDICINE | Facility: CLINIC | Age: 65
End: 2020-02-20

## 2020-02-21 ENCOUNTER — PATIENT MESSAGE (OUTPATIENT)
Dept: FAMILY MEDICINE | Facility: CLINIC | Age: 65
End: 2020-02-21

## 2020-02-21 ENCOUNTER — TELEPHONE (OUTPATIENT)
Dept: NEUROSURGERY | Facility: CLINIC | Age: 65
End: 2020-02-21

## 2020-02-21 NOTE — TELEPHONE ENCOUNTER
Spoke with pt she's aware she's cleared for sx on 2/27/20 pt is also aware that of pre op instructions, how to use scrub brushes, and pre admit will call with time of procedure on next week//ns

## 2020-02-24 NOTE — PRE ADMISSION SCREENING
Pre op instructions reviewed with patient per phone:    To confirm, Your surgeon has instructed you:  Surgery is scheduled 2/27/2020 at 0730.      Please report to [facility] 1st floor main lobby by 0600.   Pre admit office to call afternoon prior to surgery with final arrival time      INSTRUCTIONS IMPORTANT!!!  ¨ Do not eat, drink, or smoke after 12 midnight prior to surgery, including water. OK to brush teeth, no gum, candy or mints!    ¨ Take only these medicines with a small swallow of water-morning of surgery.  Digoxin, celexa, synthroid, micardis    ____  Do not wear makeup, including mascara.  ____  No powder, lotions or creams to surgical area.  ____  Please remove all jewelry, including piercings and leave at home.  ____  No money or valuables needed. Please leave at home.  ____  Please bring identification and insurance information to hospital.  ____  If going home the same day, arrange for a ride home. You will not be able to   drive if Anesthesia was used.  ____  Children, under 12 years old, must remain in the waiting room with an adult.  They are not allowed in patient areas.  ____  Wear loose fitting clothing. Allow for dressings, bandages.  ____  Stop Aspirin, Ibuprofen, Motrin and Aleve at least 5-7 days before surgery, unless otherwise instructed by your doctor, or the nurse.   You MAY use Tylenol/acetaminophen until day of surgery.  ____  If you take diabetic medication, do not take am of surgery unless instructed by   Doctor.  ____ Stop taking any Fish Oil supplement or any Vitamins that contain Vitamin E at least 5 days prior to surgery.          Bathing Instructions-- The night before surgery and the morning prior to coming to the hospital:   -Do not shave the surgical area.   -Shower and wash your hair and body as usual with anti-bacterial  soap and shampoo.   -Rinse your hair and body completely.   -Use one packet of hibiclens to wash the surgical site (using your  hand) gently for 5 minutes.  Do not scrub you skin too hard.   -Do not use hibiclens on your head, face, or genitals.   -Do not wash with anti-bacterial soap after you use the hibiclens.   -Rinse your body thoroughly.   -Dry with clean, soft towel.  Do not use lotion, cream, deodorant, or powders on   the surgical site.    Use antibacterial soap in place of hibiclens if your surgery is on the head, face or genitals.         Surgical Site Infection    Prevention of surgical site infections:     -Keep incisions clean and dry.   -Do not soak/submerge incisions in water until completely healed.   -Do not apply lotions, powders, creams, or deodorants to site.   -Always make sure hands are cleaned with antibacterial soap/ alcohol-based   prior to touching the surgical site.  (This includes doctors, nurses, staff, and yourself.)    Signs and symptoms:   -Redness and pain around the area where you had surgery   -Drainage of cloudy fluid from your surgical wound   -Fever over 100.4  I have read or had read and explained to me, and understand the above information.

## 2020-02-26 ENCOUNTER — PATIENT MESSAGE (OUTPATIENT)
Dept: SURGERY | Facility: HOSPITAL | Age: 65
End: 2020-02-26

## 2020-02-27 ENCOUNTER — ANESTHESIA EVENT (OUTPATIENT)
Dept: SURGERY | Facility: HOSPITAL | Age: 65
End: 2020-02-27
Payer: COMMERCIAL

## 2020-02-27 ENCOUNTER — ANESTHESIA (OUTPATIENT)
Dept: SURGERY | Facility: HOSPITAL | Age: 65
End: 2020-02-27
Payer: COMMERCIAL

## 2020-02-27 ENCOUNTER — HOSPITAL ENCOUNTER (OUTPATIENT)
Facility: HOSPITAL | Age: 65
Discharge: HOME | End: 2020-02-27
Attending: NEUROLOGICAL SURGERY | Admitting: NEUROLOGICAL SURGERY
Payer: COMMERCIAL

## 2020-02-27 DIAGNOSIS — M54.16 LUMBAR RADICULOPATHY: Primary | ICD-10-CM

## 2020-02-27 DIAGNOSIS — M51.36 DDD (DEGENERATIVE DISC DISEASE), LUMBAR: ICD-10-CM

## 2020-02-27 PROBLEM — M51.369 DDD (DEGENERATIVE DISC DISEASE), LUMBAR: Status: ACTIVE | Noted: 2020-02-27

## 2020-02-27 PROCEDURE — 63048 PR LAMINECT/FACETECT/FORAMINOT, EA ADDTL VERTEBRAL SEGM: ICD-10-PCS | Mod: ,,, | Performed by: NEUROLOGICAL SURGERY

## 2020-02-27 PROCEDURE — 71000015 HC POSTOP RECOV 1ST HR: Performed by: NEUROLOGICAL SURGERY

## 2020-02-27 PROCEDURE — 63600175 PHARM REV CODE 636 W HCPCS: Performed by: NURSE ANESTHETIST, CERTIFIED REGISTERED

## 2020-02-27 PROCEDURE — 37000009 HC ANESTHESIA EA ADD 15 MINS: Performed by: NEUROLOGICAL SURGERY

## 2020-02-27 PROCEDURE — C9290 INJ, BUPIVACAINE LIPOSOME: HCPCS | Performed by: NEUROLOGICAL SURGERY

## 2020-02-27 PROCEDURE — 88304 TISSUE EXAM BY PATHOLOGIST: CPT | Performed by: PATHOLOGY

## 2020-02-27 PROCEDURE — 37000008 HC ANESTHESIA 1ST 15 MINUTES: Performed by: NEUROLOGICAL SURGERY

## 2020-02-27 PROCEDURE — 71000039 HC RECOVERY, EACH ADD'L HOUR: Performed by: NEUROLOGICAL SURGERY

## 2020-02-27 PROCEDURE — 27201423 OPTIME MED/SURG SUP & DEVICES STERILE SUPPLY: Performed by: NEUROLOGICAL SURGERY

## 2020-02-27 PROCEDURE — 25000003 PHARM REV CODE 250: Performed by: NEUROLOGICAL SURGERY

## 2020-02-27 PROCEDURE — 36000711: Performed by: NEUROLOGICAL SURGERY

## 2020-02-27 PROCEDURE — 88304 PR  SURG PATH,LEVEL III: ICD-10-PCS | Mod: 26,,, | Performed by: PATHOLOGY

## 2020-02-27 PROCEDURE — 63600175 PHARM REV CODE 636 W HCPCS: Performed by: ANESTHESIOLOGY

## 2020-02-27 PROCEDURE — 36000710: Performed by: NEUROLOGICAL SURGERY

## 2020-02-27 PROCEDURE — 63047 PR LAMINEC/FACETECT/FORAMIN,LUMBAR 1 SEG: ICD-10-PCS | Mod: ,,, | Performed by: NEUROLOGICAL SURGERY

## 2020-02-27 PROCEDURE — 71000033 HC RECOVERY, INTIAL HOUR: Performed by: NEUROLOGICAL SURGERY

## 2020-02-27 PROCEDURE — 63047 LAM FACETEC & FORAMOT LUMBAR: CPT | Mod: ,,, | Performed by: NEUROLOGICAL SURGERY

## 2020-02-27 PROCEDURE — 63600175 PHARM REV CODE 636 W HCPCS: Performed by: PHYSICIAN ASSISTANT

## 2020-02-27 PROCEDURE — 63600175 PHARM REV CODE 636 W HCPCS: Performed by: NEUROLOGICAL SURGERY

## 2020-02-27 PROCEDURE — 25000003 PHARM REV CODE 250: Performed by: NURSE ANESTHETIST, CERTIFIED REGISTERED

## 2020-02-27 PROCEDURE — 63048 LAM FACETEC &FORAMOT EA ADDL: CPT | Mod: ,,, | Performed by: NEUROLOGICAL SURGERY

## 2020-02-27 PROCEDURE — 88304 TISSUE EXAM BY PATHOLOGIST: CPT | Mod: 26,,, | Performed by: PATHOLOGY

## 2020-02-27 RX ORDER — DEXTROSE MONOHYDRATE AND SODIUM CHLORIDE 5; .9 G/100ML; G/100ML
INJECTION, SOLUTION INTRAVENOUS CONTINUOUS
Status: DISCONTINUED | OUTPATIENT
Start: 2020-02-27 | End: 2020-02-27 | Stop reason: HOSPADM

## 2020-02-27 RX ORDER — SUCCINYLCHOLINE CHLORIDE 20 MG/ML
INJECTION INTRAMUSCULAR; INTRAVENOUS
Status: DISCONTINUED | OUTPATIENT
Start: 2020-02-27 | End: 2020-02-27

## 2020-02-27 RX ORDER — SODIUM CHLORIDE, SODIUM LACTATE, POTASSIUM CHLORIDE, CALCIUM CHLORIDE 600; 310; 30; 20 MG/100ML; MG/100ML; MG/100ML; MG/100ML
INJECTION, SOLUTION INTRAVENOUS CONTINUOUS PRN
Status: DISCONTINUED | OUTPATIENT
Start: 2020-02-27 | End: 2020-02-27

## 2020-02-27 RX ORDER — EPHEDRINE SULFATE 50 MG/ML
INJECTION, SOLUTION INTRAVENOUS
Status: DISCONTINUED | OUTPATIENT
Start: 2020-02-27 | End: 2020-02-27

## 2020-02-27 RX ORDER — MEPERIDINE HYDROCHLORIDE 25 MG/ML
12.5 INJECTION INTRAMUSCULAR; INTRAVENOUS; SUBCUTANEOUS ONCE AS NEEDED
Status: DISCONTINUED | OUTPATIENT
Start: 2020-02-27 | End: 2020-02-27 | Stop reason: HOSPADM

## 2020-02-27 RX ORDER — METHYLPREDNISOLONE 4 MG/1
4 TABLET ORAL DAILY
Qty: 10 TABLET | Refills: 0 | Status: SHIPPED | OUTPATIENT
Start: 2020-02-27 | End: 2020-03-08

## 2020-02-27 RX ORDER — LIDOCAINE HYDROCHLORIDE 10 MG/ML
INJECTION, SOLUTION EPIDURAL; INFILTRATION; INTRACAUDAL; PERINEURAL
Status: DISCONTINUED | OUTPATIENT
Start: 2020-02-27 | End: 2020-02-27

## 2020-02-27 RX ORDER — DEXAMETHASONE SODIUM PHOSPHATE 4 MG/ML
INJECTION, SOLUTION INTRA-ARTICULAR; INTRALESIONAL; INTRAMUSCULAR; INTRAVENOUS; SOFT TISSUE
Status: DISCONTINUED | OUTPATIENT
Start: 2020-02-27 | End: 2020-02-27

## 2020-02-27 RX ORDER — OXYCODONE AND ACETAMINOPHEN 5; 325 MG/1; MG/1
1 TABLET ORAL EVERY 4 HOURS PRN
Qty: 60 TABLET | Refills: 0 | Status: SHIPPED | OUTPATIENT
Start: 2020-02-27 | End: 2020-04-07

## 2020-02-27 RX ORDER — SODIUM CHLORIDE 0.9 % (FLUSH) 0.9 %
3 SYRINGE (ML) INJECTION EVERY 8 HOURS
Status: DISCONTINUED | OUTPATIENT
Start: 2020-02-27 | End: 2020-02-27 | Stop reason: HOSPADM

## 2020-02-27 RX ORDER — ROCURONIUM BROMIDE 10 MG/ML
INJECTION, SOLUTION INTRAVENOUS
Status: DISCONTINUED | OUTPATIENT
Start: 2020-02-27 | End: 2020-02-27

## 2020-02-27 RX ORDER — DIPHENHYDRAMINE HYDROCHLORIDE 50 MG/ML
25 INJECTION INTRAMUSCULAR; INTRAVENOUS EVERY 6 HOURS PRN
Status: DISCONTINUED | OUTPATIENT
Start: 2020-02-27 | End: 2020-02-27 | Stop reason: HOSPADM

## 2020-02-27 RX ORDER — PROPOFOL 10 MG/ML
VIAL (ML) INTRAVENOUS
Status: DISCONTINUED | OUTPATIENT
Start: 2020-02-27 | End: 2020-02-27

## 2020-02-27 RX ORDER — VANCOMYCIN HYDROCHLORIDE 1 G/20ML
INJECTION, POWDER, LYOPHILIZED, FOR SOLUTION INTRAVENOUS
Status: DISCONTINUED | OUTPATIENT
Start: 2020-02-27 | End: 2020-02-27 | Stop reason: HOSPADM

## 2020-02-27 RX ORDER — HYDROMORPHONE HYDROCHLORIDE 2 MG/ML
0.2 INJECTION, SOLUTION INTRAMUSCULAR; INTRAVENOUS; SUBCUTANEOUS EVERY 5 MIN PRN
Status: DISCONTINUED | OUTPATIENT
Start: 2020-02-27 | End: 2020-02-27 | Stop reason: HOSPADM

## 2020-02-27 RX ORDER — ONDANSETRON 2 MG/ML
INJECTION INTRAMUSCULAR; INTRAVENOUS
Status: DISCONTINUED | OUTPATIENT
Start: 2020-02-27 | End: 2020-02-27

## 2020-02-27 RX ORDER — VANCOMYCIN HCL IN 5 % DEXTROSE 1G/250ML
1000 PLASTIC BAG, INJECTION (ML) INTRAVENOUS
Status: COMPLETED | OUTPATIENT
Start: 2020-02-27 | End: 2020-02-27

## 2020-02-27 RX ORDER — BUPIVACAINE HYDROCHLORIDE 2.5 MG/ML
INJECTION, SOLUTION EPIDURAL; INFILTRATION; INTRACAUDAL
Status: DISCONTINUED | OUTPATIENT
Start: 2020-02-27 | End: 2020-02-27 | Stop reason: HOSPADM

## 2020-02-27 RX ORDER — PHENYLEPHRINE HYDROCHLORIDE 10 MG/ML
INJECTION INTRAVENOUS
Status: DISCONTINUED | OUTPATIENT
Start: 2020-02-27 | End: 2020-02-27

## 2020-02-27 RX ORDER — BACITRACIN ZINC 500 UNIT/G
OINTMENT (GRAM) TOPICAL
Status: DISCONTINUED | OUTPATIENT
Start: 2020-02-27 | End: 2020-02-27 | Stop reason: HOSPADM

## 2020-02-27 RX ORDER — BACITRACIN 50000 [IU]/1
INJECTION, POWDER, FOR SOLUTION INTRAMUSCULAR
Status: DISCONTINUED | OUTPATIENT
Start: 2020-02-27 | End: 2020-02-27 | Stop reason: HOSPADM

## 2020-02-27 RX ORDER — METHOCARBAMOL 750 MG/1
750 TABLET, FILM COATED ORAL 3 TIMES DAILY PRN
Qty: 60 TABLET | Refills: 0 | Status: SHIPPED | OUTPATIENT
Start: 2020-02-27 | End: 2020-03-13

## 2020-02-27 RX ORDER — MIDAZOLAM HYDROCHLORIDE 1 MG/ML
INJECTION, SOLUTION INTRAMUSCULAR; INTRAVENOUS
Status: DISCONTINUED | OUTPATIENT
Start: 2020-02-27 | End: 2020-02-27

## 2020-02-27 RX ORDER — METOCLOPRAMIDE HYDROCHLORIDE 5 MG/ML
10 INJECTION INTRAMUSCULAR; INTRAVENOUS EVERY 10 MIN PRN
Status: DISCONTINUED | OUTPATIENT
Start: 2020-02-27 | End: 2020-02-27 | Stop reason: HOSPADM

## 2020-02-27 RX ORDER — FENTANYL CITRATE 50 UG/ML
INJECTION, SOLUTION INTRAMUSCULAR; INTRAVENOUS
Status: DISCONTINUED | OUTPATIENT
Start: 2020-02-27 | End: 2020-02-27

## 2020-02-27 RX ADMIN — DEXAMETHASONE SODIUM PHOSPHATE 12 MG: 4 INJECTION, SOLUTION INTRA-ARTICULAR; INTRALESIONAL; INTRAMUSCULAR; INTRAVENOUS; SOFT TISSUE at 09:02

## 2020-02-27 RX ADMIN — HYDROMORPHONE HYDROCHLORIDE 0.2 MG: 2 INJECTION INTRAMUSCULAR; INTRAVENOUS; SUBCUTANEOUS at 11:02

## 2020-02-27 RX ADMIN — SODIUM CHLORIDE, SODIUM LACTATE, POTASSIUM CHLORIDE, AND CALCIUM CHLORIDE: 600; 310; 30; 20 INJECTION, SOLUTION INTRAVENOUS at 10:02

## 2020-02-27 RX ADMIN — MIDAZOLAM 2 MG: 1 INJECTION INTRAMUSCULAR; INTRAVENOUS at 07:02

## 2020-02-27 RX ADMIN — LIDOCAINE HYDROCHLORIDE 50 MG: 10 INJECTION, SOLUTION EPIDURAL; INFILTRATION; INTRACAUDAL; PERINEURAL at 07:02

## 2020-02-27 RX ADMIN — PHENYLEPHRINE HYDROCHLORIDE 200 MCG: 10 INJECTION INTRAVENOUS at 07:02

## 2020-02-27 RX ADMIN — SODIUM CHLORIDE, SODIUM LACTATE, POTASSIUM CHLORIDE, AND CALCIUM CHLORIDE: 600; 310; 30; 20 INJECTION, SOLUTION INTRAVENOUS at 07:02

## 2020-02-27 RX ADMIN — EPHEDRINE SULFATE 20 MG: 50 INJECTION INTRAVENOUS at 08:02

## 2020-02-27 RX ADMIN — PROPOFOL 100 MG: 10 INJECTION, EMULSION INTRAVENOUS at 07:02

## 2020-02-27 RX ADMIN — ONDANSETRON 4 MG: 2 INJECTION, SOLUTION INTRAMUSCULAR; INTRAVENOUS at 09:02

## 2020-02-27 RX ADMIN — EPHEDRINE SULFATE 10 MG: 50 INJECTION INTRAVENOUS at 08:02

## 2020-02-27 RX ADMIN — VANCOMYCIN HYDROCHLORIDE 1000 MG: 1 INJECTION, POWDER, LYOPHILIZED, FOR SOLUTION INTRAVENOUS at 06:02

## 2020-02-27 RX ADMIN — FENTANYL CITRATE 100 MCG: 50 INJECTION, SOLUTION INTRAMUSCULAR; INTRAVENOUS at 07:02

## 2020-02-27 RX ADMIN — SUCCINYLCHOLINE CHLORIDE 100 MG: 20 INJECTION, SOLUTION INTRAMUSCULAR; INTRAVENOUS at 07:02

## 2020-02-27 RX ADMIN — ROCURONIUM BROMIDE 10 MG: 10 INJECTION, SOLUTION INTRAVENOUS at 07:02

## 2020-02-27 RX ADMIN — PHENYLEPHRINE HYDROCHLORIDE 100 MCG: 10 INJECTION INTRAVENOUS at 07:02

## 2020-02-27 RX ADMIN — HYDROMORPHONE HYDROCHLORIDE 0.2 MG: 2 INJECTION INTRAMUSCULAR; INTRAVENOUS; SUBCUTANEOUS at 12:02

## 2020-02-27 NOTE — INTERVAL H&P NOTE
The patient has been examined and the H&P has been reviewed:    I concur with the findings and no changes have occurred since H&P was written.  Plan for Left L5-S1 microdiscectomy     Anesthesia/Surgery risks, benefits and alternative options discussed and understood by patient/family.          Active Hospital Problems    Diagnosis  POA    DDD (degenerative disc disease), lumbar [M51.36]  Yes      Resolved Hospital Problems   No resolved problems to display.

## 2020-02-27 NOTE — PLAN OF CARE
Discharge instructions discussed with patient and patient's . Patient and patient's  verbalized understanding.

## 2020-02-27 NOTE — ANESTHESIA PREPROCEDURE EVALUATION
02/27/2020  Dulce Root is a 64 y.o., female.    Pre-op Assessment    I have reviewed the Patient Summary Reports.    I have reviewed the Nursing Notes.   I have reviewed the Medications.     Review of Systems  Anesthesia Hx:  No problems with previous Anesthesia  History of prior surgery of interest to airway management or planning: Previous anesthesia: General  Denies Personal Hx of Anesthesia complications.   Social:  Former Smoker    Hematology/Oncology:  Hematology Normal   Oncology Normal     EENT/Dental:EENT/Dental Normal   Cardiovascular:   Hypertension Valvular problems/Murmurs (S/P AVR) CAD  CABG/stent  ECG has been reviewed. 11/2019 Echo  · Moderately decreased left ventricular systolic function. The estimated ejection fraction is 35%  · Normal LV diastolic function.  · Low normal right ventricular systolic function.  · Mild mitral regurgitation.  · Mild tricuspid regurgitation.  · Normal central venous pressure (3 mm Hg).  · The estimated PA systolic pressure is 49 mm Hg  · Pulmonary hypertension present.      Pulmonary:  Pulmonary Normal    Renal/:  Renal/ Normal     Hepatic/GI:  Hepatic/GI Normal    Musculoskeletal:  Musculoskeletal Normal    Neurological:  Neurology Normal    Endocrine:   Hypothyroidism    Dermatological:  Skin Normal    Psych:   Psychiatric History          Physical Exam  General:  Well nourished    Airway/Jaw/Neck:  Airway Findings: Mouth Opening: Normal Tongue: Normal  Mallampati: II      Dental:  Dental Findings: In tact   Chest/Lungs:  Chest/Lungs Clear    Heart/Vascular:  Heart Findings: Normal Heart murmur: negative       Mental Status:  Mental Status Findings:  Cooperative, Alert and Oriented         Anesthesia Plan  Type of Anesthesia, risks & benefits discussed:  Anesthesia Type:  general  Patient's Preference:   Intra-op Monitoring Plan: standard ASA  monitors  Intra-op Monitoring Plan Comments:   Post Op Pain Control Plan: multimodal analgesia  Post Op Pain Control Plan Comments:   Induction:   IV  Beta Blocker:  Patient is on a Beta-Blocker and has received one dose within the past 24 hours (No further documentation required).       Informed Consent: Patient understands risks and agrees with Anesthesia plan.  Questions answered. Anesthesia consent signed with patient.  ASA Score: 3     Day of Surgery Review of History & Physical: I have interviewed and examined the patient. I have reviewed the patient's H&P dated:    H&P update referred to the surgeon.

## 2020-02-27 NOTE — TRANSFER OF CARE
"Anesthesia Transfer of Care Note    Patient: Dulce Root    Procedure(s) Performed: Procedure(s) (LRB):  LAMINECTOMY, SPINE, LUMBAR, WITH DISCECTOMY (N/A)    Patient location: PACU    Anesthesia Type: general    Transport from OR: Transported from OR on room air with adequate spontaneous ventilation    Post pain: adequate analgesia    Post assessment: no apparent anesthetic complications    Post vital signs: stable    Level of consciousness: responds to stimulation    Nausea/Vomiting: no nausea/vomiting    Complications: none    Transfer of care protocol was followed      Last vitals:   Visit Vitals  BP (!) 173/77 (Patient Position: Sitting)   Pulse 92   Temp 36.8 °C (98.2 °F) (Skin)   Resp 18   Ht 5' 3" (1.6 m)   Wt 70.6 kg (155 lb 10.3 oz)   SpO2 97%   Breastfeeding? No   BMI 27.57 kg/m²     "

## 2020-02-27 NOTE — OP NOTE
[facility] -   Brief Operative Note    SUMMARY     Surgery Date: 2/27/2020     Surgeon(s) and Role:     * Vimal Villegas MD - Primary    Assisting Surgeon: None    Pre-op Diagnosis:  Degenerative disc disease, lumbar [M51.36]  Lumbar radiculopathy [M54.16]  Lumbar stenosis with neurogenic claudication [M48.062]    Post-op Diagnosis:  Post-Op Diagnosis Codes:     * Degenerative disc disease, lumbar [M51.36]     * Lumbar radiculopathy [M54.16]     * Lumbar stenosis with neurogenic claudication [M48.062]    Procedure(s) (LRB):  LAMINECTOMY, SPINE, LUMBAR, WITH DISCECTOMY Left 5-S1   Laminectomy and foraminotomy Left  L4-5     Anesthesia: General    Description of Procedure: disc herniation with nerve root compression on the left side at L5-S1.  Central and foraminal stenosis left-sided L4-5    Description of the findings of the procedure:  Large disc herniation left-sided L5-S1.  Foraminal stenosis left L4-5    Estimated Blood Loss: * No values recorded between 2/27/2020  7:53 AM and 2/27/2020 11:06 AM *         Specimens:   Specimen (12h ago, onward)    None          Pt is a 64 y.o. female who presented with signs, symptoms back pain as well as pain radiating down the lower extremity.  Imaging was done which showed a herniated disc at present at  L5-S1 on the left.  She also had severe stenosis centrally of foraminal compression on the left side at L4-5.  Given the progression of the symptoms and failing conservative therapy, it was explained to the patient that  they could possibly benefit from a minimally invasive microdiskectomy L5-S1 with L4-5 decompression     After explaining the surgical risks the patient as well as the family members were well apprised of all the objectives, benefits, risks and potential complications of the procedure, including but not limited to:  Worsening of current status, the possible need for further procedures, the risk of infection, headaches, CSF leak, recurrent disc  herniation, possible spinal nerve injury resulting in paralysis, infection, injury to major vessels causing hemorrhage, stroke, loss of language function, coma and even death.  No assurance was given whether the symptoms would improve following the procedure.  Informed consent was obtained and secured in the chart after the patient and family voiced understanding of these risks and decided to proceed with the operation.    Patient was intubated on the preoperative stretcher by the anesthesia service without any complications.  The eyes were taped to avoid corneal abrasions.  Next the patient was rolled prone onto the Ace table.  All contact points were carefully padded.  The lumbar area was cleaned with an alcohol swab.  Preoperative antibiotics were given prior to the incision. The lumbar area was prepped and draped in the usual sterile fashion.     Using fluoroscopy along with a spinal needle the L5-S1 level was marked prior making an incision. An approximate 18 mm incision was made 1 cm off the midline at L5-S1 the.  Dissection was taken down through the fascia using the Bovie cautery.  The [employer] metrx dilators were then used to dilate down to the L5-S1 level.  A 18 mm tube was docked at the level of the disc space and secured in the usual fas the hion.  The tubes final location was confirmed with both an AP and lateral image.    The microscope was draped and then brought in.  The Bovie cautery was used to remove any remaining paraspinous muscle.  Dissection was taken down to the spinous process and lamina as well as the medial facet.  Using the [employer] high-speed drill a hemilaminotomy and medial facetectomy was performed. The ligament was  from the dura using an upgoing curette.  Kerrison this remove the remaining ligament to expose the underlying thecal sac and exiting nerve root.  Once the thecal sac and nerve root were completely decompressed using a nerve root retractor my assistant then  gently retracted the neural elements medially to expose the disc space. A bipolar was used to bipolar any epidural veins.  The disc space was identified and incised with a 11 blade.  Pituitary rongeurs were used to remove the disc material extending into the foramen.  Once the decompression was complete hemostasis was achieved with the bipolar as well as the FloSeal.  After hemostasis was achieved a [place]co was used to feel out into the foramen inferiorly as well as laterally without any identifiable compression.  The surgical cavity was irrigated using bacitracin solution.  Depo-Medrol was placed to the epidural space. Under direct visualization the tube was taken out.      Next using fluoroscopic localization the L4-5 level was identified.Met-rx tubes were then used to dilate to approximately 18 mm at the L4-5 level on the left.  Using high-speed drill the lamina as well as medial facet were removed until the ligamentum flavum was  from the lamina superiorly.  Using a ball hook the ligament was carefully peeled back.  Using Kerrison this the thecal sac centrally as well as laterally into the foramen on the left-sided L4-5 was I done.  There was noted to be groove of the ligament with compression of the nerve root going out of the foramen inferiorly.  This was decompressed and there was no evidence of disc herniation at this level.     At this point the microscope was removed.  The fascial layer was approximated using an 0 Vicryl stitch.  Two 0 Vicryl were used on the subcutaneous layer.  Staples closed the skin.  Exparel was injected around the surgical site for postoperative pain control.  Following the procedure the patient was rolled supine onto the preoperative stretcher, extubated with anesthesia, and taken to the recovery room in a stable condition.  All sponge count and needle counts were correct at the end of the case x 2  I was present throughout the entire procedure.

## 2020-02-27 NOTE — DISCHARGE SUMMARY
[facility]  Neurosurgery  Discharge Summary      Patient Name: Dulce Root  MRN: [id number]  Admission Date: 2/27/2020  Hospital Length of Stay: 0 days  Discharge Date and Time:  02/27/2020 11:28 AM  Attending Physician: Vimal Villegas MD   Discharging Provider: Vimal Villegas MD  Primary Care Provider: Patrick Hernandez MD    HPI:   No notes on file    Procedure(s) (LRB):  LAMINECTOMY, SPINE, LUMBAR, WITH DISCECTOMY (N/A)     Hospital Course: No notes on file    Consults: none    Significant Diagnostic Studies:none     Pending Diagnostic Studies:     Procedure Component Value Units Date/Time    Specimen to Pathology, Surgery Neurosurgery [[id number]] Collected:  02/27/20 1050    Order Status:  Sent Lab Status:  In process Updated:  02/27/20 1050    X-Ray Lumbar Spine Ap And Lateral [[id number]] Resulted:  02/27/20 1106    Order Status:  Sent Lab Status:  In process Updated:  02/27/20 1109        Final Active Diagnoses:    Diagnosis Date Noted POA    DDD (degenerative disc disease), lumbar [M51.36] 02/27/2020 Yes      Problems Resolved During this Admission:      Discharged Condition: good    Disposition: Home or Self Care    Follow Up:  Follow-up Information     Lusí Bettencourt PA-C In 2 weeks.    Specialty:  Neurosurgery  Why:  For staple removal  Contact information:  [address] [place] [address] [address] Isidro MÁRQUEZ [address] [phone]                 Patient Instructions:      Diet Adult Regular     Lifting restrictions   Order Comments: No lifting greater than 10 lbs     Notify your health care provider if you experience any of the following:  temperature >100.4     Notify your health care provider if you experience any of the following:  persistent nausea and vomiting or diarrhea     Notify your health care provider if you experience any of the following:  severe uncontrolled pain     Notify your health care provider if you experience any of the following:  redness, tenderness, or signs of  infection (pain, swelling, redness, odor or green/yellow discharge around incision site)     Notify your health care provider if you experience any of the following:  severe persistent headache     Notify your health care provider if you experience any of the following:  persistent dizziness, light-headedness, or visual disturbances     Notify your health care provider if you experience any of the following:  increased confusion or weakness     Change dressing (specify)   Order Comments: Dressing change: change once a day with a dry bandage     Shower on day dressing removed (No bath)     Medications:  Reconciled Home Medications:      Medication List      START taking these medications    methylPREDNISolone 4 MG Tab  Commonly known as:  MEDROL  Take 1 tablet (4 mg total) by mouth once daily. for 10 days     oxyCODONE-acetaminophen 5-325 mg per tablet  Commonly known as:  PERCOCET  Take 1 tablet by mouth every 4 (four) hours as needed for Pain.        CHANGE how you take these medications    * methocarbamol 750 MG Tab  Commonly known as:  ROBAXIN  Take 1 tablet (750 mg total) by mouth every 8 (eight) hours as needed (muscle spasms).  What changed:  Another medication with the same name was added. Make sure you understand how and when to take each.     * methocarbamol 750 MG Tab  Commonly known as:  ROBAXIN  Take 1 tablet (750 mg total) by mouth 3 (three) times daily as needed (muscle spasms).  What changed:  You were already taking a medication with the same name, and this prescription was added. Make sure you understand how and when to take each.         * This list has 2 medication(s) that are the same as other medications prescribed for you. Read the directions carefully, and ask your doctor or other care provider to review them with you.            CONTINUE taking these medications    acetaminophen 325 MG tablet  Commonly known as:  TYLENOL  Take 1 tablet (325 mg total) by mouth every 6 (six) hours as needed for  Pain.     ALBUTEROL (REFILL) INHL  Inhale into the lungs.     azelastine 137 mcg (0.1 %) nasal spray  Commonly known as:  ASTELIN  USE 1 SPRAY IN EACH NOSTRIL TWICE A DAY     calcium carbonate 600 mg calcium (1,500 mg) Tab  Commonly known as:  OS-CALVIN  Take 600 mg by mouth 2 (two) times daily with meals.     citalopram 10 MG tablet  Commonly known as:  CELEXA  TAKE 1 TABLET BY MOUTH EVERY DAY     Combigan 0.2-0.5 % Drop  Generic drug:  brimonidine-timoloL  Place 1 drop into both eyes 2 (two) times daily.     digoxin 125 mcg tablet  Commonly known as:  LANOXIN  TAKE 1 TABLET (125 MCG TOTAL) BY MOUTH ONCE DAILY.     fluticasone propionate 50 mcg/actuation nasal spray  Commonly known as:  FLONASE  USE 2 SPRAYS IN EACH NOSTRIL ONCE DAILY.     folic acid 1 MG tablet  Commonly known as:  FOLVITE     gabapentin 300 MG capsule  Commonly known as:  NEURONTIN  Take 1 capsule at bedtime for one week, then take 1 capsule at breakfast and bedtime for 1 week, then take 1 capsule three times daily.     levothyroxine 75 MCG tablet  Commonly known as:  SYNTHROID  TAKE 1 TABLET EVERY DAY     lidocaine 5 %  Commonly known as:  LIDODERM  Place 1 patch onto the skin once daily. Remove & Discard patch within 12 hours or as directed by MD     methotrexate 10 MG tablet  Commonly known as:  TREXALL  Take 40 mg by mouth once a week.     metoprolol succinate 25 MG 24 hr tablet  Commonly known as:  TOPROL-XL  Take 1 tablet (25 mg total) by mouth once daily.     multivitamin with minerals tablet  Take 1 tablet by mouth once daily.     Restasis 0.05 % ophthalmic emulsion  Generic drug:  cycloSPORINE  Place 1 drop into both eyes 2 (two) times daily.     rosuvastatin 20 MG tablet  Commonly known as:  CRESTOR  TAKE 1 TABLET every OTHER night     telmisartan 20 MG Tab  Commonly known as:  MICARDIS  Take 1 tablet (20 mg total) by mouth once daily.     valACYclovir 1000 MG tablet  Commonly known as:  VALTREX  TAKE 1 TABLET EVERY 24 HOURS; prn      Xiidra 5 % Dpet  Generic drug:  lifitegrast  Place 1 drop into both eyes 2 (two) times daily.        STOP taking these medications    (MAGIC MOUTHWASH) 1:1:1 BENADRYL 12.5MG/5ML LIQ, ALUMINUM & MAGNESIUM     aspirin 81 MG EC tablet  Commonly known as:  ECOTRIN     clopidogreL 75 mg tablet  Commonly known as:  PLAVIX     traMADol 50 mg tablet  Commonly known as:  ULTRAM            Vimal Villegas MD  Neurosurgery  [facility]

## 2020-02-27 NOTE — ANESTHESIA RELEASE NOTE
"Anesthesia Release from PACU Note    Patient: Dulce Root    Procedure(s) Performed: Procedure(s) (LRB):  LAMINECTOMY, SPINE, LUMBAR, WITH DISCECTOMY (N/A)    Anesthesia type: general    Post pain: Adequate analgesia    Post assessment: no apparent anesthetic complications, tolerated procedure well and no evidence of recall    Last Vitals:   Visit Vitals  BP (!) 173/77 (Patient Position: Sitting)   Pulse 92   Temp 36.8 °C (98.2 °F) (Skin)   Resp 18   Ht 5' 3" (1.6 m)   Wt 70.6 kg (155 lb 10.3 oz)   SpO2 97%   Breastfeeding? No   BMI 27.57 kg/m²       Post vital signs: stable    Level of consciousness: responds to stimulation    Nausea/Vomiting: no nausea/no vomiting    Complications: none    Airway Patency: patent    Respiratory: unassisted    Cardiovascular: stable and blood pressure at baseline    Hydration: euvolemic       "

## 2020-02-28 ENCOUNTER — PATIENT MESSAGE (OUTPATIENT)
Dept: NEUROSURGERY | Facility: CLINIC | Age: 65
End: 2020-02-28

## 2020-02-28 VITALS
SYSTOLIC BLOOD PRESSURE: 118 MMHG | HEART RATE: 75 BPM | OXYGEN SATURATION: 99 % | TEMPERATURE: 98 F | BODY MASS INDEX: 27.57 KG/M2 | HEIGHT: 63 IN | RESPIRATION RATE: 16 BRPM | WEIGHT: 155.63 LBS | DIASTOLIC BLOOD PRESSURE: 71 MMHG

## 2020-02-28 NOTE — ANESTHESIA POSTPROCEDURE EVALUATION
Anesthesia Post Evaluation    Patient: Dulce Root    Procedure(s) Performed: Procedure(s) (LRB):  LAMINECTOMY, SPINE, LUMBAR, WITH DISCECTOMY (N/A)    Final Anesthesia Type: general    Patient location during evaluation: PACU  Patient participation: Yes- Able to Participate  Level of consciousness: awake and alert, oriented and awake  Post-procedure vital signs: reviewed and stable  Pain management: adequate  Airway patency: patent    PONV status at discharge: No PONV  Anesthetic complications: no      Cardiovascular status: blood pressure returned to baseline  Respiratory status: unassisted and spontaneous ventilation  Hydration status: euvolemic  Follow-up not needed.          Vitals Value Taken Time   /71 2/27/2020  1:30 PM   Temp 36.7 °C (98 °F) 2/27/2020 12:50 PM   Pulse 75 2/27/2020  1:30 PM   Resp 16 2/27/2020  1:30 PM   SpO2 99 % 2/27/2020  1:30 PM         Event Time     Out of Recovery 12:53:30          Pain/Pawan Score: Pain Rating Prior to Med Admin: 8 (2/27/2020 12:00 PM)  Pawan Score: 10 (2/27/2020  1:30 PM)

## 2020-03-01 ENCOUNTER — NURSE TRIAGE (OUTPATIENT)
Dept: ADMINISTRATIVE | Facility: CLINIC | Age: 65
End: 2020-03-01

## 2020-03-01 NOTE — TELEPHONE ENCOUNTER
Reason for Disposition   New or worsening leg (calf, thigh) pain   Back pain radiating (shooting) into leg(s)   [1] SEVERE back pain (e.g., excruciating) AND [2] sudden onset AND [3] age > 60    Additional Information   Negative: Sounds like a life-threatening emergency to the triager   Negative: Chest pain   Negative: Difficulty breathing   Negative: Surgical incision symptoms and questions   Negative: [1] Discomfort (pain, burning or stinging) when passing urine AND [2] male   Negative: [1] Discomfort (pain, burning or stinging) when passing urine AND [2] female   Negative: Constipation   Negative: Looks like a broken bone or dislocated joint (e.g., crooked or deformed)   Negative: Sounds like a life-threatening emergency to the triager   Negative: Followed a leg injury   Negative: Leg swelling is main symptom   Negative: Passed out (i.e., lost consciousness, collapsed and was not responding)   Negative: Shock suspected (e.g., cold/pale/clammy skin, too weak to stand, low BP, rapid pulse)   Negative: Sounds like a life-threatening emergency to the triager   Negative: Major injury to the back (e.g., MVA, fall > 10 feet or 3 meters, penetrating injury, etc.)   Negative: Followed a tailbone injury   Negative: [1] Pain in the upper back over the ribs (rib cage) AND [2] radiates (travels, goes) into chest   Negative: [1] Pain in the upper back over the ribs (rib cage) AND [2] worsened by coughing (or clearly increases with breathing)   Negative: Back pain during pregnancy   Negative: Pain mainly in flank (i.e., in the side, over the lower ribs or just below the ribs)    Protocols used: POST-OP SYMPTOMS AND [id number], LEG PAIN-A-AH, BACK PAIN-A-AH

## 2020-03-01 NOTE — TELEPHONE ENCOUNTER
Reason for Disposition   Caller has already spoken with another triager or PCP AND has further questions AND triager able to answer questions.    Protocols used: NO CONTACT OR DUPLICATE CONTACT CALL-A-AH    Patient called triage line back to speak with the on call for Dr. Villegas. The  stated that he does not have on call for his service tonight. Informed the patient of what the  said. Unsure what she will do.

## 2020-03-02 LAB
FINAL PATHOLOGIC DIAGNOSIS: NORMAL
GROSS: NORMAL

## 2020-03-02 NOTE — TELEPHONE ENCOUNTER
2:48 PM  I called patient to check on her due to triage calls over the weekend.  Patient states that her LLE pain has been bad over the weekend, at times it felt worse than before surgery, but now it is improving.   She denies HA, dizziness, incision issues, leg weakness, numbness/tingling, bladder/bowel issues.   She is currently taking Percocet 5, Robaxin, Gabapentin, and methylprednisolone.   She does feel better today. I informed patient that if anything worsens to please update us. We will continue to monitor.   She is scheduled for postop eval #1 on 3/13/20.       SALLY Bettencourt PA-C

## 2020-03-04 ENCOUNTER — TELEPHONE (OUTPATIENT)
Dept: NEUROSURGERY | Facility: CLINIC | Age: 65
End: 2020-03-04

## 2020-03-04 NOTE — TELEPHONE ENCOUNTER
Phoned pt in regards to getting her scheduled to be seen earlier on 3/13/2020     Pt did not answer. I did leave a brief message and a call back number     Will proceed to reach patient via portal

## 2020-03-05 ENCOUNTER — TELEPHONE (OUTPATIENT)
Dept: NEUROSURGERY | Facility: CLINIC | Age: 65
End: 2020-03-05

## 2020-03-05 NOTE — TELEPHONE ENCOUNTER
Spoke with pt in reference to appt on 3/13, pt was wanting a latter appt informed pt, she has the last appt for the day, she then said she's fine with appt scheduled.//ns    ----- Message from Maria Dolores Nicole sent at 3/5/2020 12:20 PM CST -----  Contact: pt  Type:  Patient Returning Call    Who Called: the pt   Who Left Message for Patient: unknown  Does the patient know what this is regarding?: no  Would the patient rather a call back or a response via [facility]? Call back  Best Call Back Number: [phone] or [phone]  Additional Information: n/a

## 2020-03-12 NOTE — PROGRESS NOTES
Subjective:      Patient ID: Dulce Root is a 64 y.o. female.    Chief Complaint: Lumbar Spine Pain (L-Spine)    HPI  The patient is here today for postop evaluation #1. Patient states she started having more pain yesterday and today. She localizes her pain to Left lower back, buttock and posterior leg (to calf). She only has numbness in the last 3 toes of her L foot. Does not report weakness.     Surgery Date: 2/27/2020   Pre-op Diagnosis:  Degenerative disc disease, lumbar [M51.36]  Lumbar radiculopathy [M54.16]  Lumbar stenosis with neurogenic claudication [M48.062]   Post-op Diagnosis:  Post-Op Diagnosis Codes:     * Degenerative disc disease, lumbar [M51.36]     * Lumbar radiculopathy [M54.16]     * Lumbar stenosis with neurogenic claudication [M48.062]   Procedure(s) (LRB):  LAMINECTOMY, SPINE, LUMBAR, WITH DISCECTOMY Left 5-S1   Laminectomy and foraminotomy Left  L4-5    Anesthesia: General   Description of Procedure: disc herniation with nerve root compression on the left side at L5-S1.  Central and foraminal stenosis left-sided L4-5   Description of the findings of the procedure:  Large disc herniation left-sided L5-S1.  Foraminal stenosis left L4-5        Review of Systems   Constitution: Negative for fever.   HENT: Negative for congestion.    Respiratory: Negative for cough and wheezing.    Skin: Negative for poor wound healing.   Musculoskeletal: Positive for back pain and myalgias. Negative for falls, joint pain, muscle weakness and neck pain.   Gastrointestinal: Negative for abdominal pain, bowel incontinence, diarrhea, nausea and vomiting.   Genitourinary: Negative for bladder incontinence.   Neurological: Negative for numbness and seizures.   Psychiatric/Behavioral: Negative for altered mental status.         Objective:            General    Constitutional: She is oriented to person, place, and time. She appears well-developed and well-nourished.   Neck: Normal range of motion.   Cardiovascular:  Normal rate and regular rhythm.    Pulmonary/Chest: Effort normal.   Abdominal: Soft.   Neurological: She is alert and oriented to person, place, and time.   Psychiatric: She has a normal mood and affect. Her behavior is normal.             Neurosurgery Exam:   Vitals reviewed.  Incision CDI  No erythema, swelling or fluctuance  Moves all extremities well  Sensation intact to light touch  No focal weakness                Assessment:       Encounter Diagnoses   Name Primary?    Encounter for postoperative wound check     Encounter for staple removal     Status post lumbar discectomy Yes          Plan:       Dulce was seen today for lumbar spine pain (l-spine).    Diagnoses and all orders for this visit:    Status post lumbar discectomy  -     Ambulatory referral/consult to Physical/Occupational Therapy; Future    Encounter for postoperative wound check  -     Ambulatory referral/consult to Physical/Occupational Therapy; Future    Encounter for staple removal  -     Ambulatory referral/consult to Physical/Occupational Therapy; Future    Other orders  -     ketorolac (TORADOL) 10 mg tablet; Take 1 tablet (10 mg total) by mouth every 8 (eight) hours as needed for Pain.      Staples were removed w/out incident today.  Rx given for Toradol to help with postop discomfort. Patient does not prefer to take controlled substances.  Patient will be referred to outpatient therapy for core strengthening, ROM exercises.  She will follow-up in 4 weeks to see MD.  If anything changes or worsens she will inform department.            Luís Bettencourt PA-C

## 2020-03-13 ENCOUNTER — OFFICE VISIT (OUTPATIENT)
Dept: NEUROSURGERY | Facility: CLINIC | Age: 65
End: 2020-03-13
Payer: COMMERCIAL

## 2020-03-13 ENCOUNTER — TELEPHONE (OUTPATIENT)
Dept: NEUROSURGERY | Facility: CLINIC | Age: 65
End: 2020-03-13

## 2020-03-13 VITALS
DIASTOLIC BLOOD PRESSURE: 56 MMHG | SYSTOLIC BLOOD PRESSURE: 91 MMHG | HEIGHT: 63 IN | HEART RATE: 81 BPM | WEIGHT: 151.25 LBS | BODY MASS INDEX: 26.8 KG/M2 | RESPIRATION RATE: 18 BRPM

## 2020-03-13 DIAGNOSIS — Z48.02 ENCOUNTER FOR STAPLE REMOVAL: ICD-10-CM

## 2020-03-13 DIAGNOSIS — Z98.890 STATUS POST LUMBAR DISCECTOMY: Primary | ICD-10-CM

## 2020-03-13 DIAGNOSIS — Z48.89 ENCOUNTER FOR POSTOPERATIVE WOUND CHECK: ICD-10-CM

## 2020-03-13 PROCEDURE — 99024 POSTOP FOLLOW-UP VISIT: CPT | Mod: S$GLB,,, | Performed by: PHYSICIAN ASSISTANT

## 2020-03-13 PROCEDURE — 99024 PR POST-OP FOLLOW-UP VISIT: ICD-10-PCS | Mod: S$GLB,,, | Performed by: PHYSICIAN ASSISTANT

## 2020-03-13 PROCEDURE — 99999 PR PBB SHADOW E&M-EST. PATIENT-LVL V: ICD-10-PCS | Mod: PBBFAC,,, | Performed by: PHYSICIAN ASSISTANT

## 2020-03-13 PROCEDURE — 99999 PR PBB SHADOW E&M-EST. PATIENT-LVL V: CPT | Mod: PBBFAC,,, | Performed by: PHYSICIAN ASSISTANT

## 2020-03-13 RX ORDER — KETOROLAC TROMETHAMINE 10 MG/1
10 TABLET, FILM COATED ORAL EVERY 8 HOURS PRN
Qty: 20 TABLET | Refills: 0 | Status: SHIPPED | OUTPATIENT
Start: 2020-03-13 | End: 2020-04-07

## 2020-03-13 RX ORDER — METHOTREXATE 2.5 MG/1
TABLET ORAL
COMMUNITY
Start: 2020-03-06 | End: 2020-08-25

## 2020-03-13 NOTE — TELEPHONE ENCOUNTER
----- Message from Sade Brar sent at 3/13/2020 12:03 PM CDT -----  Type:  Pharmacy Calling to Clarify an RX    Name of Caller: gertrude  Pharmacy Name:     [id number] - [place] - [address] [facility] [address] [place]  [address] [place] [id number]  Phone: [phone] Fax: [phone]    Prescription Name: ketorolac 10 mg   What do they need to clarify?: drug interaction with the med methotrexate 2.5 MG   Best Call Back Number:  Additional Information:

## 2020-03-24 RX ORDER — GABAPENTIN 300 MG/1
CAPSULE ORAL
Qty: 270 CAPSULE | Refills: 1 | Status: SHIPPED | OUTPATIENT
Start: 2020-03-24 | End: 2021-01-26

## 2020-04-02 ENCOUNTER — PATIENT MESSAGE (OUTPATIENT)
Dept: NEUROSURGERY | Facility: CLINIC | Age: 65
End: 2020-04-02

## 2020-04-02 ENCOUNTER — PATIENT MESSAGE (OUTPATIENT)
Dept: PAIN MEDICINE | Facility: CLINIC | Age: 65
End: 2020-04-02

## 2020-04-02 ENCOUNTER — PATIENT MESSAGE (OUTPATIENT)
Dept: FAMILY MEDICINE | Facility: CLINIC | Age: 65
End: 2020-04-02

## 2020-04-06 NOTE — PROGRESS NOTES
Digital Medicine: Clinician Follow-Up    Called patient in response to low blood pressure alert. Patient reports feel weak and tired which prompted her to check her blood pressure. Patient states that she is still having difficulty staying hydrated. Also, has been taking methocarbomol during the day.     The history is provided by the patient. No  was used.     Follow Up  Follow-up reason(s): reading review and routine education              Assessment:  Patient's current 30-day average is at goal of <130/80 mmHg.       Plan:  Advised to hold blood pressure medication until BP >110/60 mmHg.  Recommend  from telmisartan by 2 to 4 hours.   Patients health , Oxana Mccormack, will follow-up as scheduled.    I will continue to monitor regularly and will follow-up in 3-4 weeks, sooner if blood pressure begins to trend upward or downward.        Patient has my contact information and knows to call with any concerns or clinical changes.            There are no preventive care reminders to display for this patient.    Last 5 Patient Entered Readings                                      Current 30 Day Average: 118/68     Recent Readings 4/6/2020 4/6/2020 4/1/2020 4/1/2020 4/1/2020    SBP (mmHg) 99 77 96 97 89    DBP (mmHg) 51 41 70 56 45    Pulse 76 78 78 79 81             Hypertension Medications             metoprolol succinate (TOPROL-XL) 25 MG 24 hr tablet Take 1 tablet (25 mg total) by mouth once daily.    telmisartan (MICARDIS) 20 MG Tab Take 1 tablet (20 mg total) by mouth once daily.

## 2020-04-07 ENCOUNTER — OFFICE VISIT (OUTPATIENT)
Dept: FAMILY MEDICINE | Facility: CLINIC | Age: 65
End: 2020-04-07
Payer: COMMERCIAL

## 2020-04-07 DIAGNOSIS — M51.36 DDD (DEGENERATIVE DISC DISEASE), LUMBAR: ICD-10-CM

## 2020-04-07 DIAGNOSIS — E03.9 ACQUIRED HYPOTHYROIDISM: ICD-10-CM

## 2020-04-07 DIAGNOSIS — F41.9 ANXIETY: ICD-10-CM

## 2020-04-07 DIAGNOSIS — I65.23 CAROTID STENOSIS, BILATERAL: ICD-10-CM

## 2020-04-07 DIAGNOSIS — I25.810 CORONARY ARTERY DISEASE INVOLVING CORONARY BYPASS GRAFT OF NATIVE HEART WITHOUT ANGINA PECTORIS: ICD-10-CM

## 2020-04-07 DIAGNOSIS — Z90.13 ABSENCE OF BOTH BREASTS: ICD-10-CM

## 2020-04-07 DIAGNOSIS — Z00.00 ANNUAL PHYSICAL EXAM: Primary | ICD-10-CM

## 2020-04-07 DIAGNOSIS — D05.10 DUCTAL CARCINOMA IN SITU (DCIS) OF BREAST, UNSPECIFIED LATERALITY: ICD-10-CM

## 2020-04-07 DIAGNOSIS — E78.5 DYSLIPIDEMIA: ICD-10-CM

## 2020-04-07 DIAGNOSIS — E78.5 HYPERLIPIDEMIA, UNSPECIFIED HYPERLIPIDEMIA TYPE: ICD-10-CM

## 2020-04-07 DIAGNOSIS — I10 ESSENTIAL HYPERTENSION: ICD-10-CM

## 2020-04-07 PROCEDURE — 99396 PREV VISIT EST AGE 40-64: CPT | Mod: 95,,, | Performed by: FAMILY MEDICINE

## 2020-04-07 PROCEDURE — 99396 PR PREVENTIVE VISIT,EST,40-64: ICD-10-PCS | Mod: 95,,, | Performed by: FAMILY MEDICINE

## 2020-04-07 RX ORDER — LEVOTHYROXINE SODIUM 75 UG/1
75 TABLET ORAL DAILY
Qty: 90 TABLET | Refills: 3 | Status: SHIPPED | OUTPATIENT
Start: 2020-04-07 | End: 2021-04-05

## 2020-04-07 RX ORDER — CITALOPRAM 10 MG/1
10 TABLET ORAL DAILY
Qty: 90 TABLET | Refills: 3 | Status: SHIPPED | OUTPATIENT
Start: 2020-04-07 | End: 2021-02-17

## 2020-04-07 RX ORDER — METOPROLOL SUCCINATE 25 MG/1
25 TABLET, EXTENDED RELEASE ORAL DAILY
Qty: 90 TABLET | Refills: 3
Start: 2020-04-07 | End: 2020-04-26

## 2020-04-07 RX ORDER — NAPROXEN SODIUM 220 MG/1
81 TABLET, FILM COATED ORAL DAILY
Status: ON HOLD | COMMUNITY
End: 2021-05-14

## 2020-04-07 RX ORDER — TELMISARTAN 20 MG/1
20 TABLET ORAL DAILY
Qty: 90 TABLET | Refills: 3 | Status: SHIPPED | OUTPATIENT
Start: 2020-04-07 | End: 2021-03-28

## 2020-04-07 RX ORDER — ROSUVASTATIN CALCIUM 20 MG/1
TABLET, COATED ORAL
Qty: 90 TABLET | Refills: 3 | Status: SHIPPED | OUTPATIENT
Start: 2020-04-07 | End: 2021-04-21

## 2020-04-07 NOTE — PROGRESS NOTES
Subjective:      Patient ID: Dulce Root is a 64 y.o. female.    Chief Complaint: annual   Primary Care Telemedicine Note    The patient location is:  Patient Home   The chief complaint leading to consultation is:  Annual physical.  Total time spent with patient: 25 min.    Visit type: Virtual visit with synchronous audio only and video  Each patient to whom he or she provides medical services by telemedicine is:  (1) informed of the relationship between the physician and patient and the respective role of any other health care provider with respect to management of the patient; and (2) notified that he or she may decline to receive medical services by telemedicine and may withdraw from such care at any time.    Problem List Items Addressed This Visit     Absence of both breasts    Anxiety    Overview     The patient has anxiety which been treated over time with celexa.  Treatment has been given since many years ago.  This has controled the symptoms.  Things that makes it worse include stress and celexa makes it better.               Carotid stenosis, bilateral    Overview     She has carotid artery stenosis.  She has had a CTA.    CTA Neck   Order: [id number]   Status:  Final result   Visible to patient:  Yes (Patient Portal) Next appt:  05/27/2020 at 10:00 AM in Hematology and Oncology (Rios Sepulveda MD) Dx:  Bilateral carotid artery stenosis   Details     Reading Physician Reading Date Result Priority   Ralf Murphy MD 12/23/2019 Routine      Narrative     EXAMINATION:  CTA NECK    CLINICAL HISTORY:  Carotid artery stenosis.    TECHNIQUE:  Axial CTA images of the neck were obtained with intravenous contrast.  Coronal and sagittal reformations were obtained.  3D reconstructions were obtained.  Automated exposure control utilized to reduce radiation dose.  Total exam DLP is 507 mGy cm.    COMPARISON:  11/03/2017    FINDINGS:  There is calcified plaque within the thoracic aorta and branch vessels without  flow-limiting stenosis at the origins of the great vessels.  The left vertebral artery is slightly dominant over the right vertebral artery.  No flow-limiting stenosis is identified.  The basilar artery and visualized posterior cerebral arteries appear patent without flow-limiting stenosis.  There appears to be fetal origin of the right PCA.  There is calcified plaque within the right common carotid artery and at the carotid bulb and proximal right ICA.  There is noncalcified plaque in the proximal right ICA.  There does appear to be approximately 70% stenosis of the proximal right ICA.  There is calcified plaque within the left common carotid artery, carotid bulb, proximal internal carotid artery.  There appears to be approximately 60-70% stenosis of the proximal left ICA.      Impression       1. There does appear to be approximately 70% stenosis of the proximal right internal carotid artery secondary to noncalcified plaque.  2. There is approximately 60-70% stenosis of the proximal left internal carotid artery secondary to calcified plaque.  3. Vertebral arteries demonstrate no flow-limiting stenosis.      Electronically signed by: Ralf Murphy MD  Date: 12/23/2019  Time: 16:13           She is tracked by .  She is following with him. She is on an ASA a day.         Coronary artery disease involving coronary bypass graft of native heart without angina pectoris - Primary    Overview     2/19   1.  Left main is a small vessel with a 60%-65% ostial lesion.  2.  LAD is a medium-sized vessel diffuse 70% proximally  Ramus intermedius is a medium size vessel with a 40%-50% ostial lesion.  3.  Circumflex 60%-70% ostial  remainder of circumflex and obtuse marginal normal in appearance. Right coronary artery is normal size vessel, short discrete 70%mid  4.  Vein graft to right coronary is occluded.  5.  Vein graft to LAD is patent.  Large vein graft to the ostial lesion of approximately 60%.   Fractional flow  reserve of the vein graft to LAD was 0.83.   Fractional flow reserve of the circumflex was 0.77 at 1 minute.   Fractional flow reserve of the ramus was negative.            DCIS (ductal carcinoma in situ) of breast    Overview     She has been following with Dr. Sepulveda for this. She is a little overdue to see him for this.         DDD (degenerative disc disease), lumbar    Overview     She has DDD of the lumbar region and she is on gabapentin along with tyelnol along with robaxin for this and she is stable at this time.  She is seeing Dr. Villegas for this.         Dyslipidemia    Overview     The patient presents with hyperlipidemia.  The patient reports tolerating the medication well and is in excellent compliance.  There have been no medication side effects.  The patient denies chest pain, neuropathy, and myalgias.  The patient has reduced fat intake and has been exercising.  Current treatment has included the medications listed in the med card.    Lab Results   Component Value Date    CHOL 120 11/14/2019    CHOL 166 03/01/2019    CHOL 146 04/12/2018       Lab Results   Component Value Date    HDL 37 (L) 11/14/2019    HDL 51 03/01/2019    HDL 58 04/12/2018       Lab Results   Component Value Date    LDLCALC 68.2 11/14/2019    LDLCALC 98.8 03/01/2019    LDLCALC 70.2 04/12/2018       Lab Results   Component Value Date    TRIG 74 11/14/2019    TRIG 81 03/01/2019    TRIG 89 04/12/2018       Lab Results   Component Value Date    CHOLHDL 30.8 11/14/2019    CHOLHDL 30.7 03/01/2019    CHOLHDL 39.7 04/12/2018     Lab Results   Component Value Date    ALT 21 02/11/2020    AST 19 02/11/2020    ALKPHOS 74 02/11/2020    BILITOT 0.4 02/11/2020              Essential hypertension    Overview     The patient presents with essential hypertension.  The patient is tolerating the medication well and is in excellent compliance.  The patient is experiencing no side effects.  Counseling was offered regarding low salt diets.  The patient  has a reduced salt intake.  The patient denies chest pain, palpitations, shortness of breath, dyspnea on exertion, left or murmur neck pain, nausea, vomiting, diaphoresis, paroxysmal nocturnal dyspnea, and orthopnea.  She has had some lower bp's.  She has been asked on the digital medicine program to hold the telmisartan if she has a lower blood pressure.  Hypertension Medications             metoprolol succinate (TOPROL-XL) 25 MG 24 hr tablet Take 1 tablet (25 mg total) by mouth once daily.    telmisartan (MICARDIS) 20 MG Tab Take 1 tablet (20 mg total) by mouth once daily.                 Hyperlipidemia    Overview     The patient presents with hyperlipidemia.  The patient reports tolerating the medication well and is in excellent compliance.  There have been no medication side effects.  The patient denies chest pain, neuropathy, and myalgias.  The patient has reduced fat intake and has been exercising.  Current treatment has included the medications listed in the med card.    Lab Results   Component Value Date    CHOL 146 04/12/2018    CHOL 168 01/20/2015    CHOL 166 05/13/2013       Lab Results   Component Value Date    HDL 58 04/12/2018    HDL 42 01/20/2015    HDL 50 05/13/2013       Lab Results   Component Value Date    LDLCALC 70.2 04/12/2018    LDLCALC 95.0 01/20/2015    LDLCALC 89.0 05/13/2013       Lab Results   Component Value Date    TRIG 89 04/12/2018    TRIG 155 (H) 01/20/2015    TRIG 136 05/13/2013       Lab Results   Component Value Date    CHOLHDL 39.7 04/12/2018    CHOLHDL 25.0 01/20/2015    CHOLHDL 30.1 05/13/2013     Lab Results   Component Value Date    ALT 36 02/11/2019    AST 64 (H) 02/11/2019    ALKPHOS 77 02/11/2019    BILITOT 0.6 02/11/2019              Hypothyroid    Overview     The patient presents with hypothyroidism.  The patient denies agitation, anxiety, blurred vision, chest pain, cold intolerance, constipation, dizziness, dry skin, fatigue, lightheadedness, paresthesias, skin  coarsening, tachycardia, tremor, weight gain or weight loss.  The patient's current treatment has included Synthroid with a good response.    Lab Results   Component Value Date    TSH 1.544 11/14/2019                    Past Medical History:  Past Medical History:   Diagnosis Date    Allergy     Anticoagulant long-term use     Plavix    Breast cancer 2008    Carotid stenosis, bilateral 10/13/2017    Coronary artery disease     Coronary artery disease involving coronary bypass graft of native heart without angina pectoris 8/16/2019 2/19  1.  Left main is a small vessel with a 60%-65% ostial lesion. 2.  LAD is a medium-sized vessel diffuse 70% proximally  Ramus intermedius is a medium size vessel with a 40%-50% ostial lesion. 3.  Circumflex 60%-70% ostial  remainder of circumflex and obtuse marginal normal in appearance. Right coronary artery is normal size vessel, short discrete 70%mid 4.  Vein graft to right coronary is occ    Coronary artery disease involving native coronary artery of native heart without angina pectoris 6/27/2017    DCIS (ductal carcinoma in situ) of breast 11/6/2012    Essential hypertension 11/6/2012    Hodgkin lymphoma     Hx of Hodgkin's disease - s/p chemo and radiation 11/6/2012    Hyperlipidemia     Hyperlipidemia 11/6/2012    The patient presents with hyperlipidemia.  The patient reports tolerating the medication well and is in excellent compliance.  There have been no medication side effects.  The patient denies chest pain, neuropathy, and myalgias.  The patient has reduced fat intake and has been exercising.  Current treatment has included the medications listed in the med card.   Lab Results Component Value Date  CH    Hypertension     LBBB (left bundle branch block) 5/22/2018    Osteoporosis     Paroxysmal atrial fibrillation - single post-op episode 8/24/2017    Pemphigoid     S/P AVR (aortic valve replacement) - pericardial valve 08/21/2017    Perceval aortic  tissue valve PVS23. 23mm    serial # [id number]    S/P CABG x 2 8/21/2017 8/17 CABG X 2 with SVG-LAD and SVG-distal RCA  SVG LAD due to absent LIMA after chest radiation and lymph node biopsy     Stented coronary artery     She had 2 stents placed in 2/2019.  She has had CAD and bypasses in the past in 2017.      Thyroid disease      Past Surgical History:   Procedure Laterality Date    ARTERIOGRAPHY OF AORTIC ROOT N/A 2/15/2019    Procedure: ARTERIOGRAM, AORTIC ROOT;  Surgeon: Sujit Chaudhry MD;  Location: [facility] CATH;  Service: Cardiology;  Laterality: N/A;    BREAST BIOPSY      BREAST RECONSTRUCTION      bilateral mastectomy    CARDIAC CATHETERIZATION      stents x 2    CARDIAC SURGERY  08/2017    Aortic valve replacement , CABG 2 vessel    CORONARY ANGIOGRAPHY N/A 2/15/2019    Procedure: ANGIOGRAM, CORONARY ARTERY;  Surgeon: Sujit Chaudhry MD;  Location: [facility] CATH;  Service: Cardiology;  Laterality: N/A;    CORONARY BYPASS GRAFT ANGIOGRAPHY  2/15/2019    Procedure: Bypass graft study;  Surgeon: Sujit Chaudhry MD;  Location: [facility] CATH;  Service: Cardiology;;    COSMETIC SURGERY      bereast reconstruction    EYE SURGERY      eye lids    LEFT HEART CATHETERIZATION Right 2/15/2019    Procedure: Left heart cath;  Surgeon: Sujit Chaudhry MD;  Location: [facility] CATH;  Service: Cardiology;  Laterality: Right;    LUMBAR LAMINECTOMY WITH DISCECTOMY N/A 2/27/2020    Procedure: LAMINECTOMY, SPINE, LUMBAR, WITH DISCECTOMY;  Surgeon: Vimal Villegas MD;  Location: [facility] OR;  Service: Neurosurgery;  Laterality: N/A;  left L5-S1  Laminectomy L4-5    LYMPHADENECTOMY      MASTECTOMY      SKIN BIOPSY      SPLENECTOMY, TOTAL      TRANSFORAMINAL EPIDURAL INJECTION OF STEROID Left 12/31/2019    Procedure: Left L5/S1 TF SKIP with local;  Surgeon: Canelo Niño MD;  Location: [facility] PAIN MGT;  Service: Pain Management;  Laterality: Left;    TUMOR REMOVAL       Review of patient's allergies indicates:   Allergen  Reactions    Amlodipine Shortness Of Breath and Other (See Comments)     unknown  Other reaction(s): Swelling  unknown  unknown  Other reaction(s): Swelling  unknown  Other reaction(s): Swelling  unknown    Levofloxacin Hives and Swelling    Sulfa (sulfonamide antibiotics) Shortness Of Breath, Itching and Other (See Comments)     elevated blood pressure    Ciprofloxacin Itching    Iodinated contrast media     Iodine      Other reaction(s): Unknown    Latex      Other reaction(s): Itching    Latex, natural rubber Itching     Current Outpatient Medications on File Prior to Visit   Medication Sig Dispense Refill    acetaminophen (TYLENOL) 325 MG tablet Take 1 tablet (325 mg total) by mouth every 6 (six) hours as needed for Pain.      ALBUTEROL, REFILL, INHL Inhale into the lungs.      aspirin 81 MG Chew Take 81 mg by mouth once daily.      brimonidine-timolol (COMBIGAN) 0.2-0.5 % Drop Place 1 drop into both eyes 2 (two) times daily.       calcium carbonate (OS-CALVIN) 600 mg (1,500 mg) Tab Take 600 mg by mouth 2 (two) times daily with meals.      citalopram (CELEXA) 10 MG tablet TAKE 1 TABLET BY MOUTH EVERY DAY 90 tablet 3    digoxin (LANOXIN) 125 mcg tablet TAKE 1 TABLET (125 MCG TOTAL) BY MOUTH ONCE DAILY. 30 tablet 11    folic acid (FOLVITE) 1 MG tablet       gabapentin (NEURONTIN) 300 MG capsule TAKE 1 CAP AT BEDTIME X1 WEEK, THEN 1 CAP AT BREAKFAST AND BEDTIME X1 WEEK, THEN 1 CAP 3X DAILY. 270 capsule 1    levothyroxine (SYNTHROID) 75 MCG tablet TAKE 1 TABLET EVERY DAY 90 tablet 3    methocarbamol (ROBAXIN) 750 MG Tab Take 1 tablet (750 mg total) by mouth every 8 (eight) hours as needed (muscle spasms). 60 tablet 2    methotrexate 2.5 MG Tab       metoprolol succinate (TOPROL-XL) 25 MG 24 hr tablet Take 1 tablet (25 mg total) by mouth once daily. 90 tablet 3    multivitamin with minerals tablet Take 1 tablet by mouth once daily.      RESTASIS 0.05 % ophthalmic emulsion Place 1 drop into both  eyes 2 (two) times daily.  4    rosuvastatin (CRESTOR) 20 MG tablet TAKE 1 TABLET every OTHER night 90 tablet 6    telmisartan (MICARDIS) 20 MG Tab Take 1 tablet (20 mg total) by mouth once daily. 90 tablet 3    valacyclovir (VALTREX) 1000 MG tablet TAKE 1 TABLET EVERY 24 HOURS; prn  3    XIIDRA 5 % Dpet Place 1 drop into both eyes 2 (two) times daily.  3    [DISCONTINUED] azelastine (ASTELIN) 137 mcg (0.1 %) nasal spray USE 1 SPRAY IN EACH NOSTRIL TWICE A DAY (Patient not taking: Reported on 12/12/2019) 30 mL 0    [DISCONTINUED] fluticasone propionate (FLONASE) 50 mcg/actuation nasal spray USE 2 SPRAYS IN EACH NOSTRIL ONCE DAILY. 16 mL 0    [DISCONTINUED] ketorolac (TORADOL) 10 mg tablet Take 1 tablet (10 mg total) by mouth every 8 (eight) hours as needed for Pain. 20 tablet 0    [DISCONTINUED] lidocaine (LIDODERM) 5 % Place 1 patch onto the skin once daily. Remove & Discard patch within 12 hours or as directed by MD (Patient not taking: Reported on 3/13/2020) 30 patch 11    [DISCONTINUED] oxyCODONE-acetaminophen (PERCOCET) 5-325 mg per tablet Take 1 tablet by mouth every 4 (four) hours as needed for Pain. (Patient not taking: Reported on 3/13/2020) 60 tablet 0     Current Facility-Administered Medications on File Prior to Visit   Medication Dose Route Frequency Provider Last Rate Last Dose    albuterol inhaler 2 puff  2 puff Inhalation 1 time in Clinic/HOD Sujit hCaudhry MD         Social History     Socioeconomic History    Marital status:      Spouse name: Not on file    Number of children: Not on file    Years of education: Not on file    Highest education level: Not on file   Occupational History    Not on file   Social Needs    Financial resource strain: Not on file    Food insecurity:     Worry: Not on file     Inability: Not on file    Transportation needs:     Medical: Not on file     Non-medical: Not on file   Tobacco Use    Smoking status: Former Smoker     Packs/day: 1.00      Years: 20.00     Pack years: 20.00     Types: Cigarettes     Last attempt to quit: 1981     Years since quittin.4    Smokeless tobacco: Never Used   Substance and Sexual Activity    Alcohol use: No     Comment: 2 drinks/month; none 72 hrs prior to surgery    Drug use: No    Sexual activity: Yes     Partners: Male   Lifestyle    Physical activity:     Days per week: Not on file     Minutes per session: Not on file    Stress: Only a little   Relationships    Social connections:     Talks on phone: Not on file     Gets together: Not on file     Attends Evangelical service: Not on file     Active member of club or organization: Not on file     Attends meetings of clubs or organizations: Not on file     Relationship status: Not on file   Other Topics Concern    Not on file   Social History Narrative    Live w/ spouse and  1 dog      Family History   Problem Relation Age of Onset    Hypertension Mother     Hypertension Father     Cancer Neg Hx        Review of Systems   Constitutional: Negative for fatigue, fever and unexpected weight change.   HENT: Negative for congestion, ear pain, postnasal drip and sore throat.    Eyes: Negative for visual disturbance.   Respiratory: Negative for cough, chest tightness, shortness of breath and wheezing.    Cardiovascular: Negative for chest pain, palpitations and leg swelling.   Gastrointestinal: Negative for abdominal pain, blood in stool, constipation, diarrhea, nausea and vomiting.   Genitourinary: Negative for dysuria and hematuria.   Musculoskeletal: Positive for arthralgias.   Neurological: Negative for weakness and numbness.       Objective:     She is checking her vitals via digital medicine-see that in the flow sheets  Physical Exam   Constitutional: She is oriented to person, place, and time. She appears well-developed and well-nourished. No distress.   Eyes: EOM are normal.   Pulmonary/Chest: Effort normal. No respiratory distress.   Neurological: She is  alert and oriented to person, place, and time.   Skin: She is not diaphoretic.   Psychiatric: She has a normal mood and affect. Her behavior is normal. Judgment and thought content normal.     Assessment:     1. Annual physical exam    2. Essential hypertension    3. Dyslipidemia    4. Hyperlipidemia, unspecified hyperlipidemia type    5. Acquired hypothyroidism    6. DDD (degenerative disc disease), lumbar    7. Anxiety    8. Carotid stenosis, bilateral    9. Coronary artery disease involving coronary bypass graft of native heart without angina pectoris    10. Ductal carcinoma in situ (DCIS) of breast, unspecified laterality    11. Absence of both breasts        Plan:     Problem List Items Addressed This Visit     Absence of both breasts    Anxiety    Relevant Medications    citalopram (CELEXA) 10 MG tablet    Carotid stenosis, bilateral    Coronary artery disease involving coronary bypass graft of native heart without angina pectoris - Primary    Relevant Orders    Digoxin level    DCIS (ductal carcinoma in situ) of breast    DDD (degenerative disc disease), lumbar    Dyslipidemia    Essential hypertension    Relevant Medications    telmisartan (MICARDIS) 20 MG Tab    metoprolol succinate (TOPROL-XL) 25 MG 24 hr tablet    Hyperlipidemia    Relevant Medications    rosuvastatin (CRESTOR) 20 MG tablet    Hypothyroid    Relevant Medications    levothyroxine (SYNTHROID) 75 MCG tablet      Other Visit Diagnoses     Annual physical exam        Relevant Medications    diphth,pertus,acell,,tetanus (BOOSTRIX) 2.5-8-5 Lf-mcg-Lf/0.5mL Susp        No follow-ups on file.      I have changed Rosalinda Root's levothyroxine and citalopram. I am also having her start on (diphth,pertus(acell),tetanus). Additionally, I am having her maintain her calcium carbonate, brimonidine-timoloL, RESTASIS, valACYclovir, multivitamin with minerals, (ALBUTEROL, REFILL, INHL), acetaminophen, folic acid, XIIDRA, methocarbamoL, digoxin, methotrexate,  gabapentin, aspirin, telmisartan, metoprolol succinate, and rosuvastatin. We will continue to administer albuterol.    Diagnoses and all orders for this visit:    Coronary artery disease involving coronary bypass graft of native heart without angina pectoris  -     Digoxin level; Future    Essential hypertension  -     telmisartan (MICARDIS) 20 MG Tab; Take 1 tablet (20 mg total) by mouth once daily.  -     metoprolol succinate (TOPROL-XL) 25 MG 24 hr tablet; Take 1 tablet (25 mg total) by mouth once daily.    Dyslipidemia    Hyperlipidemia, unspecified hyperlipidemia type  -     rosuvastatin (CRESTOR) 20 MG tablet; TAKE 1 TABLET every OTHER night    Acquired hypothyroidism  -     levothyroxine (SYNTHROID) 75 MCG tablet; Take 1 tablet (75 mcg total) by mouth once daily.    DDD (degenerative disc disease), lumbar    Anxiety  -     citalopram (CELEXA) 10 MG tablet; Take 1 tablet (10 mg total) by mouth once daily.    Carotid stenosis, bilateral    Ductal carcinoma in situ (DCIS) of breast, unspecified laterality    Absence of both breasts    Annual physical exam  -     diphth,pertus,acell,,tetanus (BOOSTRIX) 2.5-8-5 Lf-mcg-Lf/0.5mL Susp; Inject 0.5 mLs into the muscle once. for 1 dose    cont f/u with the specialists for the problems noted above.  Cont pain management that she is doing. She does not have breasts so she does not need mammograms. She is to get her digoxin level soon and get the other labs for screening in November.  Continue follow-up with the cardiovascular surgeon for her carotid stenosis and continue aspirin.     The patient was instructed to stop the following meds:  Medications Discontinued During This Encounter   Medication Reason    azelastine (ASTELIN) 137 mcg (0.1 %) nasal spray Patient no longer taking    fluticasone propionate (FLONASE) 50 mcg/actuation nasal spray Patient no longer taking    oxyCODONE-acetaminophen (PERCOCET) 5-325 mg per tablet Patient no longer taking    ketorolac  (TORADOL) 10 mg tablet Patient no longer taking    lidocaine (LIDODERM) 5 % Patient no longer taking     No orders of the defined types were placed in this encounter.

## 2020-04-26 DIAGNOSIS — I10 ESSENTIAL HYPERTENSION: ICD-10-CM

## 2020-04-26 RX ORDER — METOPROLOL SUCCINATE 25 MG/1
TABLET, EXTENDED RELEASE ORAL
Qty: 90 TABLET | Refills: 3 | Status: SHIPPED | OUTPATIENT
Start: 2020-04-26 | End: 2021-04-05

## 2020-04-27 RX ORDER — CLOPIDOGREL BISULFATE 75 MG/1
TABLET ORAL
Qty: 90 TABLET | Refills: 11 | Status: SHIPPED | OUTPATIENT
Start: 2020-04-27 | End: 2021-05-11

## 2020-04-28 ENCOUNTER — PATIENT MESSAGE (OUTPATIENT)
Dept: RESEARCH | Facility: HOSPITAL | Age: 65
End: 2020-04-28

## 2020-05-27 NOTE — PROGRESS NOTES
Subjective:       Patient ID: Dulce Root is a 65 y.o. female.    Chief Complaint: No chief complaint on file.    HPI Ms. Root is a very pleasant 65-year-old female with history of bilateral ductal carcinoma in situ and a remote history of Hodgkin's disease.    Major issues had been someone with back pain.  She would have surgery earlier this year without significant improvement is noted.  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  She is followed at [facility] by Dr. Xie who is planning possible rituximab therapy.  She has had no new cardiac issues.    Breast history:  She  developed ductal      carcinoma in situ of the right breast in 09/2006, treated with lumpectomy     and radiation therapy.  In 08/2009, she was diagnosed with ductal carcinoma  in situ of the left breast and in 08/2009, she had bilateral mastectomy.      The left breast showed widespread high-grade DCIS and the right breast was    without evidence of malignancy.        She has a remote history of Hodgkin's disease, originally diagnosed in   1991, treated with radiation therapy and then treated with salvage   chemotherapy with ABVD on protocol [id number] in 1996.    In August 2017 she underwent aortic valve replacement and coronary artery bypass grafting by Dr. Baxter.  She has also has stent placement in 2019.  Review of Systems   Constitutional: Negative for activity change, appetite change, fatigue and unexpected weight change.   HENT: Positive for mouth sores.    Eyes: Negative for visual disturbance.   Respiratory: Positive for shortness of breath (With exertion). Negative for cough.    Cardiovascular: Negative for chest pain.   Gastrointestinal: Negative for abdominal pain, diarrhea and nausea.   Genitourinary: Negative for flank pain and frequency.   Musculoskeletal: Positive for back pain.   Skin: Negative for rash.   Neurological: Negative for headaches.   Hematological: Negative for adenopathy.    Psychiatric/Behavioral: Negative for dysphoric mood. The patient is not nervous/anxious.        Objective:      Physical Exam   Constitutional: She is oriented to person, place, and time. She appears well-developed and well-nourished. No distress.   Eyes: No scleral icterus.   Cardiovascular: Normal rate and regular rhythm.   Pulmonary/Chest:       The bilateral breast reconstructions show no nodules or erythema   Abdominal: Soft. She exhibits no mass. There is no tenderness.   Lymphadenopathy:     She has no cervical adenopathy.     She has no axillary adenopathy.        Right: No supraclavicular adenopathy present.        Left: No supraclavicular adenopathy present.   Neurological: She is alert and oriented to person, place, and time.   Skin: No rash noted.   Psychiatric: She has a normal mood and affect. Her behavior is normal. Thought content normal.   Vitals reviewed.      Assessment:       1. Ductal carcinoma in situ (DCIS) of breast, unspecified laterality    2. Hx of Hodgkin's disease - s/p chemo and radiation        Plan:         Return to clinic in 1 year.

## 2020-05-28 ENCOUNTER — OFFICE VISIT (OUTPATIENT)
Dept: HEMATOLOGY/ONCOLOGY | Facility: CLINIC | Age: 65
End: 2020-05-28
Payer: COMMERCIAL

## 2020-05-28 VITALS
DIASTOLIC BLOOD PRESSURE: 61 MMHG | BODY MASS INDEX: 27.46 KG/M2 | SYSTOLIC BLOOD PRESSURE: 128 MMHG | OXYGEN SATURATION: 99 % | WEIGHT: 155 LBS | TEMPERATURE: 98 F | RESPIRATION RATE: 16 BRPM | HEIGHT: 63 IN | HEART RATE: 80 BPM

## 2020-05-28 DIAGNOSIS — D05.10 DUCTAL CARCINOMA IN SITU (DCIS) OF BREAST, UNSPECIFIED LATERALITY: Primary | ICD-10-CM

## 2020-05-28 DIAGNOSIS — Z85.71 HX OF HODGKIN'S DISEASE: ICD-10-CM

## 2020-05-28 PROCEDURE — 3008F PR BODY MASS INDEX (BMI) DOCUMENTED: ICD-10-PCS | Mod: CPTII,S$GLB,, | Performed by: INTERNAL MEDICINE

## 2020-05-28 PROCEDURE — 99999 PR PBB SHADOW E&M-EST. PATIENT-LVL III: ICD-10-PCS | Mod: PBBFAC,,, | Performed by: INTERNAL MEDICINE

## 2020-05-28 PROCEDURE — 99213 OFFICE O/P EST LOW 20 MIN: CPT | Mod: S$GLB,,, | Performed by: INTERNAL MEDICINE

## 2020-05-28 PROCEDURE — 3008F BODY MASS INDEX DOCD: CPT | Mod: CPTII,S$GLB,, | Performed by: INTERNAL MEDICINE

## 2020-05-28 PROCEDURE — 3078F DIAST BP <80 MM HG: CPT | Mod: CPTII,S$GLB,, | Performed by: INTERNAL MEDICINE

## 2020-05-28 PROCEDURE — 3074F SYST BP LT 130 MM HG: CPT | Mod: CPTII,S$GLB,, | Performed by: INTERNAL MEDICINE

## 2020-05-28 PROCEDURE — 99213 PR OFFICE/OUTPT VISIT, EST, LEVL III, 20-29 MIN: ICD-10-PCS | Mod: S$GLB,,, | Performed by: INTERNAL MEDICINE

## 2020-05-28 PROCEDURE — 99999 PR PBB SHADOW E&M-EST. PATIENT-LVL III: CPT | Mod: PBBFAC,,, | Performed by: INTERNAL MEDICINE

## 2020-05-28 PROCEDURE — 3078F PR MOST RECENT DIASTOLIC BLOOD PRESSURE < 80 MM HG: ICD-10-PCS | Mod: CPTII,S$GLB,, | Performed by: INTERNAL MEDICINE

## 2020-05-28 PROCEDURE — 3074F PR MOST RECENT SYSTOLIC BLOOD PRESSURE < 130 MM HG: ICD-10-PCS | Mod: CPTII,S$GLB,, | Performed by: INTERNAL MEDICINE

## 2020-05-28 RX ORDER — PREDNISONE 10 MG/1
TABLET ORAL
COMMUNITY
Start: 2020-04-16 | End: 2020-08-25

## 2020-06-25 NOTE — PROGRESS NOTES
"Digital Medicine: Health  Follow-Up    Called to follow up with patient, current BP average 134/76 is at goal <130/<80.   Patient is doing well and reports no complaints/concerns at this time.   Patient reports that she knows she needs to "sit down and take my pressure more". Patient reports she has been busy and distracted by the COVID-19 pandemic and hasn't been remembering to take her pressure as often. She stated she has had it taken in office a couple times and it was "good".       The history is provided by the patient. No  was used.   Follow Up  Follow-up reason(s): reading review      Readings are missing.   patient reminder needed.      INTERVENTION(S)  reviewed monitoring technique, encouragement/support, denied further coaching, denied resources and denied questions    PLAN  patient verbalizes understanding and continue monitoring    Patient denied health coaching, goal setting, and resources.   Patient denied any questions/concerns for her digital medicine care team at this time.  Patient agreed to reume regular readings, [facility] care team will continue to monitor and I will follow up in 6-8 weeks.         There are no preventive care reminders to display for this patient.    Last 5 Patient Entered Readings                                      Current 30 Day Average: 134/76     Recent Readings 6/13/2020 5/29/2020 5/14/2020 5/14/2020 4/30/2020    SBP (mmHg) 120 148 105 117 152    DBP (mmHg) 75 77 66 75 83    Pulse 91 78 77 84 78                  Screenings    SDOH  "

## 2020-08-07 ENCOUNTER — OFFICE VISIT (OUTPATIENT)
Dept: FAMILY MEDICINE | Facility: CLINIC | Age: 65
End: 2020-08-07
Payer: COMMERCIAL

## 2020-08-07 DIAGNOSIS — I95.9 HYPOTENSION, UNSPECIFIED HYPOTENSION TYPE: Primary | ICD-10-CM

## 2020-08-07 PROCEDURE — 99214 OFFICE O/P EST MOD 30 MIN: CPT | Mod: 95,,, | Performed by: FAMILY MEDICINE

## 2020-08-07 PROCEDURE — 99214 PR OFFICE/OUTPT VISIT, EST, LEVL IV, 30-39 MIN: ICD-10-PCS | Mod: 95,,, | Performed by: FAMILY MEDICINE

## 2020-08-07 NOTE — PROGRESS NOTES
Yes well I pray every I stay healthy so would avoid it a I understood you arePrimary Care Telemedicine Note    The patient location is:  Patient Home   The chief complaint leading to consultation is:  Hypotension  Total time spent with patient:  15 min      Visit type: Virtual visit with synchronous audio only and video  Each patient to whom he or she provides medical services by telemedicine is:  (1) informed of the relationship between the physician and patient and the respective role of any other health care provider with respect to management of the patient; and (2) notified that he or she may decline to receive medical services by telemedicine and may withdraw from such care at any time.    Subjective:      Patient ID: Dulce Root is a 65 y.o. female.    Chief Complaint: low bp  HPIshe has been having infusions of retuximab and she has been having lower bp's in the 90's.  All this week, she has been holding the bp med in the AM.      The bp was 100/60 this AM and she has not taken her med at all today.  She normall takes metoprolol and micardis.    She did get put on dapsone.  This can cause anemia. She has been on it 3 weeks.  She is on ASA and plavix.      Lab Results   Component Value Date    WBC 5.78 02/18/2020    HGB 11.7 (L) 02/18/2020    HCT 36.5 (L) 02/18/2020     (H) 02/18/2020     02/18/2020       Lab Results   Component Value Date    TSH 1.544 11/14/2019           Last 5 Patient Entered Readings                                      Current 30 Day Average: 116/71     Recent Readings 8/6/2020 8/5/2020 8/4/2020 8/4/2020 8/3/2020    SBP (mmHg) 122 113 103 145 127    DBP (mmHg) 74 69 51 83 71    Pulse 81 84 88 79 79        Pulse Readings from Last 3 Encounters:   05/28/20 80   03/13/20 81   02/27/20 75     She has been tired and weak. She has occasional SOB.  No cough noted.     Health Maintenance Due   Topic Date Due    HIV Screening  05/05/1970    TETANUS VACCINE  05/05/1974     Shingles Vaccine (1 of 2) 05/05/2006    Pneumococcal Vaccine (Highest Risk) (2 of 3 - PPSV23) 12/05/2019    Pap Smear with HPV Cotest  06/02/2020       Past Medical History:  Past Medical History:   Diagnosis Date    Allergy     Anticoagulant long-term use     Plavix    Breast cancer 2008    Carotid stenosis, bilateral 10/13/2017    Coronary artery disease     Coronary artery disease involving coronary bypass graft of native heart without angina pectoris 8/16/2019 2/19  1.  Left main is a small vessel with a 60%-65% ostial lesion. 2.  LAD is a medium-sized vessel diffuse 70% proximally  Ramus intermedius is a medium size vessel with a 40%-50% ostial lesion. 3.  Circumflex 60%-70% ostial  remainder of circumflex and obtuse marginal normal in appearance. Right coronary artery is normal size vessel, short discrete 70%mid 4.  Vein graft to right coronary is occ    Coronary artery disease involving native coronary artery of native heart without angina pectoris 6/27/2017    DCIS (ductal carcinoma in situ) of breast 11/6/2012    Essential hypertension 11/6/2012    Hodgkin lymphoma     Hx of Hodgkin's disease - s/p chemo and radiation 11/6/2012    Hyperlipidemia     Hyperlipidemia 11/6/2012    The patient presents with hyperlipidemia.  The patient reports tolerating the medication well and is in excellent compliance.  There have been no medication side effects.  The patient denies chest pain, neuropathy, and myalgias.  The patient has reduced fat intake and has been exercising.  Current treatment has included the medications listed in the med card.   Lab Results Component Value Date  CH    Hypertension     LBBB (left bundle branch block) 5/22/2018    Osteoporosis     Paroxysmal atrial fibrillation - single post-op episode 8/24/2017    Pemphigoid     S/P AVR (aortic valve replacement) - pericardial valve 08/21/2017    Perceval aortic tissue valve PVS23. 23mm    serial # [id number]    S/P CABG x 2  8/21/2017 8/17 CABG X 2 with SVG-LAD and SVG-distal RCA  SVG LAD due to absent LIMA after chest radiation and lymph node biopsy     Stented coronary artery     She had 2 stents placed in 2/2019.  She has had CAD and bypasses in the past in 2017.      Thyroid disease      Past Surgical History:   Procedure Laterality Date    ARTERIOGRAPHY OF AORTIC ROOT N/A 2/15/2019    Procedure: ARTERIOGRAM, AORTIC ROOT;  Surgeon: Sujit Chaudhry MD;  Location: [facility] C[facility];  Service: Cardiology;  Laterality: N/A;    BREAST BIOPSY      BREAST RECONSTRUCTION      bilateral mastectomy    CARDIAC CATHETERIZATION      stents x 2    CARDIAC SURGERY  08/2017    Aortic valve replacement , CABG 2 vessel    CORONARY ANGIOGRAPHY N/A 2/15/2019    Procedure: ANGIOGRAM, CORONARY ARTERY;  Surgeon: Sujit Chaudhry MD;  Location: [facility]ATH;  Service: Cardiology;  Laterality: N/A;    CORONARY BYPASS GRAFT ANGIOGRAPHY  2/15/2019    Procedure: Bypass graft study;  Surgeon: Sujit Chaudhry MD;  Location: [facility] CATH;  Service: Cardiology;;    COSMETIC SURGERY      bereast reconstruction    EYE SURGERY      eye lids    LEFT HEART CATHETERIZATION Right 2/15/2019    Procedure: Left heart cath;  Surgeon: Sujit Chaudhry MD;  Location: [facility];  Service: Cardiology;  Laterality: Right;    LUMBAR LAMINECTOMY WITH DISCECTOMY N/A 2/27/2020    Procedure: LAMINECTOMY, SPINE, LUMBAR, WITH DISCECTOMY;  Surgeon: Vimal Villegas MD;  Location[facility] OR;  Service: Neurosurgery;  Laterality: N/A;  left L5-S1  Laminectomy L4-5    LYMPHADENECTOMY      MASTECTOMY      SKIN BIOPSY      SPLENECTOMY, TOTAL      TRANSFORAMINAL EPIDURAL INJECTION OF STEROID Left 12/31/2019    Procedure: Left L5/S1 TF SKIP with local;  Surgeon: Canelo Niño MD;  [facility];  Service: Pain Management;  Laterality: Left;    TUMOR REMOVAL       Review of patient's allergies indicates:   Allergen Reactions    Amlodipine Shortness Of Breath and Other (See Comments)      unknown  Other reaction(s): Swelling  unknown  unknown  Other reaction(s): Swelling  unknown  Other reaction(s): Swelling  unknown    Levofloxacin Hives and Swelling    Sulfa (sulfonamide antibiotics) Shortness Of Breath, Itching and Other (See Comments)     elevated blood pressure    Ciprofloxacin Itching    Iodinated contrast media     Iodine      Other reaction(s): Unknown    Latex      Other reaction(s): Itching    Latex, natural rubber Itching     Current Outpatient Medications on File Prior to Visit   Medication Sig Dispense Refill    acetaminophen (TYLENOL) 325 MG tablet Take 1 tablet (325 mg total) by mouth every 6 (six) hours as needed for Pain.      ALBUTEROL, REFILL, INHL Inhale into the lungs.      aspirin 81 MG Chew Take 81 mg by mouth once daily.      brimonidine-timolol (COMBIGAN) 0.2-0.5 % Drop Place 1 drop into both eyes 2 (two) times daily.       calcium carbonate (OS-CALVIN) 600 mg (1,500 mg) Tab Take 600 mg by mouth 2 (two) times daily with meals.      citalopram (CELEXA) 10 MG tablet Take 1 tablet (10 mg total) by mouth once daily. 90 tablet 3    clopidogreL (PLAVIX) 75 mg tablet TAKE 1 TABLET BY MOUTH EVERY DAY 90 tablet 11    digoxin (LANOXIN) 125 mcg tablet TAKE 1 TABLET (125 MCG TOTAL) BY MOUTH ONCE DAILY. 30 tablet 11    folic acid (FOLVITE) 1 MG tablet       gabapentin (NEURONTIN) 300 MG capsule TAKE 1 CAP AT BEDTIME X1 WEEK, THEN 1 CAP AT BREAKFAST AND BEDTIME X1 WEEK, THEN 1 CAP 3X DAILY. 270 capsule 1    levothyroxine (SYNTHROID) 75 MCG tablet Take 1 tablet (75 mcg total) by mouth once daily. 90 tablet 3    methocarbamol (ROBAXIN) 750 MG Tab Take 1 tablet (750 mg total) by mouth every 8 (eight) hours as needed (muscle spasms). 60 tablet 2    methotrexate 2.5 MG Tab       metoprolol succinate (TOPROL-XL) 25 MG 24 hr tablet TAKE 1/2 TABLET BY MOUTH TWICE A DAY 90 tablet 3    multivitamin with minerals tablet Take 1 tablet by mouth once daily.      predniSONE  (DELTASONE) 10 MG tablet       RESTASIS 0.05 % ophthalmic emulsion Place 1 drop into both eyes 2 (two) times daily.  4    rosuvastatin (CRESTOR) 20 MG tablet TAKE 1 TABLET every OTHER night 90 tablet 3    telmisartan (MICARDIS) 20 MG Tab Take 1 tablet (20 mg total) by mouth once daily. 90 tablet 3    valacyclovir (VALTREX) 1000 MG tablet TAKE 1 TABLET EVERY 24 HOURS; prn  3    XIIDRA 5 % Dpet Place 1 drop into both eyes 2 (two) times daily.  3     Current Facility-Administered Medications on File Prior to Visit   Medication Dose Route Frequency Provider Last Rate Last Dose    albuterol inhaler 2 puff  2 puff Inhalation 1 time in Clinic/HOD Sujit Chaudhry MD         Social History     Socioeconomic History    Marital status:      Spouse name: Not on file    Number of children: Not on file    Years of education: Not on file    Highest education level: Not on file   Occupational History    Not on file   Social Needs    Financial resource strain: Not on file    Food insecurity     Worry: Not on file     Inability: Not on file    Transportation needs     Medical: Not on file     Non-medical: Not on file   Tobacco Use    Smoking status: Former Smoker     Packs/day: 1.00     Years: 20.00     Pack years: 20.00     Types: Cigarettes     Quit date: 1981     Years since quittin.7    Smokeless tobacco: Never Used   Substance and Sexual Activity    Alcohol use: No     Comment: 2 drinks/month; none 72 hrs prior to surgery    Drug use: No    Sexual activity: Yes     Partners: Male   Lifestyle    Physical activity     Days per week: Not on file     Minutes per session: Not on file    Stress: Only a little   Relationships    Social connections     Talks on phone: Not on file     Gets together: Not on file     Attends [employer] service: Not on file     Active member of club or organization: Not on file     Attends meetings of clubs or organizations: Not on file     Relationship status: Not on  file   Other Topics Concern    Not on file   Social History Narrative    Live w/ spouse and  1 dog      Family History   Problem Relation Age of Onset    Hypertension Mother     Hypertension Father     Cancer Neg Hx              Review of Systems   Constitutional: Positive for fatigue. Negative for activity change and unexpected weight change.   HENT: Negative for hearing loss, rhinorrhea and trouble swallowing.    Eyes: Negative for discharge and visual disturbance.   Respiratory: Negative for chest tightness and wheezing.    Cardiovascular: Negative for chest pain and palpitations.   Gastrointestinal: Negative for blood in stool, constipation, diarrhea and vomiting.   Endocrine: Negative for polydipsia and polyuria.   Genitourinary: Negative for difficulty urinating, dysuria, hematuria and menstrual problem.   Musculoskeletal: Negative for arthralgias and joint swelling.   Neurological: Negative for weakness and headaches.   Psychiatric/Behavioral: Positive for dysphoric mood. Negative for confusion.       Objective:      Physical Exam  Constitutional:       General: She is not in acute distress.     Appearance: She is well-developed. She is not diaphoretic.   Pulmonary:      Effort: Pulmonary effort is normal. No respiratory distress.   Neurological:      Mental Status: She is alert and oriented to person, place, and time.   Psychiatric:         Behavior: Behavior normal.         Thought Content: Thought content normal.         Judgment: Judgment normal.         Assessment:       1. Hypotension, unspecified hypotension type        Plan:       Diagnoses and all orders for this visit:    Hypotension, unspecified hypotension type  -     Comprehensive metabolic panel; Future  -     TSH; Future  -     CBC auto differential; Future  -     Digoxin level; Future      Hold bp meds over weekend.  Increase fluids.  To ER if worsen.  Cont to track with digital med and resume meds when the bp is 140 or greater.

## 2020-08-10 ENCOUNTER — LAB VISIT (OUTPATIENT)
Dept: LAB | Facility: HOSPITAL | Age: 65
End: 2020-08-10
Attending: FAMILY MEDICINE
Payer: COMMERCIAL

## 2020-08-10 DIAGNOSIS — I95.9 HYPOTENSION, UNSPECIFIED HYPOTENSION TYPE: ICD-10-CM

## 2020-08-10 LAB
ALBUMIN SERPL BCP-MCNC: 3.9 G/DL (ref 3.5–5.2)
ALP SERPL-CCNC: 81 U/L (ref 55–135)
ALT SERPL W/O P-5'-P-CCNC: 19 U/L (ref 10–44)
ANION GAP SERPL CALC-SCNC: 9 MMOL/L (ref 8–16)
AST SERPL-CCNC: 18 U/L (ref 10–40)
BASOPHILS # BLD AUTO: 0.09 K/UL (ref 0–0.2)
BASOPHILS NFR BLD: 1 % (ref 0–1.9)
BILIRUB SERPL-MCNC: 0.7 MG/DL (ref 0.1–1)
BUN SERPL-MCNC: 21 MG/DL (ref 8–23)
CALCIUM SERPL-MCNC: 9.6 MG/DL (ref 8.7–10.5)
CHLORIDE SERPL-SCNC: 106 MMOL/L (ref 95–110)
CO2 SERPL-SCNC: 24 MMOL/L (ref 23–29)
CREAT SERPL-MCNC: 0.7 MG/DL (ref 0.5–1.4)
DIFFERENTIAL METHOD: ABNORMAL
DIGOXIN SERPL-MCNC: 0.9 NG/ML (ref 0.8–2)
EOSINOPHIL # BLD AUTO: 0.2 K/UL (ref 0–0.5)
EOSINOPHIL NFR BLD: 2.6 % (ref 0–8)
ERYTHROCYTE [DISTWIDTH] IN BLOOD BY AUTOMATED COUNT: 14.7 % (ref 11.5–14.5)
EST. GFR  (AFRICAN AMERICAN): >60 ML/MIN/1.73 M^2
EST. GFR  (NON AFRICAN AMERICAN): >60 ML/MIN/1.73 M^2
GLUCOSE SERPL-MCNC: 109 MG/DL (ref 70–110)
HCT VFR BLD AUTO: 36.2 % (ref 37–48.5)
HGB BLD-MCNC: 11.7 G/DL (ref 12–16)
IMM GRANULOCYTES # BLD AUTO: 0.04 K/UL (ref 0–0.04)
IMM GRANULOCYTES NFR BLD AUTO: 0.4 % (ref 0–0.5)
LYMPHOCYTES # BLD AUTO: 0.9 K/UL (ref 1–4.8)
LYMPHOCYTES NFR BLD: 9.9 % (ref 18–48)
MCH RBC QN AUTO: 31.5 PG (ref 27–31)
MCHC RBC AUTO-ENTMCNC: 32.3 G/DL (ref 32–36)
MCV RBC AUTO: 98 FL (ref 82–98)
MONOCYTES # BLD AUTO: 1.2 K/UL (ref 0.3–1)
MONOCYTES NFR BLD: 13.1 % (ref 4–15)
NEUTROPHILS # BLD AUTO: 6.7 K/UL (ref 1.8–7.7)
NEUTROPHILS NFR BLD: 73 % (ref 38–73)
NRBC BLD-RTO: 0 /100 WBC
PLATELET # BLD AUTO: 331 K/UL (ref 150–350)
PMV BLD AUTO: 10.8 FL (ref 9.2–12.9)
POTASSIUM SERPL-SCNC: 4 MMOL/L (ref 3.5–5.1)
PROT SERPL-MCNC: 7.3 G/DL (ref 6–8.4)
RBC # BLD AUTO: 3.71 M/UL (ref 4–5.4)
SODIUM SERPL-SCNC: 139 MMOL/L (ref 136–145)
TSH SERPL DL<=0.005 MIU/L-ACNC: 3.32 UIU/ML (ref 0.4–4)
WBC # BLD AUTO: 9.22 K/UL (ref 3.9–12.7)

## 2020-08-10 PROCEDURE — 84443 ASSAY THYROID STIM HORMONE: CPT

## 2020-08-10 PROCEDURE — 85025 COMPLETE CBC W/AUTO DIFF WBC: CPT

## 2020-08-10 PROCEDURE — 80053 COMPREHEN METABOLIC PANEL: CPT

## 2020-08-10 PROCEDURE — 80162 ASSAY OF DIGOXIN TOTAL: CPT

## 2020-08-10 PROCEDURE — 36415 COLL VENOUS BLD VENIPUNCTURE: CPT | Mod: PO

## 2020-08-11 NOTE — PROGRESS NOTES
The thyroid, digoxin and all electrolytes appear to be normal.  This is great news.    The blood count is showing continued anemia but it is stable from a 6 months ago and is actually the same as it was 5 months ago so there is no appearance of bleeding that would cause low blood pressure.  This is all good news.  Continue with the plan we discussed at the time of the visit.

## 2020-08-18 ENCOUNTER — CLINICAL SUPPORT (OUTPATIENT)
Dept: CARDIOLOGY | Facility: CLINIC | Age: 65
End: 2020-08-18
Attending: INTERNAL MEDICINE
Payer: COMMERCIAL

## 2020-08-18 VITALS — BODY MASS INDEX: 27.29 KG/M2 | WEIGHT: 154 LBS | HEIGHT: 63 IN

## 2020-08-18 DIAGNOSIS — Z95.2 S/P AVR (AORTIC VALVE REPLACEMENT): ICD-10-CM

## 2020-08-18 DIAGNOSIS — I10 ESSENTIAL HYPERTENSION: ICD-10-CM

## 2020-08-18 DIAGNOSIS — I25.810 CORONARY ARTERY DISEASE INVOLVING CORONARY BYPASS GRAFT OF NATIVE HEART WITHOUT ANGINA PECTORIS: ICD-10-CM

## 2020-08-18 DIAGNOSIS — Z95.1 S/P CABG X 2: ICD-10-CM

## 2020-08-18 DIAGNOSIS — Z95.5 STENTED CORONARY ARTERY: ICD-10-CM

## 2020-08-18 DIAGNOSIS — I65.23 CAROTID STENOSIS, BILATERAL: ICD-10-CM

## 2020-08-18 LAB
AV INDEX (PROSTH): 0.45
AV MEAN GRADIENT: 12 MMHG
AV PEAK GRADIENT: 23 MMHG
AV VALVE AREA: 1.68 CM2
AV VELOCITY RATIO: 0.47
BSA FOR ECHO PROCEDURE: 1.76 M2
CV ECHO LV RWT: 0.47 CM
DOP CALC AO PEAK VEL: 2.4 M/S
DOP CALC AO VTI: 46.12 CM
DOP CALC LVOT AREA: 3.7 CM2
DOP CALC LVOT DIAMETER: 2.18 CM
DOP CALC LVOT PEAK VEL: 1.13 M/S
DOP CALC LVOT STROKE VOLUME: 77.56 CM3
DOP CALCLVOT PEAK VEL VTI: 20.79 CM
E WAVE DECELERATION TIME: 201.73 MSEC
E/A RATIO: 1.67
E/E' RATIO: 16.17 M/S
ECHO LV POSTERIOR WALL: 1.08 CM (ref 0.6–1.1)
FRACTIONAL SHORTENING: 17 % (ref 28–44)
INTERVENTRICULAR SEPTUM: 1.2 CM (ref 0.6–1.1)
IVRT: 111.32 MSEC
LA MAJOR: 4.78 CM
LA MINOR: 4.78 CM
LA WIDTH: 3.87 CM
LEFT ATRIUM SIZE: 3.86 CM
LEFT ATRIUM VOLUME INDEX: 35.1 ML/M2
LEFT ATRIUM VOLUME: 60.69 CM3
LEFT INTERNAL DIMENSION IN SYSTOLE: 3.84 CM (ref 2.1–4)
LEFT VENTRICLE DIASTOLIC VOLUME INDEX: 57.17 ML/M2
LEFT VENTRICLE DIASTOLIC VOLUME: 98.93 ML
LEFT VENTRICLE MASS INDEX: 111 G/M2
LEFT VENTRICLE SYSTOLIC VOLUME INDEX: 36.7 ML/M2
LEFT VENTRICLE SYSTOLIC VOLUME: 63.48 ML
LEFT VENTRICULAR INTERNAL DIMENSION IN DIASTOLE: 4.63 CM (ref 3.5–6)
LEFT VENTRICULAR MASS: 192.53 G
LV LATERAL E/E' RATIO: 10.78 M/S
LV SEPTAL E/E' RATIO: 32.33 M/S
MV PEAK A VEL: 0.58 M/S
MV PEAK E VEL: 0.97 M/S
PISA MRMAX VEL: 0.05 M/S
PISA TR MAX VEL: 3.18 M/S
RA MAJOR: 4.21 CM
RA PRESSURE: 3 MMHG
RA WIDTH: 3.66 CM
RIGHT VENTRICULAR END-DIASTOLIC DIMENSION: 3.46 CM
SINUS: 2.49 CM
STJ: 2.47 CM
TDI LATERAL: 0.09 M/S
TDI SEPTAL: 0.03 M/S
TDI: 0.06 M/S
TR MAX PG: 40 MMHG
TRICUSPID ANNULAR PLANE SYSTOLIC EXCURSION: 1.13 CM
TV REST PULMONARY ARTERY PRESSURE: 43 MMHG

## 2020-08-18 PROCEDURE — 99999 PR PBB SHADOW E&M-EST. PATIENT-LVL II: ICD-10-PCS | Mod: PBBFAC,,,

## 2020-08-18 PROCEDURE — 99999 PR PBB SHADOW E&M-EST. PATIENT-LVL II: CPT | Mod: PBBFAC,,,

## 2020-08-18 PROCEDURE — 93306 ECHO (CUPID ONLY): ICD-10-PCS | Mod: S$GLB,,, | Performed by: INTERNAL MEDICINE

## 2020-08-18 PROCEDURE — 93306 TTE W/DOPPLER COMPLETE: CPT | Mod: S$GLB,,, | Performed by: INTERNAL MEDICINE

## 2020-08-21 ENCOUNTER — PATIENT OUTREACH (OUTPATIENT)
Dept: OTHER | Facility: OTHER | Age: 65
End: 2020-08-21

## 2020-08-21 ENCOUNTER — LAB VISIT (OUTPATIENT)
Dept: LAB | Facility: HOSPITAL | Age: 65
End: 2020-08-21
Attending: INTERNAL MEDICINE
Payer: COMMERCIAL

## 2020-08-21 DIAGNOSIS — I10 ESSENTIAL HYPERTENSION: ICD-10-CM

## 2020-08-21 DIAGNOSIS — I65.23 CAROTID STENOSIS, BILATERAL: ICD-10-CM

## 2020-08-21 DIAGNOSIS — Z95.2 S/P AVR (AORTIC VALVE REPLACEMENT): ICD-10-CM

## 2020-08-21 DIAGNOSIS — Z95.5 STENTED CORONARY ARTERY: ICD-10-CM

## 2020-08-21 DIAGNOSIS — Z95.1 S/P CABG X 2: ICD-10-CM

## 2020-08-21 DIAGNOSIS — I25.810 CORONARY ARTERY DISEASE INVOLVING CORONARY BYPASS GRAFT OF NATIVE HEART WITHOUT ANGINA PECTORIS: ICD-10-CM

## 2020-08-21 LAB
ALBUMIN SERPL BCP-MCNC: 4.3 G/DL (ref 3.5–5.2)
ALP SERPL-CCNC: 95 U/L (ref 55–135)
ALT SERPL W/O P-5'-P-CCNC: 14 U/L (ref 10–44)
ANION GAP SERPL CALC-SCNC: 8 MMOL/L (ref 8–16)
AST SERPL-CCNC: 18 U/L (ref 10–40)
BILIRUB SERPL-MCNC: 0.6 MG/DL (ref 0.1–1)
BUN SERPL-MCNC: 20 MG/DL (ref 8–23)
CALCIUM SERPL-MCNC: 10.4 MG/DL (ref 8.7–10.5)
CHLORIDE SERPL-SCNC: 104 MMOL/L (ref 95–110)
CHOLEST SERPL-MCNC: 166 MG/DL (ref 120–199)
CHOLEST/HDLC SERPL: 3.1 {RATIO} (ref 2–5)
CO2 SERPL-SCNC: 28 MMOL/L (ref 23–29)
CREAT SERPL-MCNC: 0.8 MG/DL (ref 0.5–1.4)
EST. GFR  (AFRICAN AMERICAN): >60 ML/MIN/1.73 M^2
EST. GFR  (NON AFRICAN AMERICAN): >60 ML/MIN/1.73 M^2
GLUCOSE SERPL-MCNC: 106 MG/DL (ref 70–110)
HDLC SERPL-MCNC: 53 MG/DL (ref 40–75)
HDLC SERPL: 31.9 % (ref 20–50)
LDLC SERPL CALC-MCNC: 89.8 MG/DL (ref 63–159)
NONHDLC SERPL-MCNC: 113 MG/DL
POTASSIUM SERPL-SCNC: 4.1 MMOL/L (ref 3.5–5.1)
PROT SERPL-MCNC: 7.9 G/DL (ref 6–8.4)
SODIUM SERPL-SCNC: 140 MMOL/L (ref 136–145)
TRIGL SERPL-MCNC: 116 MG/DL (ref 30–150)

## 2020-08-21 PROCEDURE — 36415 COLL VENOUS BLD VENIPUNCTURE: CPT | Mod: PO

## 2020-08-21 PROCEDURE — 80053 COMPREHEN METABOLIC PANEL: CPT

## 2020-08-21 PROCEDURE — 80061 LIPID PANEL: CPT

## 2020-08-23 ENCOUNTER — PATIENT OUTREACH (OUTPATIENT)
Dept: ADMINISTRATIVE | Facility: OTHER | Age: 65
End: 2020-08-23

## 2020-08-23 NOTE — PROGRESS NOTES
LINKS immunization registry updated  Care Everywhere updated  Health Maintenance updated  Chart reviewed for overdue Proactive [facility] Encounters (SIVAKUMAR) health maintenance testing (CRS, Breast Ca, Diabetic Eye Exam)   Orders entered:N/A

## 2020-08-25 ENCOUNTER — OFFICE VISIT (OUTPATIENT)
Dept: CARDIOLOGY | Facility: CLINIC | Age: 65
End: 2020-08-25
Payer: COMMERCIAL

## 2020-08-25 VITALS
HEART RATE: 74 BPM | DIASTOLIC BLOOD PRESSURE: 60 MMHG | WEIGHT: 151.88 LBS | BODY MASS INDEX: 26.91 KG/M2 | HEIGHT: 63 IN | SYSTOLIC BLOOD PRESSURE: 114 MMHG

## 2020-08-25 DIAGNOSIS — Z95.5 STENTED CORONARY ARTERY: ICD-10-CM

## 2020-08-25 DIAGNOSIS — E78.2 MIXED HYPERLIPIDEMIA: ICD-10-CM

## 2020-08-25 DIAGNOSIS — I10 ESSENTIAL HYPERTENSION: Primary | ICD-10-CM

## 2020-08-25 DIAGNOSIS — J90 BILATERAL PLEURAL EFFUSION: ICD-10-CM

## 2020-08-25 DIAGNOSIS — I25.810 CORONARY ARTERY DISEASE INVOLVING CORONARY BYPASS GRAFT OF NATIVE HEART WITHOUT ANGINA PECTORIS: ICD-10-CM

## 2020-08-25 DIAGNOSIS — E78.5 DYSLIPIDEMIA: ICD-10-CM

## 2020-08-25 DIAGNOSIS — I65.23 CAROTID STENOSIS, BILATERAL: ICD-10-CM

## 2020-08-25 DIAGNOSIS — I48.0 PAROXYSMAL ATRIAL FIBRILLATION: ICD-10-CM

## 2020-08-25 DIAGNOSIS — I44.7 LBBB (LEFT BUNDLE BRANCH BLOCK): ICD-10-CM

## 2020-08-25 PROCEDURE — 3008F PR BODY MASS INDEX (BMI) DOCUMENTED: ICD-10-PCS | Mod: CPTII,S$GLB,, | Performed by: INTERNAL MEDICINE

## 2020-08-25 PROCEDURE — 3074F PR MOST RECENT SYSTOLIC BLOOD PRESSURE < 130 MM HG: ICD-10-PCS | Mod: CPTII,S$GLB,, | Performed by: INTERNAL MEDICINE

## 2020-08-25 PROCEDURE — 99999 PR PBB SHADOW E&M-EST. PATIENT-LVL IV: ICD-10-PCS | Mod: PBBFAC,,, | Performed by: INTERNAL MEDICINE

## 2020-08-25 PROCEDURE — 3074F SYST BP LT 130 MM HG: CPT | Mod: CPTII,S$GLB,, | Performed by: INTERNAL MEDICINE

## 2020-08-25 PROCEDURE — 99214 PR OFFICE/OUTPT VISIT, EST, LEVL IV, 30-39 MIN: ICD-10-PCS | Mod: S$GLB,,, | Performed by: INTERNAL MEDICINE

## 2020-08-25 PROCEDURE — 99999 PR PBB SHADOW E&M-EST. PATIENT-LVL IV: CPT | Mod: PBBFAC,,, | Performed by: INTERNAL MEDICINE

## 2020-08-25 PROCEDURE — 3078F DIAST BP <80 MM HG: CPT | Mod: CPTII,S$GLB,, | Performed by: INTERNAL MEDICINE

## 2020-08-25 PROCEDURE — 99214 OFFICE O/P EST MOD 30 MIN: CPT | Mod: S$GLB,,, | Performed by: INTERNAL MEDICINE

## 2020-08-25 PROCEDURE — 3008F BODY MASS INDEX DOCD: CPT | Mod: CPTII,S$GLB,, | Performed by: INTERNAL MEDICINE

## 2020-08-25 PROCEDURE — 3078F PR MOST RECENT DIASTOLIC BLOOD PRESSURE < 80 MM HG: ICD-10-PCS | Mod: CPTII,S$GLB,, | Performed by: INTERNAL MEDICINE

## 2020-08-25 NOTE — PROGRESS NOTES
Subjective:    Patient ID:  Dulce Root is a 65 y.o. female who presents for follow-up of Hypertension f/u      HPI  Here for follow up of CABG-PCI (2/19)/AVR/LBBB/DLP/carotid stenosis    Review of Systems   Constitution: Negative for malaise/fatigue.   Eyes: Negative for blurred vision.   Cardiovascular: Negative for chest pain, claudication, cyanosis, dyspnea on exertion, irregular heartbeat, leg swelling, near-syncope, orthopnea, palpitations, paroxysmal nocturnal dyspnea and syncope.   Respiratory: Negative for cough and shortness of breath.    Hematologic/Lymphatic: Does not bruise/bleed easily.   Musculoskeletal: Negative for back pain, falls, joint pain, muscle cramps, muscle weakness and myalgias.   Gastrointestinal: Negative for abdominal pain, change in bowel habit, nausea and vomiting.   Genitourinary: Negative for urgency.   Neurological: Negative for dizziness, focal weakness and light-headedness.       Past Medical History:   Diagnosis Date    Allergy     Anticoagulant long-term use     Plavix    Breast cancer 2008    Carotid stenosis, bilateral 10/13/2017    Coronary artery disease     Coronary artery disease involving coronary bypass graft of native heart without angina pectoris 8/16/2019 2/19  1.  Left main is a small vessel with a 60%-65% ostial lesion. 2.  LAD is a medium-sized vessel diffuse 70% proximally  Ramus intermedius is a medium size vessel with a 40%-50% ostial lesion. 3.  Circumflex 60%-70% ostial  remainder of circumflex and obtuse marginal normal in appearance. Right coronary artery is normal size vessel, short discrete 70%mid 4.  Vein graft to right coronary is occ    Coronary artery disease involving native coronary artery of native heart without angina pectoris 6/27/2017    DCIS (ductal carcinoma in situ) of breast 11/6/2012    Essential hypertension 11/6/2012    Hodgkin lymphoma     Hx of Hodgkin's disease - s/p chemo and radiation 11/6/2012    Hyperlipidemia      Hyperlipidemia 11/6/2012    The patient presents with hyperlipidemia.  The patient reports tolerating the medication well and is in excellent compliance.  There have been no medication side effects.  The patient denies chest pain, neuropathy, and myalgias.  The patient has reduced fat intake and has been exercising.  Current treatment has included the medications listed in the med card.   Lab Results Component Value Date  CH    Hypertension     LBBB (left bundle branch block) 5/22/2018    Osteoporosis     Paroxysmal atrial fibrillation - single post-op episode 8/24/2017    Pemphigoid     S/P AVR (aortic valve replacement) - pericardial valve 08/21/2017    Perceval aortic tissue valve PVS23. 23mm    serial # [id number]    S/P CABG x 2 8/21/2017 8/17 CABG X 2 with SVG-LAD and SVG-distal RCA  SVG LAD due to absent LIMA after chest radiation and lymph node biopsy     Stented coronary artery     She had 2 stents placed in 2/2019.  She has had CAD and bypasses in the past in 2017.      Thyroid disease      Patient Active Problem List   Diagnosis    DCIS (ductal carcinoma in situ) of breast    Hx of Hodgkin's disease - s/p chemo and radiation    Essential hypertension    Hyperlipidemia    Hypothyroid    Scleritis    Pemphigoid    Dyslipidemia    S/P AVR (aortic valve replacement) - pericardial valve    S/P CABG x 2    Hypophosphatemia    Acute blood loss anemia    Paroxysmal atrial fibrillation - single post-op episode    Carotid stenosis, bilateral    Syncope    LBBB (left bundle branch block)    Acute respiratory distress    Bilateral pleural effusion    Acute systolic heart failure    Abnormal EKG    Elevated brain natriuretic peptide (BNP) level    Stented coronary artery    Hypoxia    Coronary artery disease involving coronary bypass graft of native heart without angina pectoris    Low back pain, non-specific    Lumbar radiculopathy    DDD (degenerative disc disease), lumbar     Anxiety    Absence of both breasts        Objective:     Vitals:    08/25/20 1155   BP: 114/60   Pulse: 74        Physical Exam   Constitutional: She is oriented to person, place, and time. She appears well-developed and well-nourished.   HENT:   Head: Normocephalic.   Eyes: Conjunctivae are normal.   Neck: Normal range of motion. Neck supple. No JVD present.   Cardiovascular: Normal rate, regular rhythm, normal heart sounds and intact distal pulses.   Pulses:       Carotid pulses are 2+ on the right side and 2+ on the left side.       Radial pulses are 2+ on the right side and 2+ on the left side.        Dorsalis pedis pulses are 2+ on the right side and 2+ on the left side.        Posterior tibial pulses are 2+ on the right side and 2+ on the left side.   Well healed midline sternal incision.     Pulmonary/Chest: Effort normal and breath sounds normal.   Abdominal: Soft. Bowel sounds are normal.   Musculoskeletal:         General: No tenderness or edema.   Neurological: She is alert and oriented to person, place, and time. Gait normal.   Skin: Skin is warm, dry and intact. No cyanosis. Nails show no clubbing.   Psychiatric: She has a normal mood and affect. Her speech is normal and behavior is normal. Thought content normal.   Nursing note and vitals reviewed.            ..    Chemistry        Component Value Date/Time     08/21/2020 0835    K 4.1 08/21/2020 0835     08/21/2020 0835    CO2 28 08/21/2020 0835    BUN 20 08/21/2020 0835    CREATININE 0.8 08/21/2020 0835     08/21/2020 0835        Component Value Date/Time    CALCIUM 10.4 08/21/2020 0835    ALKPHOS 95 08/21/2020 0835    AST 18 08/21/2020 0835    ALT 14 08/21/2020 0835    BILITOT 0.6 08/21/2020 0835    ESTGFRAFRICA >60.0 08/21/2020 0835    EGFRNONAA >60.0 08/21/2020 0835            ..  Lab Results   Component Value Date    CHOL 166 08/21/2020    CHOL 120 11/14/2019    CHOL 166 03/01/2019     Lab Results   Component Value Date    HDL  53 08/21/2020    HDL 37 (L) 11/14/2019    HDL 51 03/01/2019     Lab Results   Component Value Date    LDLCALC 89.8 08/21/2020    LDLCALC 68.2 11/14/2019    LDLCALC 98.8 03/01/2019     Lab Results   Component Value Date    TRIG 116 08/21/2020    TRIG 74 11/14/2019    TRIG 81 03/01/2019     Lab Results   Component Value Date    CHOLHDL 31.9 08/21/2020    CHOLHDL 30.8 11/14/2019    CHOLHDL 30.7 03/01/2019     ..  Lab Results   Component Value Date    WBC 9.22 08/10/2020    HGB 11.7 (L) 08/10/2020    HCT 36.2 (L) 08/10/2020    MCV 98 08/10/2020     08/10/2020       Test(s) Reviewed  I have reviewed the following in detail:  [] Stress test   [] Angiography   [x] Echocardiogram   [x] Labs   [x] Other:       Assessment:         ICD-10-CM ICD-9-CM   1. Essential hypertension  I10 401.9   2. Mixed hyperlipidemia  E78.2 272.2   3. Paroxysmal atrial fibrillation - single post-op episode  I48.0 427.31   4. Carotid stenosis, bilateral  I65.23 433.10     433.30   5. LBBB (left bundle branch block)  I44.7 426.3   6. Dyslipidemia  E78.5 272.4   7. Coronary artery disease involving coronary bypass graft of native heart without angina pectoris  I25.810 414.05   8. Stented coronary artery  Z95.5 V45.82   9. Bilateral pleural effusion  J90 511.9     Problem List Items Addressed This Visit     Essential hypertension - Primary    Overview     The patient presents with essential hypertension.  The patient is tolerating the medication well and is in excellent compliance.  The patient is experiencing no side effects.  Counseling was offered regarding low salt diets.  The patient has a reduced salt intake.  The patient denies chest pain, palpitations, shortness of breath, dyspnea on exertion, left or murmur neck pain, nausea, vomiting, diaphoresis, paroxysmal nocturnal dyspnea, and orthopnea.  She has had some lower bp's.  She has been asked on the digital medicine program to hold the telmisartan if she has a lower blood  pressure.  Hypertension Medications             metoprolol succinate (TOPROL-XL) 25 MG 24 hr tablet Take 1 tablet (25 mg total) by mouth once daily.    telmisartan (MICARDIS) 20 MG Tab Take 1 tablet (20 mg total) by mouth once daily.                 Hyperlipidemia    Overview     The patient presents with hyperlipidemia.  The patient reports tolerating the medication well and is in excellent compliance.  There have been no medication side effects.  The patient denies chest pain, neuropathy, and myalgias.  The patient has reduced fat intake and has been exercising.  Current treatment has included the medications listed in the med card.    Lab Results   Component Value Date    CHOL 146 04/12/2018    CHOL 168 01/20/2015    CHOL 166 05/13/2013       Lab Results   Component Value Date    HDL 58 04/12/2018    HDL 42 01/20/2015    HDL 50 05/13/2013       Lab Results   Component Value Date    LDLCALC 70.2 04/12/2018    LDLCALC 95.0 01/20/2015    LDLCALC 89.0 05/13/2013       Lab Results   Component Value Date    TRIG 89 04/12/2018    TRIG 155 (H) 01/20/2015    TRIG 136 05/13/2013       Lab Results   Component Value Date    CHOLHDL 39.7 04/12/2018    CHOLHDL 25.0 01/20/2015    CHOLHDL 30.1 05/13/2013     Lab Results   Component Value Date    ALT 36 02/11/2019    AST 64 (H) 02/11/2019    ALKPHOS 77 02/11/2019    BILITOT 0.6 02/11/2019              Dyslipidemia    Overview     The patient presents with hyperlipidemia.  The patient reports tolerating the medication well and is in excellent compliance.  There have been no medication side effects.  The patient denies chest pain, neuropathy, and myalgias.  The patient has reduced fat intake and has been exercising.  Current treatment has included the medications listed in the med card.    Lab Results   Component Value Date    CHOL 120 11/14/2019    CHOL 166 03/01/2019    CHOL 146 04/12/2018       Lab Results   Component Value Date    HDL 37 (L) 11/14/2019    HDL 51 03/01/2019     HDL 58 04/12/2018       Lab Results   Component Value Date    LDLCALC 68.2 11/14/2019    LDLCALC 98.8 03/01/2019    LDLCALC 70.2 04/12/2018       Lab Results   Component Value Date    TRIG 74 11/14/2019    TRIG 81 03/01/2019    TRIG 89 04/12/2018       Lab Results   Component Value Date    CHOLHDL 30.8 11/14/2019    CHOLHDL 30.7 03/01/2019    CHOLHDL 39.7 04/12/2018     Lab Results   Component Value Date    ALT 21 02/11/2020    AST 19 02/11/2020    ALKPHOS 74 02/11/2020    BILITOT 0.4 02/11/2020              Paroxysmal atrial fibrillation - single post-op episode    Carotid stenosis, bilateral    Overview     She has carotid artery stenosis.  She has had a CTA.    CTA Neck   Order: [id number]   Status:  Final result   Visible to patient:  Yes (Patient Portal) Next appt:  05/27/2020 at 10:00 AM in Hematology and Oncology (Rios Sepulveda MD) Dx:  Bilateral carotid artery stenosis   Details     Reading Physician Reading Date Result Priority   Ralf Murphy MD 12/23/2019 Routine      Narrative     EXAMINATION:  CTA NECK    CLINICAL HISTORY:  Carotid artery stenosis.    TECHNIQUE:  Axial CTA images of the neck were obtained with intravenous contrast.  Coronal and sagittal reformations were obtained.  3D reconstructions were obtained.  Automated exposure control utilized to reduce radiation dose.  Total exam DLP is 507 mGy cm.    COMPARISON:  11/03/2017    FINDINGS:  There is calcified plaque within the thoracic aorta and branch vessels without flow-limiting stenosis at the origins of the great vessels.  The left vertebral artery is slightly dominant over the right vertebral artery.  No flow-limiting stenosis is identified.  The basilar artery and visualized posterior cerebral arteries appear patent without flow-limiting stenosis.  There appears to be fetal origin of the right PCA.  There is calcified plaque within the right common carotid artery and at the carotid bulb and proximal right ICA.  There is noncalcified  plaque in the proximal right ICA.  There does appear to be approximately 70% stenosis of the proximal right ICA.  There is calcified plaque within the left common carotid artery, carotid bulb, proximal internal carotid artery.  There appears to be approximately 60-70% stenosis of the proximal left ICA.      Impression       1. There does appear to be approximately 70% stenosis of the proximal right internal carotid artery secondary to noncalcified plaque.  2. There is approximately 60-70% stenosis of the proximal left internal carotid artery secondary to calcified plaque.  3. Vertebral arteries demonstrate no flow-limiting stenosis.      Electronically signed by: Ralf Murphy MD  Date: 12/23/2019  Time: 16:13           She is tracked by .  She is following with him. She is on an ASA a day.         LBBB (left bundle branch block)    Bilateral pleural effusion    Stented coronary artery    Overview     2/19   ostial circumflex synergy 3.0 x 12 with a balloon in the ramus and LAD as a T-technique.  Synergy left main, using a 3.5 x 8 post-dilated to 4.0,    RCA  Synergy 2.75 x 16 post dilated 2.75          Coronary artery disease involving coronary bypass graft of native heart without angina pectoris    Overview     2/19   1.  Left main is a small vessel with a 60%-65% ostial lesion.  2.  LAD is a medium-sized vessel diffuse 70% proximally  Ramus intermedius is a medium size vessel with a 40%-50% ostial lesion.  3.  Circumflex 60%-70% ostial  remainder of circumflex and obtuse marginal normal in appearance. Right coronary artery is normal size vessel, short discrete 70%mid  4.  Vein graft to right coronary is occluded.  5.  Vein graft to LAD is patent.  Large vein graft to the ostial lesion of approximately 60%.   Fractional flow reserve of the vein graft to LAD was 0.83.   Fractional flow reserve of the circumflex was 0.77 at 1 minute.   Fractional flow reserve of the ramus was negative.                   Plan:            Return to clinic 9 months   Low level/low impact aerobic exercise 5x's/wk. Heart healthy diet and risk factor modification.    See labs and med orders.  Carotid US 1-2 weeks pro to Dr garza visit.      Portions of this note may have been created with voice recognition software.  Grammatical, syntax and spelling errors may be inevitable.

## 2020-09-08 ENCOUNTER — CLINICAL SUPPORT (OUTPATIENT)
Dept: CARDIOLOGY | Facility: CLINIC | Age: 65
End: 2020-09-08
Attending: INTERNAL MEDICINE
Payer: COMMERCIAL

## 2020-09-08 VITALS — HEIGHT: 63 IN | BODY MASS INDEX: 26.75 KG/M2 | WEIGHT: 151 LBS

## 2020-09-08 DIAGNOSIS — I48.0 PAROXYSMAL ATRIAL FIBRILLATION: ICD-10-CM

## 2020-09-08 DIAGNOSIS — I10 ESSENTIAL HYPERTENSION: ICD-10-CM

## 2020-09-08 DIAGNOSIS — Z95.5 STENTED CORONARY ARTERY: ICD-10-CM

## 2020-09-08 DIAGNOSIS — I65.23 CAROTID STENOSIS, BILATERAL: ICD-10-CM

## 2020-09-08 DIAGNOSIS — E78.2 MIXED HYPERLIPIDEMIA: ICD-10-CM

## 2020-09-08 DIAGNOSIS — I44.7 LBBB (LEFT BUNDLE BRANCH BLOCK): ICD-10-CM

## 2020-09-08 PROCEDURE — 99999 PR PBB SHADOW E&M-EST. PATIENT-LVL II: CPT | Mod: PBBFAC,,,

## 2020-09-08 PROCEDURE — 93880 CV US DOPPLER CAROTID (CUPID ONLY): ICD-10-PCS | Mod: S$GLB,,, | Performed by: INTERNAL MEDICINE

## 2020-09-08 PROCEDURE — 99999 PR PBB SHADOW E&M-EST. PATIENT-LVL II: ICD-10-PCS | Mod: PBBFAC,,,

## 2020-09-08 PROCEDURE — 93880 EXTRACRANIAL BILAT STUDY: CPT | Mod: S$GLB,,, | Performed by: INTERNAL MEDICINE

## 2020-09-14 LAB
LEFT ARM DIASTOLIC BLOOD PRESSURE: 60 MMHG
LEFT ARM SYSTOLIC BLOOD PRESSURE: 114 MMHG
LEFT CBA DIAS: 23 CM/S
LEFT CBA SYS: 83 CM/S
LEFT CCA DIST DIAS: 24 CM/S
LEFT CCA DIST SYS: 78 CM/S
LEFT CCA MID DIAS: 18 CM/S
LEFT CCA MID SYS: 74 CM/S
LEFT CCA PROX DIAS: 29 CM/S
LEFT CCA PROX SYS: 115 CM/S
LEFT ECA DIAS: 43 CM/S
LEFT ECA SYS: 230 CM/S
LEFT ICA DIST DIAS: 68 CM/S
LEFT ICA DIST SYS: 224 CM/S
LEFT ICA MID DIAS: 65 CM/S
LEFT ICA MID SYS: 271 CM/S
LEFT ICA PROX DIAS: 68 CM/S
LEFT ICA PROX SYS: 265 CM/S
LEFT VERTEBRAL DIAS: 19 CM/S
LEFT VERTEBRAL SYS: 91 CM/S
OHS CV CAROTID RIGHT ICA EDV HIGHEST: 65
OHS CV CAROTID ULTRASOUND LEFT ICA/CCA RATIO: 3.47
OHS CV CAROTID ULTRASOUND RIGHT ICA/CCA RATIO: 3.33
OHS CV PV CAROTID LEFT HIGHEST CCA: 115
OHS CV PV CAROTID LEFT HIGHEST ICA: 271
OHS CV PV CAROTID RIGHT HIGHEST CCA: 92
OHS CV PV CAROTID RIGHT HIGHEST ICA: 296
OHS CV US CAROTID LEFT HIGHEST EDV: 68
RIGHT ARM DIASTOLIC BLOOD PRESSURE: 60 MMHG
RIGHT ARM SYSTOLIC BLOOD PRESSURE: 114 MMHG
RIGHT CBA DIAS: 36 CM/S
RIGHT CBA SYS: 232 CM/S
RIGHT CCA DIST DIAS: 20 CM/S
RIGHT CCA DIST SYS: 89 CM/S
RIGHT CCA MID DIAS: 19 CM/S
RIGHT CCA MID SYS: 92 CM/S
RIGHT CCA PROX DIAS: 16 CM/S
RIGHT CCA PROX SYS: 84 CM/S
RIGHT ECA DIAS: 33 CM/S
RIGHT ECA SYS: 268 CM/S
RIGHT ICA DIST DIAS: 41 CM/S
RIGHT ICA DIST SYS: 137 CM/S
RIGHT ICA MID DIAS: 46 CM/S
RIGHT ICA MID SYS: 217 CM/S
RIGHT ICA PROX DIAS: 65 CM/S
RIGHT ICA PROX SYS: 296 CM/S
RIGHT VERTEBRAL DIAS: 14 CM/S
RIGHT VERTEBRAL SYS: 61 CM/S

## 2020-09-26 ENCOUNTER — PATIENT MESSAGE (OUTPATIENT)
Dept: FAMILY MEDICINE | Facility: CLINIC | Age: 65
End: 2020-09-26

## 2020-10-01 ENCOUNTER — OFFICE VISIT (OUTPATIENT)
Dept: FAMILY MEDICINE | Facility: CLINIC | Age: 65
End: 2020-10-01
Payer: COMMERCIAL

## 2020-10-01 VITALS
HEIGHT: 63 IN | SYSTOLIC BLOOD PRESSURE: 124 MMHG | DIASTOLIC BLOOD PRESSURE: 74 MMHG | WEIGHT: 153.81 LBS | BODY MASS INDEX: 27.25 KG/M2 | HEART RATE: 88 BPM | TEMPERATURE: 98 F

## 2020-10-01 DIAGNOSIS — Z12.4 ENCOUNTER FOR PAPANICOLAOU SMEAR FOR CERVICAL CANCER SCREENING: Primary | ICD-10-CM

## 2020-10-01 PROCEDURE — 99999 PR PBB SHADOW E&M-EST. PATIENT-LVL IV: CPT | Mod: PBBFAC,,, | Performed by: NURSE PRACTITIONER

## 2020-10-01 PROCEDURE — 3074F PR MOST RECENT SYSTOLIC BLOOD PRESSURE < 130 MM HG: ICD-10-PCS | Mod: CPTII,S$GLB,, | Performed by: NURSE PRACTITIONER

## 2020-10-01 PROCEDURE — 99396 PR PREVENTIVE VISIT,EST,40-64: ICD-10-PCS | Mod: 25,S$GLB,, | Performed by: NURSE PRACTITIONER

## 2020-10-01 PROCEDURE — 3078F DIAST BP <80 MM HG: CPT | Mod: CPTII,S$GLB,, | Performed by: NURSE PRACTITIONER

## 2020-10-01 PROCEDURE — 3074F SYST BP LT 130 MM HG: CPT | Mod: CPTII,S$GLB,, | Performed by: NURSE PRACTITIONER

## 2020-10-01 PROCEDURE — 88142 CYTOPATH C/V THIN LAYER: CPT

## 2020-10-01 PROCEDURE — 3078F PR MOST RECENT DIASTOLIC BLOOD PRESSURE < 80 MM HG: ICD-10-PCS | Mod: CPTII,S$GLB,, | Performed by: NURSE PRACTITIONER

## 2020-10-01 PROCEDURE — 99999 PR PBB SHADOW E&M-EST. PATIENT-LVL IV: ICD-10-PCS | Mod: PBBFAC,,, | Performed by: NURSE PRACTITIONER

## 2020-10-01 PROCEDURE — 87624 HPV HI-RISK TYP POOLED RSLT: CPT

## 2020-10-01 PROCEDURE — 3008F PR BODY MASS INDEX (BMI) DOCUMENTED: ICD-10-PCS | Mod: CPTII,S$GLB,, | Performed by: NURSE PRACTITIONER

## 2020-10-01 PROCEDURE — 3008F BODY MASS INDEX DOCD: CPT | Mod: CPTII,S$GLB,, | Performed by: NURSE PRACTITIONER

## 2020-10-01 PROCEDURE — 99396 PREV VISIT EST AGE 40-64: CPT | Mod: 25,S$GLB,, | Performed by: NURSE PRACTITIONER

## 2020-10-01 NOTE — PROGRESS NOTES
Subjective:       Patient ID: Dulce Root is a 65 y.o. female.    Chief Complaint: Gynecologic Exam    Gynecologic Exam  The patient's pertinent negatives include no pelvic pain, vaginal bleeding or vaginal discharge. The problem has been unchanged. She is not pregnant. Pertinent negatives include no abdominal pain, diarrhea, fever, headaches, rash, sore throat or vomiting. She is sexually active. No, her partner does not have an STD. She uses nothing for contraception. She is postmenopausal.       Review of Systems   Constitutional: Negative for fatigue, fever and unexpected weight change.   HENT: Negative for ear pain and sore throat.    Eyes: Negative for pain and visual disturbance.   Respiratory: Negative for cough and shortness of breath.    Cardiovascular: Negative for chest pain and palpitations.   Gastrointestinal: Negative for abdominal pain, diarrhea and vomiting.   Genitourinary: Negative for pelvic pain and vaginal discharge.   Musculoskeletal: Negative for arthralgias and myalgias.   Skin: Negative for color change and rash.   Neurological: Negative for dizziness and headaches.   Psychiatric/Behavioral: Negative for dysphoric mood and sleep disturbance. The patient is not nervous/anxious.        Vitals:    10/01/20 0854   BP: 124/74   Pulse: 88   Temp: 97.7 °F (36.5 °C)       Objective:     Current Outpatient Medications   Medication Sig Dispense Refill    acetaminophen (TYLENOL) 325 MG tablet Take 1 tablet (325 mg total) by mouth every 6 (six) hours as needed for Pain.      ALBUTEROL, REFILL, INHL Inhale into the lungs.      aspirin 81 MG Chew Take 81 mg by mouth once daily.      brimonidine-timolol (COMBIGAN) 0.2-0.5 % Drop Place 1 drop into both eyes 2 (two) times daily.       calcium carbonate (OS-CALVIN) 600 mg (1,500 mg) Tab Take 600 mg by mouth 2 (two) times daily with meals.      citalopram (CELEXA) 10 MG tablet Take 1 tablet (10 mg total) by mouth once daily. 90 tablet 3    clopidogreL  (PLAVIX) 75 mg tablet TAKE 1 TABLET BY MOUTH EVERY DAY 90 tablet 11    digoxin (LANOXIN) 125 mcg tablet TAKE 1 TABLET (125 MCG TOTAL) BY MOUTH ONCE DAILY. 30 tablet 11    folic acid (FOLVITE) 1 MG tablet       levothyroxine (SYNTHROID) 75 MCG tablet Take 1 tablet (75 mcg total) by mouth once daily. 90 tablet 3    metoprolol succinate (TOPROL-XL) 25 MG 24 hr tablet TAKE 1/2 TABLET BY MOUTH TWICE A DAY 90 tablet 3    multivitamin with minerals tablet Take 1 tablet by mouth once daily.      rosuvastatin (CRESTOR) 20 MG tablet TAKE 1 TABLET every OTHER night 90 tablet 3    telmisartan (MICARDIS) 20 MG Tab Take 1 tablet (20 mg total) by mouth once daily. 90 tablet 3    valacyclovir (VALTREX) 1000 MG tablet TAKE 1 TABLET EVERY 24 HOURS; prn  3    XIIDRA 5 % Dpet Place 1 drop into both eyes 2 (two) times daily.  3    gabapentin (NEURONTIN) 300 MG capsule TAKE 1 CAP AT BEDTIME X1 WEEK, THEN 1 CAP AT BREAKFAST AND BEDTIME X1 WEEK, THEN 1 CAP 3X DAILY. (Patient not taking: Reported on 8/25/2020) 270 capsule 1    RESTASIS 0.05 % ophthalmic emulsion Place 1 drop into both eyes 2 (two) times daily.  4     Current Facility-Administered Medications   Medication Dose Route Frequency Provider Last Rate Last Dose    albuterol inhaler 2 puff  2 puff Inhalation 1 time in Clinic/HOD Sujit Chaudhry MD           Physical Exam  Constitutional:       General: She is not in acute distress.     Appearance: She is well-developed.   HENT:      Head: Normocephalic and atraumatic.   Eyes:      Pupils: Pupils are equal, round, and reactive to light.   Cardiovascular:      Rate and Rhythm: Normal rate.   Pulmonary:      Effort: Pulmonary effort is normal.   Chest:      Breasts:         Right: No mass, nipple discharge or skin change.         Left: No mass, nipple discharge or skin change.   Abdominal:      General: There is no distension.      Tenderness: There is no abdominal tenderness.   Genitourinary:     Labia:         Right: No  rash.         Left: No rash.       Vagina: Normal.      Cervix: No cervical motion tenderness, discharge or friability.      Uterus: Not tender.       Adnexa:         Right: No mass or tenderness.          Left: No mass or tenderness.     Skin:     General: Skin is warm and dry.         Pap smear performed. Endocervical sample obtained using a brush. Cervix without bleeding or discharge. Vaginal wall with no abnormalities noted. Pt tolerated procedure well. Sample will be sent for pathology.    Assessment:       1. Encounter for Papanicolaou smear for cervical cancer screening        Plan:   Encounter for Papanicolaou smear for cervical cancer screening  -     HPV High Risk Genotypes, PCR  -     Liquid-Based Pap Smear, Screening        Follow up if symptoms worsen or fail to improve.    There are no Patient Instructions on file for this visit.

## 2020-10-06 LAB
HPV HR 12 DNA SPEC QL NAA+PROBE: NEGATIVE
HPV16 AG SPEC QL: NEGATIVE
HPV18 DNA SPEC QL NAA+PROBE: NEGATIVE

## 2020-10-07 ENCOUNTER — PATIENT MESSAGE (OUTPATIENT)
Dept: VASCULAR SURGERY | Facility: CLINIC | Age: 65
End: 2020-10-07

## 2020-10-23 LAB
FINAL PATHOLOGIC DIAGNOSIS: NORMAL
Lab: NORMAL

## 2020-10-27 NOTE — TELEPHONE ENCOUNTER
Left message on voicemail requesting call back to schedule CT scan. Per chart review, patient has not yet read reply to MyChart sent by her on 10/7/20.

## 2020-10-29 ENCOUNTER — PATIENT MESSAGE (OUTPATIENT)
Dept: VASCULAR SURGERY | Facility: CLINIC | Age: 65
End: 2020-10-29

## 2020-11-20 ENCOUNTER — PATIENT OUTREACH (OUTPATIENT)
Dept: OTHER | Facility: OTHER | Age: 65
End: 2020-11-20

## 2020-11-20 NOTE — PROGRESS NOTES
Digital Medicine: Clinician Follow-Up    Called patient for digital medicine follow up. She has been doing well. Apologized for not checking blood pressure regularly due to being busy. Plans to check later this weekend. Currently taking telmisartan and metoprolol with no complaints.     The history is provided by the patient.   Follow-up reason(s): routine follow up.     Hypertension  Patient needs assistance troubleshooting: Patient reminder needed.          Last 5 Patient Entered Readings                                      Current 30 Day Average: 120/72     Recent Readings 10/25/2020 10/15/2020 9/22/2020 9/13/2020 9/4/2020    SBP (mmHg) 120 123 118 129 106    DBP (mmHg) 72 76 67 72 61    Pulse 96 83 96 87 77                 Depression Screening  Did not address depression screening.    Sleep Apnea Screening    Did not address sleep apnea screening.     Medication Affordability Screening  Did not address medication affordability screening.     Medication Adherence-Medication adherence was assessed.          ASSESSMENT(S)  Patients BP average is 120/72 mmHg, which is at goal. Patient's BP goal is less than or equal to 130/80.    BMP stable as of 8/21/2020.      Hypertension Plan  Additional monitoring needed. Reminded patient to submit weekly readings.   Continue current therapy.       Addressed patient questions and patient has my contact information if needed prior to next outreach. Patient verbalizes understanding.             There are no preventive care reminders to display for this patient.  There are no preventive care reminders to display for this patient.      Hypertension Medications             metoprolol succinate (TOPROL-XL) 25 MG 24 hr tablet TAKE 1/2 TABLET BY MOUTH TWICE A DAY    telmisartan (MICARDIS) 20 MG Tab Take 1 tablet (20 mg total) by mouth once daily.

## 2020-12-03 NOTE — PROGRESS NOTES
"Digital Medicine: Health  Follow-Up    Called to follow up with patient, last reading is from 10/25. Patient reminder needed, she stated she has "been forgetting" and will take a readings "in about an hour". Patient is doing well and reports no complaints/concerns at this time. Patient denied lifestyle coaching at this outreach and denied any changes to her lifestyle at this time.       The history is provided by the patient.                 Patient needs assistance troubleshooting: patient reminder needed.            Diet-no change to diet    No change to diet.        Physical Activity-no change to routine  No change to exercise routine.     Medication Adherence-Medication Adherence not addressed.      Substance, Sleep, Stress-Not assessed      Additional monitoring needed.  Continue current diet/physical activity routine.  Instructed to charge device.       Addressed patient questions and patient has my contact information if needed prior to next outreach. Patient verbalizes understanding.      Explained the importance of self-monitoring and medication adherence. Encouraged the patient to communicate with their health  for lifestyle modifications to help improve or maintain a healthy lifestyle.               There are no preventive care reminders to display for this patient.      Last 5 Patient Entered Readings                                      Current 30 Day Average:      Recent Readings 10/25/2020 10/15/2020 9/22/2020 9/13/2020 9/4/2020    SBP (mmHg) 120 123 118 129 106    DBP (mmHg) 72 76 67 72 61    Pulse 96 83 96 87 77               "

## 2020-12-15 ENCOUNTER — PATIENT MESSAGE (OUTPATIENT)
Dept: FAMILY MEDICINE | Facility: CLINIC | Age: 65
End: 2020-12-15

## 2021-01-26 ENCOUNTER — HOSPITAL ENCOUNTER (OUTPATIENT)
Dept: RADIOLOGY | Facility: HOSPITAL | Age: 66
Discharge: HOME | End: 2021-01-26
Attending: NURSE PRACTITIONER
Payer: COMMERCIAL

## 2021-01-26 ENCOUNTER — TELEPHONE (OUTPATIENT)
Dept: FAMILY MEDICINE | Facility: CLINIC | Age: 66
End: 2021-01-26

## 2021-01-26 ENCOUNTER — OFFICE VISIT (OUTPATIENT)
Dept: FAMILY MEDICINE | Facility: CLINIC | Age: 66
End: 2021-01-26
Payer: COMMERCIAL

## 2021-01-26 VITALS
BODY MASS INDEX: 26.43 KG/M2 | TEMPERATURE: 97 F | DIASTOLIC BLOOD PRESSURE: 88 MMHG | WEIGHT: 154.81 LBS | HEART RATE: 94 BPM | HEIGHT: 64 IN | SYSTOLIC BLOOD PRESSURE: 138 MMHG | OXYGEN SATURATION: 90 %

## 2021-01-26 DIAGNOSIS — S29.012A STRAIN OF THORACIC BACK REGION: ICD-10-CM

## 2021-01-26 DIAGNOSIS — S29.012A STRAIN OF THORACIC BACK REGION: Primary | ICD-10-CM

## 2021-01-26 PROCEDURE — 1125F PR PAIN SEVERITY QUANTIFIED, PAIN PRESENT: ICD-10-PCS | Mod: S$GLB,,, | Performed by: NURSE PRACTITIONER

## 2021-01-26 PROCEDURE — 72070 XR THORACIC SPINE AP LATERAL: ICD-10-PCS | Mod: 26,,, | Performed by: RADIOLOGY

## 2021-01-26 PROCEDURE — 99213 OFFICE O/P EST LOW 20 MIN: CPT | Mod: S$GLB,,, | Performed by: NURSE PRACTITIONER

## 2021-01-26 PROCEDURE — 72070 X-RAY EXAM THORAC SPINE 2VWS: CPT | Mod: 26,,, | Performed by: RADIOLOGY

## 2021-01-26 PROCEDURE — 3008F BODY MASS INDEX DOCD: CPT | Mod: CPTII,S$GLB,, | Performed by: NURSE PRACTITIONER

## 2021-01-26 PROCEDURE — 3075F PR MOST RECENT SYSTOLIC BLOOD PRESS GE 130-139MM HG: ICD-10-PCS | Mod: CPTII,S$GLB,, | Performed by: NURSE PRACTITIONER

## 2021-01-26 PROCEDURE — 3079F PR MOST RECENT DIASTOLIC BLOOD PRESSURE 80-89 MM HG: ICD-10-PCS | Mod: CPTII,S$GLB,, | Performed by: NURSE PRACTITIONER

## 2021-01-26 PROCEDURE — 3079F DIAST BP 80-89 MM HG: CPT | Mod: CPTII,S$GLB,, | Performed by: NURSE PRACTITIONER

## 2021-01-26 PROCEDURE — 99999 PR PBB SHADOW E&M-EST. PATIENT-LVL V: CPT | Mod: PBBFAC,,, | Performed by: NURSE PRACTITIONER

## 2021-01-26 PROCEDURE — 99213 PR OFFICE/OUTPT VISIT, EST, LEVL III, 20-29 MIN: ICD-10-PCS | Mod: S$GLB,,, | Performed by: NURSE PRACTITIONER

## 2021-01-26 PROCEDURE — 3008F PR BODY MASS INDEX (BMI) DOCUMENTED: ICD-10-PCS | Mod: CPTII,S$GLB,, | Performed by: NURSE PRACTITIONER

## 2021-01-26 PROCEDURE — 3075F SYST BP GE 130 - 139MM HG: CPT | Mod: CPTII,S$GLB,, | Performed by: NURSE PRACTITIONER

## 2021-01-26 PROCEDURE — 1125F AMNT PAIN NOTED PAIN PRSNT: CPT | Mod: S$GLB,,, | Performed by: NURSE PRACTITIONER

## 2021-01-26 PROCEDURE — 99999 PR PBB SHADOW E&M-EST. PATIENT-LVL V: ICD-10-PCS | Mod: PBBFAC,,, | Performed by: NURSE PRACTITIONER

## 2021-01-26 PROCEDURE — 72070 X-RAY EXAM THORAC SPINE 2VWS: CPT | Mod: TC,PO

## 2021-01-26 RX ORDER — TIZANIDINE 4 MG/1
4 TABLET ORAL EVERY 6 HOURS PRN
Qty: 20 TABLET | Refills: 1 | Status: SHIPPED | OUTPATIENT
Start: 2021-01-26 | End: 2021-02-05

## 2021-01-26 RX ORDER — HYDROCODONE BITARTRATE AND ACETAMINOPHEN 7.5; 325 MG/1; MG/1
1 TABLET ORAL EVERY 8 HOURS PRN
Qty: 20 TABLET | Refills: 0 | Status: SHIPPED | OUTPATIENT
Start: 2021-01-26 | End: 2021-02-14 | Stop reason: SDUPTHER

## 2021-01-26 RX ORDER — ALPRAZOLAM 0.5 MG/1
TABLET ORAL
COMMUNITY
Start: 2020-12-13 | End: 2021-02-17

## 2021-01-27 ENCOUNTER — PATIENT MESSAGE (OUTPATIENT)
Dept: FAMILY MEDICINE | Facility: CLINIC | Age: 66
End: 2021-01-27

## 2021-01-27 ENCOUNTER — PATIENT MESSAGE (OUTPATIENT)
Dept: NEUROSURGERY | Facility: CLINIC | Age: 66
End: 2021-01-27

## 2021-01-27 ENCOUNTER — TELEPHONE (OUTPATIENT)
Dept: FAMILY MEDICINE | Facility: CLINIC | Age: 66
End: 2021-01-27

## 2021-01-27 DIAGNOSIS — S22.060D COMPRESSION FRACTURE OF T7 VERTEBRA WITH ROUTINE HEALING, SUBSEQUENT ENCOUNTER: Primary | ICD-10-CM

## 2021-01-27 DIAGNOSIS — Z78.0 POSTMENOPAUSAL STATE: Primary | ICD-10-CM

## 2021-01-28 ENCOUNTER — PATIENT OUTREACH (OUTPATIENT)
Dept: ADMINISTRATIVE | Facility: OTHER | Age: 66
End: 2021-01-28

## 2021-01-29 ENCOUNTER — OFFICE VISIT (OUTPATIENT)
Dept: NEUROSURGERY | Facility: CLINIC | Age: 66
End: 2021-01-29
Payer: COMMERCIAL

## 2021-01-29 ENCOUNTER — TELEPHONE (OUTPATIENT)
Dept: NEUROSURGERY | Facility: CLINIC | Age: 66
End: 2021-01-29

## 2021-01-29 VITALS
RESPIRATION RATE: 16 BRPM | HEART RATE: 87 BPM | BODY MASS INDEX: 26.79 KG/M2 | WEIGHT: 156.94 LBS | SYSTOLIC BLOOD PRESSURE: 139 MMHG | HEIGHT: 64 IN | DIASTOLIC BLOOD PRESSURE: 78 MMHG

## 2021-01-29 DIAGNOSIS — S22.060D COMPRESSION FRACTURE OF T7 VERTEBRA WITH ROUTINE HEALING, SUBSEQUENT ENCOUNTER: ICD-10-CM

## 2021-01-29 DIAGNOSIS — M47.814 SPONDYLOSIS WITHOUT MYELOPATHY OR RADICULOPATHY, THORACIC REGION: ICD-10-CM

## 2021-01-29 PROCEDURE — 3078F DIAST BP <80 MM HG: CPT | Mod: CPTII,S$GLB,, | Performed by: NEUROLOGICAL SURGERY

## 2021-01-29 PROCEDURE — 99213 OFFICE O/P EST LOW 20 MIN: CPT | Mod: S$GLB,,, | Performed by: NEUROLOGICAL SURGERY

## 2021-01-29 PROCEDURE — 1125F AMNT PAIN NOTED PAIN PRSNT: CPT | Mod: S$GLB,,, | Performed by: NEUROLOGICAL SURGERY

## 2021-01-29 PROCEDURE — 3008F PR BODY MASS INDEX (BMI) DOCUMENTED: ICD-10-PCS | Mod: CPTII,S$GLB,, | Performed by: NEUROLOGICAL SURGERY

## 2021-01-29 PROCEDURE — 99999 PR PBB SHADOW E&M-EST. PATIENT-LVL V: CPT | Mod: PBBFAC,,, | Performed by: NEUROLOGICAL SURGERY

## 2021-01-29 PROCEDURE — 99213 PR OFFICE/OUTPT VISIT, EST, LEVL III, 20-29 MIN: ICD-10-PCS | Mod: S$GLB,,, | Performed by: NEUROLOGICAL SURGERY

## 2021-01-29 PROCEDURE — 3075F SYST BP GE 130 - 139MM HG: CPT | Mod: CPTII,S$GLB,, | Performed by: NEUROLOGICAL SURGERY

## 2021-01-29 PROCEDURE — 3075F PR MOST RECENT SYSTOLIC BLOOD PRESS GE 130-139MM HG: ICD-10-PCS | Mod: CPTII,S$GLB,, | Performed by: NEUROLOGICAL SURGERY

## 2021-01-29 PROCEDURE — 1125F PR PAIN SEVERITY QUANTIFIED, PAIN PRESENT: ICD-10-PCS | Mod: S$GLB,,, | Performed by: NEUROLOGICAL SURGERY

## 2021-01-29 PROCEDURE — 99999 PR PBB SHADOW E&M-EST. PATIENT-LVL V: ICD-10-PCS | Mod: PBBFAC,,, | Performed by: NEUROLOGICAL SURGERY

## 2021-01-29 PROCEDURE — 3008F BODY MASS INDEX DOCD: CPT | Mod: CPTII,S$GLB,, | Performed by: NEUROLOGICAL SURGERY

## 2021-01-29 PROCEDURE — 3078F PR MOST RECENT DIASTOLIC BLOOD PRESSURE < 80 MM HG: ICD-10-PCS | Mod: CPTII,S$GLB,, | Performed by: NEUROLOGICAL SURGERY

## 2021-02-04 ENCOUNTER — HOSPITAL ENCOUNTER (OUTPATIENT)
Dept: RADIOLOGY | Facility: HOSPITAL | Age: 66
Discharge: HOME | End: 2021-02-04
Attending: NEUROLOGICAL SURGERY
Payer: COMMERCIAL

## 2021-02-04 ENCOUNTER — HOSPITAL ENCOUNTER (OUTPATIENT)
Dept: RADIOLOGY | Facility: HOSPITAL | Age: 66
Discharge: HOME | End: 2021-02-04
Attending: NURSE PRACTITIONER
Payer: COMMERCIAL

## 2021-02-04 DIAGNOSIS — M47.814 SPONDYLOSIS WITHOUT MYELOPATHY OR RADICULOPATHY, THORACIC REGION: ICD-10-CM

## 2021-02-04 DIAGNOSIS — Z78.0 POSTMENOPAUSAL STATE: ICD-10-CM

## 2021-02-04 DIAGNOSIS — S22.060D COMPRESSION FRACTURE OF T7 VERTEBRA WITH ROUTINE HEALING, SUBSEQUENT ENCOUNTER: ICD-10-CM

## 2021-02-04 PROCEDURE — 72146 MRI CHEST SPINE W/O DYE: CPT | Mod: TC

## 2021-02-04 PROCEDURE — 77080 DXA BONE DENSITY AXIAL: CPT | Mod: TC,PO

## 2021-02-04 PROCEDURE — 72128 CT CHEST SPINE W/O DYE: CPT | Mod: TC

## 2021-02-04 PROCEDURE — 77080 DEXA BONE DENSITY SPINE HIP: ICD-10-PCS | Mod: 26,,, | Performed by: RADIOLOGY

## 2021-02-04 PROCEDURE — 77080 DXA BONE DENSITY AXIAL: CPT | Mod: 26,,, | Performed by: RADIOLOGY

## 2021-02-04 RX ORDER — ALENDRONATE SODIUM 70 MG/1
70 TABLET ORAL
Qty: 4 TABLET | Refills: 11 | Status: SHIPPED | OUTPATIENT
Start: 2021-02-04 | End: 2021-02-17

## 2021-02-05 ENCOUNTER — OFFICE VISIT (OUTPATIENT)
Dept: NEUROSURGERY | Facility: CLINIC | Age: 66
End: 2021-02-05
Payer: COMMERCIAL

## 2021-02-05 VITALS
RESPIRATION RATE: 18 BRPM | DIASTOLIC BLOOD PRESSURE: 71 MMHG | HEART RATE: 93 BPM | SYSTOLIC BLOOD PRESSURE: 128 MMHG | HEIGHT: 64 IN | WEIGHT: 155.31 LBS | BODY MASS INDEX: 26.52 KG/M2

## 2021-02-05 DIAGNOSIS — S22.060D COMPRESSION FRACTURE OF T7 VERTEBRA WITH ROUTINE HEALING, SUBSEQUENT ENCOUNTER: Primary | ICD-10-CM

## 2021-02-05 PROCEDURE — 99214 PR OFFICE/OUTPT VISIT, EST, LEVL IV, 30-39 MIN: ICD-10-PCS | Mod: S$GLB,,, | Performed by: NEUROLOGICAL SURGERY

## 2021-02-05 PROCEDURE — 3078F DIAST BP <80 MM HG: CPT | Mod: CPTII,S$GLB,, | Performed by: NEUROLOGICAL SURGERY

## 2021-02-05 PROCEDURE — 3078F PR MOST RECENT DIASTOLIC BLOOD PRESSURE < 80 MM HG: ICD-10-PCS | Mod: CPTII,S$GLB,, | Performed by: NEUROLOGICAL SURGERY

## 2021-02-05 PROCEDURE — 99999 PR PBB SHADOW E&M-EST. PATIENT-LVL V: CPT | Mod: PBBFAC,,, | Performed by: NEUROLOGICAL SURGERY

## 2021-02-05 PROCEDURE — 99999 PR PBB SHADOW E&M-EST. PATIENT-LVL V: ICD-10-PCS | Mod: PBBFAC,,, | Performed by: NEUROLOGICAL SURGERY

## 2021-02-05 PROCEDURE — 1125F AMNT PAIN NOTED PAIN PRSNT: CPT | Mod: S$GLB,,, | Performed by: NEUROLOGICAL SURGERY

## 2021-02-05 PROCEDURE — 3008F PR BODY MASS INDEX (BMI) DOCUMENTED: ICD-10-PCS | Mod: CPTII,S$GLB,, | Performed by: NEUROLOGICAL SURGERY

## 2021-02-05 PROCEDURE — 99214 OFFICE O/P EST MOD 30 MIN: CPT | Mod: S$GLB,,, | Performed by: NEUROLOGICAL SURGERY

## 2021-02-05 PROCEDURE — 3008F BODY MASS INDEX DOCD: CPT | Mod: CPTII,S$GLB,, | Performed by: NEUROLOGICAL SURGERY

## 2021-02-05 PROCEDURE — 3074F SYST BP LT 130 MM HG: CPT | Mod: CPTII,S$GLB,, | Performed by: NEUROLOGICAL SURGERY

## 2021-02-05 PROCEDURE — 3074F PR MOST RECENT SYSTOLIC BLOOD PRESSURE < 130 MM HG: ICD-10-PCS | Mod: CPTII,S$GLB,, | Performed by: NEUROLOGICAL SURGERY

## 2021-02-05 PROCEDURE — 1125F PR PAIN SEVERITY QUANTIFIED, PAIN PRESENT: ICD-10-PCS | Mod: S$GLB,,, | Performed by: NEUROLOGICAL SURGERY

## 2021-02-08 ENCOUNTER — HOSPITAL ENCOUNTER (OUTPATIENT)
Dept: RADIOLOGY | Facility: HOSPITAL | Age: 66
Discharge: HOME | End: 2021-02-08
Attending: NURSE PRACTITIONER
Payer: COMMERCIAL

## 2021-02-08 ENCOUNTER — OFFICE VISIT (OUTPATIENT)
Dept: FAMILY MEDICINE | Facility: CLINIC | Age: 66
End: 2021-02-08
Payer: COMMERCIAL

## 2021-02-08 VITALS
HEIGHT: 63 IN | DIASTOLIC BLOOD PRESSURE: 84 MMHG | SYSTOLIC BLOOD PRESSURE: 132 MMHG | HEART RATE: 86 BPM | WEIGHT: 152.63 LBS | BODY MASS INDEX: 27.04 KG/M2 | OXYGEN SATURATION: 91 % | TEMPERATURE: 98 F

## 2021-02-08 DIAGNOSIS — R06.02 SHORTNESS OF BREATH: ICD-10-CM

## 2021-02-08 DIAGNOSIS — R06.02 SHORTNESS OF BREATH: Primary | ICD-10-CM

## 2021-02-08 PROCEDURE — 3079F DIAST BP 80-89 MM HG: CPT | Mod: CPTII,S$GLB,, | Performed by: NURSE PRACTITIONER

## 2021-02-08 PROCEDURE — 99214 OFFICE O/P EST MOD 30 MIN: CPT | Mod: S$GLB,,, | Performed by: NURSE PRACTITIONER

## 2021-02-08 PROCEDURE — 3008F BODY MASS INDEX DOCD: CPT | Mod: CPTII,S$GLB,, | Performed by: NURSE PRACTITIONER

## 2021-02-08 PROCEDURE — 71046 X-RAY EXAM CHEST 2 VIEWS: CPT | Mod: TC,PO

## 2021-02-08 PROCEDURE — 3079F PR MOST RECENT DIASTOLIC BLOOD PRESSURE 80-89 MM HG: ICD-10-PCS | Mod: CPTII,S$GLB,, | Performed by: NURSE PRACTITIONER

## 2021-02-08 PROCEDURE — 71046 XR CHEST PA AND LATERAL: ICD-10-PCS | Mod: 26,,, | Performed by: RADIOLOGY

## 2021-02-08 PROCEDURE — 99999 PR PBB SHADOW E&M-EST. PATIENT-LVL V: CPT | Mod: PBBFAC,,, | Performed by: NURSE PRACTITIONER

## 2021-02-08 PROCEDURE — 99999 PR PBB SHADOW E&M-EST. PATIENT-LVL V: ICD-10-PCS | Mod: PBBFAC,,, | Performed by: NURSE PRACTITIONER

## 2021-02-08 PROCEDURE — 1125F AMNT PAIN NOTED PAIN PRSNT: CPT | Mod: S$GLB,,, | Performed by: NURSE PRACTITIONER

## 2021-02-08 PROCEDURE — 1125F PR PAIN SEVERITY QUANTIFIED, PAIN PRESENT: ICD-10-PCS | Mod: S$GLB,,, | Performed by: NURSE PRACTITIONER

## 2021-02-08 PROCEDURE — 3008F PR BODY MASS INDEX (BMI) DOCUMENTED: ICD-10-PCS | Mod: CPTII,S$GLB,, | Performed by: NURSE PRACTITIONER

## 2021-02-08 PROCEDURE — 3075F SYST BP GE 130 - 139MM HG: CPT | Mod: CPTII,S$GLB,, | Performed by: NURSE PRACTITIONER

## 2021-02-08 PROCEDURE — 3075F PR MOST RECENT SYSTOLIC BLOOD PRESS GE 130-139MM HG: ICD-10-PCS | Mod: CPTII,S$GLB,, | Performed by: NURSE PRACTITIONER

## 2021-02-08 PROCEDURE — 99214 PR OFFICE/OUTPT VISIT, EST, LEVL IV, 30-39 MIN: ICD-10-PCS | Mod: S$GLB,,, | Performed by: NURSE PRACTITIONER

## 2021-02-08 PROCEDURE — 71046 X-RAY EXAM CHEST 2 VIEWS: CPT | Mod: 26,,, | Performed by: RADIOLOGY

## 2021-02-14 ENCOUNTER — PATIENT MESSAGE (OUTPATIENT)
Dept: FAMILY MEDICINE | Facility: CLINIC | Age: 66
End: 2021-02-14

## 2021-02-14 DIAGNOSIS — I25.810 CORONARY ARTERY DISEASE INVOLVING CORONARY BYPASS GRAFT OF NATIVE HEART WITHOUT ANGINA PECTORIS: ICD-10-CM

## 2021-02-14 DIAGNOSIS — F41.9 ANXIETY: Primary | ICD-10-CM

## 2021-02-17 ENCOUNTER — PATIENT MESSAGE (OUTPATIENT)
Dept: CARDIOLOGY | Facility: CLINIC | Age: 66
End: 2021-02-17

## 2021-02-17 ENCOUNTER — OFFICE VISIT (OUTPATIENT)
Dept: FAMILY MEDICINE | Facility: CLINIC | Age: 66
End: 2021-02-17
Payer: COMMERCIAL

## 2021-02-17 DIAGNOSIS — S22.060A CLOSED WEDGE COMPRESSION FRACTURE OF T7 VERTEBRA, INITIAL ENCOUNTER: Primary | ICD-10-CM

## 2021-02-17 DIAGNOSIS — M80.00XD AGE-RELATED OSTEOPOROSIS WITH CURRENT PATHOLOGICAL FRACTURE WITH ROUTINE HEALING: ICD-10-CM

## 2021-02-17 DIAGNOSIS — J81.0 ACUTE PULMONARY EDEMA: ICD-10-CM

## 2021-02-17 PROCEDURE — 99214 OFFICE O/P EST MOD 30 MIN: CPT | Mod: 95,,, | Performed by: FAMILY MEDICINE

## 2021-02-17 PROCEDURE — 99214 PR OFFICE/OUTPT VISIT, EST, LEVL IV, 30-39 MIN: ICD-10-PCS | Mod: 95,,, | Performed by: FAMILY MEDICINE

## 2021-02-17 RX ORDER — ESCITALOPRAM OXALATE 10 MG/1
10 TABLET ORAL DAILY
Qty: 30 TABLET | Refills: 0 | Status: SHIPPED | OUTPATIENT
Start: 2021-02-17 | End: 2021-03-12

## 2021-02-17 RX ORDER — DIGOXIN 125 MCG
125 TABLET ORAL DAILY
Qty: 30 TABLET | Refills: 11 | Status: SHIPPED | OUTPATIENT
Start: 2021-02-17 | End: 2021-03-12

## 2021-02-17 RX ORDER — FUROSEMIDE 40 MG/1
40 TABLET ORAL DAILY
Qty: 90 TABLET | Refills: 3 | Status: SHIPPED | OUTPATIENT
Start: 2021-02-17 | End: 2021-08-05

## 2021-02-18 ENCOUNTER — LAB VISIT (OUTPATIENT)
Dept: LAB | Facility: HOSPITAL | Age: 66
End: 2021-02-18
Attending: INTERNAL MEDICINE
Payer: COMMERCIAL

## 2021-02-18 ENCOUNTER — OFFICE VISIT (OUTPATIENT)
Dept: RHEUMATOLOGY | Facility: CLINIC | Age: 66
End: 2021-02-18
Payer: COMMERCIAL

## 2021-02-18 VITALS — BODY MASS INDEX: 27.34 KG/M2 | WEIGHT: 154.31 LBS

## 2021-02-18 DIAGNOSIS — S22.069D CLOSED FRACTURE OF SEVENTH THORACIC VERTEBRA WITH ROUTINE HEALING, UNSPECIFIED FRACTURE MORPHOLOGY, SUBSEQUENT ENCOUNTER: ICD-10-CM

## 2021-02-18 DIAGNOSIS — M81.0 AGE RELATED OSTEOPOROSIS, UNSPECIFIED PATHOLOGICAL FRACTURE PRESENCE: ICD-10-CM

## 2021-02-18 DIAGNOSIS — L12.9 PEMPHIGOID: ICD-10-CM

## 2021-02-18 DIAGNOSIS — M81.0 AGE RELATED OSTEOPOROSIS, UNSPECIFIED PATHOLOGICAL FRACTURE PRESENCE: Primary | ICD-10-CM

## 2021-02-18 LAB
ALBUMIN SERPL BCP-MCNC: 4.3 G/DL (ref 3.5–5.2)
ALP SERPL-CCNC: 104 U/L (ref 55–135)
ALT SERPL W/O P-5'-P-CCNC: 21 U/L (ref 10–44)
ANION GAP SERPL CALC-SCNC: 7 MMOL/L (ref 8–16)
AST SERPL-CCNC: 20 U/L (ref 10–40)
BILIRUB SERPL-MCNC: 0.7 MG/DL (ref 0.1–1)
BUN SERPL-MCNC: 23 MG/DL (ref 8–23)
CALCIUM SERPL-MCNC: 9.9 MG/DL (ref 8.7–10.5)
CHLORIDE SERPL-SCNC: 103 MMOL/L (ref 95–110)
CO2 SERPL-SCNC: 28 MMOL/L (ref 23–29)
CREAT SERPL-MCNC: 0.8 MG/DL (ref 0.5–1.4)
EST. GFR  (AFRICAN AMERICAN): >60 ML/MIN/1.73 M^2
EST. GFR  (NON AFRICAN AMERICAN): >60 ML/MIN/1.73 M^2
GLUCOSE SERPL-MCNC: 103 MG/DL (ref 70–110)
MAGNESIUM SERPL-MCNC: 2.2 MG/DL (ref 1.6–2.6)
PHOSPHATE SERPL-MCNC: 3.4 MG/DL (ref 2.7–4.5)
POTASSIUM SERPL-SCNC: 4.6 MMOL/L (ref 3.5–5.1)
PROT SERPL-MCNC: 7.9 G/DL (ref 6–8.4)
SODIUM SERPL-SCNC: 138 MMOL/L (ref 136–145)

## 2021-02-18 PROCEDURE — 99244 OFF/OP CNSLTJ NEW/EST MOD 40: CPT | Mod: S$GLB,,, | Performed by: INTERNAL MEDICINE

## 2021-02-18 PROCEDURE — 82306 VITAMIN D 25 HYDROXY: CPT

## 2021-02-18 PROCEDURE — 84100 ASSAY OF PHOSPHORUS: CPT

## 2021-02-18 PROCEDURE — 83735 ASSAY OF MAGNESIUM: CPT

## 2021-02-18 PROCEDURE — 3008F PR BODY MASS INDEX (BMI) DOCUMENTED: ICD-10-PCS | Mod: CPTII,S$GLB,, | Performed by: INTERNAL MEDICINE

## 2021-02-18 PROCEDURE — 99999 PR PBB SHADOW E&M-EST. PATIENT-LVL III: ICD-10-PCS | Mod: PBBFAC,,, | Performed by: INTERNAL MEDICINE

## 2021-02-18 PROCEDURE — 3008F BODY MASS INDEX DOCD: CPT | Mod: CPTII,S$GLB,, | Performed by: INTERNAL MEDICINE

## 2021-02-18 PROCEDURE — 99244 PR OFFICE CONSULTATION,LEVEL IV: ICD-10-PCS | Mod: S$GLB,,, | Performed by: INTERNAL MEDICINE

## 2021-02-18 PROCEDURE — 83970 ASSAY OF PARATHORMONE: CPT

## 2021-02-18 PROCEDURE — 80053 COMPREHEN METABOLIC PANEL: CPT

## 2021-02-18 PROCEDURE — 1125F AMNT PAIN NOTED PAIN PRSNT: CPT | Mod: S$GLB,,, | Performed by: INTERNAL MEDICINE

## 2021-02-18 PROCEDURE — 99999 PR PBB SHADOW E&M-EST. PATIENT-LVL III: CPT | Mod: PBBFAC,,, | Performed by: INTERNAL MEDICINE

## 2021-02-18 PROCEDURE — 1125F PR PAIN SEVERITY QUANTIFIED, PAIN PRESENT: ICD-10-PCS | Mod: S$GLB,,, | Performed by: INTERNAL MEDICINE

## 2021-02-18 PROCEDURE — 36415 COLL VENOUS BLD VENIPUNCTURE: CPT

## 2021-02-18 RX ORDER — DAPSONE 25 MG/1
TABLET ORAL
Status: ON HOLD | COMMUNITY
Start: 2021-02-13 | End: 2021-11-04 | Stop reason: HOSPADM

## 2021-02-19 LAB
25(OH)D3+25(OH)D2 SERPL-MCNC: 35 NG/ML (ref 30–96)
PTH-INTACT SERPL-MCNC: 49 PG/ML (ref 9–77)

## 2021-03-03 ENCOUNTER — TELEPHONE (OUTPATIENT)
Dept: NEUROSURGERY | Facility: CLINIC | Age: 66
End: 2021-03-03

## 2021-03-18 ENCOUNTER — PATIENT OUTREACH (OUTPATIENT)
Dept: ADMINISTRATIVE | Facility: OTHER | Age: 66
End: 2021-03-18

## 2021-03-18 ENCOUNTER — TELEPHONE (OUTPATIENT)
Dept: NEUROSURGERY | Facility: CLINIC | Age: 66
End: 2021-03-18

## 2021-03-19 ENCOUNTER — TELEPHONE (OUTPATIENT)
Dept: NEUROSURGERY | Facility: CLINIC | Age: 66
End: 2021-03-19

## 2021-03-22 ENCOUNTER — TELEPHONE (OUTPATIENT)
Dept: NEUROSURGERY | Facility: CLINIC | Age: 66
End: 2021-03-22

## 2021-03-24 ENCOUNTER — PATIENT MESSAGE (OUTPATIENT)
Dept: CARDIOLOGY | Facility: CLINIC | Age: 66
End: 2021-03-24

## 2021-03-25 ENCOUNTER — TELEPHONE (OUTPATIENT)
Dept: NEUROSURGERY | Facility: CLINIC | Age: 66
End: 2021-03-25

## 2021-03-26 ENCOUNTER — HOSPITAL ENCOUNTER (OUTPATIENT)
Dept: RADIOLOGY | Facility: HOSPITAL | Age: 66
Discharge: HOME | End: 2021-03-26
Attending: NEUROLOGICAL SURGERY
Payer: COMMERCIAL

## 2021-03-26 ENCOUNTER — OFFICE VISIT (OUTPATIENT)
Dept: NEUROSURGERY | Facility: CLINIC | Age: 66
End: 2021-03-26
Payer: COMMERCIAL

## 2021-03-26 VITALS — HEIGHT: 63 IN | BODY MASS INDEX: 26.09 KG/M2 | RESPIRATION RATE: 16 BRPM | WEIGHT: 147.25 LBS

## 2021-03-26 DIAGNOSIS — S22.060D COMPRESSION FRACTURE OF T7 VERTEBRA WITH ROUTINE HEALING, SUBSEQUENT ENCOUNTER: ICD-10-CM

## 2021-03-26 DIAGNOSIS — M54.9 DORSALGIA, UNSPECIFIED: Primary | ICD-10-CM

## 2021-03-26 DIAGNOSIS — S22.060D COMPRESSION FRACTURE OF T7 VERTEBRA WITH ROUTINE HEALING, SUBSEQUENT ENCOUNTER: Primary | ICD-10-CM

## 2021-03-26 PROCEDURE — 3008F PR BODY MASS INDEX (BMI) DOCUMENTED: ICD-10-PCS | Mod: CPTII,S$GLB,, | Performed by: NEUROLOGICAL SURGERY

## 2021-03-26 PROCEDURE — 99213 OFFICE O/P EST LOW 20 MIN: CPT | Mod: S$GLB,,, | Performed by: NEUROLOGICAL SURGERY

## 2021-03-26 PROCEDURE — 99213 PR OFFICE/OUTPT VISIT, EST, LEVL III, 20-29 MIN: ICD-10-PCS | Mod: S$GLB,,, | Performed by: NEUROLOGICAL SURGERY

## 2021-03-26 PROCEDURE — 99999 PR PBB SHADOW E&M-EST. PATIENT-LVL III: ICD-10-PCS | Mod: PBBFAC,,, | Performed by: NEUROLOGICAL SURGERY

## 2021-03-26 PROCEDURE — 3008F BODY MASS INDEX DOCD: CPT | Mod: CPTII,S$GLB,, | Performed by: NEUROLOGICAL SURGERY

## 2021-03-26 PROCEDURE — 1126F AMNT PAIN NOTED NONE PRSNT: CPT | Mod: S$GLB,,, | Performed by: NEUROLOGICAL SURGERY

## 2021-03-26 PROCEDURE — 99999 PR PBB SHADOW E&M-EST. PATIENT-LVL III: CPT | Mod: PBBFAC,,, | Performed by: NEUROLOGICAL SURGERY

## 2021-03-26 PROCEDURE — 72080 XR THORACOLUMBAR SPINE AP LATERAL: ICD-10-PCS | Mod: 26,,, | Performed by: RADIOLOGY

## 2021-03-26 PROCEDURE — 1126F PR PAIN SEVERITY QUANTIFIED, NO PAIN PRESENT: ICD-10-PCS | Mod: S$GLB,,, | Performed by: NEUROLOGICAL SURGERY

## 2021-03-26 PROCEDURE — 72080 X-RAY EXAM THORACOLMB 2/> VW: CPT | Mod: 26,,, | Performed by: RADIOLOGY

## 2021-03-26 PROCEDURE — 72080 X-RAY EXAM THORACOLMB 2/> VW: CPT | Mod: TC

## 2021-03-26 RX ORDER — AMOXICILLIN 500 MG/1
CAPSULE ORAL
Status: ON HOLD | COMMUNITY
Start: 2021-03-11 | End: 2021-11-04 | Stop reason: HOSPADM

## 2021-03-26 RX ORDER — FLUORIDE (SODIUM) 1.1 %
PASTE (ML) DENTAL
COMMUNITY
Start: 2021-03-11 | End: 2022-11-22 | Stop reason: CLARIF

## 2021-05-06 ENCOUNTER — PATIENT OUTREACH (OUTPATIENT)
Dept: ADMINISTRATIVE | Facility: OTHER | Age: 66
End: 2021-05-06

## 2021-05-07 ENCOUNTER — HOSPITAL ENCOUNTER (OUTPATIENT)
Dept: RADIOLOGY | Facility: HOSPITAL | Age: 66
Discharge: HOME | End: 2021-05-07
Attending: NEUROLOGICAL SURGERY
Payer: MEDICARE

## 2021-05-07 ENCOUNTER — OFFICE VISIT (OUTPATIENT)
Dept: NEUROSURGERY | Facility: CLINIC | Age: 66
End: 2021-05-07
Payer: MEDICARE

## 2021-05-07 VITALS
RESPIRATION RATE: 16 BRPM | SYSTOLIC BLOOD PRESSURE: 123 MMHG | HEIGHT: 63 IN | WEIGHT: 146.63 LBS | BODY MASS INDEX: 25.98 KG/M2 | HEART RATE: 85 BPM | DIASTOLIC BLOOD PRESSURE: 76 MMHG

## 2021-05-07 DIAGNOSIS — M54.9 DORSALGIA, UNSPECIFIED: ICD-10-CM

## 2021-05-07 DIAGNOSIS — S22.060D COMPRESSION FRACTURE OF T7 VERTEBRA WITH ROUTINE HEALING, SUBSEQUENT ENCOUNTER: Primary | ICD-10-CM

## 2021-05-07 DIAGNOSIS — M80.00XD AGE-RELATED OSTEOPOROSIS WITH CURRENT PATHOLOGICAL FRACTURE WITH ROUTINE HEALING: ICD-10-CM

## 2021-05-07 PROCEDURE — 99999 PR PBB SHADOW E&M-EST. PATIENT-LVL IV: ICD-10-PCS | Mod: PBBFAC,,, | Performed by: PHYSICIAN ASSISTANT

## 2021-05-07 PROCEDURE — 72070 X-RAY EXAM THORAC SPINE 2VWS: CPT | Mod: TC

## 2021-05-07 PROCEDURE — 99999 PR PBB SHADOW E&M-EST. PATIENT-LVL IV: CPT | Mod: PBBFAC,,, | Performed by: PHYSICIAN ASSISTANT

## 2021-05-07 PROCEDURE — 72100 X-RAY EXAM L-S SPINE 2/3 VWS: CPT | Mod: TC

## 2021-05-07 PROCEDURE — 99213 PR OFFICE/OUTPT VISIT, EST, LEVL III, 20-29 MIN: ICD-10-PCS | Mod: S$PBB,,, | Performed by: PHYSICIAN ASSISTANT

## 2021-05-07 PROCEDURE — 99214 OFFICE O/P EST MOD 30 MIN: CPT | Mod: PBBFAC,25,PO | Performed by: PHYSICIAN ASSISTANT

## 2021-05-07 PROCEDURE — 99213 OFFICE O/P EST LOW 20 MIN: CPT | Mod: S$PBB,,, | Performed by: PHYSICIAN ASSISTANT

## 2021-05-07 RX ORDER — ALENDRONATE SODIUM 70 MG/1
70 TABLET ORAL
COMMUNITY
Start: 2021-04-29 | End: 2021-08-10

## 2021-05-11 ENCOUNTER — HOSPITAL ENCOUNTER (INPATIENT)
Facility: HOSPITAL | Age: 66
LOS: 3 days | Discharge: HOME HEALTH | DRG: 378 | End: 2021-05-15
Attending: EMERGENCY MEDICINE | Admitting: FAMILY MEDICINE
Payer: MEDICARE

## 2021-05-11 ENCOUNTER — PATIENT MESSAGE (OUTPATIENT)
Dept: FAMILY MEDICINE | Facility: CLINIC | Age: 66
End: 2021-05-11

## 2021-05-11 ENCOUNTER — TELEPHONE (OUTPATIENT)
Dept: FAMILY MEDICINE | Facility: CLINIC | Age: 66
End: 2021-05-11

## 2021-05-11 ENCOUNTER — PATIENT MESSAGE (OUTPATIENT)
Dept: NEUROSURGERY | Facility: CLINIC | Age: 66
End: 2021-05-11

## 2021-05-11 ENCOUNTER — NURSE TRIAGE (OUTPATIENT)
Dept: ADMINISTRATIVE | Facility: CLINIC | Age: 66
End: 2021-05-11

## 2021-05-11 DIAGNOSIS — I48.91 ATRIAL FIBRILLATION: ICD-10-CM

## 2021-05-11 DIAGNOSIS — K92.2 UPPER GI BLEED: Primary | ICD-10-CM

## 2021-05-11 DIAGNOSIS — K92.1 MELENA: ICD-10-CM

## 2021-05-11 DIAGNOSIS — I25.810 CORONARY ARTERY DISEASE INVOLVING CORONARY BYPASS GRAFT OF NATIVE HEART WITHOUT ANGINA PECTORIS: ICD-10-CM

## 2021-05-11 PROBLEM — I50.42 CHRONIC COMBINED SYSTOLIC AND DIASTOLIC HEART FAILURE: Status: ACTIVE | Noted: 2021-05-11

## 2021-05-11 LAB
ABO + RH BLD: NORMAL
ALBUMIN SERPL BCP-MCNC: 4.3 G/DL (ref 3.5–5.2)
ALP SERPL-CCNC: 78 U/L (ref 55–135)
ALT SERPL W/O P-5'-P-CCNC: 14 U/L (ref 10–44)
AMORPH CRY URNS QL MICRO: NORMAL
ANION GAP SERPL CALC-SCNC: 9 MMOL/L (ref 8–16)
APTT BLDCRRT: 22.3 SEC (ref 21–32)
AST SERPL-CCNC: 17 U/L (ref 10–40)
BASOPHILS # BLD AUTO: 0.04 K/UL (ref 0–0.2)
BASOPHILS # BLD AUTO: 0.08 K/UL (ref 0–0.2)
BASOPHILS NFR BLD: 0.5 % (ref 0–1.9)
BASOPHILS NFR BLD: 0.7 % (ref 0–1.9)
BILIRUB SERPL-MCNC: 0.9 MG/DL (ref 0.1–1)
BILIRUB UR QL STRIP: NEGATIVE
BLD GP AB SCN CELLS X3 SERPL QL: NORMAL
BUN SERPL-MCNC: 30 MG/DL (ref 8–23)
CALCIUM SERPL-MCNC: 9.5 MG/DL (ref 8.7–10.5)
CHLORIDE SERPL-SCNC: 105 MMOL/L (ref 95–110)
CLARITY UR: CLEAR
CO2 SERPL-SCNC: 23 MMOL/L (ref 23–29)
COLOR UR: YELLOW
CREAT SERPL-MCNC: 0.7 MG/DL (ref 0.5–1.4)
CTP QC/QA: YES
DIFFERENTIAL METHOD: ABNORMAL
DIFFERENTIAL METHOD: ABNORMAL
EOSINOPHIL # BLD AUTO: 0.1 K/UL (ref 0–0.5)
EOSINOPHIL # BLD AUTO: 0.2 K/UL (ref 0–0.5)
EOSINOPHIL NFR BLD: 1.1 % (ref 0–8)
EOSINOPHIL NFR BLD: 2.3 % (ref 0–8)
ERYTHROCYTE [DISTWIDTH] IN BLOOD BY AUTOMATED COUNT: 14 % (ref 11.5–14.5)
ERYTHROCYTE [DISTWIDTH] IN BLOOD BY AUTOMATED COUNT: 14.1 % (ref 11.5–14.5)
EST. GFR  (AFRICAN AMERICAN): >60 ML/MIN/1.73 M^2
EST. GFR  (NON AFRICAN AMERICAN): >60 ML/MIN/1.73 M^2
GLUCOSE SERPL-MCNC: 91 MG/DL (ref 70–110)
GLUCOSE UR QL STRIP: NEGATIVE
HCT VFR BLD AUTO: 27.2 % (ref 37–48.5)
HCT VFR BLD AUTO: 32.6 % (ref 37–48.5)
HCV AB SERPL QL IA: NEGATIVE
HGB BLD-MCNC: 10.4 G/DL (ref 12–16)
HGB BLD-MCNC: 8.6 G/DL (ref 12–16)
HGB UR QL STRIP: ABNORMAL
IMM GRANULOCYTES # BLD AUTO: 0.03 K/UL (ref 0–0.04)
IMM GRANULOCYTES # BLD AUTO: 0.06 K/UL (ref 0–0.04)
IMM GRANULOCYTES NFR BLD AUTO: 0.4 % (ref 0–0.5)
IMM GRANULOCYTES NFR BLD AUTO: 0.6 % (ref 0–0.5)
INR PPP: 1 (ref 0.8–1.2)
KETONES UR QL STRIP: NEGATIVE
LEUKOCYTE ESTERASE UR QL STRIP: NEGATIVE
LIPASE SERPL-CCNC: 39 U/L (ref 4–60)
LYMPHOCYTES # BLD AUTO: 1.6 K/UL (ref 1–4.8)
LYMPHOCYTES # BLD AUTO: 1.6 K/UL (ref 1–4.8)
LYMPHOCYTES NFR BLD: 14.6 % (ref 18–48)
LYMPHOCYTES NFR BLD: 19.4 % (ref 18–48)
MCH RBC QN AUTO: 31.3 PG (ref 27–31)
MCH RBC QN AUTO: 31.5 PG (ref 27–31)
MCHC RBC AUTO-ENTMCNC: 31.6 G/DL (ref 32–36)
MCHC RBC AUTO-ENTMCNC: 31.9 G/DL (ref 32–36)
MCV RBC AUTO: 100 FL (ref 82–98)
MCV RBC AUTO: 98 FL (ref 82–98)
MICROSCOPIC COMMENT: NORMAL
MONOCYTES # BLD AUTO: 0.8 K/UL (ref 0.3–1)
MONOCYTES # BLD AUTO: 0.9 K/UL (ref 0.3–1)
MONOCYTES NFR BLD: 8.3 % (ref 4–15)
MONOCYTES NFR BLD: 9.6 % (ref 4–15)
NEUTROPHILS # BLD AUTO: 5.7 K/UL (ref 1.8–7.7)
NEUTROPHILS # BLD AUTO: 8.1 K/UL (ref 1.8–7.7)
NEUTROPHILS NFR BLD: 67.8 % (ref 38–73)
NEUTROPHILS NFR BLD: 74.7 % (ref 38–73)
NITRITE UR QL STRIP: NEGATIVE
NRBC BLD-RTO: 0 /100 WBC
NRBC BLD-RTO: 0 /100 WBC
OB PNL STL: POSITIVE
PH UR STRIP: 7 [PH] (ref 5–8)
PLATELET # BLD AUTO: 290 K/UL (ref 150–450)
PLATELET # BLD AUTO: 326 K/UL (ref 150–450)
PMV BLD AUTO: 10 FL (ref 9.2–12.9)
PMV BLD AUTO: 9.9 FL (ref 9.2–12.9)
POTASSIUM SERPL-SCNC: 4.3 MMOL/L (ref 3.5–5.1)
PROT SERPL-MCNC: 7.8 G/DL (ref 6–8.4)
PROT UR QL STRIP: NEGATIVE
PROTHROMBIN TIME: 10.7 SEC (ref 9–12.5)
RBC # BLD AUTO: 2.73 M/UL (ref 4–5.4)
RBC # BLD AUTO: 3.32 M/UL (ref 4–5.4)
RBC #/AREA URNS HPF: 2 /HPF (ref 0–4)
SARS-COV-2 RDRP RESP QL NAA+PROBE: NEGATIVE
SODIUM SERPL-SCNC: 137 MMOL/L (ref 136–145)
SP GR UR STRIP: 1.02 (ref 1–1.03)
SQUAMOUS #/AREA URNS HPF: 1 /HPF
URN SPEC COLLECT METH UR: ABNORMAL
UROBILINOGEN UR STRIP-ACNC: NEGATIVE EU/DL
WBC # BLD AUTO: 10.84 K/UL (ref 3.9–12.7)
WBC # BLD AUTO: 8.32 K/UL (ref 3.9–12.7)
WBC #/AREA URNS HPF: 1 /HPF (ref 0–5)

## 2021-05-11 PROCEDURE — 25000003 PHARM REV CODE 250: Performed by: EMERGENCY MEDICINE

## 2021-05-11 PROCEDURE — 83690 ASSAY OF LIPASE: CPT | Performed by: PHYSICIAN ASSISTANT

## 2021-05-11 PROCEDURE — 96376 TX/PRO/DX INJ SAME DRUG ADON: CPT

## 2021-05-11 PROCEDURE — 99285 EMERGENCY DEPT VISIT HI MDM: CPT | Mod: 25

## 2021-05-11 PROCEDURE — 96374 THER/PROPH/DIAG INJ IV PUSH: CPT

## 2021-05-11 PROCEDURE — 93010 ELECTROCARDIOGRAM REPORT: CPT | Mod: ,,, | Performed by: INTERNAL MEDICINE

## 2021-05-11 PROCEDURE — 63600175 PHARM REV CODE 636 W HCPCS: Performed by: NURSE PRACTITIONER

## 2021-05-11 PROCEDURE — 85025 COMPLETE CBC W/AUTO DIFF WBC: CPT | Performed by: PHYSICIAN ASSISTANT

## 2021-05-11 PROCEDURE — 82272 OCCULT BLD FECES 1-3 TESTS: CPT | Performed by: EMERGENCY MEDICINE

## 2021-05-11 PROCEDURE — 25000003 PHARM REV CODE 250: Performed by: NURSE PRACTITIONER

## 2021-05-11 PROCEDURE — 85610 PROTHROMBIN TIME: CPT | Performed by: EMERGENCY MEDICINE

## 2021-05-11 PROCEDURE — 93005 ELECTROCARDIOGRAM TRACING: CPT

## 2021-05-11 PROCEDURE — 80053 COMPREHEN METABOLIC PANEL: CPT | Performed by: PHYSICIAN ASSISTANT

## 2021-05-11 PROCEDURE — G0378 HOSPITAL OBSERVATION PER HR: HCPCS

## 2021-05-11 PROCEDURE — 81000 URINALYSIS NONAUTO W/SCOPE: CPT | Performed by: PHYSICIAN ASSISTANT

## 2021-05-11 PROCEDURE — C9113 INJ PANTOPRAZOLE SODIUM, VIA: HCPCS | Performed by: NURSE PRACTITIONER

## 2021-05-11 PROCEDURE — U0002 COVID-19 LAB TEST NON-CDC: HCPCS | Performed by: EMERGENCY MEDICINE

## 2021-05-11 PROCEDURE — 86920 COMPATIBILITY TEST SPIN: CPT | Performed by: FAMILY MEDICINE

## 2021-05-11 PROCEDURE — 63600175 PHARM REV CODE 636 W HCPCS: Performed by: EMERGENCY MEDICINE

## 2021-05-11 PROCEDURE — 86803 HEPATITIS C AB TEST: CPT | Performed by: EMERGENCY MEDICINE

## 2021-05-11 PROCEDURE — 96361 HYDRATE IV INFUSION ADD-ON: CPT

## 2021-05-11 PROCEDURE — 86900 BLOOD TYPING SEROLOGIC ABO: CPT | Performed by: EMERGENCY MEDICINE

## 2021-05-11 PROCEDURE — 93010 EKG 12-LEAD: ICD-10-PCS | Mod: ,,, | Performed by: INTERNAL MEDICINE

## 2021-05-11 PROCEDURE — 36415 COLL VENOUS BLD VENIPUNCTURE: CPT | Performed by: FAMILY MEDICINE

## 2021-05-11 PROCEDURE — C9113 INJ PANTOPRAZOLE SODIUM, VIA: HCPCS | Performed by: EMERGENCY MEDICINE

## 2021-05-11 PROCEDURE — 85025 COMPLETE CBC W/AUTO DIFF WBC: CPT | Mod: 91 | Performed by: FAMILY MEDICINE

## 2021-05-11 PROCEDURE — 85730 THROMBOPLASTIN TIME PARTIAL: CPT | Performed by: EMERGENCY MEDICINE

## 2021-05-11 RX ORDER — DIGOXIN 125 MCG
125 TABLET ORAL DAILY
Status: DISCONTINUED | OUTPATIENT
Start: 2021-05-11 | End: 2021-05-15 | Stop reason: HOSPADM

## 2021-05-11 RX ORDER — ONDANSETRON 2 MG/ML
4 INJECTION INTRAMUSCULAR; INTRAVENOUS EVERY 8 HOURS PRN
Status: DISCONTINUED | OUTPATIENT
Start: 2021-05-11 | End: 2021-05-15 | Stop reason: HOSPADM

## 2021-05-11 RX ORDER — DIGOXIN 125 MCG
125 TABLET ORAL
Status: DISCONTINUED | OUTPATIENT
Start: 2021-05-12 | End: 2021-05-11

## 2021-05-11 RX ORDER — SODIUM CHLORIDE 9 MG/ML
INJECTION, SOLUTION INTRAVENOUS CONTINUOUS
Status: DISCONTINUED | OUTPATIENT
Start: 2021-05-11 | End: 2021-05-11

## 2021-05-11 RX ORDER — LOSARTAN POTASSIUM 25 MG/1
25 TABLET ORAL DAILY
Refills: 3 | Status: DISCONTINUED | OUTPATIENT
Start: 2021-05-12 | End: 2021-05-15 | Stop reason: HOSPADM

## 2021-05-11 RX ORDER — PANTOPRAZOLE SODIUM 40 MG/10ML
40 INJECTION, POWDER, LYOPHILIZED, FOR SOLUTION INTRAVENOUS
Status: COMPLETED | OUTPATIENT
Start: 2021-05-11 | End: 2021-05-11

## 2021-05-11 RX ORDER — LEVOTHYROXINE SODIUM 75 UG/1
75 TABLET ORAL
Status: DISCONTINUED | OUTPATIENT
Start: 2021-05-12 | End: 2021-05-15 | Stop reason: HOSPADM

## 2021-05-11 RX ORDER — PANTOPRAZOLE SODIUM 40 MG/10ML
40 INJECTION, POWDER, LYOPHILIZED, FOR SOLUTION INTRAVENOUS 2 TIMES DAILY
Status: DISCONTINUED | OUTPATIENT
Start: 2021-05-11 | End: 2021-05-15 | Stop reason: HOSPADM

## 2021-05-11 RX ORDER — ACETAMINOPHEN 325 MG/1
650 TABLET ORAL EVERY 6 HOURS PRN
Status: DISCONTINUED | OUTPATIENT
Start: 2021-05-11 | End: 2021-05-15 | Stop reason: HOSPADM

## 2021-05-11 RX ORDER — ROSUVASTATIN CALCIUM 10 MG/1
20 TABLET, COATED ORAL EVERY OTHER DAY
Status: DISCONTINUED | OUTPATIENT
Start: 2021-05-12 | End: 2021-05-15 | Stop reason: HOSPADM

## 2021-05-11 RX ADMIN — PANTOPRAZOLE SODIUM 8 MG/HR: 40 INJECTION, POWDER, FOR SOLUTION INTRAVENOUS at 02:05

## 2021-05-11 RX ADMIN — SODIUM CHLORIDE 1000 ML: 0.9 INJECTION, SOLUTION INTRAVENOUS at 12:05

## 2021-05-11 RX ADMIN — PANTOPRAZOLE SODIUM 40 MG: 40 INJECTION, POWDER, FOR SOLUTION INTRAVENOUS at 08:05

## 2021-05-11 RX ADMIN — SODIUM CHLORIDE: 0.9 INJECTION, SOLUTION INTRAVENOUS at 03:05

## 2021-05-11 RX ADMIN — PANTOPRAZOLE SODIUM 40 MG: 40 INJECTION, POWDER, FOR SOLUTION INTRAVENOUS at 12:05

## 2021-05-11 RX ADMIN — DIGOXIN 125 MCG: 125 TABLET ORAL at 03:05

## 2021-05-12 LAB
ALBUMIN SERPL BCP-MCNC: 3.5 G/DL (ref 3.5–5.2)
ALP SERPL-CCNC: 57 U/L (ref 55–135)
ALT SERPL W/O P-5'-P-CCNC: 10 U/L (ref 10–44)
ANION GAP SERPL CALC-SCNC: 8 MMOL/L (ref 8–16)
AST SERPL-CCNC: 16 U/L (ref 10–40)
BASOPHILS # BLD AUTO: 0.06 K/UL (ref 0–0.2)
BASOPHILS NFR BLD: 0.9 % (ref 0–1.9)
BILIRUB SERPL-MCNC: 0.6 MG/DL (ref 0.1–1)
BLD PROD TYP BPU: NORMAL
BLOOD UNIT EXPIRATION DATE: NORMAL
BLOOD UNIT TYPE CODE: 5100
BLOOD UNIT TYPE: NORMAL
BUN SERPL-MCNC: 15 MG/DL (ref 8–23)
CALCIUM SERPL-MCNC: 8.1 MG/DL (ref 8.7–10.5)
CHLORIDE SERPL-SCNC: 110 MMOL/L (ref 95–110)
CO2 SERPL-SCNC: 22 MMOL/L (ref 23–29)
CODING SYSTEM: NORMAL
CREAT SERPL-MCNC: 0.6 MG/DL (ref 0.5–1.4)
DIFFERENTIAL METHOD: ABNORMAL
DISPENSE STATUS: NORMAL
EOSINOPHIL # BLD AUTO: 0.3 K/UL (ref 0–0.5)
EOSINOPHIL NFR BLD: 4.2 % (ref 0–8)
ERYTHROCYTE [DISTWIDTH] IN BLOOD BY AUTOMATED COUNT: 14.1 % (ref 11.5–14.5)
EST. GFR  (AFRICAN AMERICAN): >60 ML/MIN/1.73 M^2
EST. GFR  (NON AFRICAN AMERICAN): >60 ML/MIN/1.73 M^2
GLUCOSE SERPL-MCNC: 85 MG/DL (ref 70–110)
HCT VFR BLD AUTO: 26.6 % (ref 37–48.5)
HCT VFR BLD AUTO: 30.3 % (ref 37–48.5)
HGB BLD-MCNC: 8.3 G/DL (ref 12–16)
HGB BLD-MCNC: 9.8 G/DL (ref 12–16)
IMM GRANULOCYTES # BLD AUTO: 0.03 K/UL (ref 0–0.04)
IMM GRANULOCYTES NFR BLD AUTO: 0.4 % (ref 0–0.5)
LYMPHOCYTES # BLD AUTO: 1 K/UL (ref 1–4.8)
LYMPHOCYTES NFR BLD: 14.8 % (ref 18–48)
MCH RBC QN AUTO: 31.1 PG (ref 27–31)
MCHC RBC AUTO-ENTMCNC: 31.2 G/DL (ref 32–36)
MCV RBC AUTO: 100 FL (ref 82–98)
MONOCYTES # BLD AUTO: 0.7 K/UL (ref 0.3–1)
MONOCYTES NFR BLD: 10.9 % (ref 4–15)
NEUTROPHILS # BLD AUTO: 4.6 K/UL (ref 1.8–7.7)
NEUTROPHILS NFR BLD: 68.8 % (ref 38–73)
NRBC BLD-RTO: 0 /100 WBC
NUM UNITS TRANS PACKED RBC: NORMAL
PLATELET # BLD AUTO: 289 K/UL (ref 150–450)
PMV BLD AUTO: 10.1 FL (ref 9.2–12.9)
POTASSIUM SERPL-SCNC: 3.7 MMOL/L (ref 3.5–5.1)
PROT SERPL-MCNC: 6.1 G/DL (ref 6–8.4)
RBC # BLD AUTO: 2.67 M/UL (ref 4–5.4)
SODIUM SERPL-SCNC: 140 MMOL/L (ref 136–145)
WBC # BLD AUTO: 6.7 K/UL (ref 3.9–12.7)

## 2021-05-12 PROCEDURE — P9016 RBC LEUKOCYTES REDUCED: HCPCS | Performed by: FAMILY MEDICINE

## 2021-05-12 PROCEDURE — 85018 HEMOGLOBIN: CPT | Performed by: FAMILY MEDICINE

## 2021-05-12 PROCEDURE — 36430 TRANSFUSION BLD/BLD COMPNT: CPT

## 2021-05-12 PROCEDURE — 21400001 HC TELEMETRY ROOM

## 2021-05-12 PROCEDURE — 25000003 PHARM REV CODE 250: Performed by: NURSE PRACTITIONER

## 2021-05-12 PROCEDURE — C9113 INJ PANTOPRAZOLE SODIUM, VIA: HCPCS | Performed by: NURSE PRACTITIONER

## 2021-05-12 PROCEDURE — 36415 COLL VENOUS BLD VENIPUNCTURE: CPT | Performed by: FAMILY MEDICINE

## 2021-05-12 PROCEDURE — 36415 COLL VENOUS BLD VENIPUNCTURE: CPT | Performed by: NURSE PRACTITIONER

## 2021-05-12 PROCEDURE — 63600175 PHARM REV CODE 636 W HCPCS: Performed by: NURSE PRACTITIONER

## 2021-05-12 PROCEDURE — 85014 HEMATOCRIT: CPT | Performed by: FAMILY MEDICINE

## 2021-05-12 PROCEDURE — 25000003 PHARM REV CODE 250: Performed by: EMERGENCY MEDICINE

## 2021-05-12 PROCEDURE — 85025 COMPLETE CBC W/AUTO DIFF WBC: CPT | Performed by: NURSE PRACTITIONER

## 2021-05-12 PROCEDURE — 96376 TX/PRO/DX INJ SAME DRUG ADON: CPT

## 2021-05-12 PROCEDURE — 99223 1ST HOSP IP/OBS HIGH 75: CPT | Mod: ,,, | Performed by: INTERNAL MEDICINE

## 2021-05-12 PROCEDURE — 80053 COMPREHEN METABOLIC PANEL: CPT | Performed by: NURSE PRACTITIONER

## 2021-05-12 PROCEDURE — 25000003 PHARM REV CODE 250: Performed by: FAMILY MEDICINE

## 2021-05-12 PROCEDURE — 99223 PR INITIAL HOSPITAL CARE,LEVL III: ICD-10-PCS | Mod: ,,, | Performed by: INTERNAL MEDICINE

## 2021-05-12 RX ORDER — ESCITALOPRAM OXALATE 10 MG/1
10 TABLET ORAL DAILY
Status: DISCONTINUED | OUTPATIENT
Start: 2021-05-12 | End: 2021-05-15 | Stop reason: HOSPADM

## 2021-05-12 RX ORDER — HYDROCODONE BITARTRATE AND ACETAMINOPHEN 500; 5 MG/1; MG/1
TABLET ORAL
Status: DISCONTINUED | OUTPATIENT
Start: 2021-05-12 | End: 2021-05-14

## 2021-05-12 RX ADMIN — PANTOPRAZOLE SODIUM 40 MG: 40 INJECTION, POWDER, FOR SOLUTION INTRAVENOUS at 08:05

## 2021-05-12 RX ADMIN — LEVOTHYROXINE SODIUM 75 MCG: 75 TABLET ORAL at 05:05

## 2021-05-12 RX ADMIN — ESCITALOPRAM OXALATE 10 MG: 10 TABLET ORAL at 04:05

## 2021-05-12 RX ADMIN — DIGOXIN 125 MCG: 125 TABLET ORAL at 08:05

## 2021-05-12 RX ADMIN — PANTOPRAZOLE SODIUM 40 MG: 40 INJECTION, POWDER, FOR SOLUTION INTRAVENOUS at 09:05

## 2021-05-12 RX ADMIN — ROSUVASTATIN 20 MG: 10 TABLET, FILM COATED ORAL at 08:05

## 2021-05-13 LAB
ALBUMIN SERPL BCP-MCNC: 3.6 G/DL (ref 3.5–5.2)
ALP SERPL-CCNC: 63 U/L (ref 55–135)
ALT SERPL W/O P-5'-P-CCNC: 12 U/L (ref 10–44)
ANION GAP SERPL CALC-SCNC: 8 MMOL/L (ref 8–16)
AST SERPL-CCNC: 17 U/L (ref 10–40)
BASOPHILS # BLD AUTO: 0.09 K/UL (ref 0–0.2)
BASOPHILS NFR BLD: 1.2 % (ref 0–1.9)
BILIRUB SERPL-MCNC: 0.7 MG/DL (ref 0.1–1)
BUN SERPL-MCNC: 16 MG/DL (ref 8–23)
CALCIUM SERPL-MCNC: 8.6 MG/DL (ref 8.7–10.5)
CHLORIDE SERPL-SCNC: 109 MMOL/L (ref 95–110)
CO2 SERPL-SCNC: 22 MMOL/L (ref 23–29)
CREAT SERPL-MCNC: 0.7 MG/DL (ref 0.5–1.4)
DIFFERENTIAL METHOD: ABNORMAL
EOSINOPHIL # BLD AUTO: 0.4 K/UL (ref 0–0.5)
EOSINOPHIL NFR BLD: 5.6 % (ref 0–8)
ERYTHROCYTE [DISTWIDTH] IN BLOOD BY AUTOMATED COUNT: 15.6 % (ref 11.5–14.5)
EST. GFR  (AFRICAN AMERICAN): >60 ML/MIN/1.73 M^2
EST. GFR  (NON AFRICAN AMERICAN): >60 ML/MIN/1.73 M^2
GLUCOSE SERPL-MCNC: 98 MG/DL (ref 70–110)
HCT VFR BLD AUTO: 31.8 % (ref 37–48.5)
HCT VFR BLD AUTO: 32.1 % (ref 37–48.5)
HGB BLD-MCNC: 10.2 G/DL (ref 12–16)
HGB BLD-MCNC: 10.5 G/DL (ref 12–16)
IMM GRANULOCYTES # BLD AUTO: 0.03 K/UL (ref 0–0.04)
IMM GRANULOCYTES NFR BLD AUTO: 0.4 % (ref 0–0.5)
LYMPHOCYTES # BLD AUTO: 1 K/UL (ref 1–4.8)
LYMPHOCYTES NFR BLD: 13.5 % (ref 18–48)
MCH RBC QN AUTO: 31.2 PG (ref 27–31)
MCHC RBC AUTO-ENTMCNC: 32.7 G/DL (ref 32–36)
MCV RBC AUTO: 95 FL (ref 82–98)
MONOCYTES # BLD AUTO: 0.8 K/UL (ref 0.3–1)
MONOCYTES NFR BLD: 11 % (ref 4–15)
NEUTROPHILS # BLD AUTO: 5.1 K/UL (ref 1.8–7.7)
NEUTROPHILS NFR BLD: 68.3 % (ref 38–73)
NRBC BLD-RTO: 0 /100 WBC
PLATELET # BLD AUTO: 303 K/UL (ref 150–450)
PMV BLD AUTO: 10.1 FL (ref 9.2–12.9)
POTASSIUM SERPL-SCNC: 3.7 MMOL/L (ref 3.5–5.1)
PROT SERPL-MCNC: 6.6 G/DL (ref 6–8.4)
RBC # BLD AUTO: 3.37 M/UL (ref 4–5.4)
SODIUM SERPL-SCNC: 139 MMOL/L (ref 136–145)
WBC # BLD AUTO: 7.47 K/UL (ref 3.9–12.7)

## 2021-05-13 PROCEDURE — 99223 PR INITIAL HOSPITAL CARE,LEVL III: ICD-10-PCS | Mod: ,,, | Performed by: INTERNAL MEDICINE

## 2021-05-13 PROCEDURE — C9113 INJ PANTOPRAZOLE SODIUM, VIA: HCPCS | Performed by: NURSE PRACTITIONER

## 2021-05-13 PROCEDURE — 85025 COMPLETE CBC W/AUTO DIFF WBC: CPT | Performed by: NURSE PRACTITIONER

## 2021-05-13 PROCEDURE — 21400001 HC TELEMETRY ROOM

## 2021-05-13 PROCEDURE — 25000003 PHARM REV CODE 250: Performed by: NURSE PRACTITIONER

## 2021-05-13 PROCEDURE — 36415 COLL VENOUS BLD VENIPUNCTURE: CPT | Performed by: FAMILY MEDICINE

## 2021-05-13 PROCEDURE — 36415 COLL VENOUS BLD VENIPUNCTURE: CPT | Performed by: NURSE PRACTITIONER

## 2021-05-13 PROCEDURE — 25000003 PHARM REV CODE 250: Performed by: FAMILY MEDICINE

## 2021-05-13 PROCEDURE — 25000003 PHARM REV CODE 250: Performed by: EMERGENCY MEDICINE

## 2021-05-13 PROCEDURE — 99223 1ST HOSP IP/OBS HIGH 75: CPT | Mod: ,,, | Performed by: INTERNAL MEDICINE

## 2021-05-13 PROCEDURE — 85018 HEMOGLOBIN: CPT | Performed by: FAMILY MEDICINE

## 2021-05-13 PROCEDURE — 85014 HEMATOCRIT: CPT | Performed by: FAMILY MEDICINE

## 2021-05-13 PROCEDURE — 80053 COMPREHEN METABOLIC PANEL: CPT | Performed by: NURSE PRACTITIONER

## 2021-05-13 PROCEDURE — 63600175 PHARM REV CODE 636 W HCPCS: Performed by: NURSE PRACTITIONER

## 2021-05-13 RX ADMIN — LOSARTAN POTASSIUM 25 MG: 25 TABLET, FILM COATED ORAL at 08:05

## 2021-05-13 RX ADMIN — METOPROLOL SUCCINATE 12.5 MG: 25 TABLET, EXTENDED RELEASE ORAL at 08:05

## 2021-05-13 RX ADMIN — DIGOXIN 125 MCG: 125 TABLET ORAL at 08:05

## 2021-05-13 RX ADMIN — LEVOTHYROXINE SODIUM 75 MCG: 75 TABLET ORAL at 05:05

## 2021-05-13 RX ADMIN — PANTOPRAZOLE SODIUM 40 MG: 40 INJECTION, POWDER, FOR SOLUTION INTRAVENOUS at 10:05

## 2021-05-13 RX ADMIN — ESCITALOPRAM OXALATE 10 MG: 10 TABLET ORAL at 08:05

## 2021-05-13 RX ADMIN — PANTOPRAZOLE SODIUM 40 MG: 40 INJECTION, POWDER, FOR SOLUTION INTRAVENOUS at 08:05

## 2021-05-14 ENCOUNTER — ANESTHESIA (OUTPATIENT)
Dept: ENDOSCOPY | Facility: HOSPITAL | Age: 66
DRG: 378 | End: 2021-05-14
Payer: MEDICARE

## 2021-05-14 ENCOUNTER — ANESTHESIA EVENT (OUTPATIENT)
Dept: ENDOSCOPY | Facility: HOSPITAL | Age: 66
DRG: 378 | End: 2021-05-14
Payer: MEDICARE

## 2021-05-14 PROBLEM — E83.39 HYPOPHOSPHATEMIA: Status: RESOLVED | Noted: 2017-08-24 | Resolved: 2021-05-14

## 2021-05-14 PROBLEM — K31.5 DUODENAL ULCER WITH HEMORRHAGE AND OBSTRUCTION: Status: ACTIVE | Noted: 2021-05-14

## 2021-05-14 PROBLEM — K26.4 DUODENAL ULCER WITH HEMORRHAGE AND OBSTRUCTION: Status: ACTIVE | Noted: 2021-05-14

## 2021-05-14 LAB
ALBUMIN SERPL BCP-MCNC: 3.7 G/DL (ref 3.5–5.2)
ALP SERPL-CCNC: 68 U/L (ref 55–135)
ALT SERPL W/O P-5'-P-CCNC: 14 U/L (ref 10–44)
ANION GAP SERPL CALC-SCNC: 6 MMOL/L (ref 8–16)
AST SERPL-CCNC: 18 U/L (ref 10–40)
BASOPHILS # BLD AUTO: 0.1 K/UL (ref 0–0.2)
BASOPHILS NFR BLD: 1.3 % (ref 0–1.9)
BILIRUB SERPL-MCNC: 0.7 MG/DL (ref 0.1–1)
BUN SERPL-MCNC: 15 MG/DL (ref 8–23)
CALCIUM SERPL-MCNC: 8.7 MG/DL (ref 8.7–10.5)
CHLORIDE SERPL-SCNC: 109 MMOL/L (ref 95–110)
CO2 SERPL-SCNC: 25 MMOL/L (ref 23–29)
CREAT SERPL-MCNC: 0.7 MG/DL (ref 0.5–1.4)
DIFFERENTIAL METHOD: ABNORMAL
EOSINOPHIL # BLD AUTO: 0.4 K/UL (ref 0–0.5)
EOSINOPHIL NFR BLD: 5.3 % (ref 0–8)
ERYTHROCYTE [DISTWIDTH] IN BLOOD BY AUTOMATED COUNT: 15.4 % (ref 11.5–14.5)
EST. GFR  (AFRICAN AMERICAN): >60 ML/MIN/1.73 M^2
EST. GFR  (NON AFRICAN AMERICAN): >60 ML/MIN/1.73 M^2
GLUCOSE SERPL-MCNC: 95 MG/DL (ref 70–110)
HCT VFR BLD AUTO: 32.4 % (ref 37–48.5)
HGB BLD-MCNC: 10.6 G/DL (ref 12–16)
IMM GRANULOCYTES # BLD AUTO: 0.03 K/UL (ref 0–0.04)
IMM GRANULOCYTES NFR BLD AUTO: 0.4 % (ref 0–0.5)
LYMPHOCYTES # BLD AUTO: 1 K/UL (ref 1–4.8)
LYMPHOCYTES NFR BLD: 13.3 % (ref 18–48)
MCH RBC QN AUTO: 31.1 PG (ref 27–31)
MCHC RBC AUTO-ENTMCNC: 32.7 G/DL (ref 32–36)
MCV RBC AUTO: 95 FL (ref 82–98)
MONOCYTES # BLD AUTO: 0.8 K/UL (ref 0.3–1)
MONOCYTES NFR BLD: 10.4 % (ref 4–15)
NEUTROPHILS # BLD AUTO: 5.3 K/UL (ref 1.8–7.7)
NEUTROPHILS NFR BLD: 69.3 % (ref 38–73)
NRBC BLD-RTO: 0 /100 WBC
PLATELET # BLD AUTO: 323 K/UL (ref 150–450)
PMV BLD AUTO: 10.2 FL (ref 9.2–12.9)
POTASSIUM SERPL-SCNC: 4 MMOL/L (ref 3.5–5.1)
PROT SERPL-MCNC: 6.7 G/DL (ref 6–8.4)
RBC # BLD AUTO: 3.41 M/UL (ref 4–5.4)
SODIUM SERPL-SCNC: 140 MMOL/L (ref 136–145)
WBC # BLD AUTO: 7.6 K/UL (ref 3.9–12.7)

## 2021-05-14 PROCEDURE — 63600175 PHARM REV CODE 636 W HCPCS: Performed by: NURSE ANESTHETIST, CERTIFIED REGISTERED

## 2021-05-14 PROCEDURE — 25000003 PHARM REV CODE 250: Performed by: INTERNAL MEDICINE

## 2021-05-14 PROCEDURE — 25000003 PHARM REV CODE 250: Performed by: NURSE ANESTHETIST, CERTIFIED REGISTERED

## 2021-05-14 PROCEDURE — 25000003 PHARM REV CODE 250: Performed by: NURSE PRACTITIONER

## 2021-05-14 PROCEDURE — 85025 COMPLETE CBC W/AUTO DIFF WBC: CPT | Performed by: NURSE PRACTITIONER

## 2021-05-14 PROCEDURE — 43235 PR EGD, FLEX, DIAGNOSTIC: ICD-10-PCS | Mod: ,,, | Performed by: INTERNAL MEDICINE

## 2021-05-14 PROCEDURE — 37000009 HC ANESTHESIA EA ADD 15 MINS: Performed by: INTERNAL MEDICINE

## 2021-05-14 PROCEDURE — 21400001 HC TELEMETRY ROOM

## 2021-05-14 PROCEDURE — 63600175 PHARM REV CODE 636 W HCPCS: Performed by: INTERNAL MEDICINE

## 2021-05-14 PROCEDURE — 36415 COLL VENOUS BLD VENIPUNCTURE: CPT | Performed by: NURSE PRACTITIONER

## 2021-05-14 PROCEDURE — 43235 EGD DIAGNOSTIC BRUSH WASH: CPT | Performed by: INTERNAL MEDICINE

## 2021-05-14 PROCEDURE — C9113 INJ PANTOPRAZOLE SODIUM, VIA: HCPCS | Performed by: INTERNAL MEDICINE

## 2021-05-14 PROCEDURE — 94760 N-INVAS EAR/PLS OXIMETRY 1: CPT

## 2021-05-14 PROCEDURE — 43235 EGD DIAGNOSTIC BRUSH WASH: CPT | Mod: ,,, | Performed by: INTERNAL MEDICINE

## 2021-05-14 PROCEDURE — 37000008 HC ANESTHESIA 1ST 15 MINUTES: Performed by: INTERNAL MEDICINE

## 2021-05-14 PROCEDURE — 80053 COMPREHEN METABOLIC PANEL: CPT | Performed by: NURSE PRACTITIONER

## 2021-05-14 RX ORDER — PROPOFOL 10 MG/ML
VIAL (ML) INTRAVENOUS
Status: DISCONTINUED | OUTPATIENT
Start: 2021-05-14 | End: 2021-05-14

## 2021-05-14 RX ORDER — SUCRALFATE 1 G/10ML
1 SUSPENSION ORAL EVERY 6 HOURS
Status: DISCONTINUED | OUTPATIENT
Start: 2021-05-14 | End: 2021-05-15 | Stop reason: HOSPADM

## 2021-05-14 RX ORDER — LIDOCAINE HYDROCHLORIDE 20 MG/ML
INJECTION, SOLUTION EPIDURAL; INFILTRATION; INTRACAUDAL; PERINEURAL
Status: DISCONTINUED | OUTPATIENT
Start: 2021-05-14 | End: 2021-05-14

## 2021-05-14 RX ORDER — SODIUM CHLORIDE, SODIUM LACTATE, POTASSIUM CHLORIDE, CALCIUM CHLORIDE 600; 310; 30; 20 MG/100ML; MG/100ML; MG/100ML; MG/100ML
INJECTION, SOLUTION INTRAVENOUS CONTINUOUS PRN
Status: DISCONTINUED | OUTPATIENT
Start: 2021-05-14 | End: 2021-05-14

## 2021-05-14 RX ORDER — PANTOPRAZOLE SODIUM 40 MG/10ML
80 INJECTION, POWDER, LYOPHILIZED, FOR SOLUTION INTRAVENOUS ONCE
Status: COMPLETED | OUTPATIENT
Start: 2021-05-14 | End: 2021-05-14

## 2021-05-14 RX ADMIN — PROPOFOL 20 MG: 10 INJECTION, EMULSION INTRAVENOUS at 08:05

## 2021-05-14 RX ADMIN — PANTOPRAZOLE SODIUM 80 MG: 40 INJECTION, POWDER, FOR SOLUTION INTRAVENOUS at 09:05

## 2021-05-14 RX ADMIN — SODIUM CHLORIDE, SODIUM LACTATE, POTASSIUM CHLORIDE, AND CALCIUM CHLORIDE: .6; .31; .03; .02 INJECTION, SOLUTION INTRAVENOUS at 08:05

## 2021-05-14 RX ADMIN — DIGOXIN 125 MCG: 125 TABLET ORAL at 09:05

## 2021-05-14 RX ADMIN — PROPOFOL 50 MG: 10 INJECTION, EMULSION INTRAVENOUS at 08:05

## 2021-05-14 RX ADMIN — LIDOCAINE HYDROCHLORIDE 100 MG: 20 INJECTION, SOLUTION EPIDURAL; INFILTRATION; INTRACAUDAL; PERINEURAL at 08:05

## 2021-05-14 RX ADMIN — LOSARTAN POTASSIUM 25 MG: 25 TABLET, FILM COATED ORAL at 09:05

## 2021-05-14 RX ADMIN — METOPROLOL SUCCINATE 12.5 MG: 25 TABLET, EXTENDED RELEASE ORAL at 09:05

## 2021-05-14 RX ADMIN — METOPROLOL SUCCINATE 12.5 MG: 25 TABLET, EXTENDED RELEASE ORAL at 08:05

## 2021-05-14 RX ADMIN — SUCRALFATE 1 G: 1 SUSPENSION ORAL at 05:05

## 2021-05-14 RX ADMIN — PANTOPRAZOLE SODIUM 40 MG: 40 INJECTION, POWDER, FOR SOLUTION INTRAVENOUS at 08:05

## 2021-05-14 RX ADMIN — ESCITALOPRAM OXALATE 10 MG: 10 TABLET ORAL at 09:05

## 2021-05-14 RX ADMIN — SUCRALFATE 1 G: 1 SUSPENSION ORAL at 12:05

## 2021-05-14 RX ADMIN — ROSUVASTATIN 20 MG: 10 TABLET, FILM COATED ORAL at 09:05

## 2021-05-15 VITALS
DIASTOLIC BLOOD PRESSURE: 51 MMHG | TEMPERATURE: 99 F | WEIGHT: 148.38 LBS | OXYGEN SATURATION: 96 % | HEART RATE: 79 BPM | BODY MASS INDEX: 26.29 KG/M2 | HEIGHT: 63 IN | RESPIRATION RATE: 18 BRPM | SYSTOLIC BLOOD PRESSURE: 104 MMHG

## 2021-05-15 LAB
ALBUMIN SERPL BCP-MCNC: 3.6 G/DL (ref 3.5–5.2)
ALP SERPL-CCNC: 67 U/L (ref 55–135)
ALT SERPL W/O P-5'-P-CCNC: 15 U/L (ref 10–44)
ANION GAP SERPL CALC-SCNC: 7 MMOL/L (ref 8–16)
AST SERPL-CCNC: 18 U/L (ref 10–40)
BASOPHILS # BLD AUTO: 0.09 K/UL (ref 0–0.2)
BASOPHILS NFR BLD: 1.1 % (ref 0–1.9)
BILIRUB SERPL-MCNC: 0.7 MG/DL (ref 0.1–1)
BUN SERPL-MCNC: 11 MG/DL (ref 8–23)
CALCIUM SERPL-MCNC: 8.5 MG/DL (ref 8.7–10.5)
CHLORIDE SERPL-SCNC: 107 MMOL/L (ref 95–110)
CO2 SERPL-SCNC: 26 MMOL/L (ref 23–29)
CREAT SERPL-MCNC: 0.7 MG/DL (ref 0.5–1.4)
DIFFERENTIAL METHOD: ABNORMAL
EOSINOPHIL # BLD AUTO: 0.6 K/UL (ref 0–0.5)
EOSINOPHIL NFR BLD: 7.8 % (ref 0–8)
ERYTHROCYTE [DISTWIDTH] IN BLOOD BY AUTOMATED COUNT: 15.3 % (ref 11.5–14.5)
EST. GFR  (AFRICAN AMERICAN): >60 ML/MIN/1.73 M^2
EST. GFR  (NON AFRICAN AMERICAN): >60 ML/MIN/1.73 M^2
GLUCOSE SERPL-MCNC: 94 MG/DL (ref 70–110)
HCT VFR BLD AUTO: 33 % (ref 37–48.5)
HGB BLD-MCNC: 10.4 G/DL (ref 12–16)
IMM GRANULOCYTES # BLD AUTO: 0.02 K/UL (ref 0–0.04)
IMM GRANULOCYTES NFR BLD AUTO: 0.3 % (ref 0–0.5)
LYMPHOCYTES # BLD AUTO: 1 K/UL (ref 1–4.8)
LYMPHOCYTES NFR BLD: 13 % (ref 18–48)
MCH RBC QN AUTO: 30.8 PG (ref 27–31)
MCHC RBC AUTO-ENTMCNC: 31.5 G/DL (ref 32–36)
MCV RBC AUTO: 98 FL (ref 82–98)
MONOCYTES # BLD AUTO: 0.8 K/UL (ref 0.3–1)
MONOCYTES NFR BLD: 10.3 % (ref 4–15)
NEUTROPHILS # BLD AUTO: 5.4 K/UL (ref 1.8–7.7)
NEUTROPHILS NFR BLD: 67.5 % (ref 38–73)
NRBC BLD-RTO: 0 /100 WBC
PLATELET # BLD AUTO: 332 K/UL (ref 150–450)
PMV BLD AUTO: 10.1 FL (ref 9.2–12.9)
POTASSIUM SERPL-SCNC: 3.8 MMOL/L (ref 3.5–5.1)
PROT SERPL-MCNC: 6.5 G/DL (ref 6–8.4)
RBC # BLD AUTO: 3.38 M/UL (ref 4–5.4)
SODIUM SERPL-SCNC: 140 MMOL/L (ref 136–145)
WBC # BLD AUTO: 7.93 K/UL (ref 3.9–12.7)

## 2021-05-15 PROCEDURE — 25000003 PHARM REV CODE 250: Performed by: INTERNAL MEDICINE

## 2021-05-15 PROCEDURE — 99232 SBSQ HOSP IP/OBS MODERATE 35: CPT | Mod: ,,, | Performed by: INTERNAL MEDICINE

## 2021-05-15 PROCEDURE — 80053 COMPREHEN METABOLIC PANEL: CPT | Performed by: NURSE PRACTITIONER

## 2021-05-15 PROCEDURE — 63600175 PHARM REV CODE 636 W HCPCS: Performed by: INTERNAL MEDICINE

## 2021-05-15 PROCEDURE — C9113 INJ PANTOPRAZOLE SODIUM, VIA: HCPCS | Performed by: INTERNAL MEDICINE

## 2021-05-15 PROCEDURE — 36415 COLL VENOUS BLD VENIPUNCTURE: CPT | Performed by: NURSE PRACTITIONER

## 2021-05-15 PROCEDURE — 99232 PR SUBSEQUENT HOSPITAL CARE,LEVL II: ICD-10-PCS | Mod: ,,, | Performed by: INTERNAL MEDICINE

## 2021-05-15 PROCEDURE — 85025 COMPLETE CBC W/AUTO DIFF WBC: CPT | Performed by: NURSE PRACTITIONER

## 2021-05-15 PROCEDURE — 25000003 PHARM REV CODE 250: Performed by: NURSE PRACTITIONER

## 2021-05-15 RX ORDER — PANTOPRAZOLE SODIUM 40 MG/1
40 TABLET, DELAYED RELEASE ORAL 2 TIMES DAILY
Qty: 60 TABLET | Refills: 11 | Status: ON HOLD | OUTPATIENT
Start: 2021-05-15 | End: 2021-11-04 | Stop reason: SDUPTHER

## 2021-05-15 RX ORDER — SUCRALFATE 1 G/10ML
1 SUSPENSION ORAL 2 TIMES DAILY
Qty: 280 ML | Refills: 0 | Status: SHIPPED | OUTPATIENT
Start: 2021-05-15 | End: 2021-05-29

## 2021-05-15 RX ORDER — PANTOPRAZOLE SODIUM 40 MG/1
40 TABLET, DELAYED RELEASE ORAL DAILY
Qty: 30 TABLET | Refills: 11 | Status: SHIPPED | OUTPATIENT
Start: 2021-05-15 | End: 2021-05-15 | Stop reason: SDUPTHER

## 2021-05-15 RX ORDER — SUCRALFATE 1 G/10ML
1 SUSPENSION ORAL EVERY 6 HOURS
Qty: 560 ML | Refills: 0 | Status: SHIPPED | OUTPATIENT
Start: 2021-05-15 | End: 2021-05-15

## 2021-05-15 RX ADMIN — SUCRALFATE 1 G: 1 SUSPENSION ORAL at 05:05

## 2021-05-15 RX ADMIN — LEVOTHYROXINE SODIUM 75 MCG: 75 TABLET ORAL at 05:05

## 2021-05-15 RX ADMIN — PANTOPRAZOLE SODIUM 40 MG: 40 INJECTION, POWDER, FOR SOLUTION INTRAVENOUS at 09:05

## 2021-05-15 RX ADMIN — SUCRALFATE 1 G: 1 SUSPENSION ORAL at 12:05

## 2021-05-15 RX ADMIN — METOPROLOL SUCCINATE 12.5 MG: 25 TABLET, EXTENDED RELEASE ORAL at 09:05

## 2021-05-15 RX ADMIN — LOSARTAN POTASSIUM 25 MG: 25 TABLET, FILM COATED ORAL at 09:05

## 2021-05-15 RX ADMIN — DIGOXIN 125 MCG: 125 TABLET ORAL at 09:05

## 2021-05-15 RX ADMIN — ESCITALOPRAM OXALATE 10 MG: 10 TABLET ORAL at 09:05

## 2021-05-17 ENCOUNTER — TELEPHONE (OUTPATIENT)
Dept: GASTROENTEROLOGY | Facility: CLINIC | Age: 66
End: 2021-05-17

## 2021-05-17 ENCOUNTER — PATIENT OUTREACH (OUTPATIENT)
Dept: ADMINISTRATIVE | Facility: CLINIC | Age: 66
End: 2021-05-17

## 2021-05-17 ENCOUNTER — PATIENT MESSAGE (OUTPATIENT)
Dept: FAMILY MEDICINE | Facility: CLINIC | Age: 66
End: 2021-05-17

## 2021-05-24 ENCOUNTER — PATIENT MESSAGE (OUTPATIENT)
Dept: FAMILY MEDICINE | Facility: CLINIC | Age: 66
End: 2021-05-24

## 2021-05-25 ENCOUNTER — LAB VISIT (OUTPATIENT)
Dept: LAB | Facility: HOSPITAL | Age: 66
End: 2021-05-25
Attending: INTERNAL MEDICINE
Payer: MEDICARE

## 2021-05-25 ENCOUNTER — OFFICE VISIT (OUTPATIENT)
Dept: CARDIOLOGY | Facility: CLINIC | Age: 66
End: 2021-05-25
Payer: COMMERCIAL

## 2021-05-25 VITALS
SYSTOLIC BLOOD PRESSURE: 117 MMHG | BODY MASS INDEX: 25.78 KG/M2 | DIASTOLIC BLOOD PRESSURE: 72 MMHG | WEIGHT: 145.5 LBS | HEART RATE: 83 BPM | HEIGHT: 63 IN

## 2021-05-25 DIAGNOSIS — I65.23 CAROTID STENOSIS, BILATERAL: ICD-10-CM

## 2021-05-25 DIAGNOSIS — E78.5 DYSLIPIDEMIA: ICD-10-CM

## 2021-05-25 DIAGNOSIS — K31.5 DUODENAL ULCER WITH HEMORRHAGE AND OBSTRUCTION: ICD-10-CM

## 2021-05-25 DIAGNOSIS — E78.2 MIXED HYPERLIPIDEMIA: ICD-10-CM

## 2021-05-25 DIAGNOSIS — Z95.2 S/P AVR (AORTIC VALVE REPLACEMENT): ICD-10-CM

## 2021-05-25 DIAGNOSIS — Z95.1 S/P CABG X 2: ICD-10-CM

## 2021-05-25 DIAGNOSIS — K26.4 DUODENAL ULCER WITH HEMORRHAGE AND OBSTRUCTION: ICD-10-CM

## 2021-05-25 DIAGNOSIS — Z95.5 STENTED CORONARY ARTERY: ICD-10-CM

## 2021-05-25 DIAGNOSIS — I10 ESSENTIAL HYPERTENSION: ICD-10-CM

## 2021-05-25 DIAGNOSIS — I44.7 LBBB (LEFT BUNDLE BRANCH BLOCK): ICD-10-CM

## 2021-05-25 DIAGNOSIS — Z95.5 STENTED CORONARY ARTERY: Primary | ICD-10-CM

## 2021-05-25 DIAGNOSIS — I25.810 CORONARY ARTERY DISEASE INVOLVING CORONARY BYPASS GRAFT OF NATIVE HEART WITHOUT ANGINA PECTORIS: ICD-10-CM

## 2021-05-25 DIAGNOSIS — I48.0 PAROXYSMAL ATRIAL FIBRILLATION: ICD-10-CM

## 2021-05-25 LAB
BASOPHILS # BLD AUTO: 0.07 K/UL (ref 0–0.2)
BASOPHILS NFR BLD: 0.8 % (ref 0–1.9)
DIFFERENTIAL METHOD: ABNORMAL
EOSINOPHIL # BLD AUTO: 0.3 K/UL (ref 0–0.5)
EOSINOPHIL NFR BLD: 3.6 % (ref 0–8)
ERYTHROCYTE [DISTWIDTH] IN BLOOD BY AUTOMATED COUNT: 14.7 % (ref 11.5–14.5)
HCT VFR BLD AUTO: 33.6 % (ref 37–48.5)
HGB BLD-MCNC: 10.9 G/DL (ref 12–16)
IMM GRANULOCYTES # BLD AUTO: 0.03 K/UL (ref 0–0.04)
IMM GRANULOCYTES NFR BLD AUTO: 0.3 % (ref 0–0.5)
LYMPHOCYTES # BLD AUTO: 1.6 K/UL (ref 1–4.8)
LYMPHOCYTES NFR BLD: 18.1 % (ref 18–48)
MCH RBC QN AUTO: 30.7 PG (ref 27–31)
MCHC RBC AUTO-ENTMCNC: 32.4 G/DL (ref 32–36)
MCV RBC AUTO: 95 FL (ref 82–98)
MONOCYTES # BLD AUTO: 1.1 K/UL (ref 0.3–1)
MONOCYTES NFR BLD: 11.9 % (ref 4–15)
NEUTROPHILS # BLD AUTO: 5.9 K/UL (ref 1.8–7.7)
NEUTROPHILS NFR BLD: 65.3 % (ref 38–73)
NRBC BLD-RTO: 0 /100 WBC
PLATELET # BLD AUTO: 359 K/UL (ref 150–450)
PMV BLD AUTO: 9.7 FL (ref 9.2–12.9)
RBC # BLD AUTO: 3.55 M/UL (ref 4–5.4)
WBC # BLD AUTO: 8.96 K/UL (ref 3.9–12.7)

## 2021-05-25 PROCEDURE — 99999 PR PBB SHADOW E&M-EST. PATIENT-LVL IV: CPT | Mod: PBBFAC,,, | Performed by: INTERNAL MEDICINE

## 2021-05-25 PROCEDURE — 99214 PR OFFICE/OUTPT VISIT, EST, LEVL IV, 30-39 MIN: ICD-10-PCS | Mod: S$PBB,,, | Performed by: INTERNAL MEDICINE

## 2021-05-25 PROCEDURE — 36415 COLL VENOUS BLD VENIPUNCTURE: CPT | Mod: PO | Performed by: INTERNAL MEDICINE

## 2021-05-25 PROCEDURE — 99999 PR PBB SHADOW E&M-EST. PATIENT-LVL IV: ICD-10-PCS | Mod: PBBFAC,,, | Performed by: INTERNAL MEDICINE

## 2021-05-25 PROCEDURE — 99214 OFFICE O/P EST MOD 30 MIN: CPT | Mod: S$PBB,,, | Performed by: INTERNAL MEDICINE

## 2021-05-25 PROCEDURE — 85025 COMPLETE CBC W/AUTO DIFF WBC: CPT | Mod: PO | Performed by: INTERNAL MEDICINE

## 2021-05-25 PROCEDURE — 99214 OFFICE O/P EST MOD 30 MIN: CPT | Mod: PBBFAC,PO | Performed by: INTERNAL MEDICINE

## 2021-05-27 ENCOUNTER — OFFICE VISIT (OUTPATIENT)
Dept: FAMILY MEDICINE | Facility: CLINIC | Age: 66
End: 2021-05-27
Payer: COMMERCIAL

## 2021-05-27 VITALS
BODY MASS INDEX: 25.35 KG/M2 | TEMPERATURE: 98 F | HEART RATE: 85 BPM | WEIGHT: 143.06 LBS | HEIGHT: 63 IN | DIASTOLIC BLOOD PRESSURE: 74 MMHG | SYSTOLIC BLOOD PRESSURE: 133 MMHG

## 2021-05-27 DIAGNOSIS — D64.9 ANEMIA, UNSPECIFIED TYPE: ICD-10-CM

## 2021-05-27 DIAGNOSIS — Z09 HOSPITAL DISCHARGE FOLLOW-UP: Primary | ICD-10-CM

## 2021-05-27 DIAGNOSIS — I48.0 PAROXYSMAL ATRIAL FIBRILLATION: ICD-10-CM

## 2021-05-27 DIAGNOSIS — K92.1 MELENA: ICD-10-CM

## 2021-05-27 DIAGNOSIS — K92.2 UPPER GI BLEED: ICD-10-CM

## 2021-05-27 DIAGNOSIS — I25.810 CORONARY ARTERY DISEASE INVOLVING CORONARY BYPASS GRAFT OF NATIVE HEART WITHOUT ANGINA PECTORIS: ICD-10-CM

## 2021-05-27 PROCEDURE — 1101F PT FALLS ASSESS-DOCD LE1/YR: CPT | Mod: CPTII,S$GLB,, | Performed by: NURSE PRACTITIONER

## 2021-05-27 PROCEDURE — 99999 PR PBB SHADOW E&M-EST. PATIENT-LVL V: ICD-10-PCS | Mod: PBBFAC,,, | Performed by: NURSE PRACTITIONER

## 2021-05-27 PROCEDURE — 99213 PR OFFICE/OUTPT VISIT, EST, LEVL III, 20-29 MIN: ICD-10-PCS | Mod: S$GLB,,, | Performed by: NURSE PRACTITIONER

## 2021-05-27 PROCEDURE — 1126F AMNT PAIN NOTED NONE PRSNT: CPT | Mod: S$GLB,,, | Performed by: NURSE PRACTITIONER

## 2021-05-27 PROCEDURE — 1101F PR PT FALLS ASSESS DOC 0-1 FALLS W/OUT INJ PAST YR: ICD-10-PCS | Mod: CPTII,S$GLB,, | Performed by: NURSE PRACTITIONER

## 2021-05-27 PROCEDURE — 99999 PR PBB SHADOW E&M-EST. PATIENT-LVL V: CPT | Mod: PBBFAC,,, | Performed by: NURSE PRACTITIONER

## 2021-05-27 PROCEDURE — 3008F PR BODY MASS INDEX (BMI) DOCUMENTED: ICD-10-PCS | Mod: CPTII,S$GLB,, | Performed by: NURSE PRACTITIONER

## 2021-05-27 PROCEDURE — 3008F BODY MASS INDEX DOCD: CPT | Mod: CPTII,S$GLB,, | Performed by: NURSE PRACTITIONER

## 2021-05-27 PROCEDURE — 3288F PR FALLS RISK ASSESSMENT DOCUMENTED: ICD-10-PCS | Mod: CPTII,S$GLB,, | Performed by: NURSE PRACTITIONER

## 2021-05-27 PROCEDURE — 3288F FALL RISK ASSESSMENT DOCD: CPT | Mod: CPTII,S$GLB,, | Performed by: NURSE PRACTITIONER

## 2021-05-27 PROCEDURE — 99213 OFFICE O/P EST LOW 20 MIN: CPT | Mod: S$GLB,,, | Performed by: NURSE PRACTITIONER

## 2021-05-27 PROCEDURE — 1126F PR PAIN SEVERITY QUANTIFIED, NO PAIN PRESENT: ICD-10-PCS | Mod: S$GLB,,, | Performed by: NURSE PRACTITIONER

## 2021-06-03 ENCOUNTER — OFFICE VISIT (OUTPATIENT)
Dept: GASTROENTEROLOGY | Facility: CLINIC | Age: 66
End: 2021-06-03
Payer: MEDICARE

## 2021-06-03 VITALS
OXYGEN SATURATION: 96 % | DIASTOLIC BLOOD PRESSURE: 76 MMHG | WEIGHT: 144.19 LBS | BODY MASS INDEX: 25.55 KG/M2 | HEART RATE: 82 BPM | SYSTOLIC BLOOD PRESSURE: 118 MMHG | HEIGHT: 63 IN | RESPIRATION RATE: 18 BRPM

## 2021-06-03 DIAGNOSIS — D62 ACUTE BLOOD LOSS ANEMIA: ICD-10-CM

## 2021-06-03 DIAGNOSIS — K26.4 DUODENAL ULCER WITH HEMORRHAGE: Primary | ICD-10-CM

## 2021-06-03 PROBLEM — J90 BILATERAL PLEURAL EFFUSION: Status: RESOLVED | Noted: 2019-02-10 | Resolved: 2021-06-03

## 2021-06-03 PROBLEM — I50.21 ACUTE SYSTOLIC HEART FAILURE: Status: RESOLVED | Noted: 2019-02-12 | Resolved: 2021-06-03

## 2021-06-03 PROCEDURE — 99999 PR PBB SHADOW E&M-EST. PATIENT-LVL V: ICD-10-PCS | Mod: PBBFAC,,, | Performed by: PHYSICIAN ASSISTANT

## 2021-06-03 PROCEDURE — 99215 OFFICE O/P EST HI 40 MIN: CPT | Mod: PBBFAC | Performed by: PHYSICIAN ASSISTANT

## 2021-06-03 PROCEDURE — 99999 PR PBB SHADOW E&M-EST. PATIENT-LVL V: CPT | Mod: PBBFAC,,, | Performed by: PHYSICIAN ASSISTANT

## 2021-06-03 PROCEDURE — 99214 OFFICE O/P EST MOD 30 MIN: CPT | Mod: S$PBB,,, | Performed by: PHYSICIAN ASSISTANT

## 2021-06-03 PROCEDURE — 99214 PR OFFICE/OUTPT VISIT, EST, LEVL IV, 30-39 MIN: ICD-10-PCS | Mod: S$PBB,,, | Performed by: PHYSICIAN ASSISTANT

## 2021-06-03 RX ORDER — CLOPIDOGREL BISULFATE 75 MG/1
75 TABLET ORAL DAILY
COMMUNITY
End: 2021-07-06 | Stop reason: SDUPTHER

## 2021-06-16 ENCOUNTER — TELEPHONE (OUTPATIENT)
Dept: HEMATOLOGY/ONCOLOGY | Facility: CLINIC | Age: 66
End: 2021-06-16

## 2021-06-16 ENCOUNTER — PATIENT MESSAGE (OUTPATIENT)
Dept: NEUROSURGERY | Facility: CLINIC | Age: 66
End: 2021-06-16

## 2021-06-24 DIAGNOSIS — F41.9 ANXIETY: ICD-10-CM

## 2021-06-24 RX ORDER — ESCITALOPRAM OXALATE 10 MG/1
TABLET ORAL
Qty: 30 TABLET | Refills: 11 | Status: SHIPPED | OUTPATIENT
Start: 2021-06-24 | End: 2022-06-28

## 2021-07-04 ENCOUNTER — PATIENT MESSAGE (OUTPATIENT)
Dept: CARDIOLOGY | Facility: CLINIC | Age: 66
End: 2021-07-04

## 2021-07-04 DIAGNOSIS — Z95.2 S/P AVR (AORTIC VALVE REPLACEMENT): Primary | ICD-10-CM

## 2021-07-07 RX ORDER — CLOPIDOGREL BISULFATE 75 MG/1
75 TABLET ORAL DAILY
Qty: 90 TABLET | Refills: 4 | Status: SHIPPED | OUTPATIENT
Start: 2021-07-07 | End: 2022-08-18 | Stop reason: SDUPTHER

## 2021-08-01 NOTE — PROGRESS NOTES
Last 5 Patient Entered Readings                                      Current 30 Day Average: 136/78     Recent Readings 6/24/2018 6/23/2018 6/23/2018 6/23/2018 6/22/2018    SBP (mmHg) 122 140 148 148 132    DBP (mmHg) 71 81 89 89 85    Pulse 78 78 89 89 80          Digital Medicine: Health  Follow Up    Lifestyle Modifications:    1.Dietary Modifications (Sodium intake <2,000mg/day, food labels, dining out): Reports no changes with nutrition and declines further health coaching in this area. Encouraged limiting sodium intake to less than 2000 mg sodium per day for optimal bp control; verbalizes understanding.     2.Physical Activity: States she has continued to walk and integrate some deep breathing and that she is feeling much better than previously. Notes she is pleased with her current bp regimen at this time and feels that finding one that was a better fit has decreased her feelings of concern. Encouraged continued relaxation practices in addition to physical activity.     3.Medication Therapy: Patient has been compliant with the medication regimen.    4.Patient has the following medication side effects/concerns: No concerns at this time per patient. States she is pleased with current routine and feeling much better overall.   (Frequency/Alleviating factors/Precipitating factors, etc.)     Follow up with Ms. Dulce Root completed. No further questions or concerns. Will continue follow up to achieve health goals.     You can access the FollowMyHealth Patient Portal offered by [facility] by registering at the following website: [email or web address]. By joining [facility]’s FollowMyHealth portal, you will also be able to view your health information using other applications (apps) compatible with our system.

## 2021-08-05 ENCOUNTER — PATIENT MESSAGE (OUTPATIENT)
Dept: FAMILY MEDICINE | Facility: CLINIC | Age: 66
End: 2021-08-05

## 2021-08-05 DIAGNOSIS — I10 ESSENTIAL HYPERTENSION: ICD-10-CM

## 2021-08-09 ENCOUNTER — PATIENT MESSAGE (OUTPATIENT)
Dept: FAMILY MEDICINE | Facility: CLINIC | Age: 66
End: 2021-08-09

## 2021-08-09 RX ORDER — TELMISARTAN 20 MG/1
20 TABLET ORAL DAILY
Qty: 90 TABLET | Refills: 3 | Status: SHIPPED | OUTPATIENT
Start: 2021-08-09 | End: 2021-08-23

## 2021-08-09 RX ORDER — METOPROLOL SUCCINATE 25 MG/1
12.5 TABLET, EXTENDED RELEASE ORAL 2 TIMES DAILY
Qty: 90 TABLET | Refills: 3 | Status: SHIPPED | OUTPATIENT
Start: 2021-08-09 | End: 2022-03-16

## 2021-08-12 ENCOUNTER — OFFICE VISIT (OUTPATIENT)
Dept: NEUROSURGERY | Facility: CLINIC | Age: 66
End: 2021-08-12
Payer: MEDICARE

## 2021-08-12 ENCOUNTER — HOSPITAL ENCOUNTER (OUTPATIENT)
Dept: RADIOLOGY | Facility: HOSPITAL | Age: 66
Discharge: HOME | End: 2021-08-12
Attending: PHYSICIAN ASSISTANT
Payer: MEDICARE

## 2021-08-12 VITALS
RESPIRATION RATE: 18 BRPM | DIASTOLIC BLOOD PRESSURE: 67 MMHG | BODY MASS INDEX: 25.39 KG/M2 | HEART RATE: 84 BPM | HEIGHT: 63 IN | WEIGHT: 143.31 LBS | SYSTOLIC BLOOD PRESSURE: 100 MMHG

## 2021-08-12 DIAGNOSIS — M80.00XD AGE-RELATED OSTEOPOROSIS WITH CURRENT PATHOLOGICAL FRACTURE WITH ROUTINE HEALING: ICD-10-CM

## 2021-08-12 DIAGNOSIS — M54.9 DORSALGIA, UNSPECIFIED: ICD-10-CM

## 2021-08-12 DIAGNOSIS — S22.060D COMPRESSION FRACTURE OF T7 VERTEBRA WITH ROUTINE HEALING, SUBSEQUENT ENCOUNTER: ICD-10-CM

## 2021-08-12 DIAGNOSIS — S22.060D COMPRESSION FRACTURE OF T7 VERTEBRA WITH ROUTINE HEALING, SUBSEQUENT ENCOUNTER: Primary | ICD-10-CM

## 2021-08-12 PROCEDURE — 72070 X-RAY EXAM THORAC SPINE 2VWS: CPT | Mod: TC

## 2021-08-12 PROCEDURE — 72070 X-RAY EXAM THORAC SPINE 2VWS: CPT | Mod: 26,,, | Performed by: RADIOLOGY

## 2021-08-12 PROCEDURE — 99999 PR PBB SHADOW E&M-EST. PATIENT-LVL IV: CPT | Mod: PBBFAC,,, | Performed by: PHYSICIAN ASSISTANT

## 2021-08-12 PROCEDURE — 99213 PR OFFICE/OUTPT VISIT, EST, LEVL III, 20-29 MIN: ICD-10-PCS | Mod: S$PBB,,, | Performed by: PHYSICIAN ASSISTANT

## 2021-08-12 PROCEDURE — 99214 OFFICE O/P EST MOD 30 MIN: CPT | Mod: PBBFAC,PO | Performed by: PHYSICIAN ASSISTANT

## 2021-08-12 PROCEDURE — 99213 OFFICE O/P EST LOW 20 MIN: CPT | Mod: S$PBB,,, | Performed by: PHYSICIAN ASSISTANT

## 2021-08-12 PROCEDURE — 99999 PR PBB SHADOW E&M-EST. PATIENT-LVL IV: ICD-10-PCS | Mod: PBBFAC,,, | Performed by: PHYSICIAN ASSISTANT

## 2021-08-12 PROCEDURE — 72070 XR THORACIC SPINE AP LATERAL: ICD-10-PCS | Mod: 26,,, | Performed by: RADIOLOGY

## 2021-08-13 ENCOUNTER — TELEPHONE (OUTPATIENT)
Dept: RHEUMATOLOGY | Facility: CLINIC | Age: 66
End: 2021-08-13

## 2021-08-18 ENCOUNTER — TELEPHONE (OUTPATIENT)
Dept: RHEUMATOLOGY | Facility: CLINIC | Age: 66
End: 2021-08-18

## 2021-08-19 ENCOUNTER — OFFICE VISIT (OUTPATIENT)
Dept: RHEUMATOLOGY | Facility: CLINIC | Age: 66
End: 2021-08-19
Payer: MEDICARE

## 2021-08-19 ENCOUNTER — PATIENT OUTREACH (OUTPATIENT)
Dept: ADMINISTRATIVE | Facility: OTHER | Age: 66
End: 2021-08-19

## 2021-08-19 VITALS
DIASTOLIC BLOOD PRESSURE: 76 MMHG | HEIGHT: 63 IN | WEIGHT: 138 LBS | BODY MASS INDEX: 24.45 KG/M2 | HEART RATE: 81 BPM | SYSTOLIC BLOOD PRESSURE: 133 MMHG

## 2021-08-19 DIAGNOSIS — Z71.89 COUNSELING ON HEALTH PROMOTION AND DISEASE PREVENTION: ICD-10-CM

## 2021-08-19 DIAGNOSIS — M81.0 AGE RELATED OSTEOPOROSIS, UNSPECIFIED PATHOLOGICAL FRACTURE PRESENCE: Primary | ICD-10-CM

## 2021-08-19 PROCEDURE — 99999 PR PBB SHADOW E&M-EST. PATIENT-LVL IV: ICD-10-PCS | Mod: PBBFAC,,, | Performed by: INTERNAL MEDICINE

## 2021-08-19 PROCEDURE — 99215 PR OFFICE/OUTPT VISIT, EST, LEVL V, 40-54 MIN: ICD-10-PCS | Mod: S$PBB,,, | Performed by: INTERNAL MEDICINE

## 2021-08-19 PROCEDURE — 99215 OFFICE O/P EST HI 40 MIN: CPT | Mod: S$PBB,,, | Performed by: INTERNAL MEDICINE

## 2021-08-19 PROCEDURE — 99214 OFFICE O/P EST MOD 30 MIN: CPT | Mod: PBBFAC | Performed by: INTERNAL MEDICINE

## 2021-08-19 PROCEDURE — 99999 PR PBB SHADOW E&M-EST. PATIENT-LVL IV: CPT | Mod: PBBFAC,,, | Performed by: INTERNAL MEDICINE

## 2021-08-19 RX ORDER — BENAZEPRIL HYDROCHLORIDE 5 MG/1
5 TABLET ORAL DAILY
COMMUNITY
End: 2021-10-29 | Stop reason: SDUPTHER

## 2021-08-23 ENCOUNTER — PATIENT MESSAGE (OUTPATIENT)
Dept: GASTROENTEROLOGY | Facility: CLINIC | Age: 66
End: 2021-08-23

## 2021-08-23 ENCOUNTER — OFFICE VISIT (OUTPATIENT)
Dept: CARDIOLOGY | Facility: CLINIC | Age: 66
End: 2021-08-23
Payer: MEDICARE

## 2021-08-23 VITALS
DIASTOLIC BLOOD PRESSURE: 86 MMHG | HEIGHT: 63 IN | HEART RATE: 87 BPM | SYSTOLIC BLOOD PRESSURE: 161 MMHG | BODY MASS INDEX: 24.92 KG/M2 | WEIGHT: 140.63 LBS

## 2021-08-23 DIAGNOSIS — I48.0 PAROXYSMAL ATRIAL FIBRILLATION: ICD-10-CM

## 2021-08-23 DIAGNOSIS — I50.42 CHRONIC COMBINED SYSTOLIC AND DIASTOLIC HEART FAILURE: ICD-10-CM

## 2021-08-23 DIAGNOSIS — Z95.2 S/P AVR (AORTIC VALVE REPLACEMENT): ICD-10-CM

## 2021-08-23 DIAGNOSIS — E03.9 ACQUIRED HYPOTHYROIDISM: ICD-10-CM

## 2021-08-23 DIAGNOSIS — Z85.71 HX OF HODGKIN'S DISEASE: ICD-10-CM

## 2021-08-23 DIAGNOSIS — I25.810 CORONARY ARTERY DISEASE INVOLVING CORONARY BYPASS GRAFT OF NATIVE HEART WITHOUT ANGINA PECTORIS: ICD-10-CM

## 2021-08-23 DIAGNOSIS — D05.10 DUCTAL CARCINOMA IN SITU (DCIS) OF BREAST, UNSPECIFIED LATERALITY: ICD-10-CM

## 2021-08-23 DIAGNOSIS — I42.9 CARDIOMYOPATHY, UNSPECIFIED TYPE: Primary | ICD-10-CM

## 2021-08-23 DIAGNOSIS — K26.4 DUODENAL ULCER WITH HEMORRHAGE AND OBSTRUCTION: ICD-10-CM

## 2021-08-23 DIAGNOSIS — E78.2 MIXED HYPERLIPIDEMIA: ICD-10-CM

## 2021-08-23 DIAGNOSIS — I10 ESSENTIAL HYPERTENSION: ICD-10-CM

## 2021-08-23 DIAGNOSIS — K92.2 UPPER GI BLEED: ICD-10-CM

## 2021-08-23 DIAGNOSIS — K31.5 DUODENAL ULCER WITH HEMORRHAGE AND OBSTRUCTION: ICD-10-CM

## 2021-08-23 DIAGNOSIS — Z95.1 S/P CABG X 2: ICD-10-CM

## 2021-08-23 PROCEDURE — 99214 PR OFFICE/OUTPT VISIT, EST, LEVL IV, 30-39 MIN: ICD-10-PCS | Mod: S$PBB,,, | Performed by: PHYSICIAN ASSISTANT

## 2021-08-23 PROCEDURE — 99999 PR PBB SHADOW E&M-EST. PATIENT-LVL IV: ICD-10-PCS | Mod: PBBFAC,,, | Performed by: PHYSICIAN ASSISTANT

## 2021-08-23 PROCEDURE — 99214 OFFICE O/P EST MOD 30 MIN: CPT | Mod: PBBFAC,PO | Performed by: PHYSICIAN ASSISTANT

## 2021-08-23 PROCEDURE — 99999 PR PBB SHADOW E&M-EST. PATIENT-LVL IV: CPT | Mod: PBBFAC,,, | Performed by: PHYSICIAN ASSISTANT

## 2021-08-23 PROCEDURE — 99214 OFFICE O/P EST MOD 30 MIN: CPT | Mod: S$PBB,,, | Performed by: PHYSICIAN ASSISTANT

## 2021-08-26 ENCOUNTER — PATIENT OUTREACH (OUTPATIENT)
Dept: OTHER | Facility: OTHER | Age: 66
End: 2021-08-26

## 2021-09-27 ENCOUNTER — PATIENT MESSAGE (OUTPATIENT)
Dept: GASTROENTEROLOGY | Facility: CLINIC | Age: 66
End: 2021-09-27

## 2021-09-27 ENCOUNTER — PATIENT MESSAGE (OUTPATIENT)
Dept: FAMILY MEDICINE | Facility: CLINIC | Age: 66
End: 2021-09-27

## 2021-09-29 DIAGNOSIS — Z01.818 PRE-OP TESTING: ICD-10-CM

## 2021-10-08 ENCOUNTER — PATIENT MESSAGE (OUTPATIENT)
Dept: GASTROENTEROLOGY | Facility: CLINIC | Age: 66
End: 2021-10-08

## 2021-10-18 ENCOUNTER — PATIENT MESSAGE (OUTPATIENT)
Dept: GASTROENTEROLOGY | Facility: CLINIC | Age: 66
End: 2021-10-18
Payer: MEDICARE

## 2021-10-19 ENCOUNTER — TELEPHONE (OUTPATIENT)
Dept: PREADMISSION TESTING | Facility: HOSPITAL | Age: 66
End: 2021-10-19

## 2021-10-29 ENCOUNTER — PATIENT MESSAGE (OUTPATIENT)
Dept: FAMILY MEDICINE | Facility: CLINIC | Age: 66
End: 2021-10-29
Payer: MEDICARE

## 2021-10-29 RX ORDER — BENAZEPRIL HYDROCHLORIDE 5 MG/1
5 TABLET ORAL DAILY
Qty: 90 TABLET | Refills: 3 | Status: SHIPPED | OUTPATIENT
Start: 2021-10-29 | End: 2022-02-17

## 2021-11-03 ENCOUNTER — PATIENT MESSAGE (OUTPATIENT)
Dept: ENDOSCOPY | Facility: HOSPITAL | Age: 66
End: 2021-11-03
Payer: MEDICARE

## 2021-11-04 ENCOUNTER — HOSPITAL ENCOUNTER (OUTPATIENT)
Facility: HOSPITAL | Age: 66
Discharge: HOME | End: 2021-11-04
Attending: INTERNAL MEDICINE | Admitting: INTERNAL MEDICINE
Payer: MEDICARE

## 2021-11-04 ENCOUNTER — ANESTHESIA EVENT (OUTPATIENT)
Dept: ENDOSCOPY | Facility: HOSPITAL | Age: 66
End: 2021-11-04

## 2021-11-04 ENCOUNTER — ANESTHESIA (OUTPATIENT)
Dept: ENDOSCOPY | Facility: HOSPITAL | Age: 66
End: 2021-11-04

## 2021-11-04 VITALS
WEIGHT: 131 LBS | DIASTOLIC BLOOD PRESSURE: 78 MMHG | OXYGEN SATURATION: 95 % | HEIGHT: 63 IN | SYSTOLIC BLOOD PRESSURE: 168 MMHG | TEMPERATURE: 98 F | BODY MASS INDEX: 23.21 KG/M2 | HEART RATE: 79 BPM | RESPIRATION RATE: 16 BRPM

## 2021-11-04 DIAGNOSIS — K26.9 DUODENAL ULCER: ICD-10-CM

## 2021-11-04 DIAGNOSIS — K31.5 DUODENAL STRICTURE: Primary | ICD-10-CM

## 2021-11-04 PROBLEM — K92.2 UPPER GI BLEED: Status: RESOLVED | Noted: 2021-05-11 | Resolved: 2021-11-04

## 2021-11-04 PROCEDURE — 25000003 PHARM REV CODE 250: Performed by: NURSE ANESTHETIST, CERTIFIED REGISTERED

## 2021-11-04 PROCEDURE — 43235 EGD DIAGNOSTIC BRUSH WASH: CPT | Performed by: INTERNAL MEDICINE

## 2021-11-04 PROCEDURE — 43235 PR EGD, FLEX, DIAGNOSTIC: ICD-10-PCS | Mod: ,,, | Performed by: INTERNAL MEDICINE

## 2021-11-04 PROCEDURE — 63600175 PHARM REV CODE 636 W HCPCS: Performed by: NURSE ANESTHETIST, CERTIFIED REGISTERED

## 2021-11-04 PROCEDURE — 37000008 HC ANESTHESIA 1ST 15 MINUTES: Performed by: INTERNAL MEDICINE

## 2021-11-04 PROCEDURE — 43235 EGD DIAGNOSTIC BRUSH WASH: CPT | Mod: ,,, | Performed by: INTERNAL MEDICINE

## 2021-11-04 RX ORDER — LIDOCAINE HYDROCHLORIDE 10 MG/ML
INJECTION, SOLUTION EPIDURAL; INFILTRATION; INTRACAUDAL; PERINEURAL
Status: DISCONTINUED | OUTPATIENT
Start: 2021-11-04 | End: 2021-11-04

## 2021-11-04 RX ORDER — PANTOPRAZOLE SODIUM 20 MG/1
40 TABLET, DELAYED RELEASE ORAL DAILY
Qty: 180 TABLET | Refills: 3 | Status: SHIPPED | OUTPATIENT
Start: 2021-11-04 | End: 2022-02-14 | Stop reason: SDUPTHER

## 2021-11-04 RX ORDER — SODIUM CHLORIDE, SODIUM LACTATE, POTASSIUM CHLORIDE, CALCIUM CHLORIDE 600; 310; 30; 20 MG/100ML; MG/100ML; MG/100ML; MG/100ML
INJECTION, SOLUTION INTRAVENOUS CONTINUOUS PRN
Status: DISCONTINUED | OUTPATIENT
Start: 2021-11-04 | End: 2021-11-04

## 2021-11-04 RX ORDER — ETOMIDATE 2 MG/ML
INJECTION INTRAVENOUS
Status: DISCONTINUED | OUTPATIENT
Start: 2021-11-04 | End: 2021-11-04

## 2021-11-04 RX ADMIN — ETOMIDATE 14 MG: 2 INJECTION, SOLUTION INTRAVENOUS at 12:11

## 2021-11-04 RX ADMIN — ETOMIDATE 4 MG: 2 INJECTION, SOLUTION INTRAVENOUS at 12:11

## 2021-11-04 RX ADMIN — SODIUM CHLORIDE, SODIUM LACTATE, POTASSIUM CHLORIDE, AND CALCIUM CHLORIDE: .6; .31; .03; .02 INJECTION, SOLUTION INTRAVENOUS at 12:11

## 2021-11-04 RX ADMIN — LIDOCAINE HYDROCHLORIDE 50 MG: 10 INJECTION, SOLUTION EPIDURAL; INFILTRATION; INTRACAUDAL; PERINEURAL at 12:11

## 2021-11-17 ENCOUNTER — PATIENT OUTREACH (OUTPATIENT)
Dept: OTHER | Facility: OTHER | Age: 66
End: 2021-11-17

## 2021-11-17 ENCOUNTER — PATIENT MESSAGE (OUTPATIENT)
Dept: OTHER | Facility: OTHER | Age: 66
End: 2021-11-17
Payer: MEDICARE

## 2021-12-27 ENCOUNTER — PATIENT MESSAGE (OUTPATIENT)
Dept: OTHER | Facility: OTHER | Age: 66
End: 2021-12-27
Payer: MEDICARE

## 2021-12-28 ENCOUNTER — PATIENT MESSAGE (OUTPATIENT)
Dept: ADMINISTRATIVE | Facility: OTHER | Age: 66
End: 2021-12-28
Payer: MEDICARE

## 2021-12-30 ENCOUNTER — TELEPHONE (OUTPATIENT)
Dept: HEMATOLOGY/ONCOLOGY | Facility: CLINIC | Age: 66
End: 2021-12-30
Payer: MEDICARE

## 2021-12-30 NOTE — PROGRESS NOTES
Subjective:       Patient ID: Dulce Root is a 66 y.o. female.    Chief Complaint: Ductal carcinoma in situ (DCIS) of breast, unspecified late    HPI Ms. Root is a very pleasant 66-year-old female with history of bilateral ductal carcinoma in situ and a remote history of Hodgkin's disease.    Overall she is doing well. Eating and drinking well. Bowel movements good.    She is on calcium and vitamin D, last DXA showed osteoporosis.  Intermittent lightheadedness and dizziness.       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  She is followed at [facility] by Dr. Xie who is planning possible rituximab therapy.  She has had no new cardiac issues.    Per Dr. Sepulveda's previous note: Breast history:  She  developed ductal carcinoma in situ of the right breast in 09/2006, treated with lumpectomy and radiation therapy.  In 08/2009, she was diagnosed with ductal carcinoma in situ of the left breast and in 08/2009, she had bilateral mastectomy.The left breast showed widespread high-grade DCIS and the right breast was without evidence of malignancy.        She has a remote history of Hodgkin's disease, originally diagnosed in 1991, treated with radiation therapy and then treated with salvage chemotherapy with ABVD on protocol E-5489 in 1996.    In August 2017 she underwent aortic valve replacement and coronary artery bypass grafting by Dr. Baxter. She has also has stent placement in 2019.    Review of Systems   Constitutional: Negative for activity change, appetite change, fatigue and unexpected weight change.   HENT: Positive for mouth sores (still follows with dermatology ).    Eyes: Negative for visual disturbance.   Respiratory: Positive for shortness of breath (with exertion). Negative for cough.    Cardiovascular: Negative for chest pain.   Gastrointestinal: Negative for abdominal pain, diarrhea and nausea.   Genitourinary: Negative for flank pain and frequency.   Musculoskeletal: Negative  for back pain.   Skin: Negative for rash.   Neurological: Negative for dizziness (intermittent ), light-headedness (intermittment ) and headaches.   Hematological: Negative for adenopathy.   Psychiatric/Behavioral: Negative for dysphoric mood. The patient is not nervous/anxious.        Objective:      Physical Exam  Vitals reviewed.   Constitutional:       General: She is not in acute distress.     Appearance: She is well-developed.   Eyes:      General: No scleral icterus.  Cardiovascular:      Rate and Rhythm: Normal rate and regular rhythm.   Chest:   Breasts:      Right: No supraclavicular adenopathy.      Left: No supraclavicular adenopathy.         Abdominal:      Palpations: Abdomen is soft. There is no mass.      Tenderness: There is no abdominal tenderness.   Lymphadenopathy:      Cervical: No cervical adenopathy.      Upper Body:      Right upper body: No supraclavicular adenopathy.      Left upper body: No supraclavicular adenopathy.   Skin:     Findings: No rash.   Neurological:      Mental Status: She is alert and oriented to person, place, and time.   Psychiatric:         Behavior: Behavior normal.         Thought Content: Thought content normal.         Assessment:       1. Ductal carcinoma in situ (DCIS) of breast, unspecified laterality    2. Essential hypertension    3. Mixed hyperlipidemia    4. Hx of Hodgkin's disease - s/p chemo and radiation    5. Acquired hypothyroidism    6. Age-related osteoporosis with current pathological fracture with routine healing        Plan:       1. Return to clinic in 1 year. PAIGE on exam  - Will continue to monitor area, if continues to bleed will send to breast surgery     2. Monitored today; continue current medication and follow up with PCP     3. Continue current medication and follow up with PCP    4. Follow up in 1 year     5. Continue current dose of synthroid and follow up with endocrinology     6. On calcium and Vitamin D     Return to clinic in 1 year  with JASPAL appointment.     Patient is in agreement with the proposed treatment plan. All questions were answered to the patient's satisfaction. Patient knows to call clinic for any new or worsening symptoms and if anything is needed before the next clinic visit.          SALONI Abernathy-C  Hematology & Medical Oncology   [facility] [place] [address]  ph. [phone]  Fax. [phone]    Patient discussed with collaborating physician, Dr. Sepulveda.    Approximately 25 minutes were spent face-to-face with the patient.  Approximately 40 minutes in total were spent on this encounter, which includes face-to-face time and non-face-to-face time preparing to see the patient (e.g., review of tests), obtaining and/or reviewing separately obtained history, documenting clinical information in the electronic or other health record, independently interpreting results (not separately reported) and communicating results to the patient/family/caregiver, or care coordination (not separately reported).

## 2022-01-04 ENCOUNTER — OFFICE VISIT (OUTPATIENT)
Dept: HEMATOLOGY/ONCOLOGY | Facility: CLINIC | Age: 67
End: 2022-01-04
Payer: MEDICARE

## 2022-01-04 ENCOUNTER — PATIENT OUTREACH (OUTPATIENT)
Dept: ADMINISTRATIVE | Facility: OTHER | Age: 67
End: 2022-01-04
Payer: MEDICARE

## 2022-01-04 VITALS
TEMPERATURE: 98 F | HEIGHT: 63 IN | OXYGEN SATURATION: 97 % | BODY MASS INDEX: 24.25 KG/M2 | DIASTOLIC BLOOD PRESSURE: 60 MMHG | WEIGHT: 136.88 LBS | SYSTOLIC BLOOD PRESSURE: 131 MMHG | HEART RATE: 81 BPM | RESPIRATION RATE: 16 BRPM

## 2022-01-04 DIAGNOSIS — E03.9 ACQUIRED HYPOTHYROIDISM: ICD-10-CM

## 2022-01-04 DIAGNOSIS — M80.00XD AGE-RELATED OSTEOPOROSIS WITH CURRENT PATHOLOGICAL FRACTURE WITH ROUTINE HEALING: ICD-10-CM

## 2022-01-04 DIAGNOSIS — E78.2 MIXED HYPERLIPIDEMIA: ICD-10-CM

## 2022-01-04 DIAGNOSIS — I10 ESSENTIAL HYPERTENSION: ICD-10-CM

## 2022-01-04 DIAGNOSIS — Z85.71 HX OF HODGKIN'S DISEASE: ICD-10-CM

## 2022-01-04 DIAGNOSIS — D05.10 DUCTAL CARCINOMA IN SITU (DCIS) OF BREAST, UNSPECIFIED LATERALITY: Primary | ICD-10-CM

## 2022-01-04 PROCEDURE — 99214 OFFICE O/P EST MOD 30 MIN: CPT | Mod: PBBFAC | Performed by: NURSE PRACTITIONER

## 2022-01-04 PROCEDURE — 99999 PR PBB SHADOW E&M-EST. PATIENT-LVL IV: CPT | Mod: PBBFAC,,, | Performed by: NURSE PRACTITIONER

## 2022-01-04 PROCEDURE — 99214 OFFICE O/P EST MOD 30 MIN: CPT | Mod: S$PBB,,, | Performed by: NURSE PRACTITIONER

## 2022-01-04 PROCEDURE — 99999 PR PBB SHADOW E&M-EST. PATIENT-LVL IV: ICD-10-PCS | Mod: PBBFAC,,, | Performed by: NURSE PRACTITIONER

## 2022-01-04 PROCEDURE — 99214 PR OFFICE/OUTPT VISIT, EST, LEVL IV, 30-39 MIN: ICD-10-PCS | Mod: S$PBB,,, | Performed by: NURSE PRACTITIONER

## 2022-01-05 ENCOUNTER — TELEPHONE (OUTPATIENT)
Dept: RHEUMATOLOGY | Facility: CLINIC | Age: 67
End: 2022-01-05
Payer: MEDICARE

## 2022-01-05 ENCOUNTER — PATIENT MESSAGE (OUTPATIENT)
Dept: RHEUMATOLOGY | Facility: CLINIC | Age: 67
End: 2022-01-05
Payer: MEDICARE

## 2022-01-05 NOTE — PROGRESS NOTES
Health Maintenance Due   Topic Date Due    HIV Screening  Never done    TETANUS VACCINE  Never done    Shingles Vaccine (1 of 2) Never done    Pneumococcal Vaccines (Age 65+) (2 of 2 - PPSV23) 05/05/2021    Lipid Panel  08/21/2021     Updates were requested from care everywhere.  Chart was reviewed for overdue Proactive [facility] Encounters (SIVAKUMAR) topics (CRS, Breast Cancer Screening, Eye exam)  Health Maintenance has been updated.  LINKS immunization registry triggered.  Immunizations were reconciled.

## 2022-01-05 NOTE — TELEPHONE ENCOUNTER
Left message for patient to confirm 1-6 appointment with Dr. Buitrago at [facility].

## 2022-01-11 NOTE — TELEPHONE ENCOUNTER
----- Message from Mey Quintanilla sent at 11/22/2019  8:44 AM CST -----  Contact: pt  Type: Needs Medical Advice    Who Called:  pt    Best Call Back Number:     Additional Information: Requesting a call back from Nurse, regarding pt is stating that from but down to left leg is still in pain and now it hurts to walk pt has been putting ice and heat on it but it doesn't help , pt would want something called in([facility] [place]) as well to help with pain please call back with advice       Orthopedic Nurse Navigator Summary    COVID:  Not vaccinated- 01/18/22 Covid test was negative  -  Patient Name: Robert Fitzpatrick  Anticipated Date of Surgery: 01/24/22  Using Ortho[employer]itals? Yes, Are they Registered: Yes  Attended Pre-Op Education Class: Yes  If No, why not? PCP: Dayton Graham  Phone #:  Date of PCP Visit for H&P: 12/27/2021  Any Noted Concerns from PCP prior to surgery: No  If Yes, what concerns?:  Is the Patient in a Pain Management Program?: No  Review of Past Medical History Reveals History of:  -  Critical Lab Values:  - Hemoglobin (g/dL): Date 01/18/22 Value 15.3  - Hematocrit (%): Date 01/18/22 Value 44.5  - HgbA1C : Date 01/18/22 Value 6.3  - Albumin : Date 01/18/22 Value 4.5  - BUN (mg/dL) : Date 1/18/22 Value 10  - CREATININE (mg/dL) : Date 01/18/22 Value 0.6  - BMI (kg/m2) : Date Value 31.0  -  Coronary Artery Disease/HTN/CHF History: Yes  Cardiologist:  Cardiac Clearance Necessary: No  Date of Cardiac Clearance Appt: On Plavix? If YES, when will they stop taking? Final Cardiac Recommendations: On any anticoagulation: Yes  -  Diabetes History: Yes  Most Recent HgbA1C: 6.4  PCP or Endocrine Recommendations:  Nutritionist/Dietician Consult Scheduled:  Final Plan For Diabetic Control:  Pulmonary: COPD/Emphysema/ Use of home oxygen:  Alcohol use: Non-Drinker  -  DVT Risk Stratification: Low Risk  Vascular Consult Ordered:  Date of Vascular Appt:  Hematology/Oncology Consult Ordered:  Date of Hematology/Oncology Appt:  Final Recommendation For DVT Prophylaxis:  Smoking history: Non-Smoker  Use of Estrogen:  -  BMI Greater than 40 at time of scheduling?: No  Has Surgeon been notified of BMI concern? Weight Loss Clinic Consult Ordered  Date of Wt Loss Clinic Appt:  BMI at time of surgery (if went through [facility]):  -  Additional Medical Concerns:   Additional Recommendations for above concerns:  Attended Pre-Hab Program: Yes  Anticipated Discharge Disposition: Home with [facility]  Who will be with patient at home following discharge?   Equipment patient already has: walker  Bedroom on first or second floor: first  [address] on first or second floor: first  Weight bearing status: wbat  Pre-op ambulatory status: painful ambulation  Number of entry steps: FIVE  Caregiver assistance: full time  -  Abhay Domingo RN  01/11/2022

## 2022-01-28 ENCOUNTER — TELEPHONE (OUTPATIENT)
Dept: HEMATOLOGY/ONCOLOGY | Facility: CLINIC | Age: 67
End: 2022-01-28
Payer: MEDICARE

## 2022-01-28 NOTE — NURSING
Orders and records received from Dr. Xie's office.  Records scanned to media and provided to Nsa Whatley NP for review and initiation of plan. Patient will need to see Nas Whatley NP prior to infusion.

## 2022-02-01 RX ORDER — ACETAMINOPHEN 500 MG
500 TABLET ORAL
Status: CANCELLED | OUTPATIENT
Start: 2022-02-04

## 2022-02-01 RX ORDER — METHYLPREDNISOLONE SODIUM SUCCINATE 40 MG/ML
30 INJECTION INTRAMUSCULAR; INTRAVENOUS
Status: CANCELLED
Start: 2022-02-04 | End: 2022-02-04

## 2022-02-01 RX ORDER — HEPARIN 100 UNIT/ML
500 SYRINGE INTRAVENOUS
Status: CANCELLED | OUTPATIENT
Start: 2022-02-04

## 2022-02-01 RX ORDER — SODIUM CHLORIDE 0.9 % (FLUSH) 0.9 %
10 SYRINGE (ML) INJECTION
Status: CANCELLED | OUTPATIENT
Start: 2022-02-04

## 2022-02-01 RX ORDER — DIPHENHYDRAMINE HCL 50 MG
50 CAPSULE ORAL
Status: CANCELLED
Start: 2022-02-04

## 2022-02-03 ENCOUNTER — TELEPHONE (OUTPATIENT)
Dept: INFUSION THERAPY | Facility: HOSPITAL | Age: 67
End: 2022-02-03
Payer: MEDICARE

## 2022-02-03 ENCOUNTER — TELEPHONE (OUTPATIENT)
Dept: HEMATOLOGY/ONCOLOGY | Facility: CLINIC | Age: 67
End: 2022-02-03
Payer: MEDICARE

## 2022-02-03 DIAGNOSIS — L12.1 OCULAR CICATRICIAL PEMPHIGOID: Primary | ICD-10-CM

## 2022-02-03 NOTE — TELEPHONE ENCOUNTER
[4:00 PM] VANE Marizol Vincent Root [id number] needs to start IVIG on 2/11 - also she informed me that she is a difficult stick    [4:06 PM] Ernestine Webster  mrn [id number] 02/11/22 @9:30

## 2022-02-03 NOTE — NURSING
Patient treatment has received authorization.  Contacted patient to review process of treatment at [facility] with Nas Whatley NP.  Patient verbalized understanding.  She stated it would be best to see SHELDON Lovell the day before in afternoon then have treatment the next morning.  Patient also informed that she is a difficult IV stick.  Scheduled to see SHELDON Lovell 2/10 then follow next day with infusion.  Requested time for 2/11 infusion also, informed patient is a difficult stick

## 2022-02-10 ENCOUNTER — OFFICE VISIT (OUTPATIENT)
Dept: HEMATOLOGY/ONCOLOGY | Facility: CLINIC | Age: 67
End: 2022-02-10
Payer: MEDICARE

## 2022-02-10 VITALS
HEIGHT: 62 IN | DIASTOLIC BLOOD PRESSURE: 80 MMHG | TEMPERATURE: 98 F | OXYGEN SATURATION: 100 % | SYSTOLIC BLOOD PRESSURE: 134 MMHG | WEIGHT: 135.38 LBS | BODY MASS INDEX: 24.91 KG/M2 | HEART RATE: 89 BPM

## 2022-02-10 DIAGNOSIS — L12.1 OCULAR CICATRICIAL PEMPHIGOID: Primary | ICD-10-CM

## 2022-02-10 DIAGNOSIS — L12.9 PEMPHIGOID: ICD-10-CM

## 2022-02-10 PROCEDURE — 99999 PR PBB SHADOW E&M-EST. PATIENT-LVL IV: CPT | Mod: PBBFAC,,, | Performed by: NURSE PRACTITIONER

## 2022-02-10 PROCEDURE — 99214 OFFICE O/P EST MOD 30 MIN: CPT | Mod: S$PBB,,, | Performed by: NURSE PRACTITIONER

## 2022-02-10 PROCEDURE — 99999 PR PBB SHADOW E&M-EST. PATIENT-LVL IV: ICD-10-PCS | Mod: PBBFAC,,, | Performed by: NURSE PRACTITIONER

## 2022-02-10 PROCEDURE — 99214 PR OFFICE/OUTPT VISIT, EST, LEVL IV, 30-39 MIN: ICD-10-PCS | Mod: S$PBB,,, | Performed by: NURSE PRACTITIONER

## 2022-02-10 PROCEDURE — 99214 OFFICE O/P EST MOD 30 MIN: CPT | Mod: PBBFAC,PN | Performed by: NURSE PRACTITIONER

## 2022-02-10 NOTE — PROGRESS NOTES
Subjective:       Patient ID: Dulce Root is a 66 y.o. female.    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 Judy Xie in Dermatology for a diagnosis of Ocular cicatricial pemphigoid.  She has a history of CAD, CABG, S/P AVR, Hodgkin's, AFIB, Breast cancer, duodenal ulcer, Osteoporosis, Kyphosis, etc.    Breast history:  She  developed ductal carcinoma in situ of the right breast in 09/2006, treated with lumpectomy and radiation therapy.  In 08/2009, she was diagnosed with ductal carcinoma in situ of the left breast and in 08/2009, she had bilateral mastectomy.The left breast showed widespread high-grade DCIS and the right breast was without evidence of malignancy.       Remote history of Hodgkin's disease, originally diagnosed in 1991, treated with radiation therapy and then treated with salvage chemotherapy with ABVD on protocol E-5489 in 1996.     August 2017 she underwent aortic valve replacement and coronary artery bypass grafting by Dr. Baxter. She has also has stent placement in 2019.     She presents to the clinic today for evaluation prior to IVIG infusion on Friday, 02/11/22.  She reports that she has received IVIG [facility].  She reports that she had difficulty with her 1st infusion only.  She endorses difficulties with her gums bleeding all the time.  She denies any recent fevers, illnesses, chest pain, irregular heart rate, abdominal discomfort, N/V/D, etc.  No other complaints or pertinent findings on a 14 point review of systems.    Past Medical History:   Diagnosis Date    Allergy     Anticoagulant long-term use     Plavix    Breast cancer 2008    Carotid stenosis, bilateral 10/13/2017    Coronary artery disease     Coronary artery disease involving coronary bypass graft of native heart without angina pectoris 8/16/2019 2/19  1.  Left main is a small vessel with a 60%-65% ostial lesion. 2.  LAD is a medium-sized  vessel diffuse 70% proximally  Ramus intermedius is a medium size vessel with a 40%-50% ostial lesion. 3.  Circumflex 60%-70% ostial  remainder of circumflex and obtuse marginal normal in appearance. Right coronary artery is normal size vessel, short discrete 70%mid 4.  Vein graft to right coronary is occ    Coronary artery disease involving native coronary artery of native heart without angina pectoris 6/27/2017    DCIS (ductal carcinoma in situ) of breast 11/6/2012    Essential hypertension 11/6/2012    Hodgkin lymphoma     Hx of Hodgkin's disease - s/p chemo and radiation 11/6/2012    Hyperlipidemia     Hyperlipidemia 11/6/2012    The patient presents with hyperlipidemia.  The patient reports tolerating the medication well and is in excellent compliance.  There have been no medication side effects.  The patient denies chest pain, neuropathy, and myalgias.  The patient has reduced fat intake and has been exercising.  Current treatment has included the medications listed in the med card.   Lab Results Component Value Date  CH    Hypertension     LBBB (left bundle branch block) 5/22/2018    Osteoporosis     Paroxysmal atrial fibrillation - single post-op episode 8/24/2017    Pemphigoid     S/P AVR (aortic valve replacement) - pericardial valve 08/21/2017    Perceval aortic tissue valve PVS23. 23mm    serial # [id number]    S/P CABG x 2 8/21/2017 8/17 CABG X 2 with SVG-LAD and SVG-distal RCA  SVG LAD due to absent LIMA after chest radiation and lymph node biopsy     Stented coronary artery     She had 2 stents placed in 2/2019.  She has had CAD and bypasses in the past in 2017.      Thyroid disease      Past Surgical History:   Procedure Laterality Date    ARTERIOGRAPHY OF AORTIC ROOT N/A 2/15/2019    Procedure: ARTERIOGRAM, AORTIC ROOT;  Surgeon: Sujit Chaudhry MD;  Location: [facility] CATH;  Service: Cardiology;  Laterality: N/A;    BREAST BIOPSY      BREAST RECONSTRUCTION      bilateral mastectomy     CARDIAC CATHETERIZATION      stents x 2    CARDIAC SURGERY  08/2017    Aortic valve replacement , CABG 2 vessel    CORONARY ANGIOGRAPHY N/A 2/15/2019    Procedure: ANGIOGRAM, CORONARY ARTERY;  Surgeon: Sujit Chaudhry MD;  Location: [facility] CATH;  Service: Cardiology;  Laterality: N/A;    CORONARY BYPASS GRAFT ANGIOGRAPHY  2/15/2019    Procedure: Bypass graft study;  Surgeon: Sujit Chaudhyr MD;  [facility];  Service: Cardiology;;    COSMETIC SURGERY      bereast reconstruction    ESOPHAGOGASTRODUODENOSCOPY N/A 5/14/2021    Procedure: EGD (ESOPHAGOGASTRODUODENOSCOPY);  Surgeon: Tracy Flower MD;  Location: [facility] ENDO;  Service: Endoscopy;  Laterality: N/A;    ESOPHAGOGASTRODUODENOSCOPY N/A 11/4/2021    Procedure: EGD (ESOPHAGOGASTRODUODENOSCOPY);  Surgeon: Tracy Flower MD;  Location: [facility] ENDO;  Service: Endoscopy;  Laterality: N/A;    EYE SURGERY      eye lids    LEFT HEART CATHETERIZATION Right 2/15/2019    Procedure: Left heart cath;  Surgeon: Sujit Chaudhry MD;  Location: [facility] CATH;  Service: Cardiology;  Laterality: Right;    LUMBAR LAMINECTOMY WITH DISCECTOMY N/A 2/27/2020    Procedure: LAMINECTOMY, SPINE, LUMBAR, WITH DISCECTOMY;  Surgeon: Vimal Villegas MD;  Location: [facility] OR;  Service: Neurosurgery;  Laterality: N/A;  left L5-S1  Laminectomy L4-5    LYMPHADENECTOMY      MASTECTOMY      SKIN BIOPSY      SPLENECTOMY, TOTAL      TRANSFORAMINAL EPIDURAL INJECTION OF STEROID Left 12/31/2019    Procedure: Left L5/S1 TF SKIP with local;  Surgeon: Canelo Niño MD;  Location: [facility] PAIN MGT;  Service: Pain Management;  Laterality: Left;    TUMOR REMOVAL       Current Outpatient Medications on File Prior to Visit   Medication Sig Dispense Refill    benazepriL (LOTENSIN) 5 MG tablet Take 1 tablet (5 mg total) by mouth once daily. 90 tablet 3    calcium carbonate (OS-CALVIN) 600 mg (1,500 mg) Tab Take 600 mg by mouth 2 (two) times daily with meals.      cholecalciferol, vitamin D3,  "(VITAMIN D3) 100 mcg (4,000 unit) Cap Take by mouth.      clopidogreL (PLAVIX) 75 mg tablet Take 1 tablet (75 mg total) by mouth once daily. 90 tablet 4    digoxin (LANOXIN) 125 mcg tablet TAKE 1 TABLET (125 MCG TOTAL) BY MOUTH ONCE DAILY. 90 tablet 4    EScitalopram oxalate (LEXAPRO) 10 MG tablet TAKE 1 TABLET BY MOUTH EVERY DAY 30 tablet 11    levothyroxine (SYNTHROID) 75 MCG tablet TAKE 1 TABLET BY MOUTH EVERY DAY 90 tablet 3    metoprolol succinate (TOPROL-XL) 25 MG 24 hr tablet Take 0.5 tablets (12.5 mg total) by mouth 2 (two) times daily. 90 tablet 3    multivitamin with minerals tablet Take 1 tablet by mouth once daily.      pantoprazole (PROTONIX) 20 MG tablet Take 2 tablets (40 mg total) by mouth once daily. 180 tablet 3    PREVIDENT 5000 BOOSTER PLUS 1.1 % Pste BRUSH MORNING AND NIGHT      rosuvastatin (CRESTOR) 20 MG tablet TAKE 1 TABLET EVERY OTHER NIGHT 45 tablet 7    valacyclovir (VALTREX) 1000 MG tablet TAKE 1 TABLET EVERY 24 HOURS; prn  3     Current Facility-Administered Medications on File Prior to Visit   Medication Dose Route Frequency Provider Last Rate Last Admin    albuterol inhaler 2 puff  2 puff Inhalation 1 time in Clinic/HOD Sujit Chaudhry MD         Review of Systems   HENT: Positive for mouth sores.         Bleeding gums; left eye with broken vessels         Objective:       Vitals:    02/10/22 1506   BP: 134/80   BP Location: Left arm   Patient Position: Sitting   BP Method: Medium (Manual)   Pulse: 89   Temp: 97.9 °F (36.6 °C)   TempSrc: Temporal   SpO2: 100%   Weight: 61.4 kg (135 lb 5.8 oz)   Height: 5' 2" (1.575 m)     Physical Exam  Vitals reviewed.   Constitutional:       Appearance: Normal appearance.   HENT:      Head: Normocephalic and atraumatic.      Mouth/Throat:      Mouth: Mucous membranes are moist.      Comments: Mouth sores present  Eyes:      Conjunctiva/sclera:      Left eye: Left conjunctiva is injected.   Cardiovascular:      Rate and Rhythm: Normal rate " and regular rhythm.      Pulses: Normal pulses.      Heart sounds: Normal heart sounds.   Pulmonary:      Effort: Pulmonary effort is normal.      Breath sounds: Normal breath sounds.   Abdominal:      General: Bowel sounds are normal.      Palpations: Abdomen is soft.   Musculoskeletal:         General: Normal range of motion.      Cervical back: Normal range of motion.   Lymphadenopathy:      Cervical: No cervical adenopathy.   Skin:     General: Skin is warm and dry.      Capillary Refill: Capillary refill takes less than 2 seconds.   Neurological:      Mental Status: She is alert and oriented to person, place, and time.   Psychiatric:         Mood and Affect: Mood normal.         Behavior: Behavior normal.         Thought Content: Thought content normal.         Judgment: Judgment normal.         Assessment:       Problem List Items Addressed This Visit     Pemphigoid      Other Visit Diagnoses     Ocular cicatricial pemphigoid    -  Primary    Relevant Orders    CBC Auto Differential    Comprehensive Metabolic Panel          Plan:       1.  Treatment plan of IVIG 60 grams with premedication of Tylenol, Benadryl & Solumedrol (1st dose only) every 4 weeks x 6 doses discussed with patient.  2.  The patient verbalized understanding. All questions were answered.    Assessment/plan reviewed and approved by Dr. White.

## 2022-02-11 ENCOUNTER — PATIENT MESSAGE (OUTPATIENT)
Dept: CARDIOLOGY | Facility: CLINIC | Age: 67
End: 2022-02-11
Payer: MEDICARE

## 2022-02-11 ENCOUNTER — INFUSION (OUTPATIENT)
Dept: INFUSION THERAPY | Facility: HOSPITAL | Age: 67
End: 2022-02-11
Attending: NURSE PRACTITIONER
Payer: MEDICARE

## 2022-02-11 VITALS
BODY MASS INDEX: 24.95 KG/M2 | SYSTOLIC BLOOD PRESSURE: 134 MMHG | TEMPERATURE: 98 F | HEART RATE: 90 BPM | DIASTOLIC BLOOD PRESSURE: 66 MMHG | WEIGHT: 135.56 LBS | RESPIRATION RATE: 16 BRPM | HEIGHT: 62 IN

## 2022-02-11 DIAGNOSIS — L12.1 OCULAR CICATRICIAL PEMPHIGOID: Primary | ICD-10-CM

## 2022-02-11 DIAGNOSIS — L12.9 PEMPHIGOID: ICD-10-CM

## 2022-02-11 LAB
ALBUMIN SERPL BCP-MCNC: 3.6 G/DL (ref 3.5–5.2)
ALP SERPL-CCNC: 94 U/L (ref 55–135)
ALT SERPL W/O P-5'-P-CCNC: 12 U/L (ref 10–44)
ANION GAP SERPL CALC-SCNC: 11 MMOL/L (ref 8–16)
AST SERPL-CCNC: 14 U/L (ref 10–40)
BASOPHILS # BLD AUTO: 0.08 K/UL (ref 0–0.2)
BASOPHILS NFR BLD: 0.5 % (ref 0–1.9)
BILIRUB SERPL-MCNC: 0.7 MG/DL (ref 0.1–1)
BUN SERPL-MCNC: 13 MG/DL (ref 8–23)
CALCIUM SERPL-MCNC: 9.3 MG/DL (ref 8.7–10.5)
CHLORIDE SERPL-SCNC: 104 MMOL/L (ref 95–110)
CO2 SERPL-SCNC: 24 MMOL/L (ref 23–29)
CREAT SERPL-MCNC: 0.7 MG/DL (ref 0.5–1.4)
DIFFERENTIAL METHOD: ABNORMAL
EOSINOPHIL # BLD AUTO: 0.3 K/UL (ref 0–0.5)
EOSINOPHIL NFR BLD: 2.1 % (ref 0–8)
ERYTHROCYTE [DISTWIDTH] IN BLOOD BY AUTOMATED COUNT: 17.3 % (ref 11.5–14.5)
EST. GFR  (AFRICAN AMERICAN): >60 ML/MIN/1.73 M^2
EST. GFR  (NON AFRICAN AMERICAN): >60 ML/MIN/1.73 M^2
GLUCOSE SERPL-MCNC: 107 MG/DL (ref 70–110)
HCT VFR BLD AUTO: 36.9 % (ref 37–48.5)
HGB BLD-MCNC: 12 G/DL (ref 12–16)
IMM GRANULOCYTES # BLD AUTO: 0.1 K/UL (ref 0–0.04)
IMM GRANULOCYTES NFR BLD AUTO: 0.7 % (ref 0–0.5)
LYMPHOCYTES # BLD AUTO: 0.7 K/UL (ref 1–4.8)
LYMPHOCYTES NFR BLD: 4.6 % (ref 18–48)
MCH RBC QN AUTO: 29.4 PG (ref 27–31)
MCHC RBC AUTO-ENTMCNC: 32.5 G/DL (ref 32–36)
MCV RBC AUTO: 90 FL (ref 82–98)
MONOCYTES # BLD AUTO: 1.2 K/UL (ref 0.3–1)
MONOCYTES NFR BLD: 7.8 % (ref 4–15)
NEUTROPHILS # BLD AUTO: 12.7 K/UL (ref 1.8–7.7)
NEUTROPHILS NFR BLD: 84.3 % (ref 38–73)
NRBC BLD-RTO: 0 /100 WBC
PLATELET # BLD AUTO: 382 K/UL (ref 150–450)
PMV BLD AUTO: 9.2 FL (ref 9.2–12.9)
POTASSIUM SERPL-SCNC: 3.7 MMOL/L (ref 3.5–5.1)
PROT SERPL-MCNC: 7.2 G/DL (ref 6–8.4)
RBC # BLD AUTO: 4.08 M/UL (ref 4–5.4)
SODIUM SERPL-SCNC: 139 MMOL/L (ref 136–145)
WBC # BLD AUTO: 15.07 K/UL (ref 3.9–12.7)

## 2022-02-11 PROCEDURE — 96375 TX/PRO/DX INJ NEW DRUG ADDON: CPT | Mod: PN

## 2022-02-11 PROCEDURE — 85025 COMPLETE CBC W/AUTO DIFF WBC: CPT | Mod: PN | Performed by: NURSE PRACTITIONER

## 2022-02-11 PROCEDURE — 80053 COMPREHEN METABOLIC PANEL: CPT | Mod: PN | Performed by: NURSE PRACTITIONER

## 2022-02-11 PROCEDURE — 63600175 PHARM REV CODE 636 W HCPCS: Mod: PN | Performed by: NURSE PRACTITIONER

## 2022-02-11 PROCEDURE — 25000003 PHARM REV CODE 250: Mod: PN | Performed by: NURSE PRACTITIONER

## 2022-02-11 PROCEDURE — 96366 THER/PROPH/DIAG IV INF ADDON: CPT | Mod: PN

## 2022-02-11 PROCEDURE — 96365 THER/PROPH/DIAG IV INF INIT: CPT | Mod: PN

## 2022-02-11 RX ORDER — ACETAMINOPHEN 500 MG
500 TABLET ORAL
Status: COMPLETED | OUTPATIENT
Start: 2022-02-11 | End: 2022-02-11

## 2022-02-11 RX ORDER — ACETAMINOPHEN 500 MG
500 TABLET ORAL
Status: CANCELLED | OUTPATIENT
Start: 2022-03-11

## 2022-02-11 RX ORDER — SODIUM CHLORIDE 0.9 % (FLUSH) 0.9 %
10 SYRINGE (ML) INJECTION
Status: CANCELLED | OUTPATIENT
Start: 2022-03-11

## 2022-02-11 RX ORDER — SODIUM CHLORIDE 9 MG/ML
500 INJECTION, SOLUTION INTRAVENOUS
Status: COMPLETED | OUTPATIENT
Start: 2022-02-11 | End: 2022-02-11

## 2022-02-11 RX ORDER — METHYLPREDNISOLONE SODIUM SUCCINATE 40 MG/ML
30 INJECTION INTRAMUSCULAR; INTRAVENOUS
Status: CANCELLED
Start: 2022-03-11 | End: 2022-03-11

## 2022-02-11 RX ORDER — DIPHENHYDRAMINE HCL 50 MG
50 CAPSULE ORAL
Status: COMPLETED | OUTPATIENT
Start: 2022-02-11 | End: 2022-02-11

## 2022-02-11 RX ORDER — DIPHENHYDRAMINE HCL 50 MG
50 CAPSULE ORAL
Status: CANCELLED
Start: 2022-03-11

## 2022-02-11 RX ORDER — HEPARIN 100 UNIT/ML
500 SYRINGE INTRAVENOUS
Status: CANCELLED | OUTPATIENT
Start: 2022-03-11

## 2022-02-11 RX ADMIN — METHYLPREDNISOLONE SODIUM SUCCINATE 30 MG: 40 INJECTION, POWDER, FOR SOLUTION INTRAMUSCULAR; INTRAVENOUS at 09:02

## 2022-02-11 RX ADMIN — ACETAMINOPHEN 500 MG: 500 TABLET, FILM COATED ORAL at 09:02

## 2022-02-11 RX ADMIN — DIPHENHYDRAMINE HYDROCHLORIDE 50 MG: 50 CAPSULE ORAL at 09:02

## 2022-02-11 RX ADMIN — SODIUM CHLORIDE 500 ML: 0.9 INJECTION, SOLUTION INTRAVENOUS at 09:02

## 2022-02-11 RX ADMIN — SODIUM CHLORIDE: 0.9 INJECTION, SOLUTION INTRAVENOUS at 09:02

## 2022-02-11 RX ADMIN — HUMAN IMMUNOGLOBULIN G 60 G: 40 LIQUID INTRAVENOUS at 10:02

## 2022-02-11 NOTE — PLAN OF CARE
Problem: Adult Inpatient Plan of Care  Goal: Plan of Care Review  Outcome: Ongoing, Progressing  Flowsheets (Taken 2/11/2022 1521)  Plan of Care Reviewed With: patient   Pt tolerated hydration and IVIG infusion well.  No adverse reaction noted.   IV flushed with NS and D/C per protocol.  Patient left clinic in no acute distress.

## 2022-02-14 ENCOUNTER — PATIENT MESSAGE (OUTPATIENT)
Dept: ENDOSCOPY | Facility: HOSPITAL | Age: 67
End: 2022-02-14
Payer: MEDICARE

## 2022-02-14 ENCOUNTER — TELEPHONE (OUTPATIENT)
Dept: FAMILY MEDICINE | Facility: CLINIC | Age: 67
End: 2022-02-14
Payer: MEDICARE

## 2022-02-14 DIAGNOSIS — Z20.822 EXPOSURE TO COVID-19 VIRUS: Primary | ICD-10-CM

## 2022-02-14 NOTE — TELEPHONE ENCOUNTER
----- Message from Thao Forrester sent at 2/14/2022  7:51 AM CST -----  Regarding: Covid testing  Contact: Patient's - Humberto  Patient's   would like a call back concerning getting his wife tested for covid. Please call at Ph [phone] ( Humberto Root) or Ph .[phone] (home)

## 2022-02-14 NOTE — TELEPHONE ENCOUNTER
I have signed for the following orders AND/OR meds.  Please call the patient and ask the patient to schedule the testing AND/OR inform about any medications that were sent.      Orders Placed This Encounter   Procedures    POCT COVID-19 Rapid Screening     Order Specific Question:   Is the patient symptomatic?     Answer:   Yes

## 2022-02-14 NOTE — TELEPHONE ENCOUNTER
- Use Protonix (pantoprazole) 20 mg PO daily per Dr Flower after EGD.  Pt reports taking 10 mg daily.   Needs a new up to date prescription please.

## 2022-02-15 RX ORDER — PANTOPRAZOLE SODIUM 20 MG/1
20 TABLET, DELAYED RELEASE ORAL DAILY
Qty: 90 TABLET | Refills: 3 | Status: SHIPPED | OUTPATIENT
Start: 2022-02-15 | End: 2023-02-14

## 2022-02-17 ENCOUNTER — OFFICE VISIT (OUTPATIENT)
Dept: CARDIOLOGY | Facility: CLINIC | Age: 67
End: 2022-02-17
Payer: MEDICARE

## 2022-02-17 VITALS
WEIGHT: 133.81 LBS | HEIGHT: 62 IN | DIASTOLIC BLOOD PRESSURE: 67 MMHG | HEART RATE: 89 BPM | BODY MASS INDEX: 24.63 KG/M2 | SYSTOLIC BLOOD PRESSURE: 118 MMHG

## 2022-02-17 DIAGNOSIS — I70.0 ATHEROSCLEROSIS OF AORTA: ICD-10-CM

## 2022-02-17 DIAGNOSIS — I25.810 CORONARY ARTERY DISEASE INVOLVING CORONARY BYPASS GRAFT OF NATIVE HEART WITHOUT ANGINA PECTORIS: ICD-10-CM

## 2022-02-17 DIAGNOSIS — Z95.2 S/P AVR (AORTIC VALVE REPLACEMENT): ICD-10-CM

## 2022-02-17 DIAGNOSIS — R55 SYNCOPE AND COLLAPSE: ICD-10-CM

## 2022-02-17 DIAGNOSIS — K31.5 DUODENAL ULCER WITH HEMORRHAGE AND OBSTRUCTION: ICD-10-CM

## 2022-02-17 DIAGNOSIS — I10 ESSENTIAL HYPERTENSION: ICD-10-CM

## 2022-02-17 DIAGNOSIS — I65.23 CAROTID STENOSIS, BILATERAL: ICD-10-CM

## 2022-02-17 DIAGNOSIS — Z95.5 STENTED CORONARY ARTERY: ICD-10-CM

## 2022-02-17 DIAGNOSIS — Z95.1 S/P CABG X 2: Primary | ICD-10-CM

## 2022-02-17 DIAGNOSIS — E78.5 DYSLIPIDEMIA: ICD-10-CM

## 2022-02-17 DIAGNOSIS — I50.42 CHRONIC COMBINED SYSTOLIC AND DIASTOLIC HEART FAILURE: ICD-10-CM

## 2022-02-17 DIAGNOSIS — K26.4 DUODENAL ULCER WITH HEMORRHAGE AND OBSTRUCTION: ICD-10-CM

## 2022-02-17 PROCEDURE — 99214 OFFICE O/P EST MOD 30 MIN: CPT | Mod: S$PBB,,, | Performed by: INTERNAL MEDICINE

## 2022-02-17 PROCEDURE — 99999 PR PBB SHADOW E&M-EST. PATIENT-LVL III: ICD-10-PCS | Mod: PBBFAC,,, | Performed by: INTERNAL MEDICINE

## 2022-02-17 PROCEDURE — 99213 OFFICE O/P EST LOW 20 MIN: CPT | Mod: PBBFAC,PO | Performed by: INTERNAL MEDICINE

## 2022-02-17 PROCEDURE — 99999 PR PBB SHADOW E&M-EST. PATIENT-LVL III: CPT | Mod: PBBFAC,,, | Performed by: INTERNAL MEDICINE

## 2022-02-17 PROCEDURE — 99214 PR OFFICE/OUTPT VISIT, EST, LEVL IV, 30-39 MIN: ICD-10-PCS | Mod: S$PBB,,, | Performed by: INTERNAL MEDICINE

## 2022-02-17 RX ORDER — LOSARTAN POTASSIUM 25 MG/1
12.5 TABLET ORAL NIGHTLY
Qty: 90 TABLET | Refills: 4 | Status: SHIPPED | OUTPATIENT
Start: 2022-02-17 | End: 2022-03-04 | Stop reason: DRUGHIGH

## 2022-02-17 NOTE — PROGRESS NOTES
Subjective:    Patient ID:  Dulce Root is a 66 y.o. female who presents for follow-up of No chief complaint on file.      HPI  Here for follow up of CABG-PCI (2/19)/AVR/LBBB/DLP/carotid stenosis. duodenal ulcer with bleed (5/21). C/o increasing MARCH past week since had IVIG infusion. No CP but previous angina was MARCH.     Review of Systems   Constitutional: Negative for malaise/fatigue.   Eyes: Negative for blurred vision.   Cardiovascular: Negative for chest pain, claudication, cyanosis, dyspnea on exertion, irregular heartbeat, leg swelling, near-syncope, orthopnea, palpitations, paroxysmal nocturnal dyspnea and syncope.   Respiratory: Negative for cough and shortness of breath.    Hematologic/Lymphatic: Does not bruise/bleed easily.   Musculoskeletal: Negative for back pain, falls, joint pain, muscle cramps, muscle weakness and myalgias.   Gastrointestinal: Negative for abdominal pain, change in bowel habit, nausea and vomiting.   Genitourinary: Negative for urgency.   Neurological: Negative for dizziness, focal weakness and light-headedness.       Past Medical History:   Diagnosis Date    Allergy     Anticoagulant long-term use     Plavix    Breast cancer 2008    Carotid stenosis, bilateral 10/13/2017    Coronary artery disease     Coronary artery disease involving coronary bypass graft of native heart without angina pectoris 8/16/2019 2/19  1.  Left main is a small vessel with a 60%-65% ostial lesion. 2.  LAD is a medium-sized vessel diffuse 70% proximally  Ramus intermedius is a medium size vessel with a 40%-50% ostial lesion. 3.  Circumflex 60%-70% ostial  remainder of circumflex and obtuse marginal normal in appearance. Right coronary artery is normal size vessel, short discrete 70%mid 4.  Vein graft to right coronary is occ    Coronary artery disease involving native coronary artery of native heart without angina pectoris 6/27/2017    DCIS (ductal carcinoma in situ) of breast 11/6/2012     Essential hypertension 11/6/2012    Hodgkin lymphoma     Hx of Hodgkin's disease - s/p chemo and radiation 11/6/2012    Hyperlipidemia     Hyperlipidemia 11/6/2012    The patient presents with hyperlipidemia.  The patient reports tolerating the medication well and is in excellent compliance.  There have been no medication side effects.  The patient denies chest pain, neuropathy, and myalgias.  The patient has reduced fat intake and has been exercising.  Current treatment has included the medications listed in the med card.   Lab Results Component Value Date  CH    Hypertension     LBBB (left bundle branch block) 5/22/2018    Osteoporosis     Paroxysmal atrial fibrillation - single post-op episode 8/24/2017    Pemphigoid     S/P AVR (aortic valve replacement) - pericardial valve 08/21/2017    Perceval aortic tissue valve PVS23. 23mm    serial # [id number]    S/P CABG x 2 8/21/2017 8/17 CABG X 2 with SVG-LAD and SVG-distal RCA  SVG LAD due to absent LIMA after chest radiation and lymph node biopsy     Stented coronary artery     She had 2 stents placed in 2/2019.  She has had CAD and bypasses in the past in 2017.      Thyroid disease      There are no hospital problems to display for this patient.       Objective:     Vitals:    02/17/22 1043   BP: 118/67   Pulse: 89        Physical Exam  Vitals and nursing note reviewed.   Constitutional:       Appearance: She is well-developed.   HENT:      Head: Normocephalic.   Eyes:      Conjunctiva/sclera: Conjunctivae normal.   Neck:      Vascular: No JVD.   Cardiovascular:      Rate and Rhythm: Normal rate and regular rhythm.      Pulses: Intact distal pulses.           Carotid pulses are 2+ on the right side and 2+ on the left side.       Radial pulses are 2+ on the right side and 2+ on the left side.        Dorsalis pedis pulses are 2+ on the right side and 2+ on the left side.        Posterior tibial pulses are 2+ on the right side and 2+ on the left side.       Heart sounds: Normal heart sounds.      Comments: Well healed midline sternal incision.    Pulmonary:      Effort: Pulmonary effort is normal.      Breath sounds: Normal breath sounds.   Abdominal:      General: Bowel sounds are normal.      Palpations: Abdomen is soft.   Musculoskeletal:         General: No tenderness.      Cervical back: Normal range of motion and neck supple.   Skin:     General: Skin is warm and dry.      Nails: There is no clubbing.   Neurological:      Mental Status: She is alert and oriented to person, place, and time.      Gait: Gait normal.   Psychiatric:         Speech: Speech normal.         Behavior: Behavior normal.         Thought Content: Thought content normal.               ..    Chemistry        Component Value Date/Time     02/11/2022 0935    K 3.7 02/11/2022 0935     02/11/2022 0935    CO2 24 02/11/2022 0935    BUN 13 02/11/2022 0935    CREATININE 0.7 02/11/2022 0935     02/11/2022 0935        Component Value Date/Time    CALCIUM 9.3 02/11/2022 0935    ALKPHOS 94 02/11/2022 0935    AST 14 02/11/2022 0935    ALT 12 02/11/2022 0935    BILITOT 0.7 02/11/2022 0935    ESTGFRAFRICA >60 02/11/2022 0935    EGFRNONAA >60 02/11/2022 0935            ..  Lab Results   Component Value Date    CHOL 166 08/21/2020    CHOL 120 11/14/2019    CHOL 166 03/01/2019     Lab Results   Component Value Date    HDL 53 08/21/2020    HDL 37 (L) 11/14/2019    HDL 51 03/01/2019     Lab Results   Component Value Date    LDLCALC 89.8 08/21/2020    LDLCALC 68.2 11/14/2019    LDLCALC 98.8 03/01/2019     Lab Results   Component Value Date    TRIG 116 08/21/2020    TRIG 74 11/14/2019    TRIG 81 03/01/2019     Lab Results   Component Value Date    CHOLHDL 31.9 08/21/2020    CHOLHDL 30.8 11/14/2019    CHOLHDL 30.7 03/01/2019     ..  Lab Results   Component Value Date    WBC 15.07 (H) 02/11/2022    HGB 12.0 02/11/2022    HCT 36.9 (L) 02/11/2022    MCV 90 02/11/2022     02/11/2022        Test(s) Reviewed  I have reviewed the following in detail:  [] Stress test   [] Angiography   [x] Echocardiogram   [x] Labs   [x] Other:       Assessment:         ICD-10-CM ICD-9-CM   1. S/P CABG x 2  Z95.1 V45.81   2. Stented coronary artery  Z95.5 V45.82   3. S/P AVR (aortic valve replacement) - pericardial valve  Z95.2 V43.3   4. Dyslipidemia  E78.5 272.4   5. Carotid stenosis, bilateral  I65.23 433.10     433.30   6. Coronary artery disease involving coronary bypass graft of native heart without angina pectoris  I25.810 414.05   7. Chronic combined systolic and diastolic heart failure  I50.42 428.42   8. Essential hypertension  I10 401.9   9. Duodenal ulcer with hemorrhage and obstruction  K26.4 532.41    K31.5      Active Problem List with Overview Notes    Diagnosis Date Noted    Duodenal stricture 11/04/2021     EGD 11/4/21: 2/2 peptic ulcer disease      Duodenal ulcer with hemorrhage and obstruction 05/14/2021     EGD 5/14/21: clean base duodenal ulcer with duodenal stricture      Chronic combined systolic and diastolic heart failure 05/11/2021    Age-related osteoporosis with current pathological fracture with routine healing     Anxiety 04/07/2020     The patient has anxiety which been treated over time with celexa.  Treatment has been given since many years ago.  This has controled the symptoms.  Things that makes it worse include stress and celexa makes it better.            Absence of both breasts 04/07/2020    DDD (degenerative disc disease), lumbar 02/27/2020     She has DDD of the lumbar region and she is on gabapentin along with tyelnol along with robaxin for this and she is stable at this time.  She is seeing Dr. Villegas for this.      Lumbar radiculopathy 12/31/2019    Low back pain, non-specific 11/26/2019    Coronary artery disease involving coronary bypass graft of native heart without angina pectoris 08/16/2019 2/19   1.  Left main is a small vessel with a 60%-65% ostial  lesion.  2.  LAD is a medium-sized vessel diffuse 70% proximally  Ramus intermedius is a medium size vessel with a 40%-50% ostial lesion.  3.  Circumflex 60%-70% ostial  remainder of circumflex and obtuse marginal normal in appearance. Right coronary artery is normal size vessel, short discrete 70%mid  4.  Vein graft to right coronary is occluded.  5.  Vein graft to LAD is patent.  Large vein graft to the ostial lesion of approximately 60%.   Fractional flow reserve of the vein graft to LAD was 0.83.   Fractional flow reserve of the circumflex was 0.77 at 1 minute.   Fractional flow reserve of the ramus was negative.         Stented coronary artery      2/19   ostial circumflex synergy 3.0 x 12 with a balloon in the ramus and LAD as a T-technique.  Synergy left main, using a 3.5 x 8 post-dilated to 4.0,    RCA  Synergy 2.75 x 16 post dilated 2.75       Elevated brain natriuretic peptide (BNP) level     Acute respiratory distress 02/10/2019    Syncope 05/22/2018    LBBB (left bundle branch block) 05/22/2018    Carotid stenosis, bilateral 10/13/2017     She has carotid artery stenosis.  She has had a CTA.    CTA Neck   Order: [id number]   Status:  Final result   Visible to patient:  Yes (Patient Portal) Next appt:  05/27/2020 at 10:00 AM in Hematology and Oncology (Rios Sepulveda MD) Dx:  Bilateral carotid artery stenosis   Details     Reading Physician Reading Date Result Priority   Ralf Murphy MD 12/23/2019 Routine      Narrative     EXAMINATION:  CTA NECK    CLINICAL HISTORY:  Carotid artery stenosis.    TECHNIQUE:  Axial CTA images of the neck were obtained with intravenous contrast.  Coronal and sagittal reformations were obtained.  3D reconstructions were obtained.  Automated exposure control utilized to reduce radiation dose.  Total exam DLP is 507 mGy cm.    COMPARISON:  11/03/2017    FINDINGS:  There is calcified plaque within the thoracic aorta and branch vessels without flow-limiting stenosis at the  origins of the great vessels.  The left vertebral artery is slightly dominant over the right vertebral artery.  No flow-limiting stenosis is identified.  The basilar artery and visualized posterior cerebral arteries appear patent without flow-limiting stenosis.  There appears to be fetal origin of the right PCA.  There is calcified plaque within the right common carotid artery and at the carotid bulb and proximal right ICA.  There is noncalcified plaque in the proximal right ICA.  There does appear to be approximately 70% stenosis of the proximal right ICA.  There is calcified plaque within the left common carotid artery, carotid bulb, proximal internal carotid artery.  There appears to be approximately 60-70% stenosis of the proximal left ICA.      Impression       1. There does appear to be approximately 70% stenosis of the proximal right internal carotid artery secondary to noncalcified plaque.  2. There is approximately 60-70% stenosis of the proximal left internal carotid artery secondary to calcified plaque.  3. Vertebral arteries demonstrate no flow-limiting stenosis.      Electronically signed by: Ralf Murphy MD  Date: 12/23/2019  Time: 16:13           She is tracked by .  She is following with him. She is on an ASA a day.      Paroxysmal atrial fibrillation - single post-op episode 08/24/2017    S/P AVR (aortic valve replacement) - pericardial valve 08/21/2017    S/P CABG x 2 08/21/2017 8/17 CABG X 2 with SVG-LAD and SVG-distal RCA  SVG LAD due to absent LIMA after chest radiation and lymph node biopsy        Dyslipidemia 04/25/2017     The patient presents with hyperlipidemia.  The patient reports tolerating the medication well and is in excellent compliance.  There have been no medication side effects.  The patient denies chest pain, neuropathy, and myalgias.  The patient has reduced fat intake and has been exercising.  Current treatment has included the medications listed in the med  card.    Lab Results   Component Value Date    CHOL 120 11/14/2019    CHOL 166 03/01/2019    CHOL 146 04/12/2018       Lab Results   Component Value Date    HDL 37 (L) 11/14/2019    HDL 51 03/01/2019    HDL 58 04/12/2018       Lab Results   Component Value Date    LDLCALC 68.2 11/14/2019    LDLCALC 98.8 03/01/2019    LDLCALC 70.2 04/12/2018       Lab Results   Component Value Date    TRIG 74 11/14/2019    TRIG 81 03/01/2019    TRIG 89 04/12/2018       Lab Results   Component Value Date    CHOLHDL 30.8 11/14/2019    CHOLHDL 30.7 03/01/2019    CHOLHDL 39.7 04/12/2018     Lab Results   Component Value Date    ALT 21 02/11/2020    AST 19 02/11/2020    ALKPHOS 74 02/11/2020    BILITOT 0.4 02/11/2020           Pemphigoid 01/20/2015    Scleritis 05/13/2013    DCIS (ductal carcinoma in situ) of breast 11/06/2012     She has been following with Dr. Sepulveda for this. She is a little overdue to see him for this.      Hx of Hodgkin's disease - s/p chemo and radiation 11/06/2012    Essential hypertension 11/06/2012     The patient presents with essential hypertension.  The patient is tolerating the medication well and is in excellent compliance.  The patient is experiencing no side effects.  Counseling was offered regarding low salt diets.  The patient has a reduced salt intake.  The patient denies chest pain, palpitations, shortness of breath, dyspnea on exertion, left or murmur neck pain, nausea, vomiting, diaphoresis, paroxysmal nocturnal dyspnea, and orthopnea.  She has had some lower bp's.  She has been asked on the digital medicine program to hold the telmisartan if she has a lower blood pressure.  Hypertension Medications             metoprolol succinate (TOPROL-XL) 25 MG 24 hr tablet Take 1 tablet (25 mg total) by mouth once daily.    telmisartan (MICARDIS) 20 MG Tab Take 1 tablet (20 mg total) by mouth once daily.              Hyperlipidemia 11/06/2012     The patient presents with hyperlipidemia.  The patient  reports tolerating the medication well and is in excellent compliance.  There have been no medication side effects.  The patient denies chest pain, neuropathy, and myalgias.  The patient has reduced fat intake and has been exercising.  Current treatment has included the medications listed in the med card.    Lab Results   Component Value Date    CHOL 146 04/12/2018    CHOL 168 01/20/2015    CHOL 166 05/13/2013       Lab Results   Component Value Date    HDL 58 04/12/2018    HDL 42 01/20/2015    HDL 50 05/13/2013       Lab Results   Component Value Date    LDLCALC 70.2 04/12/2018    LDLCALC 95.0 01/20/2015    LDLCALC 89.0 05/13/2013       Lab Results   Component Value Date    TRIG 89 04/12/2018    TRIG 155 (H) 01/20/2015    TRIG 136 05/13/2013       Lab Results   Component Value Date    CHOLHDL 39.7 04/12/2018    CHOLHDL 25.0 01/20/2015    CHOLHDL 30.1 05/13/2013     Lab Results   Component Value Date    ALT 36 02/11/2019    AST 64 (H) 02/11/2019    ALKPHOS 77 02/11/2019    BILITOT 0.6 02/11/2019           Hypothyroid 11/06/2012     The patient presents with hypothyroidism.  The patient denies agitation, anxiety, blurred vision, chest pain, cold intolerance, constipation, dizziness, dry skin, fatigue, lightheadedness, paresthesias, skin coarsening, tachycardia, tremor, weight gain or weight loss.  The patient's current treatment has included Synthroid with a good response.    Lab Results   Component Value Date    TSH 1.544 11/14/2019               Plan:           Return to clinic 6 months   Low level/low impact aerobic exercise 5x's/wk. Heart healthy diet and risk factor modification.    See labs and med orders.  Orders Placed This Encounter   Procedures    Lipid Panel    CK    Nuclear Stress - Cardiology Interpreted    CV Ultrasound Bilateral Doppler Carotid    Echo     Stop ACei due to cough. Start ARB  Above due to increasing MARCH.   See PA in 1 month    Portions of this note may have been created with voice  recognition software.  Grammatical, syntax and spelling errors may be inevitable.

## 2022-02-22 ENCOUNTER — LAB VISIT (OUTPATIENT)
Dept: LAB | Facility: HOSPITAL | Age: 67
End: 2022-02-22
Attending: INTERNAL MEDICINE
Payer: MEDICARE

## 2022-02-22 DIAGNOSIS — Z95.1 S/P CABG X 2: ICD-10-CM

## 2022-02-22 DIAGNOSIS — I70.0 ATHEROSCLEROSIS OF AORTA: ICD-10-CM

## 2022-02-22 DIAGNOSIS — Z95.2 S/P AVR (AORTIC VALVE REPLACEMENT): ICD-10-CM

## 2022-02-22 DIAGNOSIS — Z95.5 STENTED CORONARY ARTERY: ICD-10-CM

## 2022-02-22 LAB
CHOLEST SERPL-MCNC: 130 MG/DL (ref 120–199)
CHOLEST/HDLC SERPL: 4.3 {RATIO} (ref 2–5)
CK SERPL-CCNC: 33 U/L (ref 20–180)
HDLC SERPL-MCNC: 30 MG/DL (ref 40–75)
HDLC SERPL: 23.1 % (ref 20–50)
LDLC SERPL CALC-MCNC: 76.2 MG/DL (ref 63–159)
NONHDLC SERPL-MCNC: 100 MG/DL
TRIGL SERPL-MCNC: 119 MG/DL (ref 30–150)

## 2022-02-22 PROCEDURE — 80061 LIPID PANEL: CPT | Performed by: INTERNAL MEDICINE

## 2022-02-22 PROCEDURE — 82550 ASSAY OF CK (CPK): CPT | Performed by: INTERNAL MEDICINE

## 2022-02-22 PROCEDURE — 36415 COLL VENOUS BLD VENIPUNCTURE: CPT | Mod: PO | Performed by: INTERNAL MEDICINE

## 2022-02-23 DIAGNOSIS — D84.9 IMMUNOSUPPRESSED STATUS: ICD-10-CM

## 2022-02-24 ENCOUNTER — CLINICAL SUPPORT (OUTPATIENT)
Dept: CARDIOLOGY | Facility: HOSPITAL | Age: 67
End: 2022-02-24
Attending: INTERNAL MEDICINE
Payer: MEDICARE

## 2022-02-24 ENCOUNTER — HOSPITAL ENCOUNTER (OUTPATIENT)
Dept: RADIOLOGY | Facility: HOSPITAL | Age: 67
Discharge: HOME | End: 2022-02-24
Attending: INTERNAL MEDICINE
Payer: MEDICARE

## 2022-02-24 VITALS — BODY MASS INDEX: 24.48 KG/M2 | HEIGHT: 62 IN | WEIGHT: 133 LBS

## 2022-02-24 DIAGNOSIS — E78.5 DYSLIPIDEMIA: ICD-10-CM

## 2022-02-24 DIAGNOSIS — Z95.2 S/P AVR (AORTIC VALVE REPLACEMENT): ICD-10-CM

## 2022-02-24 DIAGNOSIS — Z95.5 STENTED CORONARY ARTERY: ICD-10-CM

## 2022-02-24 DIAGNOSIS — Z95.1 S/P CABG X 2: ICD-10-CM

## 2022-02-24 LAB
CV PHARM DOSE: 0.4 MG
CV STRESS BASE HR: 93 BPM
DIASTOLIC BLOOD PRESSURE: 87 MMHG
NUC REST DIASTOLIC VOLUME INDEX: 135
NUC REST EJECTION FRACTION: 24
NUC REST SYSTOLIC VOLUME INDEX: 103
OHS CV CPX 1 MINUTE RECOVERY HEART RATE: 105 BPM
OHS CV CPX 85 PERCENT MAX PREDICTED HEART RATE MALE: 126
OHS CV CPX MAX PREDICTED HEART RATE: 149
OHS CV CPX PATIENT IS FEMALE: 1
OHS CV CPX PATIENT IS MALE: 0
OHS CV CPX PEAK DIASTOLIC BLOOD PRESSURE: 87 MMHG
OHS CV CPX PEAK HEAR RATE: 107 BPM
OHS CV CPX PEAK RATE PRESSURE PRODUCT: NORMAL
OHS CV CPX PEAK SYSTOLIC BLOOD PRESSURE: 163 MMHG
OHS CV CPX PERCENT MAX PREDICTED HEART RATE ACHIEVED: 72
OHS CV CPX RATE PRESSURE PRODUCT PRESENTING: NORMAL
OHS CV PHARM TIME: 1348 MIN
SYSTOLIC BLOOD PRESSURE: 163 MMHG

## 2022-02-24 PROCEDURE — 93018 CV STRESS TEST I&R ONLY: CPT | Mod: ,,, | Performed by: INTERNAL MEDICINE

## 2022-02-24 PROCEDURE — 93018 PR CARDIAC STRESS TST,INTERP/REPT ONLY: ICD-10-PCS | Mod: ,,, | Performed by: INTERNAL MEDICINE

## 2022-02-24 PROCEDURE — A9502 TC99M TETROFOSMIN: HCPCS | Mod: PO

## 2022-02-24 PROCEDURE — 93017 CV STRESS TEST TRACING ONLY: CPT | Mod: PO

## 2022-02-24 PROCEDURE — 78452 STRESS TEST WITH MYOCARDIAL PERFUSION (CUPID ONLY): ICD-10-PCS | Mod: 26,,, | Performed by: INTERNAL MEDICINE

## 2022-02-24 PROCEDURE — 78452 HT MUSCLE IMAGE SPECT MULT: CPT | Mod: 26,,, | Performed by: INTERNAL MEDICINE

## 2022-02-24 PROCEDURE — 93016 CV STRESS TEST SUPVJ ONLY: CPT | Mod: ,,, | Performed by: INTERNAL MEDICINE

## 2022-02-24 PROCEDURE — 63600175 PHARM REV CODE 636 W HCPCS: Mod: PO | Performed by: INTERNAL MEDICINE

## 2022-02-24 PROCEDURE — 93016 STRESS TEST WITH MYOCARDIAL PERFUSION (CUPID ONLY): ICD-10-PCS | Mod: ,,, | Performed by: INTERNAL MEDICINE

## 2022-02-24 RX ORDER — REGADENOSON 0.08 MG/ML
0.4 INJECTION, SOLUTION INTRAVENOUS ONCE
Status: COMPLETED | OUTPATIENT
Start: 2022-02-24 | End: 2022-02-24

## 2022-02-24 RX ADMIN — REGADENOSON 0.4 MG: 0.08 INJECTION, SOLUTION INTRAVENOUS at 01:02

## 2022-03-02 ENCOUNTER — PES CALL (OUTPATIENT)
Dept: ADMINISTRATIVE | Facility: CLINIC | Age: 67
End: 2022-03-02
Payer: MEDICARE

## 2022-03-02 ENCOUNTER — PATIENT MESSAGE (OUTPATIENT)
Dept: CARDIOLOGY | Facility: CLINIC | Age: 67
End: 2022-03-02
Payer: MEDICARE

## 2022-03-02 DIAGNOSIS — I10 ESSENTIAL HYPERTENSION: Primary | ICD-10-CM

## 2022-03-04 RX ORDER — LOSARTAN POTASSIUM 50 MG/1
50 TABLET ORAL NIGHTLY
Qty: 90 TABLET | Refills: 3 | Status: ON HOLD | OUTPATIENT
Start: 2022-03-04 | End: 2022-04-01 | Stop reason: SDUPTHER

## 2022-03-07 NOTE — TELEPHONE ENCOUNTER
Please advise: pt was taking 12.5mg BID of losartan and is questioning if you wanted her to go to 50mg BID or 25mg BID

## 2022-03-10 ENCOUNTER — CLINICAL SUPPORT (OUTPATIENT)
Dept: CARDIOLOGY | Facility: HOSPITAL | Age: 67
End: 2022-03-10
Attending: INTERNAL MEDICINE
Payer: MEDICARE

## 2022-03-10 VITALS — BODY MASS INDEX: 24.48 KG/M2 | WEIGHT: 133 LBS | HEIGHT: 62 IN

## 2022-03-10 DIAGNOSIS — R55 SYNCOPE AND COLLAPSE: ICD-10-CM

## 2022-03-10 DIAGNOSIS — E78.5 DYSLIPIDEMIA: ICD-10-CM

## 2022-03-10 DIAGNOSIS — Z95.5 STENTED CORONARY ARTERY: ICD-10-CM

## 2022-03-10 DIAGNOSIS — Z95.2 S/P AVR (AORTIC VALVE REPLACEMENT): ICD-10-CM

## 2022-03-10 DIAGNOSIS — Z95.1 S/P CABG X 2: ICD-10-CM

## 2022-03-10 LAB
LEFT CBA DIAS: 25 CM/S
LEFT CBA SYS: 82 CM/S
LEFT CCA DIST DIAS: 20 CM/S
LEFT CCA DIST SYS: 85 CM/S
LEFT CCA MID DIAS: 27 CM/S
LEFT CCA MID SYS: 85 CM/S
LEFT CCA PROX DIAS: 24 CM/S
LEFT CCA PROX SYS: 102 CM/S
LEFT ECA DIAS: 29 CM/S
LEFT ECA SYS: 248 CM/S
LEFT ICA DIST DIAS: 18 CM/S
LEFT ICA DIST SYS: 92 CM/S
LEFT ICA MID DIAS: 34 CM/S
LEFT ICA MID SYS: 123 CM/S
LEFT ICA PROX DIAS: 83 CM/S
LEFT ICA PROX SYS: 275 CM/S
LEFT VERTEBRAL DIAS: 19 CM/S
LEFT VERTEBRAL SYS: 106 CM/S
OHS CV CAROTID RIGHT ICA EDV HIGHEST: 73
OHS CV CAROTID ULTRASOUND LEFT ICA/CCA RATIO: 3.24
OHS CV CAROTID ULTRASOUND RIGHT ICA/CCA RATIO: 3.09
OHS CV PV CAROTID LEFT HIGHEST CCA: 102
OHS CV PV CAROTID LEFT HIGHEST ICA: 275
OHS CV PV CAROTID RIGHT HIGHEST CCA: 128
OHS CV PV CAROTID RIGHT HIGHEST ICA: 272
OHS CV US CAROTID LEFT HIGHEST EDV: 83
RIGHT CBA DIAS: 39 CM/S
RIGHT CBA SYS: 239 CM/S
RIGHT CCA DIST DIAS: 27 CM/S
RIGHT CCA DIST SYS: 88 CM/S
RIGHT CCA MID DIAS: 25 CM/S
RIGHT CCA MID SYS: 96 CM/S
RIGHT CCA PROX DIAS: 16 CM/S
RIGHT CCA PROX SYS: 128 CM/S
RIGHT ECA DIAS: 50 CM/S
RIGHT ECA SYS: 274 CM/S
RIGHT ICA DIST DIAS: 26 CM/S
RIGHT ICA DIST SYS: 91 CM/S
RIGHT ICA MID DIAS: 50 CM/S
RIGHT ICA MID SYS: 160 CM/S
RIGHT ICA PROX DIAS: 73 CM/S
RIGHT ICA PROX SYS: 272 CM/S
RIGHT VERTEBRAL DIAS: 19 CM/S
RIGHT VERTEBRAL SYS: 81 CM/S

## 2022-03-10 PROCEDURE — 93880 CV US DOPPLER CAROTID (CUPID ONLY): ICD-10-PCS | Mod: 26,,, | Performed by: INTERNAL MEDICINE

## 2022-03-10 PROCEDURE — 93880 EXTRACRANIAL BILAT STUDY: CPT | Mod: PO

## 2022-03-10 PROCEDURE — 93880 EXTRACRANIAL BILAT STUDY: CPT | Mod: 26,,, | Performed by: INTERNAL MEDICINE

## 2022-03-10 PROCEDURE — 93306 ECHO (CUPID ONLY): ICD-10-PCS | Mod: 26,,, | Performed by: INTERNAL MEDICINE

## 2022-03-10 PROCEDURE — 93306 TTE W/DOPPLER COMPLETE: CPT | Mod: 26,,, | Performed by: INTERNAL MEDICINE

## 2022-03-10 PROCEDURE — 93306 TTE W/DOPPLER COMPLETE: CPT | Mod: PO

## 2022-03-11 ENCOUNTER — INFUSION (OUTPATIENT)
Dept: INFUSION THERAPY | Facility: HOSPITAL | Age: 67
End: 2022-03-11
Attending: NURSE PRACTITIONER
Payer: MEDICARE

## 2022-03-11 VITALS
DIASTOLIC BLOOD PRESSURE: 79 MMHG | RESPIRATION RATE: 18 BRPM | HEIGHT: 62 IN | SYSTOLIC BLOOD PRESSURE: 147 MMHG | TEMPERATURE: 98 F | WEIGHT: 132.94 LBS | BODY MASS INDEX: 24.46 KG/M2 | HEART RATE: 75 BPM

## 2022-03-11 DIAGNOSIS — L12.9 PEMPHIGOID: Primary | ICD-10-CM

## 2022-03-11 PROCEDURE — 96366 THER/PROPH/DIAG IV INF ADDON: CPT | Mod: PN

## 2022-03-11 PROCEDURE — 96365 THER/PROPH/DIAG IV INF INIT: CPT | Mod: PN

## 2022-03-11 PROCEDURE — 63600175 PHARM REV CODE 636 W HCPCS: Mod: JG,PN | Performed by: NURSE PRACTITIONER

## 2022-03-11 PROCEDURE — 25000003 PHARM REV CODE 250: Mod: PN | Performed by: NURSE PRACTITIONER

## 2022-03-11 RX ORDER — ACETAMINOPHEN 500 MG
500 TABLET ORAL
Status: CANCELLED | OUTPATIENT
Start: 2022-04-08

## 2022-03-11 RX ORDER — SODIUM CHLORIDE 0.9 % (FLUSH) 0.9 %
10 SYRINGE (ML) INJECTION
Status: CANCELLED | OUTPATIENT
Start: 2022-04-08

## 2022-03-11 RX ORDER — DIPHENHYDRAMINE HCL 50 MG
50 CAPSULE ORAL
Status: CANCELLED
Start: 2022-04-08

## 2022-03-11 RX ORDER — SODIUM CHLORIDE 0.9 % (FLUSH) 0.9 %
10 SYRINGE (ML) INJECTION
Status: DISCONTINUED | OUTPATIENT
Start: 2022-03-11 | End: 2022-03-11 | Stop reason: HOSPADM

## 2022-03-11 RX ORDER — DIPHENHYDRAMINE HCL 50 MG
50 CAPSULE ORAL
Status: COMPLETED | OUTPATIENT
Start: 2022-03-11 | End: 2022-03-11

## 2022-03-11 RX ORDER — SODIUM CHLORIDE 9 MG/ML
500 INJECTION, SOLUTION INTRAVENOUS
Status: COMPLETED | OUTPATIENT
Start: 2022-03-11 | End: 2022-03-11

## 2022-03-11 RX ORDER — HEPARIN 100 UNIT/ML
500 SYRINGE INTRAVENOUS
Status: CANCELLED | OUTPATIENT
Start: 2022-04-08

## 2022-03-11 RX ORDER — ACETAMINOPHEN 500 MG
500 TABLET ORAL
Status: COMPLETED | OUTPATIENT
Start: 2022-03-11 | End: 2022-03-11

## 2022-03-11 RX ORDER — HEPARIN 100 UNIT/ML
500 SYRINGE INTRAVENOUS
Status: DISCONTINUED | OUTPATIENT
Start: 2022-03-11 | End: 2022-03-11 | Stop reason: HOSPADM

## 2022-03-11 RX ADMIN — SODIUM CHLORIDE 500 ML: 0.9 INJECTION, SOLUTION INTRAVENOUS at 09:03

## 2022-03-11 RX ADMIN — DIPHENHYDRAMINE HYDROCHLORIDE 50 MG: 50 CAPSULE ORAL at 09:03

## 2022-03-11 RX ADMIN — HUMAN IMMUNOGLOBULIN G 60 G: 40 LIQUID INTRAVENOUS at 10:03

## 2022-03-11 RX ADMIN — ACETAMINOPHEN 500 MG: 500 TABLET, FILM COATED ORAL at 09:03

## 2022-03-11 NOTE — PLAN OF CARE
Tolerated IVIG well.   No reactions noted.  No questions or concerns at this time.  Ambulated off unit in NAD.

## 2022-03-14 LAB
AV INDEX (PROSTH): 0.32
AV MEAN GRADIENT: 14 MMHG
AV PEAK GRADIENT: 26 MMHG
AV VALVE AREA: 1.15 CM2
AV VELOCITY RATIO: 0.33
BSA FOR ECHO PROCEDURE: 1.62 M2
CV ECHO LV RWT: 0.31 CM
DOP CALC AO PEAK VEL: 2.53 M/S
DOP CALC AO VTI: 44.22 CM
DOP CALC LVOT AREA: 3.6 CM2
DOP CALC LVOT DIAMETER: 2.13 CM
DOP CALC LVOT PEAK VEL: 0.84 M/S
DOP CALC LVOT STROKE VOLUME: 50.93 CM3
DOP CALCLVOT PEAK VEL VTI: 14.3 CM
E WAVE DECELERATION TIME: 183.05 MSEC
E/A RATIO: 1.47
ECHO LV POSTERIOR WALL: 0.86 CM (ref 0.6–1.1)
EJECTION FRACTION: 35 %
FRACTIONAL SHORTENING: 22 % (ref 28–44)
INTERVENTRICULAR SEPTUM: 0.8 CM (ref 0.6–1.1)
LA MAJOR: 5.86 CM
LA MINOR: 4.94 CM
LA WIDTH: 3.82 CM
LEFT ATRIUM SIZE: 3.97 CM
LEFT ATRIUM VOLUME INDEX: 42.9 ML/M2
LEFT ATRIUM VOLUME: 69.1 CM3
LEFT INTERNAL DIMENSION IN SYSTOLE: 4.35 CM (ref 2.1–4)
LEFT VENTRICLE DIASTOLIC VOLUME INDEX: 94.37 ML/M2
LEFT VENTRICLE DIASTOLIC VOLUME: 151.94 ML
LEFT VENTRICLE MASS INDEX: 106 G/M2
LEFT VENTRICLE SYSTOLIC VOLUME INDEX: 53.1 ML/M2
LEFT VENTRICLE SYSTOLIC VOLUME: 85.53 ML
LEFT VENTRICULAR INTERNAL DIMENSION IN DIASTOLE: 5.57 CM (ref 3.5–6)
LEFT VENTRICULAR MASS: 171.26 G
LV LATERAL E/E' RATIO: 9.7 M/S
MV PEAK A VEL: 0.66 M/S
MV PEAK E VEL: 0.97 M/S
MV STENOSIS PRESSURE HALF TIME: 53.08 MS
MV VALVE AREA P 1/2 METHOD: 4.14 CM2
PISA MRMAX VEL: 0.04 M/S
PISA TR MAX VEL: 3.62 M/S
RA MAJOR: 5 CM
RA PRESSURE: 3 MMHG
RA WIDTH: 3.89 CM
RIGHT VENTRICULAR END-DIASTOLIC DIMENSION: 3.65 CM
RV TISSUE DOPPLER FREE WALL SYSTOLIC VELOCITY 1 (APICAL 4 CHAMBER VIEW): 4.21 CM/S
SINUS: 2.68 CM
STJ: 2.41 CM
TDI LATERAL: 0.1 M/S
TR MAX PG: 52 MMHG
TRICUSPID ANNULAR PLANE SYSTOLIC EXCURSION: 0.75 CM
TV REST PULMONARY ARTERY PRESSURE: 55 MMHG

## 2022-03-16 ENCOUNTER — OFFICE VISIT (OUTPATIENT)
Dept: CARDIOLOGY | Facility: CLINIC | Age: 67
End: 2022-03-16
Payer: MEDICARE

## 2022-03-16 VITALS
DIASTOLIC BLOOD PRESSURE: 78 MMHG | HEART RATE: 74 BPM | HEIGHT: 63 IN | WEIGHT: 136 LBS | BODY MASS INDEX: 24.1 KG/M2 | SYSTOLIC BLOOD PRESSURE: 141 MMHG

## 2022-03-16 DIAGNOSIS — I50.42 CHRONIC COMBINED SYSTOLIC AND DIASTOLIC HEART FAILURE: ICD-10-CM

## 2022-03-16 DIAGNOSIS — E78.2 MIXED HYPERLIPIDEMIA: ICD-10-CM

## 2022-03-16 DIAGNOSIS — E03.9 ACQUIRED HYPOTHYROIDISM: ICD-10-CM

## 2022-03-16 DIAGNOSIS — I25.810 CORONARY ARTERY DISEASE INVOLVING CORONARY BYPASS GRAFT OF NATIVE HEART WITHOUT ANGINA PECTORIS: ICD-10-CM

## 2022-03-16 DIAGNOSIS — I10 ESSENTIAL HYPERTENSION: ICD-10-CM

## 2022-03-16 DIAGNOSIS — I48.0 PAROXYSMAL ATRIAL FIBRILLATION: ICD-10-CM

## 2022-03-16 DIAGNOSIS — Z95.1 S/P CABG X 2: Primary | ICD-10-CM

## 2022-03-16 PROCEDURE — 99999 PR PBB SHADOW E&M-EST. PATIENT-LVL III: CPT | Mod: PBBFAC,,, | Performed by: PHYSICIAN ASSISTANT

## 2022-03-16 PROCEDURE — 99214 PR OFFICE/OUTPT VISIT, EST, LEVL IV, 30-39 MIN: ICD-10-PCS | Mod: S$PBB,,, | Performed by: PHYSICIAN ASSISTANT

## 2022-03-16 PROCEDURE — 99999 PR PBB SHADOW E&M-EST. PATIENT-LVL III: ICD-10-PCS | Mod: PBBFAC,,, | Performed by: PHYSICIAN ASSISTANT

## 2022-03-16 PROCEDURE — 99214 OFFICE O/P EST MOD 30 MIN: CPT | Mod: S$PBB,,, | Performed by: PHYSICIAN ASSISTANT

## 2022-03-16 PROCEDURE — 99213 OFFICE O/P EST LOW 20 MIN: CPT | Mod: PBBFAC,PO | Performed by: PHYSICIAN ASSISTANT

## 2022-03-16 RX ORDER — METOPROLOL SUCCINATE 25 MG/1
25 TABLET, EXTENDED RELEASE ORAL 2 TIMES DAILY
Qty: 90 TABLET | Refills: 3 | Status: SHIPPED | OUTPATIENT
Start: 2022-03-16 | End: 2022-08-18 | Stop reason: SDUPTHER

## 2022-03-16 RX ORDER — SODIUM CHLORIDE 9 MG/ML
INJECTION, SOLUTION INTRAVENOUS ONCE
Status: CANCELLED | OUTPATIENT
Start: 2022-03-16 | End: 2022-03-16

## 2022-03-16 NOTE — PATIENT INSTRUCTIONS
Angiogram 4/1 at 10 am    Arrive for procedure at: [facility]    You will receive a phone call from [facility] Pre-Op Department with further instructions prior to your scheduled procedure.    Notify the nurse if you are ALLERGIC TO IODINE.    FASTING: You MAY NOT have anything to eat or drink AFTER MIDNIGHT the day before your procedure. If your procedure is scheduled in the afternoon, you may have a LIGHT BREAKFAST 6-8 hours prior to your procedure.  For example: Two slices of toast; black coffee or black tea.    MEDICATIONS: You may take your regular morning medications with water. If there are any medications that you should not take, you will be instructed to hold them for that morning.    CARDIOLOGY PRE-PROCEDURE MEDICATION ORDERS:  ** Please hold any medications that are checked below:    HOLD   # OF DAYS TO HOLD  Coumadin   Consult with Coumadin Clinic   Xarelto    _DAY BEFORE & DAY OF_  Pradaxa  _ DAY BEFORE & DAY OF _  Eliquis   _ DAY BEFORE & DAY OF _  Metformin    Day before procedure & morning of procedure  Short acting insulin   Morning of procedure    CONTINUE the Following Medications   Plavix      Effient     Aspirin    WHAT TO EXPECT:    How long will the procedure take?  The procedure will take an average of 1 - 2 hours to perform.  After the procedure, you will need to lay flat for around 4 - 6 hours to minimize bleeding from the puncture site. If the wrist is accessed you will need to keep your arm still as instructed by the nurse.    When can I go home?  You may be able to be discharged home that same afternoon if there were no complications.  If you have one of the following: balloon; stent; pacemaker or defibrillator procedures, you may spend one night for observation.  Your doctor will determine your discharge based upon your progress.  The results of your procedure will be discussed with you before you are discharged.  Any further testing or procedures will be scheduled for  you either before you leave or you will be instructed to call for a future appointment.      TRANSPORTATION:  PLEASE ARRANGE TO HAVE SOMEONE DRIVE YOU HOME FOLLOWING YOUR PROCEDURE, YOU WILL NOT BE ALLOWED TO DRIVE.

## 2022-03-16 NOTE — PROGRESS NOTES
Subjective:    Patient ID:  Dulce Root is a 66 y.o. female who presents for follow-up of CAD.      HPI  Ms. Root is a very pleasant lady who follows with Dr. Chaudhry. She presents today for routine follow up. At her visit with Dr. Chaudhry 1 month ago, her ACEI was stopped due to cough and she was changed to losartan. Cough resolved, but she is still SOB.  MARCH was her previous anginal equivalent. She states it resolved after her CABG, but recurred again many months ago and it is progressively getting work.     She had a nuclear stress test on 2/24/22 that was negative for ischemia. Echo showed:  · The estimated ejection fraction is 35%.  · The left ventricle is normal in size with eccentric hypertrophy and moderately decreased systolic function.  · Grade II left ventricular diastolic dysfunction.  · Normal right ventricular size with mildly reduced right ventricular systolic function.  · Moderate left atrial enlargement.  · There is a Medium Perceval bioprosthetic aortic valve present. There is no aortic insufficiency present. Prosthetic aortic valve is normal.  · The aortic valve mean gradient is 14 mmHg with a dimensionless index of 0.32.  · Mild mitral regurgitation.  · Mild to moderate tricuspid regurgitation.  · Normal central venous pressure (3 mmHg).  · The estimated PA systolic pressure is 55 mmHg.     She also had a carotid US that showed significant bilateral carotid stenosis (60-69% ANNA stenosis and 80-99% LICA stenosis). This is progressive since her previous US in 2020 showed 70-79% ANNA stenosis and 60-69% LICA stenosis.     Review of Systems   Constitutional: Negative for chills, diaphoresis, fever, weight gain and weight loss.   HENT: Negative for sore throat.    Eyes: Negative for blurred vision, vision loss in left eye, vision loss in right eye and visual disturbance.   Cardiovascular: Positive for dyspnea on exertion. Negative for chest pain, claudication, leg swelling, near-syncope, orthopnea,  "palpitations, paroxysmal nocturnal dyspnea and syncope.   Respiratory: Negative for cough, hemoptysis, shortness of breath, sputum production and wheezing.    Endocrine: Negative for cold intolerance and heat intolerance.   Hematologic/Lymphatic: Negative for adenopathy. Does not bruise/bleed easily.   Skin: Negative for rash.   Musculoskeletal: Negative for falls, muscle weakness and myalgias.   Gastrointestinal: Negative for abdominal pain, change in bowel habit, constipation, diarrhea, melena and nausea.   Genitourinary: Negative for bladder incontinence.   Neurological: Negative for dizziness, focal weakness, headaches, light-headedness, numbness and weakness.   Psychiatric/Behavioral: Negative for altered mental status.         Vitals:    03/16/22 1551   BP: (!) 141/78   BP Location: Right arm   Patient Position: Sitting   BP Method: Medium (Automatic)   Pulse: 74   Weight: 61.7 kg (136 lb 0.4 oz)   Height: 5' 2.5" (1.588 m)   Body mass index is 24.48 kg/m².    Objective:    Physical Exam  Constitutional:       General: She is not in acute distress.     Appearance: She is well-developed.   HENT:      Head: Normocephalic and atraumatic.   Eyes:      General: No scleral icterus.     Conjunctiva/sclera: Conjunctivae normal.      Pupils: Pupils are equal, round, and reactive to light.   Neck:      Vascular: No JVD.      Trachea: No tracheal deviation.   Cardiovascular:      Rate and Rhythm: Normal rate and regular rhythm.      Heart sounds: Murmur heard.   No friction rub. No gallop.    Pulmonary:      Effort: Pulmonary effort is normal. No respiratory distress.      Breath sounds: Normal breath sounds. No wheezing or rales.   Chest:      Chest wall: No tenderness.   Abdominal:      General: Bowel sounds are normal. There is no distension.      Palpations: Abdomen is soft.      Tenderness: There is no abdominal tenderness.   Musculoskeletal:         General: No tenderness.      Cervical back: Neck supple.   Skin:   "   General: Skin is warm and dry.      Findings: No erythema or rash.   Neurological:      Mental Status: She is alert and oriented to person, place, and time.   Psychiatric:         Behavior: Behavior normal.           Assessment:       Problem List Items Addressed This Visit        Cardiology Problems    Chronic combined systolic and diastolic heart failure    Coronary artery disease involving coronary bypass graft of native heart without angina pectoris    Essential hypertension    Hyperlipidemia    Paroxysmal atrial fibrillation - single post-op episode       Other    Hypothyroid    S/P CABG x 2 - Primary           Plan:       Patient with known CAD with worsening MARCH which was her previous angina. She recently had a normal nuclear stress test, but continues symptoms concerning for accelerating angina.     Stop digoxin.   Increase Toprol to 25mg BID for angina.   Will proceed with coronary angiogram +/- PCI.   Risks, benefits, and alternatives of cardiac catheterization and possible intervention were discussed in detail with the patient. Questions were answered. She is agreeable to proceed.    Will plan for referral to Vascular Surgery for consideration of CEA after procedure.

## 2022-03-25 ENCOUNTER — PATIENT MESSAGE (OUTPATIENT)
Dept: FAMILY MEDICINE | Facility: CLINIC | Age: 67
End: 2022-03-25
Payer: MEDICARE

## 2022-03-25 DIAGNOSIS — I25.810 CORONARY ARTERY DISEASE INVOLVING CORONARY BYPASS GRAFT OF NATIVE HEART WITHOUT ANGINA PECTORIS: ICD-10-CM

## 2022-03-25 RX ORDER — DIGOXIN 125 MCG
125 TABLET ORAL DAILY
Qty: 90 TABLET | Refills: 0 | Status: SHIPPED | OUTPATIENT
Start: 2022-03-25 | End: 2022-05-31

## 2022-03-25 NOTE — TELEPHONE ENCOUNTER
Care Due:                  Date            Visit Type   Department     Provider  --------------------------------------------------------------------------------                                ESTABLISHED                              PATIENT -    [facility]  Last Visit: 02-      [employer]      MEDICINE       Patrick Hernandez  Next Visit: None Scheduled  None         None Found                                                            Last  Test          Frequency    Reason                     Performed    Due Date  --------------------------------------------------------------------------------    Office Visit  12 months..  EScitalopram,              02- 02-                             rosuvastatin.............    Powered by "[employer] by [employer]. Reference number: [id number].   3/25/2022 12:03:55 AM CDT

## 2022-03-25 NOTE — TELEPHONE ENCOUNTER
I refilled the requested medication x 1 month.  The patient is due for a visit.  Call the patient on the phone and book the patient with Ivet Valenzuela NP.    PLEASE DOCUMENT THE FACT THAT YOU HAVE CONTACTED THE PATIENT IN THE CHART FOR FUTURE REFERENCE.    Health Maintenance Due   Topic Date Due    HIV Screening  Never done    TETANUS VACCINE  Never done    Shingles Vaccine (1 of 2) Never done    Colorectal Cancer Screening  02/18/2021    Pneumococcal Vaccines (Age 65+) (2 of 2 - PPSV23) 05/05/2021

## 2022-04-04 NOTE — TELEPHONE ENCOUNTER
The  I sent the patient a Accurence Message and the patient has not read the message.  It was returned to me. Please review the message and contact the patient regarding the information.     Health Maintenance Due   Topic Date Due    HIV Screening  Never done    TETANUS VACCINE  Never done    Shingles Vaccine (1 of 2) Never done    Colorectal Cancer Screening  02/18/2021    Pneumococcal Vaccines (Age 65+) (2 of 2 - PPSV23) 05/05/2021

## 2022-04-08 ENCOUNTER — INFUSION (OUTPATIENT)
Dept: INFUSION THERAPY | Facility: HOSPITAL | Age: 67
End: 2022-04-08
Attending: NURSE PRACTITIONER
Payer: MEDICARE

## 2022-04-08 VITALS
HEIGHT: 62 IN | TEMPERATURE: 98 F | RESPIRATION RATE: 18 BRPM | SYSTOLIC BLOOD PRESSURE: 135 MMHG | WEIGHT: 128.5 LBS | HEART RATE: 81 BPM | BODY MASS INDEX: 23.65 KG/M2 | DIASTOLIC BLOOD PRESSURE: 78 MMHG

## 2022-04-08 DIAGNOSIS — L12.9 PEMPHIGOID: Primary | ICD-10-CM

## 2022-04-08 PROCEDURE — 96361 HYDRATE IV INFUSION ADD-ON: CPT | Mod: PN

## 2022-04-08 PROCEDURE — 96366 THER/PROPH/DIAG IV INF ADDON: CPT | Mod: PN

## 2022-04-08 PROCEDURE — 96365 THER/PROPH/DIAG IV INF INIT: CPT | Mod: PN

## 2022-04-08 PROCEDURE — 63600175 PHARM REV CODE 636 W HCPCS: Mod: JG,PN | Performed by: NURSE PRACTITIONER

## 2022-04-08 PROCEDURE — 25000003 PHARM REV CODE 250: Mod: PN | Performed by: NURSE PRACTITIONER

## 2022-04-08 RX ORDER — SODIUM CHLORIDE 0.9 % (FLUSH) 0.9 %
10 SYRINGE (ML) INJECTION
Status: CANCELLED | OUTPATIENT
Start: 2022-05-06

## 2022-04-08 RX ORDER — ACETAMINOPHEN 500 MG
500 TABLET ORAL
Status: COMPLETED | OUTPATIENT
Start: 2022-04-08 | End: 2022-04-08

## 2022-04-08 RX ORDER — SODIUM CHLORIDE 0.9 % (FLUSH) 0.9 %
10 SYRINGE (ML) INJECTION
Status: DISCONTINUED | OUTPATIENT
Start: 2022-04-08 | End: 2022-04-08 | Stop reason: HOSPADM

## 2022-04-08 RX ORDER — SODIUM CHLORIDE 9 MG/ML
500 INJECTION, SOLUTION INTRAVENOUS
Status: COMPLETED | OUTPATIENT
Start: 2022-04-08 | End: 2022-04-08

## 2022-04-08 RX ORDER — HEPARIN 100 UNIT/ML
500 SYRINGE INTRAVENOUS
Status: DISCONTINUED | OUTPATIENT
Start: 2022-04-08 | End: 2022-04-08 | Stop reason: HOSPADM

## 2022-04-08 RX ORDER — HEPARIN 100 UNIT/ML
500 SYRINGE INTRAVENOUS
Status: CANCELLED | OUTPATIENT
Start: 2022-05-06

## 2022-04-08 RX ORDER — DIPHENHYDRAMINE HCL 50 MG
50 CAPSULE ORAL
Status: CANCELLED
Start: 2022-05-06

## 2022-04-08 RX ORDER — ACETAMINOPHEN 500 MG
500 TABLET ORAL
Status: CANCELLED | OUTPATIENT
Start: 2022-05-06

## 2022-04-08 RX ORDER — DIPHENHYDRAMINE HCL 50 MG
50 CAPSULE ORAL
Status: COMPLETED | OUTPATIENT
Start: 2022-04-08 | End: 2022-04-08

## 2022-04-08 RX ADMIN — SODIUM CHLORIDE 500 ML: 0.9 INJECTION, SOLUTION INTRAVENOUS at 09:04

## 2022-04-08 RX ADMIN — HUMAN IMMUNOGLOBULIN G 60 G: 40 LIQUID INTRAVENOUS at 10:04

## 2022-04-08 RX ADMIN — DIPHENHYDRAMINE HYDROCHLORIDE 50 MG: 50 CAPSULE ORAL at 10:04

## 2022-04-08 RX ADMIN — ACETAMINOPHEN 500 MG: 500 TABLET, FILM COATED ORAL at 10:04

## 2022-05-06 ENCOUNTER — INFUSION (OUTPATIENT)
Dept: INFUSION THERAPY | Facility: HOSPITAL | Age: 67
End: 2022-05-06
Attending: NURSE PRACTITIONER
Payer: MEDICARE

## 2022-05-06 VITALS
TEMPERATURE: 98 F | HEIGHT: 62 IN | RESPIRATION RATE: 16 BRPM | SYSTOLIC BLOOD PRESSURE: 127 MMHG | WEIGHT: 134.25 LBS | HEART RATE: 74 BPM | BODY MASS INDEX: 24.7 KG/M2 | DIASTOLIC BLOOD PRESSURE: 73 MMHG

## 2022-05-06 DIAGNOSIS — L12.9 PEMPHIGOID: Primary | ICD-10-CM

## 2022-05-06 PROCEDURE — 63600175 PHARM REV CODE 636 W HCPCS: Mod: JG,PN | Performed by: NURSE PRACTITIONER

## 2022-05-06 PROCEDURE — 25000003 PHARM REV CODE 250: Mod: PN | Performed by: NURSE PRACTITIONER

## 2022-05-06 PROCEDURE — 96365 THER/PROPH/DIAG IV INF INIT: CPT | Mod: PN

## 2022-05-06 PROCEDURE — 96366 THER/PROPH/DIAG IV INF ADDON: CPT | Mod: PN

## 2022-05-06 RX ORDER — ACETAMINOPHEN 500 MG
500 TABLET ORAL
Status: COMPLETED | OUTPATIENT
Start: 2022-05-06 | End: 2022-05-06

## 2022-05-06 RX ORDER — HEPARIN 100 UNIT/ML
500 SYRINGE INTRAVENOUS
Status: CANCELLED | OUTPATIENT
Start: 2022-06-03

## 2022-05-06 RX ORDER — DIPHENHYDRAMINE HCL 50 MG
50 CAPSULE ORAL
Status: COMPLETED | OUTPATIENT
Start: 2022-05-06 | End: 2022-05-06

## 2022-05-06 RX ORDER — ACETAMINOPHEN 500 MG
500 TABLET ORAL
Status: CANCELLED | OUTPATIENT
Start: 2022-06-03

## 2022-05-06 RX ORDER — SODIUM CHLORIDE 0.9 % (FLUSH) 0.9 %
10 SYRINGE (ML) INJECTION
Status: DISCONTINUED | OUTPATIENT
Start: 2022-05-06 | End: 2022-05-06 | Stop reason: HOSPADM

## 2022-05-06 RX ORDER — DIPHENHYDRAMINE HCL 50 MG
50 CAPSULE ORAL
Status: CANCELLED
Start: 2022-06-03

## 2022-05-06 RX ORDER — SODIUM CHLORIDE 0.9 % (FLUSH) 0.9 %
10 SYRINGE (ML) INJECTION
Status: CANCELLED | OUTPATIENT
Start: 2022-06-03

## 2022-05-06 RX ADMIN — SODIUM CHLORIDE 500 ML: 9 INJECTION, SOLUTION INTRAVENOUS at 10:05

## 2022-05-06 RX ADMIN — HUMAN IMMUNOGLOBULIN G 60 G: 20 LIQUID INTRAVENOUS at 11:05

## 2022-05-06 RX ADMIN — ACETAMINOPHEN 500 MG: 500 TABLET, FILM COATED ORAL at 10:05

## 2022-05-06 RX ADMIN — SODIUM CHLORIDE: 0.9 INJECTION, SOLUTION INTRAVENOUS at 11:05

## 2022-05-06 RX ADMIN — DIPHENHYDRAMINE HYDROCHLORIDE 50 MG: 50 CAPSULE ORAL at 10:05

## 2022-05-06 NOTE — PLAN OF CARE
Problem: Adult Inpatient Plan of Care  Goal: Patient-Specific Goal (Individualized)  Outcome: Ongoing, Progressing  Flowsheets (Taken 5/6/2022 1655)  Anxieties, Fears or Concerns: None  Individualized Care Needs: Recliner  Patient-Specific Goals (Include Timeframe): Infection prevention during treatment.     Problem: Fatigue  Goal: Improved Activity Tolerance  Intervention: Promote Improved Energy  Flowsheets (Taken 5/6/2022 1655)  Fatigue Management:   activity schedule adjusted   frequent rest breaks encouraged   paced activity encouraged   fatigue-related activity identified  Sleep/Rest Enhancement:   regular sleep/rest pattern promoted   relaxation techniques promoted  Activity Management:   Ambulated -L4   Ambulated in yao - L4     Problem: Adult Inpatient Plan of Care  Goal: Plan of Care Review  Outcome: Ongoing, Progressing  Flowsheets (Taken 5/6/2022 1655)  Plan of Care Reviewed With: patient  Tolerated treatment with no known distress.  Ambulated from infusion center with steady gait.

## 2022-05-13 ENCOUNTER — OFFICE VISIT (OUTPATIENT)
Dept: FAMILY MEDICINE | Facility: CLINIC | Age: 67
End: 2022-05-13
Payer: MEDICARE

## 2022-05-13 VITALS
WEIGHT: 136.81 LBS | HEART RATE: 70 BPM | TEMPERATURE: 97 F | OXYGEN SATURATION: 98 % | DIASTOLIC BLOOD PRESSURE: 56 MMHG | SYSTOLIC BLOOD PRESSURE: 104 MMHG | HEIGHT: 63 IN | BODY MASS INDEX: 24.24 KG/M2

## 2022-05-13 DIAGNOSIS — E03.9 ACQUIRED HYPOTHYROIDISM: ICD-10-CM

## 2022-05-13 DIAGNOSIS — E78.5 HYPERLIPIDEMIA, UNSPECIFIED HYPERLIPIDEMIA TYPE: ICD-10-CM

## 2022-05-13 DIAGNOSIS — Z12.11 COLON CANCER SCREENING: ICD-10-CM

## 2022-05-13 DIAGNOSIS — I10 ESSENTIAL HYPERTENSION: ICD-10-CM

## 2022-05-13 DIAGNOSIS — Z00.00 ANNUAL PHYSICAL EXAM: Primary | ICD-10-CM

## 2022-05-13 PROCEDURE — 99999 PR PBB SHADOW E&M-EST. PATIENT-LVL V: CPT | Mod: PBBFAC,,, | Performed by: NURSE PRACTITIONER

## 2022-05-13 PROCEDURE — 99215 OFFICE O/P EST HI 40 MIN: CPT | Mod: PBBFAC,PO | Performed by: NURSE PRACTITIONER

## 2022-05-13 PROCEDURE — 99999 PR PBB SHADOW E&M-EST. PATIENT-LVL V: ICD-10-PCS | Mod: PBBFAC,,, | Performed by: NURSE PRACTITIONER

## 2022-05-13 PROCEDURE — 99397 PER PM REEVAL EST PAT 65+ YR: CPT | Mod: S$PBB,GY,, | Performed by: NURSE PRACTITIONER

## 2022-05-13 PROCEDURE — 99397 PR PREVENTIVE VISIT,EST,65 & OVER: ICD-10-PCS | Mod: S$PBB,GY,, | Performed by: NURSE PRACTITIONER

## 2022-05-13 RX ORDER — AZATHIOPRINE 50 MG/1
TABLET ORAL
COMMUNITY
Start: 2022-05-03 | End: 2022-11-22 | Stop reason: CLARIF

## 2022-05-19 ENCOUNTER — HOSPITAL ENCOUNTER (OUTPATIENT)
Dept: RADIOLOGY | Facility: HOSPITAL | Age: 67
Discharge: HOME | End: 2022-05-19
Attending: DERMATOLOGY
Payer: MEDICARE

## 2022-05-19 DIAGNOSIS — L12.1: ICD-10-CM

## 2022-05-19 DIAGNOSIS — Z79.52 LONG TERM CURRENT USE OF SYSTEMIC STEROIDS: ICD-10-CM

## 2022-05-19 DIAGNOSIS — Z79.899 ENCOUNTER FOR LONG-TERM (CURRENT) USE OF OTHER MEDICATIONS: ICD-10-CM

## 2022-05-19 PROCEDURE — 77080 DEXA BONE DENSITY SPINE HIP: ICD-10-PCS | Mod: 26,,, | Performed by: RADIOLOGY

## 2022-05-19 PROCEDURE — 77080 DXA BONE DENSITY AXIAL: CPT | Mod: TC,PO

## 2022-05-19 PROCEDURE — 77080 DXA BONE DENSITY AXIAL: CPT | Mod: 26,,, | Performed by: RADIOLOGY

## 2022-05-19 NOTE — PROGRESS NOTES
Digital Medicine: Health  Follow-Up    The history is provided by the patient.                 Missing readings     Patient needs assistance troubleshooting: patient reminder needed.   Additional Follow-up details: Patient had a few moments to check in. She states that she is interested in participating in the program, but she just has a lot going on. Patient agreed to start checking her bp once a week for monitoring.                 Addressed patient questions and patient has my contact information if needed prior to next outreach.     There are no preventive care reminders to display for this patient.       Last 5 Patient Entered Readings                Current 30 Day Average:   Recent Readings 2/28/2022 2/21/2022 2/20/2022 2/20/2022 2/18/2022   SBP (mmHg) 137 142 118 151 162   DBP (mmHg) 82 82 72 87 92   Pulse 77 95 83 96 95

## 2022-05-26 ENCOUNTER — PATIENT MESSAGE (OUTPATIENT)
Dept: FAMILY MEDICINE | Facility: CLINIC | Age: 67
End: 2022-05-26
Payer: MEDICARE

## 2022-05-31 RX ORDER — ROSUVASTATIN CALCIUM 20 MG/1
TABLET, COATED ORAL
Qty: 45 TABLET | Refills: 3 | Status: SHIPPED | OUTPATIENT
Start: 2022-05-31 | End: 2022-08-18 | Stop reason: SDUPTHER

## 2022-05-31 RX ORDER — VALACYCLOVIR HYDROCHLORIDE 1 G/1
TABLET, FILM COATED ORAL
Qty: 30 TABLET | Refills: 2 | Status: ON HOLD | OUTPATIENT
Start: 2022-05-31 | End: 2023-04-05 | Stop reason: HOSPADM

## 2022-05-31 NOTE — PROGRESS NOTES
Subjective:       Patient ID: Dulce Root is a 67 y.o. female.    Chief Complaint: Annual Exam    HPI    She comes in for routine annual wellness exam with fasting labs and medication refills     Problem List Items Addressed This Visit        Cardiac/Vascular    Essential hypertension    Overview     The patient presents with essential hypertension.  The patient is tolerating the medication well and is in excellent compliance.  The patient is experiencing no side effects.  Counseling was offered regarding low salt diets.  The patient has a reduced salt intake.  The patient denies chest pain, palpitations, shortness of breath, dyspnea on exertion, left or murmur neck pain, nausea, vomiting, diaphoresis, paroxysmal nocturnal dyspnea, and orthopnea.  She has had some lower bp's.  She has been asked on the digital medicine program to hold the telmisartan if she has a lower blood pressure.  Hypertension Medications             metoprolol succinate (TOPROL-XL) 25 MG 24 hr tablet Take 1 tablet (25 mg total) by mouth once daily.    telmisartan (MICARDIS) 20 MG Tab Take 1 tablet (20 mg total) by mouth once daily.                   Hyperlipidemia    Overview     The patient presents with hyperlipidemia.  The patient reports tolerating the medication well and is in excellent compliance.  There have been no medication side effects.  The patient denies chest pain, neuropathy, and myalgias.  The patient has reduced fat intake and has been exercising.  Current treatment has included the medications listed in the med card.    Lab Results   Component Value Date    CHOL 130 02/22/2022    CHOL 166 08/21/2020    CHOL 120 11/14/2019       Lab Results   Component Value Date    HDL 30 (L) 02/22/2022    HDL 53 08/21/2020    HDL 37 (L) 11/14/2019       Lab Results   Component Value Date    LDLCALC 76.2 02/22/2022    LDLCALC 89.8 08/21/2020    LDLCALC 68.2 11/14/2019       Lab Results   Component Value Date    TRIG 119 02/22/2022    TRIG  116 08/21/2020    TRIG 74 11/14/2019       Lab Results   Component Value Date    CHOLHDL 23.1 02/22/2022    CHOLHDL 31.9 08/21/2020    CHOLHDL 30.8 11/14/2019     Lab Results   Component Value Date    ALT 34 04/01/2022    AST 39 (H) 04/01/2022    ALKPHOS 94 04/01/2022    BILITOT 0.6 04/01/2022                Relevant Medications    rosuvastatin (CRESTOR) 20 MG tablet       Endocrine    Hypothyroid    Overview     The patient presents with hypothyroidism.  The patient denies agitation, anxiety, blurred vision, chest pain, cold intolerance, constipation, dizziness, dry skin, fatigue, lightheadedness, paresthesias, skin coarsening, tachycardia, tremor, weight gain or weight loss.  The patient's current treatment has included Synthroid with a good response.    Lab Results   Component Value Date    TSH 3.320 08/10/2020                Relevant Orders    TSH      Other Visit Diagnoses     Annual physical exam    -  Primary    Colon cancer screening        Relevant Orders    Fecal Immunochemical Test (iFOBT)           Review of Systems   Constitutional: Negative for fatigue, fever and unexpected weight change.   HENT: Negative for ear pain and sore throat.    Eyes: Negative for pain and visual disturbance.   Respiratory: Negative for cough and shortness of breath.    Cardiovascular: Negative for chest pain and palpitations.   Gastrointestinal: Negative for abdominal pain, diarrhea and vomiting.   Musculoskeletal: Negative for arthralgias and myalgias.   Skin: Negative for color change and rash.   Neurological: Negative for dizziness and headaches.   Psychiatric/Behavioral: Negative for dysphoric mood and sleep disturbance. The patient is not nervous/anxious.        Vitals:    05/13/22 1032   BP: (!) 104/56   Pulse: 70   Temp: 97.3 °F (36.3 °C)       Objective:     Current Outpatient Medications   Medication Sig Dispense Refill    aspirin (ECOTRIN) 81 MG EC tablet Take 1 tablet (81 mg total) by mouth once daily. 90 tablet  12    azaTHIOprine (IMURAN) 50 mg Tab       calcium carbonate (OS-CALVIN) 600 mg (1,500 mg) Tab Take 600 mg by mouth 2 (two) times daily with meals.      cholecalciferol, vitamin D3, (VITAMIN D3) 100 mcg (4,000 unit) Cap Take by mouth.      clopidogreL (PLAVIX) 75 mg tablet Take 1 tablet (75 mg total) by mouth once daily. 90 tablet 4    EScitalopram oxalate (LEXAPRO) 10 MG tablet TAKE 1 TABLET BY MOUTH EVERY DAY (Patient taking differently: Take 10 mg by mouth every morning.) 30 tablet 11    hydroCHLOROthiazide (HYDRODIURIL) 25 MG tablet Take 1 tablet (25 mg total) by mouth every morning. 90 tablet 6    losartan (COZAAR) 50 MG tablet Take 0.5 tablets (25 mg total) by mouth every evening. 45 tablet 3    metoprolol succinate (TOPROL-XL) 25 MG 24 hr tablet Take 1 tablet (25 mg total) by mouth 2 (two) times daily. 90 tablet 3    pantoprazole (PROTONIX) 20 MG tablet Take 1 tablet (20 mg total) by mouth once daily. (Patient taking differently: Take 20 mg by mouth every evening.) 90 tablet 3    PREVIDENT 5000 BOOSTER PLUS 1.1 % Pste BRUSH MORNING AND NIGHT      levothyroxine (SYNTHROID) 75 MCG tablet TAKE 1 TABLET BY MOUTH EVERY DAY BEFORE BREAKFAST 30 tablet 0    methotrexate 5 MG tablet Take by mouth once a week. Every Friday  - 3 pills in the AM and 3 pills in the PM per pt      multivitamin with minerals tablet Take 1 tablet by mouth once daily.      rosuvastatin (CRESTOR) 20 MG tablet TAKE 1 TABLET EVERY OTHER NIGHT 45 tablet 3    valACYclovir (VALTREX) 1000 MG tablet TAKE 1 TABLET EVERY 24 HOURS; prn 30 tablet 2     Current Facility-Administered Medications   Medication Dose Route Frequency Provider Last Rate Last Admin    albuterol inhaler 2 puff  2 puff Inhalation 1 time in Clinic/HOD Sujit Chaudhry MD           Physical Exam  Vitals and nursing note reviewed.   Constitutional:       General: She is not in acute distress.     Appearance: She is well-developed.   HENT:      Head: Normocephalic and  atraumatic.   Eyes:      Pupils: Pupils are equal, round, and reactive to light.   Cardiovascular:      Rate and Rhythm: Normal rate and regular rhythm.   Pulmonary:      Effort: Pulmonary effort is normal.      Breath sounds: Normal breath sounds.   Musculoskeletal:         General: Normal range of motion.      Cervical back: Normal range of motion and neck supple.   Skin:     General: Skin is warm and dry.      Findings: No rash.   Neurological:      Mental Status: She is alert and oriented to person, place, and time.   Psychiatric:         Judgment: Judgment normal.         Assessment:       1. Annual physical exam    2. Essential hypertension    3. Hyperlipidemia, unspecified hyperlipidemia type    4. Acquired hypothyroidism    5. Colon cancer screening        Plan:   Annual physical exam    Essential hypertension    Hyperlipidemia, unspecified hyperlipidemia type  -     rosuvastatin (CRESTOR) 20 MG tablet; TAKE 1 TABLET EVERY OTHER NIGHT  Dispense: 45 tablet; Refill: 3    Acquired hypothyroidism  -     TSH; Future; Expected date: 05/13/2022    Colon cancer screening  -     Fecal Immunochemical Test (iFOBT); Future; Expected date: 05/27/2022    Other orders  -     valACYclovir (VALTREX) 1000 MG tablet; TAKE 1 TABLET EVERY 24 HOURS; prn  Dispense: 30 tablet; Refill: 2        No follow-ups on file.    There are no Patient Instructions on file for this visit.

## 2022-06-15 ENCOUNTER — PATIENT MESSAGE (OUTPATIENT)
Dept: CARDIOLOGY | Facility: CLINIC | Age: 67
End: 2022-06-15
Payer: MEDICARE

## 2022-06-16 ENCOUNTER — INFUSION (OUTPATIENT)
Dept: INFUSION THERAPY | Facility: HOSPITAL | Age: 67
End: 2022-06-16
Attending: NURSE PRACTITIONER
Payer: MEDICARE

## 2022-06-16 VITALS
HEART RATE: 76 BPM | TEMPERATURE: 98 F | SYSTOLIC BLOOD PRESSURE: 122 MMHG | RESPIRATION RATE: 16 BRPM | WEIGHT: 136.81 LBS | DIASTOLIC BLOOD PRESSURE: 74 MMHG | HEIGHT: 63 IN | BODY MASS INDEX: 24.24 KG/M2

## 2022-06-16 DIAGNOSIS — L12.9 PEMPHIGOID: Primary | ICD-10-CM

## 2022-06-16 PROCEDURE — 63600175 PHARM REV CODE 636 W HCPCS: Mod: JG,PN | Performed by: NURSE PRACTITIONER

## 2022-06-16 PROCEDURE — 96366 THER/PROPH/DIAG IV INF ADDON: CPT | Mod: PN

## 2022-06-16 PROCEDURE — 96365 THER/PROPH/DIAG IV INF INIT: CPT | Mod: PN

## 2022-06-16 PROCEDURE — 25000003 PHARM REV CODE 250: Mod: PN | Performed by: NURSE PRACTITIONER

## 2022-06-16 RX ORDER — ACETAMINOPHEN 500 MG
500 TABLET ORAL
Status: COMPLETED | OUTPATIENT
Start: 2022-06-16 | End: 2022-06-16

## 2022-06-16 RX ORDER — DIPHENHYDRAMINE HCL 50 MG
50 CAPSULE ORAL
Status: CANCELLED
Start: 2022-06-30

## 2022-06-16 RX ORDER — DIPHENHYDRAMINE HCL 50 MG
50 CAPSULE ORAL
Status: COMPLETED | OUTPATIENT
Start: 2022-06-16 | End: 2022-06-16

## 2022-06-16 RX ORDER — SODIUM CHLORIDE 0.9 % (FLUSH) 0.9 %
10 SYRINGE (ML) INJECTION
Status: CANCELLED | OUTPATIENT
Start: 2022-06-30

## 2022-06-16 RX ORDER — ACETAMINOPHEN 500 MG
500 TABLET ORAL
Status: CANCELLED | OUTPATIENT
Start: 2022-06-30

## 2022-06-16 RX ORDER — HEPARIN 100 UNIT/ML
500 SYRINGE INTRAVENOUS
Status: CANCELLED | OUTPATIENT
Start: 2022-06-30

## 2022-06-16 RX ORDER — SODIUM CHLORIDE 0.9 % (FLUSH) 0.9 %
10 SYRINGE (ML) INJECTION
Status: DISCONTINUED | OUTPATIENT
Start: 2022-06-16 | End: 2022-06-16 | Stop reason: HOSPADM

## 2022-06-16 RX ORDER — SODIUM CHLORIDE 9 MG/ML
500 INJECTION, SOLUTION INTRAVENOUS
Status: COMPLETED | OUTPATIENT
Start: 2022-06-16 | End: 2022-06-16

## 2022-06-16 RX ADMIN — HUMAN IMMUNOGLOBULIN G 60 G: 40 LIQUID INTRAVENOUS at 11:06

## 2022-06-16 RX ADMIN — ACETAMINOPHEN 500 MG: 500 TABLET, FILM COATED ORAL at 11:06

## 2022-06-16 RX ADMIN — DIPHENHYDRAMINE HYDROCHLORIDE 50 MG: 50 CAPSULE ORAL at 11:06

## 2022-06-16 RX ADMIN — SODIUM CHLORIDE 500 ML: 0.9 INJECTION, SOLUTION INTRAVENOUS at 11:06

## 2022-06-16 NOTE — PLAN OF CARE
Problem: Adult Inpatient Plan of Care  Goal: Patient-Specific Goal (Individualized)  Outcome: Ongoing, Progressing  Flowsheets (Taken 6/16/2022 1709)  Anxieties, Fears or Concerns: None  Individualized Care Needs: Recliner  Patient-Specific Goals (Include Timeframe): Infection prevention during treatment.     Problem: Fatigue  Goal: Improved Activity Tolerance  Intervention: Promote Improved Energy  Flowsheets (Taken 6/16/2022 1709)  Fatigue Management:   activity schedule adjusted   frequent rest breaks encouraged   paced activity encouraged   fatigue-related activity identified  Sleep/Rest Enhancement:   regular sleep/rest pattern promoted   relaxation techniques promoted  Activity Management:   Ambulated -L4   Ambulated in yao - L4     Problem: Adult Inpatient Plan of Care  Goal: Plan of Care Review  Outcome: Ongoing, Progressing  Flowsheets (Taken 6/16/2022 1709)  Plan of Care Reviewed With: patient  Tolerated treatment with no known distress.  Ambulated from infusion center with steady gait.

## 2022-06-28 ENCOUNTER — PES CALL (OUTPATIENT)
Dept: ADMINISTRATIVE | Facility: CLINIC | Age: 67
End: 2022-06-28
Payer: MEDICARE

## 2022-07-05 DIAGNOSIS — E03.9 ACQUIRED HYPOTHYROIDISM: ICD-10-CM

## 2022-07-06 NOTE — TELEPHONE ENCOUNTER
No new care gaps identified.  [facility] Embedded Care Gaps. Reference number: [id number]. 7/05/2022   8:48:35 PM CDT

## 2022-07-07 ENCOUNTER — TELEPHONE (OUTPATIENT)
Dept: ADMINISTRATIVE | Facility: CLINIC | Age: 67
End: 2022-07-07
Payer: MEDICARE

## 2022-07-07 RX ORDER — LEVOTHYROXINE SODIUM 75 UG/1
TABLET ORAL
Qty: 30 TABLET | Refills: 0 | OUTPATIENT
Start: 2022-07-07

## 2022-07-07 NOTE — TELEPHONE ENCOUNTER
Called pt, no answer; left message informing pt I was calling to remind pt of her in office EAWV on 7/8/22 and to return my call if she had any questions; left my name and number   
Additional Progress Note...

## 2022-07-08 ENCOUNTER — PATIENT MESSAGE (OUTPATIENT)
Dept: FAMILY MEDICINE | Facility: CLINIC | Age: 67
End: 2022-07-08

## 2022-07-08 ENCOUNTER — OFFICE VISIT (OUTPATIENT)
Dept: FAMILY MEDICINE | Facility: CLINIC | Age: 67
End: 2022-07-08
Payer: MEDICARE

## 2022-07-08 VITALS
BODY MASS INDEX: 24.1 KG/M2 | HEART RATE: 78 BPM | DIASTOLIC BLOOD PRESSURE: 60 MMHG | OXYGEN SATURATION: 96 % | SYSTOLIC BLOOD PRESSURE: 110 MMHG | WEIGHT: 136 LBS | HEIGHT: 63 IN

## 2022-07-08 DIAGNOSIS — I25.810 CORONARY ARTERY DISEASE INVOLVING CORONARY BYPASS GRAFT OF NATIVE HEART WITHOUT ANGINA PECTORIS: ICD-10-CM

## 2022-07-08 DIAGNOSIS — M54.16 LUMBAR RADICULOPATHY: ICD-10-CM

## 2022-07-08 DIAGNOSIS — Z95.2 S/P AVR (AORTIC VALVE REPLACEMENT): ICD-10-CM

## 2022-07-08 DIAGNOSIS — I48.0 PAROXYSMAL ATRIAL FIBRILLATION: ICD-10-CM

## 2022-07-08 DIAGNOSIS — I44.7 LBBB (LEFT BUNDLE BRANCH BLOCK): ICD-10-CM

## 2022-07-08 DIAGNOSIS — I65.23 CAROTID STENOSIS, BILATERAL: ICD-10-CM

## 2022-07-08 DIAGNOSIS — E03.9 ACQUIRED HYPOTHYROIDISM: ICD-10-CM

## 2022-07-08 DIAGNOSIS — Z00.00 ENCOUNTER FOR MEDICARE ANNUAL WELLNESS EXAM: Primary | ICD-10-CM

## 2022-07-08 DIAGNOSIS — Z95.5 STENTED CORONARY ARTERY: ICD-10-CM

## 2022-07-08 DIAGNOSIS — M51.36 DDD (DEGENERATIVE DISC DISEASE), LUMBAR: ICD-10-CM

## 2022-07-08 DIAGNOSIS — E78.2 MIXED HYPERLIPIDEMIA: ICD-10-CM

## 2022-07-08 DIAGNOSIS — D05.10 DUCTAL CARCINOMA IN SITU (DCIS) OF BREAST, UNSPECIFIED LATERALITY: ICD-10-CM

## 2022-07-08 DIAGNOSIS — Z95.1 S/P CABG X 2: ICD-10-CM

## 2022-07-08 DIAGNOSIS — Z23 IMMUNIZATION DUE: ICD-10-CM

## 2022-07-08 DIAGNOSIS — I10 ESSENTIAL HYPERTENSION: ICD-10-CM

## 2022-07-08 DIAGNOSIS — M80.00XD AGE-RELATED OSTEOPOROSIS WITH CURRENT PATHOLOGICAL FRACTURE WITH ROUTINE HEALING: ICD-10-CM

## 2022-07-08 DIAGNOSIS — F41.9 ANXIETY: ICD-10-CM

## 2022-07-08 DIAGNOSIS — I50.42 CHRONIC COMBINED SYSTOLIC AND DIASTOLIC HEART FAILURE: ICD-10-CM

## 2022-07-08 DIAGNOSIS — Z85.71 HX OF HODGKIN'S DISEASE: ICD-10-CM

## 2022-07-08 PROBLEM — R55 SYNCOPE: Status: RESOLVED | Noted: 2018-05-22 | Resolved: 2022-07-08

## 2022-07-08 PROBLEM — R06.03 ACUTE RESPIRATORY DISTRESS: Status: RESOLVED | Noted: 2019-02-10 | Resolved: 2022-07-08

## 2022-07-08 PROCEDURE — 99999 PR PBB SHADOW E&M-EST. PATIENT-LVL V: CPT | Mod: PBBFAC,,, | Performed by: NURSE PRACTITIONER

## 2022-07-08 PROCEDURE — G0439 PR MEDICARE ANNUAL WELLNESS SUBSEQUENT VISIT: ICD-10-PCS | Mod: ,,, | Performed by: NURSE PRACTITIONER

## 2022-07-08 PROCEDURE — 99215 OFFICE O/P EST HI 40 MIN: CPT | Mod: PBBFAC,PO | Performed by: NURSE PRACTITIONER

## 2022-07-08 PROCEDURE — 99999 PR PBB SHADOW E&M-EST. PATIENT-LVL V: ICD-10-PCS | Mod: PBBFAC,,, | Performed by: NURSE PRACTITIONER

## 2022-07-08 PROCEDURE — G0439 PPPS, SUBSEQ VISIT: HCPCS | Mod: ,,, | Performed by: NURSE PRACTITIONER

## 2022-07-08 RX ORDER — BRIMONIDINE TARTRATE 2 MG/ML
1 SOLUTION/ DROPS OPHTHALMIC 2 TIMES DAILY
COMMUNITY
Start: 2022-06-10 | End: 2023-03-31

## 2022-07-08 RX ORDER — CHLORHEXIDINE GLUCONATE ORAL RINSE 1.2 MG/ML
SOLUTION DENTAL
COMMUNITY
Start: 2022-03-17 | End: 2023-03-31

## 2022-07-08 RX ORDER — FOLIC ACID 1 MG/1
TABLET ORAL
COMMUNITY
Start: 2022-06-10 | End: 2022-08-18 | Stop reason: ALTCHOICE

## 2022-07-08 NOTE — PROGRESS NOTES
"  Dulce Root presented for a  Medicare AWV and comprehensive Health Risk Assessment today. The following components were reviewed and updated:    · Medical history  · Family History  · Social history  · Allergies and Current Medications  · Health Risk Assessment  · Health Maintenance  · Care Team     ** See Completed Assessments for Annual Wellness Visit within the encounter summary.**       The following assessments were completed:  · Living Situation  · CAGE  · Depression Screening  · Timed Get Up and Go  · Whisper Test  · Cognitive Function Screening  · Nutrition Screening  · ADL Screening  · PAQ Screening    Vitals:    07/08/22 1106   BP: 110/60   BP Location: Right arm   Pulse: 78   SpO2: 96%   Weight: 61.7 kg (136 lb)   Height: 5' 3" (1.6 m)     Body mass index is 24.09 kg/m².  Physical Exam  Vitals reviewed.   Constitutional:       General: She is not in acute distress.     Appearance: Normal appearance. She is not ill-appearing.   HENT:      Head: Normocephalic and atraumatic.      Right Ear: External ear normal.      Left Ear: External ear normal.   Eyes:      Extraocular Movements: Extraocular movements intact.      Conjunctiva/sclera: Conjunctivae normal.   Cardiovascular:      Rate and Rhythm: Normal rate.      Heart sounds: Normal heart sounds.   Pulmonary:      Effort: Pulmonary effort is normal. No respiratory distress.      Breath sounds: Normal breath sounds.   Abdominal:      General: Abdomen is flat. There is no distension.   Musculoskeletal:         General: Normal range of motion.      Cervical back: Normal range of motion.   Skin:     General: Skin is warm and dry.      Coloration: Skin is not pale.      Findings: No rash.   Neurological:      Mental Status: She is alert and oriented to person, place, and time. Mental status is at baseline.   Psychiatric:         Mood and Affect: Mood normal.         Speech: Speech normal.         Behavior: Behavior normal. Behavior is cooperative.         "   Diagnoses and health risks identified today and associated recommendations/orders:    1. Encounter for Medicare annual wellness exam  Complete history and physical was completed today.  Complete and thorough medication reconciliation was performed.  Discussed risks and benefits of medications.  Advised patient on orders and health maintenance. Continue current medications listed on your summary sheet.    2. DDD (degenerative disc disease), lumbar  Chronic, stable  Managed by Dr. Villegas  Continue plan of care, follow up with specialist    3. Lumbar radiculopathy  Chronic, stable  Managed by Dr. Villegas  Continue plan of care, follow up with specialist    4. Anxiety  Chronic, stable  Taking Lexapro as prescribed  -discussed anxiety condition course  -discussed SSRI/SNRI as first-line treatment for this condition  -discussed risk of discontinuing this medication without tapering  -patient was educated, advised of side effects, and all questions were answered.  Patient voiced understanding  -patient will follow up routinely and notify us if having any side effects or worsening or persistent symptoms.  ER precautions were given.    5. Carotid stenosis, bilateral  Chronic, stable  She has had CTA and carotid US  Following with Dr. Otto  She is compliant with ASA and statin    6. Chronic combined systolic and diastolic heart failure  Chronic, stable  Following with cardiologist, Dr. Chaudhry  Continue plan of care, follow up with specialist     7. Coronary artery disease involving coronary bypass graft of native heart without angina pectoris  Chronic, stable  Following with cardiologist, Dr. Chaudhry  Continue plan of care, follow up with specialist   Compliant with ASA and statin    8. Essential hypertension  -at goal today  -continue lifestyle modification with low sodium diet and exercise   -discussed hypertension disease course and importance of treating high blood pressure  -patient understood and advised of risk of untreated  blood pressure.  ER precautions were given for symptoms of hypertensive urgency and emergency.    Hypertension Medications             hydroCHLOROthiazide (HYDRODIURIL) 25 MG tablet Take 1 tablet (25 mg total) by mouth every morning.    losartan (COZAAR) 50 MG tablet Take 0.5 tablets (25 mg total) by mouth every evening.    metoprolol succinate (TOPROL-XL) 25 MG 24 hr tablet Take 1 tablet (25 mg total) by mouth 2 (two) times daily.        9. Acquired hypothyroidism  Chronic, stbale  Taking Synthroid 75 mcg daily    Lab Results   Component Value Date    TSH 3.320 08/10/2020     10. S/P CABG x 2  Chronic, stable  Following with cardiologist, Dr. Chaudhry  Continue plan of care, follow up with specialist   Compliant with ASA, statin, Plavix, and beta blocker    11. S/P AVR (aortic valve replacement) - pericardial valve  Chronic, stable  Following with cardiologist, Dr. Chaudhry  Continue plan of care, follow up with specialist   Compliant with ASA, statin, Plavix, and beta blocker    12. Mixed hyperlipidemia  -chronic condition. Currently stable.    -reports compliance with hyperlipidemia treatment as prescribed  -denies any known adverse effects of medications  -recent labs listed below:  Lab Results   Component Value Date    CHOL 130 02/22/2022     Lab Results   Component Value Date    HDL 30 (L) 02/22/2022     Lab Results   Component Value Date    LDLCALC 76.2 02/22/2022     Lab Results   Component Value Date    TRIG 119 02/22/2022     Lab Results   Component Value Date    ALT 34 04/01/2022    AST 39 (H) 04/01/2022    ALKPHOS 94 04/01/2022    BILITOT 0.6 04/01/2022     Hyperlipidemia Medications             rosuvastatin (CRESTOR) 20 MG tablet TAKE 1 TABLET EVERY OTHER NIGHT        13. Paroxysmal atrial fibrillation - single post-op episode  Chronic, stable  Following with cardiologist, Dr. Chaudhry  Continue plan of care, follow up with specialist   Compliant with ASA, statin, Plavix, and beta blocker    14. LBBB (left bundle  branch block)  Chronic, stable  Following with cardiologist, Dr. Chaudhry  Continue plan of care, follow up with specialist   Compliant with ASA, statin, Plavix, and beta blocker    15. Stented coronary artery  Chronic, stable  Following with cardiologist, Dr. Chaudhry  Continue plan of care, follow up with specialist   Compliant with ASA, statin, Plavix, and beta blocker    16. Ductal carcinoma in situ (DCIS) of breast, unspecified laterality  Following with Dr. Sepulveda  Continue plan of care, follow up with specialist     17. Hx of Hodgkin's disease - s/p chemo and radiation  Following with hem/onc  Continue plan of care  Follow up with specialist    18. Immunization due  Due for pneumonia vaccine, patient agreeable to receiving immunization today   - (In Office Administered) Pneumococcal Conjugate Vaccine (20 Valent) (IM)    19. Age-related osteoporosis with current pathological fracture with routine healing  Chronic, stable  Receiving Prolia injections  Following with rheumatology  Continue plan of care, follow up with specialist     I offered to discuss end of life issues, including information on how to make advance directives that the patient could use to name someone who would make medical decisions on their behalf if they became too ill to make themselves.    ___Patient declined - already done.    _x__Patient is interested, I provided paperwork and offered to discuss.    Provided Dulce with a 5-10 year written screening schedule and personal prevention plan. Recommendations were developed using the USPSTF age appropriate recommendations. Education, counseling, and referrals were provided as needed. After Visit Summary printed and given to patient which includes a list of additional screenings\tests needed.    Follow up in about 3 months (around 10/8/2022), or if symptoms worsen or fail to improve.    Dilcia Flores NP

## 2022-07-14 ENCOUNTER — INFUSION (OUTPATIENT)
Dept: INFUSION THERAPY | Facility: HOSPITAL | Age: 67
End: 2022-07-14
Attending: NURSE PRACTITIONER
Payer: MEDICARE

## 2022-07-14 VITALS
TEMPERATURE: 98 F | RESPIRATION RATE: 18 BRPM | WEIGHT: 135.81 LBS | SYSTOLIC BLOOD PRESSURE: 121 MMHG | HEIGHT: 63 IN | BODY MASS INDEX: 24.06 KG/M2 | HEART RATE: 71 BPM | DIASTOLIC BLOOD PRESSURE: 65 MMHG

## 2022-07-14 DIAGNOSIS — L12.9 PEMPHIGOID: Primary | ICD-10-CM

## 2022-07-14 PROCEDURE — 96366 THER/PROPH/DIAG IV INF ADDON: CPT | Mod: PN

## 2022-07-14 PROCEDURE — 63600175 PHARM REV CODE 636 W HCPCS: Mod: JG,PN | Performed by: NURSE PRACTITIONER

## 2022-07-14 PROCEDURE — 25000003 PHARM REV CODE 250: Mod: PN | Performed by: NURSE PRACTITIONER

## 2022-07-14 PROCEDURE — 96365 THER/PROPH/DIAG IV INF INIT: CPT | Mod: PN

## 2022-07-14 RX ORDER — DIPHENHYDRAMINE HCL 50 MG
50 CAPSULE ORAL
Status: CANCELLED
Start: 2022-07-28

## 2022-07-14 RX ORDER — HEPARIN 100 UNIT/ML
500 SYRINGE INTRAVENOUS
Status: DISCONTINUED | OUTPATIENT
Start: 2022-07-14 | End: 2022-07-14 | Stop reason: HOSPADM

## 2022-07-14 RX ORDER — HEPARIN 100 UNIT/ML
500 SYRINGE INTRAVENOUS
Status: CANCELLED | OUTPATIENT
Start: 2022-07-28

## 2022-07-14 RX ORDER — DIPHENHYDRAMINE HCL 50 MG
50 CAPSULE ORAL
Status: COMPLETED | OUTPATIENT
Start: 2022-07-14 | End: 2022-07-14

## 2022-07-14 RX ORDER — SODIUM CHLORIDE 0.9 % (FLUSH) 0.9 %
10 SYRINGE (ML) INJECTION
Status: DISCONTINUED | OUTPATIENT
Start: 2022-07-14 | End: 2022-07-14 | Stop reason: HOSPADM

## 2022-07-14 RX ORDER — SODIUM CHLORIDE 9 MG/ML
500 INJECTION, SOLUTION INTRAVENOUS
Status: COMPLETED | OUTPATIENT
Start: 2022-07-14 | End: 2022-07-14

## 2022-07-14 RX ORDER — ACETAMINOPHEN 500 MG
500 TABLET ORAL
Status: COMPLETED | OUTPATIENT
Start: 2022-07-14 | End: 2022-07-14

## 2022-07-14 RX ORDER — ACETAMINOPHEN 500 MG
500 TABLET ORAL
Status: CANCELLED | OUTPATIENT
Start: 2022-07-28

## 2022-07-14 RX ORDER — SODIUM CHLORIDE 0.9 % (FLUSH) 0.9 %
10 SYRINGE (ML) INJECTION
Status: CANCELLED | OUTPATIENT
Start: 2022-07-28

## 2022-07-14 RX ADMIN — DIPHENHYDRAMINE HYDROCHLORIDE 50 MG: 50 CAPSULE ORAL at 10:07

## 2022-07-14 RX ADMIN — ACETAMINOPHEN 500 MG: 500 TABLET, FILM COATED ORAL at 10:07

## 2022-07-14 RX ADMIN — HUMAN IMMUNOGLOBULIN G 60 G: 40 LIQUID INTRAVENOUS at 10:07

## 2022-07-14 RX ADMIN — SODIUM CHLORIDE 500 ML: 0.9 INJECTION, SOLUTION INTRAVENOUS at 10:07

## 2022-08-23 ENCOUNTER — PATIENT MESSAGE (OUTPATIENT)
Dept: FAMILY MEDICINE | Facility: CLINIC | Age: 67
End: 2022-08-23
Payer: MEDICARE

## 2022-08-30 ENCOUNTER — PATIENT MESSAGE (OUTPATIENT)
Dept: NEUROSURGERY | Facility: CLINIC | Age: 67
End: 2022-08-30
Payer: MEDICARE

## 2022-09-09 ENCOUNTER — TELEPHONE (OUTPATIENT)
Dept: RHEUMATOLOGY | Facility: CLINIC | Age: 67
End: 2022-09-09
Payer: MEDICARE

## 2022-09-09 ENCOUNTER — OFFICE VISIT (OUTPATIENT)
Dept: NEUROSURGERY | Facility: CLINIC | Age: 67
End: 2022-09-09
Payer: MEDICARE

## 2022-09-09 VITALS
WEIGHT: 138.88 LBS | RESPIRATION RATE: 18 BRPM | HEART RATE: 75 BPM | BODY MASS INDEX: 24.61 KG/M2 | SYSTOLIC BLOOD PRESSURE: 116 MMHG | HEIGHT: 63 IN | DIASTOLIC BLOOD PRESSURE: 70 MMHG

## 2022-09-09 DIAGNOSIS — M54.9 DORSALGIA, UNSPECIFIED: Primary | ICD-10-CM

## 2022-09-09 DIAGNOSIS — Z98.890 HISTORY OF BACK SURGERY: ICD-10-CM

## 2022-09-09 DIAGNOSIS — M54.16 LUMBAR RADICULOPATHY: ICD-10-CM

## 2022-09-09 PROCEDURE — 99215 OFFICE O/P EST HI 40 MIN: CPT | Mod: PBBFAC | Performed by: PHYSICIAN ASSISTANT

## 2022-09-09 PROCEDURE — 99213 OFFICE O/P EST LOW 20 MIN: CPT | Mod: S$PBB,,, | Performed by: PHYSICIAN ASSISTANT

## 2022-09-09 PROCEDURE — 99999 PR PBB SHADOW E&M-EST. PATIENT-LVL V: ICD-10-PCS | Mod: PBBFAC,,, | Performed by: PHYSICIAN ASSISTANT

## 2022-09-09 PROCEDURE — 99999 PR PBB SHADOW E&M-EST. PATIENT-LVL V: CPT | Mod: PBBFAC,,, | Performed by: PHYSICIAN ASSISTANT

## 2022-09-09 PROCEDURE — 99213 PR OFFICE/OUTPT VISIT, EST, LEVL III, 20-29 MIN: ICD-10-PCS | Mod: S$PBB,,, | Performed by: PHYSICIAN ASSISTANT

## 2022-09-09 RX ORDER — DIPHENHYDRAMINE HCL 50 MG/1
CAPSULE ORAL
COMMUNITY
Start: 2022-08-30 | End: 2022-11-22 | Stop reason: CLARIF

## 2022-09-09 RX ORDER — COVID-19 ANTIGEN TEST
KIT MISCELLANEOUS
COMMUNITY
Start: 2022-07-24 | End: 2022-11-22 | Stop reason: CLARIF

## 2022-09-09 RX ORDER — ALPRAZOLAM 1 MG/1
1 TABLET ORAL NIGHTLY PRN
Status: ON HOLD | COMMUNITY
Start: 2022-08-30 | End: 2023-04-05 | Stop reason: HOSPADM

## 2022-09-09 RX ORDER — FLUCONAZOLE 200 MG/1
TABLET ORAL
Status: ON HOLD | COMMUNITY
Start: 2022-08-27 | End: 2023-04-05 | Stop reason: HOSPADM

## 2022-09-09 RX ORDER — FAMOTIDINE 20 MG/1
TABLET, FILM COATED ORAL
COMMUNITY
Start: 2022-08-30 | End: 2022-11-22 | Stop reason: CLARIF

## 2022-09-09 RX ORDER — DEXAMETHASONE 0.5 MG/5ML
ELIXIR ORAL
COMMUNITY
Start: 2022-06-29 | End: 2023-10-12 | Stop reason: CLARIF

## 2022-09-09 RX ORDER — PREDNISONE 50 MG/1
TABLET ORAL
COMMUNITY
Start: 2022-08-30 | End: 2022-11-22 | Stop reason: CLARIF

## 2022-09-09 NOTE — TELEPHONE ENCOUNTER
----- Message from Karlene Velasquez MA sent at 9/9/2022  2:09 PM CDT -----  Luís Boone PA-C seen the attached pt in office today, she stated she needed to be seen sooner for osteoporosis treatment. Can someone please reach out to this pt in regards to scheduling a f/u appt please.    Thanks

## 2022-09-09 NOTE — PROGRESS NOTES
Subjective:      Patient ID: Dulce Root is a 67 y.o. female.    HPI  Patient is here today for discussion of lower back and hip pain.  Approximately 4 weeks (maybe more) she has aching in both thighs.  She feels hunched over with walking.  If she lies on her side she has pain through her hips.  Pain usually stops above her knees.   RLE= LLE  Has weakness in her legs.    No recent falls/injuries.  Recently had CTA head /neck to look for blockages.  She was prescribed a steroid pack prior to the CTA which has calmed down her pain.    Ambulates unassisted.  No acute bladder/bowel changes.  Reports constipation recently.  H/o Hodgken's (twice) and in situ breast cancer.  Had a double mastectomy.    H/o lumbar laminectomy 2/27/2020.  No injections for lower back since surgery. Had injection(s) prior to back surgery in 2020.  Objective:     Body mass index is 24.6 kg/m².  Vitals:    09/09/22 1333   BP: 116/70   Pulse: 75   Resp: 18      Right Hip Exam     Tests   LAVONNE: positive          Back:  None  Paraspinal muscle spasms   None  Pain with flexion and extention   WNL  Range of motion    Neg  Straight leg raise     Motor   Right Right Left Left  Level Group   5 4+ 5 4+ L2 Hip flexor (Psoas)   5  5  L3 Leg extension (Quads)   5  5  L4 Dorsiflexion & foot inversion (Tibialis Anterior)   5  5  L5 Great toe extension ( EHL)   5  5  S1 Foot eversion (Gastroc, PL & PB)     Sensation  NL Decreased (R/L/BL) Level Sensation    X  L2 Anterio-medial thigh   X  L3 Medial thigh around knee   X  L4 Medial foot   X  L5 Dorsum foot   X  S1 Lateral foot     Reflex  2+  Patellar tendon (L4)   2+  Achilles tendon (S1)       Results for orders placed during the hospital encounter of 12/06/19    MRI Lumbar Spine Without Contrast    Narrative  EXAMINATION:  MRI LUMBAR SPINE WITHOUT CONTRAST    CLINICAL HISTORY:  Low back pain, prior surgery, new or progressive sx; Low back pain    TECHNIQUE:  Multiplanar, multisequence MR images were  acquired from the thoracolumbar junction to the sacrum without the administration of contrast.    COMPARISON:  None.    FINDINGS:  Vertebral body alignment appears adequate.  Vertebral body heights appear well preserved.  Intervertebral disc heights appear relatively well preserved.  No STIR signal abnormality is noted to suggest an aggressive bone marrow replacement process or recent fracture.  Abnormal uterine masses are noted suggestive of fibroids but not entirely specific with 1 of at least 3 cm identified.  If further evaluation is desired a female pelvis ultrasound could be performed.  Multiple colonic diverticula are noted.    A signal intensity is noted at the lower pole of the left kidney anteriorly of 7 mm nonspecific on this examination but statistically favored relate to a cyst.  If confirmation is desired to exclude solid components ultrasound could be performed.    At the T12-L1 level, minimal broad-based bulge is noted paracentric to the right of 1-2 mm without significant neural foraminal narrowing.  Minimal central canal narrowing is noted.    At the L1-L2 level, broad base bulge is noted of 3-4 mm.  Mild bilateral neural foraminal narrowing is noted with mild thecal sac narrowing to 14 mm.  Ligamentous hypertrophy appears to be present.  Right-sided neural foraminal narrowing is slightly greater than left.    At the L2-L3 level, a central broad-based protrusion appears to be present of 6 mm with bilateral lateral recess stenosis.  Moderate central canal narrowing is noted to 9 mm.  Minimal bilateral neural foraminal narrowing is noted.    At the L3-L4 level and broad-based protrusion is noted centrally of 6 mm.  Bilateral lateral recess stenosis is noted.  Moderate central canal stenosis is noted to 9 mm.  The descending nerve roots within the lateral recesses bilaterally may be affected.  Mild bulging is noted towards each neural foramen with mild bilateral neural foraminal narrowing.    At the  L4-L5 level, broad-based bulge appears to be present of 6 mm along with bulging towards each neural foramen.  Facet arthropathy and ligamentous hypertrophy appears to be present.  Moderate to severe central canal stenosis is noted with very little fluid surrounding nerve roots and central canal narrowing to 5 mm.  Moderate bilateral neural foraminal stenosis appears to be present.  Contact of the exiting nerve roots bilaterally may be present particularly on the left.    At the L5-S1 level a disc extrusion appears to be present into the left lateral recess that descends from the disc plane by 9 mm and extends posterior to the disc plane by 8 mm.  Left lateral recess stenosis is noted with probable contact of the descending nerve root within the left lateral recess.  Moderate thecal sac narrowing is noted to 9 mm.  Mild to moderate right neural foraminal stenosis is noted with probable contact of the exiting nerve root.  Mild left neural foraminal stenosis is noted.    Impression  1.  Multilevel degenerative change with central canal note narrowing most notably at the L4-L5 level to 5 mm.  Disc protrusion is noted at the L5-S1 level into the left lateral recess likely affecting the descending nerve roots.  Broad-based protrusion is also noted at the L2-L3 level centrally and at the L3-L4 level centrally causing bilateral lateral recess stenosis as described above    2.  Left renal signal intensity nonspecific on this examination but statistically favored relate to a cyst.  If confirmation is desired ultrasound could be performed.    3.  Uterine mass that is nonspecific but suggestive a fibroid, if confirmation is desired female pelvis ultrasound evaluation could be performed with follow-up for size stability.    4.  Multiple colonic diverticula are noted in the partially visualized colon    Lab Results   Component Value Date    WBC 5.36 04/01/2022    HCT 36.0 (L) 04/01/2022       DEXA - MAY  2022  Osteoporosis    INDEPENDENT INTERPRETATION OF TEST:  Relevant imaging results reviewed and interpreted by me, discussed with the patient and / or family today.  Assessment:     1. Dorsalgia, unspecified    2. Lumbar radiculopathy    3. History of back surgery      Plan:     Dorsalgia, unspecified    Lumbar radiculopathy    History of back surgery    Will order MRI of lumbar spine for further evaluation, to rule out nerve compression.  Patient has Xanax at home. She will take 1 pill 30 min prior to MRI to help with anxiety.  Patient will follow-up after study for discussion.  Encouraged pt to see Rheumatology for discussion of Prolia or alt treatment.    ERVIN Pat[facility]

## 2022-09-22 ENCOUNTER — PATIENT MESSAGE (OUTPATIENT)
Dept: RHEUMATOLOGY | Facility: CLINIC | Age: 67
End: 2022-09-22

## 2022-09-22 ENCOUNTER — OFFICE VISIT (OUTPATIENT)
Dept: RHEUMATOLOGY | Facility: CLINIC | Age: 67
End: 2022-09-22
Payer: MEDICARE

## 2022-09-22 VITALS
BODY MASS INDEX: 24.99 KG/M2 | DIASTOLIC BLOOD PRESSURE: 80 MMHG | HEIGHT: 63 IN | SYSTOLIC BLOOD PRESSURE: 127 MMHG | WEIGHT: 141.06 LBS | RESPIRATION RATE: 18 BRPM | OXYGEN SATURATION: 99 % | HEART RATE: 75 BPM

## 2022-09-22 DIAGNOSIS — L12.9 PEMPHIGOID: ICD-10-CM

## 2022-09-22 DIAGNOSIS — M81.0 AGE RELATED OSTEOPOROSIS, UNSPECIFIED PATHOLOGICAL FRACTURE PRESENCE: Primary | ICD-10-CM

## 2022-09-22 DIAGNOSIS — S22.069D CLOSED FRACTURE OF SEVENTH THORACIC VERTEBRA WITH ROUTINE HEALING, UNSPECIFIED FRACTURE MORPHOLOGY, SUBSEQUENT ENCOUNTER: ICD-10-CM

## 2022-09-22 DIAGNOSIS — Z71.89 COUNSELING ON HEALTH PROMOTION AND DISEASE PREVENTION: ICD-10-CM

## 2022-09-22 PROCEDURE — 99215 OFFICE O/P EST HI 40 MIN: CPT | Mod: S$PBB,,,

## 2022-09-22 PROCEDURE — 99215 OFFICE O/P EST HI 40 MIN: CPT | Mod: PBBFAC

## 2022-09-22 PROCEDURE — 99999 PR PBB SHADOW E&M-EST. PATIENT-LVL V: CPT | Mod: PBBFAC,,,

## 2022-09-22 PROCEDURE — 99999 PR PBB SHADOW E&M-EST. PATIENT-LVL V: ICD-10-PCS | Mod: PBBFAC,,,

## 2022-09-22 PROCEDURE — 99215 PR OFFICE/OUTPT VISIT, EST, LEVL V, 40-54 MIN: ICD-10-PCS | Mod: S$PBB,,,

## 2022-09-22 NOTE — Clinical Note
CMP, PTH, vitamin-D external labs to [facility] in [facility] in [place] for tomorrow.   Patient to schedule follow-up 3-4 months to see how she is doing with Tymlos

## 2022-09-22 NOTE — PROGRESS NOTES
RHEUMATOLOGY OUTPATIENT CLINIC NOTE    09/22/2022    Subjective:       Patient ID: Dulce Root is a 67 y.o. female.    Chief Complaint: Follow-up      HPI   Dulce Root is a 67 y.o. pleasant female here for rheumatology follow up for osteoporosis.  Previously seen by my colleagues in Rheumatology; first time seeing me.      Previously recommended Prolia therapy, this was postponed due to planned invasive dental work.  Prior episodes of hypocalcemia.    Osteoporosis questionnaire  Current meds for osteopenia/ osteoporosis: 1200 mg calcium on and off  Prior meds for osteopenia/ osteoporosis: fosamax many years ago  Prev dexa scan:  We will discuss today  Vit D supplement:  D3 two tablets a day, unsure dosage  Menopause:  40s? Cycles stopped with chemotherapy  Hystrectomy: no  HRT: no  Smoker/tobacco: stopped in 1981 since 16 yo  Etoh:  No  Falls:  No  Activity level: no formal exercise  Kidney problems : none  Prev fracture :  compression fracture  Steroids : no  Family history :  mother and fathers side   PPI/gerd:   protonix, pepcid  Other risk factors :   Dental issues: one tooth needing implant  Anticipated dental work :  one implant at some point  H/o cancer/tx: hodgkins x2 with radiation,  chemo . Breast.       Rheumatologic review of systems negative otherwise.     Physical exam   Able to rise from a chair independently.  Walks without assistance.  Steady gait.  Kyphosis present    Past Medical History:   Diagnosis Date    Allergy     Anticoagulant long-term use     Plavix    Breast cancer 2008    Carotid stenosis, bilateral 10/13/2017    Coronary artery disease     Coronary artery disease involving coronary bypass graft of native heart without angina pectoris 08/16/2019 2/19  1.  Left main is a small vessel with a 60%-65% ostial lesion. 2.  LAD is a medium-sized vessel diffuse 70% proximally  Ramus intermedius is a medium size vessel with a 40%-50% ostial lesion. 3.  Circumflex 60%-70% ostial   remainder of circumflex and obtuse marginal normal in appearance. Right coronary artery is normal size vessel, short discrete 70%mid 4.  Vein graft to right coronary is occ    Coronary artery disease involving native coronary artery of native heart without angina pectoris 06/27/2017    DCIS (ductal carcinoma in situ) of breast 11/06/2012    Encounter for blood transfusion     Essential hypertension 11/06/2012    GERD (gastroesophageal reflux disease)     GI bleed 02/2021    Hodgkin lymphoma     Hx of Hodgkin's disease - s/p chemo and radiation 11/06/2012    Hyperlipidemia     Hyperlipidemia 11/06/2012    The patient presents with hyperlipidemia.  The patient reports tolerating the medication well and is in excellent compliance.  There have been no medication side effects.  The patient denies chest pain, neuropathy, and myalgias.  The patient has reduced fat intake and has been exercising.  Current treatment has included the medications listed in the med card.   Lab Results Component Value Date  CH    Hypertension     LBBB (left bundle branch block) 05/22/2018    Osteoporosis     Paroxysmal atrial fibrillation - single post-op episode 08/24/2017    Pemphigoid     Pemphigoid     pt receives IVIG infusions    S/P AVR (aortic valve replacement) - pericardial valve 08/21/2017    Perceval aortic tissue valve PVS23. 23mm    serial # [id number]    S/P CABG x 2 08/21/2017 8/17 CABG X 2 with SVG-LAD and SVG-distal RCA  SVG LAD due to absent LIMA after chest radiation and lymph node biopsy     Stented coronary artery     She had 2 stents placed in 2/2019.  She has had CAD and bypasses in the past in 2017.      Thyroid disease      Past Surgical History:   Procedure Laterality Date    ARTERIOGRAPHY OF AORTIC ROOT N/A 02/15/2019    Procedure: ARTERIOGRAM, AORTIC ROOT;  Surgeon: Sujit Chaudhry MD;  [facility] CATH;  Service: Cardiology;  Laterality: N/A;    BREAST BIOPSY      BREAST RECONSTRUCTION      bilateral mastectomy     CARDIAC CATHETERIZATION      stents x 2    CARDIAC SURGERY  08/2017    Aortic valve replacement , CABG 2 vessel    CARDIAC VALVE REPLACEMENT  08/2017    aortic valve, bovine per pt    CORONARY ANGIOGRAPHY N/A 02/15/2019    Procedure: ANGIOGRAM, CORONARY ARTERY;  Surgeon: Sujit Chaudhry MD;  [facility];  Service: Cardiology;  Laterality: N/A;    CORONARY ANGIOGRAPHY N/A 4/1/2022    Procedure: ANGIOGRAM, CORONARY ARTERY;  Surgeon: Sujit Chaudhry MD;  Location: [facility] CATH;  Service: Cardiology;  Laterality: N/A;    CORONARY BYPASS GRAFT ANGIOGRAPHY  02/15/2019    Procedure: Bypass graft study;  Surgeon: Sujit Chaudhry MD;  Location: [facility] CATH;  Service: Cardiology;;    COSMETIC SURGERY      bereast reconstruction    ESOPHAGOGASTRODUODENOSCOPY N/A 05/14/2021    Procedure: EGD (ESOPHAGOGASTRODUODENOSCOPY);  Surgeon: Tracy Flower MD;  Location: [facility] ENDO;  Service: Endoscopy;  Laterality: N/A;    ESOPHAGOGASTRODUODENOSCOPY N/A 11/04/2021    Procedure: EGD (ESOPHAGOGASTRODUODENOSCOPY);  Surgeon: Tracy Flower MD;  Location: [facility] ENDO;  Service: Endoscopy;  Laterality: N/A;    EYE SURGERY      eye lids    LEFT HEART CATHETERIZATION Right 02/15/2019    Procedure: Left heart cath;  Surgeon: Sujit Chaudhry MD;  Location: [facility] CATH;  Service: Cardiology;  Laterality: Right;    LEFT HEART CATHETERIZATION Left 4/1/2022    Procedure: Left heart cath;  Surgeon: Sujit Chaudhry MD;  Location: [facility] CATH;  Service: Cardiology;  Laterality: Left;    LUMBAR LAMINECTOMY WITH DISCECTOMY N/A 02/27/2020    Procedure: LAMINECTOMY, SPINE, LUMBAR, WITH DISCECTOMY;  Surgeon: Vimal Villegas MD;  Location: [facility] OR;  Service: Neurosurgery;  Laterality: N/A;  left L5-S1  Laminectomy L4-5    LYMPHADENECTOMY      MASTECTOMY      RIGHT HEART CATHETERIZATION Right 4/1/2022    Procedure: INSERTION, CATHETER, RIGHT HEART;  Surgeon: Sujit Chaudhry MD;  Location: [facility] CATH;  Service: Cardiology;  Laterality: Right;    SKIN BIOPSY       SPLENECTOMY, TOTAL      TRANSFORAMINAL EPIDURAL INJECTION OF STEROID Left 2019    Procedure: Left L5/S1 TF SKIP with local;  Surgeon: Canelo Niño MD;  Location: [facility];  Service: Pain Management;  Laterality: Left;    TUMOR REMOVAL      neck- lymphoma     Family History   Problem Relation Age of Onset    Hypertension Mother     Hypertension Father     Cancer Neg Hx      Social History     Socioeconomic History    Marital status:    Tobacco Use    Smoking status: Former     Packs/day: 1.00     Years: 20.00     Pack years: 20.00     Types: Cigarettes     Quit date: 1981     Years since quittin.9    Smokeless tobacco: Never   Substance and Sexual Activity    Alcohol use: No     Comment: 2 drinks/month; none 72 hrs prior to surgery    Drug use: No    Sexual activity: Yes     Partners: Male   Social History Narrative    Live w/ spouse and  1 dog      Social Determinants of Health     Financial Resource Strain: Low Risk     Difficulty of Paying Living Expenses: Not hard at all   Food Insecurity: No Food Insecurity    Worried About Running Out of Food in the Last Year: Never true    Ran Out of Food in the Last Year: Never true   Transportation Needs: No Transportation Needs    Lack of Transportation (Medical): No    Lack of Transportation (Non-Medical): No   Physical Activity: Insufficiently Active    Days of Exercise per Week: 3 days    Minutes of Exercise per Session: 30 min   Stress: No Stress Concern Present    Feeling of Stress : Only a little   Social Connections: Moderately Integrated    Frequency of Communication with Friends and Family: More than three times a week    Frequency of Social Gatherings with Friends and Family: Once a week    Attends Buddhist Services: More than 4 times per year    Active Member of Clubs or Organizations: No    Attends Club or Organization Meetings: Never    Marital Status:    Housing Stability: Low Risk     Unable to Pay for Housing in the  "Last Year: No    Number of Places Lived in the Last Year: 1    Unstable Housing in the Last Year: No     Review of patient's allergies indicates:   Allergen Reactions    Amlodipine Shortness Of Breath and Other (See Comments)     unknown  Other reaction(s): Swelling  unknown  unknown  Other reaction(s): Swelling  unknown  Other reaction(s): Swelling  unknown    Levofloxacin Hives and Swelling    Sulfa (sulfonamide antibiotics) Shortness Of Breath, Itching and Other (See Comments)     elevated blood pressure    Ace inhibitors     Ciprofloxacin Itching    Iodinated contrast media     Iodine      Other reaction(s): Unknown    Latex      Other reaction(s): Itching    Latex, natural rubber Itching           Objective:   /80 (BP Location: Left arm, Patient Position: Sitting, BP Method: Small (Automatic))   Pulse 75   Resp 18   Ht 5' 3" (1.6 m)   Wt 64 kg (141 lb 1.2 oz)   SpO2 99%   BMI 24.99 kg/m²   Immunization History   Administered Date(s) Administered    COVID-19, MRNA, LN-S, PF (Pfizer) (Purple Cap) 12/30/2020, 01/20/2021, 09/30/2021    Influenza 11/28/2021    Influenza (FLUAD) - Quadrivalent - Adjuvanted - PF *Preferred* (65+) 11/28/2021    Influenza - Quadrivalent - MDCK - PF 02/01/2018    Pneumococcal Conjugate - 13 Valent 10/10/2019              Recent Results (from the past 672 hour(s))   Creatinine, serum    Collection Time: 09/01/22  3:08 PM   Result Value Ref Range    Creatinine 0.69 0.50 - 1.40 mg/dL    eGFR >60 >60 mL/min/1.73 m^2        No results found for: TBGOLDPLUS   Lab Results   Component Value Date    HEPCAB Negative 05/11/2021        DEXA 02/04/2021   Spine -1.9, FN -2.9, TH -2.7    DEXA 05/19/2022   Spine -1.7, FN -3.0, TH -3.2    Assessment:       1. Age related osteoporosis, unspecified pathological fracture presence    2. Counseling on health promotion and disease prevention    3. Closed fracture of seventh thoracic vertebra with routine healing, unspecified fracture morphology, " subsequent encounter    4. Pemphigoid          Impression:   1. Age related osteoporosis, unspecified pathological fracture presence  - episodes of fragility vertebral compression fracture, severely low osteoporotic T-scores (-3.2 total hip,-3.0 femoral neck), prior therapy with oral bisphosphonates (years ago)  - past makers history of lymphoma with chest wall radiation as part of treatment (199s)  - adequate dietary calcium and vitamin D intake.  - last vitamin-D level 35 on 02/2021  - Avoid immobility, fall prevention      2. Other specified counseling  - over 10 minutes spent regarding below topics:  - Nutrition and exercise counseling.  - Medication counseling provided.     Other specified counseling  Over 10 minutes spent regarding below topics:  - Nutrition and exercise counseling.  - Medication counseling provided.   Plan:          Discussed osteoporosis/low bone density disease state in detail with patient.  Reviewed treatment options along with potential side effects of these treatments.  Dexa scan was reviewed with patient.  Treatment plan agreed upon together with patient      Due to severely low T-scores <-3.0, prior fragility fractures planned dental implants, would recommend treatment with Tymlos daily injectable.    Discussed side effects and risks of medication with patient.  Will try for Tymlos, repeat bone density 1 year after Tymlos to monitor therapy 09/2023     Patient has labs planned for tomorrow from her external dermatologist at [facility] [place].      CMP, vitamin-D, PTH external orders for [facility] [place]    Will provide recommendations for vitamin-D and calcium supplementation based on results.    Follow up 3-4 months to see how she is doing with Tymlos injections     Caroline Howard PA-C  [facility]  Rheumatology       50 minutes of total time spent on the encounter, which includes face to face time and non-face to face time preparing to see the patient (eg, review of tests),  Obtaining and/or reviewing separately obtained history, Documenting clinical information in the electronic or other health record, Independently interpreting results (not separately reported) and communicating results to the patient/family/caregiver, or Care coordination (not separately reported).

## 2022-09-23 ENCOUNTER — TELEPHONE (OUTPATIENT)
Dept: RHEUMATOLOGY | Facility: CLINIC | Age: 67
End: 2022-09-23
Payer: MEDICARE

## 2022-09-23 ENCOUNTER — SPECIALTY PHARMACY (OUTPATIENT)
Dept: PHARMACY | Facility: CLINIC | Age: 67
End: 2022-09-23
Payer: MEDICARE

## 2022-09-23 DIAGNOSIS — M81.0 AGE RELATED OSTEOPOROSIS, UNSPECIFIED PATHOLOGICAL FRACTURE PRESENCE: Primary | ICD-10-CM

## 2022-09-23 NOTE — TELEPHONE ENCOUNTER
----- Message from Caroline Howrad PA-C sent at 9/22/2022  3:12 PM CDT -----  CMP, PTH, vitamin-D external labs to [facility] in [facility] [place] in [place] for tomorrow.    Patient to schedule follow-up 3-4 months to see how she is doing with Tymlos

## 2022-09-23 NOTE — TELEPHONE ENCOUNTER
Paolo, this is Marino Nunes with [facility].  We are working on your prescription that your doctor has sent us. We will be working with your insurance to get this approved for you. We will be calling you along the way with updates on your medication.  If you have any questions, you can reach us at [phone].    Welcome call outcome: Patient/caregiver reached

## 2022-09-26 NOTE — TELEPHONE ENCOUNTER
PA approved   PA Case: [id number], Status: Approved, Coverage Starts on: 9/23/2022 12:00:00 AM, Coverage Ends on: 3/23/2023    Test Claim successful - $612.40     Forwarding to BI/FA

## 2022-09-27 NOTE — TELEPHONE ENCOUNTER
Benefit Investigation    Madhavi  [employer]  No LIS  Estimated copay: $612.40   Pt is initial phase  [facility] in network    Forward to FA

## 2022-09-28 ENCOUNTER — TELEPHONE (OUTPATIENT)
Dept: RHEUMATOLOGY | Facility: CLINIC | Age: 67
End: 2022-09-28
Payer: MEDICARE

## 2022-09-28 NOTE — TELEPHONE ENCOUNTER
Outgoing call to patient regarding Tymlos prescription we received. Tymlos has been approved through insurance with a high copay. No grants are currently available at this time but am able to apply patient for  assistance.If pt consents, need HH size, annual income (%) and preference on how to receive application. LVM

## 2022-09-29 NOTE — TELEPHONE ENCOUNTER
Pt returning call regarding Tymlos. Tymlos has been approved by insurance with copay.  Pt states she was informed by provider that she should not pay more than $5 and was instructed to callback to provider if that was not the case.  Attempted to discuss PAP option, but patient states she will not qualify. She expressed she will call provider to inform.   Will await on pt's decision on how to proceed.

## 2022-10-03 ENCOUNTER — PATIENT MESSAGE (OUTPATIENT)
Dept: RHEUMATOLOGY | Facility: CLINIC | Age: 67
End: 2022-10-03
Payer: MEDICARE

## 2022-10-10 ENCOUNTER — PATIENT MESSAGE (OUTPATIENT)
Dept: RHEUMATOLOGY | Facility: CLINIC | Age: 67
End: 2022-10-10
Payer: MEDICARE

## 2022-10-10 PROCEDURE — 82274 ASSAY TEST FOR BLOOD FECAL: CPT | Performed by: NURSE PRACTITIONER

## 2022-10-10 NOTE — TELEPHONE ENCOUNTER
Per chart notes, pt is transitioning to Prolia due to cost of Tymlos. Closing out referral of Tymlos at [facility].

## 2022-10-17 ENCOUNTER — TELEPHONE (OUTPATIENT)
Dept: NEUROSURGERY | Facility: CLINIC | Age: 67
End: 2022-10-17
Payer: MEDICARE

## 2022-10-17 DIAGNOSIS — M54.9 DORSALGIA, UNSPECIFIED: Primary | ICD-10-CM

## 2022-10-18 ENCOUNTER — OFFICE VISIT (OUTPATIENT)
Dept: NEUROSURGERY | Facility: CLINIC | Age: 67
End: 2022-10-18
Payer: MEDICARE

## 2022-10-18 ENCOUNTER — HOSPITAL ENCOUNTER (OUTPATIENT)
Dept: RADIOLOGY | Facility: HOSPITAL | Age: 67
Discharge: HOME | End: 2022-10-18
Attending: PHYSICIAN ASSISTANT
Payer: MEDICARE

## 2022-10-18 VITALS
HEIGHT: 63 IN | DIASTOLIC BLOOD PRESSURE: 64 MMHG | BODY MASS INDEX: 24.98 KG/M2 | WEIGHT: 141 LBS | SYSTOLIC BLOOD PRESSURE: 101 MMHG | HEART RATE: 74 BPM | RESPIRATION RATE: 18 BRPM

## 2022-10-18 DIAGNOSIS — M54.16 LUMBAR RADICULOPATHY: ICD-10-CM

## 2022-10-18 DIAGNOSIS — M51.36 DDD (DEGENERATIVE DISC DISEASE), LUMBAR: Primary | ICD-10-CM

## 2022-10-18 DIAGNOSIS — E03.9 ACQUIRED HYPOTHYROIDISM: ICD-10-CM

## 2022-10-18 DIAGNOSIS — M54.16 LUMBAR RADICULOPATHY: Primary | ICD-10-CM

## 2022-10-18 DIAGNOSIS — M54.9 DORSALGIA, UNSPECIFIED: ICD-10-CM

## 2022-10-18 DIAGNOSIS — Z98.890 HISTORY OF BACK SURGERY: ICD-10-CM

## 2022-10-18 PROCEDURE — 99213 PR OFFICE/OUTPT VISIT, EST, LEVL III, 20-29 MIN: ICD-10-PCS | Mod: S$PBB,,, | Performed by: PHYSICIAN ASSISTANT

## 2022-10-18 PROCEDURE — 99999 PR PBB SHADOW E&M-EST. PATIENT-LVL IV: CPT | Mod: PBBFAC,,, | Performed by: PHYSICIAN ASSISTANT

## 2022-10-18 PROCEDURE — 99213 OFFICE O/P EST LOW 20 MIN: CPT | Mod: S$PBB,,, | Performed by: PHYSICIAN ASSISTANT

## 2022-10-18 PROCEDURE — 25500020 PHARM REV CODE 255: Performed by: PHYSICIAN ASSISTANT

## 2022-10-18 PROCEDURE — 72158 MRI LUMBAR SPINE W/O & W/DYE: CPT | Mod: 26,,, | Performed by: RADIOLOGY

## 2022-10-18 PROCEDURE — 99214 OFFICE O/P EST MOD 30 MIN: CPT | Mod: PBBFAC,25 | Performed by: PHYSICIAN ASSISTANT

## 2022-10-18 PROCEDURE — 72158 MRI LUMBAR SPINE W WO CONTRAST: ICD-10-PCS | Mod: 26,,, | Performed by: RADIOLOGY

## 2022-10-18 PROCEDURE — 72158 MRI LUMBAR SPINE W/O & W/DYE: CPT | Mod: TC

## 2022-10-18 PROCEDURE — A9585 GADOBUTROL INJECTION: HCPCS | Performed by: PHYSICIAN ASSISTANT

## 2022-10-18 PROCEDURE — 99999 PR PBB SHADOW E&M-EST. PATIENT-LVL IV: ICD-10-PCS | Mod: PBBFAC,,, | Performed by: PHYSICIAN ASSISTANT

## 2022-10-18 RX ORDER — LEVOTHYROXINE SODIUM 75 UG/1
TABLET ORAL
Qty: 30 TABLET | Refills: 0 | Status: SHIPPED | OUTPATIENT
Start: 2022-10-18 | End: 2022-11-16

## 2022-10-18 RX ORDER — GADOBUTROL 604.72 MG/ML
6.5 INJECTION INTRAVENOUS
Status: COMPLETED | OUTPATIENT
Start: 2022-10-18 | End: 2022-10-18

## 2022-10-18 RX ADMIN — GADOBUTROL 6.5 ML: 604.72 INJECTION INTRAVENOUS at 11:10

## 2022-10-18 NOTE — TELEPHONE ENCOUNTER
The patient requested a thryoid med which I called in x 1 month.  The patient is overdue for a TSH check.  Please order one on the patient.  Other things that may be due on the Health Maintenance are below.  Please order them if the patient is willing to get them done.  Health Maintenance Due   Topic Date Due    TETANUS VACCINE  Never done    Shingles Vaccine (1 of 2) Never done    Pneumococcal Vaccines (Age 65+) (2 - PPSV23 if available, else PCV20) 10/10/2020    COVID-19 Vaccine (4 - Booster for Pfizer series) 11/25/2021    Influenza Vaccine (1) 09/01/2022

## 2022-10-18 NOTE — TELEPHONE ENCOUNTER
No new care gaps identified.  [facility] Embedded Care Gaps. Reference number: [id number]. 10/18/2022   11:32:27 AM ANAT

## 2022-10-18 NOTE — PROGRESS NOTES
"Subjective:      Patient ID: Dulce Root is a 67 y.o. female.    HPI  The patient is here today for MRI review.  No changes since her last office visit.   Patient reports she is not in serious pain, rarely takes Tylenol, however at night it's worse.  Pain is described as "pressing in."  She reports pain mostly in lower back, buttocks and thighs.  RLE=LLE  Pain does not radiate past knees.  Prev note:  Patient is here today for discussion of lower back and hip pain.  Approximately 4 weeks (maybe more) she has aching in both thighs.  She feels hunched over with walking.  If she lies on her side she has pain through her hips.  Pain usually stops above her knees.   RLE= LLE  Has weakness in her legs.   No recent falls/injuries.  Recently had CTA head /neck to look for blockages.  She was prescribed a steroid pack prior to the CTA which has calmed down her pain.   Ambulates unassisted.  No acute bladder/bowel changes.  Reports constipation recently.  H/o Hodgken's (twice) and in situ breast cancer.  Had a double mastectomy.   H/o lumbar laminectomy 2/27/2020.  No injections for lower back since surgery. Had injection(s) prior to back surgery in 2020.      Surgery Date: 2/27/2020    Procedure(s) (LRB):  LAMINECTOMY, SPINE, LUMBAR, WITH DISCECTOMY Left 5-S1   Laminectomy and foraminotomy Left  L4-5    Description of Procedure: disc herniation with nerve root compression on the left side at L5-S1.  Central and foraminal stenosis left-sided L4-5   Description of the findings of the procedure:  Large disc herniation left-sided L5-S1.  Foraminal stenosis left L4-5  Objective:     Body mass index is 24.98 kg/m².  Vitals:    10/18/22 1406   BP: 101/64   Pulse: 74   Resp: 18          Back:  None  Paraspinal muscle spasms   None  Pain with flexion and extention   WNL  Range of motion    Neg  Straight leg raise     Motor   Right Right Left Left  Level Group   5 4+ 5 4+ L2 Hip flexor (Psoas)   5  5  L3 Leg extension (Quads)   5  5 "  L4 Dorsiflexion & foot inversion (Tibialis Anterior)   5  5  L5 Great toe extension ( EHL)   5  5  S1 Foot eversion (Gastroc, PL & PB)     Sensation  NL Decreased (R/L/BL) Level Sensation    X  L2 Anterio-medial thigh   X  L3 Medial thigh around knee   X  L4 Medial foot   X  L5 Dorsum foot   X  S1 Lateral foot      Lab Results   Component Value Date    WBC 5.36 04/01/2022    HCT 36.0 (L) 04/01/2022           INDEPENDENT INTERPRETATION OF TEST:  MRI Lumbar-  FINDINGS:  The vertebral body heights and alignment are maintained throughout the lumbar spine.  No abnormal vertebral body marrow signal.  Multilevel intervertebral disc desiccation.  The conus is normal in signal intensity terminating at the level of the L1 vertebral body.      L1-2: Intervertebral disc bulge with central disc protrusion.  Mild facet hypertrophy and ligamentum flavum thickening.  Mild bilateral neural foraminal narrowing.  No thecal sac narrowing.   L2-3: Intervertebral disc bulge with central disc extrusion.  4 mm inferior migration.  Mild facet hypertrophy and ligamentum flavum thickening.  The thecal sac is narrowed to 9 mm.  Mild bilateral neural foraminal narrowing.   L3-4: Intervertebral disc bulge with central disc extrusion.  Mild facet hypertrophy and ligamentum flavum thickening.  The thecal sac is narrowed to 8 mm.  Mild bilateral neural foraminal narrowing.   L4-5: Intervertebral disc bulge with right paracentral disc protrusion.  There is narrowing of the right subarticular recess.  Mild facet hypertrophy and ligamentum flavum thickening.  Buckling of the ligamentum flavum.  The thecal sac is narrowed to 8 x 6 mm.  Mild bilateral neural foraminal narrowing.   L5-S1: Left hemilaminectomy.  Intervertebral disc bulge eccentric to the right side.  Mild facet hypertrophy and ligamentum flavum thickening.  Moderate right and mild left neural foraminal narrowing.  No thecal sac narrowing.   Multiple cysts within both kidneys.      Impression:   Multilevel lumbar spondylosis with thecal sac stenosis and neural foraminal narrowing.  The previously demonstrated left paracentral disc extrusion at L5-S1 is no longer present.   Renal cysts are incompletely evaluated on the current exam.       Relevant imaging results reviewed and interpreted by me, discussed with the patient and / or family today.  Assessment:     1. DDD (degenerative disc disease), lumbar    2. Lumbar radiculopathy    3. History of back surgery      Plan:     DDD (degenerative disc disease), lumbar  -     Procedure Order to Pain Management; Future; Expected date: 10/18/2022    Lumbar radiculopathy  -     Procedure Order to Pain Management; Future; Expected date: 10/18/2022    History of back surgery  -     Procedure Order to Pain Management; Future; Expected date: 10/18/2022      Discussed MRI findings, pathology and tx options.  At this time, will recommend injections with IPM.  -D2P orders placed- TFESI L2/3, 3/4 bilateral  Follow-up after injections for reassessment.  If pain does not improve, will order add'l imaging studies and have pt follow-up with MD.      Luís Bettencourt PA-C  [facility]

## 2022-10-24 ENCOUNTER — PATIENT MESSAGE (OUTPATIENT)
Dept: FAMILY MEDICINE | Facility: CLINIC | Age: 67
End: 2022-10-24
Payer: MEDICARE

## 2022-10-24 ENCOUNTER — PATIENT MESSAGE (OUTPATIENT)
Dept: HEMATOLOGY/ONCOLOGY | Facility: CLINIC | Age: 67
End: 2022-10-24
Payer: MEDICARE

## 2022-10-25 ENCOUNTER — OFFICE VISIT (OUTPATIENT)
Dept: SURGERY | Facility: CLINIC | Age: 67
End: 2022-10-25
Payer: MEDICARE

## 2022-10-25 ENCOUNTER — OFFICE VISIT (OUTPATIENT)
Dept: HEMATOLOGY/ONCOLOGY | Facility: CLINIC | Age: 67
End: 2022-10-25
Payer: MEDICARE

## 2022-10-25 VITALS
WEIGHT: 142 LBS | BODY MASS INDEX: 25.16 KG/M2 | HEART RATE: 76 BPM | SYSTOLIC BLOOD PRESSURE: 139 MMHG | HEIGHT: 63 IN | DIASTOLIC BLOOD PRESSURE: 69 MMHG

## 2022-10-25 VITALS
WEIGHT: 142.19 LBS | HEART RATE: 76 BPM | SYSTOLIC BLOOD PRESSURE: 139 MMHG | RESPIRATION RATE: 16 BRPM | OXYGEN SATURATION: 100 % | DIASTOLIC BLOOD PRESSURE: 69 MMHG | BODY MASS INDEX: 25.2 KG/M2 | HEIGHT: 63 IN | TEMPERATURE: 98 F

## 2022-10-25 DIAGNOSIS — S21.009A BREAST WOUND, INITIAL ENCOUNTER: Primary | ICD-10-CM

## 2022-10-25 DIAGNOSIS — Z86.000 HISTORY OF DUCTAL CARCINOMA IN SITU (DCIS) OF BREAST: ICD-10-CM

## 2022-10-25 DIAGNOSIS — Z85.3 PERSONAL HISTORY OF BREAST CANCER: ICD-10-CM

## 2022-10-25 DIAGNOSIS — Z12.31 ENCOUNTER FOR SCREENING MAMMOGRAM FOR BREAST CANCER: ICD-10-CM

## 2022-10-25 DIAGNOSIS — Z90.13 S/P MASTECTOMY, BILATERAL: ICD-10-CM

## 2022-10-25 PROCEDURE — 99214 OFFICE O/P EST MOD 30 MIN: CPT | Mod: S$PBB,25,, | Performed by: PHYSICIAN ASSISTANT

## 2022-10-25 PROCEDURE — 99213 PR OFFICE/OUTPT VISIT, EST, LEVL III, 20-29 MIN: ICD-10-PCS | Mod: S$PBB,,, | Performed by: INTERNAL MEDICINE

## 2022-10-25 PROCEDURE — 99213 OFFICE O/P EST LOW 20 MIN: CPT | Mod: S$PBB,,, | Performed by: INTERNAL MEDICINE

## 2022-10-25 PROCEDURE — 88360 TUMOR IMMUNOHISTOCHEM/MANUAL: CPT | Mod: 59 | Performed by: PATHOLOGY

## 2022-10-25 PROCEDURE — 11104 PR PUNCH BIOPSY, SKIN, SINGLE LESION: ICD-10-PCS | Mod: S$PBB,,, | Performed by: PHYSICIAN ASSISTANT

## 2022-10-25 PROCEDURE — 88360 PR  TUMOR IMMUNOHISTOCHEM/MANUAL: ICD-10-PCS | Mod: 26,,, | Performed by: PATHOLOGY

## 2022-10-25 PROCEDURE — 99999 PR PBB SHADOW E&M-EST. PATIENT-LVL V: CPT | Mod: PBBFAC,,, | Performed by: INTERNAL MEDICINE

## 2022-10-25 PROCEDURE — 99214 PR OFFICE/OUTPT VISIT, EST, LEVL IV, 30-39 MIN: ICD-10-PCS | Mod: S$PBB,25,, | Performed by: PHYSICIAN ASSISTANT

## 2022-10-25 PROCEDURE — 99215 OFFICE O/P EST HI 40 MIN: CPT | Mod: PBBFAC,25 | Performed by: INTERNAL MEDICINE

## 2022-10-25 PROCEDURE — 99999 PR PBB SHADOW E&M-EST. PATIENT-LVL IV: CPT | Mod: PBBFAC,,, | Performed by: PHYSICIAN ASSISTANT

## 2022-10-25 PROCEDURE — 88305 TISSUE EXAM BY PATHOLOGIST: CPT | Mod: 26,,, | Performed by: PATHOLOGY

## 2022-10-25 PROCEDURE — 11104 PUNCH BX SKIN SINGLE LESION: CPT | Mod: PBBFAC | Performed by: INTERNAL MEDICINE

## 2022-10-25 PROCEDURE — 88305 TISSUE EXAM BY PATHOLOGIST: ICD-10-PCS | Mod: 26,,, | Performed by: PATHOLOGY

## 2022-10-25 PROCEDURE — 88360 TUMOR IMMUNOHISTOCHEM/MANUAL: CPT | Mod: 26,,, | Performed by: PATHOLOGY

## 2022-10-25 PROCEDURE — 11104 PUNCH BX SKIN SINGLE LESION: CPT | Mod: S$PBB,,, | Performed by: PHYSICIAN ASSISTANT

## 2022-10-25 PROCEDURE — 99999 PR PBB SHADOW E&M-EST. PATIENT-LVL V: ICD-10-PCS | Mod: PBBFAC,,, | Performed by: INTERNAL MEDICINE

## 2022-10-25 PROCEDURE — 88305 TISSUE EXAM BY PATHOLOGIST: CPT | Performed by: PATHOLOGY

## 2022-10-25 PROCEDURE — 99214 OFFICE O/P EST MOD 30 MIN: CPT | Mod: PBBFAC,27 | Performed by: PHYSICIAN ASSISTANT

## 2022-10-25 PROCEDURE — 99999 PR PBB SHADOW E&M-EST. PATIENT-LVL IV: ICD-10-PCS | Mod: PBBFAC,,, | Performed by: PHYSICIAN ASSISTANT

## 2022-10-25 NOTE — PROGRESS NOTES
Subjective:       Patient ID: Dulce Root is a 67 y.o. female.    Chief Complaint: No chief complaint on file.    HPI Ms. Root is a very pleasant 67-year-old female with history of bilateral ductal carcinoma in situ and a remote history of Hodgkin's disease.    She returns to clinic for evaluation of a non-healing area in her left breast reconstruction.That has been present for mor than 6 months but seems to be worse. Bleeds occcasionally, not painful.    Breast history:  She  developed ductal      carcinoma in situ of the right breast in 09/2006, treated with lumpectomy     and radiation therapy.  In 08/2009, she was diagnosed with ductal carcinoma  in situ of the left breast and in 08/2009, she had bilateral mastectomy.      The left breast showed widespread high-grade DCIS and the right breast was    without evidence of malignancy.        She has a remote history of Hodgkin's disease, originally diagnosed in   1991, treated with radiation therapy and then treated with salvage   chemotherapy with ABVD on protocol [id number] in 1996.    In August 2017 she underwent aortic valve replacement and coronary artery bypass grafting by Dr. Baxter.  She has also has stent placement in 2019.  Review of Systems   Constitutional:  Negative for activity change, appetite change, fatigue and unexpected weight change.   Eyes:  Negative for visual disturbance.   Respiratory:  Positive for shortness of breath (occasional). Negative for cough.    Cardiovascular:  Negative for chest pain.   Gastrointestinal:  Negative for abdominal pain, diarrhea and nausea.   Genitourinary:  Negative for flank pain and frequency.   Skin:  Negative for rash.   Neurological:  Negative for headaches.   Hematological:  Negative for adenopathy.   Psychiatric/Behavioral:  Negative for dysphoric mood. The patient is not nervous/anxious.      Objective:      Physical Exam  Vitals reviewed.   Constitutional:       General: She is not in acute distress.      Appearance: She is well-developed.   Eyes:      General: No scleral icterus.  Cardiovascular:      Rate and Rhythm: Normal rate and regular rhythm.   Pulmonary:      Effort: Pulmonary effort is normal. No respiratory distress.      Breath sounds: Normal breath sounds. No wheezing or rales.   Chest:       Abdominal:      Palpations: Abdomen is soft. There is no mass.      Tenderness: There is no abdominal tenderness.   Lymphadenopathy:      Cervical: No cervical adenopathy.      Upper Body:      Right upper body: No supraclavicular adenopathy.      Left upper body: No supraclavicular adenopathy.   Neurological:      Mental Status: She is alert and oriented to person, place, and time.   Psychiatric:         Behavior: Behavior normal.         Thought Content: Thought content normal.       Assessment:       1. Breast wound, initial encounter    2. History of ductal carcinoma in situ (DCIS) of breast        Plan:     Needs to see Breast surgery for BX/excision.    Return to clinic in 1 year.

## 2022-10-25 NOTE — Clinical Note
Can we set her up for a left diagnostic mammo/US? Previous history of breast cancer from 2009 s/p bilateral flap recon, but has a new skin lesion. I am performed a punch today, but also needs imaging work up. Thank you! PACU

## 2022-10-27 ENCOUNTER — INFUSION (OUTPATIENT)
Dept: INFUSION THERAPY | Facility: HOSPITAL | Age: 67
End: 2022-10-27
Attending: FAMILY MEDICINE
Payer: MEDICARE

## 2022-10-27 VITALS
DIASTOLIC BLOOD PRESSURE: 65 MMHG | TEMPERATURE: 98 F | SYSTOLIC BLOOD PRESSURE: 112 MMHG | OXYGEN SATURATION: 99 % | HEART RATE: 76 BPM | RESPIRATION RATE: 16 BRPM

## 2022-10-27 DIAGNOSIS — M80.00XD AGE-RELATED OSTEOPOROSIS WITH CURRENT PATHOLOGICAL FRACTURE WITH ROUTINE HEALING: Primary | ICD-10-CM

## 2022-10-27 PROCEDURE — 63600175 PHARM REV CODE 636 W HCPCS: Mod: JG

## 2022-10-27 PROCEDURE — 96372 THER/PROPH/DIAG INJ SC/IM: CPT

## 2022-10-27 RX ADMIN — DENOSUMAB 60 MG: 60 INJECTION SUBCUTANEOUS at 04:10

## 2022-10-27 NOTE — DISCHARGE INSTRUCTIONS
Remember to take your calcium with vitamin D supplement as directed.   Do not have any dental work for 3 months following your injection today.

## 2022-10-27 NOTE — NURSING
Injection given without difficulties. Bandaid applied. Patient instructed to stay in the clinic for 15 minutes. Patient verbalized understanding and will notify nurse with any complaints.

## 2022-10-28 LAB
FINAL PATHOLOGIC DIAGNOSIS: ABNORMAL
GROSS: ABNORMAL
Lab: ABNORMAL
SUPPLEMENTAL DIAGNOSIS: ABNORMAL

## 2022-10-31 ENCOUNTER — TELEPHONE (OUTPATIENT)
Dept: SURGERY | Facility: CLINIC | Age: 67
End: 2022-10-31
Payer: MEDICARE

## 2022-10-31 NOTE — PROGRESS NOTES
Called patient and scheduled appt with Dr. Sepulveda and Dr. Maradiaga. Reviewed location of appt. Patient verbalized understanding

## 2022-11-03 ENCOUNTER — HOSPITAL ENCOUNTER (OUTPATIENT)
Dept: RADIOLOGY | Facility: HOSPITAL | Age: 67
Discharge: HOME | End: 2022-11-03
Attending: PHYSICIAN ASSISTANT
Payer: MEDICARE

## 2022-11-03 ENCOUNTER — DOCUMENTATION ONLY (OUTPATIENT)
Dept: SURGERY | Facility: CLINIC | Age: 67
End: 2022-11-03
Payer: MEDICARE

## 2022-11-03 ENCOUNTER — OFFICE VISIT (OUTPATIENT)
Dept: HEMATOLOGY/ONCOLOGY | Facility: CLINIC | Age: 67
End: 2022-11-03
Payer: MEDICARE

## 2022-11-03 ENCOUNTER — OFFICE VISIT (OUTPATIENT)
Dept: SURGERY | Facility: CLINIC | Age: 67
End: 2022-11-03
Payer: MEDICARE

## 2022-11-03 ENCOUNTER — TELEPHONE (OUTPATIENT)
Dept: HEMATOLOGY/ONCOLOGY | Facility: CLINIC | Age: 67
End: 2022-11-03
Payer: MEDICARE

## 2022-11-03 VITALS
WEIGHT: 140.88 LBS | TEMPERATURE: 98 F | HEIGHT: 63 IN | SYSTOLIC BLOOD PRESSURE: 122 MMHG | RESPIRATION RATE: 18 BRPM | OXYGEN SATURATION: 99 % | HEART RATE: 78 BPM | BODY MASS INDEX: 24.96 KG/M2 | DIASTOLIC BLOOD PRESSURE: 59 MMHG

## 2022-11-03 VITALS
HEIGHT: 63 IN | DIASTOLIC BLOOD PRESSURE: 69 MMHG | BODY MASS INDEX: 25.16 KG/M2 | SYSTOLIC BLOOD PRESSURE: 121 MMHG | HEART RATE: 70 BPM | WEIGHT: 142 LBS

## 2022-11-03 DIAGNOSIS — Z85.3 PERSONAL HISTORY OF BREAST CANCER: ICD-10-CM

## 2022-11-03 DIAGNOSIS — C50.112 MALIGNANT NEOPLASM OF CENTRAL PORTION OF LEFT BREAST IN FEMALE, ESTROGEN RECEPTOR NEGATIVE: ICD-10-CM

## 2022-11-03 DIAGNOSIS — Z90.13 S/P MASTECTOMY, BILATERAL: ICD-10-CM

## 2022-11-03 DIAGNOSIS — S21.009A BREAST WOUND, INITIAL ENCOUNTER: ICD-10-CM

## 2022-11-03 DIAGNOSIS — Z17.1 MALIGNANT NEOPLASM OF CENTRAL PORTION OF LEFT BREAST IN FEMALE, ESTROGEN RECEPTOR NEGATIVE: ICD-10-CM

## 2022-11-03 DIAGNOSIS — C50.812 MALIGNANT NEOPLASM OF OVERLAPPING SITES OF LEFT BREAST IN FEMALE, ESTROGEN RECEPTOR NEGATIVE: Primary | ICD-10-CM

## 2022-11-03 DIAGNOSIS — Z12.31 ENCOUNTER FOR SCREENING MAMMOGRAM FOR BREAST CANCER: ICD-10-CM

## 2022-11-03 DIAGNOSIS — Z17.1 MALIGNANT NEOPLASM OF OVERLAPPING SITES OF LEFT BREAST IN FEMALE, ESTROGEN RECEPTOR NEGATIVE: Primary | ICD-10-CM

## 2022-11-03 PROCEDURE — 99999 PR PBB SHADOW E&M-EST. PATIENT-LVL V: CPT | Mod: PBBFAC,,, | Performed by: SURGERY

## 2022-11-03 PROCEDURE — 77061 BREAST TOMOSYNTHESIS UNI: CPT | Mod: 26,LT,, | Performed by: RADIOLOGY

## 2022-11-03 PROCEDURE — 99999 PR PBB SHADOW E&M-EST. PATIENT-LVL V: CPT | Mod: PBBFAC,,, | Performed by: INTERNAL MEDICINE

## 2022-11-03 PROCEDURE — 99213 OFFICE O/P EST LOW 20 MIN: CPT | Mod: S$PBB,,, | Performed by: INTERNAL MEDICINE

## 2022-11-03 PROCEDURE — 99205 OFFICE O/P NEW HI 60 MIN: CPT | Mod: S$PBB,,, | Performed by: SURGERY

## 2022-11-03 PROCEDURE — 76642 US BREAST LEFT LIMITED: ICD-10-PCS | Mod: 26,LT,, | Performed by: RADIOLOGY

## 2022-11-03 PROCEDURE — 99215 OFFICE O/P EST HI 40 MIN: CPT | Mod: PBBFAC | Performed by: INTERNAL MEDICINE

## 2022-11-03 PROCEDURE — 99999 PR PBB SHADOW E&M-EST. PATIENT-LVL V: ICD-10-PCS | Mod: PBBFAC,,, | Performed by: INTERNAL MEDICINE

## 2022-11-03 PROCEDURE — 99205 PR OFFICE/OUTPT VISIT, NEW, LEVL V, 60-74 MIN: ICD-10-PCS | Mod: S$PBB,,, | Performed by: SURGERY

## 2022-11-03 PROCEDURE — 76642 ULTRASOUND BREAST LIMITED: CPT | Mod: 26,LT,, | Performed by: RADIOLOGY

## 2022-11-03 PROCEDURE — 77061 MAMMO DIGITAL DIAGNOSTIC LEFT WITH TOMO: ICD-10-PCS | Mod: 26,LT,, | Performed by: RADIOLOGY

## 2022-11-03 PROCEDURE — 77065 DX MAMMO INCL CAD UNI: CPT | Mod: 26,LT,, | Performed by: RADIOLOGY

## 2022-11-03 PROCEDURE — 99213 PR OFFICE/OUTPT VISIT, EST, LEVL III, 20-29 MIN: ICD-10-PCS | Mod: S$PBB,,, | Performed by: INTERNAL MEDICINE

## 2022-11-03 PROCEDURE — 77065 MAMMO DIGITAL DIAGNOSTIC LEFT WITH TOMO: ICD-10-PCS | Mod: 26,LT,, | Performed by: RADIOLOGY

## 2022-11-03 PROCEDURE — 99999 PR PBB SHADOW E&M-EST. PATIENT-LVL V: ICD-10-PCS | Mod: PBBFAC,,, | Performed by: SURGERY

## 2022-11-03 PROCEDURE — 77065 DX MAMMO INCL CAD UNI: CPT | Mod: TC,LT

## 2022-11-03 PROCEDURE — 99215 OFFICE O/P EST HI 40 MIN: CPT | Mod: PBBFAC,27 | Performed by: SURGERY

## 2022-11-03 PROCEDURE — 76642 ULTRASOUND BREAST LIMITED: CPT | Mod: TC,LT

## 2022-11-03 NOTE — PROGRESS NOTES
Breast Surgery  [facility]  Department of Surgery      REFERRING PROVIDER: Rios Sepulveda MD  [address] [place] [address]    Chief Complaint: Breast Cancer (New Patient Left Breast Invasive Carcinoma 10/25/22 .)      Subjective:      Patient ID: Dulce Root is a 67 y.o. female who presents with newly diagnosed IDC of the left flap. Patient has a previous history of bilateral DCIS s/p bilateral flap reconstruction.     Patient has a history of bilateral ductal carcinoma in situ and a remote history of Hodgkin's disease. Patient is known to Dr. Sepulveda. Per his note, her history is as follows: She developed ductal carcinoma in situ of the right breast in 09/2006, treated with lumpectomy  and radiation therapy.  In 08/2009, she was diagnosed with ductal carcinoma in situ of the left breast and in 08/2009, she had bilateral nipple sparing mastectomy. The left breast showed widespread high-grade DCIS and the right breast was    without evidence of malignancy.    She has a remote history of Hodgkin's disease, originally diagnosed in 1991, treated with radiation therapy and then treated with salvage chemotherapy with ABVD on protocol E-5489 in 1996.    Patient seen in clinic for punch biopsy of a new skin lesion adjacent to the left nipple of her flap. Patient states she first noted a small lesion in the spring that has continued to grow. Punch performed by JACQUELINE Capps which showed invasive ductal carcinoma, grade 3.        Findings at that time were the following:   Tumor size: 35 cm   Tumor ndgndrndanddndend:nd nd2nd Estrogen Receptor: -   Progesterone Receptor: -   Her-2 inés: +   Lymph node status: clinically negative     Lymphatic invasion: not identified         Past Medical History:   Diagnosis Date    Allergy     Anticoagulant long-term use     Plavix    Breast cancer 2008    Carotid stenosis, bilateral 10/13/2017    Coronary artery disease     Coronary artery disease involving coronary bypass graft of  native heart without angina pectoris 08/16/2019 2/19  1.  Left main is a small vessel with a 60%-65% ostial lesion. 2.  LAD is a medium-sized vessel diffuse 70% proximally  Ramus intermedius is a medium size vessel with a 40%-50% ostial lesion. 3.  Circumflex 60%-70% ostial  remainder of circumflex and obtuse marginal normal in appearance. Right coronary artery is normal size vessel, short discrete 70%mid 4.  Vein graft to right coronary is occ    Coronary artery disease involving native coronary artery of native heart without angina pectoris 06/27/2017    DCIS (ductal carcinoma in situ) of breast 11/06/2012    Encounter for blood transfusion     Essential hypertension 11/06/2012    GERD (gastroesophageal reflux disease)     GI bleed 02/2021    Hodgkin lymphoma     Hx of Hodgkin's disease - s/p chemo and radiation 11/06/2012    Hyperlipidemia     Hyperlipidemia 11/06/2012    The patient presents with hyperlipidemia.  The patient reports tolerating the medication well and is in excellent compliance.  There have been no medication side effects.  The patient denies chest pain, neuropathy, and myalgias.  The patient has reduced fat intake and has been exercising.  Current treatment has included the medications listed in the med card.   Lab Results Component Value Date  CH    Hypertension     LBBB (left bundle branch block) 05/22/2018    Osteoporosis     Paroxysmal atrial fibrillation - single post-op episode 08/24/2017    Pemphigoid     Pemphigoid     pt receives IVIG infusions    S/P AVR (aortic valve replacement) - pericardial valve 08/21/2017    Perceval aortic tissue valve PVS23. 23mm    serial # [id number]    S/P CABG x 2 08/21/2017 8/17 CABG X 2 with SVG-LAD and SVG-distal RCA  SVG LAD due to absent LIMA after chest radiation and lymph node biopsy     Stented coronary artery     She had 2 stents placed in 2/2019.  She has had CAD and bypasses in the past in 2017.      Thyroid disease      Past Surgical History:    Procedure Laterality Date    ARTERIOGRAPHY OF AORTIC ROOT N/A 02/15/2019    Procedure: ARTERIOGRAM, AORTIC ROOT;  Surgeon: Sujit Chaudhry MD;  Location: [facility] CATH;  Service: Cardiology;  Laterality: N/A;    AXILLARY NODE DISSECTION Left 11/25/2022    Procedure: LYMPHADENECTOMY, AXILLARY-Left;  Surgeon: Rosa Maradiaga MD;  Location: [facility] OR;  Service: General;  Laterality: Left;    BREAST BIOPSY      BREAST RECONSTRUCTION      bilateral mastectomy    CARDIAC CATHETERIZATION      stents x 2    CARDIAC SURGERY  08/2017    Aortic valve replacement , CABG 2 vessel    CARDIAC VALVE REPLACEMENT  08/2017    aortic valve, bovine per pt    CORONARY ANGIOGRAPHY N/A 02/15/2019    Procedure: ANGIOGRAM, CORONARY ARTERY;  Surgeon: Sujit Chaudhry MD;  Location: [facility] CATH;  Service: Cardiology;  Laterality: N/A;    CORONARY ANGIOGRAPHY N/A 4/1/2022    Procedure: ANGIOGRAM, CORONARY ARTERY;  Surgeon: Sujit Chaudhry MD;  Location: [facility] CATH;  Service: Cardiology;  Laterality: N/A;    CORONARY BYPASS GRAFT ANGIOGRAPHY  02/15/2019    Procedure: Bypass graft study;  Surgeon: Sujit Chaudhry MD;  Location: [facility] CATH;  Service: Cardiology;;    COSMETIC SURGERY      bereast reconstruction    ESOPHAGOGASTRODUODENOSCOPY N/A 05/14/2021    Procedure: EGD (ESOPHAGOGASTRODUODENOSCOPY);  Surgeon: Tracy Flower MD;  Location: [facility];  Service: Endoscopy;  Laterality: N/A;    ESOPHAGOGASTRODUODENOSCOPY N/A 11/04/2021    Procedure: EGD (ESOPHAGOGASTRODUODENOSCOPY);  Surgeon: Tracy Flower MD;  Location: [facility] ENDO;  Service: Endoscopy;  Laterality: N/A;    EYE SURGERY      eye lids    INJECTION FOR SENTINEL NODE IDENTIFICATION Left 11/25/2022    Procedure: INJECTION, FOR SENTINEL NODE IDENTIFICATION-Left;  Surgeon: Rosa Maradiaga MD;  Location: [facility] OR;  Service: General;  Laterality: Left;    LEFT HEART CATHETERIZATION Right 02/15/2019    Procedure: Left heart cath;  Surgeon: Sujit Chaudhry MD;  Location: [facility] CATH;  Service:  Cardiology;  Laterality: Right;    LEFT HEART CATHETERIZATION Left 2022    Procedure: Left heart cath;  Surgeon: Sujit Chaudhry MD;  Location: [facility] CATH;  Service: Cardiology;  Laterality: Left;    LUMBAR LAMINECTOMY WITH DISCECTOMY N/A 2020    Procedure: LAMINECTOMY, SPINE, LUMBAR, WITH DISCECTOMY;  Surgeon: Vimal Villegas MD;  Location: [facility] OR;  Service: Neurosurgery;  Laterality: N/A;  left L5-S1  Laminectomy L4-5    LYMPHADENECTOMY      MASTECTOMY      MASTECTOMY, PARTIAL Left 2022    Procedure: MASTECTOMY, PARTIAL-Left ultrasound guided;  Surgeon: Rosa Maradiaga MD;  Location: [facility] [place] OR;  Service: General;  Laterality: Left;    RIGHT HEART CATHETERIZATION Right 2022    Procedure: INSERTION, CATHETER, RIGHT HEART;  Surgeon: Sujit Chaudhry MD;  Location: [facility] CATH;  Service: Cardiology;  Laterality: Right;    SENTINEL LYMPH NODE BIOPSY Left 2022    Procedure: BIOPSY, LYMPH NODE, SENTINEL-Left;  Surgeon: Rosa Maradiaga MD;  Location: [facility] [place] OR;  Service: General;  Laterality: Left;    SKIN BIOPSY      SPLENECTOMY, TOTAL      TRANSFORAMINAL EPIDURAL INJECTION OF STEROID Left 2019    Procedure: Left L5/S1 TF SKIP with local;  Surgeon: Canelo Niño MD;  Location: [facility] PAIN MGT;  Service: Pain Management;  Laterality: Left;    TUMOR REMOVAL      neck- lymphoma     Current Outpatient Medications on File Prior to Visit   Medication Sig Dispense Refill    ALPRAZolam (XANAX) 1 MG tablet Take 1 mg by mouth nightly as needed. Rarely used      aspirin (ECOTRIN) 81 MG EC tablet Take 1 tablet (81 mg total) by mouth once daily. 90 tablet 12    brimonidine 0.2% (ALPHAGAN) 0.2 % Drop       calcium carbonate (OS-CALVIN) 600 mg (1,500 mg) Tab Take 600 mg by mouth 2 (two) times daily with meals.      chlorhexidine (PERIDEX) 0.12 % solution SMARTSI Tablespoon By Mouth Twice Daily      cholecalciferol, vitamin D3, (VITAMIN D3) 100 mcg (4,000 unit) Cap Take by mouth.      clopidogreL (PLAVIX) 75 mg  tablet Take 1 tablet (75 mg total) by mouth once daily. 90 tablet 4    EScitalopram oxalate (LEXAPRO) 10 MG tablet TAKE 1 TABLET BY MOUTH EVERY DAY 90 tablet 3    hydroCHLOROthiazide (HYDRODIURIL) 25 MG tablet Take 1 tablet (25 mg total) by mouth every morning. 90 tablet 6    losartan (COZAAR) 50 MG tablet Take 1 tablet (50 mg total) by mouth every evening. 90 tablet 3    metoprolol succinate (TOPROL-XL) 25 MG 24 hr tablet Take 1 tablet (25 mg total) by mouth 2 (two) times daily. 90 tablet 3    pantoprazole (PROTONIX) 20 MG tablet Take 1 tablet (20 mg total) by mouth once daily. 90 tablet 3    rosuvastatin (CRESTOR) 20 MG tablet TAKE 1 TABLET EVERY OTHER NIGHT 45 tablet 3    valACYclovir (VALTREX) 1000 MG tablet TAKE 1 TABLET EVERY 24 HOURS; prn (Patient not taking: Reported on 2022) 30 tablet 2    abaloparatide (TYMLOS) 80 mcg (3,120 mcg/1.56 mL) PnIj Inject 80 mcg into the skin once daily. (Patient not taking: Reported on 2022) 1 each 11    dexAMETHasone (DECADRON) 0.5 mg/5 mL Elix SWISH 3.75MLS IN MOUTH FOR 30 SECONDS AND SWALLOW TWICE DAILY AS NEEDED FOR LICHEN PLANUS FLARE [employer]      fluconazole (DIFLUCAN) 200 MG Tab TAKE 1 TABLET ORALLY EVERY OTHER DAY X 4 DOSES, THEN 1 WEEKLY FOR 30 DAYS       No current facility-administered medications on file prior to visit.     Social History     Socioeconomic History    Marital status:    Tobacco Use    Smoking status: Former     Packs/day: 1.00     Years: 20.00     Pack years: 20.00     Types: Cigarettes     Quit date: 1981     Years since quittin.1    Smokeless tobacco: Never   Substance and Sexual Activity    Alcohol use: No     Comment: 2 drinks/month; none 72 hrs prior to surgery    Drug use: No    Sexual activity: Yes     Partners: Male   Social History Narrative    Live w/ spouse and  1 dog      Social Determinants of Health     Financial Resource Strain: Low Risk     Difficulty of Paying Living Expenses: Not hard at all   Food  Insecurity: No Food Insecurity    Worried About Running Out of Food in the Last Year: Never true    Ran Out of Food in the Last Year: Never true   Transportation Needs: No Transportation Needs    Lack of Transportation (Medical): No    Lack of Transportation (Non-Medical): No   Physical Activity: Insufficiently Active    Days of Exercise per Week: 3 days    Minutes of Exercise per Session: 30 min   Stress: No Stress Concern Present    Feeling of Stress : Only a little   Social Connections: Moderately Integrated    Frequency of Communication with Friends and Family: More than three times a week    Frequency of Social Gatherings with Friends and Family: Once a week    Attends Protestant Services: More than 4 times per year    Active Member of Clubs or Organizations: No    Attends Club or Organization Meetings: Never    Marital Status:    Housing Stability: Low Risk     Unable to Pay for Housing in the Last Year: No    Number of Places Lived in the Last Year: 1    Unstable Housing in the Last Year: No     Family History   Problem Relation Age of Onset    Hypertension Mother     Hypertension Father     Cancer Neg Hx         Review of Systems   Constitutional:  Negative for appetite change, chills, fever and unexpected weight change.   HENT:  Negative for facial swelling, postnasal drip and sore throat.    Eyes:  Negative for redness and itching.   Respiratory:  Negative for chest tightness and shortness of breath.    Cardiovascular:  Negative for chest pain and palpitations.   Gastrointestinal:  Negative for blood in stool, diarrhea, nausea and vomiting.   Genitourinary:  Negative for difficulty urinating and dysuria.   Musculoskeletal:  Negative for arthralgias and joint swelling.   Skin:  Negative for rash and wound.   Neurological:  Negative for dizziness and syncope.   Hematological:  Negative for adenopathy.   Psychiatric/Behavioral:  Negative for agitation. The patient is not nervous/anxious.    Objective:  "  /69 (BP Location: Left arm, Patient Position: Sitting, BP Method: Medium (Automatic))   Pulse 70   Ht 5' 3" (1.6 m)   Wt 64.4 kg (142 lb)   BMI 25.15 kg/m²     Physical Exam   Vitals reviewed.  Constitutional: She is oriented to person, place, and time.   HENT:   Head: Normocephalic and atraumatic.   Nose: Nose normal.   Eyes: Pupils are equal, round, and reactive to light. Right eye exhibits no discharge. Left eye exhibits no discharge.   Pulmonary/Chest: Effort normal and breath sounds normal. No stridor. No respiratory distress. She exhibits no mass, no tenderness and no edema. Right breast exhibits no mass, no skin change and no tenderness. Left breast exhibits mass. Left breast exhibits no skin change and no tenderness. No breast swelling or bleeding. Breasts are symmetrical.       Abdominal: Normal appearance.   Genitourinary: No breast swelling or bleeding.   Neurological: She is alert and oriented to person, place, and time.   Skin: Skin is warm and dry.     Psychiatric: Her behavior is normal. Mood, judgment and thought content normal.     Radiology review: Images personally reviewed by me in the clinic.     11/3/22 Left Diagnostic Mammo/US Left:     Findings:  This procedure was performed using tomosynthesis. Computer-aided detection was utilized in the interpretation of this examination.     Patient is status post punch biopsy of skin lesion at the left lateral aspect of the areola. She is status post mastectomy with autologous tissue reconstruction.  Immediately deep to the skin lesion, there is a high density, irregularly shaped mass with spiculated margins seen in the retroareolar region of the left breast. [place] ultrasound, this corresponds to a 35 mm x 23 mm x 14 mm irregularly shaped, isoechoic mass. The  Mass does extend to the skin surface. No left axillary adenopathy.      Impression:  Left  Mass: Left breast mass at the retroareolar position. Assessment: 6 - Known biopsy, proven " malignancy.      BI-RADS Category:   Overall: 6 - Known Biopsy-Proven Malignancy     Recommendation:  Patient is under the care of the Breast Oncology team.     Assessment:       1. Malignant neoplasm of overlapping sites of left breast in female, estrogen receptor negative    2. Personal history of breast cancer    3. S/P mastectomy, bilateral        Plan:     Options for management were discussed with the patient and her family. We reviewed the existing data noting the equivalency of breast conserving surgery with radiation therapy and mastectomy. We also reviewed the guidelines of the National Comprehensive Cancer Network for recurrent Breast carcinoma. We discussed the need for lumpectomy margins to be negative for carcinoma, the necessity for postoperative radiation therapy after breast conservation in most cases, the possibility of a failed or false negative sentinel lymph node biopsy and the potential need for complete lymphadenectomy for a failed or positive sentinel lymph node biopsy were fully discussed. In the setting of mastectomy, delayed or immediate reconstruction options are available and were discussed.     In the setting of lumpectomy, radiation therapy would be recommended majority of the time.  The duration and treatment side effects were discussed with the patient.  This will coordinated with the radiation oncologist pending final pathology.    We also discussed the role of systemic therapy in the treatment of early stage breast cancer.  We discussed that this is based on tumor biology and david status and will be determined based on final pathology.  We discussed that if the cancer is hormone positive, endocrine therapy would be recommended in most cases and its use can reduce the risk of recurrence as well as improve survival. Side effects of treatment were briefly discussed. We also discussed the potential role for chemotherapy based on a number of factors such as tumor phenotype (ER+ vs.  triple negative vs. [id number]+) and this would be determined in coordination with the medical oncologist.    The patient, in consultation with her family, has elected to proceed with left partial mastectomy and sentinel lymph node biopsy if PET clear. The operative risks of bleeding, infection, recurrence, scarring, and anesthetic complications and the possibility of requiring further surgery were all noted and informed consent obtained.    PET scheduled for 11/7/22.   Genetics      Surgery scheduled. Follow-up in clinic roughly 14 days after surgery.     Patient was educated on breast cancer, receptors, wire localization lumpectomy, mastectomy, sentinel lymph node mapping and biopsy, axillary lymph node dissection, reconstruction, breast prosthesis with post-mastectomy bra and radiation therapy. Patient was given patient information binder including [facility] breast cancer treatment brochure.  All her questions were answered.      65 minutes were spent on this encounter, 45 of which was face to face counseling and 20 minutes were spent on chart review and coordination of care.

## 2022-11-03 NOTE — PROGRESS NOTES
Subjective:       Patient ID: Dulce Root is a 67 y.o. female.    Chief Complaint: No chief complaint on file.    HPI Ms. Root is a very pleasant 67-year-old female with history of bilateral ductal carcinoma in situ and a remote history of Hodgkin's disease.    She was recently seen with an area of nonhealing ulceration in her left breast reconstruction.    A biopsy of that area on October 25th showed poorly differentiated invasive carcinoma which was ER negative, GA negative and HER2 3+.  Ki-67 was 30%.      Breast history:  She  developed ductal      carcinoma in situ of the right breast in 09/2006, treated with lumpectomy     and radiation therapy.  In 08/2009, she was diagnosed with ductal carcinoma  in situ of the left breast and in 08/2009, she had bilateral mastectomy.      The left breast showed widespread high-grade DCIS and the right breast was    without evidence of malignancy.        She has a remote history of Hodgkin's disease, originally diagnosed in   1991, treated with radiation therapy and then treated with salvage   chemotherapy with ABVD on protocol E-5489 in 1996.    In August 2017 she underwent aortic valve replacement and coronary artery bypass grafting by Dr. Baxter.  She has also has stent placement in 2019.  Her last echocardiogram in March of 2022 showed an ejection fraction of 35%.  Review of Systems   Constitutional:  Negative for activity change, appetite change, fatigue and unexpected weight change.   Eyes:  Negative for visual disturbance.   Respiratory:  Positive for shortness of breath (occasional). Negative for cough.    Cardiovascular:  Negative for chest pain.   Gastrointestinal:  Negative for abdominal pain, diarrhea and nausea.   Genitourinary:  Negative for flank pain and frequency.   Skin:  Negative for rash.   Neurological:  Negative for headaches.   Hematological:  Negative for adenopathy.   Psychiatric/Behavioral:  Negative for dysphoric mood. The patient is not  nervous/anxious.      Objective:      Physical Exam  Vitals reviewed.   Constitutional:       General: She is not in acute distress.     Appearance: She is well-developed.   Neurological:      Mental Status: She is alert and oriented to person, place, and time.   Psychiatric:         Mood and Affect: Mood normal.         Behavior: Behavior normal.         Thought Content: Thought content normal.         Judgment: Judgment normal.       Assessment:       1. Malignant neoplasm of central portion of left breast in female, estrogen receptor negative        Plan:     I reviewed the biopsy results.  Due to her cardiac disease she may not be able to receive HER2 directed therapy.  Proceeding directly to surgery may be the most appropriate approach.I discussed that with the patient and her .  She sees Dr. Maradiaga today-we will discuss.          Needs repeat ECHO,,Cardiology  PET     Route Chart for Scheduling    Med Onc Chart Routing      Follow up with physician    Follow up with JASPAL    Infusion scheduling note    Injection scheduling note    Labs    Imaging ECHO and PET scan      Pharmacy appointment    Other referrals Additional referrals needed           Therapy Plan Information  DENOSUMAB (PROLIA) Q6M  Medications  denosumab (PROLIA) injection 60 mg  60 mg, Subcutaneous, Every 26 weeks    PRIVIGEN (IVIG) Q4W  Pre-Medications  acetaminophen tablet 500 mg  500 mg, Oral, Every 4 weeks  diphenhydrAMINE capsule 50 mg  50 mg, Oral, Every 4 weeks  Hydration  sodium chloride 0.9% bolus 500 mL  500 mL, Intravenous, Every 4 weeks  Flushes  sodium chloride 0.9% 250 mL flush bag  Intravenous, Every 4 weeks  sodium chloride 0.9% flush 10 mL  10 mL, Intravenous, Every 4 weeks  heparin, porcine (PF) 100 unit/mL injection flush 500 Units  500 Units, Intravenous, Every 4 weeks  alteplase injection 2 mg  2 mg, Intra-Catheter, Every 4 weeks

## 2022-11-03 NOTE — PROGRESS NOTES
Genetics Lay Navigator Note:    Met with patient at her consult with Dr. Maradiaga (11/3/2022), to facilitate genetic testing. Patient declined testing at appointment and stated she will call back if she desires testing. [employer] brochure with number to call with questions or concerns provided to patient as well as my card. Encouraged patient to call me or [employer] at any time.     Dr. Maradiaga and staff notified.

## 2022-11-04 ENCOUNTER — DOCUMENTATION ONLY (OUTPATIENT)
Dept: HEMATOLOGY/ONCOLOGY | Facility: CLINIC | Age: 67
End: 2022-11-04
Payer: MEDICARE

## 2022-11-04 NOTE — NURSING
Nurse Navigator Note:     Met with patient during her consult with Dr. palmer.  Patient and I reviewed the information she discussed with Dr. Palmer, including treatment options, diagnosis, and future plans for workup. Patient and I went through the new patient binder, explained some of the information and why it is provided.     Also offered patient consults with our other specialty clinics: Dr. Fields for Integrative Therapies, Survivorship and/or Women's Gynecologic needs, our breast physical therapy department for pre-op and post-operative assessments, Oncologic Psychology for psychological support, and Oncologic Nutrition for nutritional counseling. Explained to patient that all of these support services are completely optional. Discussed that physical therapy may call patient to offer pre-op appt, and what that appt would entail.     Patient was given a copy of her appointments, Dr. Palmer's card, and my card. Encouraged her to call me if she has any questions or concerns or would like to schedule any additional diane  Oncology Navigation   Intake  Date of Diagnosis: 10/25/22  Cancer Type: Breast  Internal / External Referral: Internal  Date of Referral: 10/25/22  Initial Nurse Navigator Contact: 11/03/22  Referral to Initial Contact Timeline (days): 9  Date Worked: 11/04/22  First Appointment Available: 11/03/22  Appointment Date: 11/03/22  First Available Date vs. Scheduled Date (days): 0     Treatment     Surgical Oncologist: spencer  Consult Date: 10/03/22    Medical Oncologist: Derick  Consult Date: 11/03/22       Procedures: Echo; PET scan; Mammogram  Echo Schedule Date: 11/07/22  Mammo Schedule Date: 11/03/22  PET Scan Schedule Date: 11/07/22       ER: Negative  IN: Negative  Her2: Postive       Concerns: cardiology referral placed 11/3/22     Acuity  Treatment Tolerability: Has not started treatment yet/treatment fully completed and side effects resolved  Hospitalization Within the Past Month:  0   Needed: 0  Support: 0  Verbalizes Financial Concerns: 0  Transportation: 0  History of noncompliance/frequent no shows and cancellations: 0  Verbalizes the need for more education: 0  Navigation Acuity: 0     Follow Up  Follow up in about 4 days (around 11/8/2022) for ECHO and PET 11/7.     ointments. Verbalized understanding of all information.

## 2022-11-07 ENCOUNTER — HOSPITAL ENCOUNTER (OUTPATIENT)
Dept: RADIOLOGY | Facility: HOSPITAL | Age: 67
Discharge: HOME | End: 2022-11-07
Attending: INTERNAL MEDICINE
Payer: MEDICARE

## 2022-11-07 ENCOUNTER — HOSPITAL ENCOUNTER (OUTPATIENT)
Dept: CARDIOLOGY | Facility: HOSPITAL | Age: 67
Discharge: HOME | End: 2022-11-07
Attending: INTERNAL MEDICINE
Payer: MEDICARE

## 2022-11-07 VITALS
SYSTOLIC BLOOD PRESSURE: 121 MMHG | HEIGHT: 63 IN | HEART RATE: 81 BPM | BODY MASS INDEX: 25.16 KG/M2 | DIASTOLIC BLOOD PRESSURE: 69 MMHG | WEIGHT: 142 LBS

## 2022-11-07 DIAGNOSIS — C50.112 MALIGNANT NEOPLASM OF CENTRAL PORTION OF LEFT BREAST IN FEMALE, ESTROGEN RECEPTOR NEGATIVE: ICD-10-CM

## 2022-11-07 DIAGNOSIS — Z17.1 MALIGNANT NEOPLASM OF CENTRAL PORTION OF LEFT BREAST IN FEMALE, ESTROGEN RECEPTOR NEGATIVE: ICD-10-CM

## 2022-11-07 LAB — GLUCOSE SERPL-MCNC: 95 MG/DL (ref 70–110)

## 2022-11-07 PROCEDURE — 93306 TTE W/DOPPLER COMPLETE: CPT | Mod: 26,,, | Performed by: STUDENT IN AN ORGANIZED HEALTH CARE EDUCATION/TRAINING PROGRAM

## 2022-11-07 PROCEDURE — 78815 PET IMAGE W/CT SKULL-THIGH: CPT | Mod: TC,PN

## 2022-11-07 PROCEDURE — 93306 TTE W/DOPPLER COMPLETE: CPT | Mod: PO

## 2022-11-07 PROCEDURE — 78815 NM PET CT ROUTINE: ICD-10-PCS | Mod: 26,PS,, | Performed by: RADIOLOGY

## 2022-11-07 PROCEDURE — 93306 ECHO (CUPID ONLY): ICD-10-PCS | Mod: 26,,, | Performed by: STUDENT IN AN ORGANIZED HEALTH CARE EDUCATION/TRAINING PROGRAM

## 2022-11-07 PROCEDURE — 78815 PET IMAGE W/CT SKULL-THIGH: CPT | Mod: 26,PS,, | Performed by: RADIOLOGY

## 2022-11-07 NOTE — PROGRESS NOTES
PET Imaging Questionnaire    Are you a Diabetic? Recent Blood Sugar level? No    Are you anemic? Bone Marrow Stimulation Meds? No    Have you had a CT Scan, if so when & where was your last one? Yes -     Have you had a PET Scan, if so when & where was your last one? No    Chemotherapy or currently on Chemotherapy? Yes    Radiation therapy? Yes    Surgical History:   Past Surgical History:   Procedure Laterality Date    ARTERIOGRAPHY OF AORTIC ROOT N/A 02/15/2019    Procedure: ARTERIOGRAM, AORTIC ROOT;  Surgeon: Sujit Chaudhry MD;  [facility];  Service: Cardiology;  Laterality: N/A;    BREAST BIOPSY      BREAST RECONSTRUCTION      bilateral mastectomy    CARDIAC CATHETERIZATION      stents x 2    CARDIAC SURGERY  08/2017    Aortic valve replacement , CABG 2 vessel    CARDIAC VALVE REPLACEMENT  08/2017    aortic valve, bovine per pt    CORONARY ANGIOGRAPHY N/A 02/15/2019    Procedure: ANGIOGRAM, CORONARY ARTERY;  Surgeon: Sujit Chaudhry MD;  Location: [facility];  Service: Cardiology;  Laterality: N/A;    CORONARY ANGIOGRAPHY N/A 4/1/2022    Procedure: ANGIOGRAM, CORONARY ARTERY;  Surgeon: Sujit Chaudhry MD;  [facility];  Service: Cardiology;  Laterality: N/A;    CORONARY BYPASS GRAFT ANGIOGRAPHY  02/15/2019    Procedure: Bypass graft study;  Surgeon: Sujit Chaudhry MD;  [facility];  Service: Cardiology;;    COSMETIC SURGERY      bereast reconstruction    ESOPHAGOGASTRODUODENOSCOPY N/A 05/14/2021    Procedure: EGD (ESOPHAGOGASTRODUODENOSCOPY);  Surgeon: Tracy Flower MD;  Location: [facility];  Service: Endoscopy;  Laterality: N/A;    ESOPHAGOGASTRODUODENOSCOPY N/A 11/04/2021    Procedure: EGD (ESOPHAGOGASTRODUODENOSCOPY);  Surgeon: Tracy Flower MD;  Location: [facility] ENDO;  Service: Endoscopy;  Laterality: N/A;    EYE SURGERY      eye lids    LEFT HEART CATHETERIZATION Right 02/15/2019    Procedure: Left heart cath;  Surgeon: Sujit Chaudhry MD;  Location: [facility] CATH;  Service:  Cardiology;  Laterality: Right;    LEFT HEART CATHETERIZATION Left 4/1/2022    Procedure: Left heart cath;  Surgeon: Sujit Chaudhry MD;  Location: [facility] CATH;  Service: Cardiology;  Laterality: Left;    LUMBAR LAMINECTOMY WITH DISCECTOMY N/A 02/27/2020    Procedure: LAMINECTOMY, SPINE, LUMBAR, WITH DISCECTOMY;  Surgeon: Vimal Villegas MD;  Location: [facility] OR;  Service: Neurosurgery;  Laterality: N/A;  left L5-S1  Laminectomy L4-5    LYMPHADENECTOMY      MASTECTOMY      RIGHT HEART CATHETERIZATION Right 4/1/2022    Procedure: INSERTION, CATHETER, RIGHT HEART;  Surgeon: Sujit Chaudhry MD;  [facility];  Service: Cardiology;  Laterality: Right;    SKIN BIOPSY      SPLENECTOMY, TOTAL      TRANSFORAMINAL EPIDURAL INJECTION OF STEROID Left 12/31/2019    Procedure: Left L5/S1 TF SKIP with local;  Surgeon: Canelo Niño MD;  Location: [facility] PAIN MGT;  Service: Pain Management;  Laterality: Left;    TUMOR REMOVAL      neck- lymphoma        Have you been fasting for at least 6 hours? Yes    Is there any chance you may be pregnant or breastfeeding? Yes    Assay: 12.24 MCi@:12:08   Injection Site:RT Hand    Residual: .803 mCi@: 12:11   Technologist: Yamil Loco Injected:11.44mCi

## 2022-11-09 LAB
AV INDEX (PROSTH): 0.28
AV MEAN GRADIENT: 13 MMHG
AV PEAK GRADIENT: 22 MMHG
AV VALVE AREA: 0.84 CM2
AV VELOCITY RATIO: 0.27
BSA FOR ECHO PROCEDURE: 1.69 M2
CV ECHO LV RWT: 0.48 CM
DOP CALC AO PEAK VEL: 2.32 M/S
DOP CALC AO VTI: 43.3 CM
DOP CALC LVOT AREA: 3 CM2
DOP CALC LVOT DIAMETER: 1.96 CM
DOP CALC LVOT PEAK VEL: 0.63 M/S
DOP CALC LVOT STROKE VOLUME: 36.19 CM3
DOP CALCLVOT PEAK VEL VTI: 12 CM
ECHO LV POSTERIOR WALL: 1.25 CM (ref 0.6–1.1)
EJECTION FRACTION: 35 %
FRACTIONAL SHORTENING: 17 % (ref 28–44)
HCV RNA # SERPL NAA+PROBE: 425.5 MMHG/S
INTERVENTRICULAR SEPTUM: 1.03 CM (ref 0.6–1.1)
IVRT: 68.51 MSEC
LA MAJOR: 5.82 CM
LA MINOR: 3.82 CM
LA WIDTH: 3.8 CM
LEFT ATRIUM SIZE: 3.28 CM
LEFT ATRIUM VOLUME INDEX MOD: 24.2 ML/M2
LEFT ATRIUM VOLUME INDEX: 29.3 ML/M2
LEFT ATRIUM VOLUME MOD: 40.34 CM3
LEFT ATRIUM VOLUME: 48.87 CM3
LEFT INTERNAL DIMENSION IN SYSTOLE: 4.27 CM (ref 2.1–4)
LEFT VENTRICLE DIASTOLIC VOLUME INDEX: 76.22 ML/M2
LEFT VENTRICLE DIASTOLIC VOLUME: 127.29 ML
LEFT VENTRICLE MASS INDEX: 137 G/M2
LEFT VENTRICLE SYSTOLIC VOLUME INDEX: 48.9 ML/M2
LEFT VENTRICLE SYSTOLIC VOLUME: 81.63 ML
LEFT VENTRICULAR INTERNAL DIMENSION IN DIASTOLE: 5.16 CM (ref 3.5–6)
LEFT VENTRICULAR MASS: 228.94 G
LVOT MG: 1.01 MMHG
LVOT MV: 0.48 CM/S
PISA TR MAX VEL: 3.66 M/S
PULM VEIN S/D RATIO: 1.9
PV PEAK D VEL: 0.3 M/S
PV PEAK S VEL: 0.57 M/S
RA MAJOR: 3.94 CM
RA PRESSURE: 3 MMHG
RA WIDTH: 3.72 CM
RIGHT VENTRICULAR END-DIASTOLIC DIMENSION: 2.55 CM
TDI LATERAL: 0.12 M/S
TDI SEPTAL: 0.07 M/S
TDI: 0.1 M/S
TR MAX PG: 54 MMHG
TR MEAN GRADIENT: 33 MMHG
TRICUSPID ANNULAR PLANE SYSTOLIC EXCURSION: 1.82 CM
TV REST PULMONARY ARTERY PRESSURE: 57 MMHG

## 2022-11-10 ENCOUNTER — TELEPHONE (OUTPATIENT)
Dept: PAIN MEDICINE | Facility: CLINIC | Age: 67
End: 2022-11-10
Payer: MEDICARE

## 2022-11-14 ENCOUNTER — PATIENT MESSAGE (OUTPATIENT)
Dept: SURGERY | Facility: CLINIC | Age: 67
End: 2022-11-14
Payer: MEDICARE

## 2022-11-14 ENCOUNTER — PATIENT MESSAGE (OUTPATIENT)
Dept: HEMATOLOGY/ONCOLOGY | Facility: CLINIC | Age: 67
End: 2022-11-14
Payer: MEDICARE

## 2022-11-16 ENCOUNTER — TELEPHONE (OUTPATIENT)
Dept: SURGERY | Facility: CLINIC | Age: 67
End: 2022-11-16
Payer: MEDICARE

## 2022-11-16 DIAGNOSIS — E03.9 ACQUIRED HYPOTHYROIDISM: ICD-10-CM

## 2022-11-16 RX ORDER — LEVOTHYROXINE SODIUM 75 UG/1
TABLET ORAL
Qty: 30 TABLET | Refills: 0 | Status: SHIPPED | OUTPATIENT
Start: 2022-11-16 | End: 2022-11-28

## 2022-11-16 NOTE — TELEPHONE ENCOUNTER
No new care gaps identified.  [facility] Embedded Care Gaps. Reference number: [id number]. 11/16/2022   2:05:30 PM CST

## 2022-11-16 NOTE — TELEPHONE ENCOUNTER
Spoke to patient to give surgery date with Dr. Maradiaga of 11/25/22 at [facility].  Pt agreeable to surgery date.  Answered all pt questions pertaining to surgery.

## 2022-11-17 NOTE — TELEPHONE ENCOUNTER
Called pt to get lab scheduled. Stated that she wants Dr. Hernandez to know she is scheduled for another mastectomy next week and her son just passed away. She has a lot going on right now and states she will get the lab done next month for sure. I let pt know that we have filled the medication for her for one month.

## 2022-11-17 NOTE — TELEPHONE ENCOUNTER
Please express my condolences on her son.  Ask her if there is anything she needs in preparation for the mastectomy.

## 2022-11-21 ENCOUNTER — PATIENT MESSAGE (OUTPATIENT)
Dept: SURGERY | Facility: CLINIC | Age: 67
End: 2022-11-21
Payer: MEDICARE

## 2022-11-21 ENCOUNTER — PATIENT MESSAGE (OUTPATIENT)
Dept: SURGERY | Facility: OTHER | Age: 67
End: 2022-11-21
Payer: MEDICARE

## 2022-11-22 ENCOUNTER — TELEPHONE (OUTPATIENT)
Dept: SURGERY | Facility: CLINIC | Age: 67
End: 2022-11-22
Payer: MEDICARE

## 2022-11-22 ENCOUNTER — PATIENT MESSAGE (OUTPATIENT)
Dept: SURGERY | Facility: CLINIC | Age: 67
End: 2022-11-22
Payer: MEDICARE

## 2022-11-22 ENCOUNTER — ANESTHESIA EVENT (OUTPATIENT)
Dept: SURGERY | Facility: OTHER | Age: 67
End: 2022-11-22
Payer: MEDICARE

## 2022-11-22 ENCOUNTER — HOSPITAL ENCOUNTER (OUTPATIENT)
Dept: PREADMISSION TESTING | Facility: OTHER | Age: 67
Discharge: HOME | End: 2022-11-22
Attending: SURGERY
Payer: MEDICARE

## 2022-11-22 VITALS
TEMPERATURE: 98 F | WEIGHT: 140 LBS | SYSTOLIC BLOOD PRESSURE: 126 MMHG | DIASTOLIC BLOOD PRESSURE: 60 MMHG | HEIGHT: 63 IN | RESPIRATION RATE: 19 BRPM | HEART RATE: 84 BPM | BODY MASS INDEX: 24.8 KG/M2 | OXYGEN SATURATION: 97 %

## 2022-11-22 DIAGNOSIS — Z17.1 MALIGNANT NEOPLASM OF OVERLAPPING SITES OF LEFT BREAST IN FEMALE, ESTROGEN RECEPTOR NEGATIVE: ICD-10-CM

## 2022-11-22 DIAGNOSIS — C50.812 MALIGNANT NEOPLASM OF OVERLAPPING SITES OF LEFT BREAST IN FEMALE, ESTROGEN RECEPTOR NEGATIVE: ICD-10-CM

## 2022-11-22 LAB
ALBUMIN SERPL BCP-MCNC: 4 G/DL (ref 3.5–5.2)
ALP SERPL-CCNC: 84 U/L (ref 55–135)
ALT SERPL W/O P-5'-P-CCNC: 15 U/L (ref 10–44)
ANION GAP SERPL CALC-SCNC: 8 MMOL/L (ref 8–16)
AST SERPL-CCNC: 17 U/L (ref 10–40)
BASOPHILS # BLD AUTO: 0.08 K/UL (ref 0–0.2)
BASOPHILS NFR BLD: 1.1 % (ref 0–1.9)
BILIRUB SERPL-MCNC: 0.5 MG/DL (ref 0.1–1)
BUN SERPL-MCNC: 18 MG/DL (ref 8–23)
CALCIUM SERPL-MCNC: 9.8 MG/DL (ref 8.7–10.5)
CHLORIDE SERPL-SCNC: 104 MMOL/L (ref 95–110)
CO2 SERPL-SCNC: 29 MMOL/L (ref 23–29)
CREAT SERPL-MCNC: 0.8 MG/DL (ref 0.5–1.4)
DIFFERENTIAL METHOD: ABNORMAL
EOSINOPHIL # BLD AUTO: 0.2 K/UL (ref 0–0.5)
EOSINOPHIL NFR BLD: 2.1 % (ref 0–8)
ERYTHROCYTE [DISTWIDTH] IN BLOOD BY AUTOMATED COUNT: 15.4 % (ref 11.5–14.5)
EST. GFR  (NO RACE VARIABLE): >60 ML/MIN/1.73 M^2
GLUCOSE SERPL-MCNC: 79 MG/DL (ref 70–110)
HCT VFR BLD AUTO: 35.7 % (ref 37–48.5)
HGB BLD-MCNC: 12.1 G/DL (ref 12–16)
IMM GRANULOCYTES # BLD AUTO: 0.03 K/UL (ref 0–0.04)
IMM GRANULOCYTES NFR BLD AUTO: 0.4 % (ref 0–0.5)
LYMPHOCYTES # BLD AUTO: 1.4 K/UL (ref 1–4.8)
LYMPHOCYTES NFR BLD: 20.3 % (ref 18–48)
MCH RBC QN AUTO: 29.2 PG (ref 27–31)
MCHC RBC AUTO-ENTMCNC: 33.9 G/DL (ref 32–36)
MCV RBC AUTO: 86 FL (ref 82–98)
MONOCYTES # BLD AUTO: 0.9 K/UL (ref 0.3–1)
MONOCYTES NFR BLD: 13.4 % (ref 4–15)
NEUTROPHILS # BLD AUTO: 4.4 K/UL (ref 1.8–7.7)
NEUTROPHILS NFR BLD: 62.7 % (ref 38–73)
NRBC BLD-RTO: 0 /100 WBC
PLATELET # BLD AUTO: 302 K/UL (ref 150–450)
PMV BLD AUTO: 10 FL (ref 9.2–12.9)
POTASSIUM SERPL-SCNC: 3.5 MMOL/L (ref 3.5–5.1)
PROT SERPL-MCNC: 7.2 G/DL (ref 6–8.4)
RBC # BLD AUTO: 4.14 M/UL (ref 4–5.4)
SODIUM SERPL-SCNC: 141 MMOL/L (ref 136–145)
WBC # BLD AUTO: 6.99 K/UL (ref 3.9–12.7)

## 2022-11-22 PROCEDURE — 80053 COMPREHEN METABOLIC PANEL: CPT | Performed by: SURGERY

## 2022-11-22 PROCEDURE — 85025 COMPLETE CBC W/AUTO DIFF WBC: CPT | Performed by: SURGERY

## 2022-11-22 PROCEDURE — 93005 ELECTROCARDIOGRAM TRACING: CPT

## 2022-11-22 PROCEDURE — 93010 ELECTROCARDIOGRAM REPORT: CPT | Mod: ,,, | Performed by: INTERNAL MEDICINE

## 2022-11-22 PROCEDURE — 36415 COLL VENOUS BLD VENIPUNCTURE: CPT | Performed by: SURGERY

## 2022-11-22 PROCEDURE — 93010 EKG 12-LEAD: ICD-10-PCS | Mod: ,,, | Performed by: INTERNAL MEDICINE

## 2022-11-22 RX ORDER — LIDOCAINE HYDROCHLORIDE 10 MG/ML
0.5 INJECTION, SOLUTION EPIDURAL; INFILTRATION; INTRACAUDAL; PERINEURAL ONCE
Status: CANCELLED | OUTPATIENT
Start: 2022-11-22 | End: 2022-11-22

## 2022-11-22 RX ORDER — SODIUM CHLORIDE, SODIUM LACTATE, POTASSIUM CHLORIDE, CALCIUM CHLORIDE 600; 310; 30; 20 MG/100ML; MG/100ML; MG/100ML; MG/100ML
INJECTION, SOLUTION INTRAVENOUS CONTINUOUS
Status: CANCELLED | OUTPATIENT
Start: 2022-11-22

## 2022-11-22 RX ORDER — DAPSONE 25 MG/1
75 TABLET ORAL
COMMUNITY
Start: 2022-10-27 | End: 2023-10-10

## 2022-11-22 RX ORDER — ACETAMINOPHEN 325 MG/1
975 TABLET ORAL
Status: CANCELLED | OUTPATIENT
Start: 2022-11-22 | End: 2022-11-22

## 2022-11-22 NOTE — ANESTHESIA PREPROCEDURE EVALUATION
11/22/2022  Dulce Root is a 67 y.o., female.      Pre-op Assessment    I have reviewed the Patient Summary Reports.     I have reviewed the Nursing Notes. I have reviewed the NPO Status.   I have reviewed the Medications.     Review of Systems  Anesthesia Hx:  History of prior surgery of interest to airway management or planning: Previous anesthesia: General 2017 CABG/AVR with general anesthesia.  Airway issues documented on chart review include mask, easy, easy direct laryngoscopy, GETA , view on direct laryngoscopy Grade I  Denies Family Hx of Anesthesia complications.   Denies Personal Hx of Anesthesia complications.   Social:  Non-Smoker    Hematology/Oncology:  Hematology Normal       -- Cancer in past history:  Breast bilateral Oncology Comments: '91 and '95 Hodgkins, XRT, Chemo    2009, Second Breast CA, chey mast/flap reconstruction    Now with Local Recurrence, L breast flap     EENT/Dental:   pemphoid on dapsone, blisters in mouth and throat, tx by derm at [facility]   Cardiovascular:   Hypertension Valvular problems/Murmurs (s/p AVR) CAD  CABG/stent (2017 CABG/AVR, stents)  PVD (Carotid disease) MARCH (single flight stairs) ECG has been reviewed. 11/3/22 echo:  Summary    ? The left ventricle is mildly enlarged with concentric hypertrophy and moderately decreased systolic function.  ? Left ventricular diastolic dysfunction.  ? The estimated PA systolic pressure is 57 mmHg.  ? Normal right ventricular size with low normal right ventricular systolic function.  ? There is pulmonary hypertension.  ? Normal central venous pressure (3 mmHg).  ? The estimated ejection fraction is 35%.  ? There is moderate left ventricular global hypokinesis.  ? Mild tricuspid regurgitation.  ? There is a bioprosthetic aortic valve present.  ? The aortic valve mean gradient is 13 mmHg with a dimensionless index of 0.28.  "  Pulmonary:  Pulmonary Normal    Renal/:  Renal/ Normal     Hepatic/GI:   GERD    Musculoskeletal:   Arthritis     Neurological:   CVA, no residual symptoms    Endocrine:   Hypothyroidism    Dermatological:  Skin Normal    Psych:  Psychiatric Normal           Physical Exam  General: Well nourished, Cooperative, Alert and Oriented    Airway:  Mallampati: II   Mouth Opening: Normal  TM Distance: Normal  Neck ROM: Normal ROM    Dental:  Intact        Anesthesia Plan  Type of Anesthesia, risks & benefits discussed:    Anesthesia Type: Gen Supraglottic Airway  Intra-op Monitoring Plan: Standard ASA Monitors  Post Op Pain Control Plan: multimodal analgesia and IV/PO Opioids PRN  Induction:  IV  Informed Consent: Informed consent signed with the Patient and all parties understand the risks and agree with anesthesia plan.  All questions answered.   ASA Score: 4  Anesthesia Plan Notes: High risk cardiac patient, will request card clearance    Addendum(LKR):  Discussed with Dr. Maradiaga, she rec'd clearance note from Dr. Chaudhry, "low risk"    Ready For Surgery From Anesthesia Perspective.     .      "

## 2022-11-22 NOTE — TELEPHONE ENCOUNTER
----- Message from Norma Ritter MA sent at 11/22/2022  7:42 AM CST -----  Regarding: FW: Cardiac Clearance    ----- Message -----  From: Sujit Chaudhry MD  Sent: 11/21/2022   1:43 PM CST  To: Norma Ritter MA  Subject: RE: Cardiac Clearance                            Patient is a low CV risk for a low risk surgery. Continue BP/antiplatelet meds jana-op.      ----- Message -----  From: Norma Ritter MA  Sent: 11/21/2022   8:31 AM CST  To: Sujit Chaudhry MD  Subject: FW: Cardiac Clearance                            Please advise. Patient taking ASA, PLAVIX  ----- Message -----  From: Latoya Fairchild RN  Sent: 11/21/2022   7:52 AM CST  To: Richa Humphries  Subject: Cardiac Clearance                                Good morning,   Mrs. Root will have a lumpectomy under general anesthesia with Dr. Maradiaga for her breast cancer on 11/25/22.  Dr. Maradiaga is requesting cardiac clearance.  Can Dr. Chaudhry give clearance or does he need to see the patient back in clinic.  Also, when can she stop her ASA and Plavix before surgery?   Thanks,   Latoya

## 2022-11-22 NOTE — DISCHARGE INSTRUCTIONS
Information to Prepare you for your Surgery    PRE-ADMIT TESTING -  [phone]    [address]          Your surgery has been scheduled at [facility]. We are pleased to have the opportunity to serve you. For Further Information please call [phone].    On the day of surgery please report to the Information Desk on the 1st floor.    CONTACT YOUR PHYSICIAN'S OFFICE THE DAY PRIOR TO YOUR SURGERY TO OBTAIN YOUR ARRIVAL TIME.     The evening before surgery do not eat anything after 9 p.m. ( this includes hard candy, chewing gum and mints).  You may only have GATORADE, POWERADE AND WATER  from 9 p.m. until you leave your home.   DO NOT DRINK ANY LIQUIDS ON THE WAY TO THE HOSPITAL.      Why does your anesthesiologist allow you to drink Gatorade/Powerade before surgery?  Gatorade/Powerade helps to increase your comfort before surgery and to decrease your nausea after surgery. The carbohydrates in Gatorade/Powerade help reduce your body's stress response to surgery.  If you are a diabetic-drink only water prior to surgery.      Current Visitor policy(12/27/2021) - Patients may have 2 visitors pre and post procedure. Only 2 visitors will be allowed in the Surgical building with the patient.     SPECIAL MEDICATION INSTRUCTIONS: TAKE medications checked off by the Anesthesiologist on your Medication List.    Angiogram Patients: Take medications as instructed by your physician, including aspirin.     Surgery Patients:    If you take ASPIRIN - Your PHYSICIAN/SURGEON will need to inform you IF/OR when you need to stop taking aspirin prior to your surgery.     The week prior to surgery do not ot take any medications containing IBUPROFEN or NSAIDS ( Advil, Motrin, Goodys, BC, Aleve, Naproxen etc) If you are not sure if you should take a medicine please call your surgeon's office.  Ok to take Tylenol    Do Not Wear any make-up (especially eye make-up) to  surgery. Please remove any false eyelashes or eyelash extensions. If you arrive the day of surgery with makeup/eyelashes on you will be required to remove prior to surgery. (There is a risk of corneal abrasions if eye makeup/eyelash extensions are not removed)      Leave all valuables at home.   Do Not wear any jewelry or watches, including any metal in body piercings. Jewelry must be removed prior to coming to the hospital.  There is a possibility that rings that are unable to be removed may be cut off if they are on the surgical extremity.    Please remove all hair extensions, wigs, clips and any other metal accessories/ ornaments from your hair.  These items may pose a flammable/fire risk in Surgery and must be removed.    Do not shave your surgical area at least 5 days prior to your surgery. The surgical prep will be performed at the hospital according to Infection Control regulations.    Contact Lens must be removed before surgery. Either do not wear the contact lens or bring a case and solution for storage.  Please bring a container for eyeglasses or dentures as required.  Bring any paperwork your physician has provided, such as consent forms,  history and physicals, doctor's orders, etc.   Bring comfortable clothes that are loose fitting to wear upon discharge. Take into consideration the type of surgery being performed.  Maintain your diet as advised per your physician the day prior to surgery.      Adequate rest the night before surgery is advised.   Park in the Parking lot behind the hospital or in the [place] [facility] across the street from the parking lot. Parking is complimentary.  If you will be discharged the same day as your procedure, please arrange for a responsible adult to drive you home or to accompany you if traveling by taxi.   YOU WILL NOT BE PERMITTED TO DRIVE OR TO LEAVE THE HOSPITAL ALONE AFTER SURGERY.   If you are being discharged the same day, it is strongly recommended that you  arrange for someone to remain with you for the first 24 hrs following your surgery.    The Surgeon will speak to your family/visitor after your surgery regarding the outcome of your surgery and post op care.  The Surgeon may speak to you after your surgery, but there is a possibility you may not remember the details.  Please check with your family members regarding the conversation with the Surgeon.    We strongly recommend whoever is bringing you home be present for discharge instructions.  This will ensure a thorough understanding for your post op home care.    ALL CHILDREN MUST ALWAYS BE ACCOMPANIED BY AN ADULT.    Visitors-Refer to current Visitor policy handouts.    Thank you for your cooperation.  The Staff of [facility].            Bathing Instructions with Hibiclens    Shower the evening before and morning of your procedure with Chlorhexidine (Hibiclens)  do not use Chlorhexidine on your face or genitals. Do not get in your eyes.  Wash your face with water and your regular face wash/soap  Use your regular shampoo  Apply Chlorhexidine (Hibiclens) directly on your skin or on a wet washcloth and wash gently. When showering: Move away from the shower stream when applying Chlorhexidine (Hibiclens) to avoid rinsing off too soon.  Rinse thoroughly with warm water  Do not dilute Chlorhexidine (Hibiclens)   Dry off as usual, do not use any deodorant, powder, body lotions, perfume, after shave or cologne.

## 2022-11-23 ENCOUNTER — TELEPHONE (OUTPATIENT)
Dept: SURGERY | Facility: CLINIC | Age: 67
End: 2022-11-23
Payer: MEDICARE

## 2022-11-23 DIAGNOSIS — Z01.810 PREOPERATIVE CARDIOVASCULAR EXAMINATION: Primary | ICD-10-CM

## 2022-11-23 DIAGNOSIS — I65.23 CAROTID STENOSIS, BILATERAL: Primary | ICD-10-CM

## 2022-11-23 DIAGNOSIS — Z01.810 PREOPERATIVE CARDIOVASCULAR EXAMINATION: ICD-10-CM

## 2022-11-23 NOTE — PRE ADMISSION SCREENING
PATIENT HAS MD SPECIFIC INSTRUCTIONS RE: PLAVIX THAT SHE RECEIVED YESTERDAY. PATIENT HAD PREVIOUSLY PUT HER PLAVIX ON HOLD BUT HAS RESTARTED IT.

## 2022-11-23 NOTE — TELEPHONE ENCOUNTER
Confirmed arrival time for surgery on 11/25/22 at the [facility] with Dr.Amy Maradiaga. Arrival time is for 7:30 am surgery is scheduled for 9:30 am . Nothing to eat after 9 pm on Thursday evening 11/24/22. Clear liquids Gatorade up until patient leaves home on the morning of surgery. Patient may have 2 people with her on the morning of surgery if needed. Please leave all jewelry home also wear a button down shirt on the morning of surgery. Mrs. Root appreciated the call and voiced understanding to this call.

## 2022-11-25 ENCOUNTER — HOSPITAL ENCOUNTER (OUTPATIENT)
Facility: OTHER | Age: 67
Discharge: HOME | End: 2022-11-25
Attending: SURGERY | Admitting: SURGERY
Payer: MEDICARE

## 2022-11-25 ENCOUNTER — ANESTHESIA (OUTPATIENT)
Dept: SURGERY | Facility: OTHER | Age: 67
End: 2022-11-25
Payer: MEDICARE

## 2022-11-25 ENCOUNTER — HOSPITAL ENCOUNTER (OUTPATIENT)
Dept: RADIOLOGY | Facility: OTHER | Age: 67
Discharge: HOME | End: 2022-11-25
Attending: SURGERY
Payer: MEDICARE

## 2022-11-25 DIAGNOSIS — C50.912 MALIGNANT NEOPLASM OF LEFT BREAST: Primary | ICD-10-CM

## 2022-11-25 DIAGNOSIS — Z17.1 MALIGNANT NEOPLASM OF OVERLAPPING SITES OF LEFT BREAST IN FEMALE, ESTROGEN RECEPTOR NEGATIVE: ICD-10-CM

## 2022-11-25 DIAGNOSIS — C50.812 MALIGNANT NEOPLASM OF OVERLAPPING SITES OF LEFT BREAST IN FEMALE, ESTROGEN RECEPTOR NEGATIVE: ICD-10-CM

## 2022-11-25 PROCEDURE — 88307 TISSUE EXAM BY PATHOLOGIST: CPT | Mod: 59 | Performed by: STUDENT IN AN ORGANIZED HEALTH CARE EDUCATION/TRAINING PROGRAM

## 2022-11-25 PROCEDURE — 88342 IMHCHEM/IMCYTCHM 1ST ANTB: CPT | Mod: 26,,, | Performed by: STUDENT IN AN ORGANIZED HEALTH CARE EDUCATION/TRAINING PROGRAM

## 2022-11-25 PROCEDURE — 88341 IMHCHEM/IMCYTCHM EA ADD ANTB: CPT | Mod: 26,,, | Performed by: STUDENT IN AN ORGANIZED HEALTH CARE EDUCATION/TRAINING PROGRAM

## 2022-11-25 PROCEDURE — 38792 PR IDENTIFY SENTINEL 2DE: ICD-10-PCS | Mod: 59,LT,, | Performed by: SURGERY

## 2022-11-25 PROCEDURE — 36620 INSERTION CATHETER ARTERY: CPT | Performed by: ANESTHESIOLOGY

## 2022-11-25 PROCEDURE — 38525 PR BIOPSY/REM LYMPH NODES, AXILLARY: ICD-10-PCS | Mod: 51,LT,, | Performed by: SURGERY

## 2022-11-25 PROCEDURE — 63600175 PHARM REV CODE 636 W HCPCS: Performed by: NURSE ANESTHETIST, CERTIFIED REGISTERED

## 2022-11-25 PROCEDURE — 88342 CHG IMMUNOCYTOCHEMISTRY: ICD-10-PCS | Mod: 26,,, | Performed by: STUDENT IN AN ORGANIZED HEALTH CARE EDUCATION/TRAINING PROGRAM

## 2022-11-25 PROCEDURE — 71000016 HC POSTOP RECOV ADDL HR: Performed by: SURGERY

## 2022-11-25 PROCEDURE — 25000003 PHARM REV CODE 250: Performed by: SURGERY

## 2022-11-25 PROCEDURE — 71000033 HC RECOVERY, INTIAL HOUR: Performed by: SURGERY

## 2022-11-25 PROCEDURE — A9520 TC99 TILMANOCEPT DIAG 0.5MCI: HCPCS

## 2022-11-25 PROCEDURE — 25000003 PHARM REV CODE 250: Performed by: NURSE ANESTHETIST, CERTIFIED REGISTERED

## 2022-11-25 PROCEDURE — 71000015 HC POSTOP RECOV 1ST HR: Performed by: SURGERY

## 2022-11-25 PROCEDURE — 19301 PARTIAL MASTECTOMY: CPT | Mod: LT,,, | Performed by: SURGERY

## 2022-11-25 PROCEDURE — 37000008 HC ANESTHESIA 1ST 15 MINUTES: Performed by: SURGERY

## 2022-11-25 PROCEDURE — 38900 IO MAP OF SENT LYMPH NODE: CPT | Mod: LT,,, | Performed by: SURGERY

## 2022-11-25 PROCEDURE — P9045 ALBUMIN (HUMAN), 5%, 250 ML: HCPCS | Mod: JG | Performed by: NURSE ANESTHETIST, CERTIFIED REGISTERED

## 2022-11-25 PROCEDURE — 36000707: Performed by: SURGERY

## 2022-11-25 PROCEDURE — 19301 PR MASTECTOMY, PARTIAL: ICD-10-PCS | Mod: LT,,, | Performed by: SURGERY

## 2022-11-25 PROCEDURE — 36000706: Performed by: SURGERY

## 2022-11-25 PROCEDURE — 88307 TISSUE EXAM BY PATHOLOGIST: CPT | Mod: 26,,, | Performed by: STUDENT IN AN ORGANIZED HEALTH CARE EDUCATION/TRAINING PROGRAM

## 2022-11-25 PROCEDURE — 25000242 PHARM REV CODE 250 ALT 637 W/ HCPCS

## 2022-11-25 PROCEDURE — 88307 PR  SURG PATH,LEVEL V: ICD-10-PCS | Mod: 26,,, | Performed by: STUDENT IN AN ORGANIZED HEALTH CARE EDUCATION/TRAINING PROGRAM

## 2022-11-25 PROCEDURE — 38792 RA TRACER ID OF SENTINL NODE: CPT | Mod: 59,LT,, | Performed by: SURGERY

## 2022-11-25 PROCEDURE — 25000003 PHARM REV CODE 250: Performed by: ANESTHESIOLOGY

## 2022-11-25 PROCEDURE — 88342 IMHCHEM/IMCYTCHM 1ST ANTB: CPT | Performed by: STUDENT IN AN ORGANIZED HEALTH CARE EDUCATION/TRAINING PROGRAM

## 2022-11-25 PROCEDURE — 63600175 PHARM REV CODE 636 W HCPCS: Performed by: ANESTHESIOLOGY

## 2022-11-25 PROCEDURE — 37000009 HC ANESTHESIA EA ADD 15 MINS: Performed by: SURGERY

## 2022-11-25 PROCEDURE — 88341 IMHCHEM/IMCYTCHM EA ADD ANTB: CPT | Performed by: STUDENT IN AN ORGANIZED HEALTH CARE EDUCATION/TRAINING PROGRAM

## 2022-11-25 PROCEDURE — 38525 BIOPSY/REMOVAL LYMPH NODES: CPT | Mod: 51,LT,, | Performed by: SURGERY

## 2022-11-25 PROCEDURE — 25000242 PHARM REV CODE 250 ALT 637 W/ HCPCS: Performed by: NURSE ANESTHETIST, CERTIFIED REGISTERED

## 2022-11-25 PROCEDURE — 71000039 HC RECOVERY, EACH ADD'L HOUR: Performed by: SURGERY

## 2022-11-25 PROCEDURE — 88341 PR IHC OR ICC EACH ADD'L SINGLE ANTIBODY  STAINPR: ICD-10-PCS | Mod: 26,,, | Performed by: STUDENT IN AN ORGANIZED HEALTH CARE EDUCATION/TRAINING PROGRAM

## 2022-11-25 PROCEDURE — 38900 PR INTRAOPERATIVE SENTINEL LYMPH NODE ID W DYE INJECTION: ICD-10-PCS | Mod: LT,,, | Performed by: SURGERY

## 2022-11-25 PROCEDURE — 63600175 PHARM REV CODE 636 W HCPCS: Performed by: STUDENT IN AN ORGANIZED HEALTH CARE EDUCATION/TRAINING PROGRAM

## 2022-11-25 RX ORDER — LIDOCAINE HCL/PF 100 MG/5ML
SYRINGE (ML) INTRAVENOUS
Status: DISCONTINUED | OUTPATIENT
Start: 2022-11-25 | End: 2022-11-25

## 2022-11-25 RX ORDER — DIPHENHYDRAMINE HYDROCHLORIDE 50 MG/ML
25 INJECTION INTRAMUSCULAR; INTRAVENOUS EVERY 6 HOURS PRN
Status: DISCONTINUED | OUTPATIENT
Start: 2022-11-25 | End: 2022-11-25 | Stop reason: HOSPADM

## 2022-11-25 RX ORDER — LIDOCAINE HYDROCHLORIDE 10 MG/ML
0.5 INJECTION, SOLUTION EPIDURAL; INFILTRATION; INTRACAUDAL; PERINEURAL ONCE
Status: DISCONTINUED | OUTPATIENT
Start: 2022-11-25 | End: 2022-11-25 | Stop reason: HOSPADM

## 2022-11-25 RX ORDER — SODIUM CHLORIDE 9 MG/ML
INJECTION, SOLUTION INTRAVENOUS CONTINUOUS
Status: DISCONTINUED | OUTPATIENT
Start: 2022-11-25 | End: 2023-08-24

## 2022-11-25 RX ORDER — EPHEDRINE SULFATE 50 MG/ML
INJECTION, SOLUTION INTRAVENOUS
Status: DISCONTINUED | OUTPATIENT
Start: 2022-11-25 | End: 2022-11-25

## 2022-11-25 RX ORDER — ONDANSETRON HYDROCHLORIDE 2 MG/ML
INJECTION, SOLUTION INTRAMUSCULAR; INTRAVENOUS
Status: DISCONTINUED | OUTPATIENT
Start: 2022-11-25 | End: 2022-11-25

## 2022-11-25 RX ORDER — SODIUM CHLORIDE, SODIUM LACTATE, POTASSIUM CHLORIDE, CALCIUM CHLORIDE 600; 310; 30; 20 MG/100ML; MG/100ML; MG/100ML; MG/100ML
INJECTION, SOLUTION INTRAVENOUS CONTINUOUS
Status: DISCONTINUED | OUTPATIENT
Start: 2022-11-25 | End: 2022-11-25 | Stop reason: HOSPADM

## 2022-11-25 RX ORDER — HYDROMORPHONE HYDROCHLORIDE 2 MG/ML
0.4 INJECTION, SOLUTION INTRAMUSCULAR; INTRAVENOUS; SUBCUTANEOUS EVERY 5 MIN PRN
Status: DISCONTINUED | OUTPATIENT
Start: 2022-11-25 | End: 2022-11-25 | Stop reason: HOSPADM

## 2022-11-25 RX ORDER — CEFAZOLIN SODIUM 2 G/50ML
2 SOLUTION INTRAVENOUS
Status: COMPLETED | OUTPATIENT
Start: 2022-11-25 | End: 2022-11-25

## 2022-11-25 RX ORDER — DEXAMETHASONE SODIUM PHOSPHATE 4 MG/ML
INJECTION, SOLUTION INTRA-ARTICULAR; INTRALESIONAL; INTRAMUSCULAR; INTRAVENOUS; SOFT TISSUE
Status: DISCONTINUED | OUTPATIENT
Start: 2022-11-25 | End: 2022-11-25

## 2022-11-25 RX ORDER — ACETAMINOPHEN 325 MG/1
975 TABLET ORAL
Status: COMPLETED | OUTPATIENT
Start: 2022-11-25 | End: 2022-11-25

## 2022-11-25 RX ORDER — PROPOFOL 10 MG/ML
VIAL (ML) INTRAVENOUS
Status: DISCONTINUED | OUTPATIENT
Start: 2022-11-25 | End: 2022-11-25

## 2022-11-25 RX ORDER — FENTANYL CITRATE 50 UG/ML
INJECTION, SOLUTION INTRAMUSCULAR; INTRAVENOUS
Status: DISCONTINUED | OUTPATIENT
Start: 2022-11-25 | End: 2022-11-25

## 2022-11-25 RX ORDER — PROCHLORPERAZINE EDISYLATE 5 MG/ML
5 INJECTION INTRAMUSCULAR; INTRAVENOUS EVERY 30 MIN PRN
Status: DISCONTINUED | OUTPATIENT
Start: 2022-11-25 | End: 2022-11-25 | Stop reason: HOSPADM

## 2022-11-25 RX ORDER — ALBUTEROL SULFATE 2.5 MG/.5ML
SOLUTION RESPIRATORY (INHALATION)
Status: COMPLETED
Start: 2022-11-25 | End: 2022-11-25

## 2022-11-25 RX ORDER — BUPIVACAINE HYDROCHLORIDE 2.5 MG/ML
INJECTION, SOLUTION EPIDURAL; INFILTRATION; INTRACAUDAL
Status: DISCONTINUED | OUTPATIENT
Start: 2022-11-25 | End: 2022-11-25 | Stop reason: HOSPADM

## 2022-11-25 RX ORDER — ALBUMIN HUMAN 50 G/1000ML
SOLUTION INTRAVENOUS CONTINUOUS PRN
Status: DISCONTINUED | OUTPATIENT
Start: 2022-11-25 | End: 2022-11-25

## 2022-11-25 RX ORDER — EPINEPHRINE 1 MG/ML
INJECTION, SOLUTION, CONCENTRATE INTRAVENOUS
Status: DISCONTINUED | OUTPATIENT
Start: 2022-11-25 | End: 2022-11-25

## 2022-11-25 RX ORDER — VASOPRESSIN 20 [USP'U]/ML
INJECTION, SOLUTION INTRAMUSCULAR; SUBCUTANEOUS
Status: DISCONTINUED | OUTPATIENT
Start: 2022-11-25 | End: 2022-11-25

## 2022-11-25 RX ORDER — SODIUM CHLORIDE 0.9 % (FLUSH) 0.9 %
3 SYRINGE (ML) INJECTION
Status: DISCONTINUED | OUTPATIENT
Start: 2022-11-25 | End: 2022-11-25 | Stop reason: HOSPADM

## 2022-11-25 RX ORDER — ALBUTEROL SULFATE 90 UG/1
AEROSOL, METERED RESPIRATORY (INHALATION)
Status: DISCONTINUED | OUTPATIENT
Start: 2022-11-25 | End: 2022-11-25

## 2022-11-25 RX ORDER — ALBUTEROL SULFATE 2.5 MG/.5ML
2.5 SOLUTION RESPIRATORY (INHALATION) EVERY 4 HOURS PRN
Status: DISCONTINUED | OUTPATIENT
Start: 2022-11-25 | End: 2022-11-25

## 2022-11-25 RX ORDER — HYDROCODONE BITARTRATE AND ACETAMINOPHEN 5; 325 MG/1; MG/1
1 TABLET ORAL EVERY 6 HOURS PRN
Qty: 20 TABLET | Refills: 0 | Status: SHIPPED | OUTPATIENT
Start: 2022-11-25 | End: 2023-01-13

## 2022-11-25 RX ORDER — OXYCODONE HYDROCHLORIDE 5 MG/1
5 TABLET ORAL
Status: DISCONTINUED | OUTPATIENT
Start: 2022-11-25 | End: 2022-11-25 | Stop reason: HOSPADM

## 2022-11-25 RX ORDER — ALBUTEROL SULFATE 2.5 MG/.5ML
2.5 SOLUTION RESPIRATORY (INHALATION) ONCE
Status: COMPLETED | OUTPATIENT
Start: 2022-11-25 | End: 2022-11-25

## 2022-11-25 RX ADMIN — PROPOFOL 160 MG: 10 INJECTION, EMULSION INTRAVENOUS at 09:11

## 2022-11-25 RX ADMIN — EPINEPHRINE 10 MCG: 1 INJECTION, SOLUTION, CONCENTRATE INTRAVENOUS at 09:11

## 2022-11-25 RX ADMIN — EPINEPHRINE 20 MCG: 1 INJECTION, SOLUTION, CONCENTRATE INTRAVENOUS at 09:11

## 2022-11-25 RX ADMIN — CEFAZOLIN SODIUM 2 G: 2 SOLUTION INTRAVENOUS at 09:11

## 2022-11-25 RX ADMIN — ALBUTEROL SULFATE 6 PUFF: 90 AEROSOL, METERED RESPIRATORY (INHALATION) at 09:11

## 2022-11-25 RX ADMIN — VASOPRESSIN 1 UNITS: 20 INJECTION, SOLUTION INTRAMUSCULAR; SUBCUTANEOUS at 09:11

## 2022-11-25 RX ADMIN — ALBUTEROL SULFATE 2.5 MG: 2.5 SOLUTION RESPIRATORY (INHALATION) at 11:11

## 2022-11-25 RX ADMIN — FENTANYL CITRATE 50 MCG: 50 INJECTION, SOLUTION INTRAMUSCULAR; INTRAVENOUS at 08:11

## 2022-11-25 RX ADMIN — EPINEPHRINE 20 MCG: 1 INJECTION, SOLUTION, CONCENTRATE INTRAVENOUS at 08:11

## 2022-11-25 RX ADMIN — SODIUM CHLORIDE, SODIUM LACTATE, POTASSIUM CHLORIDE, AND CALCIUM CHLORIDE: 600; 310; 30; 20 INJECTION, SOLUTION INTRAVENOUS at 08:11

## 2022-11-25 RX ADMIN — VASOPRESSIN 1 UNITS: 20 INJECTION, SOLUTION INTRAMUSCULAR; SUBCUTANEOUS at 08:11

## 2022-11-25 RX ADMIN — DEXAMETHASONE SODIUM PHOSPHATE 8 MG: 4 INJECTION, SOLUTION INTRAMUSCULAR; INTRAVENOUS at 09:11

## 2022-11-25 RX ADMIN — EPINEPHRINE 20 MCG: 1 INJECTION, SOLUTION, CONCENTRATE INTRAVENOUS at 10:11

## 2022-11-25 RX ADMIN — EPINEPHRINE 10 MCG: 1 INJECTION, SOLUTION, CONCENTRATE INTRAVENOUS at 10:11

## 2022-11-25 RX ADMIN — CARBOXYMETHYLCELLULOSE SODIUM 2 DROP: 2.5 SOLUTION/ DROPS OPHTHALMIC at 09:11

## 2022-11-25 RX ADMIN — ONDANSETRON 4 MG: 2 INJECTION INTRAMUSCULAR; INTRAVENOUS at 10:11

## 2022-11-25 RX ADMIN — EPINEPHRINE 0.01 MCG/KG/MIN: 1 INJECTION INTRAMUSCULAR; INTRAVENOUS; SUBCUTANEOUS at 10:11

## 2022-11-25 RX ADMIN — ALBUMIN (HUMAN): 12.5 SOLUTION INTRAVENOUS at 10:11

## 2022-11-25 RX ADMIN — EPHEDRINE SULFATE 5 MG: 50 INJECTION INTRAVENOUS at 12:11

## 2022-11-25 RX ADMIN — LIDOCAINE HYDROCHLORIDE 100 MG: 20 INJECTION, SOLUTION INTRAVENOUS at 09:11

## 2022-11-25 RX ADMIN — GLYCOPYRROLATE 0.2 MG: 0.2 INJECTION, SOLUTION INTRAMUSCULAR; INTRAVITREAL at 10:11

## 2022-11-25 RX ADMIN — FENTANYL CITRATE 50 MCG: 50 INJECTION, SOLUTION INTRAMUSCULAR; INTRAVENOUS at 09:11

## 2022-11-25 RX ADMIN — VASOPRESSIN 1 UNITS: 20 INJECTION, SOLUTION INTRAMUSCULAR; SUBCUTANEOUS at 10:11

## 2022-11-25 RX ADMIN — ACETAMINOPHEN 975 MG: 325 TABLET, FILM COATED ORAL at 07:11

## 2022-11-25 NOTE — ANESTHESIA PROCEDURE NOTES
Arterial    Diagnosis: heart failure    Patient location during procedure: holding area    Staffing  Authorizing Provider: Osman Romo MD  Performing Provider: Mendel Mendez MD    Anesthesiologist was present at the time of the procedure.    Preanesthetic Checklist  Completed: patient identified, IV checked, site marked, risks and benefits discussed, surgical consent, monitors and equipment checked, pre-op evaluation, timeout performed and anesthesia consent givenArterial  Skin Prep: chlorhexidine gluconate  Local Infiltration: lidocaine  Orientation: right    Catheter Size: 20 G  Catheter placement by Ultrasound guidance. Heme positive aspiration all ports.   Vessel Caliber: small, patent, compressibility poor  Needle advanced into vessel with real time Ultrasound guidance.Insertion Attempts: 4 or more  Additional Notes  Artery cannulated on both sides x2 using ultrasound- unable to thread catheter successfully

## 2022-11-25 NOTE — DISCHARGE INSTRUCTIONS

## 2022-11-25 NOTE — PATIENT INSTRUCTIONS
POSTOPERATIVE INSTRUCTIONS FOLLOWING BIOPSY OR LUMPECTOMY    The following are post-operative instructions that will help you to recover from your surgery.  Please read over these instructions carefully and contact us if we can answer any of your questions or concerns.    Dressing/breast binder (surgi-bra)  A surgical bra may be placed around your chest after your surgery.  If you are given the bra, please wear it as close to 24 hours a day as possible until your post-operative clinic appointment.  If the elastic around the bra irritates your skin, you may wear a soft t-shirt underneath the bra.  You may go without wearing the bra long enough to shower, to launder and dry the bra.  If the bra is extremely uncomfortable, you may wear a supportive sports bra instead after 2 days.  You may shower the day after surgery.  Do not take a tub bath and do not soak the surgical site.    Activity   You should be able to return to your regular activities 2 days after your surgery.  However, do not engage in strenuous activities in which you use your upper body such as:  golf, tennis, aerobics, washing windows, raking the yard, mopping, vacuuming, heavy lifting (e.g children) until you are seen for your follow-up appointment in clinic.    Medication for pain  You may find that over the counter pain medications may be sufficient for your pain.  You will be given a prescription for pain medication for more severe pain.  You should not drive or operate machinery while taking these.  Please take narcotics with food.  Narcotics can cause, or worse, constipation.  You will need to increase your fluid intake, eat high fiber foods (such as fruits and bran) and make sure that you are up and walking. You may need to take an over the counter stool softener for constipation.    Please report the following:  Temperature greater than 101 degrees  Discharge or bad odor from the wound  Excessive bleeding, such as bloody dressing or extreme  bruising  Redness at incision and/or drain sites  Swelling or buildup of fluid around incision    Additional information  I will see you approximately 2 weeks following your surgery.  If this follow-up appointment has not been made, please call the office.    If you have any questions or problems, please call my office or my nurse.    MD Ana Gatica, RN  [phone]    After hours and on weekends, you may call the main [employer] line at [phone] and ask to have the general surgery resident paged or have me paged.      How to care for your Drain(s)  Wash hands--STRIP or milk the drainage tube as it comes out of your body toward the bulb.   Beginning where the drain comes out of your body, hold drainage tubing with one hand and with the other, stretch and release tubing an inch at time while moving downward with both hands toward the bulb.  Do this 2-3 times before emptying the bulb.  Remove the stopper from the bulbs port  (drainage port)  Pour the drainage in the measuring cup provided by the nurse  Flatten/squeeze the bulb to create a vacuum and replace the stopper before letting go the of the bulb.  Record the date, time and amount of drainage in ccs (not ounces) each time bulb is emptied. If you have more than one drain, record each separately.  Discard the drainage into the toilet after measuring and then wash hands.  Empty bulbs 3 times/day or as needed if it fills up before 8 hours.  Remember to bring the output record with you to your doctors appointment.    Please report the following:  Temperature greater than 101 degrees  Discharge or bad odor from the wound  Excessive bleeding, such as bloody dressing or extreme bruising  Redness at incision and/or drain sites  Swelling or buildup of fluid around incision  If blue dye was used to locate your sentinel lymph nodes, your urine and stool may be blue-green in color for 1 or 2 days.    Lymphedema Risk Reduction    Lymphedema is a  swelling of a part of the body, caused by an insufficient lymphatic system and an accumulation of fluid in the bodys tissues.  Lymphedema may occur when normal drainage of fluid is disrupted, such as an infection, injury, cancer, scar tissue, or removal of lymph nodes.    If you had a full axillary node dissection procedure, you may be at great risk for lymphedema.     For those patients having a sentinel lymph node biopsy, these risks may be smaller and the recommendations are provided for your review and consideration.    The following list contains recommendations for reducing your risk of developing lymphedema.    Skin Care--avoid trauma/injury to reduce infection risk  Keep the hand and arm on the side of surgery clean and dry  Pay attention to nail care and do not cut cuticles  Avoid punctures, such as injections and blood draws from you on the side of your surgery  Wear gloves while doing activities that may cause skin injury (washing dishes, gardening, etc.)  If scratches or punctures occur, wash area with soap and water, and observe for signs of infections (redness, drainage, swelling)  If a rash, itching, redness, pain, increased skin temperatures, fever, or flu-like symptoms occur, contact your physician immediately for early treatment of a possible infection  Activity/Lifestyle  Gradually build up the duration and intensity of any activity or exercise  Take frequent rest periods during activity to allow for arm recovery  Monitor your arm and upper body during and after activity for any change in size, shape, tissue, texture, soreness, heaviness, or firmness  Avoid constriction of your arm on the side of your surgery  Avoid having blood pressure take on the arm on the side of your surgery  Wear loose fitting jewelry and clothing  When you return to wearing a bra, make sure that it is well fitted and not tight                    PLEASE RECORD ALL AMOUNTS IN CCS AND BRIND THIS RECORD   WITH YOU TO YOUR  RETURN APPOINTMENT  Date Time Drain #1 Drain #2 comment                                                                                                                                                                                                                         Date Time Drain #1 Drain #2 comment

## 2022-11-25 NOTE — ANESTHESIA PROCEDURE NOTES
Intubation    Date/Time: 11/25/2022 9:25 AM  Performed by: Kristi Delvalle CRNA  Authorized by: Osman Romo MD     Intubation:     Induction:  Intravenous    Intubated:  Postinduction    Mask Ventilation:  Not attempted    Attempts:  1    Attempted By:  CRNA    Difficult Airway Encountered?: No      Complications:  None    Airway Device:  Supraglottic airway/LMA    Airway Device Size:  3.0    Style/Cuff Inflation:  Uncuffed    Secured at:  The lips    Complicating Factors:  None    Findings Post-Intubation:  BS equal bilateral and wheezing

## 2022-11-25 NOTE — PLAN OF CARE
Dulce Root has met all discharge criteria from Phase II. Vital Signs are stable, ambulating  without difficulty. Discharge instructions given, patient verbalized understanding. Discharged from facility via wheelchair in stable condition.

## 2022-11-25 NOTE — OP NOTE
DATE OF PROCEDURE: 11/25/2022    SURGEON: Surgeon(s) and Role:     * Rosa Maradiaga MD - Primary  Resident: Nba    PREOPERATIVE DIAGNOSIS: Invasive breast carcinoma of the left breast  central region    POSTOPERATIVE DIAGNOSIS: same    ANESTHESIA: local and general    PROCEDURES PERFORMED:   1. left breast ultrasound localization partial mastectomy (lumpectomy) with excision for clear margins   2. left axillary deep sentinel lymph node biopsy   3. injection of left breast with technetium-labeled radiocolloid for sentinel lymph node identification  4. Identification of sentinel lymph node         PROCEDURE IN DETAIL:   The patient underwent informed consent.  The films were reviewed.    She was then brought to the Operating Room and placed in the supine position. local and general anesthesia was administered.    The left breast was injected in the subareolar region with the technetium-labeled radiocolloid. The left breast was further injected with the isosulfan Lymphazurin blue dye in the central subareolar region.  The left breast, anterior chest, arm and axilla were then prepped and draped in a sterile fashion.     Next, we turned our attention to the left breast itself. Ultrasound was used to identify the breast mass at 3 o'clock.  The area if interest was identified and then was marked for localization using ultrasound guidance.  An incision was made in the upper outer quadrant of the left breast over the anticipated tract of the lesion in a jana-areolar location.  The specimen was dissected circumferentially around the cancer.  We did not dissect all the way down to the underlying pectoralis fascia.  The ultrasound localization lumpectomy specimen was inked on the back table using green ink inferiorly, blue ink superiorly, orange ink laterally, yellow ink anteriorly, black ink posteriorly, and the red ink medially.  It was then fixed with acetic acid and submitted for specimen radiograph with the Miriam  which confirmed the clip and area of interest within the specimen, and was interpreted in the operating room. Given the appearance and location of the lesion, additional margins were taken including none.       Using the gamma probe, activity was noted and localized in the left axilla.  We then dissected down through the clavipectoral fascia using electrocautery, identifying a hot afferent lymphatic channel coming from the upper outer quadrant axillary tail of [place] breast tissue to a level 1 sentinel node, which was excised. It was dissected circumferentially with blunt dissection and the afferent and efferent lymphatics were tied together. The lymph node was labeled as specimen #1 for permanent sectioning, hot and not blue with an ex vivo count of 104. A total of 3 axillary sentinel lymph nodes were removed.  2 of these areas may just be blind ending lymphatics without node.  The probe was placed in the cavity and there was no significant residual background radioactivity or blue dye noted in the axilla indicating adequate and appropriate sentinel lymph node biopsy. The cavity was then palpated and 0 further palpable nodes were noted. Any additional palpable nodes were sent to pathology for permanent section.       We then achieved hemostasis and irrigation was performed.  The incision was then closed with an interuppted 3-0 vicryl deep dermal followed by a running 4-0 vicryl subcuticular.      Dermabond was applied. Sterile fluff gauze was placed and a post-procedure bra was placed. She tolerated the procedure well without complication and was turned over to Anesthesia for transport to the recovery area in a satisfactory condition. All specimens were sent to Pathology for permanent sectioning.    ESTIMATED BLOOD LOSS: 10ml    COMPLICATIONS: none    DISPOSITION:  PACU--hemodynamically stable    ATTESTATION:  I was present and scrubbed for the entire procedure.

## 2022-11-25 NOTE — BRIEF OP NOTE
[facility] - Surgery ([place])  Brief Operative Note    Surgery Date: 11/25/2022     Surgeon(s) and Role:     * Rosa Maradiaga MD - Primary  Ender Raman MD -resident, assiting    Pre-op Diagnosis:  Malignant neoplasm of overlapping sites of left breast in female, estrogen receptor negative [C50.812, Z17.1]    Post-op Diagnosis:  Post-Op Diagnosis Codes:     * Malignant neoplasm of overlapping sites of left breast in female, estrogen receptor negative [C50.812, Z17.1]    Procedure(s) (LRB):  MASTECTOMY, PARTIAL-Left ultrasound guided (Left)  BIOPSY, LYMPH NODE, SENTINEL-Left (Left)  INJECTION, FOR SENTINEL NODE IDENTIFICATION-Left (Left)  LYMPHADENECTOMY, AXILLARY-Left (Left)    Anesthesia: General    Operative Findings: See op note    Estimated Blood Loss: minimal         Specimens:   Specimen (24h ago, onward)       Start     Ordered    11/25/22 1019  Specimen to Pathology, Surgery Breast  Once        Comments: Pre-op Diagnosis: Malignant neoplasm of overlapping sites of left breast in female, estrogen receptor negative [C50.812, Z17.1]Procedure(s):MASTECTOMY, PARTIAL-Left ultrasound guidedBIOPSY, LYMPH NODE, SENTINEL-LeftINJECTION, FOR SENTINEL NODE IDENTIFICATION-LeftLYMPHADENECTOMY, AXILLARY-Left Number of specimens: Name of specimens: #1 Left Breast lumpectomy green-Inferior, Blue-Superior, orange -lateral, yellow-anterior, black-posterior,red-medial #2 Left Axillary sentinel lymph node Hot 43#3 Left Axillary [place] lymph node hot 104#4Left Axillary sentinel lymph node hot 35     References:    Click here for ordering Quick Tip   Question Answer Comment   Procedure Type: Breast    Specimen Class: Known or suspected malignancy    Which provider would you like to cc? ROSA MARADIAGA    Release to patient Immediate        11/25/22 1042                      Discharge Note    OUTCOME: Patient tolerated treatment/procedure well without complication and is now ready for discharge.    DISPOSITION: Home or  Self Care    FINAL DIAGNOSIS:  Malignant neoplasm of overlapping sites of left breast in female, estrogen receptor negative [C50.812, Z17.1]    FOLLOWUP: In clinic    DISCHARGE INSTRUCTIONS:    Discharge Procedure Orders   Diet Cardiac     Lifting restrictions   Order Comments: No heavy lifting greater than 10 pounds (about the weight of a gallon of milk) for 6 week postoperatively. This is to prevent new/recurrent hernia at your incision sites while they heal.     No driving until:   Order Comments: While taking narcotic pain medications.     Notify your health care provider if you experience any of the following:  temperature >100.4     Notify your health care provider if you experience any of the following:  persistent nausea and vomiting or diarrhea     Notify your health care provider if you experience any of the following:  severe uncontrolled pain     Notify your health care provider if you experience any of the following:  redness, tenderness, or signs of infection (pain, swelling, redness, odor or green/yellow discharge around incision site)     Notify your health care provider if you experience any of the following:  difficulty breathing or increased cough     Notify your health care provider if you experience any of the following:  severe persistent headache     Notify your health care provider if you experience any of the following:  worsening rash     Notify your health care provider if you experience any of the following:  persistent dizziness, light-headedness, or visual disturbances     Notify your health care provider if you experience any of the following:  increased confusion or weakness

## 2022-11-25 NOTE — ANESTHESIA POSTPROCEDURE EVALUATION
Anesthesia Post Evaluation    Patient: Dulce Root    Procedure(s) Performed: Procedure(s) (LRB):  MASTECTOMY, PARTIAL-Left ultrasound guided (Left)  BIOPSY, LYMPH NODE, SENTINEL-Left (Left)  INJECTION, FOR SENTINEL NODE IDENTIFICATION-Left (Left)  LYMPHADENECTOMY, AXILLARY-Left (Left)    Final Anesthesia Type: general      Patient location during evaluation: PACU  Patient participation: Yes- Able to Participate  Level of consciousness: awake and alert  Post-procedure vital signs: reviewed and stable  Pain management: adequate  Airway patency: patent    PONV status at discharge: No PONV  Anesthetic complications: no      Cardiovascular status: blood pressure returned to baseline  Respiratory status: unassisted  Hydration status: euvolemic  Follow-up not needed.          Vitals Value Taken Time   /60 11/25/22 1230   Temp 36.4 °C (97.6 °F) 11/25/22 1205   Pulse 78 11/25/22 1231   Resp 16 11/25/22 1215   SpO2 92 % 11/25/22 1231   Vitals shown include unvalidated device data.      No case tracking events are documented in the log.      Pain/Pawan Score: Pain Rating Prior to Med Admin: 0 (11/25/2022  7:37 AM)  Pawan Score: 9 (11/25/2022 11:47 AM)

## 2022-11-25 NOTE — TRANSFER OF CARE
"Anesthesia Transfer of Care Note    Patient: Dulce Root    Procedure(s) Performed: Procedure(s) (LRB):  MASTECTOMY, PARTIAL-Left ultrasound guided (Left)  BIOPSY, LYMPH NODE, SENTINEL-Left (Left)  INJECTION, FOR SENTINEL NODE IDENTIFICATION-Left (Left)  LYMPHADENECTOMY, AXILLARY-Left (Left)    Patient location: PACU    Anesthesia Type: general    Transport from OR: Transported from OR on 2-3 L/min O2 by NC with adequate spontaneous ventilation    Post pain: adequate analgesia    Post assessment: no apparent anesthetic complications and tolerated procedure well    Post vital signs: stable    Level of consciousness: awake, alert and oriented    Nausea/Vomiting: no nausea/vomiting    Complications: none    Transfer of care protocol was followed      Last vitals:   Visit Vitals  /66   Pulse 73   Temp 36.8 °C (98.2 °F) (Oral)   Resp 16   Ht 5' 3" (1.6 m)   Wt 63.5 kg (140 lb)   SpO2 98%   Breastfeeding No   BMI 24.80 kg/m²     "

## 2022-11-25 NOTE — OR NURSING
Dr. Mendez notified of BP 90/48, medication given by CRNA in recovery, BP now 133/60, stable for transfer to ACU. Dr. Mendez to be notified if SBP <100. All VSS on room air. Patient states pain is tolerable at this time. Phase I recovery complete.

## 2022-11-25 NOTE — H&P
The patient has been examined and the H&P has been reviewed:  I concur with the findings and no changes have occurred since H&P was written.  Anesthesia/Surgery risks, benefits and alternative options discussed and understood by patient/family.      =================================================    Breast Surgery  [facility]  Department of Surgery        REFERRING PROVIDER: Rios Sepulveda MD  [address] [place] [address]     Chief Complaint: Recurrent breast cancer        Subjective:      Patient ID: Dulce Root is a 67 y.o. female who presents with newly diagnosed IDC of the left flap. Patient has a previous history of bilateral DCIS s/p bilateral flap reconstruction.      Patient has a history of bilateral ductal carcinoma in situ and a remote history of Hodgkin's disease. Patient is known to Dr. Sepulveda. Per his note, her history is as follows: She developed ductal carcinoma in situ of the right breast in 09/2006, treated with lumpectomy  and radiation therapy.  In 08/2009, she was diagnosed with ductal carcinoma in situ of the left breast and in 08/2009, she had bilateral nipple sparing mastectomy. The left breast showed widespread high-grade DCIS and the right breast was    without evidence of malignancy.    She has a remote history of Hodgkin's disease, originally diagnosed in 1991, treated with radiation therapy and then treated with salvage chemotherapy with ABVD on protocol [id number] in 1996.     Patient seen in clinic for punch biopsy of a new skin lesion adjacent to the left nipple of her flap. Patient states she first noted a small lesion in the spring that has continued to grow. Punch performed by JACQUELINE Capps which showed invasive ductal carcinoma, grade 3.          Findings at that time were the following:   Tumor size: 35 cm   Tumor thgthrthathdtheth:th th4th Estrogen Receptor: -   Progesterone Receptor: -   Her-2 inés: +   Lymph node status: clinically negative              Lymphatic  invasion: not identified                 Past Medical History:   Diagnosis Date    Allergy      Anticoagulant long-term use       Plavix    Breast cancer 2008    Carotid stenosis, bilateral 10/13/2017    Coronary artery disease      Coronary artery disease involving coronary bypass graft of native heart without angina pectoris 08/16/2019 2/19  1.  Left main is a small vessel with a 60%-65% ostial lesion. 2.  LAD is a medium-sized vessel diffuse 70% proximally  Ramus intermedius is a medium size vessel with a 40%-50% ostial lesion. 3.  Circumflex 60%-70% ostial  remainder of circumflex and obtuse marginal normal in appearance. Right coronary artery is normal size vessel, short discrete 70%mid 4.  Vein graft to right coronary is occ    Coronary artery disease involving native coronary artery of native heart without angina pectoris 06/27/2017    DCIS (ductal carcinoma in situ) of breast 11/06/2012    Encounter for blood transfusion      Essential hypertension 11/06/2012    GERD (gastroesophageal reflux disease)      GI bleed 02/2021    Hodgkin lymphoma      Hx of Hodgkin's disease - s/p chemo and radiation 11/06/2012    Hyperlipidemia      Hyperlipidemia 11/06/2012     The patient presents with hyperlipidemia.  The patient reports tolerating the medication well and is in excellent compliance.  There have been no medication side effects.  The patient denies chest pain, neuropathy, and myalgias.  The patient has reduced fat intake and has been exercising.  Current treatment has included the medications listed in the med card.   Lab Results Component  Value             Date             CH    Hypertension      LBBB (left bundle branch block) 05/22/2018    Osteoporosis      Paroxysmal atrial fibrillation - single post-op episode 08/24/2017    Pemphigoid      Pemphigoid       pt receives IVIG infusions    S/P AVR (aortic valve replacement) - pericardial valve 08/21/2017     Perceval aortic tissue valve PVS23. 23mm     serial # [id number]    S/P CABG x 2 08/21/2017 8/17 CABG X 2 with SVG-LAD and SVG-distal RCA  SVG LAD due to absent LIMA after chest radiation and lymph node biopsy     Stented coronary artery       She had 2 stents placed in 2/2019.  She has had CAD and bypasses in the past in 2017.      Thyroid disease              Past Surgical History:   Procedure Laterality Date    ARTERIOGRAPHY OF AORTIC ROOT N/A 02/15/2019     Procedure: ARTERIOGRAM, AORTIC ROOT;  Surgeon: Sujit Chaudhry MD;  Location: [facility] CATH;  Service: Cardiology;  Laterality: N/A;    BREAST BIOPSY        BREAST RECONSTRUCTION         bilateral mastectomy    CARDIAC CATHETERIZATION         stents x 2    CARDIAC SURGERY   08/2017     Aortic valve replacement , CABG 2 vessel    CARDIAC VALVE REPLACEMENT   08/2017     aortic valve, bovine per pt    CORONARY ANGIOGRAPHY N/A 02/15/2019     Procedure: ANGIOGRAM, CORONARY ARTERY;  Surgeon: Sujit Chaudhry MD;  Location: [facility] CATH;  Service: Cardiology;  Laterality: N/A;    CORONARY ANGIOGRAPHY N/A 4/1/2022     Procedure: ANGIOGRAM, CORONARY ARTERY;  Surgeon: Sujit Chaudhry MD;  Location: [facility];  Service: Cardiology;  Laterality: N/A;    CORONARY BYPASS GRAFT ANGIOGRAPHY   02/15/2019     Procedure: Bypass graft study;  Surgeon: Sujit Chaudhry MD;  [facility];  Service: Cardiology;;    COSMETIC SURGERY         bereast reconstruction    ESOPHAGOGASTRODUODENOSCOPY N/A 05/14/2021     Procedure: EGD (ESOPHAGOGASTRODUODENOSCOPY);  Surgeon: Tracy Flower MD;  Location: [facility];  Service: Endoscopy;  Laterality: N/A;    ESOPHAGOGASTRODUODENOSCOPY N/A 11/04/2021     Procedure: EGD (ESOPHAGOGASTRODUODENOSCOPY);  Surgeon: Tracy Flower MD;  Location: [facility];  Service: Endoscopy;  Laterality: N/A;    EYE SURGERY         eye lids    LEFT HEART CATHETERIZATION Right 02/15/2019     Procedure: Left heart cath;  Surgeon: Sujit Chaudhry MD;  Location: [facility];  Service: Cardiology;  Laterality: Right;     LEFT HEART CATHETERIZATION Left 2022     Procedure: Left heart cath;  Surgeon: Sujit Chaudhry MD;  Location: [facility];  Service: Cardiology;  Laterality: Left;    LUMBAR LAMINECTOMY WITH DISCECTOMY N/A 2020     Procedure: LAMINECTOMY, SPINE, LUMBAR, WITH DISCECTOMY;  Surgeon: Vimal Villegas MD;  Location: [facility] OR;  Service: Neurosurgery;  Laterality: N/A;  left L5-S1  Laminectomy L4-5    LYMPHADENECTOMY        MASTECTOMY        RIGHT HEART CATHETERIZATION Right 2022     Procedure: INSERTION, CATHETER, RIGHT HEART;  Surgeon: Sujit Chaudhry MD;  [facility];  Service: Cardiology;  Laterality: Right;    SKIN BIOPSY        SPLENECTOMY, TOTAL        TRANSFORAMINAL EPIDURAL INJECTION OF STEROID Left 2019     Procedure: Left L5/S1 TF SKIP with local;  Surgeon: Canelo Niño MD;  Location: [facility]IN [facility]GT;  Service: Pain Management;  Laterality: Left;    TUMOR REMOVAL         neck- lymphoma             Current Outpatient Medications on File Prior to Visit   Medication Sig Dispense Refill    abaloparatide (TYMLOS) 80 mcg (3,120 mcg/1.56 mL) PnIj Inject 80 mcg into the skin once daily. 1 each 11    ALPRAZolam (XANAX) 1 MG tablet          aspirin (ECOTRIN) 81 MG EC tablet Take 1 tablet (81 mg total) by mouth once daily. 90 tablet 12    azaTHIOprine (IMURAN) 50 mg Tab          BANOPHEN 50 mg capsule TAKE 1 CAPSULE EVERY 6 HOURS, TAKE 3 DOSES BEFORE AND 1 DOSE AFTER SCAN        brimonidine 0.2% (ALPHAGAN) 0.2 % Drop          calcium carbonate (OS-CALVIN) 600 mg (1,500 mg) Tab Take 600 mg by mouth 2 (two) times daily with meals.        chlorhexidine (PERIDEX) 0.12 % solution SMARTSI Tablespoon By Mouth Twice Daily        cholecalciferol, vitamin D3, (VITAMIN D3) 100 mcg (4,000 unit) Cap Take by mouth.        clopidogreL (PLAVIX) 75 mg tablet Take 1 tablet (75 mg total) by mouth once daily. 90 tablet 4    dexAMETHasone (DECADRON) 0.5 mg/5 mL Elix SWISH 3.75MLS IN MOUTH FOR 30 SECONDS AND SWALLOW  TWICE DAILY AS NEEDED FOR LICHEN PLANUS FLARE UPS        EScitalopram oxalate (LEXAPRO) 10 MG tablet TAKE 1 TABLET BY MOUTH EVERY DAY 90 tablet 3    famotidine (PEPCID) 20 MG tablet TAKE 1 TABLET EVERY 6 HOURS TAKE 3 DOSES BEFORE AND 1 DOSE AFTER SCAN        FLOWFLEX COVID-19 AG HOME TEST Kit use as directed        fluconazole (DIFLUCAN) 200 MG Tab TAKE 1 TABLET ORALLY EVERY OTHER DAY X 4 DOSES, THEN 1 WEEKLY FOR 30 DAYS        hydroCHLOROthiazide (HYDRODIURIL) 25 MG tablet Take 1 tablet (25 mg total) by mouth every morning. 90 tablet 6    levothyroxine (SYNTHROID) 75 MCG tablet TAKE 1 TABLET BY MOUTH EVERY DAY BEFORE BREAKFAST 30 tablet 0    losartan (COZAAR) 50 MG tablet Take 1 tablet (50 mg total) by mouth every evening. 90 tablet 3    metoprolol succinate (TOPROL-XL) 25 MG 24 hr tablet Take 1 tablet (25 mg total) by mouth 2 (two) times daily. 90 tablet 3    pantoprazole (PROTONIX) 20 MG tablet Take 1 tablet (20 mg total) by mouth once daily. 90 tablet 3    predniSONE (DELTASONE) 50 MG Tab TAKE 1 TABLET EVERY 6 HOURS , TAKE 3 DOSES BEFORE AND 1 DOSE AFTER SCAN        PREVIDENT 5000 BOOSTER PLUS 1.1 % Pste BRUSH MORNING AND NIGHT        rosuvastatin (CRESTOR) 20 MG tablet TAKE 1 TABLET EVERY OTHER NIGHT 45 tablet 3    valACYclovir (VALTREX) 1000 MG tablet TAKE 1 TABLET EVERY 24 HOURS; prn 30 tablet 2                Current Facility-Administered Medications on File Prior to Visit   Medication Dose Route Frequency Provider Last Rate Last Admin    albuterol inhaler 2 puff  2 puff Inhalation 1 time in Clinic/HOD Sujit Chaudhry MD          Social History               Socioeconomic History    Marital status:    Tobacco Use    Smoking status: Former       Packs/day: 1.00       Years: 20.00       Pack years: 20.00       Types: Cigarettes       Quit date: 1981       Years since quittin.0    Smokeless tobacco: Never   Substance and Sexual Activity    Alcohol use: No       Comment: 2 drinks/month; none 72  hrs prior to surgery    Drug use: No    Sexual activity: Yes       Partners: Male   Social History Narrative     Live w/ spouse and  1 dog       Social Determinants of Health          Financial Resource Strain: Low Risk     Difficulty of Paying Living Expenses: Not hard at all   Food Insecurity: No Food Insecurity    Worried About Running Out of Food in the Last Year: Never true    Ran Out of Food in the Last Year: Never true   Transportation Needs: No Transportation Needs    Lack of Transportation (Medical): No    Lack of Transportation (Non-Medical): No   Physical Activity: Insufficiently Active    Days of Exercise per Week: 3 days    Minutes of Exercise per Session: 30 min   Stress: No Stress Concern Present    Feeling of Stress : Only a little   Social Connections: Moderately Integrated    Frequency of Communication with Friends and Family: More than three times a week    Frequency of Social Gatherings with Friends and Family: Once a week    Attends Church Services: More than 4 times per year    Active Member of Clubs or Organizations: No    Attends Club or Organization Meetings: Never    Marital Status:    Housing Stability: Low Risk     Unable to Pay for Housing in the Last Year: No    Number of Places Lived in the Last Year: 1    Unstable Housing in the Last Year: No               Family History   Problem Relation Age of Onset    Hypertension Mother      Hypertension Father      Cancer Neg Hx           Review of Systems   Constitutional:  Negative for appetite change, chills, fever and unexpected weight change.   HENT:  Negative for facial swelling, postnasal drip and sore throat.    Eyes:  Negative for redness and itching.   Respiratory:  Negative for chest tightness and shortness of breath.    Cardiovascular:  Negative for chest pain and palpitations.   Gastrointestinal:  Negative for blood in stool, diarrhea, nausea and vomiting.   Genitourinary:  Negative for difficulty urinating and dysuria.  "  Musculoskeletal:  Negative for arthralgias and joint swelling.   Skin:  Negative for rash and wound.   Neurological:  Negative for dizziness and syncope.   Hematological:  Negative for adenopathy.   Psychiatric/Behavioral:  Negative for agitation. The patient is not nervous/anxious.    Objective:   /69 (BP Location: Left arm, Patient Position: Sitting, BP Method: Medium (Automatic))   Pulse 70   Ht 5' 3" (1.6 m)   Wt 64.4 kg (142 lb)   BMI 25.15 kg/m²      Physical Exam   Vitals reviewed.  Constitutional: She is oriented to person, place, and time.   HENT:   Head: Normocephalic and atraumatic.   Nose: Nose normal.   Eyes: Pupils are equal, round, and reactive to light. Right eye exhibits no discharge. Left eye exhibits no discharge.   Pulmonary/Chest: Effort normal and breath sounds normal. No stridor. No respiratory distress. She exhibits no mass, no tenderness and no edema. Right breast exhibits no mass, no skin change and no tenderness. Left breast exhibits mass. Left breast exhibits no skin change and no tenderness. No breast swelling or bleeding. Breasts are symmetrical.      Abdominal: Normal appearance.   Genitourinary: No breast swelling or bleeding.   Neurological: She is alert and oriented to person, place, and time.   Skin: Skin is warm and dry.      Psychiatric: Her behavior is normal. Mood, judgment and thought content normal.      Radiology review: Images personally reviewed by me in the clinic.      11/3/22 Left Diagnostic Mammo/US Left:      Findings:  This procedure was performed using tomosynthesis. Computer-aided detection was utilized in the interpretation of this examination.     Patient is status post punch biopsy of skin lesion at the left lateral aspect of the areola. She is status post mastectomy with autologous tissue reconstruction.  Immediately deep to the skin lesion, there is a high density, irregularly shaped mass with spiculated margins seen in the retroareolar region of " the left breast. Laura ultrasound, this corresponds to a 35 mm x 23 mm x 14 mm irregularly shaped, isoechoic mass. The  Mass does extend to the skin surface. No left axillary adenopathy.      Impression:  Left  Mass: Left breast mass at the retroareolar position. Assessment: 6 - Known biopsy, proven malignancy.      BI-RADS Category:   Overall: 6 - Known Biopsy-Proven Malignancy     Recommendation:  Patient is under the care of the Breast Oncology team.      Assessment:       1. Personal history of breast cancer    2. S/P mastectomy, bilateral    3. Malignant neoplasm of overlapping sites of left breast in female, estrogen receptor negative        Plan:      Options for management were discussed with the patient and her family. We reviewed the existing data noting the equivalency of breast conserving surgery with radiation therapy and mastectomy. We also reviewed the guidelines of the National Comprehensive Cancer Network for Stage  breast carcinoma. We discussed the need for lumpectomy margins to be negative for carcinoma, the necessity for postoperative radiation therapy after breast conservation in most cases, the possibility of a failed or false negative sentinel lymph node biopsy and the potential need for complete lymphadenectomy for a failed or positive sentinel lymph node biopsy were fully discussed. In the setting of mastectomy, delayed or immediate reconstruction options are available and were discussed.      In the setting of lumpectomy, radiation therapy would be recommended majority of the time.  The duration and treatment side effects were discussed with the patient.  This will coordinated with the radiation oncologist pending final pathology.     We also discussed the role of systemic therapy in the treatment of early stage breast cancer.  We discussed that this is based on tumor biology and david status and will be determined based on final pathology.  We discussed that if the cancer is hormone  positive, endocrine therapy would be recommended in most cases and its use can reduce the risk of recurrence as well as improve survival. Side effects of treatment were briefly discussed. We also discussed the potential role for chemotherapy based on a number of factors such as tumor phenotype (ER+ vs. triple negative vs. Bmz0wap+) and this would be determined in coordination with the medical oncologist.     The patient, in consultation with her family, has elected to proceed with left partial mastectomy and sentinel lymph node biopsy. The operative risks of bleeding, infection, recurrence, scarring, and anesthetic complications and the possibility of requiring further surgery were all noted and informed consent obtained.     PET scheduled for 11/7/22.         Surgery scheduled. Follow-up in clinic roughly 14 days after surgery.      Patient was educated on breast cancer, receptors, wire localization lumpectomy, mastectomy, sentinel lymph node mapping and biopsy, axillary lymph node dissection, reconstruction, breast prosthesis with post-mastectomy bra and radiation therapy. Patient was given patient information binder [facility].  All her questions were answered.

## 2022-11-26 VITALS
HEART RATE: 76 BPM | SYSTOLIC BLOOD PRESSURE: 100 MMHG | TEMPERATURE: 98 F | WEIGHT: 140 LBS | HEIGHT: 63 IN | DIASTOLIC BLOOD PRESSURE: 55 MMHG | OXYGEN SATURATION: 95 % | RESPIRATION RATE: 18 BRPM | BODY MASS INDEX: 24.8 KG/M2

## 2022-11-28 DIAGNOSIS — E03.9 ACQUIRED HYPOTHYROIDISM: ICD-10-CM

## 2022-11-28 RX ORDER — LEVOTHYROXINE SODIUM 75 UG/1
TABLET ORAL
Qty: 30 TABLET | Refills: 0 | Status: SHIPPED | OUTPATIENT
Start: 2022-11-28 | End: 2023-01-13

## 2022-11-28 NOTE — TELEPHONE ENCOUNTER
No new care gaps identified.  [facility] Embedded Care Gaps. Reference number: [id number]. 11/28/2022   12:58:21 PM CST

## 2022-11-29 ENCOUNTER — TELEPHONE (OUTPATIENT)
Dept: SURGERY | Facility: CLINIC | Age: 67
End: 2022-11-29
Payer: MEDICARE

## 2022-11-29 NOTE — TELEPHONE ENCOUNTER
Called patient to check in after surgery. She states her pain is well controlled. No issues to report. Given my number to contact if she had an questions or concerns.

## 2022-12-02 ENCOUNTER — OFFICE VISIT (OUTPATIENT)
Dept: SURGERY | Facility: CLINIC | Age: 67
End: 2022-12-02
Payer: MEDICARE

## 2022-12-02 VITALS — WEIGHT: 140 LBS | HEIGHT: 63 IN | BODY MASS INDEX: 24.8 KG/M2

## 2022-12-02 DIAGNOSIS — Z85.3 PERSONAL HISTORY OF BREAST CANCER: ICD-10-CM

## 2022-12-02 DIAGNOSIS — Z90.13 S/P MASTECTOMY, BILATERAL: ICD-10-CM

## 2022-12-02 DIAGNOSIS — Z09 POSTOP CHECK: Primary | ICD-10-CM

## 2022-12-02 DIAGNOSIS — Z98.890 S/P LUMPECTOMY, LEFT BREAST: ICD-10-CM

## 2022-12-02 PROCEDURE — 99024 PR POST-OP FOLLOW-UP VISIT: ICD-10-PCS | Mod: POP,,, | Performed by: PHYSICIAN ASSISTANT

## 2022-12-02 PROCEDURE — 99024 POSTOP FOLLOW-UP VISIT: CPT | Mod: POP,,, | Performed by: PHYSICIAN ASSISTANT

## 2022-12-02 PROCEDURE — 99999 PR PBB SHADOW E&M-EST. PATIENT-LVL III: ICD-10-PCS | Mod: PBBFAC,,, | Performed by: PHYSICIAN ASSISTANT

## 2022-12-02 PROCEDURE — 99999 PR PBB SHADOW E&M-EST. PATIENT-LVL III: CPT | Mod: PBBFAC,,, | Performed by: PHYSICIAN ASSISTANT

## 2022-12-02 PROCEDURE — 99213 OFFICE O/P EST LOW 20 MIN: CPT | Mod: PBBFAC | Performed by: PHYSICIAN ASSISTANT

## 2022-12-06 LAB
COMMENT: NORMAL
FINAL PATHOLOGIC DIAGNOSIS: NORMAL
GROSS: NORMAL
Lab: NORMAL
MICROSCOPIC EXAM: NORMAL

## 2022-12-07 ENCOUNTER — TELEPHONE (OUTPATIENT)
Dept: SURGERY | Facility: CLINIC | Age: 67
End: 2022-12-07
Payer: MEDICARE

## 2022-12-07 NOTE — PROGRESS NOTES
Post-Op  [facility] [[email or web address]]  Department of Surgery    REFERRING PROVIDER: Patrick Hernandez MD  MEDICAL ONCOLOGIST:    Dr. Sepulveda  RADIATION ONCOLOGIST:   pending    DIAGNOSIS:    This is a 67 y.o. female with history of bilateral DCIS s/p nipple sparing flap reconstruction now with Grade 3 ER-, UT-, Her2 + IDC of the left breast.     TREATMENT SUMMARY:  The patient is status post left partial mastectomy and sentinel node biopsy on 2022.  Final pathology still pending at time of visit.     INTERVAL HISTORY:   Dulce Root comes in for a post-op drain check.  She denies fever, chills, chest pain or shortness of breath.  Her pain is well controlled. Per patient, her drain still with inconsistent output. Patient has not been keeping a log so unclear about total daily output.     MEDICATIONS:  Current Outpatient Medications   Medication Sig Dispense Refill    abaloparatide (TYMLOS) 80 mcg (3,120 mcg/1.56 mL) PnIj Inject 80 mcg into the skin once daily. 1 each 11    ALPRAZolam (XANAX) 1 MG tablet Take 1 mg by mouth nightly as needed. Rarely used      aspirin (ECOTRIN) 81 MG EC tablet Take 1 tablet (81 mg total) by mouth once daily. 90 tablet 12    brimonidine 0.2% (ALPHAGAN) 0.2 % Drop       calcium carbonate (OS-CALVIN) 600 mg (1,500 mg) Tab Take 600 mg by mouth 2 (two) times daily with meals.      chlorhexidine (PERIDEX) 0.12 % solution SMARTSI Tablespoon By Mouth Twice Daily      cholecalciferol, vitamin D3, (VITAMIN D3) 100 mcg (4,000 unit) Cap Take by mouth.      clopidogreL (PLAVIX) 75 mg tablet Take 1 tablet (75 mg total) by mouth once daily. (Patient taking differently: Take 75 mg by mouth once daily. MD OFFICE INSTRUCTED PATIENT NOT TO STOP PLAVIX AND SHE HAS RESTARTED IT.) 90 tablet 4    dapsone 25 MG Tab TAKE 2 TABLETS BY MOUTH EVERY DAY FOR 30 DAYS      dexAMETHasone (DECADRON) 0.5 mg/5 mL Elix SWISH 3.75MLS IN MOUTH FOR 30 SECONDS AND SWALLOW TWICE DAILY AS NEEDED FOR LICHEN PLANUS FLARE UPS       EScitalopram oxalate (LEXAPRO) 10 MG tablet TAKE 1 TABLET BY MOUTH EVERY DAY 90 tablet 3    fluconazole (DIFLUCAN) 200 MG Tab TAKE 1 TABLET ORALLY EVERY OTHER DAY X 4 DOSES, THEN 1 WEEKLY FOR 30 DAYS      hydroCHLOROthiazide (HYDRODIURIL) 25 MG tablet Take 1 tablet (25 mg total) by mouth every morning. 90 tablet 6    HYDROcodone-acetaminophen (NORCO) 5-325 mg per tablet Take 1 tablet by mouth every 6 (six) hours as needed for Pain. 20 tablet 0    levothyroxine (SYNTHROID) 75 MCG tablet TAKE 1 TABLET BY MOUTH EVERY DAY BEFORE BREAKFAST 30 tablet 0    losartan (COZAAR) 50 MG tablet Take 1 tablet (50 mg total) by mouth every evening. 90 tablet 3    metoprolol succinate (TOPROL-XL) 25 MG 24 hr tablet Take 1 tablet (25 mg total) by mouth 2 (two) times daily. 90 tablet 3    pantoprazole (PROTONIX) 20 MG tablet Take 1 tablet (20 mg total) by mouth once daily. 90 tablet 3    rosuvastatin (CRESTOR) 20 MG tablet TAKE 1 TABLET EVERY OTHER NIGHT 45 tablet 3    valACYclovir (VALTREX) 1000 MG tablet TAKE 1 TABLET EVERY 24 HOURS; prn 30 tablet 2     No current facility-administered medications for this visit.     Facility-Administered Medications Ordered in Other Visits   Medication Dose Route Frequency Provider Last Rate Last Admin    0.9%  NaCl infusion   Intravenous Continuous Ender Morrison MD           ALLERGIES:   Review of patient's allergies indicates:   Allergen Reactions    Amlodipine Shortness Of Breath and Other (See Comments)     unknown  Other reaction(s): Swelling  unknown  unknown  Other reaction(s): Swelling  unknown  Other reaction(s): Swelling  unknown    Levofloxacin Hives and Swelling    Sulfa (sulfonamide antibiotics) Shortness Of Breath, Itching and Other (See Comments)     elevated blood pressure    Ace inhibitors     Ciprofloxacin Itching    Iodinated contrast media     Iodine      Other reaction(s): Unknown    Latex      Other reaction(s): Itching    Latex, natural rubber Itching       PHYSICAL  EXAMINATION:   General:  This is a well appearing female with appropriate speech, affect and gait.     Breast:  Incision clean, dry, and intact. Diffuse bruising of the left lateral breast surrounding incision. Drain in place and functioning.       IMPRESSION:   The patient has had an uneventful postoperative course.    PLAN:   1. Long discussion about proper drain care. Patient had not been charging the bulb keeping a log. ~ 60 CC's drained off today once bulb was charge and drain was stripped.   2. Patient given a cup to measure and educated about keeping a drain log. Will check in next week.   3. The patient is advised in continued exam of the breast chest wall and to report to this office sooner should she note any areas of abnormality or concern.   4.  She has been instructed to meet with med onc and rad onc for discussion of adjuvant treatment recommendations  5. Patient is scheduled to see Dr. Maradiaga on 12/13/22.

## 2022-12-07 NOTE — TELEPHONE ENCOUNTER
Spoke with patient regarding drain output. Reviewed her surgical pathology over the phone. Patient verbalized understanding. All questions asked and answered.

## 2022-12-13 ENCOUNTER — OFFICE VISIT (OUTPATIENT)
Dept: SURGERY | Facility: CLINIC | Age: 67
End: 2022-12-13
Payer: MEDICARE

## 2022-12-13 VITALS
BODY MASS INDEX: 24.98 KG/M2 | DIASTOLIC BLOOD PRESSURE: 65 MMHG | SYSTOLIC BLOOD PRESSURE: 132 MMHG | HEART RATE: 86 BPM | HEIGHT: 63 IN | WEIGHT: 141 LBS

## 2022-12-13 DIAGNOSIS — C50.112 MALIGNANT NEOPLASM OF CENTRAL PORTION OF LEFT BREAST IN FEMALE, ESTROGEN RECEPTOR NEGATIVE: Primary | ICD-10-CM

## 2022-12-13 DIAGNOSIS — Z17.1 MALIGNANT NEOPLASM OF CENTRAL PORTION OF LEFT BREAST IN FEMALE, ESTROGEN RECEPTOR NEGATIVE: Primary | ICD-10-CM

## 2022-12-13 PROCEDURE — 99024 POSTOP FOLLOW-UP VISIT: CPT | Mod: POP,,, | Performed by: SURGERY

## 2022-12-13 PROCEDURE — 99999 PR PBB SHADOW E&M-EST. PATIENT-LVL IV: ICD-10-PCS | Mod: PBBFAC,,, | Performed by: SURGERY

## 2022-12-13 PROCEDURE — 99024 PR POST-OP FOLLOW-UP VISIT: ICD-10-PCS | Mod: POP,,, | Performed by: SURGERY

## 2022-12-13 PROCEDURE — 99999 PR PBB SHADOW E&M-EST. PATIENT-LVL IV: CPT | Mod: PBBFAC,,, | Performed by: SURGERY

## 2022-12-13 PROCEDURE — 99214 OFFICE O/P EST MOD 30 MIN: CPT | Mod: PBBFAC | Performed by: SURGERY

## 2022-12-14 ENCOUNTER — DOCUMENTATION ONLY (OUTPATIENT)
Dept: HEMATOLOGY/ONCOLOGY | Facility: CLINIC | Age: 67
End: 2022-12-14
Payer: MEDICARE

## 2022-12-14 DIAGNOSIS — C50.112 MALIGNANT NEOPLASM OF CENTRAL PORTION OF LEFT BREAST IN FEMALE, ESTROGEN RECEPTOR NEGATIVE: Primary | ICD-10-CM

## 2022-12-14 DIAGNOSIS — Z17.1 MALIGNANT NEOPLASM OF CENTRAL PORTION OF LEFT BREAST IN FEMALE, ESTROGEN RECEPTOR NEGATIVE: Primary | ICD-10-CM

## 2022-12-14 NOTE — PROGRESS NOTES
Subjective:       Patient ID: Dulce Root is a 67 y.o. female.    Chief Complaint: No chief complaint on file.      HPI Ms. Root is a very pleasant 67-year-old female with history of bilateral ductal carcinoma in situ and a remote history of Hodgkin's disease.    She had an area of nonhealing ulceration in her left breast reconstruction.    A biopsy of that area on October 25th, 2022 showed poorly differentiated invasive carcinoma which was ER negative, [place] negative and HER2 3+.  Ki-67 was 30%.    PET 11/7/22  -no distant disease.    Echo - EF 35%    On November 25, 2022 left breast lumpectomy and sentinel lymph node biopsy was performed.  That showed a 2.6 x 2.3 x 2.1 cm poorly differentiated carcinoma (3+ 3+2).  Margins were negative.  There was dermal lymphovascular invasion present.  [place] lymph node biopsy was negative.  Final pathological stage T2 N0.  Breast history:  She  developed ductal      carcinoma in situ of the right breast in 09/2006, treated with lumpectomy     and radiation therapy.  In 08/2009, she was diagnosed with ductal carcinoma  in situ of the left breast and in 08/2009, she had bilateral mastectomy.      The left breast showed widespread high-grade DCIS and the right breast was    without evidence of malignancy.        She has a remote history of Hodgkin's disease, originally diagnosed in   1991, treated with radiation therapy and then treated with salvage   chemotherapy with ABVD on protocol E-5489 in 1996.    In August 2017 she underwent aortic valve replacement and coronary artery bypass grafting by Dr. Baxter.  She has also has stent placement in 2019.  Her last echocardiogram in March of 2022 showed an ejection fraction of 35%.  Review of Systems    Objective:      Physical Exam  Vitals reviewed.   Constitutional:       General: She is not in acute distress.     Appearance: She is well-developed.   Neurological:      Mental Status: She is alert and oriented to person, place, and  time.   Psychiatric:         Mood and Affect: Mood normal.         Behavior: Behavior normal.         Thought Content: Thought content normal.         Judgment: Judgment normal.       Assessment:       1. Malignant neoplasm of central portion of left breast in female, estrogen receptor negative        Plan:    I discussed options for therapy which would include chemotherapy alone or weekly Taxol plus Herceptin with close monitoring of her cardiac function.    HER 2 directed therapy would certainly offer significant advantage over chemotherapy alone; however, there would be some risk of deterioration of her cardiac function.      Written information for Taxol and Herceptin provided as well as the treatment schema.  I discussed the treatment plan - risk of less than 10% with RX vs 30% without.    She is appropriately concerned about her ability to go through therapy.    Will meet with Cardiology first.    Route Chart for Scheduling    Med Onc Chart Routing      Follow up with physician 3 weeks.   Follow up with [facility]    Infusion scheduling note    Injection scheduling note    Labs    Imaging    Pharmacy appointment    Other referrals          Therapy Plan Information  DENOSUMAB (PROLIA) Q6M  Medications  denosumab (PROLIA) injection 60 mg  60 mg, Subcutaneous, Every 26 weeks    PRIVIGEN (IVIG) Q4W  Pre-Medications  acetaminophen tablet 500 mg  500 mg, Oral, Every 4 weeks  diphenhydrAMINE capsule 50 mg  50 mg, Oral, Every 4 weeks  Hydration  sodium chloride 0.9% bolus 500 mL  500 mL, Intravenous, Every 4 weeks  Flushes  sodium chloride 0.9% 250 mL flush bag  Intravenous, Every 4 weeks  sodium chloride 0.9% flush 10 mL  10 mL, Intravenous, Every 4 weeks  heparin, porcine (PF) 100 unit/mL injection flush 500 Units  500 Units, Intravenous, Every 4 weeks  alteplase injection 2 mg  2 mg, Intra-Catheter, Every 4 weeks

## 2022-12-15 ENCOUNTER — PATIENT MESSAGE (OUTPATIENT)
Dept: CARDIOLOGY | Facility: CLINIC | Age: 67
End: 2022-12-15
Payer: MEDICARE

## 2022-12-15 ENCOUNTER — OFFICE VISIT (OUTPATIENT)
Dept: HEMATOLOGY/ONCOLOGY | Facility: CLINIC | Age: 67
End: 2022-12-15
Payer: MEDICARE

## 2022-12-15 VITALS
BODY MASS INDEX: 25.17 KG/M2 | SYSTOLIC BLOOD PRESSURE: 141 MMHG | HEART RATE: 83 BPM | WEIGHT: 142.06 LBS | RESPIRATION RATE: 18 BRPM | HEIGHT: 63 IN | TEMPERATURE: 98 F | DIASTOLIC BLOOD PRESSURE: 66 MMHG | OXYGEN SATURATION: 96 %

## 2022-12-15 DIAGNOSIS — C50.112 MALIGNANT NEOPLASM OF CENTRAL PORTION OF LEFT BREAST IN FEMALE, ESTROGEN RECEPTOR NEGATIVE: Primary | ICD-10-CM

## 2022-12-15 DIAGNOSIS — Z17.1 MALIGNANT NEOPLASM OF CENTRAL PORTION OF LEFT BREAST IN FEMALE, ESTROGEN RECEPTOR NEGATIVE: Primary | ICD-10-CM

## 2022-12-15 PROCEDURE — 99999 PR PBB SHADOW E&M-EST. PATIENT-LVL IV: ICD-10-PCS | Mod: PBBFAC,,, | Performed by: INTERNAL MEDICINE

## 2022-12-15 PROCEDURE — 99214 PR OFFICE/OUTPT VISIT, EST, LEVL IV, 30-39 MIN: ICD-10-PCS | Mod: S$PBB,,, | Performed by: INTERNAL MEDICINE

## 2022-12-15 PROCEDURE — 99214 OFFICE O/P EST MOD 30 MIN: CPT | Mod: S$PBB,,, | Performed by: INTERNAL MEDICINE

## 2022-12-15 PROCEDURE — 99999 PR PBB SHADOW E&M-EST. PATIENT-LVL IV: CPT | Mod: PBBFAC,,, | Performed by: INTERNAL MEDICINE

## 2022-12-15 PROCEDURE — 99214 OFFICE O/P EST MOD 30 MIN: CPT | Mod: PBBFAC | Performed by: INTERNAL MEDICINE

## 2022-12-20 ENCOUNTER — TUMOR BOARD CONFERENCE (OUTPATIENT)
Dept: SURGERY | Facility: CLINIC | Age: 67
End: 2022-12-20
Payer: MEDICARE

## 2022-12-20 NOTE — PROGRESS NOTES
Oncology History   Malignant neoplasm of central portion of left breast in female, estrogen receptor negative   10/25/2022 Initial Diagnosis    Malignant neoplasm of central portion of left breast in female, estrogen receptor negative     10/25/2022 Biopsy    LEFT BREAST SKIN, PUNCH BIOPSY:   Poorly differentiated carcinoma, morphologically consistent with invasive   ductal carcinoma of breast origin., Shanti histologic grade 3 (tubule   formation:  3, nuclear pleomorphism:  3, mitotic activity:  2).   Comment:  Invasive carcinoma is present involving dermis without involvement   of overlying epidermis in this sample.  Dermal lymphatic invasion is not   excluded.  The patient's previous history of breast cancer status post double   mastectomy is noted.  As such, the findings may represent a recurrence of   such.  Recommend correlation with previous histology and biomarker status if   available.      10/25/2022 Breast Tumor Markers    Estrogen: Negative  Progesterone: Negative  HER2: Positive     11/25/2022 Breast Surgery    Left partial mastectomy     12/14/2022 Cancer Staged    Staging form: Breast, AJCC 8th Edition  - Pathologic stage from 12/14/2022: Stage IIA (pT2, pN0(sn), cM0, G3, ER-, VA-, HER2+)        Notable Event    She developed ductal carcinoma in situ of the right breast in 09/2006, treated with lumpectomy  and radiation therapy.  In 08/2009, she was diagnosed with ductal carcinoma in situ of the left breast and in 08/2009, she had bilateral mastectomy. The left breast showed widespread high-grade DCIS and the right breast was    without evidence of malignancy.    She has a remote history of Hodgkin's disease, originally diagnosed in 1991, treated with radiation therapy and then treated with salvage chemotherapy with ABVD on protocol E-5489 in 1996.     12/20/2022 Tumor Conference    Given Her2 positivity, patient is considered for chemotherapy, but will need close cardiac monitoring given her low  ejection fraction. Dr. Sepulveda has discussed a treatment plan with the patient. She is deciding whether she will proceed. Discussion regarding XRT. Radiation Oncology does recommend XRT given the lymphovascular invasion. Patient will meet with Radiation Oncology to discuss.

## 2022-12-23 ENCOUNTER — PATIENT MESSAGE (OUTPATIENT)
Dept: FAMILY MEDICINE | Facility: CLINIC | Age: 67
End: 2022-12-23
Payer: MEDICARE

## 2022-12-27 NOTE — PROGRESS NOTES
Last 5 Patient Entered Readings                                      Current 30 Day Average: 139/79     Recent Readings 5/24/2018 5/23/2018 5/23/2018 5/23/2018 5/23/2018    SBP (mmHg) 150 176 137 137 171    DBP (mmHg) 82 107 119 119 96    Pulse 77 99 75 75 82          RCB tomorrow 12:15 PM.    eliquis 2.5 mg tab  Pantoprazole 40 mg tab  Quetiapine 25 mg tab.    Refill requests.     Last office visit:  11/10/2022 with note to Follow-up with me in 4 weeks.  Wife agrees and understands.    Reception to please contact the patient to schedule recheck appt then send call back to nursing so med can be filled

## 2022-12-29 ENCOUNTER — OFFICE VISIT (OUTPATIENT)
Dept: RHEUMATOLOGY | Facility: CLINIC | Age: 67
End: 2022-12-29
Payer: MEDICARE

## 2022-12-29 DIAGNOSIS — S22.069D CLOSED FRACTURE OF SEVENTH THORACIC VERTEBRA WITH ROUTINE HEALING, UNSPECIFIED FRACTURE MORPHOLOGY, SUBSEQUENT ENCOUNTER: ICD-10-CM

## 2022-12-29 DIAGNOSIS — M81.0 AGE RELATED OSTEOPOROSIS, UNSPECIFIED PATHOLOGICAL FRACTURE PRESENCE: Primary | ICD-10-CM

## 2022-12-29 DIAGNOSIS — Z71.89 COUNSELING ON HEALTH PROMOTION AND DISEASE PREVENTION: ICD-10-CM

## 2022-12-29 PROCEDURE — 99214 PR OFFICE/OUTPT VISIT, EST, LEVL IV, 30-39 MIN: ICD-10-PCS | Mod: 95,,,

## 2022-12-29 PROCEDURE — 99214 OFFICE O/P EST MOD 30 MIN: CPT | Mod: 95,,,

## 2022-12-29 NOTE — PROGRESS NOTES
RHEUMATOLOGY OUTPATIENT CLINIC NOTE    12/29/2022    Subjective:       Patient ID: Dulce Root is a 67 y.o. female.    Chief Complaint: No chief complaint on file.    The patient location is: [place]  The chief complaint leading to consultation is: See how she did with prolia    Visit type: audiovisual    Face to Face time with patient: 15  30 minutes of total time spent on the encounter, which includes face to face time and non-face to face time preparing to see the patient (eg, review of tests), Obtaining and/or reviewing separately obtained history, Documenting clinical information in the electronic or other health record, Independently interpreting results (not separately reported) and communicating results to the patient/family/caregiver, or Care coordination (not separately reported).         Each patient to whom he or she provides medical services by telemedicine is:  (1) informed of the relationship between the physician and patient and the respective role of any other health care provider with respect to management of the patient; and (2) notified that he or she may decline to receive medical services by telemedicine and may withdraw from such care at any time.    Notes:      [facility]   Dulce Root is a 67 y.o. pleasant female here for rheumatology follow up for osteoporosis.      Was unable to get Tymlos therapy due to high out of pocket cost from patient. She started prolia injections 10/27/2022. Tolerated the injection well without side effect.     She occasionally does not take calcium supplement due to it being difficult to swallow. Has not gotten much physical therapy as she has had some difficult life circumstances since last appointment. She lost her son this fall and also was diagnosed with breast cancer again, recently undergoing surgery.     Denies any planned invasive dental work.     Osteoporosis questionnaire  Current meds for osteopenia/ osteoporosis: 1200 mg calcium on and off  Prior meds  for osteopenia/ osteoporosis: fosamax many years ago  Prev dexa scan:  We will discuss today  Vit D supplement:  D3 two tablets a day, unsure dosage  Menopause:  40s? Cycles stopped with chemotherapy  Hystrectomy: no  HRT: no  Smoker/tobacco: stopped in 1981 since 16 yo  Etoh:  No  Falls:  No  Activity level: no formal exercise  Kidney problems : none  Prev fracture :  compression fracture  Steroids : no  Family history :  mother and fathers side   PPI/gerd:   protonix, pepcid  Other risk factors :   Dental issues: one tooth needing implant  Anticipated dental work :  one implant at some point  H/o cancer/tx: hodgkins x2 with radiation,  chemo . Breast.       Rheumatologic review of systems negative otherwise.     Physical exam  Able to rise from a chair independently.  Walks without assistance.  Steady gait.  Kyphosis present    Past Medical History:   Diagnosis Date    Allergy     Anticoagulant long-term use     Plavix    Breast cancer 2008    Carotid stenosis, bilateral 10/13/2017    Coronary artery disease     Coronary artery disease involving coronary bypass graft of native heart without angina pectoris 08/16/2019 2/19  1.  Left main is a small vessel with a 60%-65% ostial lesion. 2.  LAD is a medium-sized vessel diffuse 70% proximally  Ramus intermedius is a medium size vessel with a 40%-50% ostial lesion. 3.  Circumflex 60%-70% ostial  remainder of circumflex and obtuse marginal normal in appearance. Right coronary artery is normal size vessel, short discrete 70%mid 4.  Vein graft to right coronary is occ    Coronary artery disease involving native coronary artery of native heart without angina pectoris 06/27/2017    DCIS (ductal carcinoma in situ) of breast 11/06/2012    Encounter for blood transfusion     Essential hypertension 11/06/2012    GERD (gastroesophageal reflux disease)     GI bleed 02/2021    Hodgkin lymphoma     Hx of Hodgkin's disease - s/p chemo and radiation 11/06/2012    Hyperlipidemia      Hyperlipidemia 11/06/2012    The patient presents with hyperlipidemia.  The patient reports tolerating the medication well and is in excellent compliance.  There have been no medication side effects.  The patient denies chest pain, neuropathy, and myalgias.  The patient has reduced fat intake and has been exercising.  Current treatment has included the medications listed in the med card.   Lab Results Component Value Date  CH    Hypertension     LBBB (left bundle branch block) 05/22/2018    Osteoporosis     Paroxysmal atrial fibrillation - single post-op episode 08/24/2017    Pemphigoid     Pemphigoid     pt receives IVIG infusions    S/P AVR (aortic valve replacement) - pericardial valve 08/21/2017    Perceval aortic tissue valve PVS23. 23mm    serial # [id number]    S/P CABG x 2 08/21/2017 8/17 CABG X 2 with SVG-LAD and SVG-distal RCA  SVG LAD due to absent LIMA after chest radiation and lymph node biopsy     Stented coronary artery     She had 2 stents placed in 2/2019.  She has had CAD and bypasses in the past in 2017.      Thyroid disease      Past Surgical History:   Procedure Laterality Date    ARTERIOGRAPHY OF AORTIC ROOT N/A 02/15/2019    Procedure: ARTERIOGRAM, AORTIC ROOT;  Surgeon: Sujit Chaudhry MD;  [facility];  Service: Cardiology;  Laterality: N/A;    AXILLARY NODE DISSECTION Left 11/25/2022    Procedure: LYMPHADENECTOMY, AXILLARY-Left;  Surgeon: Rosa Maradiaga MD;  Location: [facility];  Service: General;  Laterality: Left;    BREAST BIOPSY      BREAST RECONSTRUCTION      bilateral mastectomy    CARDIAC CATHETERIZATION      stents x 2    CARDIAC SURGERY  08/2017    Aortic valve replacement , CABG 2 vessel    CARDIAC VALVE REPLACEMENT  08/2017    aortic valve, bovine per pt    CORONARY ANGIOGRAPHY N/A 02/15/2019    Procedure: ANGIOGRAM, CORONARY ARTERY;  Surgeon: Sujit Chaudhry MD;  Location: [facility] CATH;  Service: Cardiology;  Laterality: N/A;    CORONARY ANGIOGRAPHY N/A 4/1/2022    Procedure:  ANGIOGRAM, CORONARY ARTERY;  Surgeon: Sujit Chaudhry MD;  Location: [facility] CATH;  Service: Cardiology;  Laterality: N/A;    CORONARY BYPASS GRAFT ANGIOGRAPHY  02/15/2019    Procedure: Bypass graft study;  Surgeon: Sujit Chaudhry MD;  Location: [facility] CATH;  Service: Cardiology;;    COSMETIC SURGERY      bereast reconstruction    ESOPHAGOGASTRODUODENOSCOPY N/A 05/14/2021    Procedure: EGD (ESOPHAGOGASTRODUODENOSCOPY);  Surgeon: Tracy Flower MD;  Location: [facility] ENDO;  Service: Endoscopy;  Laterality: N/A;    ESOPHAGOGASTRODUODENOSCOPY N/A 11/04/2021    Procedure: EGD (ESOPHAGOGASTRODUODENOSCOPY);  Surgeon: Tracy Flower MD;  Location: [facility];  Service: Endoscopy;  Laterality: N/A;    EYE SURGERY      eye lids    INJECTION FOR SENTINEL NODE IDENTIFICATION Left 11/25/2022    Procedure: INJECTION, FOR SENTINEL NODE IDENTIFICATION-Left;  Surgeon: Rosa Maradiaga MD;  Location: [facility];  Service: General;  Laterality: Left;    LEFT HEART CATHETERIZATION Right 02/15/2019    Procedure: Left heart cath;  Surgeon: Sujit Chaudhry MD;  Location: [facility] CATH;  Service: Cardiology;  Laterality: Right;    LEFT HEART CATHETERIZATION Left 4/1/2022    Procedure: Left heart cath;  Surgeon: Sujit Chaudhry MD;  [facility];  Service: Cardiology;  Laterality: Left;    LUMBAR LAMINECTOMY WITH DISCECTOMY N/A 02/27/2020    Procedure: LAMINECTOMY, SPINE, LUMBAR, WITH DISCECTOMY;  Surgeon: Vimal Villegas MD;  Location: [facility] OR;  Service: Neurosurgery;  Laterality: N/A;  left L5-S1  Laminectomy L4-5    LYMPHADENECTOMY      MASTECTOMY      MASTECTOMY, PARTIAL Left 11/25/2022    Procedure: MASTECTOMY, PARTIAL-Left ultrasound guided;  Surgeon: Rosa Maradiaga MD;  Location: [facility];  Service: General;  Laterality: Left;    RIGHT HEART CATHETERIZATION Right 4/1/2022    Procedure: INSERTION, CATHETER, RIGHT HEART;  Surgeon: Sujit Chaudhry MD;  Location: [facility] CATH;  Service: Cardiology;  Laterality: Right;    SENTINEL LYMPH NODE  BIOPSY Left 2022    Procedure: BIOPSY, LYMPH NODE, SENTINEL-Left;  Surgeon: Rosa Maradiaga MD;  Location: [facility] OR;  Service: General;  Laterality: Left;    SKIN BIOPSY      SPLENECTOMY, TOTAL      TRANSFORAMINAL EPIDURAL INJECTION OF STEROID Left 2019    Procedure: Left L5/S1 TF SKIP with local;  Surgeon: Canelo Niño MD;  Location: [facility] PAIN MGT;  Service: Pain Management;  Laterality: Left;    TUMOR REMOVAL      neck- lymphoma     Family History   Problem Relation Age of Onset    Hypertension Mother     Hypertension Father     Cancer Neg Hx      Social History     Socioeconomic History    Marital status:    Tobacco Use    Smoking status: Former     Packs/day: 1.00     Years: 20.00     Pack years: 20.00     Types: Cigarettes     Quit date: 1981     Years since quittin.1    Smokeless tobacco: Never   Substance and Sexual Activity    Alcohol use: No     Comment: 2 drinks/month; none 72 hrs prior to surgery    Drug use: No    Sexual activity: Yes     Partners: Male   Social History Narrative    Live w/ spouse and  1 dog      Social Determinants of Health     Financial Resource Strain: Low Risk     Difficulty of Paying Living Expenses: Not hard at all   Food Insecurity: No Food Insecurity    Worried About Running Out of Food in the Last Year: Never true    Ran Out of Food in the Last Year: Never true   Transportation Needs: No Transportation Needs    Lack of Transportation (Medical): No    Lack of Transportation (Non-Medical): No   Physical Activity: Insufficiently Active    Days of Exercise per Week: 3 days    Minutes of Exercise per Session: 30 min   Stress: No Stress Concern Present    Feeling of Stress : Only a little   Social Connections: Moderately Integrated    Frequency of Communication with Friends and Family: More than three times a week    Frequency of Social Gatherings with Friends and Family: Once a week    Attends Mosque Services: More than 4 times per year    Active  Member of Clubs or Organizations: No    Attends Club or Organization Meetings: Never    Marital Status:    Housing Stability: Low Risk     Unable to Pay for Housing in the Last Year: No    Number of Places Lived in the Last Year: 1    Unstable Housing in the Last Year: No     Review of patient's allergies indicates:   Allergen Reactions    Amlodipine Shortness Of Breath and Other (See Comments)     unknown  Other reaction(s): Swelling  unknown  unknown  Other reaction(s): Swelling  unknown  Other reaction(s): Swelling  unknown    Levofloxacin Hives and Swelling    Sulfa (sulfonamide antibiotics) Shortness Of Breath, Itching and Other (See Comments)     elevated blood pressure    Ace inhibitors     Ciprofloxacin Itching    Iodinated contrast media     Iodine      Other reaction(s): Unknown    Latex      Other reaction(s): Itching    Latex, natural rubber Itching           Objective:   There were no vitals taken for this visit.  Immunization History   Administered Date(s) Administered    COVID-19, MRNA, LN-S, PF (Pfizer) (Purple Cap) 12/30/2020, 01/20/2021, 09/30/2021    Influenza 11/28/2021    Influenza (FLUAD) - Quadrivalent - Adjuvanted - PF *Preferred* (65+) 11/28/2021    Influenza - Quadrivalent - MDCK - PF 02/01/2018    Pneumococcal Conjugate - 13 Valent 10/10/2019              No results found for this or any previous visit (from the past 672 hour(s)).       No results found for: TBGOLDPLUS   Lab Results   Component Value Date    HEPCAB Negative 05/11/2021        DEXA 02/04/2021   Spine -1.9, FN -2.9, TH -2.7    DEXA 05/19/2022   Spine -1.7, FN -3.0, TH -3.2    Assessment:       1. Age related osteoporosis, unspecified pathological fracture presence    2. Counseling on health promotion and disease prevention    3. Closed fracture of seventh thoracic vertebra with routine healing, unspecified fracture morphology, subsequent encounter            Impression:   1. Age related osteoporosis, unspecified  pathological fracture presence  - episodes of fragility vertebral compression fracture, severely low osteoporotic T-scores (-3.2 total hip,-3.0 femoral neck), prior therapy with oral bisphosphonates (years ago)  - past makers history of lymphoma with chest wall radiation as part of treatment (199s)  - adequate dietary calcium and vitamin D intake.  - last vitamin-D level 35 on 02/2021  - Avoid immobility, fall prevention   -Tried for tymlos but too expensive out of pocket.   -Started prolia 10/27/2022. Tolerated well.      2. Other specified counseling  - over 10 minutes spent regarding below topics:  - Nutrition and exercise counseling.  - Medication counseling provided.    Plan:          Continue prolia q 6 months    Will monitor calcium on next labs. Can try for calcium in diet. If not, try for chewable or gummy form of calcium.     Weight bearing activity as tolerated  Caution to avoid falls     Continue otc Vitamin D    Follow up 6 months after prolia for prolia with early CMP in Brooke Howard PA-C  [facility]  Rheumatology       30 minutes of total time spent on the encounter, which includes face to face time and non-face to face time preparing to see the patient (eg, review of tests), Obtaining and/or reviewing separately obtained history, Documenting clinical information in the electronic or other health record, Independently interpreting results (not separately reported) and communicating results to the patient/family/caregiver, or Care coordination (not separately reported).

## 2023-01-05 ENCOUNTER — OFFICE VISIT (OUTPATIENT)
Dept: HEMATOLOGY/ONCOLOGY | Facility: CLINIC | Age: 68
End: 2023-01-05
Payer: MEDICARE

## 2023-01-05 VITALS
BODY MASS INDEX: 25.47 KG/M2 | HEART RATE: 83 BPM | HEIGHT: 63 IN | DIASTOLIC BLOOD PRESSURE: 60 MMHG | RESPIRATION RATE: 18 BRPM | WEIGHT: 143.75 LBS | OXYGEN SATURATION: 100 % | SYSTOLIC BLOOD PRESSURE: 121 MMHG | TEMPERATURE: 98 F

## 2023-01-05 DIAGNOSIS — C50.112 MALIGNANT NEOPLASM OF CENTRAL PORTION OF LEFT BREAST IN FEMALE, ESTROGEN RECEPTOR NEGATIVE: Primary | ICD-10-CM

## 2023-01-05 DIAGNOSIS — Z17.1 MALIGNANT NEOPLASM OF CENTRAL PORTION OF LEFT BREAST IN FEMALE, ESTROGEN RECEPTOR NEGATIVE: Primary | ICD-10-CM

## 2023-01-05 DIAGNOSIS — I50.42 CHRONIC COMBINED SYSTOLIC AND DIASTOLIC HEART FAILURE: ICD-10-CM

## 2023-01-05 PROCEDURE — 99999 PR PBB SHADOW E&M-EST. PATIENT-LVL IV: CPT | Mod: PBBFAC,,, | Performed by: INTERNAL MEDICINE

## 2023-01-05 PROCEDURE — 99212 OFFICE O/P EST SF 10 MIN: CPT | Mod: S$PBB,,, | Performed by: INTERNAL MEDICINE

## 2023-01-05 PROCEDURE — 99214 OFFICE O/P EST MOD 30 MIN: CPT | Mod: PBBFAC | Performed by: INTERNAL MEDICINE

## 2023-01-05 PROCEDURE — 99999 PR PBB SHADOW E&M-EST. PATIENT-LVL IV: ICD-10-PCS | Mod: PBBFAC,,, | Performed by: INTERNAL MEDICINE

## 2023-01-05 PROCEDURE — 99212 PR OFFICE/OUTPT VISIT, EST, LEVL II, 10-19 MIN: ICD-10-PCS | Mod: S$PBB,,, | Performed by: INTERNAL MEDICINE

## 2023-01-05 NOTE — PROGRESS NOTES
Subjective:       Patient ID: Dulce Root is a 67 y.o. female.    Chief Complaint: No chief complaint on file.      HPI Ms. Root is a very pleasant 67-year-old female with recent diagnosis of stage 1 ER negative HER 2 positive breast cancer. She has a prior history of bilateral ductal carcinoma in situ and a remote history of Hodgkin's disease.She also has a cardiomyopathy.      Has not had F/U with cardiology yet.    Current history:  She had an area of nonhealing ulceration in her left breast reconstruction dating back to early 2022.    A biopsy of that area on October 25th, 2022 showed poorly differentiated invasive carcinoma which was ER negative, IA negative and HER2 3+.  Ki-67 was 30%.    PET 11/7/22  -no distant disease.    On November 25, 2022 left breast lumpectomy and sentinel lymph node biopsy was performed.  That showed a 2.6 x 2.3 x 2.1 cm poorly differentiated carcinoma (3+ 3+2).  Margins were negative.  There was dermal lymphovascular invasion present.  [place] lymph node biopsy was negative.  Final pathological stage T2 N0.  Breast history:  She  developed ductal      carcinoma in situ of the right breast in 09/2006, treated with lumpectomy     and radiation therapy.  In 08/2009, she was diagnosed with ductal carcinoma  in situ of the left breast and in 08/2009, she had bilateral mastectomy.      The left breast showed widespread high-grade DCIS and the right breast was    without evidence of malignancy.        She has a remote history of Hodgkin's disease, originally diagnosed in   1991, treated with radiation therapy and then treated with salvage   chemotherapy with ABVD on protocol E-5489 in 1996.    In August 2017 she underwent aortic valve replacement and coronary artery bypass grafting by Dr. Baxter.  She has also has stent placement in 2019.    Her last echocardiogram 11/3/2022 showed an ejection fraction of 35%.  Review of Systems   Constitutional:  Positive for fatigue.   Respiratory:   Positive for shortness of breath (with minimal exertion).      Objective:      Physical Exam  Vitals reviewed.   Constitutional:       General: She is not in acute distress.     Appearance: She is well-developed. She is not ill-appearing.   Neurological:      Mental Status: She is alert and oriented to person, place, and time.   Psychiatric:         Mood and Affect: Mood normal.         Behavior: Behavior normal.         Thought Content: Thought content normal.         Judgment: Judgment normal.       Assessment:       1. Malignant neoplasm of central portion of left breast in female, estrogen receptor negative    2. Chronic combined systolic and diastolic heart failure        Plan:      I reviewed the risks of recurrence and the diminished effectiveness of chemo without HER 2 directed therapy.  She is appropriately concerned regarding her ability to undergo chemotherapy.  Will see Cardiology before a final decision.    Route Chart for Scheduling    Med Onc Chart Routing      Follow up with physician 3 months.   Follow up with JASPAL    Infusion scheduling note    Injection scheduling note    Labs    Imaging    Pharmacy appointment    Other referrals          Therapy Plan Information  DENOSUMAB (PROLIA) Q6M  Medications  denosumab (PROLIA) injection 60 mg  60 mg, Subcutaneous, Every 26 weeks    PRIVIGEN (IVIG) Q4W  Pre-Medications  acetaminophen tablet 500 mg  500 mg, Oral, Every 4 weeks  diphenhydrAMINE capsule 50 mg  50 mg, Oral, Every 4 weeks  Hydration  sodium chloride 0.9% bolus 500 mL  500 mL, Intravenous, Every 4 weeks  Flushes  sodium chloride 0.9% 250 mL flush bag  Intravenous, Every 4 weeks  sodium chloride 0.9% flush 10 mL  10 mL, Intravenous, Every 4 weeks  heparin, porcine (PF) 100 unit/mL injection flush 500 Units  500 Units, Intravenous, Every 4 weeks  alteplase injection 2 mg  2 mg, Intra-Catheter, Every 4 weeks

## 2023-01-06 ENCOUNTER — PATIENT MESSAGE (OUTPATIENT)
Dept: HEMATOLOGY/ONCOLOGY | Facility: CLINIC | Age: 68
End: 2023-01-06
Payer: MEDICARE

## 2023-01-06 ENCOUNTER — TELEPHONE (OUTPATIENT)
Dept: FAMILY MEDICINE | Facility: CLINIC | Age: 68
End: 2023-01-06
Payer: MEDICARE

## 2023-01-06 ENCOUNTER — PATIENT MESSAGE (OUTPATIENT)
Dept: CARDIOLOGY | Facility: CLINIC | Age: 68
End: 2023-01-06
Payer: MEDICARE

## 2023-01-06 ENCOUNTER — PATIENT MESSAGE (OUTPATIENT)
Dept: FAMILY MEDICINE | Facility: CLINIC | Age: 68
End: 2023-01-06
Payer: MEDICARE

## 2023-01-06 ENCOUNTER — LAB VISIT (OUTPATIENT)
Dept: LAB | Facility: HOSPITAL | Age: 68
End: 2023-01-06
Attending: FAMILY MEDICINE
Payer: MEDICARE

## 2023-01-06 DIAGNOSIS — E03.9 ACQUIRED HYPOTHYROIDISM: Primary | ICD-10-CM

## 2023-01-06 DIAGNOSIS — Z79.899 ENCOUNTER FOR LONG-TERM (CURRENT) USE OF HIGH-RISK MEDICATION: ICD-10-CM

## 2023-01-06 DIAGNOSIS — L12.1 PEMPHIGOID OF GINGIVAL MUCOSA: Primary | ICD-10-CM

## 2023-01-06 DIAGNOSIS — E03.9 ACQUIRED HYPOTHYROIDISM: ICD-10-CM

## 2023-01-06 PROCEDURE — 84443 ASSAY THYROID STIM HORMONE: CPT | Performed by: FAMILY MEDICINE

## 2023-01-06 PROCEDURE — 85025 COMPLETE CBC W/AUTO DIFF WBC: CPT | Performed by: DERMATOLOGY

## 2023-01-06 PROCEDURE — 80053 COMPREHEN METABOLIC PANEL: CPT | Performed by: DERMATOLOGY

## 2023-01-07 LAB
ALBUMIN SERPL BCP-MCNC: 3.8 G/DL (ref 3.5–5.2)
ALP SERPL-CCNC: 101 U/L (ref 55–135)
ALT SERPL W/O P-5'-P-CCNC: 14 U/L (ref 10–44)
ANION GAP SERPL CALC-SCNC: 14 MMOL/L (ref 8–16)
AST SERPL-CCNC: 27 U/L (ref 10–40)
BASOPHILS # BLD AUTO: 0.07 K/UL (ref 0–0.2)
BASOPHILS NFR BLD: 0.8 % (ref 0–1.9)
BILIRUB SERPL-MCNC: 0.8 MG/DL (ref 0.1–1)
BUN SERPL-MCNC: 23 MG/DL (ref 8–23)
CALCIUM SERPL-MCNC: 9.7 MG/DL (ref 8.7–10.5)
CHLORIDE SERPL-SCNC: 98 MMOL/L (ref 95–110)
CO2 SERPL-SCNC: 25 MMOL/L (ref 23–29)
CREAT SERPL-MCNC: 0.7 MG/DL (ref 0.5–1.4)
DIFFERENTIAL METHOD: ABNORMAL
EOSINOPHIL # BLD AUTO: 0.1 K/UL (ref 0–0.5)
EOSINOPHIL NFR BLD: 1.6 % (ref 0–8)
ERYTHROCYTE [DISTWIDTH] IN BLOOD BY AUTOMATED COUNT: 15 % (ref 11.5–14.5)
EST. GFR  (NO RACE VARIABLE): >60 ML/MIN/1.73 M^2
GLUCOSE SERPL-MCNC: 83 MG/DL (ref 70–110)
HCT VFR BLD AUTO: 33.9 % (ref 37–48.5)
HGB BLD-MCNC: 10.6 G/DL (ref 12–16)
IMM GRANULOCYTES # BLD AUTO: 0.02 K/UL (ref 0–0.04)
IMM GRANULOCYTES NFR BLD AUTO: 0.2 % (ref 0–0.5)
LYMPHOCYTES # BLD AUTO: 1.4 K/UL (ref 1–4.8)
LYMPHOCYTES NFR BLD: 16.4 % (ref 18–48)
MCH RBC QN AUTO: 29 PG (ref 27–31)
MCHC RBC AUTO-ENTMCNC: 31.3 G/DL (ref 32–36)
MCV RBC AUTO: 93 FL (ref 82–98)
MONOCYTES # BLD AUTO: 0.9 K/UL (ref 0.3–1)
MONOCYTES NFR BLD: 10.4 % (ref 4–15)
NEUTROPHILS # BLD AUTO: 6 K/UL (ref 1.8–7.7)
NEUTROPHILS NFR BLD: 70.6 % (ref 38–73)
NRBC BLD-RTO: 0 /100 WBC
PLATELET # BLD AUTO: 415 K/UL (ref 150–450)
PMV BLD AUTO: 10.2 FL (ref 9.2–12.9)
POTASSIUM SERPL-SCNC: 3.5 MMOL/L (ref 3.5–5.1)
PROT SERPL-MCNC: 7.5 G/DL (ref 6–8.4)
RBC # BLD AUTO: 3.65 M/UL (ref 4–5.4)
SODIUM SERPL-SCNC: 137 MMOL/L (ref 136–145)
TSH SERPL DL<=0.005 MIU/L-ACNC: 1.33 UIU/ML (ref 0.4–4)
WBC # BLD AUTO: 8.55 K/UL (ref 3.9–12.7)

## 2023-01-10 ENCOUNTER — PATIENT MESSAGE (OUTPATIENT)
Dept: SURGERY | Facility: CLINIC | Age: 68
End: 2023-01-10
Payer: MEDICARE

## 2023-01-11 NOTE — PROGRESS NOTES
Subjective:   Patient ID:  Dulce Root is a 67 y.o. female who presents for evaluation of No chief complaint on file.      PROBLEM LIST:  Breast cancer, left 11/22 (Previous cancer of the right breast s/p XRT, s/p bilateral mastectomy)  Hodgkins lymphoma 1991 (ABVD, XRT)  CAD s/p CABG 8/17 ( SVG to LAD, SVG to PDA occluded), PCI 2019 LM, CX, RCA  AVR, bioprosthetic 8/17  Carotid artery stenosis (ANNA 60-69%, LICA 80-89%)  Pulmonary HTN PAP 57, Likely WHO class II  HFrEF, EF 35% since 2019  HTN  HLD    HPI:  She presents today to [facility] care and cardio Oncology Clinic.  She is referred by Dr. Sepulveda for recurrent HER2+ breast cancer diagnosed 11/22.  She has a significant oncologic and cardiovascular history.  She was diagnosed with Hodgkin's lymphoma back in 1991 and underwent treatment with chemotherapy as well as mental radiation therapy.  She subsequently developed right-sided breast cancer and underwent bilateral mastectomy and radiation seed implantation.  In 2017 she underwent coronary artery bypass grafting x2 with an SVG to LAD and SVG to PDA for significant left main and right coronary artery disease as well as bioprosthetic AVR due to radiation induced valvulopathy.  At that time her ejection fraction was normal.  In 2019, she developed a cardiomyopathy with a reduced ejection fraction of 35%.  Repeat coronary angiography was performed at that time which showed an occlusion of her SVG to PDA.  She subsequently underwent PCI of her left main coronary artery, circumflex artery, and right coronary artery.  She has been on guideline directed medical therapy with losartan and metoprolol.  Her LVEF has remained at 35%. She also has significant carotid artery disease with moderate stenosis in her right carotid artery and a severe stenosis in her left carotid artery.  She has been on dual anti-platelet therapy with aspirin and Plavix as well as high-intensity statin therapy with rosuvastatin 20 mg daily. She  reports MARCH with everyday activities. No chest pain. She has frequent palpitations associated with near syncope.     ECG 22-NOV-2022 16:31:45: Personally reviewed by me shows NSR with LBBB QRS duration 180 ms    Echo 11/22:  Summary    The left ventricle is mildly enlarged with concentric hypertrophy and moderately decreased systolic function.  Left ventricular diastolic dysfunction.  The estimated PA systolic pressure is 57 mmHg.  Normal right ventricular size with low normal right ventricular systolic function.  There is pulmonary hypertension.  Normal central venous pressure (3 mmHg).  The estimated ejection fraction is 35%.  There is moderate left ventricular global hypokinesis.  Mild tricuspid regurgitation.  There is a bioprosthetic aortic valve present.  The aortic valve mean gradient is 13 mmHg with a dimensionless index of 0.28.    [facility] 4/22:  Angiographic findings:  Left main coronary-medium size vessel mild disease less than 15%.  Left anterior descending-chronic total occlusion its proximal portion.  Ramus intermedius-medium size vessel diffuse mild calcific disease less than 20%.  Circumflex-small to medium size vessel gives rise to 1 obtuse marginal.  Normal appearance patient's age.  Right coronary-medium size dominant vessel diffuse disease proximal mid up to 45% short discrete lesions in its midportion.  Vein graft to the LAD-patent with normal appearing native LAD mid to distal.  Could not find vein graft to right coronary.     Hemodynamics:  Right ventricular pressure  69/11/20 mmHg   pulmonary artery pressure 65/33/46 mmHg  Pulmonary catheter wedge pressure 34/30 1/30 mmHg  Right atrial pressure 17/31.    Cardiac output/cardiac index 4.2/2.59    Carotid US 3/22:  Conclusion    There is 60-69% right Internal Carotid Stenosis.  There is 80-99% left Internal Carotid Stenosis.  Mild to moderate heterogeneous calcified plaque bilaterally.  Normal antegrade vertebral flow bilaterally.    Past Medical  History:   Diagnosis Date    Allergy     Anticoagulant long-term use     Plavix    Breast cancer 2008    Carotid stenosis, bilateral 10/13/2017    Coronary artery disease     Coronary artery disease involving coronary bypass graft of native heart without angina pectoris 08/16/2019 2/19  1.  Left main is a small vessel with a 60%-65% ostial lesion. 2.  LAD is a medium-sized vessel diffuse 70% proximally  Ramus intermedius is a medium size vessel with a 40%-50% ostial lesion. 3.  Circumflex 60%-70% ostial  remainder of circumflex and obtuse marginal normal in appearance. Right coronary artery is normal size vessel, short discrete 70%mid 4.  Vein graft to right coronary is occ    Coronary artery disease involving native coronary artery of native heart without angina pectoris 06/27/2017    DCIS (ductal carcinoma in situ) of breast 11/06/2012    Encounter for blood transfusion     Essential hypertension 11/06/2012    GERD (gastroesophageal reflux disease)     GI bleed 02/2021    Hodgkin lymphoma     Hx of Hodgkin's disease - s/p chemo and radiation 11/06/2012    Hyperlipidemia     Hyperlipidemia 11/06/2012    The patient presents with hyperlipidemia.  The patient reports tolerating the medication well and is in excellent compliance.  There have been no medication side effects.  The patient denies chest pain, neuropathy, and myalgias.  The patient has reduced fat intake and has been exercising.  Current treatment has included the medications listed in the med card.   Lab Results Component Value Date  CH    Hypertension     LBBB (left bundle branch block) 05/22/2018    Osteoporosis     Paroxysmal atrial fibrillation - single post-op episode 08/24/2017    Pemphigoid     Pemphigoid     pt receives IVIG infusions    S/P AVR (aortic valve replacement) - pericardial valve 08/21/2017    Perceval aortic tissue valve PVS23. 23mm    serial # [id number]    S/P CABG x 2 08/21/2017 8/17 CABG X 2 with SVG-LAD and SVG-distal RCA  SVG  LAD due to absent LIMA after chest radiation and lymph node biopsy     Stented coronary artery     She had 2 stents placed in 2/2019.  She has had CAD and bypasses in the past in 2017.      Thyroid disease        Past Surgical History:   Procedure Laterality Date    ARTERIOGRAPHY OF AORTIC ROOT N/A 02/15/2019    Procedure: ARTERIOGRAM, AORTIC ROOT;  Surgeon: Sujit Chaudhry MD;  [facility];  Service: Cardiology;  Laterality: N/A;    AXILLARY NODE DISSECTION Left 11/25/2022    Procedure: LYMPHADENECTOMY, AXILLARY-Left;  Surgeon: Rosa Maradiaga MD;  Location: [facility];  Service: General;  Laterality: Left;    BREAST BIOPSY      BREAST RECONSTRUCTION      bilateral mastectomy    CARDIAC CATHETERIZATION      stents x 2    CARDIAC SURGERY  08/2017    Aortic valve replacement , CABG 2 vessel    CARDIAC VALVE REPLACEMENT  08/2017    aortic valve, bovine per pt    CORONARY ANGIOGRAPHY N/A 02/15/2019    Procedure: ANGIOGRAM, CORONARY ARTERY;  Surgeon: Sujit Chaudhry MD;  [facility];  Service: Cardiology;  Laterality: N/A;    CORONARY ANGIOGRAPHY N/A 4/1/2022    Procedure: ANGIOGRAM, CORONARY ARTERY;  Surgeon: Sujit Chaudhry MD;  [facility];  Service: Cardiology;  Laterality: N/A;    CORONARY BYPASS GRAFT ANGIOGRAPHY  02/15/2019    Procedure: Bypass graft study;  Surgeon: Sujit Chaudhry MD;  [facility];  Service: Cardiology;;    COSMETIC SURGERY      bereast reconstruction    ESOPHAGOGASTRODUODENOSCOPY N/A 05/14/2021    Procedure: EGD (ESOPHAGOGASTRODUODENOSCOPY);  Surgeon: Tracy Flower MD;  Location: [facility];  Service: Endoscopy;  Laterality: N/A;    ESOPHAGOGASTRODUODENOSCOPY N/A 11/04/2021    Procedure: EGD (ESOPHAGOGASTRODUODENOSCOPY);  Surgeon: Tracy Flower MD;  Location: [facility] ENDO;  Service: Endoscopy;  Laterality: N/A;    EYE SURGERY      eye lids    INJECTION FOR SENTINEL NODE IDENTIFICATION Left 11/25/2022    Procedure: INJECTION, FOR SENTINEL NODE IDENTIFICATION-Left;   Surgeon: Rosa Maradiaga MD;  Location: [facility] OR;  Service: General;  Laterality: Left;    LEFT HEART CATHETERIZATION Right 02/15/2019    Procedure: Left heart cath;  Surgeon: Sujit Chaudhry MD;  Location: [facility];  Service: Cardiology;  Laterality: Right;    LEFT HEART CATHETERIZATION Left 2022    Procedure: Left heart cath;  Surgeon: Sujit Chaudhry MD;  Location: [facility];  Service: Cardiology;  Laterality: Left;    LUMBAR LAMINECTOMY WITH DISCECTOMY N/A 2020    Procedure: LAMINECTOMY, SPINE, LUMBAR, WITH DISCECTOMY;  Surgeon: Vimal Villegas MD;  Location: [facility] OR;  Service: Neurosurgery;  Laterality: N/A;  left L5-S1  Laminectomy L4-5    LYMPHADENECTOMY      MASTECTOMY      MASTECTOMY, PARTIAL Left 2022    Procedure: MASTECTOMY, PARTIAL-Left ultrasound guided;  Surgeon: Rosa Maradiaga MD;  Location: [facility];  Service: General;  Laterality: Left;    RIGHT HEART CATHETERIZATION Right 2022    Procedure: INSERTION, CATHETER, RIGHT HEART;  Surgeon: Sujit Chaudhry MD;  Location: [facility];  Service: Cardiology;  Laterality: Right;    SENTINEL LYMPH NODE BIOPSY Left 2022    Procedure: BIOPSY, LYMPH NODE, SENTINEL-Left;  Surgeon: Rosa Maradiaga MD;  Location: [facility];  Service: General;  Laterality: Left;    SKIN BIOPSY      SPLENECTOMY, TOTAL      TRANSFORAMINAL EPIDURAL INJECTION OF STEROID Left 2019    Procedure: Left L5/S1 TF SKIP with local;  Surgeon: Canelo Niño MD;  Location: [facility];  Service: Pain Management;  Laterality: Left;    TUMOR REMOVAL      neck- lymphoma       Social History     Socioeconomic History    Marital status:    Tobacco Use    Smoking status: Former     Packs/day: 1.00     Years: 20.00     Pack years: 20.00     Types: Cigarettes     Quit date: 1981     Years since quittin.2    Smokeless tobacco: Never   Substance and Sexual Activity    Alcohol use: No     Comment: 2 drinks/month; none 72 hrs prior to surgery    Drug use: No     Sexual activity: Yes     Partners: Male   Social History Narrative    Live w/ spouse and  1 dog      Social Determinants of Health     Financial Resource Strain: Low Risk     Difficulty of Paying Living Expenses: Not hard at all   Food Insecurity: No Food Insecurity    Worried About Running Out of Food in the Last Year: Never true    Ran Out of Food in the Last Year: Never true   Transportation Needs: No Transportation Needs    Lack of Transportation (Medical): No    Lack of Transportation (Non-Medical): No   Physical Activity: Insufficiently Active    Days of Exercise per Week: 3 days    Minutes of Exercise per Session: 30 min   Stress: No Stress Concern Present    Feeling of Stress : Only a little   Social Connections: Moderately Integrated    Frequency of Communication with Friends and Family: More than three times a week    Frequency of Social Gatherings with Friends and Family: Once a week    Attends Rastafarian Services: More than 4 times per year    Active Member of Clubs or Organizations: No    Attends Club or Organization Meetings: Never    Marital Status:    Housing Stability: Low Risk     Unable to Pay for Housing in the Last Year: No    Number of Places Lived in the Last Year: 1    Unstable Housing in the Last Year: No       Family History   Problem Relation Age of Onset    Hypertension Mother     Hypertension Father     Cancer Neg Hx        Patient's Medications   New Prescriptions    No medications on file   Previous Medications    ABALOPARATIDE (TYMLOS) 80 MCG (3,120 MCG/1.56 ML) PNIJ    Inject 80 mcg into the skin once daily.    ALPRAZOLAM (XANAX) 1 MG TABLET    Take 1 mg by mouth nightly as needed. Rarely used    ASPIRIN (ECOTRIN) 81 MG EC TABLET    Take 1 tablet (81 mg total) by mouth once daily.    BRIMONIDINE 0.2% (ALPHAGAN) 0.2 % DROP        CALCIUM CARBONATE (OS-CALVIN) 600 MG (1,500 MG) TAB    Take 600 mg by mouth 2 (two) times daily with meals.    CHLORHEXIDINE (PERIDEX) 0.12 % SOLUTION     SMARTSI Tablespoon By Mouth Twice Daily    CHOLECALCIFEROL, VITAMIN D3, (VITAMIN D3) 100 MCG (4,000 UNIT) CAP    Take by mouth.    CLOPIDOGREL (PLAVIX) 75 MG TABLET    Take 1 tablet (75 mg total) by mouth once daily.    DAPSONE 25 MG TAB    TAKE 2 TABLETS BY MOUTH EVERY DAY FOR 30 DAYS    DEXAMETHASONE (DECADRON) 0.5 MG/5 ML ELIX    SWISH 3.75MLS IN MOUTH FOR 30 SECONDS AND SWALLOW TWICE DAILY AS NEEDED FOR LICHEN PLANUS FLARE UPS    ESCITALOPRAM OXALATE (LEXAPRO) 10 MG TABLET    TAKE 1 TABLET BY MOUTH EVERY DAY    FLUCONAZOLE (DIFLUCAN) 200 MG TAB    TAKE 1 TABLET ORALLY EVERY OTHER DAY X 4 DOSES, THEN 1 WEEKLY FOR 30 DAYS    HYDROCHLOROTHIAZIDE (HYDRODIURIL) 25 MG TABLET    Take 1 tablet (25 mg total) by mouth every morning.    HYDROCODONE-ACETAMINOPHEN (NORCO) 5-325 MG PER TABLET    Take 1 tablet by mouth every 6 (six) hours as needed for Pain.    LEVOTHYROXINE (SYNTHROID) 75 MCG TABLET    TAKE 1 TABLET BY MOUTH EVERY DAY BEFORE BREAKFAST    LOSARTAN (COZAAR) 50 MG TABLET    Take 1 tablet (50 mg total) by mouth every evening.    METOPROLOL SUCCINATE (TOPROL-XL) 25 MG 24 HR TABLET    Take 1 tablet (25 mg total) by mouth 2 (two) times daily.    PANTOPRAZOLE (PROTONIX) 20 MG TABLET    Take 1 tablet (20 mg total) by mouth once daily.    ROSUVASTATIN (CRESTOR) 20 MG TABLET    TAKE 1 TABLET EVERY OTHER NIGHT    VALACYCLOVIR (VALTREX) 1000 MG TABLET    TAKE 1 TABLET EVERY 24 HOURS; prn   Modified Medications    No medications on file   Discontinued Medications    No medications on file       Review of Systems   Constitutional: Negative for malaise/fatigue and weight gain.   HENT:  Negative for hearing loss.    Eyes:  Negative for visual disturbance.   Cardiovascular:  Positive for dyspnea on exertion, near-syncope and palpitations. Negative for chest pain, claudication, leg swelling, orthopnea, paroxysmal nocturnal dyspnea and syncope.   Respiratory:  Negative for cough, shortness of breath, sleep disturbances due to  breathing, snoring and wheezing.    Endocrine: Negative for cold intolerance, heat intolerance, polydipsia, polyphagia and polyuria.   Hematologic/Lymphatic: Negative for bleeding problem. Bruises/bleeds easily.   Skin:  Negative for rash and suspicious lesions.   Musculoskeletal:  Negative for arthritis, falls, joint pain, muscle weakness and myalgias.   Gastrointestinal:  Negative for abdominal pain, change in bowel habit, constipation, diarrhea, heartburn, hematochezia, melena and nausea.   Genitourinary:  Negative for hematuria and nocturia.   Neurological:  Negative for excessive daytime sleepiness, dizziness, headaches, light-headedness, loss of balance and weakness.   Psychiatric/Behavioral:  Negative for depression. The patient is not nervous/anxious.    Allergic/Immunologic: Negative for environmental allergies.     There were no vitals taken for this visit.    Objective:   Physical Exam  Constitutional:       Appearance: She is well-developed.      Comments:      HENT:      Head: Normocephalic and atraumatic.   Eyes:      General: No scleral icterus.     Conjunctiva/sclera: Conjunctivae normal.      Pupils: Pupils are equal, round, and reactive to light.   Neck:      Thyroid: No thyromegaly.      Vascular: Hepatojugular reflux and JVD present.      Trachea: No tracheal deviation.      Comments: JVD to angle of jaw  Cardiovascular:      Rate and Rhythm: Normal rate and regular rhythm.      Chest Wall: PMI is not displaced.      Pulses: Intact distal pulses.           Carotid pulses are 2+ on the right side with bruit and 2+ on the left side with bruit.       Radial pulses are 2+ on the right side and 2+ on the left side.        Dorsalis pedis pulses are 2+ on the right side and 2+ on the left side.        Posterior tibial pulses are 2+ on the right side and 2+ on the left side.      Heart sounds: Murmur heard.   Harsh midsystolic murmur is present with a grade of 2/6 at the upper right sternal border  radiating to the neck.   Pulmonary:      Effort: Pulmonary effort is normal.      Breath sounds: Normal breath sounds.   Abdominal:      General: Bowel sounds are normal. There is no distension.      Palpations: Abdomen is soft. There is no mass.      Tenderness: There is no abdominal tenderness.   Musculoskeletal:         General: No tenderness.      Cervical back: Normal range of motion and neck supple.   Lymphadenopathy:      Cervical: No cervical adenopathy.   Skin:     General: Skin is warm and dry.      Findings: No erythema or rash.      Nails: There is no clubbing.   Neurological:      Mental Status: She is alert and oriented to person, place, and time.   Psychiatric:         Speech: Speech normal.         Behavior: Behavior normal.       Lab Results   Component Value Date     01/06/2023    K 3.5 01/06/2023    CL 98 01/06/2023    CO2 25 01/06/2023    BUN 23 01/06/2023    CREATININE 0.7 01/06/2023    GLU 83 01/06/2023    HGBA1C 5.8 (H) 02/10/2019    MG 2.2 02/18/2021    AST 27 01/06/2023    ALT 14 01/06/2023    ALBUMIN 3.8 01/06/2023    PROT 7.5 01/06/2023    BILITOT 0.8 01/06/2023    WBC 8.55 01/06/2023    HGB 10.6 (L) 01/06/2023    HCT 33.9 (L) 01/06/2023    HCT 24 (L) 08/21/2017    MCV 93 01/06/2023     01/06/2023    INR 1.0 05/11/2021    TSH 1.328 01/06/2023    CHOL 130 02/22/2022    HDL 30 (L) 02/22/2022    LDLCALC 76.2 02/22/2022    TRIG 119 02/22/2022     (H) 04/12/2018       Assessment:     1. Malignant neoplasm of central portion of left breast in female, estrogen receptor negative : Currently not a candidate for HER 2 therapy given EF 35% and decompensated heart failure, but hopefully we can improve her LVEF with plan as outlined below.   2. Hx of Hodgkin's disease - s/p chemo and radiation    3. Chronic combined systolic and diastolic heart failure : She has NYHA FC III symptoms and appears mildly volume overloaded today. I am changing her losartan to Entresto 24/26 mg bid with  labs in one week. If her renal function and potassium are stable, I will add spironolactone 12.5 mg daily at that time. I will reassess then if we need to change her HCTZ to lasix. I will see her back in 4 weeks time to see if we can add on an SGLT-2 inhibitor. Repeat echo in 3 months. If LVEF remains reduced despite optimized GDMT, will refer to EP for consideration of resynchronization therapy given her LBBB with QRS duration of 180 ms.     4. Coronary artery disease involving coronary bypass graft of native heart without angina pectoris : She is on DAPT with asa and plavix. We can consider stopping plavix at this point if she is having issues with bruising. Continue rosuvastatin but increase to 20 mg daily for goal LDL < 70. Follow up fasting labs in 6 weeks.   5. Essential hypertension : Changes as above. Goal BP < 130/80.   6. Mixed hyperlipidemia : Increase rosuvastatin to 20 mg daily for goal LDL < 70.   7. S/P CABG x 2    8. S/P AVR (aortic valve replacement) - pericardial valve : Valve functioning normally on recent echo. Continue asa. SBE prophylaxis is recommended prior to dental procedures.   9. Carotid stenosis, bilateral : She has significant carotid artery disease. She is asymptomatic on medical therapy. Consider referral to Vascular surgery.   10.   Pulmonary HTN: Likely WHO group II. Elevated PAP on RHC in the setting of significantly elevated PCWP. Diuresis and plan as above.    Plan:     Diagnoses and all orders for this visit:    Malignant neoplasm of central portion of left breast in female, estrogen receptor negative    Hx of Hodgkin's disease - s/p chemo and radiation    Chronic combined systolic and diastolic heart failure    Coronary artery disease involving coronary bypass graft of native heart without angina pectoris    Essential hypertension    Mixed hyperlipidemia    S/P CABG x 2    S/P AVR (aortic valve replacement) - pericardial valve    Carotid stenosis, bilateral        Thank you for  allowing me to participate in this patient's care. Please do not hesitate to contact me with any questions or concerns.

## 2023-01-13 ENCOUNTER — OFFICE VISIT (OUTPATIENT)
Dept: SURGERY | Facility: CLINIC | Age: 68
End: 2023-01-13
Payer: MEDICARE

## 2023-01-13 ENCOUNTER — LAB VISIT (OUTPATIENT)
Dept: LAB | Facility: HOSPITAL | Age: 68
End: 2023-01-13
Attending: INTERNAL MEDICINE
Payer: MEDICARE

## 2023-01-13 ENCOUNTER — OFFICE VISIT (OUTPATIENT)
Dept: CARDIOLOGY | Facility: CLINIC | Age: 68
End: 2023-01-13
Payer: MEDICARE

## 2023-01-13 VITALS
BODY MASS INDEX: 25.34 KG/M2 | WEIGHT: 143 LBS | TEMPERATURE: 97 F | SYSTOLIC BLOOD PRESSURE: 111 MMHG | HEART RATE: 77 BPM | DIASTOLIC BLOOD PRESSURE: 55 MMHG | HEIGHT: 63 IN

## 2023-01-13 VITALS
SYSTOLIC BLOOD PRESSURE: 138 MMHG | HEART RATE: 79 BPM | DIASTOLIC BLOOD PRESSURE: 71 MMHG | BODY MASS INDEX: 25.7 KG/M2 | HEIGHT: 63 IN | WEIGHT: 145.06 LBS

## 2023-01-13 DIAGNOSIS — Z17.1 MALIGNANT NEOPLASM OF CENTRAL PORTION OF LEFT BREAST IN FEMALE, ESTROGEN RECEPTOR NEGATIVE: Primary | ICD-10-CM

## 2023-01-13 DIAGNOSIS — I65.23 CAROTID STENOSIS, BILATERAL: ICD-10-CM

## 2023-01-13 DIAGNOSIS — Z85.71 HX OF HODGKIN'S DISEASE: ICD-10-CM

## 2023-01-13 DIAGNOSIS — Z85.3 PERSONAL HISTORY OF BREAST CANCER: ICD-10-CM

## 2023-01-13 DIAGNOSIS — I50.42 CHRONIC COMBINED SYSTOLIC AND DIASTOLIC HEART FAILURE: ICD-10-CM

## 2023-01-13 DIAGNOSIS — E78.5 HYPERLIPIDEMIA, UNSPECIFIED HYPERLIPIDEMIA TYPE: ICD-10-CM

## 2023-01-13 DIAGNOSIS — I10 ESSENTIAL HYPERTENSION: ICD-10-CM

## 2023-01-13 DIAGNOSIS — Z95.2 S/P AVR (AORTIC VALVE REPLACEMENT): ICD-10-CM

## 2023-01-13 DIAGNOSIS — Z95.1 S/P CABG X 2: ICD-10-CM

## 2023-01-13 DIAGNOSIS — R55 NEAR SYNCOPE: ICD-10-CM

## 2023-01-13 DIAGNOSIS — Z12.39 SCREENING BREAST EXAMINATION: ICD-10-CM

## 2023-01-13 DIAGNOSIS — C50.112 MALIGNANT NEOPLASM OF CENTRAL PORTION OF LEFT BREAST IN FEMALE, ESTROGEN RECEPTOR NEGATIVE: Primary | ICD-10-CM

## 2023-01-13 DIAGNOSIS — I25.810 CORONARY ARTERY DISEASE INVOLVING CORONARY BYPASS GRAFT OF NATIVE HEART WITHOUT ANGINA PECTORIS: ICD-10-CM

## 2023-01-13 DIAGNOSIS — N64.89 HEMATOMA OF LEFT BREAST: Primary | ICD-10-CM

## 2023-01-13 DIAGNOSIS — I27.20 PULMONARY HYPERTENSION: ICD-10-CM

## 2023-01-13 DIAGNOSIS — Z90.13 S/P MASTECTOMY, BILATERAL: ICD-10-CM

## 2023-01-13 DIAGNOSIS — R00.2 PALPITATIONS: ICD-10-CM

## 2023-01-13 DIAGNOSIS — E78.2 MIXED HYPERLIPIDEMIA: ICD-10-CM

## 2023-01-13 DIAGNOSIS — Z98.890 S/P LUMPECTOMY, LEFT BREAST: ICD-10-CM

## 2023-01-13 LAB — BNP SERPL-MCNC: 787 PG/ML (ref 0–99)

## 2023-01-13 PROCEDURE — 99999 PR PBB SHADOW E&M-EST. PATIENT-LVL III: ICD-10-PCS | Mod: PBBFAC,,, | Performed by: INTERNAL MEDICINE

## 2023-01-13 PROCEDURE — 99024 POSTOP FOLLOW-UP VISIT: CPT | Mod: POP,,, | Performed by: PHYSICIAN ASSISTANT

## 2023-01-13 PROCEDURE — 99999 PR PBB SHADOW E&M-EST. PATIENT-LVL IV: ICD-10-PCS | Mod: PBBFAC,,, | Performed by: PHYSICIAN ASSISTANT

## 2023-01-13 PROCEDURE — 36415 COLL VENOUS BLD VENIPUNCTURE: CPT | Performed by: INTERNAL MEDICINE

## 2023-01-13 PROCEDURE — 99213 OFFICE O/P EST LOW 20 MIN: CPT | Mod: PBBFAC | Performed by: INTERNAL MEDICINE

## 2023-01-13 PROCEDURE — 99204 OFFICE O/P NEW MOD 45 MIN: CPT | Mod: S$PBB,,, | Performed by: INTERNAL MEDICINE

## 2023-01-13 PROCEDURE — 83880 ASSAY OF NATRIURETIC PEPTIDE: CPT | Performed by: INTERNAL MEDICINE

## 2023-01-13 PROCEDURE — 99999 PR PBB SHADOW E&M-EST. PATIENT-LVL IV: CPT | Mod: PBBFAC,,, | Performed by: PHYSICIAN ASSISTANT

## 2023-01-13 PROCEDURE — 99999 PR PBB SHADOW E&M-EST. PATIENT-LVL III: CPT | Mod: PBBFAC,,, | Performed by: INTERNAL MEDICINE

## 2023-01-13 PROCEDURE — 99204 PR OFFICE/OUTPT VISIT, NEW, LEVL IV, 45-59 MIN: ICD-10-PCS | Mod: S$PBB,,, | Performed by: INTERNAL MEDICINE

## 2023-01-13 PROCEDURE — 99024 PR POST-OP FOLLOW-UP VISIT: ICD-10-PCS | Mod: POP,,, | Performed by: PHYSICIAN ASSISTANT

## 2023-01-13 PROCEDURE — 99214 OFFICE O/P EST MOD 30 MIN: CPT | Mod: 25,PBBFAC | Performed by: PHYSICIAN ASSISTANT

## 2023-01-13 RX ORDER — SACUBITRIL AND VALSARTAN 24; 26 MG/1; MG/1
1 TABLET, FILM COATED ORAL 2 TIMES DAILY
Qty: 60 TABLET | Refills: 11 | Status: SHIPPED | OUTPATIENT
Start: 2023-01-13 | End: 2023-03-10

## 2023-01-13 RX ORDER — ROSUVASTATIN CALCIUM 20 MG/1
20 TABLET, COATED ORAL NIGHTLY
Qty: 90 TABLET | Refills: 3 | Status: SHIPPED | OUTPATIENT
Start: 2023-01-13 | End: 2023-07-05 | Stop reason: SDUPTHER

## 2023-01-13 NOTE — PATIENT INSTRUCTIONS
Increase rosuvastatin to 20 mg nightly    Stop losartan and replace with Entresto 24/26 mg twice daily.    Blood work in one week to check kidney function and potassium.  If labs ok, we still start spironolactone 12.5 mg daily.

## 2023-01-13 NOTE — PROGRESS NOTES
[facility]      Post-Op      REFERRING PHYSICIAN:  Patrick Hernandez MD    MEDICAL ONCOLOGIST:    Rios Sepulveda MD  RADIATION ONCOLOGIST:   pending    DIAGNOSIS:    This is a 67 y.o. 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    TREATMENT SUMMARY:  The patient is status post left partial mastectomy and sentinel node biopsy on 11/25/2022.  Final pathology showed   Final Pathologic Diagnosis   Date/Time Value Ref Range Status   11/25/2022 10:42 AM   Final    Part 1  Left breast, lumpectomy:  Invasive ductal carcinoma, poorly differentiated  [place] grade 3: Tubule formation- 3, nuclear pleomorphism- 3 and mitotic  count -2  Invasive carcinoma measures 26 x 23 x 21 mm  All surgical margins are negative for invasive carcinoma:  -superior margin:  6 mm  -inferior margin:  2 mm  -anterior margin:  3 mm  -posterior/deep margin:  Greater than 10 mm  -medial margin:  Greater than 10 mm  -lateral margin:  Greater than 10 mm  The epidermis is involved by invasive carcinoma with associated ulceration  Dermal lymphovascular invasion is also identified  No carcinoma in Situ is identified  Pathologic stage classification: pT4b pN0(sn)  See synoptic report  See comment section below  Part 2  Left axilla, sentinel lymph node, hot 43, excision:  No lymphoid tissue identified  Part 3  Left axilla, sentinel lymph node, hot 104, excision:  No lymphoid tissue identified  Part 4  Left axilla, sentinel lymph node, hot 35, excision  One lymph node, negative for metastatic carcinoma (0/1)  See comment section below       Comment:     Interp By Colleen Raygoza M.D., Signed on 12/06/2022 at 12:34   Synoptic report :   Procedure:  Lumpectomy   Specimen laterality:  Left   Tumor site:  Retroareolar   Tumor size:   26 x 23 x 21 mm   Histologic type:  Invasive ductal   Histologic grade:   Tubular differentiation- 3   Nuclear pleomorphism- 3   Mitotic count - 2   Overall grade:  Grade 3   Tumor  focality:  Unifocal   Ductal carcinoma in situ:  Not identified   Extent of DCIS:  No DCIS present   -Lobular carcinoma in situ (LCIS):  Not identified   Tumor Extension:   -Skin:  Involved with ulceration   -Nipple:  Can not be determined, See comment   Skeletal muscle:  Not present on submitted sections   Margins:   -Invasive carcinoma margins:  Uninvolved   -Distance from closest margin:   2 mm from inferior margin   -Distance from other margins:   -superior margin:  6 mm   -anterior margin:  3 mm   -posterior/deep margin:  Greater than 10 mm   -medial margin:  Greater than 10 mm   -lateral margin:  Greater than 10 mm   DCIS margins:  No DCIS present   Regional lymph nodes:  Uninvolved by tumor cells   -Total number of lymph nodes examined:  1   -Number of sentinel nodes examined:  1   Number of lymph nodes with macrometastasis:  0   Number of lymph nodes with micrometastasis: 0   Size of largest metastatic deposit:  n/a   Extranodal extension: n/a   Treatment effect in breast:  No known pre-surgical therapy   Treatment effect in lymph nodes:  No known pre-surgical therapy   Dermal lymphovascular invasion:  Present   Pathologic stage classification: pT4b pN0(sn)   Ancillary studies :  [id number], Interp By SALLY Caruso M.D.,   Estrogen receptor (ER):  Negative (0)   Progesterone receptor (KS):  Negative (0)   HER2 IHC:  Positive (3+)   Ki-67 proliferation index:  30%     INTERVAL HISTORY:   Dulce Root comes in for a post-op wound check.  She denies fever, chills, chest pain or shortness of breath.  Her pain is well controlled. Patient states a few days ago she noticed a blister at her incision site medially. This blister then burst and has been draining copious dark red blood for the last several days. States it was worst three days ago but is starting to improve in terms of volume of output.     MEDICATIONS:  Current Outpatient Medications   Medication Sig Dispense Refill    abaloparatide (TYMLOS) 80  mcg (3,120 mcg/1.56 mL) PnIj Inject 80 mcg into the skin once daily. 1 each 11    ALPRAZolam (XANAX) 1 MG tablet Take 1 mg by mouth nightly as needed. Rarely used      aspirin (ECOTRIN) 81 MG EC tablet Take 1 tablet (81 mg total) by mouth once daily. 90 tablet 12    brimonidine 0.2% (ALPHAGAN) 0.2 % Drop       calcium carbonate (OS-CALVIN) 600 mg (1,500 mg) Tab Take 600 mg by mouth 2 (two) times daily with meals.      chlorhexidine (PERIDEX) 0.12 % solution SMARTSI Tablespoon By Mouth Twice Daily      cholecalciferol, vitamin D3, (VITAMIN D3) 100 mcg (4,000 unit) Cap Take by mouth.      clopidogreL (PLAVIX) 75 mg tablet Take 1 tablet (75 mg total) by mouth once daily. 90 tablet 4    dapsone 25 MG Tab TAKE 2 TABLETS BY MOUTH EVERY DAY FOR 30 DAYS      dexAMETHasone (DECADRON) 0.5 mg/5 mL Elix SWISH 3.75MLS IN MOUTH FOR 30 SECONDS AND SWALLOW TWICE DAILY AS NEEDED FOR LICHEN PLANUS FLARE UPS      EScitalopram oxalate (LEXAPRO) 10 MG tablet TAKE 1 TABLET BY MOUTH EVERY DAY 90 tablet 3    fluconazole (DIFLUCAN) 200 MG Tab TAKE 1 TABLET ORALLY EVERY OTHER DAY X 4 DOSES, THEN 1 WEEKLY FOR 30 DAYS      hydroCHLOROthiazide (HYDRODIURIL) 25 MG tablet Take 1 tablet (25 mg total) by mouth every morning. 90 tablet 6    HYDROcodone-acetaminophen (NORCO) 5-325 mg per tablet Take 1 tablet by mouth every 6 (six) hours as needed for Pain. 20 tablet 0    metoprolol succinate (TOPROL-XL) 25 MG 24 hr tablet Take 1 tablet (25 mg total) by mouth 2 (two) times daily. 90 tablet 3    pantoprazole (PROTONIX) 20 MG tablet Take 1 tablet (20 mg total) by mouth once daily. 90 tablet 3    rosuvastatin (CRESTOR) 20 MG tablet Take 1 tablet (20 mg total) by mouth every evening. 90 tablet 3    sacubitriL-valsartan (ENTRESTO) 24-26 mg per tablet Take 1 tablet by mouth 2 (two) times daily. 60 tablet 11    levothyroxine (SYNTHROID) 75 MCG tablet TAKE 1 TABLET BY MOUTH EVERY DAY BEFORE BREAKFAST 30 tablet 0    valACYclovir (VALTREX) 1000 MG tablet TAKE 1  TABLET EVERY 24 HOURS; prn (Patient not taking: Reported on 12/13/2022) 30 tablet 2     No current facility-administered medications for this visit.     Facility-Administered Medications Ordered in Other Visits   Medication Dose Route Frequency Provider Last Rate Last Admin    0.9%  NaCl infusion   Intravenous Continuous Ender Morrison MD           ALLERGIES:   Review of patient's allergies indicates:   Allergen Reactions    Amlodipine Shortness Of Breath and Other (See Comments)     unknown  Other reaction(s): Swelling  unknown  unknown  Other reaction(s): Swelling  unknown  Other reaction(s): Swelling  unknown    Levofloxacin Hives and Swelling    Sulfa (sulfonamide antibiotics) Shortness Of Breath, Itching and Other (See Comments)     elevated blood pressure    Ace inhibitors     Ciprofloxacin Itching    Iodinated contrast media     Iodine      Other reaction(s): Unknown    Latex      Other reaction(s): Itching    Latex, natural rubber Itching       PHYSICAL EXAMINATION:   General:  This is a well appearing female with appropriate speech, affect and gait.     Breast:  Incision clean, dry, and intact. Small ~ 1 cm blister-like bulge from incision with pinpoint opening of dark sanguinous drainage.     IMPRESSION:   The patient has had an uneventful postoperative course.    PLAN:   1. Evaluated drainage from wound of breast. Palpated small amount from cavity. Much less on bandaid placed this morning when compared to the images sent in her chart from earlier this week.   2. Likely patient had unresolved hematoma that opened and drained.   3. The patient is advised in continued exam of the breast chest wall and to report to this office sooner should she note any areas of abnormality or concern.   4.  Patient is following now with Dr. Brian for cardiac optimization before chemotherapy with Dr. Sepulveda.   5. Will reach out to her next week regarding this issue for update.

## 2023-01-13 NOTE — Clinical Note
Rios,  Hopefully we can improve her LVEF with medication optimization and potentially resynchronization therapy.  Thanks, Valery

## 2023-01-20 ENCOUNTER — LAB VISIT (OUTPATIENT)
Dept: LAB | Facility: HOSPITAL | Age: 68
End: 2023-01-20
Attending: INTERNAL MEDICINE
Payer: MEDICARE

## 2023-01-20 DIAGNOSIS — E78.2 MIXED HYPERLIPIDEMIA: ICD-10-CM

## 2023-01-20 DIAGNOSIS — I50.42 CHRONIC COMBINED SYSTOLIC AND DIASTOLIC HEART FAILURE: ICD-10-CM

## 2023-01-20 PROCEDURE — 80048 BASIC METABOLIC PNL TOTAL CA: CPT | Performed by: INTERNAL MEDICINE

## 2023-01-20 PROCEDURE — 36415 COLL VENOUS BLD VENIPUNCTURE: CPT | Mod: PO | Performed by: INTERNAL MEDICINE

## 2023-01-21 LAB
ANION GAP SERPL CALC-SCNC: 10 MMOL/L (ref 8–16)
BUN SERPL-MCNC: 20 MG/DL (ref 8–23)
CALCIUM SERPL-MCNC: 9.6 MG/DL (ref 8.7–10.5)
CHLORIDE SERPL-SCNC: 99 MMOL/L (ref 95–110)
CO2 SERPL-SCNC: 28 MMOL/L (ref 23–29)
CREAT SERPL-MCNC: 0.8 MG/DL (ref 0.5–1.4)
EST. GFR  (NO RACE VARIABLE): >60 ML/MIN/1.73 M^2
GLUCOSE SERPL-MCNC: 74 MG/DL (ref 70–110)
POTASSIUM SERPL-SCNC: 3.6 MMOL/L (ref 3.5–5.1)
SODIUM SERPL-SCNC: 137 MMOL/L (ref 136–145)

## 2023-01-30 ENCOUNTER — PATIENT MESSAGE (OUTPATIENT)
Dept: ENDOSCOPY | Facility: HOSPITAL | Age: 68
End: 2023-01-30
Payer: MEDICARE

## 2023-02-02 ENCOUNTER — CLINICAL SUPPORT (OUTPATIENT)
Dept: CARDIOLOGY | Facility: HOSPITAL | Age: 68
End: 2023-02-02
Attending: INTERNAL MEDICINE
Payer: MEDICARE

## 2023-02-02 DIAGNOSIS — R55 NEAR SYNCOPE: ICD-10-CM

## 2023-02-02 DIAGNOSIS — R00.2 PALPITATIONS: ICD-10-CM

## 2023-02-02 PROCEDURE — 93272 CARDIAC EVENT MONITOR (CUPID ONLY): ICD-10-PCS | Mod: ,,, | Performed by: INTERNAL MEDICINE

## 2023-02-02 PROCEDURE — 93272 ECG/REVIEW INTERPRET ONLY: CPT | Mod: ,,, | Performed by: INTERNAL MEDICINE

## 2023-02-02 PROCEDURE — 93270 REMOTE 30 DAY ECG REV/REPORT: CPT

## 2023-02-14 ENCOUNTER — PATIENT MESSAGE (OUTPATIENT)
Dept: CARDIOLOGY | Facility: CLINIC | Age: 68
End: 2023-02-14
Payer: MEDICARE

## 2023-02-14 RX ORDER — LOSARTAN POTASSIUM 50 MG/1
TABLET ORAL
COMMUNITY
Start: 2023-01-19 | End: 2023-02-15

## 2023-02-14 NOTE — PROGRESS NOTES
Subjective:   Patient ID:  Dulce Root is a 67 y.o. female who presents for evaluation of No chief complaint on file.    The patient location is: home  The chief complaint leading to consultation is: f/u chf    Visit type: audiovisual    Face to Face time with patient: 15 min  30 minutes of total time spent on the encounter, which includes face to face time and non-face to face time preparing to see the patient (eg, review of tests), Obtaining and/or reviewing separately obtained history, Documenting clinical information in the electronic or other health record, Independently interpreting results (not separately reported) and communicating results to the patient/family/caregiver, or Care coordination (not separately reported).         Each patient to whom he or she provides medical services by telemedicine is:  (1) informed of the relationship between the physician and patient and the respective role of any other health care provider with respect to management of the patient; and (2) notified that he or she may decline to receive medical services by telemedicine and may withdraw from such care at any time.        PROBLEM LIST:  Breast cancer, left 11/22 (Previous cancer of the right breast s/p XRT, s/p bilateral mastectomy)  Hodgkins lymphoma 1991 (ABVD, XRT)  CAD s/p CABG 8/17 ( SVG to LAD, SVG to PDA occluded), PCI 2019 LM, CX, RCA  AVR, bioprosthetic 8/17  Carotid artery stenosis (ANNA 60-69%, LICA 80-89%)  Pulmonary HTN PAP 57, Likely WHO class II  HFrEF, EF 35% since 2019  HTN  HLD    HPI 1/13/23:  She presents today to [facility] care and cardio Oncology [facility].  She is referred by Dr. Sepulveda for recurrent HER2+ breast cancer diagnosed 11/22.  She has a significant oncologic and cardiovascular history.  She was diagnosed with Hodgkin's lymphoma back in 1991 and underwent treatment with chemotherapy as well as mental radiation therapy.  She subsequently developed right-sided breast cancer and underwent bilateral  mastectomy and radiation seed implantation.  In 2017 she underwent coronary artery bypass grafting x2 with an SVG to LAD and SVG to PDA for significant left main and right coronary artery disease as well as bioprosthetic AVR due to radiation induced valvulopathy.  At that time her ejection fraction was normal.  In 2019, she developed a cardiomyopathy with a reduced ejection fraction of 35%.  Repeat coronary angiography was performed at that time which showed an occlusion of her SVG to PDA.  She subsequently underwent PCI of her left main coronary artery, circumflex artery, and right coronary artery.  She has been on guideline directed medical therapy with losartan and metoprolol.  Her LVEF has remained at 35%. She also has significant carotid artery disease with moderate stenosis in her right carotid artery and a severe stenosis in her left carotid artery.  She has been on dual anti-platelet therapy with aspirin and Plavix as well as high-intensity statin therapy with rosuvastatin 20 mg daily. She reports MARCH with everyday activities. No chest pain. She has frequent palpitations associated with near syncope.     Interval history: She started Entresto at her last visit. She had been tolerating it well but began feeling weak yesterday. BP today 90/60. Has been taking HCTZ 25 mg daily. Has not been doing daily weights. No other symptoms. Eating and drinking normally. Currently wearing event monitor.    ECG 22-NOV-2022 16:31:45: Personally reviewed by me shows NSR with LBBB QRS duration 180 ms    Echo 11/22:  Summary    The left ventricle is mildly enlarged with concentric hypertrophy and moderately decreased systolic function.  Left ventricular diastolic dysfunction.  The estimated PA systolic pressure is 57 mmHg.  Normal right ventricular size with low normal right ventricular systolic function.  There is pulmonary hypertension.  Normal central venous pressure (3 mmHg).  The estimated ejection fraction is 35%.  There  is moderate left ventricular global hypokinesis.  Mild tricuspid regurgitation.  There is a bioprosthetic aortic valve present.  The aortic valve mean gradient is 13 mmHg with a dimensionless index of 0.28.    [facility] 4/22:  Angiographic findings:  Left main coronary-medium size vessel mild disease less than 15%.  Left anterior descending-chronic total occlusion its proximal portion.  Ramus intermedius-medium size vessel diffuse mild calcific disease less than 20%.  Circumflex-small to medium size vessel gives rise to 1 obtuse marginal.  Normal appearance patient's age.  Right coronary-medium size dominant vessel diffuse disease proximal mid up to 45% short discrete lesions in its midportion.  Vein graft to the LAD-patent with normal appearing native LAD mid to distal.  Could not find vein graft to right coronary.     Hemodynamics:  Right ventricular pressure  69/11/20 mmHg   pulmonary artery pressure 65/33/46 mmHg  Pulmonary catheter wedge pressure 34/30 1/30 mmHg  Right atrial pressure 17/31.    Cardiac output/cardiac index 4.2/2.59    Carotid US 3/22:  Conclusion    There is 60-69% right Internal Carotid Stenosis.  There is 80-99% left Internal Carotid Stenosis.  Mild to moderate heterogeneous calcified plaque bilaterally.  Normal antegrade vertebral flow bilaterally.    Past Medical History:   Diagnosis Date    Allergy     Anticoagulant long-term use     Plavix    Breast cancer 2008    Carotid stenosis, bilateral 10/13/2017    Coronary artery disease     Coronary artery disease involving coronary bypass graft of native heart without angina pectoris 08/16/2019 2/19  1.  Left main is a small vessel with a 60%-65% ostial lesion. 2.  LAD is a medium-sized vessel diffuse 70% proximally  Ramus intermedius is a medium size vessel with a 40%-50% ostial lesion. 3.  Circumflex 60%-70% ostial  remainder of circumflex and obtuse marginal normal in appearance. Right coronary artery is normal size vessel, short discrete 70%mid  4.  Vein graft to right coronary is occ    Coronary artery disease involving native coronary artery of native heart without angina pectoris 06/27/2017    DCIS (ductal carcinoma in situ) of breast 11/06/2012    Encounter for blood transfusion     Essential hypertension 11/06/2012    GERD (gastroesophageal reflux disease)     GI bleed 02/2021    Hodgkin lymphoma     Hx of Hodgkin's disease - s/p chemo and radiation 11/06/2012    Hyperlipidemia     Hyperlipidemia 11/06/2012    The patient presents with hyperlipidemia.  The patient reports tolerating the medication well and is in excellent compliance.  There have been no medication side effects.  The patient denies chest pain, neuropathy, and myalgias.  The patient has reduced fat intake and has been exercising.  Current treatment has included the medications listed in the med card.   Lab Results Component Value Date  CH    Hypertension     LBBB (left bundle branch block) 05/22/2018    Osteoporosis     Paroxysmal atrial fibrillation - single post-op episode 08/24/2017    Pemphigoid     Pemphigoid     pt receives IVIG infusions    Pulmonary hypertension 1/13/2023    S/P AVR (aortic valve replacement) - pericardial valve 08/21/2017    Perceval aortic tissue valve PVS23. 23mm    serial # [id number]    S/P CABG x 2 08/21/2017 8/17 CABG X 2 with SVG-LAD and SVG-distal RCA  SVG LAD due to absent LIMA after chest radiation and lymph node biopsy     Stented coronary artery     She had 2 stents placed in 2/2019.  She has had CAD and bypasses in the past in 2017.      Thyroid disease        Past Surgical History:   Procedure Laterality Date    ARTERIOGRAPHY OF AORTIC ROOT N/A 02/15/2019    Procedure: ARTERIOGRAM, AORTIC ROOT;  Surgeon: Sujit Chaudhry MD;  Location: [facility] CATH;  Service: Cardiology;  Laterality: N/A;    AXILLARY NODE DISSECTION Left 11/25/2022    Procedure: LYMPHADENECTOMY, AXILLARY-Left;  Surgeon: Rosa Maradiaga MD;  Location: [facility] OR;  Service: General;   Laterality: Left;    BREAST BIOPSY      BREAST RECONSTRUCTION      bilateral mastectomy    CARDIAC CATHETERIZATION      stents x 2    CARDIAC SURGERY  08/2017    Aortic valve replacement , CABG 2 vessel    CARDIAC VALVE REPLACEMENT  08/2017    aortic valve, bovine per pt    CORONARY ANGIOGRAPHY N/A 02/15/2019    Procedure: ANGIOGRAM, CORONARY ARTERY;  Surgeon: Sujit Chaudhry MD;  Location: [facility] CATH;  Service: Cardiology;  Laterality: N/A;    CORONARY ANGIOGRAPHY N/A 4/1/2022    Procedure: ANGIOGRAM, CORONARY ARTERY;  Surgeon: Sujit Chaudhry MD;  Location: [facility] CATH;  Service: Cardiology;  Laterality: N/A;    CORONARY BYPASS GRAFT ANGIOGRAPHY  02/15/2019    Procedure: Bypass graft study;  Surgeon: Sujit Chaudhry MD;  Location: [facility] CATH;  Service: Cardiology;;    COSMETIC SURGERY      bereast reconstruction    ESOPHAGOGASTRODUODENOSCOPY N/A 05/14/2021    Procedure: EGD (ESOPHAGOGASTRODUODENOSCOPY);  Surgeon: Tracy Flower MD;  Location: [facility];  Service: Endoscopy;  Laterality: N/A;    ESOPHAGOGASTRODUODENOSCOPY N/A 11/04/2021    Procedure: EGD (ESOPHAGOGASTRODUODENOSCOPY);  Surgeon: Tracy Flower MD;  Location: [facility];  Service: Endoscopy;  Laterality: N/A;    EYE SURGERY      eye lids    INJECTION FOR SENTINEL NODE IDENTIFICATION Left 11/25/2022    Procedure: INJECTION, FOR SENTINEL NODE IDENTIFICATION-Left;  Surgeon: Rosa Maradiaga MD;  Location: [facility];  Service: General;  Laterality: Left;    LEFT HEART CATHETERIZATION Right 02/15/2019    Procedure: Left heart cath;  Surgeon: Sujit Chaudhry MD;  Location: [facility] CATH;  Service: Cardiology;  Laterality: Right;    LEFT HEART CATHETERIZATION Left 4/1/2022    Procedure: Left heart cath;  Surgeon: Sujit Chaudhry MD;  Location: [facility] CATH;  Service: Cardiology;  Laterality: Left;    LUMBAR LAMINECTOMY WITH DISCECTOMY N/A 02/27/2020    Procedure: LAMINECTOMY, SPINE, LUMBAR, WITH DISCECTOMY;  Surgeon: Vimal Villegas MD;  Location: [facility];  Service:  Neurosurgery;  Laterality: N/A;  left L5-S1  Laminectomy L4-5    LYMPHADENECTOMY      MASTECTOMY      MASTECTOMY, PARTIAL Left 2022    Procedure: MASTECTOMY, PARTIAL-Left ultrasound guided;  Surgeon: Rosa Maradiaga MD;  Location: [facility] [place] OR;  Service: General;  Laterality: Left;    RIGHT HEART CATHETERIZATION Right 2022    Procedure: INSERTION, CATHETER, RIGHT HEART;  Surgeon: Sujit Chaudhry MD;  Location: [facility] CATH;  Service: Cardiology;  Laterality: Right;    SENTINEL LYMPH NODE BIOPSY Left 2022    Procedure: BIOPSY, LYMPH NODE, SENTINEL-Left;  Surgeon: Rosa Maradiaga MD;  Location: [facility] [place] OR;  Service: General;  Laterality: Left;    SKIN BIOPSY      SPLENECTOMY, TOTAL      TRANSFORAMINAL EPIDURAL INJECTION OF STEROID Left 2019    Procedure: Left L5/S1 TF SKIP with local;  Surgeon: Canelo Niño MD;  Location: [facility] MGT;  Service: Pain Management;  Laterality: Left;    TUMOR REMOVAL      neck- lymphoma       Social History     Socioeconomic History    Marital status:    Tobacco Use    Smoking status: Former     Packs/day: 1.00     Years: 20.00     Pack years: 20.00     Types: Cigarettes     Quit date: 1981     Years since quittin.3    Smokeless tobacco: Never   Substance and Sexual Activity    Alcohol use: No     Comment: 2 drinks/month; none 72 hrs prior to surgery    Drug use: No    Sexual activity: Yes     Partners: Male   Social History Narrative    Live w/ spouse and  1 dog      Social Determinants of Health     Financial Resource Strain: Low Risk     Difficulty of Paying Living Expenses: Not hard at all   Food Insecurity: No Food Insecurity    Worried About Running Out of Food in the Last Year: Never true    Ran Out of Food in the Last Year: Never true   Transportation Needs: No Transportation Needs    Lack of Transportation (Medical): No    Lack of Transportation (Non-Medical): No   Physical Activity: Insufficiently Active    Days of Exercise per Week: 3 days     Minutes of Exercise per Session: 30 min   Stress: No Stress Concern Present    Feeling of Stress : Only a little   Social Connections: Moderately Integrated    Frequency of Communication with Friends and Family: More than three times a week    Frequency of Social Gatherings with Friends and Family: Once a week    Attends [employer] Services: More than 4 times per year    Active Member of Clubs or Organizations: No    Attends Club or Organization Meetings: Never    Marital Status:    Housing Stability: Low Risk     Unable to Pay for Housing in the Last Year: No    Number of Places Lived in the Last Year: 1    Unstable Housing in the Last Year: No       Family History   Problem Relation Age of Onset    Hypertension Mother     Hypertension Father     Cancer Neg Hx        Patient's Medications   New Prescriptions    No medications on file   Previous Medications    ABALOPARATIDE (TYMLOS) 80 MCG (3,120 MCG/1.56 ML) PNIJ    Inject 80 mcg into the skin once daily.    ALPRAZOLAM (XANAX) 1 MG TABLET    Take 1 mg by mouth nightly as needed. Rarely used    ASPIRIN (ECOTRIN) 81 MG EC TABLET    Take 1 tablet (81 mg total) by mouth once daily.    BRIMONIDINE 0.2% (ALPHAGAN) 0.2 % DROP        CALCIUM CARBONATE (OS-CALVIN) 600 MG (1,500 MG) TAB    Take 600 mg by mouth 2 (two) times daily with meals.    CHLORHEXIDINE (PERIDEX) 0.12 % SOLUTION    SMARTSI Tablespoon By Mouth Twice Daily    CHOLECALCIFEROL, VITAMIN D3, (VITAMIN D3) 100 MCG (4,000 UNIT) CAP    Take by mouth.    CLOPIDOGREL (PLAVIX) 75 MG TABLET    Take 1 tablet (75 mg total) by mouth once daily.    DAPSONE 25 MG TAB    TAKE 2 TABLETS BY MOUTH EVERY DAY FOR 30 DAYS    DEXAMETHASONE (DECADRON) 0.5 MG/5 ML ELIX    SWISH 3.75MLS IN MOUTH FOR 30 SECONDS AND SWALLOW TWICE DAILY AS NEEDED FOR LICHEN PLANUS FL[employer]    ESCITALOPRAM OXALATE (LEXAPRO) 10 MG TABLET    TAKE 1 TABLET BY MOUTH EVERY DAY    FLUCONAZOLE (DIFLUCAN) 200 MG TAB    TAKE 1 TABLET ORALLY EVERY OTHER  DAY X 4 DOSES, THEN 1 WEEKLY FOR 30 DAYS    HYDROCHLOROTHIAZIDE (HYDRODIURIL) 25 MG TABLET    Take 1 tablet (25 mg total) by mouth every morning.    LEVOTHYROXINE (SYNTHROID) 75 MCG TABLET    TAKE 1 TABLET BY MOUTH EVERY DAY BEFORE BREAKFAST    LOSARTAN (COZAAR) 50 MG TABLET        METOPROLOL SUCCINATE (TOPROL-XL) 25 MG 24 HR TABLET    Take 1 tablet (25 mg total) by mouth 2 (two) times daily.    PANTOPRAZOLE (PROTONIX) 20 MG TABLET    TAKE 1 TABLET BY MOUTH EVERY DAY    ROSUVASTATIN (CRESTOR) 20 MG TABLET    Take 1 tablet (20 mg total) by mouth every evening.    SACUBITRIL-VALSARTAN (ENTRESTO) 24-26 MG PER TABLET    Take 1 tablet by mouth 2 (two) times daily.    VALACYCLOVIR (VALTREX) 1000 MG TABLET    TAKE 1 TABLET EVERY 24 HOURS; prn   Modified Medications    No medications on file   Discontinued Medications    No medications on file       Review of Systems   Constitutional: Negative for malaise/fatigue and weight gain.   HENT:  Negative for hearing loss.    Eyes:  Negative for visual disturbance.   Cardiovascular:  Negative for chest pain, claudication, leg swelling, orthopnea, paroxysmal nocturnal dyspnea and syncope.   Respiratory:  Negative for cough, shortness of breath, sleep disturbances due to breathing, snoring and wheezing.    Endocrine: Negative for cold intolerance, heat intolerance, polydipsia, polyphagia and polyuria.   Hematologic/Lymphatic: Negative for bleeding problem. Bruises/bleeds easily.   Skin:  Negative for rash and suspicious lesions.   Musculoskeletal:  Negative for arthritis, falls, joint pain, muscle weakness and myalgias.   Gastrointestinal:  Negative for abdominal pain, change in bowel habit, constipation, diarrhea, heartburn, hematochezia, melena and nausea.   Genitourinary:  Negative for hematuria and nocturia.   Neurological:  Positive for light-headedness. Negative for excessive daytime sleepiness, dizziness, headaches, loss of balance and weakness.   Psychiatric/Behavioral:   Negative for depression. The patient is not nervous/anxious.    Allergic/Immunologic: Negative for environmental allergies.     There were no vitals taken for this visit.    Objective:       Lab Results   Component Value Date     01/20/2023    K 3.6 01/20/2023    CL 99 01/20/2023    CO2 28 01/20/2023    BUN 20 01/20/2023    CREATININE 0.8 01/20/2023    GLU 74 01/20/2023    HGBA1C 5.8 (H) 02/10/2019    MG 2.2 02/18/2021    AST 27 01/06/2023    ALT 14 01/06/2023    ALBUMIN 3.8 01/06/2023    PROT 7.5 01/06/2023    BILITOT 0.8 01/06/2023    WBC 8.55 01/06/2023    HGB 10.6 (L) 01/06/2023    HCT 33.9 (L) 01/06/2023    HCT 24 (L) 08/21/2017    MCV 93 01/06/2023     01/06/2023    INR 1.0 05/11/2021    TSH 1.328 01/06/2023    CHOL 130 02/22/2022    HDL 30 (L) 02/22/2022    LDLCALC 76.2 02/22/2022    TRIG 119 02/22/2022     (H) 01/13/2023       Assessment:     1. Malignant neoplasm of central portion of left breast in female, estrogen receptor negative : Currently not a candidate for HER 2 therapy given EF 35% and decompensated heart failure, but hopefully we can improve her LVEF with plan as outlined below.   2. Hx of Hodgkin's disease - s/p chemo and radiation    3. Chronic combined systolic and diastolic heart failure : NYHA FC II, Stage C. Continue Entresto 24/26 mg bid. Change HCTZ to prn for weight gain. I cannot add on therapy today due to symptomatic hypotension.  Repeat echo in 3 months. If LVEF remains reduced despite optimized GDMT, will refer to EP for consideration of resynchronization therapy given her LBBB with QRS duration of 180 ms.     4. Coronary artery disease involving coronary bypass graft of native heart without angina pectoris : She is on DAPT with asa and plavix.  Continue rosuvastatin but increase to 20 mg daily for goal LDL < 70. Follow up fasting labs in 6 weeks.   5. Essential hypertension : Changes as above. Goal BP < 130/80.   6. Mixed hyperlipidemia : Increase rosuvastatin  to 20 mg daily for goal LDL < 70.   7. S/P CABG x 2    8. S/P AVR (aortic valve replacement) - pericardial valve : Valve functioning normally on recent echo. Continue asa. SBE prophylaxis is recommended prior to dental procedures.   9. Carotid stenosis, bilateral : She has significant carotid artery disease. She is asymptomatic on medical therapy. Referral placed to Vascular surgery.   10.   Pulmonary HTN: Likely WHO group II. Elevated PAP on RHC in the setting of significantly elevated PCWP.     Plan:     Diagnoses and all orders for this visit:    Pulmonary hypertension    Carotid stenosis, bilateral    Chronic combined systolic and diastolic heart failure    Coronary artery disease involving coronary bypass graft of native heart without angina pectoris    Essential hypertension    Mixed hyperlipidemia    S/P CABG x 2    S/P AVR (aortic valve replacement) - pericardial valve    Malignant neoplasm of central portion of left breast in female, estrogen receptor negative    Hx of Hodgkin's disease - s/p chemo and radiation      Thank you for allowing me to participate in this patient's care. Please do not hesitate to contact me with any questions or concerns.

## 2023-02-15 ENCOUNTER — TELEPHONE (OUTPATIENT)
Dept: VASCULAR SURGERY | Facility: CLINIC | Age: 68
End: 2023-02-15
Payer: MEDICARE

## 2023-02-15 ENCOUNTER — OFFICE VISIT (OUTPATIENT)
Dept: CARDIOLOGY | Facility: CLINIC | Age: 68
End: 2023-02-15
Payer: MEDICARE

## 2023-02-15 DIAGNOSIS — Z17.1 MALIGNANT NEOPLASM OF CENTRAL PORTION OF LEFT BREAST IN FEMALE, ESTROGEN RECEPTOR NEGATIVE: ICD-10-CM

## 2023-02-15 DIAGNOSIS — I65.23 CAROTID STENOSIS, BILATERAL: Primary | ICD-10-CM

## 2023-02-15 DIAGNOSIS — E78.2 MIXED HYPERLIPIDEMIA: ICD-10-CM

## 2023-02-15 DIAGNOSIS — I10 ESSENTIAL HYPERTENSION: ICD-10-CM

## 2023-02-15 DIAGNOSIS — C50.112 MALIGNANT NEOPLASM OF CENTRAL PORTION OF LEFT BREAST IN FEMALE, ESTROGEN RECEPTOR NEGATIVE: ICD-10-CM

## 2023-02-15 DIAGNOSIS — Z85.71 HX OF HODGKIN'S DISEASE: ICD-10-CM

## 2023-02-15 DIAGNOSIS — I27.20 PULMONARY HYPERTENSION: Primary | ICD-10-CM

## 2023-02-15 DIAGNOSIS — Z95.1 S/P CABG X 2: ICD-10-CM

## 2023-02-15 DIAGNOSIS — I25.810 CORONARY ARTERY DISEASE INVOLVING CORONARY BYPASS GRAFT OF NATIVE HEART WITHOUT ANGINA PECTORIS: ICD-10-CM

## 2023-02-15 DIAGNOSIS — I50.42 CHRONIC COMBINED SYSTOLIC AND DIASTOLIC HEART FAILURE: ICD-10-CM

## 2023-02-15 DIAGNOSIS — I65.23 CAROTID STENOSIS, BILATERAL: ICD-10-CM

## 2023-02-15 DIAGNOSIS — Z95.2 S/P AVR (AORTIC VALVE REPLACEMENT): ICD-10-CM

## 2023-02-15 PROCEDURE — 99214 OFFICE O/P EST MOD 30 MIN: CPT | Mod: 95,,, | Performed by: INTERNAL MEDICINE

## 2023-02-15 PROCEDURE — 99214 PR OFFICE/OUTPT VISIT, EST, LEVL IV, 30-39 MIN: ICD-10-PCS | Mod: 95,,, | Performed by: INTERNAL MEDICINE

## 2023-02-15 NOTE — TELEPHONE ENCOUNTER
Contacted pt to schedule appt with Dr. Hook from referral by Dr. Brian. Appointments scheduled, pt verified. Appointment letter placed in mail.

## 2023-02-16 ENCOUNTER — OFFICE VISIT (OUTPATIENT)
Dept: FAMILY MEDICINE | Facility: CLINIC | Age: 68
End: 2023-02-16
Payer: MEDICARE

## 2023-02-16 ENCOUNTER — PATIENT MESSAGE (OUTPATIENT)
Dept: CARDIOLOGY | Facility: CLINIC | Age: 68
End: 2023-02-16
Payer: MEDICARE

## 2023-02-16 VITALS
OXYGEN SATURATION: 94 % | TEMPERATURE: 98 F | BODY MASS INDEX: 25.3 KG/M2 | HEIGHT: 63 IN | DIASTOLIC BLOOD PRESSURE: 63 MMHG | RESPIRATION RATE: 18 BRPM | SYSTOLIC BLOOD PRESSURE: 103 MMHG | WEIGHT: 142.81 LBS | HEART RATE: 78 BPM

## 2023-02-16 DIAGNOSIS — I10 ESSENTIAL HYPERTENSION: ICD-10-CM

## 2023-02-16 DIAGNOSIS — Z00.00 ANNUAL PHYSICAL EXAM: Primary | ICD-10-CM

## 2023-02-16 DIAGNOSIS — F41.9 ANXIETY: ICD-10-CM

## 2023-02-16 DIAGNOSIS — E03.9 ACQUIRED HYPOTHYROIDISM: ICD-10-CM

## 2023-02-16 DIAGNOSIS — E78.2 MIXED HYPERLIPIDEMIA: ICD-10-CM

## 2023-02-16 DIAGNOSIS — Z13.1 SCREENING FOR DIABETES MELLITUS: ICD-10-CM

## 2023-02-16 PROCEDURE — 99397 PER PM REEVAL EST PAT 65+ YR: CPT | Mod: S$PBB,GZ,, | Performed by: NURSE PRACTITIONER

## 2023-02-16 PROCEDURE — 99215 OFFICE O/P EST HI 40 MIN: CPT | Mod: PBBFAC,PO | Performed by: NURSE PRACTITIONER

## 2023-02-16 PROCEDURE — 99999 PR PBB SHADOW E&M-EST. PATIENT-LVL V: CPT | Mod: PBBFAC,,, | Performed by: NURSE PRACTITIONER

## 2023-02-16 PROCEDURE — 99999 PR PBB SHADOW E&M-EST. PATIENT-LVL V: ICD-10-PCS | Mod: PBBFAC,,, | Performed by: NURSE PRACTITIONER

## 2023-02-16 PROCEDURE — 99397 PR PREVENTIVE VISIT,EST,65 & OVER: ICD-10-PCS | Mod: S$PBB,GZ,, | Performed by: NURSE PRACTITIONER

## 2023-02-16 RX ORDER — ESCITALOPRAM OXALATE 10 MG/1
10 TABLET ORAL DAILY
Qty: 90 TABLET | Refills: 3 | Status: SHIPPED | OUTPATIENT
Start: 2023-02-16 | End: 2023-11-28 | Stop reason: SDUPTHER

## 2023-02-16 RX ORDER — LEVOTHYROXINE SODIUM 75 UG/1
75 TABLET ORAL
Qty: 90 TABLET | Refills: 3 | Status: SHIPPED | OUTPATIENT
Start: 2023-02-16 | End: 2023-11-28 | Stop reason: SDUPTHER

## 2023-02-16 NOTE — PROGRESS NOTES
Subjective:       Patient ID: Dulce Root is a 67 y.o. female.    Chief Complaint: Follow-up    HPI    She comes in for routine annual wellness exam with fasting labs and medication refills     Problem List Items Addressed This Visit          Psychiatric    Anxiety    Overview     The patient has anxiety which been treated over time with lexapro.  Treatment has been given since many years ago.  This has controled the symptoms.  Things that makes it worse include stress and celexa makes it better.               Relevant Medications    EScitalopram oxalate (LEXAPRO) 10 MG tablet       Cardiac/Vascular    Essential hypertension    Overview     The patient presents with essential hypertension.  The patient is tolerating the medication well and is in excellent compliance.  The patient is experiencing no side effects.  Counseling was offered regarding low salt diets.  The patient has a reduced salt intake.  The patient denies chest pain, palpitations, shortness of breath, dyspnea on exertion, left or murmur neck pain, nausea, vomiting, diaphoresis, paroxysmal nocturnal dyspnea, and orthopnea.  She has had some lower bp's.  She has been asked on the digital medicine program to hold the telmisartan if she has a lower blood pressure.  Hypertension Medications               metoprolol succinate (TOPROL-XL) 25 MG 24 hr tablet Take 1 tablet (25 mg total) by mouth once daily.    telmisartan (MICARDIS) 20 MG Tab Take 1 tablet (20 mg total) by mouth once daily.            Hyperlipidemia    Overview     The patient presents with hyperlipidemia.  The patient reports tolerating the medication well and is in excellent compliance.  There have been no medication side effects.  The patient denies chest pain, neuropathy, and myalgias.  The patient has reduced fat intake and has been exercising.  Current treatment has included the medications listed in the med card.    Lab Results   Component Value Date    CHOL 130 02/22/2022    CHOL 166  08/21/2020    CHOL 120 11/14/2019       Lab Results   Component Value Date    HDL 30 (L) 02/22/2022    HDL 53 08/21/2020    HDL 37 (L) 11/14/2019       Lab Results   Component Value Date    LDLCALC 76.2 02/22/2022    LDLCALC 89.8 08/21/2020    LDLCALC 68.2 11/14/2019       Lab Results   Component Value Date    TRIG 119 02/22/2022    TRIG 116 08/21/2020    TRIG 74 11/14/2019       Lab Results   Component Value Date    CHOLHDL 23.1 02/22/2022    CHOLHDL 31.9 08/21/2020    CHOLHDL 30.8 11/14/2019     Lab Results   Component Value Date    ALT 34 04/01/2022    AST 39 (H) 04/01/2022    ALKPHOS 94 04/01/2022    BILITOT 0.6 04/01/2022               Endocrine    Hypothyroid    Overview     The patient presents with hypothyroidism.  The patient denies agitation, anxiety, blurred vision, chest pain, cold intolerance, constipation, dizziness, dry skin, fatigue, lightheadedness, paresthesias, skin coarsening, tachycardia, tremor, weight gain or weight loss.  The patient's current treatment has included Synthroid with a good response.    Lab Results   Component Value Date    TSH 3.320 08/10/2020            Relevant Medications    levothyroxine (SYNTHROID) 75 MCG tablet     Other Visit Diagnoses       Annual physical exam    -  Primary    Screening for diabetes mellitus                 Review of Systems   Constitutional:  Negative for unexpected weight change.   HENT:  Negative for ear pain.    Eyes:  Negative for pain and visual disturbance.   Respiratory:  Negative for shortness of breath.    Cardiovascular:  Negative for palpitations.   Gastrointestinal:  Negative for diarrhea.   Skin:  Negative for color change.   Neurological:  Negative for dizziness.   Psychiatric/Behavioral:  Negative for dysphoric mood and sleep disturbance. The patient is not nervous/anxious.      Vitals:    02/16/23 1412   BP: 103/63   Pulse: 78   Resp: 18   Temp: 98.1 °F (36.7 °C)       Objective:     Current Outpatient Medications   Medication Sig  Dispense Refill    ALPRAZolam (XANAX) 1 MG tablet Take 1 mg by mouth nightly as needed. Rarely used      aspirin (ECOTRIN) 81 MG EC tablet Take 1 tablet (81 mg total) by mouth once daily. 90 tablet 12    brimonidine 0.2% (ALPHAGAN) 0.2 % Drop       calcium carbonate (OS-CALVIN) 600 mg (1,500 mg) Tab Take 600 mg by mouth 2 (two) times daily with meals.      chlorhexidine (PERIDEX) 0.12 % solution SMARTSI Tablespoon By Mouth Twice Daily      cholecalciferol, vitamin D3, (VITAMIN D3) 100 mcg (4,000 unit) Cap Take by mouth.      clopidogreL (PLAVIX) 75 mg tablet Take 1 tablet (75 mg total) by mouth once daily. 90 tablet 4    dapsone 25 MG Tab TAKE 2 TABLETS BY MOUTH EVERY DAY FOR 30 DAYS      hydroCHLOROthiazide (HYDRODIURIL) 25 MG tablet Take 1 tablet (25 mg total) by mouth every morning. 90 tablet 6    metoprolol succinate (TOPROL-XL) 25 MG 24 hr tablet Take 1 tablet (25 mg total) by mouth 2 (two) times daily. 90 tablet 3    pantoprazole (PROTONIX) 20 MG tablet TAKE 1 TABLET BY MOUTH EVERY DAY 30 tablet 0    rosuvastatin (CRESTOR) 20 MG tablet Take 1 tablet (20 mg total) by mouth every evening. 90 tablet 3    sacubitriL-valsartan (ENTRESTO) 24-26 mg per tablet Take 1 tablet by mouth 2 (two) times daily. 60 tablet 11    dexAMETHasone (DECADRON) 0.5 mg/5 mL Elix SWISH 3.75MLS IN MOUTH FOR 30 SECONDS AND SWALLOW TWICE DAILY AS NEEDED FOR LICHEN PLANUS FLARE UPS      EScitalopram oxalate (LEXAPRO) 10 MG tablet Take 1 tablet (10 mg total) by mouth once daily. 90 tablet 3    fluconazole (DIFLUCAN) 200 MG Tab TAKE 1 TABLET ORALLY EVERY OTHER DAY X 4 DOSES, THEN 1 WEEKLY FOR 30 DAYS      levothyroxine (SYNTHROID) 75 MCG tablet Take 1 tablet (75 mcg total) by mouth before breakfast. 90 tablet 3    valACYclovir (VALTREX) 1000 MG tablet TAKE 1 TABLET EVERY 24 HOURS; prn (Patient not taking: Reported on 2023) 30 tablet 2     No current facility-administered medications for this visit.     Facility-Administered Medications  Ordered in Other Visits   Medication Dose Route Frequency Provider Last Rate Last Admin    0.9%  NaCl infusion   Intravenous Continuous Ender Morrison MD           Physical Exam  Vitals and nursing note reviewed.   Constitutional:       General: She is not in acute distress.     Appearance: She is well-developed.   HENT:      Head: Normocephalic and atraumatic.   Eyes:      Pupils: Pupils are equal, round, and reactive to light.   Cardiovascular:      Rate and Rhythm: Normal rate and regular rhythm.   Pulmonary:      Effort: Pulmonary effort is normal.      Breath sounds: Normal breath sounds.   Musculoskeletal:         General: Normal range of motion.      Cervical back: Normal range of motion and neck supple.   Skin:     General: Skin is warm and dry.      Findings: No rash.   Neurological:      Mental Status: She is alert and oriented to person, place, and time.   Psychiatric:         Judgment: Judgment normal.       Assessment:       1. Annual physical exam    2. Screening for diabetes mellitus    3. Essential hypertension    4. Mixed hyperlipidemia    5. Acquired hypothyroidism    6. Anxiety          Plan:   Annual physical exam    Screening for diabetes mellitus    Essential hypertension    Mixed hyperlipidemia    Acquired hypothyroidism  -     levothyroxine (SYNTHROID) 75 MCG tablet; Take 1 tablet (75 mcg total) by mouth before breakfast.  Dispense: 90 tablet; Refill: 3    Anxiety  -     EScitalopram oxalate (LEXAPRO) 10 MG tablet; Take 1 tablet (10 mg total) by mouth once daily.  Dispense: 90 tablet; Refill: 3        No follow-ups on file.    There are no Patient Instructions on file for this visit.

## 2023-02-20 ENCOUNTER — PATIENT MESSAGE (OUTPATIENT)
Dept: CARDIOLOGY | Facility: CLINIC | Age: 68
End: 2023-02-20
Payer: MEDICARE

## 2023-02-24 ENCOUNTER — LAB VISIT (OUTPATIENT)
Dept: LAB | Facility: HOSPITAL | Age: 68
End: 2023-02-24
Attending: INTERNAL MEDICINE
Payer: MEDICARE

## 2023-02-24 DIAGNOSIS — I65.23 CAROTID STENOSIS, BILATERAL: ICD-10-CM

## 2023-02-24 DIAGNOSIS — I50.42 CHRONIC COMBINED SYSTOLIC AND DIASTOLIC HEART FAILURE: ICD-10-CM

## 2023-02-24 DIAGNOSIS — E78.2 MIXED HYPERLIPIDEMIA: ICD-10-CM

## 2023-02-24 LAB
ANION GAP SERPL CALC-SCNC: 7 MMOL/L (ref 8–16)
BUN SERPL-MCNC: 22 MG/DL (ref 8–23)
CALCIUM SERPL-MCNC: 9.1 MG/DL (ref 8.7–10.5)
CHLORIDE SERPL-SCNC: 106 MMOL/L (ref 95–110)
CHOLEST SERPL-MCNC: 112 MG/DL (ref 120–199)
CHOLEST/HDLC SERPL: 2.9 {RATIO} (ref 2–5)
CO2 SERPL-SCNC: 27 MMOL/L (ref 23–29)
CREAT SERPL-MCNC: 0.7 MG/DL (ref 0.5–1.4)
EST. GFR  (NO RACE VARIABLE): >60 ML/MIN/1.73 M^2
GLUCOSE SERPL-MCNC: 86 MG/DL (ref 70–110)
HDLC SERPL-MCNC: 39 MG/DL (ref 40–75)
HDLC SERPL: 34.8 % (ref 20–50)
LDLC SERPL CALC-MCNC: 58.8 MG/DL (ref 63–159)
NONHDLC SERPL-MCNC: 73 MG/DL
POTASSIUM SERPL-SCNC: 4.2 MMOL/L (ref 3.5–5.1)
SODIUM SERPL-SCNC: 140 MMOL/L (ref 136–145)
TRIGL SERPL-MCNC: 71 MG/DL (ref 30–150)

## 2023-02-24 PROCEDURE — 80048 BASIC METABOLIC PNL TOTAL CA: CPT | Performed by: INTERNAL MEDICINE

## 2023-02-24 PROCEDURE — 80061 LIPID PANEL: CPT | Performed by: INTERNAL MEDICINE

## 2023-02-24 PROCEDURE — 36415 COLL VENOUS BLD VENIPUNCTURE: CPT | Mod: PO | Performed by: INTERNAL MEDICINE

## 2023-02-28 ENCOUNTER — OFFICE VISIT (OUTPATIENT)
Dept: GASTROENTEROLOGY | Facility: CLINIC | Age: 68
End: 2023-02-28
Payer: MEDICARE

## 2023-02-28 DIAGNOSIS — K26.4 DUODENAL ULCER WITH HEMORRHAGE AND OBSTRUCTION: ICD-10-CM

## 2023-02-28 DIAGNOSIS — K59.04 CHRONIC IDIOPATHIC CONSTIPATION: ICD-10-CM

## 2023-02-28 DIAGNOSIS — K31.5 DUODENAL ULCER WITH HEMORRHAGE AND OBSTRUCTION: ICD-10-CM

## 2023-02-28 PROCEDURE — 99213 PR OFFICE/OUTPT VISIT, EST, LEVL III, 20-29 MIN: ICD-10-PCS | Mod: 95,,, | Performed by: INTERNAL MEDICINE

## 2023-02-28 PROCEDURE — 99213 OFFICE O/P EST LOW 20 MIN: CPT | Mod: 95,,, | Performed by: INTERNAL MEDICINE

## 2023-02-28 NOTE — PROGRESS NOTES
Clinic Progress Note:  [facility] Progress Note    Reason for Visit:  Diagnoses of Duodenal ulcer with hemorrhage and obstruction and Chronic idiopathic constipation were pertinent to this visit.    PCP: Patrick Hernandez       Visit type: audiovisual      20 minutes of total time spent on the encounter, which includes face to face time and non-face to face time preparing to see the patient (eg, review of tests), Obtaining and/or reviewing separately obtained history, Documenting clinical information in the electronic or other health record, Independently interpreting results (not separately reported) and communicating results to the patient/family/caregiver, or Care coordination (not separately reported).   Each patient to whom he or she provides medical services by telemedicine is: (1) informed of the relationship between the physician and patient and the respective role of any other health care provider with respect to management of the patient; and (2) notified that he or she may decline to receive medical services by telemedicine and may withdraw from such care at any time        HPI:  This is a 67 y.o. female here for evaluation of the following:     //Duodenal ulcer: s/p repeat EGD in 11/2021 that revealed a moderate post-ulcer deformity was found in the duodenal bulb and        in the first portion of the duodenum. Patient is still on PPI. No history of GERD and ulcer has healed.     //Constipation: This occurs occasionally. She is not taking anything for constipation. Has occasional gas. Gas x works for gas.      ROS:  As above HPI       Allergies: Reviewed    Home Medications:   Medication List with Changes/Refills   Current Medications    ALPRAZOLAM (XANAX) 1 MG TABLET    Take 1 mg by mouth nightly as needed. Rarely used    ASPIRIN (ECOTRIN) 81 MG EC TABLET    Take 1 tablet (81 mg total) by mouth once daily.    BRIMONIDINE 0.2% (ALPHAGAN) 0.2 % DROP        CALCIUM CARBONATE (OS-CALVIN) 600 MG  (1,500 MG) TAB    Take 600 mg by mouth 2 (two) times daily with meals.    CHLORHEXIDINE (PERIDEX) 0.12 % SOLUTION    SMARTSI Tablespoon By Mouth Twice Daily    CHOLECALCIFEROL, VITAMIN D3, (VITAMIN D3) 100 MCG (4,000 UNIT) CAP    Take by mouth.    CLOPIDOGREL (PLAVIX) 75 MG TABLET    Take 1 tablet (75 mg total) by mouth once daily.    DAPSONE 25 MG TAB    TAKE 2 TABLETS BY MOUTH EVERY DAY FOR 30 DAYS    DEXAMETHASONE (DECADRON) 0.5 MG/5 ML ELIX    SWISH 3.75MLS IN MOUTH FOR 30 SECONDS AND SWALLOW TWICE DAILY AS NEEDED FOR LICHEN PLANUS FLARE UPS    ESCITALOPRAM OXALATE (LEXAPRO) 10 MG TABLET    Take 1 tablet (10 mg total) by mouth once daily.    FLUCONAZOLE (DIFLUCAN) 200 MG TAB    TAKE 1 TABLET ORALLY EVERY OTHER DAY X 4 DOSES, THEN 1 WEEKLY FOR 30 DAYS    HYDROCHLOROTHIAZIDE (HYDRODIURIL) 25 MG TABLET    Take 1 tablet (25 mg total) by mouth every morning.    LEVOTHYROXINE (SYNTHROID) 75 MCG TABLET    Take 1 tablet (75 mcg total) by mouth before breakfast.    METOPROLOL SUCCINATE (TOPROL-XL) 25 MG 24 HR TABLET    Take 1 tablet (25 mg total) by mouth 2 (two) times daily.    PANTOPRAZOLE (PROTONIX) 20 MG TABLET    TAKE 1 TABLET BY MOUTH EVERY DAY    ROSUVASTATIN (CRESTOR) 20 MG TABLET    Take 1 tablet (20 mg total) by mouth every evening.    SACUBITRIL-VALSARTAN (ENTRESTO) 24-26 MG PER TABLET    Take 1 tablet by mouth 2 (two) times daily.    VALACYCLOVIR (VALTREX) 1000 MG TABLET    TAKE 1 TABLET EVERY 24 HOURS; prn         Physical Exam:  Vital Signs:  There were no vitals taken for this visit.  There is no height or weight on file to calculate BMI.    Physical Exam  Vitals reviewed: virtual visit.        Labs: Pertinent labs reviewed.          Assessment:  Problem List Items Addressed This Visit          GI    Duodenal ulcer with hemorrhage and obstruction    Overview     EGD 21: clean base duodenal ulcer with duodenal stricture         Current Assessment & Plan     -Healed ulcer on 2021 with post ulcer  deformity.   -No signs or symptoms of GERD. Can discontinue PPI at this time   -Avoid NSAIDs.          Chronic idiopathic constipation    Current Assessment & Plan     Plan:   -Occurs occasionally   -Can take Miralax  -High fiber diet and increase water intake   -Gas x as needed           Duodenal ulcer with hemorrhage and obstruction    Chronic idiopathic constipation                Follow up if symptoms worsen or fail to improve.    Order summary:  No orders of the defined types were placed in this encounter.      Thank you so much for allowing me to participate in the care of Dulce Johnson MD  Gastroenterology and Hepatology  [facility]

## 2023-02-28 NOTE — ASSESSMENT & PLAN NOTE
-Healed ulcer on 11/2021 with post ulcer deformity.   -No signs or symptoms of GERD. Can discontinue PPI at this time   -Avoid NSAIDs.

## 2023-02-28 NOTE — ASSESSMENT & PLAN NOTE
Plan:   -Occurs occasionally   -Can take Miralax  -High fiber diet and increase water intake   -Gas x as needed

## 2023-03-10 ENCOUNTER — INITIAL CONSULT (OUTPATIENT)
Dept: VASCULAR SURGERY | Facility: CLINIC | Age: 68
End: 2023-03-10
Payer: MEDICARE

## 2023-03-10 ENCOUNTER — HOSPITAL ENCOUNTER (OUTPATIENT)
Dept: VASCULAR SURGERY | Facility: CLINIC | Age: 68
Discharge: HOME | End: 2023-03-10
Attending: SURGERY
Payer: MEDICARE

## 2023-03-10 VITALS
TEMPERATURE: 98 F | HEIGHT: 63 IN | DIASTOLIC BLOOD PRESSURE: 58 MMHG | SYSTOLIC BLOOD PRESSURE: 122 MMHG | BODY MASS INDEX: 24.61 KG/M2 | HEART RATE: 76 BPM | WEIGHT: 138.88 LBS

## 2023-03-10 DIAGNOSIS — I65.23 CAROTID STENOSIS, BILATERAL: ICD-10-CM

## 2023-03-10 DIAGNOSIS — I50.42 CHRONIC COMBINED SYSTOLIC AND DIASTOLIC HEART FAILURE: ICD-10-CM

## 2023-03-10 PROCEDURE — 99999 PR PBB SHADOW E&M-EST. PATIENT-LVL IV: ICD-10-PCS | Mod: PBBFAC,,, | Performed by: SURGERY

## 2023-03-10 PROCEDURE — 93880 EXTRACRANIAL BILAT STUDY: CPT | Mod: PBBFAC | Performed by: SURGERY

## 2023-03-10 PROCEDURE — 99205 PR OFFICE/OUTPT VISIT, NEW, LEVL V, 60-74 MIN: ICD-10-PCS | Mod: S$PBB,,, | Performed by: SURGERY

## 2023-03-10 PROCEDURE — 93880 EXTRACRANIAL BILAT STUDY: CPT | Mod: 26,S$PBB,, | Performed by: SURGERY

## 2023-03-10 PROCEDURE — 93880 PR DUPLEX SCAN EXTRACRANIAL,BILAT: ICD-10-PCS | Mod: 26,S$PBB,, | Performed by: SURGERY

## 2023-03-10 PROCEDURE — 99214 OFFICE O/P EST MOD 30 MIN: CPT | Mod: PBBFAC | Performed by: SURGERY

## 2023-03-10 PROCEDURE — 99999 PR PBB SHADOW E&M-EST. PATIENT-LVL IV: CPT | Mod: PBBFAC,,, | Performed by: SURGERY

## 2023-03-10 PROCEDURE — 99205 OFFICE O/P NEW HI 60 MIN: CPT | Mod: S$PBB,,, | Performed by: SURGERY

## 2023-03-10 NOTE — LETTER
[facility] - Vascular Surg [facility]  [address]  [facility] [address]  Phone: [phone]  Fax: [phone] March 13, 2023        Valery Brian MD  [address]  [facility] [address]    Patient: Dulce Root   MR Number: [id number]   YOB: 1955   Date of Visit: 3/10/2023     Dear Dr. Brian:    Thank you for referring Dulce Root to me for evaluation. Attached are the relevant portions of my assessment and plan of care.    If you have questions, please do not hesitate to call me. I look forward to following Dulce along with you.    Sincerely,    IAN Hook III, MD   Professor and Chief  Vascular and Endovascular Surgery  Vice-Chair, Department of Surgery  [facility]    JOSEPH Hernandez MD

## 2023-03-10 NOTE — PROGRESS NOTES
VASCULAR SURGERY SERVICE    REFERRING DOCTOR: Valery Brian MD    CHIEF COMPLAINT:  Carotid stenosis    HISTORY OF PRESENT ILLNESS: Dulce Root is a 67 y.o. female with a history of ischemic cardiomyopathy with the compensated heart failure, ejection fraction 35%, status post CABG x2 and valve replacement, with recurrent breast cancer, who presents for evaluation carotid artery stenosis.  He was noted a year ago to have a right 60-79% carotid stenosis left 80% carotid stenosis.  She is no history of stroke TIA or amaurosis.  She does admit to subjective tinnitus    She is currently on dual antiplatelet therapy and statin.  She is a nonsmoker.  She reports that she gets lightheaded in during these times her pressure is noted to be low.  She endorses increasing dyspnea on exertion    Past Medical History:   Diagnosis Date    Allergy     Anticoagulant long-term use     Plavix    Breast cancer 2008    Carotid stenosis, bilateral 10/13/2017    Coronary artery disease     Coronary artery disease involving coronary bypass graft of native heart without angina pectoris 08/16/2019 2/19  1.  Left main is a small vessel with a 60%-65% ostial lesion. 2.  LAD is a medium-sized vessel diffuse 70% proximally  Ramus intermedius is a medium size vessel with a 40%-50% ostial lesion. 3.  Circumflex 60%-70% ostial  remainder of circumflex and obtuse marginal normal in appearance. Right coronary artery is normal size vessel, short discrete 70%mid 4.  Vein graft to right coronary is occ    Coronary artery disease involving native coronary artery of native heart without angina pectoris 06/27/2017    DCIS (ductal carcinoma in situ) of breast 11/06/2012    Encounter for blood transfusion     Essential hypertension 11/06/2012    GERD (gastroesophageal reflux disease)     GI bleed 02/2021    Hodgkin lymphoma     Hx of Hodgkin's disease - s/p chemo and radiation 11/06/2012    Hyperlipidemia     Hyperlipidemia 11/06/2012    The patient  presents with hyperlipidemia.  The patient reports tolerating the medication well and is in excellent compliance.  There have been no medication side effects.  The patient denies chest pain, neuropathy, and myalgias.  The patient has reduced fat intake and has been exercising.  Current treatment has included the medications listed in the med card.   Lab Results Component Value Date  CH    Hypertension     LBBB (left bundle branch block) 05/22/2018    Osteoporosis     Paroxysmal atrial fibrillation - single post-op episode 08/24/2017    Pemphigoid     Pemphigoid     pt receives IVIG infusions    Pulmonary hypertension 1/13/2023    S/P AVR (aortic valve replacement) - pericardial valve 08/21/2017    Perceval aortic tissue valve PVS23. 23mm    serial # [id number]    S/P CABG x 2 08/21/2017 8/17 CABG X 2 with SVG-LAD and SVG-distal RCA  SVG LAD due to absent LIMA after chest radiation and lymph node biopsy     Stented coronary artery     She had 2 stents placed in 2/2019.  She has had CAD and bypasses in the past in 2017.      Thyroid disease        Past Surgical History:   Procedure Laterality Date    ARTERIOGRAPHY OF AORTIC ROOT N/A 02/15/2019    Procedure: ARTERIOGRAM, AORTIC ROOT;  Surgeon: Sujit Chaudhry MD;  [facility];  Service: Cardiology;  Laterality: N/A;    AXILLARY NODE DISSECTION Left 11/25/2022    Procedure: LYMPHADENECTOMY, AXILLARY-Left;  Surgeon: Rosa Maradiaga MD;  Location: [facility];  Service: General;  Laterality: Left;    BREAST BIOPSY      BREAST RECONSTRUCTION      bilateral mastectomy    CARDIAC CATHETERIZATION      stents x 2    CARDIAC SURGERY  08/2017    Aortic valve replacement , CABG 2 vessel    CARDIAC VALVE REPLACEMENT  08/2017    aortic valve, bovine per pt    CORONARY ANGIOGRAPHY N/A 02/15/2019    Procedure: ANGIOGRAM, CORONARY ARTERY;  Surgeon: Sujit Chaudhry MD;  Location: [facility] CATH;  Service: Cardiology;  Laterality: N/A;    CORONARY ANGIOGRAPHY N/A 4/1/2022    Procedure:  ANGIOGRAM, CORONARY ARTERY;  Surgeon: Sujit Chaudhry MD;  Location: [facility] CATH;  Service: Cardiology;  Laterality: N/A;    CORONARY BYPASS GRAFT ANGIOGRAPHY  02/15/2019    Procedure: Bypass graft study;  Surgeon: Sujit Chaudhry MD;  Location: [facility] CATH;  Service: Cardiology;;    COSMETIC SURGERY      bereast reconstruction    ESOPHAGOGASTRODUODENOSCOPY N/A 05/14/2021    Procedure: EGD (ESOPHAGOGASTRODUODENOSCOPY);  Surgeon: Tracy Flower MD;  Location: [facility] ENDO;  Service: Endoscopy;  Laterality: N/A;    ESOPHAGOGASTRODUODENOSCOPY N/A 11/04/2021    Procedure: EGD (ESOPHAGOGASTRODUODENOSCOPY);  Surgeon: Tracy Flower MD;  Location: [facility];  Service: Endoscopy;  Laterality: N/A;    EYE SURGERY      eye lids    INJECTION FOR SENTINEL NODE IDENTIFICATION Left 11/25/2022    Procedure: INJECTION, FOR SENTINEL NODE IDENTIFICATION-Left;  Surgeon: Rosa Maradiaga MD;  Location: [facility];  Service: General;  Laterality: Left;    LEFT HEART CATHETERIZATION Right 02/15/2019    Procedure: Left heart cath;  Surgeon: Sujit Chaudhry MD;  Location: [facility] CATH;  Service: Cardiology;  Laterality: Right;    LEFT HEART CATHETERIZATION Left 4/1/2022    Procedure: Left heart cath;  Surgeon: Sujit Chaudhry MD;  [facility];  Service: Cardiology;  Laterality: Left;    LUMBAR LAMINECTOMY WITH DISCECTOMY N/A 02/27/2020    Procedure: LAMINECTOMY, SPINE, LUMBAR, WITH DISCECTOMY;  Surgeon: Vimal Villegas MD;  Location: [facility] OR;  Service: Neurosurgery;  Laterality: N/A;  left L5-S1  Laminectomy L4-5    LYMPHADENECTOMY      MASTECTOMY      MASTECTOMY, PARTIAL Left 11/25/2022    Procedure: MASTECTOMY, PARTIAL-Left ultrasound guided;  Surgeon: Rosa Maradiaga MD;  Location: [facility];  Service: General;  Laterality: Left;    RIGHT HEART CATHETERIZATION Right 4/1/2022    Procedure: INSERTION, CATHETER, RIGHT HEART;  Surgeon: Sujit Chaudhry MD;  Location: [facility] CATH;  Service: Cardiology;  Laterality: Right;    SENTINEL LYMPH NODE  BIOPSY Left 2022    Procedure: BIOPSY, LYMPH NODE, SENTINEL-Left;  Surgeon: Rosa Maradiaga MD;  Location: [facility] OR;  Service: General;  Laterality: Left;    SKIN BIOPSY      SPLENECTOMY, TOTAL      TRANSFORAMINAL EPIDURAL INJECTION OF STEROID Left 2019    Procedure: Left L5/S1 TF SKIP with local;  Surgeon: Canelo Niño MD;  Location: [facility] PAIN MGT;  Service: Pain Management;  Laterality: Left;    TUMOR REMOVAL      neck- lymphoma         Current Outpatient Medications:     ALPRAZolam (XANAX) 1 MG tablet, Take 1 mg by mouth nightly as needed. Rarely used, Disp: , Rfl:     aspirin (ECOTRIN) 81 MG EC tablet, Take 1 tablet (81 mg total) by mouth once daily., Disp: 90 tablet, Rfl: 12    brimonidine 0.2% (ALPHAGAN) 0.2 % Drop, , Disp: , Rfl:     calcium carbonate (OS-CALVIN) 600 mg (1,500 mg) Tab, Take 600 mg by mouth 2 (two) times daily with meals., Disp: , Rfl:     chlorhexidine (PERIDEX) 0.12 % solution, SMARTSI Tablespoon By Mouth Twice Daily, Disp: , Rfl:     cholecalciferol, vitamin D3, (VITAMIN D3) 100 mcg (4,000 unit) Cap, Take by mouth., Disp: , Rfl:     clopidogreL (PLAVIX) 75 mg tablet, Take 1 tablet (75 mg total) by mouth once daily., Disp: 90 tablet, Rfl: 4    dapsone 25 MG Tab, TAKE 2 TABLETS BY MOUTH EVERY DAY FOR 30 DAYS, Disp: , Rfl:     dexAMETHasone (DECADRON) 0.5 mg/5 mL Elix, SWISH 3.75MLS IN MOUTH FOR 30 SECONDS AND SWALLOW TWICE DAILY AS NEEDED FOR LICHEN PLANUS FLARE UPS, Disp: , Rfl:     EScitalopram oxalate (LEXAPRO) 10 MG tablet, Take 1 tablet (10 mg total) by mouth once daily., Disp: 90 tablet, Rfl: 3    fluconazole (DIFLUCAN) 200 MG Tab, TAKE 1 TABLET ORALLY EVERY OTHER DAY X 4 DOSES, THEN 1 WEEKLY FOR 30 DAYS, Disp: , Rfl:     hydroCHLOROthiazide (HYDRODIURIL) 25 MG tablet, Take 1 tablet (25 mg total) by mouth every morning., Disp: 90 tablet, Rfl: 6    levothyroxine (SYNTHROID) 75 MCG tablet, Take 1 tablet (75 mcg total) by mouth before breakfast., Disp: 90 tablet, Rfl: 3     metoprolol succinate (TOPROL-XL) 25 MG 24 hr tablet, TAKE 1 TABLET BY MOUTH TWICE A DAY, Disp: 180 tablet, Rfl: 1    pantoprazole (PROTONIX) 20 MG tablet, TAKE 1 TABLET BY MOUTH EVERY DAY, Disp: 30 tablet, Rfl: 0    rosuvastatin (CRESTOR) 20 MG tablet, Take 1 tablet (20 mg total) by mouth every evening., Disp: 90 tablet, Rfl: 3    valACYclovir (VALTREX) 1000 MG tablet, TAKE 1 TABLET EVERY 24 HOURS; prn, Disp: 30 tablet, Rfl: 2  No current facility-administered medications for this visit.    Facility-Administered Medications Ordered in Other Visits:     0.9%  NaCl infusion, , Intravenous, Continuous, Ender Morrison MD    Review of patient's allergies indicates:   Allergen Reactions    Amlodipine Shortness Of Breath and Other (See Comments)     unknown  Other reaction(s): Swelling  unknown  unknown  Other reaction(s): Swelling  unknown  Other reaction(s): Swelling  unknown    Levofloxacin Hives and Swelling    Sulfa (sulfonamide antibiotics) Shortness Of Breath, Itching and Other (See Comments)     elevated blood pressure    Ace inhibitors     Ciprofloxacin Itching    Iodinated contrast media     Iodine      Other reaction(s): Unknown    Latex      Other reaction(s): Itching    Latex, natural rubber Itching       Family History   Problem Relation Age of Onset    Hypertension Mother     Hypertension Father     Cancer Neg Hx        Social History     Tobacco Use    Smoking status: Former     Packs/day: 1.00     Years: 20.00     Pack years: 20.00     Types: Cigarettes     Quit date: 1981     Years since quittin.3    Smokeless tobacco: Never   Substance Use Topics    Alcohol use: No     Comment: 2 drinks/month; none 72 hrs prior to surgery    Drug use: No       REVIEW OF SYSTEMS:  General: No chills, fever, malaise, changes in weight  HEENT: No visual changes, difficulty hearing  Cardiovascular: No chest pain, palpitations, claudication  Pulmonary: No dyspnea, cough, wheezing  Gastrointestinal: No  "nausea, vomiting, diarrhea, constipation  Genitourinary: No dysuria, low urine output, hematuria  Endocrine: No polydipsia, polyphagia  Hematologic: No fatigue with exertion, pica, pallor  Musculoskeletal: No extremity or joint pain, no back pain, no difficulty with ambulation  Neurologic: no seizures, no headaches, no weakness  Psychiatric: no mood disturbance      PHYSICAL EXAM:   BP (!) 122/58 (BP Location: Right arm, Patient Position: Sitting, BP Method: Large (Automatic))   Pulse 76   Temp 98 °F (36.7 °C) (Oral)   Ht 5' 3" (1.6 m)   Wt 63 kg (138 lb 14.2 oz)   BMI 24.60 kg/m²   Constitutional:  Alert,   Well-appearing  In no distress.   Neurological: Normal speech  no focal findings  CN II - XII grossly intact.    Psychiatric: Mood and affect appropriate and symmetric.   HEENT: Normocephalic / atraumatic  PERRLA  Midline trachea  No scars across the neck   Cardiac: Regular rate and rhythm.   Pulmonary: Normal pulmonary effort.   Abdomen: Soft, not distended.     Skin: Warm and well perfused.    Vascular:  2+ radial pulses bilaterally   Extremities/  Musculoskeletal: No edema.        IMAGING:  Carotid duplex today reveals a right 60-79% carotid stenosis with a peak velocity of 205, and diastolic of 71, and on the left a 70-80% carotid stenosis with a peak velocity of 284 and diastolic of 73.  This is completely stable from 1 year ago when the left peak systolic velocity was 275, and end-diastolic of 83    IMPRESSION:  Bilateral carotid stenosis L>R, left 70-80%, completely stable by velocity criteria, neurologically asymptomatic.    In this patient with significant medical comorbidities included decompensated ischemic cardiomyopathy, recurrent breast cancer with no definitive treatment, I would not recommend carotid endarterectomy, TCAR or transfemoral stenting.  Medical continue medical therapy is most appropriate.  Incidentally, her carotid stenosis is not severe enough for interested in the crest 2 " trial.   She would not be a candidate for this trial because of her medical comorbidities as well    PLAN:  Continue medical therapy    ESCOBAR Hook III, MD, FACS  Professor and Chief, Vascular and Endovascular Surgery

## 2023-03-13 ENCOUNTER — PATIENT MESSAGE (OUTPATIENT)
Dept: CARDIOLOGY | Facility: CLINIC | Age: 68
End: 2023-03-13
Payer: MEDICARE

## 2023-03-31 ENCOUNTER — PATIENT MESSAGE (OUTPATIENT)
Dept: GASTROENTEROLOGY | Facility: CLINIC | Age: 68
End: 2023-03-31
Payer: MEDICARE

## 2023-03-31 ENCOUNTER — HOSPITAL ENCOUNTER (INPATIENT)
Facility: HOSPITAL | Age: 68
LOS: 5 days | Discharge: HOME | DRG: 378 | End: 2023-04-05
Attending: EMERGENCY MEDICINE | Admitting: HOSPITALIST
Payer: MEDICARE

## 2023-03-31 DIAGNOSIS — K26.0 ACUTE DUODENAL ULCER WITH HEMORRHAGE AND OBSTRUCTION: ICD-10-CM

## 2023-03-31 DIAGNOSIS — R10.13 EPIGASTRIC PAIN: ICD-10-CM

## 2023-03-31 DIAGNOSIS — R07.9 CHEST PAIN: ICD-10-CM

## 2023-03-31 DIAGNOSIS — I50.42 CHRONIC COMBINED SYSTOLIC AND DIASTOLIC HEART FAILURE: ICD-10-CM

## 2023-03-31 DIAGNOSIS — K92.2 UPPER GI BLEEDING: Primary | ICD-10-CM

## 2023-03-31 DIAGNOSIS — D64.9 ANEMIA, UNSPECIFIED TYPE: ICD-10-CM

## 2023-03-31 DIAGNOSIS — K92.1 MELENA: ICD-10-CM

## 2023-03-31 DIAGNOSIS — I95.9 HYPOTENSION: ICD-10-CM

## 2023-03-31 LAB
ABO + RH BLD: NORMAL
ALBUMIN SERPL BCP-MCNC: 3.4 G/DL (ref 3.5–5.2)
ALP SERPL-CCNC: 75 U/L (ref 55–135)
ALT SERPL W/O P-5'-P-CCNC: 12 U/L (ref 10–44)
ANION GAP SERPL CALC-SCNC: 14 MMOL/L (ref 8–16)
APTT PPP: 24.8 SEC (ref 21–32)
AST SERPL-CCNC: 13 U/L (ref 10–40)
BASOPHILS # BLD AUTO: 0.08 K/UL (ref 0–0.2)
BASOPHILS NFR BLD: 0.7 % (ref 0–1.9)
BILIRUB SERPL-MCNC: 0.4 MG/DL (ref 0.1–1)
BLD GP AB SCN CELLS X3 SERPL QL: NORMAL
BUN SERPL-MCNC: 47 MG/DL (ref 8–23)
CALCIUM SERPL-MCNC: 9.1 MG/DL (ref 8.7–10.5)
CHLORIDE SERPL-SCNC: 102 MMOL/L (ref 95–110)
CO2 SERPL-SCNC: 23 MMOL/L (ref 23–29)
CREAT SERPL-MCNC: 0.7 MG/DL (ref 0.5–1.4)
DIFFERENTIAL METHOD: ABNORMAL
EOSINOPHIL # BLD AUTO: 0.2 K/UL (ref 0–0.5)
EOSINOPHIL NFR BLD: 1.8 % (ref 0–8)
ERYTHROCYTE [DISTWIDTH] IN BLOOD BY AUTOMATED COUNT: 15.4 % (ref 11.5–14.5)
EST. GFR  (NO RACE VARIABLE): >60 ML/MIN/1.73 M^2
GLUCOSE SERPL-MCNC: 96 MG/DL (ref 70–110)
HCT VFR BLD AUTO: 27.7 % (ref 37–48.5)
HGB BLD-MCNC: 9 G/DL (ref 12–16)
IMM GRANULOCYTES # BLD AUTO: 0.04 K/UL (ref 0–0.04)
IMM GRANULOCYTES NFR BLD AUTO: 0.3 % (ref 0–0.5)
INR PPP: 1.1 (ref 0.8–1.2)
LYMPHOCYTES # BLD AUTO: 2.4 K/UL (ref 1–4.8)
LYMPHOCYTES NFR BLD: 20.6 % (ref 18–48)
MCH RBC QN AUTO: 28.1 PG (ref 27–31)
MCHC RBC AUTO-ENTMCNC: 32.5 G/DL (ref 32–36)
MCV RBC AUTO: 87 FL (ref 82–98)
MONOCYTES # BLD AUTO: 1.3 K/UL (ref 0.3–1)
MONOCYTES NFR BLD: 11.2 % (ref 4–15)
NEUTROPHILS # BLD AUTO: 7.7 K/UL (ref 1.8–7.7)
NEUTROPHILS NFR BLD: 65.4 % (ref 38–73)
NRBC BLD-RTO: 0 /100 WBC
OB PNL STL: POSITIVE
PLATELET # BLD AUTO: 301 K/UL (ref 150–450)
PMV BLD AUTO: 9.7 FL (ref 9.2–12.9)
POTASSIUM SERPL-SCNC: 3.3 MMOL/L (ref 3.5–5.1)
PROT SERPL-MCNC: 6.8 G/DL (ref 6–8.4)
PROTHROMBIN TIME: 11.3 SEC (ref 9–12.5)
RBC # BLD AUTO: 3.2 M/UL (ref 4–5.4)
SODIUM SERPL-SCNC: 139 MMOL/L (ref 136–145)
SPECIMEN OUTDATE: NORMAL
WBC # BLD AUTO: 11.72 K/UL (ref 3.9–12.7)

## 2023-03-31 PROCEDURE — 96361 HYDRATE IV INFUSION ADD-ON: CPT

## 2023-03-31 PROCEDURE — 63600175 PHARM REV CODE 636 W HCPCS: Performed by: HOSPITALIST

## 2023-03-31 PROCEDURE — C9113 INJ PANTOPRAZOLE SODIUM, VIA: HCPCS | Performed by: EMERGENCY MEDICINE

## 2023-03-31 PROCEDURE — 96375 TX/PRO/DX INJ NEW DRUG ADDON: CPT

## 2023-03-31 PROCEDURE — 63600175 PHARM REV CODE 636 W HCPCS: Performed by: EMERGENCY MEDICINE

## 2023-03-31 PROCEDURE — 99291 CRITICAL CARE FIRST HOUR: CPT

## 2023-03-31 PROCEDURE — 86900 BLOOD TYPING SEROLOGIC ABO: CPT | Performed by: EMERGENCY MEDICINE

## 2023-03-31 PROCEDURE — 25500020 PHARM REV CODE 255

## 2023-03-31 PROCEDURE — 82272 OCCULT BLD FECES 1-3 TESTS: CPT | Performed by: EMERGENCY MEDICINE

## 2023-03-31 PROCEDURE — 80053 COMPREHEN METABOLIC PANEL: CPT | Performed by: EMERGENCY MEDICINE

## 2023-03-31 PROCEDURE — 25000003 PHARM REV CODE 250: Performed by: EMERGENCY MEDICINE

## 2023-03-31 PROCEDURE — 85025 COMPLETE CBC W/AUTO DIFF WBC: CPT | Performed by: EMERGENCY MEDICINE

## 2023-03-31 PROCEDURE — 86920 COMPATIBILITY TEST SPIN: CPT | Performed by: INTERNAL MEDICINE

## 2023-03-31 PROCEDURE — 85610 PROTHROMBIN TIME: CPT | Performed by: EMERGENCY MEDICINE

## 2023-03-31 PROCEDURE — 96374 THER/PROPH/DIAG INJ IV PUSH: CPT

## 2023-03-31 PROCEDURE — 85730 THROMBOPLASTIN TIME PARTIAL: CPT | Performed by: EMERGENCY MEDICINE

## 2023-03-31 PROCEDURE — 11000001 HC ACUTE MED/SURG PRIVATE ROOM

## 2023-03-31 RX ORDER — MORPHINE SULFATE 4 MG/ML
2 INJECTION, SOLUTION INTRAMUSCULAR; INTRAVENOUS EVERY 4 HOURS PRN
Status: DISCONTINUED | OUTPATIENT
Start: 2023-03-31 | End: 2023-04-01

## 2023-03-31 RX ORDER — DEXTROSE 40 %
15 GEL (GRAM) ORAL
Status: DISCONTINUED | OUTPATIENT
Start: 2023-03-31 | End: 2023-04-05 | Stop reason: HOSPADM

## 2023-03-31 RX ORDER — MAG HYDROX/ALUMINUM HYD/SIMETH 200-200-20
30 SUSPENSION, ORAL (FINAL DOSE FORM) ORAL 4 TIMES DAILY PRN
Status: DISCONTINUED | OUTPATIENT
Start: 2023-03-31 | End: 2023-04-05 | Stop reason: HOSPADM

## 2023-03-31 RX ORDER — ONDANSETRON 2 MG/ML
4 INJECTION INTRAMUSCULAR; INTRAVENOUS EVERY 8 HOURS PRN
Status: DISCONTINUED | OUTPATIENT
Start: 2023-03-31 | End: 2023-04-05 | Stop reason: HOSPADM

## 2023-03-31 RX ORDER — LOSARTAN POTASSIUM 50 MG/1
50 TABLET ORAL NIGHTLY
Status: ON HOLD | COMMUNITY
Start: 2023-03-14 | End: 2023-04-05 | Stop reason: HOSPADM

## 2023-03-31 RX ORDER — AMOXICILLIN 250 MG
1 CAPSULE ORAL 2 TIMES DAILY PRN
Status: DISCONTINUED | OUTPATIENT
Start: 2023-03-31 | End: 2023-04-01

## 2023-03-31 RX ORDER — METHYLPREDNISOLONE SOD SUCC 125 MG
125 VIAL (EA) INJECTION
Status: COMPLETED | OUTPATIENT
Start: 2023-03-31 | End: 2023-03-31

## 2023-03-31 RX ORDER — TALC
6 POWDER (GRAM) TOPICAL NIGHTLY PRN
Status: DISCONTINUED | OUTPATIENT
Start: 2023-03-31 | End: 2023-04-05 | Stop reason: HOSPADM

## 2023-03-31 RX ORDER — DIPHENHYDRAMINE HYDROCHLORIDE 50 MG/ML
50 INJECTION INTRAMUSCULAR; INTRAVENOUS
Status: COMPLETED | OUTPATIENT
Start: 2023-03-31 | End: 2023-03-31

## 2023-03-31 RX ORDER — ACETAMINOPHEN 325 MG/1
650 TABLET ORAL EVERY 8 HOURS PRN
Status: DISCONTINUED | OUTPATIENT
Start: 2023-03-31 | End: 2023-04-01

## 2023-03-31 RX ORDER — HYDROCODONE BITARTRATE AND ACETAMINOPHEN 5; 325 MG/1; MG/1
1 TABLET ORAL EVERY 6 HOURS PRN
Status: DISCONTINUED | OUTPATIENT
Start: 2023-03-31 | End: 2023-04-01

## 2023-03-31 RX ORDER — SODIUM CHLORIDE, SODIUM LACTATE, POTASSIUM CHLORIDE, CALCIUM CHLORIDE 600; 310; 30; 20 MG/100ML; MG/100ML; MG/100ML; MG/100ML
INJECTION, SOLUTION INTRAVENOUS CONTINUOUS
Status: DISCONTINUED | OUTPATIENT
Start: 2023-03-31 | End: 2023-04-01

## 2023-03-31 RX ORDER — SODIUM CHLORIDE 0.9 % (FLUSH) 0.9 %
3 SYRINGE (ML) INJECTION EVERY 12 HOURS PRN
Status: DISCONTINUED | OUTPATIENT
Start: 2023-03-31 | End: 2023-04-05 | Stop reason: HOSPADM

## 2023-03-31 RX ORDER — NALOXONE HCL 0.4 MG/ML
0.02 VIAL (ML) INJECTION
Status: DISCONTINUED | OUTPATIENT
Start: 2023-03-31 | End: 2023-04-05 | Stop reason: HOSPADM

## 2023-03-31 RX ORDER — PANTOPRAZOLE SODIUM 40 MG/10ML
40 INJECTION, POWDER, LYOPHILIZED, FOR SOLUTION INTRAVENOUS 2 TIMES DAILY
Status: DISCONTINUED | OUTPATIENT
Start: 2023-04-01 | End: 2023-04-01

## 2023-03-31 RX ORDER — DEXTROSE 40 %
30 GEL (GRAM) ORAL
Status: DISCONTINUED | OUTPATIENT
Start: 2023-03-31 | End: 2023-04-05 | Stop reason: HOSPADM

## 2023-03-31 RX ORDER — FAMOTIDINE 10 MG/ML
20 INJECTION INTRAVENOUS
Status: COMPLETED | OUTPATIENT
Start: 2023-03-31 | End: 2023-03-31

## 2023-03-31 RX ORDER — ACETAMINOPHEN 650 MG/1
650 SUPPOSITORY RECTAL EVERY 6 HOURS PRN
Status: DISCONTINUED | OUTPATIENT
Start: 2023-03-31 | End: 2023-04-01

## 2023-03-31 RX ORDER — GLUCAGON 1 MG
1 KIT INJECTION
Status: DISCONTINUED | OUTPATIENT
Start: 2023-03-31 | End: 2023-04-05 | Stop reason: HOSPADM

## 2023-03-31 RX ORDER — PROMETHAZINE HYDROCHLORIDE 25 MG/1
25 TABLET ORAL EVERY 6 HOURS PRN
Status: DISCONTINUED | OUTPATIENT
Start: 2023-03-31 | End: 2023-04-05 | Stop reason: HOSPADM

## 2023-03-31 RX ORDER — PANTOPRAZOLE SODIUM 40 MG/10ML
80 INJECTION, POWDER, LYOPHILIZED, FOR SOLUTION INTRAVENOUS
Status: DISCONTINUED | OUTPATIENT
Start: 2023-03-31 | End: 2023-03-31

## 2023-03-31 RX ORDER — PANTOPRAZOLE SODIUM 40 MG/10ML
80 INJECTION, POWDER, LYOPHILIZED, FOR SOLUTION INTRAVENOUS ONCE
Status: COMPLETED | OUTPATIENT
Start: 2023-03-31 | End: 2023-03-31

## 2023-03-31 RX ADMIN — FAMOTIDINE 20 MG: 10 INJECTION, SOLUTION INTRAVENOUS at 05:03

## 2023-03-31 RX ADMIN — SODIUM CHLORIDE 500 ML: 9 INJECTION, SOLUTION INTRAVENOUS at 05:03

## 2023-03-31 RX ADMIN — DIPHENHYDRAMINE HYDROCHLORIDE 50 MG: 50 INJECTION, SOLUTION INTRAMUSCULAR; INTRAVENOUS at 05:03

## 2023-03-31 RX ADMIN — METHYLPREDNISOLONE SODIUM SUCCINATE 125 MG: 125 INJECTION, POWDER, FOR SOLUTION INTRAMUSCULAR; INTRAVENOUS at 05:03

## 2023-03-31 RX ADMIN — PANTOPRAZOLE SODIUM 80 MG: 40 INJECTION, POWDER, FOR SOLUTION INTRAVENOUS at 06:03

## 2023-03-31 RX ADMIN — SODIUM CHLORIDE, POTASSIUM CHLORIDE, SODIUM LACTATE AND CALCIUM CHLORIDE: 600; 310; 30; 20 INJECTION, SOLUTION INTRAVENOUS at 09:03

## 2023-03-31 NOTE — PHARMACY MED REC
"Admission Medication History     The home medication history was taken by Klaus Nicole.    You may go to "Admission" then "Reconcile Home Medications" tabs to review and/or act upon these items.     The home medication list has been updated by the Pharmacy department.   Please read ALL comments highlighted in yellow.   Please address this information as you see fit.    Feel free to contact us if you have any questions or require assistance.      Medications listed below were obtained from: Analytic software- [employer] and Medical records  (Not in a hospital admission)        Klaus Nicole  [id number]    Current Outpatient Medications on File Prior to Encounter   Medication Sig Dispense Refill Last Dose    ALPRAZolam (XANAX) 1 MG tablet Take 1 mg by mouth nightly as needed.       aspirin (ECOTRIN) 81 MG EC tablet Take 1 tablet (81 mg total) by mouth once daily. 90 tablet 12     calcium carbonate (OS-CALVIN) 600 mg (1,500 mg) Tab Take 600 mg by mouth 2 (two) times daily with meals.       cholecalciferol, vitamin D3, (VITAMIN D3) 100 mcg (4,000 unit) Cap Take 4,000 Units by mouth once daily.       clopidogreL (PLAVIX) 75 mg tablet Take 1 tablet (75 mg total) by mouth once daily. 90 tablet 4     dapsone 25 MG Tab TAKE 2 TABLETS BY MOUTH EVERY DAY FOR 30 DAYS       dexAMETHasone (DECADRON) 0.5 mg/5 mL Elix SWISH 3.75MLS IN MOUTH FOR 30 SECONDS AND SWALLOW TWICE DAILY AS NEEDED FOR LICHEN PLANUS FLARE UPS       EScitalopram oxalate (LEXAPRO) 10 MG tablet Take 1 tablet (10 mg total) by mouth once daily. 90 tablet 3     fluconazole (DIFLUCAN) 200 MG Tab TAKE 1 TABLET ORALLY EVERY OTHER DAY X 4 DOSES, THEN 1 WEEKLY FOR 30 DAYS       hydroCHLOROthiazide (HYDRODIURIL) 25 MG tablet Take 1 tablet (25 mg total) by mouth every morning. 90 tablet 6     levothyroxine (SYNTHROID) 75 MCG tablet Take 1 tablet (75 mcg total) by mouth before breakfast. 90 tablet 3     losartan (COZAAR) 50 MG tablet Take 50 mg by mouth every evening. "       metoprolol succinate (TOPROL-XL) 25 MG 24 hr tablet TAKE 1 TABLET BY MOUTH TWICE A  tablet 1     pantoprazole (PROTONIX) 20 MG tablet TAKE 1 TABLET BY MOUTH EVERY DAY 90 tablet 1     rosuvastatin (CRESTOR) 20 MG tablet Take 1 tablet (20 mg total) by mouth every evening. 90 tablet 3     valACYclovir (VALTREX) 1000 MG tablet TAKE 1 TABLET EVERY 24 HOURS; prn 30 tablet 2                          .

## 2023-03-31 NOTE — ED PROVIDER NOTES
SCRIBE #1 NOTE: I, Eitan Tan, am scribing for, and in the presence of, Enrico Etienne Jr., MD. I have scribed the entire note.      History      Chief Complaint   Patient presents with    Abdominal Pain     X 3 days; hx of GI ulcers       Review of patient's allergies indicates:   Allergen Reactions    Amlodipine Shortness Of Breath and Other (See Comments)     unknown  Other reaction(s): Swelling  unknown  unknown  Other reaction(s): Swelling  unknown  Other reaction(s): Swelling  unknown    Levofloxacin Hives and Swelling    Sulfa (sulfonamide antibiotics) Shortness Of Breath, Itching and Other (See Comments)     elevated blood pressure    Ace inhibitors     Ciprofloxacin Itching    Iodinated contrast media     Iodine      Other reaction(s): Unknown    Latex      Other reaction(s): Itching    Latex, natural rubber Itching        HPI   HPI    3/31/2023, 5:05 PM   History obtained from the patient      History of Present Illness: Dulce Root is a 67 y.o. female patient who presents to the Emergency Department for blood in stool, onset 3 days PTA. Pt reports black stool for the past 3 days, noting that her current sxs are similar to when she last had a bleeding gastric ulcer. Symptoms are episodic and moderate in severity. No mitigating or exacerbating factors reported. Associated sxs include generalized weakness, epigastric abdominal pain, and fatigue. Patient denies any fever, chills, abdominal pain, n/v/d, SOB, CP, numbness, dizziness, headache, and all other sxs at this time. Pt is currently on Plavix. Pt is not on Protonix. She states that her only recent NSAID use was 1 dose of Ibuprofen 1 week ago. No further complaints or concerns at this time.    Arrival mode: Personal vehicle    PCP: Patrick Hernandez MD       Past Medical History:  Past Medical History:   Diagnosis Date    Allergy     Anticoagulant long-term use     Plavix    Breast cancer 2008    Carotid stenosis, bilateral 10/13/2017    Coronary  artery disease     Coronary artery disease involving coronary bypass graft of native heart without angina pectoris 08/16/2019 2/19  1.  Left main is a small vessel with a 60%-65% ostial lesion. 2.  LAD is a medium-sized vessel diffuse 70% proximally  Ramus intermedius is a medium size vessel with a 40%-50% ostial lesion. 3.  Circumflex 60%-70% ostial  remainder of circumflex and obtuse marginal normal in appearance. Right coronary artery is normal size vessel, short discrete 70%mid 4.  Vein graft to right coronary is occ    Coronary artery disease involving native coronary artery of native heart without angina pectoris 06/27/2017    DCIS (ductal carcinoma in situ) of breast 11/06/2012    Encounter for blood transfusion     Essential hypertension 11/06/2012    GERD (gastroesophageal reflux disease)     GI bleed 02/2021    Hodgkin lymphoma     Hx of Hodgkin's disease - s/p chemo and radiation 11/06/2012    Hyperlipidemia     Hyperlipidemia 11/06/2012    The patient presents with hyperlipidemia.  The patient reports tolerating the medication well and is in excellent compliance.  There have been no medication side effects.  The patient denies chest pain, neuropathy, and myalgias.  The patient has reduced fat intake and has been exercising.  Current treatment has included the medications listed in the med card.   Lab Results Component Value Date  CH    Hypertension     LBBB (left bundle branch block) 05/22/2018    Osteoporosis     Paroxysmal atrial fibrillation - single post-op episode 08/24/2017    Pemphigoid     Pemphigoid     pt receives IVIG infusions    Pulmonary hypertension 1/13/2023    S/P AVR (aortic valve replacement) - pericardial valve 08/21/2017    Perceval aortic tissue valve PVS23. 23mm    serial # [id number]    S/P CABG x 2 08/21/2017 8/17 CABG X 2 with SVG-LAD and SVG-distal RCA  SVG LAD due to absent LIMA after chest radiation and lymph node biopsy     Stented coronary artery     She had 2 stents  placed in 2/2019.  She has had CAD and bypasses in the past in 2017.      Thyroid disease        Past Surgical History:  Past Surgical History:   Procedure Laterality Date    ARTERIOGRAPHY OF AORTIC ROOT N/A 02/15/2019    Procedure: ARTERIOGRAM, AORTIC ROOT;  Surgeon: Sujit Chaudhry MD;  [facility];  Service: Cardiology;  Laterality: N/A;    AXILLARY NODE DISSECTION Left 11/25/2022    Procedure: LYMPHADENECTOMY, AXILLARY-Left;  Surgeon: Rosa Maradiaga MD;  Location: [facility];  Service: General;  Laterality: Left;    BREAST BIOPSY      BREAST RECONSTRUCTION      bilateral mastectomy    CARDIAC CATHETERIZATION      stents x 2    CARDIAC SURGERY  08/2017    Aortic valve replacement , CABG 2 vessel    CARDIAC VALVE REPLACEMENT  08/2017    aortic valve, bovine per pt    CORONARY ANGIOGRAPHY N/A 02/15/2019    Procedure: ANGIOGRAM, CORONARY ARTERY;  Surgeon: Sujit Chaudhry MD;  [facility];  Service: Cardiology;  Laterality: N/A;    CORONARY ANGIOGRAPHY N/A 4/1/2022    Procedure: ANGIOGRAM, CORONARY ARTERY;  Surgeon: Sujit Chaudhry MD;  Location: [facility];  Service: Cardiology;  Laterality: N/A;    CORONARY BYPASS GRAFT ANGIOGRAPHY  02/15/2019    Procedure: Bypass graft study;  Surgeon: Sujit Chaudhry MD;  [facility];  Service: Cardiology;;    COSMETIC SURGERY      bereast reconstruction    ESOPHAGOGASTRODUODENOSCOPY N/A 05/14/2021    Procedure: EGD (ESOPHAGOGASTRODUODENOSCOPY);  Surgeon: Tracy Flower MD;  Location: [facility];  Service: Endoscopy;  Laterality: N/A;    ESOPHAGOGASTRODUODENOSCOPY N/A 11/04/2021    Procedure: EGD (ESOPHAGOGASTRODUODENOSCOPY);  Surgeon: Tracy Flower MD;  Location: [facility] ENDO;  Service: Endoscopy;  Laterality: N/A;    EYE SURGERY      eye lids    INJECTION FOR SENTINEL NODE IDENTIFICATION Left 11/25/2022    Procedure: INJECTION, FOR SENTINEL NODE IDENTIFICATION-Left;  Surgeon: Rosa Maradiaga MD;  Location: [facility];  Service: General;  Laterality: Left;     LEFT HEART CATHETERIZATION Right 02/15/2019    Procedure: Left heart cath;  Surgeon: Sujit Chaudhry MD;  [facility];  Service: Cardiology;  Laterality: Right;    LEFT HEART CATHETERIZATION Left 2022    Procedure: Left heart cath;  Surgeon: Sujit Chaudhry MD;  Location: [facility] CATH;  Service: Cardiology;  Laterality: Left;    LUMBAR LAMINECTOMY WITH DISCECTOMY N/A 2020    Procedure: LAMINECTOMY, SPINE, LUMBAR, WITH DISCECTOMY;  Surgeon: Vimal Villegas MD;  Location: [facility] OR;  Service: Neurosurgery;  Laterality: N/A;  left L5-S1  Laminectomy L4-5    LYMPHADENECTOMY      MASTECTOMY      MASTECTOMY, PARTIAL Left 2022    Procedure: MASTECTOMY, PARTIAL-Left ultrasound guided;  Surgeon: Rosa Maradiaga MD;  Location: [facility];  Service: General;  Laterality: Left;    RIGHT HEART CATHETERIZATION Right 2022    Procedure: INSERTION, CATHETER, RIGHT HEART;  Surgeon: Sujit Chaudhry MD;  Location: [facility] CATH;  Service: Cardiology;  Laterality: Right;    SENTINEL LYMPH NODE BIOPSY Left 2022    Procedure: BIOPSY, LYMPH NODE, SENTINEL-Left;  Surgeon: Rosa Maradiaga MD;  Location: [facility];  Service: General;  Laterality: Left;    SKIN BIOPSY      SPLENECTOMY, TOTAL      TRANSFORAMINAL EPIDURAL INJECTION OF STEROID Left 2019    Procedure: Left L5/S1 TF SKIP with local;  Surgeon: Canelo Niño MD;  Location: [facility] PAIN MGT;  Service: Pain Management;  Laterality: Left;    TUMOR REMOVAL      neck- lymphoma         Family History:  Family History   Problem Relation Age of Onset    Hypertension Mother     Hypertension Father     Cancer Neg Hx        Social History:  Social History     Tobacco Use    Smoking status: Former     Packs/day: 1.00     Years: 20.00     Pack years: 20.00     Types: Cigarettes     Quit date: 1981     Years since quittin.4    Smokeless tobacco: Never   Substance and Sexual Activity    Alcohol use: No     Comment: 2 drinks/month; none 72 hrs prior to surgery    Drug  use: No    Sexual activity: Yes     Partners: Male       ROS   Review of Systems   Constitutional:  Positive for fatigue. Negative for chills and fever.   HENT:  Negative for sore throat.    Respiratory:  Negative for shortness of breath.    Cardiovascular:  Negative for chest pain.   Gastrointestinal:  Positive for abdominal pain (epigastric) and blood in stool (black). Negative for diarrhea, nausea and vomiting.   Genitourinary:  Negative for dysuria.   Musculoskeletal:  Negative for back pain.   Skin:  Negative for rash.   Neurological:  Positive for weakness (generalized). Negative for dizziness, light-headedness, numbness and headaches.   Hematological:  Does not bruise/bleed easily.   All other systems reviewed and are negative.    Physical Exam      Initial Vitals [03/31/23 1615]   BP Pulse Resp Temp SpO2   (!) 112/59 90 20 97.8 °F (36.6 °C) 98 %      MAP       --          Physical Exam  Nursing Notes and Vital Signs Reviewed.  Constitutional: Patient is in no acute distress. Well-developed and well-nourished.  Head: Atraumatic. Normocephalic.  Eyes:  EOM intact.  No scleral icterus.  ENT: Mucous membranes are moist.  Nares clear   Neck:  Full ROM. No JVD.  Cardiovascular: Regular rate. Regular rhythm No murmurs, rubs, or gallops. Distal pulses are 2+ and symmetric  Pulmonary/Chest: No respiratory distress. Clear to auscultation bilaterally. No wheezing or rales.  Equal chest wall rise bilaterally  Abdominal: Soft and non-distended.  There is no tenderness.  No rebound, guarding, or rigidity. Good bowel sounds.  Rectal: Female chaperone present for the duration of the rectal exam. There is no tenderness. No masses or hemorrhoids. Normal sphincter tone. Black, greasy stool noted in the rectal vault.  Genitourinary: No CVA tenderness.  No suprapubic tenderness  Musculoskeletal: Moves all extremities. No obvious deformities.  5 x 5 strength in all extremities   Skin: Warm and dry.  Neurological:  Alert, awake,  "and appropriate.  Normal speech.  No acute focal neurological deficits are appreciated.  Two through 12 intact bilaterally.  Psychiatric: Normal affect. Good eye contact. Appropriate in content.    ED Course    Critical Care    Date/Time: 3/31/2023 7:39 PM  Performed by: Enrico Etienne Jr., MD  Authorized by: Enrico Etienne Jr., MD   Direct patient critical care time: 15 minutes  Additional history critical care time: 5 minutes  Ordering / reviewing critical care time: 10 minutes  Documentation critical care time: 5 minutes  Consulting other physicians critical care time: 5 minutes  Total critical care time (exclusive of procedural time) : 40 minutes  Critical care time was exclusive of separately billable procedures and treating other patients and teaching time.  Critical care was necessary to treat or prevent imminent or life-threatening deterioration of the following conditions: Upper GI bleed.  Critical care was time spent personally by me on the following activities: blood draw for specimens, development of treatment plan with patient or surrogate, discussions with consultants, interpretation of cardiac output measurements, evaluation of patient's response to treatment, examination of patient, obtaining history from patient or surrogate, ordering and performing treatments and interventions, ordering and review of laboratory studies, ordering and review of radiographic studies, pulse oximetry, re-evaluation of patient's condition and review of old charts.      ED Vital Signs:  Vitals:    03/31/23 1615 03/31/23 1645 03/31/23 1735 03/31/23 1750   BP: (!) 112/59 139/63 139/63 139/60   Pulse: 90 86 89 100   Resp: 20 14 14 18   Temp: 97.8 °F (36.6 °C) 98.1 °F (36.7 °C) 98 °F (36.7 °C)    TempSrc: Oral  Oral    SpO2: 98% 95% (!) 93% (!) 92%   Weight: 63 kg (138 lb 14.2 oz)      Height: 5' 3" (1.6 m)       03/31/23 1805 03/31/23 1830   BP: (!) 102/52 (!) 108/55   Pulse: 92 88   Resp: (!) 24 (!) 24   Temp: 98.1 °F " (36.7 °C)    TempSrc: Oral    SpO2: (!) 92% (!) 93%   Weight:     Height:         Abnormal Lab Results:  Labs Reviewed   CBC W/ AUTO DIFFERENTIAL - Abnormal; Notable for the following components:       Result Value    RBC 3.20 (*)     Hemoglobin 9.0 (*)     Hematocrit 27.7 (*)     RDW 15.4 (*)     Mono # 1.3 (*)     All other components within normal limits   COMPREHENSIVE METABOLIC PANEL - Abnormal; Notable for the following components:    Potassium 3.3 (*)     BUN 47 (*)     Albumin 3.4 (*)     All other components within normal limits   APTT   PROTIME-INR   OCCULT BLOOD X 1, STOOL   TYPE & SCREEN        All Lab Results:  Results for orders placed or performed during the hospital encounter of 03/31/23   APTT   Result Value Ref Range    aPTT 24.8 21.0 - 32.0 sec   CBC auto differential   Result Value Ref Range    WBC 11.72 3.90 - 12.70 K/uL    RBC 3.20 (L) 4.00 - 5.40 M/uL    Hemoglobin 9.0 (L) 12.0 - 16.0 g/dL    Hematocrit 27.7 (L) 37.0 - 48.5 %    MCV 87 82 - 98 fL    MCH 28.1 27.0 - 31.0 pg    MCHC 32.5 32.0 - 36.0 g/dL    RDW 15.4 (H) 11.5 - 14.5 %    Platelets 301 150 - 450 K/uL    MPV 9.7 9.2 - 12.9 fL    Immature Granulocytes 0.3 0.0 - 0.5 %    Gran # (ANC) 7.7 1.8 - 7.7 K/uL    Immature Grans (Abs) 0.04 0.00 - 0.04 K/uL    Lymph # 2.4 1.0 - 4.8 K/uL    Mono # 1.3 (H) 0.3 - 1.0 K/uL    Eos # 0.2 0.0 - 0.5 K/uL    Baso # 0.08 0.00 - 0.20 K/uL    nRBC 0 0 /100 WBC    Gran % 65.4 38.0 - 73.0 %    Lymph % 20.6 18.0 - 48.0 %    Mono % 11.2 4.0 - 15.0 %    Eosinophil % 1.8 0.0 - 8.0 %    Basophil % 0.7 0.0 - 1.9 %    Differential Method Automated    Comprehensive metabolic panel   Result Value Ref Range    Sodium 139 136 - 145 mmol/L    Potassium 3.3 (L) 3.5 - 5.1 mmol/L    Chloride 102 95 - 110 mmol/L    CO2 23 23 - 29 mmol/L    Glucose 96 70 - 110 mg/dL    BUN 47 (H) 8 - 23 mg/dL    Creatinine 0.7 0.5 - 1.4 mg/dL    Calcium 9.1 8.7 - 10.5 mg/dL    Total Protein 6.8 6.0 - 8.4 g/dL    Albumin 3.4 (L) 3.5 - 5.2  g/dL    Total Bilirubin 0.4 0.1 - 1.0 mg/dL    Alkaline Phosphatase 75 55 - 135 U/L    AST 13 10 - 40 U/L    ALT 12 10 - 44 U/L    Anion Gap 14 8 - 16 mmol/L    eGFR >60 >60 mL/min/1.73 m^2   Protime-INR   Result Value Ref Range    Prothrombin Time 11.3 9.0 - 12.5 sec    INR 1.1 0.8 - 1.2   Type & Screen   Result Value Ref Range    Group & Rh O POS     Indirect Gisel NEG     Specimen Outdate 04/03/2023 23:59      *Note: Due to a large number of results and/or encounters for the requested time period, some results have not been displayed. A complete set of results can be found in Results Review.     Imaging Results:  Imaging Results              CTA Acute GI [place], Abdomen and Pelvis (Final result)  Result time 03/31/23 19:18:13   Procedure changed from CTA Abdomen and Pelvis     Final result by Skip Almazan MD (03/31/23 19:18:13)                   Impression:      Multiple findings as described above.  No evidence for active hemorrhage    Questionable scarring in the left kidney upper lobe with poor enhancement of the renal parenchyma.  This may relate to scarring.  Recommend follow-up.      Electronically signed by: Skip Almazan  Date:    03/31/2023  Time:    19:18               Narrative:    EXAMINATION:  CT angiography acute GI bleed abdomen pelvis    CLINICAL HISTORY:  GI bleeding    TECHNIQUE:  CT angiography of the abdomen pelvis with and without contrast.  3 minute delays were provided.  MIP imaging was utilized.  A total of 100 cc of Omnipaque 350 was injected.    COMPARISON:  None    FINDINGS:  No hemorrhage identified.  No hydronephrosis.  Upper lobe scarring of left kidney.  Calcified uterine fibroid.  No high bowel obstruction.  No evidence for active hemorrhage.  Colonic diverticulosis.  Mild mucosal thickening of the sigmoid colon.  Uterine fibroid suspected.  Bladder is mildly distended.  Postsurgical changes anterior abdomen.  Mild mucosal thickening of the stomach.  Pancreas adrenal glands are  grossly unremarkable.  No gallstones.  Mild moderate atherosclerotic disease of the aorta iliac vasculature.                                              The Emergency Provider reviewed the vital signs and test results, which are outlined above.    ED Discussion     7:31 PM: Discussed pt's case with Dr. Flower (Gastroenterology), who agrees with plan for admission to will see the pt as a consult.    7:41 PM: Discussed case with Dr. Khan ([facility]). Dr. Khan agrees with current care and management of pt and accepts admission.   Admitting Service: Hospital Medicine  Admitting Physician: Dr. Khan  Admit to: Inpatient Med Tele    7:42 PM: Re-evaluated pt. I have discussed test results, shared treatment plan, and the need for admission with patient and family at bedside. Pt and family express understanding at this time and agree with all information. All questions answered. Pt and family have no further questions or concerns at this time. Pt is ready for admit.         ED Medication(s):  Medications   iohexoL (OMNIPAQUE 350) injection 100 mL (has no administration in time range)   sodium chloride 0.9% bolus 500 mL 500 mL (0 mLs Intravenous Stopped 3/31/23 1850)   methylPREDNISolone sodium succinate injection 125 mg (125 mg Intravenous Given 3/31/23 1728)   diphenhydrAMINE injection 50 mg (50 mg Intravenous Given 3/31/23 1729)   famotidine (PF) injection 20 mg (20 mg Intravenous Given 3/31/23 1729)   pantoprazole injection 80 mg (80 mg Intravenous Given 3/31/23 1857)       New Prescriptions    No medications on file       Medical Decision Making    Medical Decision Making:   History:   Old Medical Records: I decided to obtain old medical records.  Old Records Summarized: records from clinic visits and records from previous admission(s).       <> Summary of Records: Patient has a very extensive cardiac history is currently on Plavix she is history of last  Initial Assessment:   Patient presents with  black stools all ICU.  She is happened ease safe.  Last and said use as Plavix.  States symptoms with consistent with past GI duties.  Rectal exam shows black stools.  Differential Diagnosis:   Upper GI bleed, lower GI bleed  Clinical Tests:   Lab Tests: Ordered and Reviewed  Radiological Study: Ordered and Reviewed  ED Management:  Patient was evaluated history physical examination.  Ordered all laboratory studies well as CT angio per I prescribed as obtained.  Her electrolytes show hyperkalemia with the pathway potassium.  Lower hematocrit a subtle 28.  Coags patient was discussed with GI cough.  We status off Plavix with PPI add hospitalist.  I discussed case with hospital as was graciously accepts the patient.  Patient family are aware.  The case concern active GI bleed         Scribe Attestation:   Scribe #1: I performed the above scribed service and the documentation accurately describes the services I performed. I attest to the accuracy of the note.    Attending:   Physician Attestation Statement for Scribe #1: I, Enrico Etienne Jr., MD, personally performed the services described in this documentation, as scribed by Eitan Tan, in my presence, and it is both accurate and complete.          Clinical Impression       ICD-10-CM ICD-9-CM   1. Upper GI bleeding  K92.2 578.9   2. Anemia, unspecified type  D64.9 285.9       Disposition:   Disposition: Admitted  Condition: Fair       Enrico Etienne Jr., MD  03/31/23 1951

## 2023-03-31 NOTE — ED NOTES
Bed: 17  Expected date: 3/31/23  Expected time:   Means of arrival: Personal Transportation  Comments:

## 2023-04-01 ENCOUNTER — ANESTHESIA EVENT (OUTPATIENT)
Dept: ENDOSCOPY | Facility: HOSPITAL | Age: 68
DRG: 378 | End: 2023-04-01
Payer: MEDICARE

## 2023-04-01 ENCOUNTER — ANESTHESIA (OUTPATIENT)
Dept: ENDOSCOPY | Facility: HOSPITAL | Age: 68
DRG: 378 | End: 2023-04-01
Payer: MEDICARE

## 2023-04-01 PROBLEM — K26.0 ACUTE DUODENAL ULCER WITH HEMORRHAGE AND OBSTRUCTION: Status: ACTIVE | Noted: 2023-04-01

## 2023-04-01 PROBLEM — R10.13 EPIGASTRIC PAIN: Status: RESOLVED | Noted: 2023-04-01 | Resolved: 2023-04-01

## 2023-04-01 PROBLEM — R10.13 EPIGASTRIC PAIN: Status: ACTIVE | Noted: 2023-04-01

## 2023-04-01 PROBLEM — K92.1 MELENA: Status: ACTIVE | Noted: 2023-04-01

## 2023-04-01 PROBLEM — K92.2 UGIB (UPPER GASTROINTESTINAL BLEED): Status: ACTIVE | Noted: 2023-04-01

## 2023-04-01 PROBLEM — K21.9 GERD (GASTROESOPHAGEAL REFLUX DISEASE): Status: ACTIVE | Noted: 2023-04-01

## 2023-04-01 PROBLEM — K92.0 HEMATEMESIS WITH NAUSEA: Status: ACTIVE | Noted: 2021-05-11

## 2023-04-01 PROBLEM — K92.0 HEMATEMESIS WITH NAUSEA: Status: RESOLVED | Noted: 2021-05-11 | Resolved: 2023-04-01

## 2023-04-01 LAB
ALBUMIN SERPL BCP-MCNC: 3.1 G/DL (ref 3.5–5.2)
ALP SERPL-CCNC: 55 U/L (ref 55–135)
ALT SERPL W/O P-5'-P-CCNC: 10 U/L (ref 10–44)
ANION GAP SERPL CALC-SCNC: 9 MMOL/L (ref 8–16)
AST SERPL-CCNC: 10 U/L (ref 10–40)
BASOPHILS # BLD AUTO: 0.01 K/UL (ref 0–0.2)
BASOPHILS # BLD AUTO: 0.02 K/UL (ref 0–0.2)
BASOPHILS NFR BLD: 0.1 % (ref 0–1.9)
BASOPHILS NFR BLD: 0.2 % (ref 0–1.9)
BILIRUB SERPL-MCNC: 0.4 MG/DL (ref 0.1–1)
BLD PROD TYP BPU: NORMAL
BLOOD UNIT EXPIRATION DATE: NORMAL
BLOOD UNIT TYPE CODE: 5100
BLOOD UNIT TYPE: NORMAL
BUN SERPL-MCNC: 43 MG/DL (ref 8–23)
CALCIUM SERPL-MCNC: 8.4 MG/DL (ref 8.7–10.5)
CHLORIDE SERPL-SCNC: 107 MMOL/L (ref 95–110)
CO2 SERPL-SCNC: 21 MMOL/L (ref 23–29)
CODING SYSTEM: NORMAL
CREAT SERPL-MCNC: 0.7 MG/DL (ref 0.5–1.4)
CROSSMATCH INTERPRETATION: NORMAL
DIFFERENTIAL METHOD: ABNORMAL
DIFFERENTIAL METHOD: ABNORMAL
DISPENSE STATUS: NORMAL
EOSINOPHIL # BLD AUTO: 0 K/UL (ref 0–0.5)
EOSINOPHIL # BLD AUTO: 0 K/UL (ref 0–0.5)
EOSINOPHIL NFR BLD: 0 % (ref 0–8)
EOSINOPHIL NFR BLD: 0 % (ref 0–8)
ERYTHROCYTE [DISTWIDTH] IN BLOOD BY AUTOMATED COUNT: 15.2 % (ref 11.5–14.5)
ERYTHROCYTE [DISTWIDTH] IN BLOOD BY AUTOMATED COUNT: 15.6 % (ref 11.5–14.5)
EST. GFR  (NO RACE VARIABLE): >60 ML/MIN/1.73 M^2
GLUCOSE SERPL-MCNC: 141 MG/DL (ref 70–110)
HCT VFR BLD AUTO: 24 % (ref 37–48.5)
HCT VFR BLD AUTO: 28.7 % (ref 37–48.5)
HGB BLD-MCNC: 7.8 G/DL (ref 12–16)
HGB BLD-MCNC: 9.6 G/DL (ref 12–16)
IMM GRANULOCYTES # BLD AUTO: 0.03 K/UL (ref 0–0.04)
IMM GRANULOCYTES # BLD AUTO: 0.06 K/UL (ref 0–0.04)
IMM GRANULOCYTES NFR BLD AUTO: 0.4 % (ref 0–0.5)
IMM GRANULOCYTES NFR BLD AUTO: 0.5 % (ref 0–0.5)
LYMPHOCYTES # BLD AUTO: 0.8 K/UL (ref 1–4.8)
LYMPHOCYTES # BLD AUTO: 1.9 K/UL (ref 1–4.8)
LYMPHOCYTES NFR BLD: 14.6 % (ref 18–48)
LYMPHOCYTES NFR BLD: 9.4 % (ref 18–48)
MCH RBC QN AUTO: 28.8 PG (ref 27–31)
MCH RBC QN AUTO: 29.6 PG (ref 27–31)
MCHC RBC AUTO-ENTMCNC: 32.5 G/DL (ref 32–36)
MCHC RBC AUTO-ENTMCNC: 33.4 G/DL (ref 32–36)
MCV RBC AUTO: 89 FL (ref 82–98)
MCV RBC AUTO: 89 FL (ref 82–98)
MONOCYTES # BLD AUTO: 0.3 K/UL (ref 0.3–1)
MONOCYTES # BLD AUTO: 1.3 K/UL (ref 0.3–1)
MONOCYTES NFR BLD: 3.7 % (ref 4–15)
MONOCYTES NFR BLD: 9.5 % (ref 4–15)
NEUTROPHILS # BLD AUTO: 7 K/UL (ref 1.8–7.7)
NEUTROPHILS # BLD AUTO: 9.9 K/UL (ref 1.8–7.7)
NEUTROPHILS NFR BLD: 75.2 % (ref 38–73)
NEUTROPHILS NFR BLD: 86.4 % (ref 38–73)
NRBC BLD-RTO: 0 /100 WBC
NRBC BLD-RTO: 0 /100 WBC
NUM UNITS TRANS PACKED RBC: NORMAL
PLATELET # BLD AUTO: 251 K/UL (ref 150–450)
PLATELET # BLD AUTO: 276 K/UL (ref 150–450)
PMV BLD AUTO: 9.6 FL (ref 9.2–12.9)
PMV BLD AUTO: 9.7 FL (ref 9.2–12.9)
POTASSIUM SERPL-SCNC: 3.6 MMOL/L (ref 3.5–5.1)
PROT SERPL-MCNC: 6 G/DL (ref 6–8.4)
RBC # BLD AUTO: 2.71 M/UL (ref 4–5.4)
RBC # BLD AUTO: 3.24 M/UL (ref 4–5.4)
SODIUM SERPL-SCNC: 137 MMOL/L (ref 136–145)
WBC # BLD AUTO: 13.1 K/UL (ref 3.9–12.7)
WBC # BLD AUTO: 8.1 K/UL (ref 3.9–12.7)

## 2023-04-01 PROCEDURE — 25000003 PHARM REV CODE 250: Performed by: STUDENT IN AN ORGANIZED HEALTH CARE EDUCATION/TRAINING PROGRAM

## 2023-04-01 PROCEDURE — 99223 PR INITIAL HOSPITAL CARE,LEVL III: ICD-10-PCS | Mod: ,,, | Performed by: INTERNAL MEDICINE

## 2023-04-01 PROCEDURE — 43255 PR EGD, FLEX, W/CTRL BLEED, ANY METHOD: ICD-10-PCS | Mod: 22,,, | Performed by: INTERNAL MEDICINE

## 2023-04-01 PROCEDURE — 37000009 HC ANESTHESIA EA ADD 15 MINS: Performed by: INTERNAL MEDICINE

## 2023-04-01 PROCEDURE — 27201028 HC NEEDLE, SCLERO: Performed by: INTERNAL MEDICINE

## 2023-04-01 PROCEDURE — 25000003 PHARM REV CODE 250: Performed by: NURSE ANESTHETIST, CERTIFIED REGISTERED

## 2023-04-01 PROCEDURE — 43255 EGD CONTROL BLEEDING ANY: CPT | Mod: 22,,, | Performed by: INTERNAL MEDICINE

## 2023-04-01 PROCEDURE — 36430 TRANSFUSION BLD/BLD COMPNT: CPT

## 2023-04-01 PROCEDURE — 63600175 PHARM REV CODE 636 W HCPCS: Performed by: NURSE ANESTHETIST, CERTIFIED REGISTERED

## 2023-04-01 PROCEDURE — 63600175 PHARM REV CODE 636 W HCPCS: Performed by: INTERNAL MEDICINE

## 2023-04-01 PROCEDURE — 36415 COLL VENOUS BLD VENIPUNCTURE: CPT | Performed by: HOSPITALIST

## 2023-04-01 PROCEDURE — 37000008 HC ANESTHESIA 1ST 15 MINUTES: Performed by: INTERNAL MEDICINE

## 2023-04-01 PROCEDURE — 25000003 PHARM REV CODE 250: Performed by: HOSPITALIST

## 2023-04-01 PROCEDURE — 99900035 HC TECH TIME PER 15 MIN (STAT)

## 2023-04-01 PROCEDURE — 94799 UNLISTED PULMONARY SVC/PX: CPT

## 2023-04-01 PROCEDURE — 11000001 HC ACUTE MED/SURG PRIVATE ROOM

## 2023-04-01 PROCEDURE — 43255 EGD CONTROL BLEEDING ANY: CPT | Performed by: INTERNAL MEDICINE

## 2023-04-01 PROCEDURE — 85025 COMPLETE CBC W/AUTO DIFF WBC: CPT | Performed by: STUDENT IN AN ORGANIZED HEALTH CARE EDUCATION/TRAINING PROGRAM

## 2023-04-01 PROCEDURE — 99223 1ST HOSP IP/OBS HIGH 75: CPT | Mod: ,,, | Performed by: INTERNAL MEDICINE

## 2023-04-01 PROCEDURE — 27000221 HC OXYGEN, UP TO 24 HOURS

## 2023-04-01 PROCEDURE — 94761 N-INVAS EAR/PLS OXIMETRY MLT: CPT

## 2023-04-01 PROCEDURE — 25000003 PHARM REV CODE 250: Performed by: INTERNAL MEDICINE

## 2023-04-01 PROCEDURE — 80053 COMPREHEN METABOLIC PANEL: CPT | Performed by: HOSPITALIST

## 2023-04-01 PROCEDURE — C9113 INJ PANTOPRAZOLE SODIUM, VIA: HCPCS | Performed by: INTERNAL MEDICINE

## 2023-04-01 PROCEDURE — P9016 RBC LEUKOCYTES REDUCED: HCPCS | Performed by: INTERNAL MEDICINE

## 2023-04-01 PROCEDURE — 85025 COMPLETE CBC W/AUTO DIFF WBC: CPT | Mod: 91 | Performed by: HOSPITALIST

## 2023-04-01 RX ORDER — HYDROCODONE BITARTRATE AND ACETAMINOPHEN 500; 5 MG/1; MG/1
TABLET ORAL
Status: DISCONTINUED | OUTPATIENT
Start: 2023-04-01 | End: 2023-04-05 | Stop reason: HOSPADM

## 2023-04-01 RX ORDER — SODIUM CHLORIDE 9 MG/ML
INJECTION, SOLUTION INTRAVENOUS CONTINUOUS
Status: DISCONTINUED | OUTPATIENT
Start: 2023-04-01 | End: 2023-04-04

## 2023-04-01 RX ORDER — METOPROLOL SUCCINATE 25 MG/1
25 TABLET, EXTENDED RELEASE ORAL 2 TIMES DAILY
Status: DISCONTINUED | OUTPATIENT
Start: 2023-04-01 | End: 2023-04-03

## 2023-04-01 RX ORDER — ALPRAZOLAM 1 MG/1
1 TABLET ORAL NIGHTLY PRN
Status: DISCONTINUED | OUTPATIENT
Start: 2023-04-01 | End: 2023-04-05 | Stop reason: HOSPADM

## 2023-04-01 RX ORDER — DAPSONE 25 MG/1
75 TABLET ORAL NIGHTLY
Status: DISCONTINUED | OUTPATIENT
Start: 2023-04-01 | End: 2023-04-05 | Stop reason: HOSPADM

## 2023-04-01 RX ORDER — MORPHINE SULFATE 2 MG/ML
1 INJECTION, SOLUTION INTRAMUSCULAR; INTRAVENOUS EVERY 6 HOURS PRN
Status: DISCONTINUED | OUTPATIENT
Start: 2023-04-01 | End: 2023-04-05 | Stop reason: HOSPADM

## 2023-04-01 RX ORDER — ACETAMINOPHEN 650 MG/20.3ML
650 LIQUID ORAL EVERY 6 HOURS PRN
Status: DISCONTINUED | OUTPATIENT
Start: 2023-04-01 | End: 2023-04-05 | Stop reason: HOSPADM

## 2023-04-01 RX ORDER — EPINEPHRINE CONVENIENCE KIT 1 MG/ML(1)
KIT INTRAMUSCULAR; SUBCUTANEOUS
Status: COMPLETED | OUTPATIENT
Start: 2023-04-01 | End: 2023-04-01

## 2023-04-01 RX ORDER — ATORVASTATIN CALCIUM 40 MG/1
80 TABLET, FILM COATED ORAL DAILY
Status: DISCONTINUED | OUTPATIENT
Start: 2023-04-01 | End: 2023-04-02

## 2023-04-01 RX ORDER — ESCITALOPRAM OXALATE 10 MG/1
10 TABLET ORAL DAILY
Status: DISCONTINUED | OUTPATIENT
Start: 2023-04-01 | End: 2023-04-05 | Stop reason: HOSPADM

## 2023-04-01 RX ORDER — LEVOTHYROXINE SODIUM 75 UG/1
75 TABLET ORAL
Status: DISCONTINUED | OUTPATIENT
Start: 2023-04-01 | End: 2023-04-05 | Stop reason: HOSPADM

## 2023-04-01 RX ORDER — LIDOCAINE HYDROCHLORIDE 20 MG/ML
INJECTION INTRAVENOUS
Status: DISCONTINUED | OUTPATIENT
Start: 2023-04-01 | End: 2023-04-01

## 2023-04-01 RX ORDER — SUCRALFATE 1 G/10ML
1 SUSPENSION ORAL EVERY 6 HOURS
Status: DISCONTINUED | OUTPATIENT
Start: 2023-04-01 | End: 2023-04-01 | Stop reason: SDUPTHER

## 2023-04-01 RX ORDER — PROPOFOL 10 MG/ML
VIAL (ML) INTRAVENOUS
Status: DISCONTINUED | OUTPATIENT
Start: 2023-04-01 | End: 2023-04-01

## 2023-04-01 RX ORDER — SUCRALFATE 1 G/10ML
1 SUSPENSION ORAL EVERY 6 HOURS
Status: DISCONTINUED | OUTPATIENT
Start: 2023-04-01 | End: 2023-04-05 | Stop reason: HOSPADM

## 2023-04-01 RX ADMIN — PROPOFOL 30 MG: 10 INJECTION, EMULSION INTRAVENOUS at 09:04

## 2023-04-01 RX ADMIN — PROPOFOL 20 MG: 10 INJECTION, EMULSION INTRAVENOUS at 09:04

## 2023-04-01 RX ADMIN — SODIUM CHLORIDE: 9 INJECTION, SOLUTION INTRAVENOUS at 03:04

## 2023-04-01 RX ADMIN — SUCRALFATE 1 G: 1 SUSPENSION ORAL at 11:04

## 2023-04-01 RX ADMIN — PANTOPRAZOLE SODIUM 8 MG/HR: 40 INJECTION, POWDER, FOR SOLUTION INTRAVENOUS at 08:04

## 2023-04-01 RX ADMIN — METOPROLOL SUCCINATE 25 MG: 25 TABLET, EXTENDED RELEASE ORAL at 09:04

## 2023-04-01 RX ADMIN — SODIUM CHLORIDE, POTASSIUM CHLORIDE, SODIUM LACTATE AND CALCIUM CHLORIDE: 600; 310; 30; 20 INJECTION, SOLUTION INTRAVENOUS at 09:04

## 2023-04-01 RX ADMIN — DAPSONE 75 MG: 25 TABLET ORAL at 09:04

## 2023-04-01 RX ADMIN — PANTOPRAZOLE SODIUM 8 MG/HR: 40 INJECTION, POWDER, FOR SOLUTION INTRAVENOUS at 07:04

## 2023-04-01 RX ADMIN — ACETAMINOPHEN 650 MG: 160 SOLUTION ORAL at 05:04

## 2023-04-01 RX ADMIN — SUCRALFATE 1 G: 1 SUSPENSION ORAL at 05:04

## 2023-04-01 RX ADMIN — LIDOCAINE HYDROCHLORIDE 100 MG: 20 INJECTION INTRAVENOUS at 09:04

## 2023-04-01 RX ADMIN — ESCITALOPRAM OXALATE 10 MG: 10 TABLET ORAL at 11:04

## 2023-04-01 RX ADMIN — GLYCOPYRROLATE 0.2 MG: 0.2 INJECTION, SOLUTION INTRAMUSCULAR; INTRAVITREAL at 09:04

## 2023-04-01 RX ADMIN — PROPOFOL 40 MG: 10 INJECTION, EMULSION INTRAVENOUS at 09:04

## 2023-04-01 NOTE — ASSESSMENT & PLAN NOTE
Currently hypotensive. BP currently ranging from  systolic.  Plan:  -Optimize pain control   -hold home medications, titrate as needed   -Monitor BP  -Low salt/cardiac diet when not NPO   -type/screen, transfuse as needed

## 2023-04-01 NOTE — ASSESSMENT & PLAN NOTE
Chronic. Currently with epigastric pain and episodes of hematemesis and melena.  Patient initiated on Protonix and awaiting further evaluation by GI as noted above.  Plan:  -Continue PPI  -f/u GI

## 2023-04-01 NOTE — PROGRESS NOTES
[facility] Medicine  Progress Note    Patient Name: Dulce Root  MRN: [id number]  Patient Class: IP- Inpatient   Admission Date: 3/31/2023  Length of Stay: 1 days  Attending Physician: Vimal Ferris MD  Primary Care Provider: Patrick Hernandez MD        Subjective:     Principal Problem:UGIB (upper gastrointestinal bleed)    HPI:  Dulce Root is a 67 y.o. female with a PMH  has a past medical history of Allergy, Anticoagulant long-term use, Breast cancer (2008), Carotid stenosis, bilateral (10/13/2017), Coronary artery disease, Coronary artery disease involving coronary bypass graft of native heart without angina pectoris (08/16/2019), Coronary artery disease involving native coronary artery of native heart without angina pectoris (06/27/2017), DCIS (ductal carcinoma in situ) of breast (11/06/2012), Encounter for blood transfusion, Essential hypertension (11/06/2012), GERD (gastroesophageal reflux disease), GI bleed (02/2021), Hodgkin lymphoma, Hx of Hodgkin's disease - s/p chemo and radiation (11/06/2012), Hyperlipidemia, Hyperlipidemia (11/06/2012), Hypertension, LBBB (left bundle branch block) (05/22/2018), Osteoporosis, Paroxysmal atrial fibrillation - single post-op episode (08/24/2017), Pemphigoid, Pemphigoid, Pulmonary hypertension (1/13/2023), S/P AVR (aortic valve replacement) - pericardial valve (08/21/2017), S/P CABG x 2 (08/21/2017), Stented coronary artery, and Thyroid disease. who presented to the ED for further evaluation after endorsing dark bloody stool x3 days duration.  Patient has history of gastric ulcers and had similar presentation try last occurrence.  Patient also reported 1 episode of hematemesis yesterday in addition to endorsing decreased oral intake, bloating throughout her epigastric region, feeling fatigued, and generalized weakness.  Patient denies abuse of NSAIDs the reports being taken off Protonix over the past year after being so she no longer needed to be on  them but continues to be on aspirin and Plavix 2 2 significant heart history and stent placement.  Other associated symptoms included light headedness/dizziness and noticed that her blood pressure has been running on the lower end of normal with systolic running in the 90s and use a ranges 120s/70s.  She reported no known alleviating or aggravating factors reported all other review of systems negative except as noted above.  GI was consulted by ED staff who recommended patient be admitted to Hospital Medicine and initiated on Protonix with further recommendations to follow in the morning.    PCP: Patrick Hernandez        Overview/Hospital Course:  No notes on file    Interval History: No acute events since admission. Denies CP, SOB, Abdominal pain, fevers/chills. Discussed case with Dr. Flower who is at bedside this AM obtaining consent for 1uPRBC transfusion.      Review of Systems  Objective:     Vital Signs (Most Recent):  Temp: 98.4 °F (36.9 °C) (04/01/23 0759)  Pulse: 89 (04/01/23 0759)  Resp: 14 (04/01/23 0759)  BP: (!) 99/50 (04/01/23 0759)  SpO2: 100 % (04/01/23 0759)   Vital Signs (24h Range):  Temp:  [97.1 °F (36.2 °C)-98.5 °F (36.9 °C)] 98.4 °F (36.9 °C)  Pulse:  [] 89  Resp:  [11-43] 14  SpO2:  [92 %-100 %] 100 %  BP: ()/(46-63) 99/50     Weight: 63 kg (138 lb 14.2 oz)  Body mass index is 24.6 kg/m².  No intake or output data in the 24 hours ending 04/01/23 0839   Physical Exam    Significant Labs: BMP:   Recent Labs   Lab 04/01/23 0631   *      K 3.6      CO2 21*   BUN 43*   CREATININE 0.7   CALCIUM 8.4*     CBC:   Recent Labs   Lab 03/31/23 1720 04/01/23 0631   WBC 11.72 8.10   HGB 9.0* 7.8*   HCT 27.7* 24.0*    276     CMP:   Recent Labs   Lab 03/31/23  1720 04/01/23  0631    137   K 3.3* 3.6    107   CO2 23 21*   GLU 96 141*   BUN 47* 43*   CREATININE 0.7 0.7   CALCIUM 9.1 8.4*   PROT 6.8 6.0   ALBUMIN 3.4* 3.1*   BILITOT 0.4 0.4   ALKPHOS 75 55    AST 13 10   ALT 12 10   ANIONGAP 14 9     Cardiac Markers: No results for input(s): CKMB, MYOGLOBIN, BNP, TROPISTAT in the last 48 hours.  Coagulation:   Recent Labs   Lab 03/31/23  1720   INR 1.1   APTT 24.8     Lactic Acid: No results for input(s): LACTATE in the last 48 hours.  Magnesium: No results for input(s): MG in the last 48 hours.  Troponin: No results for input(s): TROPONINI, TROPONINIHS in the last 48 hours.  TSH:   Recent Labs   Lab 01/06/23  1450   TSH 1.328     Urine Studies:   No results for input(s): COLORU, APPEARANCEUA, PHUR, SPECGRAV, PROTEINUA, GLUCUA, KETONESU, BILIRUBINUA, OCCULTUA, NITRITE, UROBILINOGEN, LEUKOCYTESUR, RBCUA, WBCUA, BACTERIA, SQUAMEPITHEL, HYALINECASTS in the last 48 hours.    Invalid input(s): ABDIAZIZ  '    Significant Imaging: I have reviewed all pertinent imaging results/findings within the past 24 hours.      Assessment/Plan:      * UGIB (upper gastrointestinal bleed)  Melena  Duodenal stricture, hx of   Hx of gastric ulcer with duodenal stricture, recent NSAID use, recent discontinuation of PPI in the OP setting  --s/p IV PPI 80mg in the ER  --agree with transitioning to PPI gtt given substantial risk factors  --discussed case with GI, EGD tentatively scheduled for later today  --H&H downtrending since admission  --1uPRBC ordered to be transfused prior to EGD, consent obtained by GI  --serial CBC to trend h&h  --low threshold to transfuse for hgb < 8 given significant cardiac history      Coronary artery disease involving coronary bypass graft of native heart without angina pectoris  Significant cardiac disease, CABG in 2017 (per patient), has been on ASA, plavis since that time, last dose day prior to admission  --holding asa, plavix d/t UGIB  --low threshold to consult Cardiology for development of ACS symptoms  --high risk patient, monitor closely on telemetry    GERD (gastroesophageal reflux disease)  See plan for UGIB       Malignant neoplasm of central portion  of left breast in female, estrogen receptor negative  Currently follows Dr. Sepulveda from oncology.  Plan:  -continue outpatient f/u as directed     Chronic combined systolic and diastolic heart failure  --Last ECHO from NOV 2022 confirm EF 35 %  --euvolemic on exam, no signs of fluid overload  --monitor electrolytes, UOP  --strict I&O and daily weights ordered      Anxiety  Chronic. Stable. Not in acute exacerbation and currently denies endorsing any suicidal/homicidal ideations.   Plan:  -Continue home medications       Hypothyroid  Patient has chronic hypothyroidism. TFTs reviewed-   Lab Results   Component Value Date    TSH 1.328 01/06/2023   Plan:  -Will continue chronic levothyroxine and adjust for and clinical changes.      Hyperlipidemia  Patient is chronically on statin.will continue for now. Last Lipid Panel:   Lab Results   Component Value Date    CHOL 112 (L) 02/24/2023    HDL 39 (L) 02/24/2023    LDLCALC 58.8 (L) 02/24/2023    TRIG 71 02/24/2023    CHOLHDL 34.8 02/24/2023   Plan:  -Continue home medication  -low fat/low calorie diet      Essential hypertension  Currently hypotensive. BP currently ranging from  systolic.  Plan:  -Optimize pain control   -hold home medications, titrate as needed   -Monitor BP          VTE Risk Mitigation (From admission, onward)         Ordered     Reason for No Pharmacological VTE Prophylaxis  Once        Question Answer Comment   Reasons: Active Bleeding    Reasons: Risk of Bleeding        03/31/23 2119     IP VTE HIGH RISK PATIENT  Once         03/31/23 2119     Place sequential compression device  Until discontinued         03/31/23 2119                Discharge Planning   DELICIA:      Code Status: Full Code   Is the patient medically ready for discharge?:     Reason for patient still in hospital (select all that apply): Patient trending condition and Treatment           Transfuse 1uPRBC, maintain hgb > 8.0, EGD tentatively planned for today. Stable to continue PPI  drip on the floor after procedure unless otherwise determined by GI.         Vimal Ferris MD  Department of Hospital Medicine   [facility]

## 2023-04-01 NOTE — ASSESSMENT & PLAN NOTE
Hx of gastric ulcer with duodenal stricture, recent NSAID use, recent discontinuation of PPI in the OP setting  --s/p IV PPI 80mg in the ER  --agree with transitioning to PPI gtt given substantial risk factors  --discussed case with GI, EGD tentatively scheduled for later today  --H&H downtrending since admission  --1uPRBC ordered to be transfused prior to EGD, consent obtained by GI  --serial CBC to trend h&h  --low threshold to transfuse for hgb < 8 given significant cardiac history

## 2023-04-01 NOTE — ASSESSMENT & PLAN NOTE
Patient has chronic hypothyroidism. TFTs reviewed-   Lab Results   Component Value Date    TSH 1.328 01/06/2023   Plan:  -Will continue chronic levothyroxine and adjust for and clinical changes.

## 2023-04-01 NOTE — H&P
[facility] - Emergency Dept.  [facility]  History & Physical    Patient Name: Dulce Root  MRN: [id number]  Patient Class: IP- Inpatient  Admission Date: 3/31/2023  Attending Physician: Pascual Khan MD   Primary Care Provider: Patrick Hernandez MD         Patient information was obtained from patient, past medical records and ER records.     Subjective:     Principal Problem:<principal problem not specified>    Chief Complaint:   Chief Complaint   Patient presents with    Abdominal Pain     X 3 days; hx of GI ulcers        HPI: Dulce Root is a 67 y.o. female with a PMH  has a past medical history of Allergy, Anticoagulant long-term use, Breast cancer (2008), Carotid stenosis, bilateral (10/13/2017), Coronary artery disease, Coronary artery disease involving coronary bypass graft of native heart without angina pectoris (08/16/2019), Coronary artery disease involving native coronary artery of native heart without angina pectoris (06/27/2017), DCIS (ductal carcinoma in situ) of breast (11/06/2012), Encounter for blood transfusion, Essential hypertension (11/06/2012), GERD (gastroesophageal reflux disease), GI bleed (02/2021), Hodgkin lymphoma, Hx of Hodgkin's disease - s/p chemo and radiation (11/06/2012), Hyperlipidemia, Hyperlipidemia (11/06/2012), Hypertension, LBBB (left bundle branch block) (05/22/2018), Osteoporosis, Paroxysmal atrial fibrillation - single post-op episode (08/24/2017), Pemphigoid, Pemphigoid, Pulmonary hypertension (1/13/2023), S/P AVR (aortic valve replacement) - pericardial valve (08/21/2017), S/P CABG x 2 (08/21/2017), Stented coronary artery, and Thyroid disease. who presented to the ED for further evaluation after endorsing dark bloody stool x3 days duration.  Patient has history of gastric ulcers and had similar presentation try last occurrence.  Patient also reported 1 episode of hematemesis yesterday in addition to endorsing decreased oral intake, bloating throughout her  epigastric region, feeling fatigued, and generalized weakness.  Patient denies abuse of NSAIDs the reports being taken off Protonix over the past year after being so she no longer needed to be on them but continues to be on aspirin and Plavix 2 2 significant heart history and stent placement.  Other associated symptoms included light headedness/dizziness and noticed that her blood pressure has been running on the lower end of normal with systolic running in the 90s and use a ranges 120s/70s.  She reported no known alleviating or aggravating factors reported all other review of systems negative except as noted above.  GI was consulted by ED staff who recommended patient be admitted to Hospital Medicine and initiated on Protonix with further recommendations to follow in the morning.    PCP: Patrick Hernandez        Past Medical History:   Diagnosis Date    Allergy     Anticoagulant long-term use     Plavix    Breast cancer 2008    Carotid stenosis, bilateral 10/13/2017    Coronary artery disease     Coronary artery disease involving coronary bypass graft of native heart without angina pectoris 08/16/2019 2/19  1.  Left main is a small vessel with a 60%-65% ostial lesion. 2.  LAD is a medium-sized vessel diffuse 70% proximally  Ramus intermedius is a medium size vessel with a 40%-50% ostial lesion. 3.  Circumflex 60%-70% ostial  remainder of circumflex and obtuse marginal normal in appearance. Right coronary artery is normal size vessel, short discrete 70%mid 4.  Vein graft to right coronary is occ    Coronary artery disease involving native coronary artery of native heart without angina pectoris 06/27/2017    DCIS (ductal carcinoma in situ) of breast 11/06/2012    Encounter for blood transfusion     Essential hypertension 11/06/2012    GERD (gastroesophageal reflux disease)     GI bleed 02/2021    Hodgkin lymphoma     Hx of Hodgkin's disease - s/p chemo and radiation 11/06/2012    Hyperlipidemia      Hyperlipidemia 11/06/2012    The patient presents with hyperlipidemia.  The patient reports tolerating the medication well and is in excellent compliance.  There have been no medication side effects.  The patient denies chest pain, neuropathy, and myalgias.  The patient has reduced fat intake and has been exercising.  Current treatment has included the medications listed in the med card.   Lab Results Component Value Date  CH    Hypertension     LBBB (left bundle branch block) 05/22/2018    Osteoporosis     Paroxysmal atrial fibrillation - single post-op episode 08/24/2017    Pemphigoid     Pemphigoid     pt receives IVIG infusions    Pulmonary hypertension 1/13/2023    S/P AVR (aortic valve replacement) - pericardial valve 08/21/2017    Perceval aortic tissue valve PVS23. 23mm    serial # [id number]    S/P CABG x 2 08/21/2017 8/17 CABG X 2 with SVG-LAD and SVG-distal RCA  SVG LAD due to absent LIMA after chest radiation and lymph node biopsy     Stented coronary artery     She had 2 stents placed in 2/2019.  She has had CAD and bypasses in the past in 2017.      Thyroid disease        Past Surgical History:   Procedure Laterality Date    ARTERIOGRAPHY OF AORTIC ROOT N/A 02/15/2019    Procedure: ARTERIOGRAM, AORTIC ROOT;  Surgeon: Sujit Chaudhry MD;  Location: [facility] CATH;  Service: Cardiology;  Laterality: N/A;    AXILLARY NODE DISSECTION Left 11/25/2022    Procedure: LYMPHADENECTOMY, AXILLARY-Left;  Surgeon: Rosa Maradiaga MD;  Location: [facility] OR;  Service: General;  Laterality: Left;    BREAST BIOPSY      BREAST RECONSTRUCTION      bilateral mastectomy    CARDIAC CATHETERIZATION      stents x 2    CARDIAC SURGERY  08/2017    Aortic valve replacement , CABG 2 vessel    CARDIAC VALVE REPLACEMENT  08/2017    aortic valve, bovine per pt    CORONARY ANGIOGRAPHY N/A 02/15/2019    Procedure: ANGIOGRAM, CORONARY ARTERY;  Surgeon: Sujit Chaudhry MD;  Location: [facility] CATH;  Service: Cardiology;  Laterality:  N/A;    CORONARY ANGIOGRAPHY N/A 4/1/2022    Procedure: ANGIOGRAM, CORONARY ARTERY;  Surgeon: Sujit Chaudhry MD;  Location: [facility] CATH;  Service: Cardiology;  Laterality: N/A;    CORONARY BYPASS GRAFT ANGIOGRAPHY  02/15/2019    Procedure: Bypass graft study;  Surgeon: Sujit Chaudhry MD;  Location: [facility] CATH;  Service: Cardiology;;    COSMETIC SURGERY      bereast reconstruction    ESOPHAGOGASTRODUODENOSCOPY N/A 05/14/2021    Procedure: EGD (ESOPHAGOGASTRODUODENOSCOPY);  Surgeon: Tracy Flower MD;  Location: [facility] ENDO;  Service: Endoscopy;  Laterality: N/A;    ESOPHAGOGASTRODUODENOSCOPY N/A 11/04/2021    Procedure: EGD (ESOPHAGOGASTRODUODENOSCOPY);  Surgeon: Tracy Flower MD;  Location: [facility] ENDO;  Service: Endoscopy;  Laterality: N/A;    EYE SURGERY      eye lids    INJECTION FOR SENTINEL NODE IDENTIFICATION Left 11/25/2022    Procedure: INJECTION, FOR SENTINEL NODE IDENTIFICATION-Left;  Surgeon: Rosa Maradiaga MD;  Location: [facility];  Service: General;  Laterality: Left;    LEFT HEART CATHETERIZATION Right 02/15/2019    Procedure: Left heart cath;  Surgeon: Sujit Chaudhry MD;  Location: [facility] CATH;  Service: Cardiology;  Laterality: Right;    LEFT HEART CATHETERIZATION Left 4/1/2022    Procedure: Left heart cath;  Surgeon: Sujit Chaudhry MD;  [facility];  Service: Cardiology;  Laterality: Left;    LUMBAR LAMINECTOMY WITH DISCECTOMY N/A 02/27/2020    Procedure: LAMINECTOMY, SPINE, LUMBAR, WITH DISCECTOMY;  Surgeon: Vimal Villegas MD;  Location: [facility] OR;  Service: Neurosurgery;  Laterality: N/A;  left L5-S1  Laminectomy L4-5    LYMPHADENECTOMY      MASTECTOMY      MASTECTOMY, PARTIAL Left 11/25/2022    Procedure: MASTECTOMY, PARTIAL-Left ultrasound guided;  Surgeon: Rosa Maradiaga MD;  Location: [facility];  Service: General;  Laterality: Left;    RIGHT HEART CATHETERIZATION Right 4/1/2022    Procedure: INSERTION, CATHETER, RIGHT HEART;  Surgeon: Sujit Chaudhry MD;  Location: [facility] CATH;   Service: Cardiology;  Laterality: Right;    SENTINEL LYMPH NODE BIOPSY Left 11/25/2022    Procedure: BIOPSY, LYMPH NODE, SENTINEL-Left;  Surgeon: Rosa Maradiaga MD;  Location: [facility];  Service: General;  Laterality: Left;    SKIN BIOPSY      SPLENECTOMY, TOTAL      TRANSFORAMINAL EPIDURAL INJECTION OF STEROID Left 12/31/2019    Procedure: Left L5/S1 TF SKIP with local;  Surgeon: Canelo Niño MD;  [facility];  Service: Pain Management;  Laterality: Left;    TUMOR REMOVAL      neck- lymphoma       Review of patient's allergies indicates:   Allergen Reactions    Amlodipine Shortness Of Breath and Other (See Comments)     unknown  Other reaction(s): Swelling  unknown  unknown  Other reaction(s): Swelling  unknown  Other reaction(s): Swelling  unknown    Levofloxacin Hives and Swelling    Sulfa (sulfonamide antibiotics) Shortness Of Breath, Itching and Other (See Comments)     elevated blood pressure    Ace inhibitors     Ciprofloxacin Itching    Iodinated contrast media     Iodine      Other reaction(s): Unknown    Latex      Other reaction(s): Itching    Latex, natural rubber Itching       Current Facility-Administered Medications on File Prior to Encounter   Medication    0.9%  NaCl infusion     Current Outpatient Medications on File Prior to Encounter   Medication Sig    ALPRAZolam (XANAX) 1 MG tablet Take 1 mg by mouth nightly as needed.    aspirin (ECOTRIN) 81 MG EC tablet Take 1 tablet (81 mg total) by mouth once daily.    calcium carbonate (OS-CALVIN) 600 mg (1,500 mg) Tab Take 600 mg by mouth 2 (two) times daily with meals.    cholecalciferol, vitamin D3, (VITAMIN D3) 100 mcg (4,000 unit) Cap Take 4,000 Units by mouth once daily.    clopidogreL (PLAVIX) 75 mg tablet Take 1 tablet (75 mg total) by mouth once daily.    dapsone 25 MG Tab TAKE 2 TABLETS BY MOUTH EVERY DAY FOR 30 DAYS    dexAMETHasone (DECADRON) 0.5 mg/5 mL Elix SWISH 3.75MLS IN MOUTH FOR 30 SECONDS AND SWALLOW  TWICE DAILY AS NEEDED FOR LICHEN PLANUS FLARE UPS    EScitalopram oxalate (LEXAPRO) 10 MG tablet Take 1 tablet (10 mg total) by mouth once daily.    fluconazole (DIFLUCAN) 200 MG Tab TAKE 1 TABLET ORALLY EVERY OTHER DAY X 4 DOSES, THEN 1 WEEKLY FOR 30 DAYS    hydroCHLOROthiazide (HYDRODIURIL) 25 MG tablet Take 1 tablet (25 mg total) by mouth every morning.    levothyroxine (SYNTHROID) 75 MCG tablet Take 1 tablet (75 mcg total) by mouth before breakfast.    losartan (COZAAR) 50 MG tablet Take 50 mg by mouth every evening.    metoprolol succinate (TOPROL-XL) 25 MG 24 hr tablet TAKE 1 TABLET BY MOUTH TWICE A DAY    pantoprazole (PROTONIX) 20 MG tablet TAKE 1 TABLET BY MOUTH EVERY DAY    rosuvastatin (CRESTOR) 20 MG tablet Take 1 tablet (20 mg total) by mouth every evening.    valACYclovir (VALTREX) 1000 MG tablet TAKE 1 TABLET EVERY 24 HOURS; prn     Family History       Problem Relation (Age of Onset)    Hypertension Mother, Father          Tobacco Use    Smoking status: Former     Packs/day: 1.00     Years: 20.00     Pack years: 20.00     Types: Cigarettes     Quit date: 1981     Years since quittin.4    Smokeless tobacco: Never   Substance and Sexual Activity    Alcohol use: No     Comment: 2 drinks/month; none 72 hrs prior to surgery    Drug use: No    Sexual activity: Yes     Partners: Male     Review of Systems   All other systems reviewed and are negative.  Objective:     Vital Signs (Most Recent):  Temp: 97.1 °F (36.2 °C) (23 2300)  Pulse: 76 (23 010)  Resp: 11 (23)  BP: (!) 106/53 (23)  SpO2: (!) 94 % (23)   Vital Signs (24h Range):  Temp:  [97.1 °F (36.2 °C)-98.5 °F (36.9 °C)] 97.1 °F (36.2 °C)  Pulse:  [] 76  Resp:  [11-43] 11  SpO2:  [92 %-98 %] 94 %  BP: ()/(48-63) 106/53     Weight: 63 kg (138 lb 14.2 oz)  Body mass index is 24.6 kg/m².    Physical Exam  Vitals reviewed.   Constitutional:       General: She is not in acute  distress.     Appearance: Normal appearance. She is normal weight. She is not ill-appearing, toxic-appearing or diaphoretic.   HENT:      Head: Normocephalic and atraumatic.      Right Ear: External ear normal.      Left Ear: External ear normal.      Nose: Nose normal. No congestion or rhinorrhea.      Mouth/Throat:      Mouth: Mucous membranes are moist.      Pharynx: Oropharynx is clear. No oropharyngeal exudate or posterior oropharyngeal erythema.   Eyes:      General: No scleral icterus.     Extraocular Movements: Extraocular movements intact.      Conjunctiva/sclera: Conjunctivae normal.      Pupils: Pupils are equal, round, and reactive to light.   Neck:      Vascular: No carotid bruit.   Cardiovascular:      Rate and Rhythm: Normal rate and regular rhythm.      Pulses: Normal pulses.      Heart sounds: Normal heart sounds. No murmur heard.    No friction rub. No gallop.   Pulmonary:      Effort: Pulmonary effort is normal. No respiratory distress.      Breath sounds: Normal breath sounds. No stridor. No wheezing, rhonchi or rales.   Chest:      Chest wall: No tenderness.   Abdominal:      General: Abdomen is flat. Bowel sounds are normal. There is no distension.      Palpations: Abdomen is soft.      Tenderness: There is abdominal tenderness. There is no right CVA tenderness, left CVA tenderness, guarding or rebound.      Hernia: No hernia is present.      Comments: Mild TTP throughout entire abdomen greatest in epigastric region without evidence of guarding or rebound tenderness noted.   Musculoskeletal:         General: No swelling, tenderness, deformity or signs of injury. Normal range of motion.      Cervical back: Normal range of motion and neck supple. No rigidity or tenderness.      Right lower leg: No edema.      Left lower leg: No edema.   Lymphadenopathy:      Cervical: No cervical adenopathy.   Skin:     General: Skin is warm and dry.      Capillary Refill: Capillary refill takes less than 2  seconds.      Coloration: Skin is not jaundiced or pale.      Findings: No bruising, erythema, lesion or rash.   Neurological:      General: No focal deficit present.      Mental Status: She is alert and oriented to person, place, and time. Mental status is at baseline.      Cranial Nerves: No cranial nerve deficit.      Sensory: No sensory deficit.      Motor: No weakness.      Coordination: Coordination normal.   Psychiatric:         Mood and Affect: Mood normal.         Behavior: Behavior normal.         Thought Content: Thought content normal.         Judgment: Judgment normal.         CRANIAL NERVES     CN III, IV, VI   Pupils are equal, round, and reactive to light.     Significant Labs: All pertinent labs within the past 24 hours have been reviewed.    Significant Imaging: I have reviewed all pertinent imaging results/findings within the past 24 hours.    LABS:  Recent Results (from the past 24 hour(s))   APTT    Collection Time: 03/31/23  5:20 PM   Result Value Ref Range    aPTT 24.8 21.0 - 32.0 sec   CBC auto differential    Collection Time: 03/31/23  5:20 PM   Result Value Ref Range    WBC 11.72 3.90 - 12.70 K/uL    RBC 3.20 (L) 4.00 - 5.40 M/uL    Hemoglobin 9.0 (L) 12.0 - 16.0 g/dL    Hematocrit 27.7 (L) 37.0 - 48.5 %    MCV 87 82 - 98 fL    MCH 28.1 27.0 - 31.0 pg    MCHC 32.5 32.0 - 36.0 g/dL    RDW 15.4 (H) 11.5 - 14.5 %    Platelets 301 150 - 450 K/uL    MPV 9.7 9.2 - 12.9 fL    Immature Granulocytes 0.3 0.0 - 0.5 %    Gran # (ANC) 7.7 1.8 - 7.7 K/uL    Immature Grans (Abs) 0.04 0.00 - 0.04 K/uL    Lymph # 2.4 1.0 - 4.8 K/uL    Mono # 1.3 (H) 0.3 - 1.0 K/uL    Eos # 0.2 0.0 - 0.5 K/uL    Baso # 0.08 0.00 - 0.20 K/uL    nRBC 0 0 /100 WBC    Gran % 65.4 38.0 - 73.0 %    Lymph % 20.6 18.0 - 48.0 %    Mono % 11.2 4.0 - 15.0 %    Eosinophil % 1.8 0.0 - 8.0 %    Basophil % 0.7 0.0 - 1.9 %    Differential Method Automated    Comprehensive metabolic panel    Collection Time: 03/31/23  5:20 PM   Result Value  Ref Range    Sodium 139 136 - 145 mmol/L    Potassium 3.3 (L) 3.5 - 5.1 mmol/L    Chloride 102 95 - 110 mmol/L    CO2 23 23 - 29 mmol/L    Glucose 96 70 - 110 mg/dL    BUN 47 (H) 8 - 23 mg/dL    Creatinine 0.7 0.5 - 1.4 mg/dL    Calcium 9.1 8.7 - 10.5 mg/dL    Total Protein 6.8 6.0 - 8.4 g/dL    Albumin 3.4 (L) 3.5 - 5.2 g/dL    Total Bilirubin 0.4 0.1 - 1.0 mg/dL    Alkaline Phosphatase 75 55 - 135 U/L    AST 13 10 - 40 U/L    ALT 12 10 - 44 U/L    Anion Gap 14 8 - 16 mmol/L    eGFR >60 >60 mL/min/1.73 m^2   Protime-INR    Collection Time: 03/31/23  5:20 PM   Result Value Ref Range    Prothrombin Time 11.3 9.0 - 12.5 sec    INR 1.1 0.8 - 1.2   Type & Screen    Collection Time: 03/31/23  5:20 PM   Result Value Ref Range    Group & Rh O POS     Indirect Gisel NEG     Specimen Outdate 04/03/2023 23:59    Occult blood x 1, stool    Collection Time: 03/31/23  7:39 PM    Specimen: Stool   Result Value Ref Range    Occult Blood Positive (A) Negative       RADIOLOGY  VAS US Carotid Bilateral    Result Date: 3/10/2023  Indication ======== Bilateral carotid artery stenosis Results ====== Right CCA prox PSV 66 cm/s Right CCA prox     ED          20 cm/s Right CCA distal PSV   70 cm/s Right CCA distal ED    22 cm/s Right bulb  cm/s Right bulb ED  33 cm/s Right ICA prox  cm/s Right ICA prox EDV 71 cm/s Right ICA mid PSV  170 cm/s Right ICA mid EDV  53 cm/s Right ICA distal PSV   112 cm/s Right ICA distal EDV   35 cm/s Right ICA PSV / right CCA PSV  2.9 Right ICA EDV / right CCA EDV  3.2 Right ECA mid PSV  183 cm/s Right VERT PSV 96 cm/s Right VERT findings:   Antegrade flow Left CCA prox PSV  92 cm/s Left CCA prox ED   32 cm/s Left CCA distal PSV    64 cm/s Left CCA distal ED 24 cm/s Left bulb PSV  74 cm/s Left bulb ED   27 cm/s Left ICA prox PSV  275 cm/s Left ICA prox EDV  80 cm/s Left ICA mid PSV   284 cm/s Left ICA mid EDV   72 cm/s Left ICA distal PSV    110 cm/s Left ICA distal EDV    43 cm/s Left ICA PSV /  Left CCA PSV    4.5 Left ICA EDV / Left CCA EDV    3.0 Left ECA prox PSV  366 cm/s Left VERT PSV  70 cm/s Left VERT findings:    Antegrade flow Comment ======== Heavy acoustic shadowing secondary to calcific plaque denies complete visualization of the ICA. Impression ========= RIGHT SIDE: 60-79% Right ICA stenosis. Heterogeneous plaque in the right internal carotid artery. Heterogeneous plaque noted in the right common carotid artery. Antegrade flow in the right vertebral artery. LEFT SIDE: 80-99% Left ICA stenosis. Calcific plaque noted in the left internal carotid artery. Antegrade flow noted in the left vertebral artery. There are elevated velocities in the proximal ECA measuring 366 cm/s DATE OF SERVICE: 03/10/2023                                                   Sonographer: VERONICA SUH RVS Electronically Signed by: Alex Ulloa M.D. at 03/10/2023-20:11    CTA Acute GI [place], Abdomen and Pelvis    Result Date: 3/31/2023  EXAMINATION: CT angiography acute GI bleed abdomen pelvis CLINICAL HISTORY: GI bleeding TECHNIQUE: CT angiography of the abdomen pelvis with and without contrast.  3 minute delays were provided.  MIP imaging was utilized.  A total of 100 cc of Omnipaque 350 was injected. COMPARISON: None FINDINGS: No hemorrhage identified.  No hydronephrosis.  Upper lobe scarring of left kidney.  Calcified uterine fibroid.  No high bowel obstruction.  No evidence for active hemorrhage.  Colonic diverticulosis.  Mild mucosal thickening of the sigmoid colon.  Uterine fibroid suspected.  Bladder is mildly distended.  Postsurgical changes anterior abdomen.  Mild mucosal thickening of the stomach.  Pancreas adrenal glands are grossly unremarkable.  No gallstones.  Mild moderate atherosclerotic disease of the aorta iliac vasculature.     Multiple findings as described above.  No evidence for active hemorrhage Questionable scarring in the left kidney upper lobe with poor enhancement of the renal parenchyma.  This  may relate to scarring.  Recommend follow-up. Electronically signed by: Skip Almazan Date:    03/31/2023 Time:    19:18      EKG    MICROBIOLOGY    MDM      Assessment/Plan:     Melena    Epigastric pain    Hematemesis with nausea  Patient presented with persistent abdominal pain in setting of hematemesis and melena/dark stools concerning for GI bleed.  Patient with significant history of gastric ulcers and has and is found to have evidence of hypotension and anemia with H/H measuring 9.0/27.7.  GI consult by ED staff who recommended Protonix and admission with possible endoscopy in the morning.  Check/screen ordered and pending transfusion if hemoglobin drops below 8.0 in setting of cardiac patient.  Plan:  -NPO  -Continue current pain regimen, titrate as needed  -Bowel regimen  -Bedrest  -IVFs prn  -monitor H/H  -continue Protonix  -type/screen, transfuse if hemoglobin <8.0  -hold antiplatelet/anticoagulation therapies  -Antiemetics prn  -Tylenol as needed for fever   -f/u GI      Essential hypertension  Currently hypotensive. BP currently ranging from  systolic.  Plan:  -Optimize pain control   -hold home medications, titrate as needed   -Monitor BP  -Low salt/cardiac diet when not NPO   -type/screen, transfuse as needed      Coronary artery disease involving coronary bypass graft of native heart without angina pectoris  Patient with known CAD s/p stent placement and CABG, which is controlled. Patient also s/p valve replacement and chronic carotid artery stenosis. Will continue ASA, Plavix and Statin and monitor for S/Sx of angina/ACS. Continue to monitor on telemetry.       Chronic combined systolic and diastolic heart failure  Patient is identified as having Combined Systolic and Diastolic heart failure that is Chronic. CHF is currently controlled. Latest ECHO performed and demonstrates- Results for orders placed during the hospital encounter of 11/07/22    Echo Saline Bubble? No    Interpretation  Summary  · The left ventricle is mildly enlarged with concentric hypertrophy and moderately decreased systolic function.  · Left ventricular diastolic dysfunction.  · The estimated PA systolic pressure is 57 mmHg.  · Normal right ventricular size with low normal right ventricular systolic function.  · There is pulmonary hypertension.  · Normal central venous pressure (3 mmHg).  · The estimated ejection fraction is 35%.  · There is moderate left ventricular global hypokinesis.  · Mild tricuspid regurgitation.  · There is a bioprosthetic aortic valve present.  · The aortic valve mean gradient is 13 mmHg with a dimensionless index of 0.28.  . Continue Beta Blocker ACE/ARB Furosemide Aldactone Nitrate/Vasodilator ARNI and monitor clinical status closely. Monitor on telemetry. Patient is off CHF pathway.  Monitor strict Is&Os and daily weights.  Place on fluid restriction of 1.5 L. Continue to stress to patient importance of self efficacy and  on diet for CHF. Last BNP reviewed- and noted below No results for input(s): BNP, BNPTRIAGEBLO in the last 168 hours..      Hyperlipidemia  Patient is chronically on statin.will continue for now. Last Lipid Panel:   Lab Results   Component Value Date    CHOL 112 (L) 02/24/2023    HDL 39 (L) 02/24/2023    LDLCALC 58.8 (L) 02/24/2023    TRIG 71 02/24/2023    CHOLHDL 34.8 02/24/2023   Plan:  -Continue home medication  -low fat/low calorie diet      Hypothyroid  Patient has chronic hypothyroidism. TFTs reviewed-   Lab Results   Component Value Date    TSH 1.328 01/06/2023   Plan:  -Will continue chronic levothyroxine and adjust for and clinical changes.      Anxiety  Chronic. Stable. Not in acute exacerbation and currently denies endorsing any suicidal/homicidal ideations.   Plan:  -Continue home medications       Malignant neoplasm of central portion of left breast in female, estrogen receptor negative  Currently follows Dr. Sepulveda from oncology.  Plan:  -continue outpatient f/u  as directed       GERD (gastroesophageal reflux disease)  Chronic. Currently with epigastric pain and episodes of hematemesis and melena.  Patient initiated on Protonix and awaiting further evaluation by GI as noted above.  Plan:  -Continue PPI  -f/u GI      VTE Risk Mitigation (From admission, onward)         Ordered     Reason for No Pharmacological VTE Prophylaxis  Once        Question Answer Comment   Reasons: Active Bleeding    Reasons: Risk of Bleeding        03/31/23 2119     IP VTE HIGH RISK PATIENT  Once         03/31/23 2119     Place sequential compression device  Until discontinued         03/31/23 2119              //Core Measures   -DVT proph: SCDs, withholding anticoagulation in setting of GI bleeding  -Code status: Full    -Surrogate: spouse       Components of this note were documented using a voice recognition system and are subject to errors not corrected at the time the document was proof read. Please contact the author for any clarifications.       Pascual Khan MD  Department of Hospital Medicine  [facility] - Emergency Dept.

## 2023-04-01 NOTE — ASSESSMENT & PLAN NOTE
CAD s/p CABG 8/17 ( SVG to LAD, SVG to PDA occluded), PCI 2019 LM, CX, RCA  AVR, bioprosthetic 8/17  Carotid artery stenosis (ANNA 60-69%, LICA 80-89%)  Pulmonary HTN PAP 57, Likely WHO class II  HFrEF, EF 35% since 2019  Plavix and ASA last taken 3/31/23

## 2023-04-01 NOTE — BRIEF OP NOTE
Inpatient Endoscopy Brief Operative Note      Admit Date: 3/31/2023    Procedure Date and Time:  4/1/2023 10:03 AM    Attending Physician: Vimal Ferris MD     Principal Admitting Diagnoses: Acute duodenal ulcer with hemorrhage and obstruction         Discharge Diagnosis: The primary encounter diagnosis was Upper GI bleeding. Diagnoses of Anemia, unspecified type, Chest pain, Epigastric pain, and Melena were also pertinent to this visit.     Discharged Condition: Stable    Indication for Admission: Acute duodenal ulcer with hemorrhage and obstruction     Procedure Performed: EGD with control of bleeding    SEE [facility]ATIONS REPORTS FOR DETAILS.    Pathology (if any):  Specimen (24h ago, onward)      None            Estimated Blood Loss: 1 ml.    Discussed with: patient and family.    Disposition: Return to hospital lim.    Recommendations:   Clears liquid diet, no reds  Continue protonix gtt  Hold Plavix and ASA  Carafate 1g suspension PO QID  If rebleeds, contact IR - acute duodenal ulcer in the distal portion fo the duodenal stricture and not amenable to endoscopic management. Discussed with Dr. Hernandez, IR who is agreeable.    Repeat EGD in 2 months to check healing      Communicated with hospital medicine service regarding the above findings.       Tracy Flower MD  Inpatient Gastroenterology  [facility]

## 2023-04-01 NOTE — PLAN OF CARE
Pt to Dr Domingo at bedside discussing patient findings.  Pt alert, vitals stable, no severe pain, and no GI bleeding. Pt discharged from unit.

## 2023-04-01 NOTE — ASSESSMENT & PLAN NOTE
Patient with known CAD s/p stent placement and CABG, which is controlled. Patient also s/p valve replacement and chronic carotid artery stenosis. Will continue ASA, Plavix and Statin and monitor for S/Sx of angina/ACS. Continue to monitor on telemetry.

## 2023-04-01 NOTE — H&P (VIEW-ONLY)
[facility]  Gastroenterology  Consult Note    Patient Name: Dulce Root  MRN: [id number]  Admission Date: 3/31/2023  Hospital Length of Stay: 1 days  Code Status: Full Code   Attending Provider: Vimal Ferris MD   Consulting Provider: Tracy Flower MD  Primary Care Physician: Patrick Hernandez MD  Principal Problem:UGIB (upper gastrointestinal bleed)    Inpatient consult to Gastroenterology  Consult performed by: Tracy Flower MD  Consult ordered by: Pascual Khan MD  Reason for consult: melena        Subjective:     HPI:  67 y.o female with CAD s/p CABG and CHF with EF 35% who is on anticoagulation with plavix and ASA. Her GI history is notable for peptic ulcer disease diagnosed in 05/5021. Patient had presented to ED at that time with coffee ground emesis and melena. EGD discovered a duodenal bulb ulcer and duodenal stricture in the first portion of the duodenum. She was treated conservatively and repeat EGD a few months later showed resolution of the ulcer but the duodenal stricture remained. She was continued on low dose Protonix 20mg daily.     Patient recently contacted our GI offices in late January with abdomianl bloating, gas and constipation. She was seen virtually by Dr. Johnson who discontinued the Protonix and advised to avoid NSAIDs. She was also started on Miralax for her constipation. Unfortunately, patient took 2 Ibuprofen on Tuesday for a headache. Later that evening she had a few episodes of brown emesis. By Thursday she was experiencing melena. Labs on presentation show a H&H 9/27, her baseline is 10.6/33.9. Repeat this morning is 7.8/24. Plavix and ASA were last taken yesterday 3/31/23. She has been NPO since her presentation to the ED presentation yesterday afternoon.    At present, patient denies abdominal pain. She has remained hemodynamically stable       Past Medical History:   Diagnosis Date    Allergy     Anticoagulant long-term use     Plavix    Breast  cancer 2008    Carotid stenosis, bilateral 10/13/2017    Coronary artery disease     Coronary artery disease involving coronary bypass graft of native heart without angina pectoris 08/16/2019 2/19  1.  Left main is a small vessel with a 60%-65% ostial lesion. 2.  LAD is a medium-sized vessel diffuse 70% proximally  Ramus intermedius is a medium size vessel with a 40%-50% ostial lesion. 3.  Circumflex 60%-70% ostial  remainder of circumflex and obtuse marginal normal in appearance. Right coronary artery is normal size vessel, short discrete 70%mid 4.  Vein graft to right coronary is occ    Coronary artery disease involving native coronary artery of native heart without angina pectoris 06/27/2017    DCIS (ductal carcinoma in situ) of breast 11/06/2012    Encounter for blood transfusion     Essential hypertension 11/06/2012    GERD (gastroesophageal reflux disease)     GI bleed 02/2021    Hodgkin lymphoma     Hx of Hodgkin's disease - s/p chemo and radiation 11/06/2012    Hyperlipidemia     Hyperlipidemia 11/06/2012    The patient presents with hyperlipidemia.  The patient reports tolerating the medication well and is in excellent compliance.  There have been no medication side effects.  The patient denies chest pain, neuropathy, and myalgias.  The patient has reduced fat intake and has been exercising.  Current treatment has included the medications listed in the med card.   Lab Results Component Value Date  CH    Hypertension     LBBB (left bundle branch block) 05/22/2018    Osteoporosis     Paroxysmal atrial fibrillation - single post-op episode 08/24/2017    Pemphigoid     Pemphigoid     pt receives IVIG infusions    Pulmonary hypertension 1/13/2023    S/P AVR (aortic valve replacement) - pericardial valve 08/21/2017    Perceval aortic tissue valve PVS23. 23mm    serial # [id number]    S/P CABG x 2 08/21/2017 8/17 CABG X 2 with SVG-LAD and SVG-distal RCA  SVG LAD due to absent LIMA after  chest radiation and lymph node biopsy     Stented coronary artery     She had 2 stents placed in 2/2019.  She has had CAD and bypasses in the past in 2017.      Thyroid disease        Past Surgical History:   Procedure Laterality Date    ARTERIOGRAPHY OF AORTIC ROOT N/A 02/15/2019    Procedure: ARTERIOGRAM, AORTIC ROOT;  Surgeon: Sujit Chaudhry MD;  Location: [facility];  Service: Cardiology;  Laterality: N/A;    AXILLARY NODE DISSECTION Left 11/25/2022    Procedure: LYMPHADENECTOMY, AXILLARY-Left;  Surgeon: Rosa Maradiaga MD;  [facility];  Service: General;  Laterality: Left;    BREAST BIOPSY      BREAST RECONSTRUCTION      bilateral mastectomy    CARDIAC CATHETERIZATION      stents x 2    CARDIAC SURGERY  08/2017    Aortic valve replacement , CABG 2 vessel    CARDIAC VALVE REPLACEMENT  08/2017    aortic valve, bovine per pt    CORONARY ANGIOGRAPHY N/A 02/15/2019    Procedure: ANGIOGRAM, CORONARY ARTERY;  Surgeon: Sujit Chaudhry MD;  [facility];  Service: Cardiology;  Laterality: N/A;    CORONARY ANGIOGRAPHY N/A 4/1/2022    Procedure: ANGIOGRAM, CORONARY ARTERY;  Surgeon: Sujit Chaudhry MD;  Location: [facility];  Service: Cardiology;  Laterality: N/A;    CORONARY BYPASS GRAFT ANGIOGRAPHY  02/15/2019    Procedure: Bypass graft study;  Surgeon: Sujit Chaudhry MD;  [facility];  Service: Cardiology;;    COSMETIC SURGERY      bereast reconstruction    ESOPHAGOGASTRODUODENOSCOPY N/A 05/14/2021    Procedure: EGD (ESOPHAGOGASTRODUODENOSCOPY);  Surgeon: Tracy Flower MD;  Location: [facility];  Service: Endoscopy;  Laterality: N/A;    ESOPHAGOGASTRODUODENOSCOPY N/A 11/04/2021    Procedure: EGD (ESOPHAGOGASTRODUODENOSCOPY);  Surgeon: Tracy Flower MD;  Location: [facility];  Service: Endoscopy;  Laterality: N/A;    EYE SURGERY      eye lids    INJECTION FOR SENTINEL NODE IDENTIFICATION Left 11/25/2022    Procedure: INJECTION, FOR SENTINEL NODE IDENTIFICATION-Left;  Surgeon:  Rosa Maradiaga MD;  Location: [facility] OR;  Service: General;  Laterality: Left;    LEFT HEART CATHETERIZATION Right 02/15/2019    Procedure: Left heart cath;  Surgeon: Sujit Chaudhry MD;  Location: [facility];  Service: Cardiology;  Laterality: Right;    LEFT HEART CATHETERIZATION Left 4/1/2022    Procedure: Left heart cath;  Surgeon: Sujit Chaudhry MD;  Location: [facility];  Service: Cardiology;  Laterality: Left;    LUMBAR LAMINECTOMY WITH DISCECTOMY N/A 02/27/2020    Procedure: LAMINECTOMY, SPINE, LUMBAR, WITH DISCECTOMY;  Surgeon: Vimal Villegas MD;  Location: [facility] OR;  Service: Neurosurgery;  Laterality: N/A;  left L5-S1  Laminectomy L4-5    LYMPHADENECTOMY      MASTECTOMY      MASTECTOMY, PARTIAL Left 11/25/2022    Procedure: MASTECTOMY, PARTIAL-Left ultrasound guided;  Surgeon: Rosa Maradiaga MD;  Location: [facility] OR;  Service: General;  Laterality: Left;    RIGHT HEART CATHETERIZATION Right 4/1/2022    Procedure: INSERTION, CATHETER, RIGHT HEART;  Surgeon: Sujit Chaudhry MD;  Location: [facility];  Service: Cardiology;  Laterality: Right;    SENTINEL LYMPH NODE BIOPSY Left 11/25/2022    Procedure: BIOPSY, LYMPH NODE, SENTINEL-Left;  Surgeon: Rosa Maradiaga MD;  Location: [facility];  Service: General;  Laterality: Left;    SKIN BIOPSY      SPLENECTOMY, TOTAL      TRANSFORAMINAL EPIDURAL INJECTION OF STEROID Left 12/31/2019    Procedure: Left L5/S1 TF SKIP with local;  Surgeon: Canelo Niño MD;  Location: [facility];  Service: Pain Management;  Laterality: Left;    TUMOR REMOVAL      neck- lymphoma       Review of patient's allergies indicates:   Allergen Reactions    Amlodipine Shortness Of Breath and Other (See Comments)     unknown  Other reaction(s): Swelling  unknown  unknown  Other reaction(s): Swelling  unknown  Other reaction(s): Swelling  unknown    Levofloxacin Hives and Swelling    Sulfa (sulfonamide antibiotics) Shortness Of Breath, Itching and Other (See Comments)     elevated blood  pressure    Ace inhibitors     Ciprofloxacin Itching    Iodinated contrast media     Iodine      Other reaction(s): Unknown    Latex      Other reaction(s): Itching    Latex, natural rubber Itching     Family History       Problem Relation (Age of Onset)    Hypertension Mother, Father          Tobacco Use    Smoking status: Former     Packs/day: 1.00     Years: 20.00     Pack years: 20.00     Types: Cigarettes     Quit date: 1981     Years since quittin.4    Smokeless tobacco: Never   Substance and Sexual Activity    Alcohol use: No     Comment: 2 drinks/month; none 72 hrs prior to surgery    Drug use: No    Sexual activity: Yes     Partners: Male     Review of Systems  Objective:     Vital Signs (Most Recent):  Temp: 97.1 °F (36.2 °C) (23 2300)  Pulse: 82 (23 0700)  Resp: 20 (23 0700)  BP: (!) 115/56 (23 0700)  SpO2: 96 % (23 07)   Vital Signs (24h Range):  Temp:  [97.1 °F (36.2 °C)-98.5 °F (36.9 °C)] 97.1 °F (36.2 °C)  Pulse:  [] 82  Resp:  [11-43] 20  SpO2:  [92 %-98 %] 96 %  BP: ()/(46-63) 115/56     Weight: 63 kg (138 lb 14.2 oz) (23 1615)  Body mass index is 24.6 kg/m².    No intake or output data in the 24 hours ending 23 0758    Lines/Drains/Airways       Drain  Duration                  Closed/Suction Drain 22 1105 Left Breast Bulb 15 Fr. 126 days              Peripheral Intravenous Line  Duration                  Peripheral IV - Single Lumen 23 1712 20 G Posterior;Proximal;Right Forearm <1 day                    Physical Exam  Constitutional:       Appearance: Normal appearance. She is ill-appearing.   Cardiovascular:      Rate and Rhythm: Normal rate and regular rhythm.   Pulmonary:      Effort: Pulmonary effort is normal.      Breath sounds: Normal breath sounds.   Abdominal:      General: Abdomen is flat. There is no distension.      Palpations: Abdomen is soft.      Tenderness: There is no abdominal tenderness.  There is no guarding or rebound.          Comments: Midline abdominal scar   Neurological:      Mental Status: She is alert and oriented to person, place, and time. Mental status is at baseline.   Psychiatric:         Attention and Perception: Attention normal.         Mood and Affect: Mood normal.         Speech: Speech normal.         Behavior: Behavior normal. Behavior is cooperative.         Thought Content: Thought content normal.         Cognition and Memory: Cognition normal.         Judgment: Judgment normal.       Significant Labs:  CBC:   Recent Labs   Lab 03/31/23  1720 04/01/23  0631   WBC 11.72 8.10   HGB 9.0* 7.8*   HCT 27.7* 24.0*    276     CMP:   Recent Labs   Lab 04/01/23  0631   *   CALCIUM 8.4*   ALBUMIN 3.1*   PROT 6.0      K 3.6   CO2 21*      BUN 43*   CREATININE 0.7   ALKPHOS 55   ALT 10   AST 10   BILITOT 0.4     Coagulation:   Recent Labs   Lab 03/31/23  1720   INR 1.1   APTT 24.8       Significant Imaging:  Imaging results within the past 24 hours have been reviewed.    Assessment/Plan:     Cardiac/Vascular  Chronic combined systolic and diastolic heart failure  CAD s/p CABG 8/17 ( SVG to LAD, SVG to PDA occluded), PCI 2019 LM, CX, RCA  AVR, bioprosthetic 8/17  Carotid artery stenosis (ANNA 60-69%, LICA 80-89%)  Pulmonary HTN PAP 57, Likely WHO class II  HFrEF, EF 35% since 2019  Plavix and ASA last taken 3/31/23    GI  Melena  2/2 UGIB  Known hx of PUD - DU with duodenal stricture  Took 2 Ibuprofen Tuesday  On Plavix and ASA last taken 3/31/23  Protonix 80mg IV bolus given    Epigastric pain-resolved as of 4/1/2023  resolved      Recommendations  1. Protonix gtt now  2. Keep NPO  3. EGD today  4. Given cardiac history keep Hgb >8  5. Transfuse 1U pRBCs now      The risks, benefits and alternatives of the procedure were discussed with the patient in detail. The risks include, risks of adverse reaction to sedation requiring the use of reversal agents, bleeding  requiring blood transfusion, perforation requiring surgical intervention and technical failure. Other risks include aspiration leading to respiratory distress and respiratory failure resulting in endotracheal intubation and mechanical ventilation including death. If anesthesia is being utilized for this procedure, it is up to the anesthesiologist to determine airway safety including elective endotracheal intubation. Questions were answered, they agree to proceed. There was no language barriers.       Thank you for your consult. I will follow-up with patient. Please contact us if you have any additional questions.    Tracy Flower MD  Gastroenterology  [facility] - Emergency Dept.

## 2023-04-01 NOTE — HPI
67 y.o female with CAD s/p CABG and CHF with EF 35% who is on anticoagulation with plavix and ASA. Her GI history is notable for peptic ulcer disease diagnosed in 05/5021. Patient had presented to ED at that time with coffee ground emesis and melena. EGD discovered a duodenal bulb ulcer and duodenal stricture in the first portion of the duodenum. She was treated conservatively and repeat EGD a few months later showed resolution of the ulcer but the duodenal stricture remained. She was continued on low dose Protonix 20mg daily.     Patient recently contacted our GI offices in late January with abdomianl bloating, gas and constipation. She was seen virtually by Dr. Johnson who discontinued the Protonix and advised to avoid NSAIDs. She was also started on Miralax for her constipation. Unfortunately, patient took 2 Ibuprofen on Tuesday for a headache. Later that evening she had a few episodes of brown emesis. By Thursday she was experiencing melena. Labs on presentation show a H&H 9/27, her baseline is 10.6/33.9. Repeat this morning is 7.8/24. Plavix and ASA were last taken yesterday 3/31/23. She has been NPO since her presentation to the ED presentation yesterday afternoon.    At present, patient denies abdominal pain. She has remained hemodynamically stable

## 2023-04-01 NOTE — SUBJECTIVE & OBJECTIVE
Past Medical History:   Diagnosis Date    Allergy     Anticoagulant long-term use     Plavix    Breast cancer 2008    Carotid stenosis, bilateral 10/13/2017    Coronary artery disease     Coronary artery disease involving coronary bypass graft of native heart without angina pectoris 08/16/2019 2/19  1.  Left main is a small vessel with a 60%-65% ostial lesion. 2.  LAD is a medium-sized vessel diffuse 70% proximally  Ramus intermedius is a medium size vessel with a 40%-50% ostial lesion. 3.  Circumflex 60%-70% ostial  remainder of circumflex and obtuse marginal normal in appearance. Right coronary artery is normal size vessel, short discrete 70%mid 4.  Vein graft to right coronary is occ    Coronary artery disease involving native coronary artery of native heart without angina pectoris 06/27/2017    DCIS (ductal carcinoma in situ) of breast 11/06/2012    Encounter for blood transfusion     Essential hypertension 11/06/2012    GERD (gastroesophageal reflux disease)     GI bleed 02/2021    Hodgkin lymphoma     Hx of Hodgkin's disease - s/p chemo and radiation 11/06/2012    Hyperlipidemia     Hyperlipidemia 11/06/2012    The patient presents with hyperlipidemia.  The patient reports tolerating the medication well and is in excellent compliance.  There have been no medication side effects.  The patient denies chest pain, neuropathy, and myalgias.  The patient has reduced fat intake and has been exercising.  Current treatment has included the medications listed in the med card.   Lab Results Component Value Date  CH    Hypertension     LBBB (left bundle branch block) 05/22/2018    Osteoporosis     Paroxysmal atrial fibrillation - single post-op episode 08/24/2017    Pemphigoid     Pemphigoid     pt receives IVIG infusions    Pulmonary hypertension 1/13/2023    S/P AVR (aortic valve replacement) - pericardial valve 08/21/2017    Perceval aortic tissue valve PVS23. 23mm    serial # [id number]    S/P CABG x 2 08/21/2017     8/17 CABG X 2 with SVG-LAD and SVG-distal RCA  SVG LAD due to absent LIMA after chest radiation and lymph node biopsy     Stented coronary artery     She had 2 stents placed in 2/2019.  She has had CAD and bypasses in the past in 2017.      Thyroid disease        Past Surgical History:   Procedure Laterality Date    ARTERIOGRAPHY OF AORTIC ROOT N/A 02/15/2019    Procedure: ARTERIOGRAM, AORTIC ROOT;  Surgeon: Sujit Chaudhry MD;  [facility];  Service: Cardiology;  Laterality: N/A;    AXILLARY NODE DISSECTION Left 11/25/2022    Procedure: LYMPHADENECTOMY, AXILLARY-Left;  Surgeon: Rosa Maradiaga MD;  Location: [facility] OR;  Service: General;  Laterality: Left;    BREAST BIOPSY      BREAST RECONSTRUCTION      bilateral mastectomy    CARDIAC CATHETERIZATION      stents x 2    CARDIAC SURGERY  08/2017    Aortic valve replacement , CABG 2 vessel    CARDIAC VALVE REPLACEMENT  08/2017    aortic valve, bovine per pt    CORONARY ANGIOGRAPHY N/A 02/15/2019    Procedure: ANGIOGRAM, CORONARY ARTERY;  Surgeon: Sujit Chaudhry MD;  [facility];  Service: Cardiology;  Laterality: N/A;    CORONARY ANGIOGRAPHY N/A 4/1/2022    Procedure: ANGIOGRAM, CORONARY ARTERY;  Surgeon: Sujit Chaudhry MD;  Location: [facility];  Service: Cardiology;  Laterality: N/A;    CORONARY BYPASS GRAFT ANGIOGRAPHY  02/15/2019    Procedure: Bypass graft study;  Surgeon: Sujit Chaudhry MD;  [facility];  Service: Cardiology;;    COSMETIC SURGERY      bereast reconstruction    ESOPHAGOGASTRODUODENOSCOPY N/A 05/14/2021    Procedure: EGD (ESOPHAGOGASTRODUODENOSCOPY);  Surgeon: Tracy Flower MD;  Location: [facility];  Service: Endoscopy;  Laterality: N/A;    ESOPHAGOGASTRODUODENOSCOPY N/A 11/04/2021    Procedure: EGD (ESOPHAGOGASTRODUODENOSCOPY);  Surgeon: Tracy Flower MD;  Location: [facility] ENDO;  Service: Endoscopy;  Laterality: N/A;    EYE SURGERY      eye lids    INJECTION FOR SENTINEL NODE IDENTIFICATION Left 11/25/2022    Procedure:  INJECTION, FOR SENTINEL NODE IDENTIFICATION-Left;  Surgeon: Rosa Maradiaga MD;  Location: [facility];  Service: General;  Laterality: Left;    LEFT HEART CATHETERIZATION Right 02/15/2019    Procedure: Left heart cath;  Surgeon: Sujit Chaudhry MD;  Location: [facility];  Service: Cardiology;  Laterality: Right;    LEFT HEART CATHETERIZATION Left 4/1/2022    Procedure: Left heart cath;  Surgeon: Sujit Chaudhry MD;  Location: [facility];  Service: Cardiology;  Laterality: Left;    LUMBAR LAMINECTOMY WITH DISCECTOMY N/A 02/27/2020    Procedure: LAMINECTOMY, SPINE, LUMBAR, WITH DISCECTOMY;  Surgeon: Vimal Villegas MD;  Location: [facility] OR;  Service: Neurosurgery;  Laterality: N/A;  left L5-S1  Laminectomy L4-5    LYMPHADENECTOMY      MASTECTOMY      MASTECTOMY, PARTIAL Left 11/25/2022    Procedure: MASTECTOMY, PARTIAL-Left ultrasound guided;  Surgeon: Rosa Maradiaga MD;  Location: [facility];  Service: General;  Laterality: Left;    RIGHT HEART CATHETERIZATION Right 4/1/2022    Procedure: INSERTION, CATHETER, RIGHT HEART;  Surgeon: Sujit Chaudhry MD;  Location: [facility];  Service: Cardiology;  Laterality: Right;    SENTINEL LYMPH NODE BIOPSY Left 11/25/2022    Procedure: BIOPSY, LYMPH NODE, SENTINEL-Left;  Surgeon: Rosa Maradiaga MD;  Location: [facility];  Service: General;  Laterality: Left;    SKIN BIOPSY      SPLENECTOMY, TOTAL      TRANSFORAMINAL EPIDURAL INJECTION OF STEROID Left 12/31/2019    Procedure: Left L5/S1 TF SKIP with local;  Surgeon: Canelo Niño MD;  Location: [facility];  Service: Pain Management;  Laterality: Left;    TUMOR REMOVAL      neck- lymphoma       Review of patient's allergies indicates:   Allergen Reactions    Amlodipine Shortness Of Breath and Other (See Comments)     unknown  Other reaction(s): Swelling  unknown  unknown  Other reaction(s): Swelling  unknown  Other reaction(s): Swelling  unknown    Levofloxacin Hives and Swelling    Sulfa (sulfonamide antibiotics) Shortness Of Breath,  Itching and Other (See Comments)     elevated blood pressure    Ace inhibitors     Ciprofloxacin Itching    Iodinated contrast media     Iodine      Other reaction(s): Unknown    Latex      Other reaction(s): Itching    Latex, natural rubber Itching     Family History       Problem Relation (Age of Onset)    Hypertension Mother, Father          Tobacco Use    Smoking status: Former     Packs/day: 1.00     Years: 20.00     Pack years: 20.00     Types: Cigarettes     Quit date: 1981     Years since quittin.4    Smokeless tobacco: Never   Substance and Sexual Activity    Alcohol use: No     Comment: 2 drinks/month; none 72 hrs prior to surgery    Drug use: No    Sexual activity: Yes     Partners: Male     Review of Systems  Objective:     Vital Signs (Most Recent):  Temp: 97.1 °F (36.2 °C) (23 2300)  Pulse: 82 (23 0700)  Resp: 20 (23 0700)  BP: (!) 115/56 (23 0700)  SpO2: 96 % (23 0700)   Vital Signs (24h Range):  Temp:  [97.1 °F (36.2 °C)-98.5 °F (36.9 °C)] 97.1 °F (36.2 °C)  Pulse:  [] 82  Resp:  [11-43] 20  SpO2:  [92 %-98 %] 96 %  BP: ()/(46-63) 115/56     Weight: 63 kg (138 lb 14.2 oz) (23 1615)  Body mass index is 24.6 kg/m².    No intake or output data in the 24 hours ending 23 0758    Lines/Drains/Airways       Drain  Duration                  Closed/Suction Drain 22 1105 Left Breast Bulb 15 Fr. 126 days              Peripheral Intravenous Line  Duration                  Peripheral IV - Single Lumen 23 1712 20 G Posterior;Proximal;Right Forearm <1 day                    Physical Exam  Constitutional:       Appearance: Normal appearance. She is ill-appearing.   Cardiovascular:      Rate and Rhythm: Normal rate and regular rhythm.   Pulmonary:      Effort: Pulmonary effort is normal.      Breath sounds: Normal breath sounds.   Abdominal:      General: Abdomen is flat. There is no distension.      Palpations: Abdomen is soft.       Tenderness: There is no abdominal tenderness. There is no guarding or rebound.          Comments: Midline abdominal scar   Neurological:      Mental Status: She is alert and oriented to person, place, and time. Mental status is at baseline.   Psychiatric:         Attention and Perception: Attention normal.         Mood and Affect: Mood normal.         Speech: Speech normal.         Behavior: Behavior normal. Behavior is cooperative.         Thought Content: Thought content normal.         Cognition and Memory: Cognition normal.         Judgment: Judgment normal.       Significant Labs:  CBC:   Recent Labs   Lab 03/31/23  1720 04/01/23  0631   WBC 11.72 8.10   HGB 9.0* 7.8*   HCT 27.7* 24.0*    276     CMP:   Recent Labs   Lab 04/01/23  0631   *   CALCIUM 8.4*   ALBUMIN 3.1*   PROT 6.0      K 3.6   CO2 21*      BUN 43*   CREATININE 0.7   ALKPHOS 55   ALT 10   AST 10   BILITOT 0.4     Coagulation:   Recent Labs   Lab 03/31/23  1720   INR 1.1   APTT 24.8       Significant Imaging:  Imaging results within the past 24 hours have been reviewed.

## 2023-04-01 NOTE — SUBJECTIVE & OBJECTIVE
Past Medical History:   Diagnosis Date    Allergy     Anticoagulant long-term use     Plavix    Breast cancer 2008    Carotid stenosis, bilateral 10/13/2017    Coronary artery disease     Coronary artery disease involving coronary bypass graft of native heart without angina pectoris 08/16/2019 2/19  1.  Left main is a small vessel with a 60%-65% ostial lesion. 2.  LAD is a medium-sized vessel diffuse 70% proximally  Ramus intermedius is a medium size vessel with a 40%-50% ostial lesion. 3.  Circumflex 60%-70% ostial  remainder of circumflex and obtuse marginal normal in appearance. Right coronary artery is normal size vessel, short discrete 70%mid 4.  Vein graft to right coronary is occ    Coronary artery disease involving native coronary artery of native heart without angina pectoris 06/27/2017    DCIS (ductal carcinoma in situ) of breast 11/06/2012    Encounter for blood transfusion     Essential hypertension 11/06/2012    GERD (gastroesophageal reflux disease)     GI bleed 02/2021    Hodgkin lymphoma     Hx of Hodgkin's disease - s/p chemo and radiation 11/06/2012    Hyperlipidemia     Hyperlipidemia 11/06/2012    The patient presents with hyperlipidemia.  The patient reports tolerating the medication well and is in excellent compliance.  There have been no medication side effects.  The patient denies chest pain, neuropathy, and myalgias.  The patient has reduced fat intake and has been exercising.  Current treatment has included the medications listed in the med card.   Lab Results Component Value Date  CH    Hypertension     LBBB (left bundle branch block) 05/22/2018    Osteoporosis     Paroxysmal atrial fibrillation - single post-op episode 08/24/2017    Pemphigoid     Pemphigoid     pt receives IVIG infusions    Pulmonary hypertension 1/13/2023    S/P AVR (aortic valve replacement) - pericardial valve 08/21/2017    Perceval aortic tissue valve PVS23. 23mm    serial # [id number]    S/P CABG x 2 08/21/2017     8/17 CABG X 2 with SVG-LAD and SVG-distal RCA  SVG LAD due to absent LIMA after chest radiation and lymph node biopsy     Stented coronary artery     She had 2 stents placed in 2/2019.  She has had CAD and bypasses in the past in 2017.      Thyroid disease        Past Surgical History:   Procedure Laterality Date    ARTERIOGRAPHY OF AORTIC ROOT N/A 02/15/2019    Procedure: ARTERIOGRAM, AORTIC ROOT;  Surgeon: Sujit Chaudhry MD;  [facility];  Service: Cardiology;  Laterality: N/A;    AXILLARY NODE DISSECTION Left 11/25/2022    Procedure: LYMPHADENECTOMY, AXILLARY-Left;  Surgeon: Rosa Maradiaga MD;  Location: [facility] OR;  Service: General;  Laterality: Left;    BREAST BIOPSY      BREAST RECONSTRUCTION      bilateral mastectomy    CARDIAC CATHETERIZATION      stents x 2    CARDIAC SURGERY  08/2017    Aortic valve replacement , CABG 2 vessel    CARDIAC VALVE REPLACEMENT  08/2017    aortic valve, bovine per pt    CORONARY ANGIOGRAPHY N/A 02/15/2019    Procedure: ANGIOGRAM, CORONARY ARTERY;  Surgeon: Sujit Chaudhry MD;  [facility];  Service: Cardiology;  Laterality: N/A;    CORONARY ANGIOGRAPHY N/A 4/1/2022    Procedure: ANGIOGRAM, CORONARY ARTERY;  Surgeon: Sujit Chaudhry MD;  Location: [facility];  Service: Cardiology;  Laterality: N/A;    CORONARY BYPASS GRAFT ANGIOGRAPHY  02/15/2019    Procedure: Bypass graft study;  Surgeon: Sujit Chaudhry MD;  [facility];  Service: Cardiology;;    COSMETIC SURGERY      bereast reconstruction    ESOPHAGOGASTRODUODENOSCOPY N/A 05/14/2021    Procedure: EGD (ESOPHAGOGASTRODUODENOSCOPY);  Surgeon: Tracy Flower MD;  Location: [facility];  Service: Endoscopy;  Laterality: N/A;    ESOPHAGOGASTRODUODENOSCOPY N/A 11/04/2021    Procedure: EGD (ESOPHAGOGASTRODUODENOSCOPY);  Surgeon: Tracy Flower MD;  Location: [facility] ENDO;  Service: Endoscopy;  Laterality: N/A;    EYE SURGERY      eye lids    INJECTION FOR SENTINEL NODE IDENTIFICATION Left 11/25/2022    Procedure:  INJECTION, FOR SENTINEL NODE IDENTIFICATION-Left;  Surgeon: Rosa Maradiaga MD;  Location: [facility];  Service: General;  Laterality: Left;    LEFT HEART CATHETERIZATION Right 02/15/2019    Procedure: Left heart cath;  Surgeon: Sujit Chaudhry MD;  Location: [facility];  Service: Cardiology;  Laterality: Right;    LEFT HEART CATHETERIZATION Left 4/1/2022    Procedure: Left heart cath;  Surgeon: Sujit Chaudhry MD;  Location: [facility];  Service: Cardiology;  Laterality: Left;    LUMBAR LAMINECTOMY WITH DISCECTOMY N/A 02/27/2020    Procedure: LAMINECTOMY, SPINE, LUMBAR, WITH DISCECTOMY;  Surgeon: Vimal Villegas MD;  [facility];  Service: Neurosurgery;  Laterality: N/A;  left L5-S1  Laminectomy L4-5    LYMPHADENECTOMY      MASTECTOMY      MASTECTOMY, PARTIAL Left 11/25/2022    Procedure: MASTECTOMY, PARTIAL-Left ultrasound guided;  Surgeon: Rosa Maradiaga MD;  Location: [facility];  Service: General;  Laterality: Left;    RIGHT HEART CATHETERIZATION Right 4/1/2022    Procedure: INSERTION, CATHETER, RIGHT HEART;  Surgeon: Sujit Chaudhry MD;  Location: [facility];  Service: Cardiology;  Laterality: Right;    SENTINEL LYMPH NODE BIOPSY Left 11/25/2022    Procedure: BIOPSY, LYMPH NODE, SENTINEL-Left;  Surgeon: Rosa Maradiaga MD;  Location: [facility];  Service: General;  Laterality: Left;    SKIN BIOPSY      SPLENECTOMY, TOTAL      TRANSFORAMINAL EPIDURAL INJECTION OF STEROID Left 12/31/2019    Procedure: Left L5/S1 TF SKIP with local;  Surgeon: Canelo Niño MD;  Location: [facility];  Service: Pain Management;  Laterality: Left;    TUMOR REMOVAL      neck- lymphoma       Review of patient's allergies indicates:   Allergen Reactions    Amlodipine Shortness Of Breath and Other (See Comments)     unknown  Other reaction(s): Swelling  unknown  unknown  Other reaction(s): Swelling  unknown  Other reaction(s): Swelling  unknown    Levofloxacin Hives and Swelling    Sulfa (sulfonamide antibiotics) Shortness Of Breath,  Itching and Other (See Comments)     elevated blood pressure    Ace inhibitors     Ciprofloxacin Itching    Iodinated contrast media     Iodine      Other reaction(s): Unknown    Latex      Other reaction(s): Itching    Latex, natural rubber Itching       Current Facility-Administered Medications on File Prior to Encounter   Medication    0.9%  NaCl infusion     Current Outpatient Medications on File Prior to Encounter   Medication Sig    ALPRAZolam (XANAX) 1 MG tablet Take 1 mg by mouth nightly as needed.    aspirin (ECOTRIN) 81 MG EC tablet Take 1 tablet (81 mg total) by mouth once daily.    calcium carbonate (OS-CALVIN) 600 mg (1,500 mg) Tab Take 600 mg by mouth 2 (two) times daily with meals.    cholecalciferol, vitamin D3, (VITAMIN D3) 100 mcg (4,000 unit) Cap Take 4,000 Units by mouth once daily.    clopidogreL (PLAVIX) 75 mg tablet Take 1 tablet (75 mg total) by mouth once daily.    dapsone 25 MG Tab TAKE 2 TABLETS BY MOUTH EVERY DAY FOR 30 DAYS    dexAMETHasone (DECADRON) 0.5 mg/5 mL Elix SWISH 3.75MLS IN MOUTH FOR 30 SECONDS AND SWALLOW TWICE DAILY AS NEEDED FOR LICHEN PLANUS FLARE UPS    EScitalopram oxalate (LEXAPRO) 10 MG tablet Take 1 tablet (10 mg total) by mouth once daily.    fluconazole (DIFLUCAN) 200 MG Tab TAKE 1 TABLET ORALLY EVERY OTHER DAY X 4 DOSES, THEN 1 WEEKLY FOR 30 DAYS    hydroCHLOROthiazide (HYDRODIURIL) 25 MG tablet Take 1 tablet (25 mg total) by mouth every morning.    levothyroxine (SYNTHROID) 75 MCG tablet Take 1 tablet (75 mcg total) by mouth before breakfast.    losartan (COZAAR) 50 MG tablet Take 50 mg by mouth every evening.    metoprolol succinate (TOPROL-XL) 25 MG 24 hr tablet TAKE 1 TABLET BY MOUTH TWICE A DAY    pantoprazole (PROTONIX) 20 MG tablet TAKE 1 TABLET BY MOUTH EVERY DAY    rosuvastatin (CRESTOR) 20 MG tablet Take 1 tablet (20 mg total) by mouth every evening.    valACYclovir (VALTREX) 1000 MG tablet TAKE 1 TABLET EVERY 24 HOURS; prn     Family History       Problem  Relation (Age of Onset)    Hypertension Mother, Father          Tobacco Use    Smoking status: Former     Packs/day: 1.00     Years: 20.00     Pack years: 20.00     Types: Cigarettes     Quit date: 1981     Years since quittin.4    Smokeless tobacco: Never   Substance and Sexual Activity    Alcohol use: No     Comment: 2 drinks/month; none 72 hrs prior to surgery    Drug use: No    Sexual activity: Yes     Partners: Male     Review of Systems   All other systems reviewed and are negative.  Objective:     Vital Signs (Most Recent):  Temp: 97.1 °F (36.2 °C) (23 2300)  Pulse: 76 (23)  Resp: 11 (23)  BP: (!) 106/53 (23)  SpO2: (!) 94 % (23)   Vital Signs (24h Range):  Temp:  [97.1 °F (36.2 °C)-98.5 °F (36.9 °C)] 97.1 °F (36.2 °C)  Pulse:  [] 76  Resp:  [11-43] 11  SpO2:  [92 %-98 %] 94 %  BP: ()/(48-63) 106/53     Weight: 63 kg (138 lb 14.2 oz)  Body mass index is 24.6 kg/m².    Physical Exam  Vitals reviewed.   Constitutional:       General: She is not in acute distress.     Appearance: Normal appearance. She is normal weight. She is not ill-appearing, toxic-appearing or diaphoretic.   HENT:      Head: Normocephalic and atraumatic.      Right Ear: External ear normal.      Left Ear: External ear normal.      Nose: Nose normal. No congestion or rhinorrhea.      Mouth/Throat:      Mouth: Mucous membranes are moist.      Pharynx: Oropharynx is clear. No oropharyngeal exudate or posterior oropharyngeal erythema.   Eyes:      General: No scleral icterus.     Extraocular Movements: Extraocular movements intact.      Conjunctiva/sclera: Conjunctivae normal.      Pupils: Pupils are equal, round, and reactive to light.   Neck:      Vascular: No carotid bruit.   Cardiovascular:      Rate and Rhythm: Normal rate and regular rhythm.      Pulses: Normal pulses.      Heart sounds: Normal heart sounds. No murmur heard.    No friction rub. No gallop.   Pulmonary:       Effort: Pulmonary effort is normal. No respiratory distress.      Breath sounds: Normal breath sounds. No stridor. No wheezing, rhonchi or rales.   Chest:      Chest wall: No tenderness.   Abdominal:      General: Abdomen is flat. Bowel sounds are normal. There is no distension.      Palpations: Abdomen is soft.      Tenderness: There is abdominal tenderness. There is no right CVA tenderness, left CVA tenderness, guarding or rebound.      Hernia: No hernia is present.      Comments: Mild TTP throughout entire abdomen greatest in epigastric region without evidence of guarding or rebound tenderness noted.   Musculoskeletal:         General: No swelling, tenderness, deformity or signs of injury. Normal range of motion.      Cervical back: Normal range of motion and neck supple. No rigidity or tenderness.      Right lower leg: No edema.      Left lower leg: No edema.   Lymphadenopathy:      Cervical: No cervical adenopathy.   Skin:     General: Skin is warm and dry.      Capillary Refill: Capillary refill takes less than 2 seconds.      Coloration: Skin is not jaundiced or pale.      Findings: No bruising, erythema, lesion or rash.   Neurological:      General: No focal deficit present.      Mental Status: She is alert and oriented to person, place, and time. Mental status is at baseline.      Cranial Nerves: No cranial nerve deficit.      Sensory: No sensory deficit.      Motor: No weakness.      Coordination: Coordination normal.   Psychiatric:         Mood and Affect: Mood normal.         Behavior: Behavior normal.         Thought Content: Thought content normal.         Judgment: Judgment normal.         CRANIAL NERVES     CN III, IV, VI   Pupils are equal, round, and reactive to light.     Significant Labs: All pertinent labs within the past 24 hours have been reviewed.    Significant Imaging: I have reviewed all pertinent imaging results/findings within the past 24 hours.    LABS:  Recent Results (from the  past 24 hour(s))   APTT    Collection Time: 03/31/23  5:20 PM   Result Value Ref Range    aPTT 24.8 21.0 - 32.0 sec   CBC auto differential    Collection Time: 03/31/23  5:20 PM   Result Value Ref Range    WBC 11.72 3.90 - 12.70 K/uL    RBC 3.20 (L) 4.00 - 5.40 M/uL    Hemoglobin 9.0 (L) 12.0 - 16.0 g/dL    Hematocrit 27.7 (L) 37.0 - 48.5 %    MCV 87 82 - 98 fL    MCH 28.1 27.0 - 31.0 pg    MCHC 32.5 32.0 - 36.0 g/dL    RDW 15.4 (H) 11.5 - 14.5 %    Platelets 301 150 - 450 K/uL    MPV 9.7 9.2 - 12.9 fL    Immature Granulocytes 0.3 0.0 - 0.5 %    Gran # (ANC) 7.7 1.8 - 7.7 K/uL    Immature Grans (Abs) 0.04 0.00 - 0.04 K/uL    Lymph # 2.4 1.0 - 4.8 K/uL    Mono # 1.3 (H) 0.3 - 1.0 K/uL    Eos # 0.2 0.0 - 0.5 K/uL    Baso # 0.08 0.00 - 0.20 K/uL    nRBC 0 0 /100 WBC    Gran % 65.4 38.0 - 73.0 %    Lymph % 20.6 18.0 - 48.0 %    Mono % 11.2 4.0 - 15.0 %    Eosinophil % 1.8 0.0 - 8.0 %    Basophil % 0.7 0.0 - 1.9 %    Differential Method Automated    Comprehensive metabolic panel    Collection Time: 03/31/23  5:20 PM   Result Value Ref Range    Sodium 139 136 - 145 mmol/L    Potassium 3.3 (L) 3.5 - 5.1 mmol/L    Chloride 102 95 - 110 mmol/L    CO2 23 23 - 29 mmol/L    Glucose 96 70 - 110 mg/dL    BUN 47 (H) 8 - 23 mg/dL    Creatinine 0.7 0.5 - 1.4 mg/dL    Calcium 9.1 8.7 - 10.5 mg/dL    Total Protein 6.8 6.0 - 8.4 g/dL    Albumin 3.4 (L) 3.5 - 5.2 g/dL    Total Bilirubin 0.4 0.1 - 1.0 mg/dL    Alkaline Phosphatase 75 55 - 135 U/L    AST 13 10 - 40 U/L    ALT 12 10 - 44 U/L    Anion Gap 14 8 - 16 mmol/L    eGFR >60 >60 mL/min/1.73 m^2   Protime-INR    Collection Time: 03/31/23  5:20 PM   Result Value Ref Range    Prothrombin Time 11.3 9.0 - 12.5 sec    INR 1.1 0.8 - 1.2   Type & Screen    Collection Time: 03/31/23  5:20 PM   Result Value Ref Range    Group & Rh O POS     Indirect Gisel NEG     Specimen Outdate 04/03/2023 23:59    Occult blood x 1, stool    Collection Time: 03/31/23  7:39 PM    Specimen: Stool   Result  Value Ref Range    Occult Blood Positive (A) Negative       RADIOLOGY  VAS US Carotid Bilateral    Result Date: 3/10/2023  Indication ======== Bilateral carotid artery stenosis Results ====== Right CCA prox PSV 66 cm/s Right CCA prox     ED          20 cm/s Right CCA distal PSV   70 cm/s Right CCA distal ED    22 cm/s Right bulb  cm/s Right bulb ED  33 cm/s Right ICA prox  cm/s Right ICA prox EDV 71 cm/s Right ICA mid PSV  170 cm/s Right ICA mid EDV  53 cm/s Right ICA distal PSV   112 cm/s Right ICA distal EDV   35 cm/s Right ICA PSV / right CCA PSV  2.9 Right ICA EDV / right CCA EDV  3.2 Right ECA mid PSV  183 cm/s Right VERT PSV 96 cm/s Right VERT findings:   Antegrade flow Left CCA prox PSV  92 cm/s Left CCA prox ED   32 cm/s Left CCA distal PSV    64 cm/s Left CCA distal ED 24 cm/s Left bulb PSV  74 cm/s Left bulb ED   27 cm/s Left ICA prox PSV  275 cm/s Left ICA prox EDV  80 cm/s Left ICA mid PSV   284 cm/s Left ICA mid EDV   72 cm/s Left ICA distal PSV    110 cm/s Left ICA distal EDV    43 cm/s Left ICA PSV / Left CCA PSV    4.5 Left ICA EDV / Left CCA EDV    3.0 Left ECA prox PSV  366 cm/s Left VERT PSV  70 cm/s Left VERT findings:    Antegrade flow Comment ======== Heavy acoustic shadowing secondary to calcific plaque denies complete visualization of the ICA. Impression ========= RIGHT SIDE: 60-79% Right ICA stenosis. Heterogeneous plaque in the right internal carotid artery. Heterogeneous plaque noted in the right common carotid artery. Antegrade flow in the right vertebral artery. LEFT SIDE: 80-99% Left ICA stenosis. Calcific plaque noted in the left internal carotid artery. Antegrade flow noted in the left vertebral artery. There are elevated velocities in the proximal ECA measuring 366 cm/s DATE OF SERVICE: 03/10/2023                                                   Sonographer: VERONICA SUH RVS Electronically Signed by: Alex Ulloa M.D. at 03/10/2023-20:11    CTA Acute GI [place],  Abdomen and Pelvis    Result Date: 3/31/2023  EXAMINATION: CT angiography acute GI bleed abdomen pelvis CLINICAL HISTORY: GI bleeding TECHNIQUE: CT angiography of the abdomen pelvis with and without contrast.  3 minute delays were provided.  MIP imaging was utilized.  A total of 100 cc of Omnipaque 350 was injected. COMPARISON: None FINDINGS: No hemorrhage identified.  No hydronephrosis.  Upper lobe scarring of left kidney.  Calcified uterine fibroid.  No high bowel obstruction.  No evidence for active hemorrhage.  Colonic diverticulosis.  Mild mucosal thickening of the sigmoid colon.  Uterine fibroid suspected.  Bladder is mildly distended.  Postsurgical changes anterior abdomen.  Mild mucosal thickening of the stomach.  Pancreas adrenal glands are grossly unremarkable.  No gallstones.  Mild moderate atherosclerotic disease of the aorta iliac vasculature.     Multiple findings as described above.  No evidence for active hemorrhage Questionable scarring in the left kidney upper lobe with poor enhancement of the renal parenchyma.  This may relate to scarring.  Recommend follow-up. Electronically signed by: Skip Almazan Date:    03/31/2023 Time:    19:18      EKG    MICROBIOLOGY    MDM

## 2023-04-01 NOTE — ASSESSMENT & PLAN NOTE
Patient is identified as having Combined Systolic and Diastolic heart failure that is Chronic. CHF is currently controlled. Latest ECHO performed and demonstrates- Results for orders placed during the hospital encounter of 11/07/22    Echo Saline Bubble? No    Interpretation Summary  · The left ventricle is mildly enlarged with concentric hypertrophy and moderately decreased systolic function.  · Left ventricular diastolic dysfunction.  · The estimated PA systolic pressure is 57 mmHg.  · Normal right ventricular size with low normal right ventricular systolic function.  · There is pulmonary hypertension.  · Normal central venous pressure (3 mmHg).  · The estimated ejection fraction is 35%.  · There is moderate left ventricular global hypokinesis.  · Mild tricuspid regurgitation.  · There is a bioprosthetic aortic valve present.  · The aortic valve mean gradient is 13 mmHg with a dimensionless index of 0.28.  . Continue Beta Blocker ACE/ARB Furosemide Aldactone Nitrate/Vasodilator ARNI and monitor clinical status closely. Monitor on telemetry. Patient is off CHF pathway.  Monitor strict Is&Os and daily weights.  Place on fluid restriction of 1.5 L. Continue to stress to patient importance of self efficacy and  on diet for CHF. Last BNP reviewed- and noted below No results for input(s): BNP, BNPTRIAGEBLO in the last 168 hours..

## 2023-04-01 NOTE — ASSESSMENT & PLAN NOTE
--Last ECHO from NOV 2022 confirm EF 35 %  --euvolemic on exam, no signs of fluid overload  --monitor electrolytes, UOP  --strict I&O and daily weights ordered

## 2023-04-01 NOTE — ANESTHESIA PREPROCEDURE EVALUATION
04/01/2023  Dulce Root is a 67 y.o., female.  Past Medical History:   Diagnosis Date    Allergy     Anticoagulant long-term use     Plavix    Breast cancer 2008    Carotid stenosis, bilateral 10/13/2017    Coronary artery disease     Coronary artery disease involving coronary bypass graft of native heart without angina pectoris 08/16/2019 2/19  1.  Left main is a small vessel with a 60%-65% ostial lesion. 2.  LAD is a medium-sized vessel diffuse 70% proximally  Ramus intermedius is a medium size vessel with a 40%-50% ostial lesion. 3.  Circumflex 60%-70% ostial  remainder of circumflex and obtuse marginal normal in appearance. Right coronary artery is normal size vessel, short discrete 70%mid 4.  Vein graft to right coronary is occ    Coronary artery disease involving native coronary artery of native heart without angina pectoris 06/27/2017    DCIS (ductal carcinoma in situ) of breast 11/06/2012    Encounter for blood transfusion     Epigastric pain 4/1/2023    Essential hypertension 11/06/2012    GERD (gastroesophageal reflux disease)     GI bleed 02/2021    Hematemesis with nausea 5/11/2021    Hodgkin lymphoma     Hx of Hodgkin's disease - s/p chemo and radiation 11/06/2012    Hyperlipidemia     Hyperlipidemia 11/06/2012    The patient presents with hyperlipidemia.  The patient reports tolerating the medication well and is in excellent compliance.  There have been no medication side effects.  The patient denies chest pain, neuropathy, and myalgias.  The patient has reduced fat intake and has been exercising.  Current treatment has included the medications listed in the med card.   Lab Results Component Value Date  CH    Hypertension     LBBB (left bundle branch block) 05/22/2018    Osteoporosis     Paroxysmal atrial fibrillation - single post-op episode 08/24/2017    Pemphigoid      Pemphigoid     pt receives IVIG infusions    Pulmonary hypertension 1/13/2023    S/P AVR (aortic valve replacement) - pericardial valve 08/21/2017    Perceval aortic tissue valve PVS23. 23mm    serial # [id number]    S/P CABG x 2 08/21/2017 8/17 CABG X 2 with SVG-LAD and SVG-distal RCA  SVG LAD due to absent LIMA after chest radiation and lymph node biopsy     Stented coronary artery     She had 2 stents placed in 2/2019.  She has had CAD and bypasses in the past in 2017.      Thyroid disease      Past Surgical History:   Procedure Laterality Date    ARTERIOGRAPHY OF AORTIC ROOT N/A 02/15/2019    Procedure: ARTERIOGRAM, AORTIC ROOT;  Surgeon: Sujit Chaudhry MD;  [facility];  Service: Cardiology;  Laterality: N/A;    AXILLARY NODE DISSECTION Left 11/25/2022    Procedure: LYMPHADENECTOMY, AXILLARY-Left;  Surgeon: Rosa Maradiaga MD;  Location: [facility];  Service: General;  Laterality: Left;    BREAST BIOPSY      BREAST RECONSTRUCTION      bilateral mastectomy    CARDIAC CATHETERIZATION      stents x 2    CARDIAC SURGERY  08/2017    Aortic valve replacement , CABG 2 vessel    CARDIAC VALVE REPLACEMENT  08/2017    aortic valve, bovine per pt    CORONARY ANGIOGRAPHY N/A 02/15/2019    Procedure: ANGIOGRAM, CORONARY ARTERY;  Surgeon: Sujit Chaudhry MD;  [facility];  Service: Cardiology;  Laterality: N/A;    CORONARY ANGIOGRAPHY N/A 4/1/2022    Procedure: ANGIOGRAM, CORONARY ARTERY;  Surgeon: Sujit Chaudhry MD;  [facility];  Service: Cardiology;  Laterality: N/A;    CORONARY BYPASS GRAFT ANGIOGRAPHY  02/15/2019    Procedure: Bypass graft study;  Surgeon: Sujit Chaudhry MD;  Location: [facility];  Service: Cardiology;;    COSMETIC SURGERY      bereast reconstruction    ESOPHAGOGASTRODUODENOSCOPY N/A 05/14/2021    Procedure: EGD (ESOPHAGOGASTRODUODENOSCOPY);  Surgeon: Tracy Flower MD;  Location: [facility];  Service: Endoscopy;  Laterality: N/A;    ESOPHAGOGASTRODUODENOSCOPY  N/A 11/04/2021    Procedure: EGD (ESOPHAGOGASTRODUODENOSCOPY);  Surgeon: Tracy Flower MD;  Location: [facility];  Service: Endoscopy;  Laterality: N/A;    EYE SURGERY      eye lids    INJECTION FOR SENTINEL NODE IDENTIFICATION Left 11/25/2022    Procedure: INJECTION, FOR SENTINEL NODE IDENTIFICATION-Left;  Surgeon: Rosa Maradiaga MD;  Location: [facility] OR;  Service: General;  Laterality: Left;    LEFT HEART CATHETERIZATION Right 02/15/2019    Procedure: Left heart cath;  Surgeon: Sujit Chaudhry MD;  Location[facility];  Service: Cardiology;  Laterality: Right;    LEFT HEART CATHETERIZATION Left 4/1/2022    Procedure: Left heart cath;  Surgeon: Sujit Chaudhry MD;  [facility];  Service: Cardiology;  Laterality: Left;    LUMBAR LAMINECTOMY WITH DISCECTOMY N/A 02/27/2020    Procedure: LAMINECTOMY, SPINE, LUMBAR, WITH DISCECTOMY;  Surgeon: Vimal Villegas MD;  Location: [facility] OR;  Service: Neurosurgery;  Laterality: N/A;  left L5-S1  Laminectomy L4-5    LYMPHADENECTOMY      MASTECTOMY      MASTECTOMY, PARTIAL Left 11/25/2022    Procedure: MASTECTOMY, PARTIAL-Left ultrasound guided;  Surgeon: Rosa Maradiaga MD;  Location: [facility] OR;  Service: General;  Laterality: Left;    RIGHT HEART CATHETERIZATION Right 4/1/2022    Procedure: INSERTION, CATHETER, RIGHT HEART;  Surgeon: Sujit Chaudhry MD;  Location: [facility];  Service: Cardiology;  Laterality: Right;    SENTINEL LYMPH NODE BIOPSY Left 11/25/2022    Procedure: BIOPSY, LYMPH NODE, SENTINEL-Left;  Surgeon: Rosa Maradiaga MD;  Location: [facility] OR;  Service: General;  Laterality: Left;    SKIN BIOPSY      SPLENECTOMY, TOTAL      TRANSFORAMINAL EPIDURAL INJECTION OF STEROID Left 12/31/2019    Procedure: Left L5/S1 TF SKIP with local;  Surgeon: Canelo Niño MD;  Location: [facility];  Service: Pain Management;  Laterality: Left;    TUMOR REMOVAL      neck- lymphoma       Pre-op Assessment    I have reviewed the Patient Summary Reports.     I have  reviewed the Nursing Notes. I have reviewed the NPO Status.   I have reviewed the Medications.     Review of Systems  Anesthesia Hx:  No problems with previous Anesthesia Easy mask vent, Grade 1 view on DL, LMA without problems. History of prior surgery of interest to airway management or planning: Previous anesthesia: General Airway issues documented on chart review include mask, easy, laryngeal mask airway used, GETA, easy direct laryngoscopy  Denies Family Hx of Anesthesia complications.   Denies Personal Hx of Anesthesia complications.   Social:  Non-Smoker    Hematology/Oncology:  Hematology Normal      Oncology Comments: S/P Hodgkin's lymphoma, s/p treatment.    Cardiovascular:   Hypertension Valvular problems/Murmurs CAD  CABG/stent  CHF PVD CAD with CHF, EF 35%, s/p CABG and stents.  H/O AVR.  H/O afib.  Pulm HTN, PA 57mmHg.  Carotid stenosis.   Pulmonary:  Pulmonary Normal    Renal/:  Renal/ Normal     Hepatic/GI:   PUD, GERD Acute GIB.   Musculoskeletal:   Arthritis   Spine Disorders: lumbar    Neurological:  Neurology Normal    Endocrine:   Hypothyroidism    Psych:  Psychiatric Normal           Physical Exam  General: Alert and Oriented    Airway:  Mallampati: II   Mouth Opening: Normal  TM Distance: Normal  Tongue: Normal  Neck ROM: Normal ROM    Dental:  Intact    Chest/Lungs:  Clear to auscultation, Normal Respiratory Rate    Heart:  Rate: Normal  Rhythm: Regular Rhythm        Anesthesia Plan  Type of Anesthesia, risks & benefits discussed:    Anesthesia Type: Gen Natural Airway  Intra-op Monitoring Plan: Standard ASA Monitors  Post Op Pain Control Plan: multimodal analgesia and IV/PO Opioids PRN  Induction:  IV  Informed Consent: Informed consent signed with the Patient and all parties understand the risks and agree with anesthesia plan.  All questions answered.   ASA Score: 4 Emergent  Day of Surgery Review of History & Physical: H&P Update referred to the surgeon/provider.    Ready For Surgery  From Anesthesia Perspective.     .

## 2023-04-01 NOTE — INTERVAL H&P NOTE
The patient has been examined and the H&P has been reviewed:    I concur with the findings and changes have been noted since the H&P was written:  present. Discussed need for blood transfusion. They are agreeable. Discussed risks, benefits and alternatives of EGD. She is high risk for endoscopic evaluation. Discussed with anesthesia who agrees. Patient and  expressed understanding of the information told to them today.    Anesthesia/Surgery risks, benefits and alternative options discussed and understood by patient/family.    The risks, benefits and alternatives of the procedure were discussed with the patient in detail. This discussion was had in the presence of her . The risks include, risks of adverse reaction to sedation requiring the use of reversal agents, bleeding requiring blood transfusion, perforation requiring surgical intervention and technical failure. Other risks include aspiration leading to respiratory distress and respiratory failure resulting in endotracheal intubation and mechanical ventilation including death. If anesthesia is being utilized for this procedure, it is up to the anesthesiologist to determine airway safety including elective endotracheal intubation. Questions were answered, they agree to proceed. There was no language barriers.        Active Hospital Problems    Diagnosis  POA    *UGIB (upper gastrointestinal bleed) [K92.2]  Yes    Melena [K92.1]  Yes     Priority: 1 - High    Duodenal stricture [K31.5]  Yes     Priority: 2      EGD 11/4/21: 2/2 peptic ulcer disease  Suspect recent bloating and gas symptoms since 01/2023  may be related to stricture vs constipation        Chronic combined systolic and diastolic heart failure [I50.42]  Yes     Priority: 3     GERD (gastroesophageal reflux disease) [K21.9]  Unknown    Malignant neoplasm of central portion of left breast in female, estrogen receptor negative [C50.112, Z17.1]  Not Applicable    Anxiety [F41.9]  Yes      The patient has anxiety which been treated over time with lexapro.  Treatment has been given since many years ago.  This has controled the symptoms.  Things that makes it worse include stress and celexa makes it better.              Coronary artery disease involving coronary bypass graft of native heart without angina pectoris [I25.810]  Yes     2/19   1.  Left main is a small vessel with a 60%-65% ostial lesion.  2.  LAD is a medium-sized vessel diffuse 70% proximally  Ramus intermedius is a medium size vessel with a 40%-50% ostial lesion.  3.  Circumflex 60%-70% ostial  remainder of circumflex and obtuse marginal normal in appearance. Right coronary artery is normal size vessel, short discrete 70%mid  4.  Vein graft to right coronary is occluded.  5.  Vein graft to LAD is patent.  Large vein graft to the ostial lesion of approximately 60%.   Fractional flow reserve of the vein graft to LAD was 0.83.   Fractional flow reserve of the circumflex was 0.77 at 1 minute.   Fractional flow reserve of the ramus was negative.         Essential hypertension [I10]  Yes     The patient presents with essential hypertension.  The patient is tolerating the medication well and is in excellent compliance.  The patient is experiencing no side effects.  Counseling was offered regarding low salt diets.  The patient has a reduced salt intake.  The patient denies chest pain, palpitations, shortness of breath, dyspnea on exertion, left or murmur neck pain, nausea, vomiting, diaphoresis, paroxysmal nocturnal dyspnea, and orthopnea.  She has had some lower bp's.  She has been asked on the digital medicine program to hold the telmisartan if she has a lower blood pressure.  Hypertension Medications               metoprolol succinate (TOPROL-XL) 25 MG 24 hr tablet Take 1 tablet (25 mg total) by mouth once daily.    telmisartan (MICARDIS) 20 MG Tab Take 1 tablet (20 mg total) by mouth once daily.         Hyperlipidemia [E78.5]  Yes     The  patient presents with hyperlipidemia.  The patient reports tolerating the medication well and is in excellent compliance.  There have been no medication side effects.  The patient denies chest pain, neuropathy, and myalgias.  The patient has reduced fat intake and has been exercising.  Current treatment has included the medications listed in the med card.    Lab Results   Component Value Date    CHOL 130 02/22/2022    CHOL 166 08/21/2020    CHOL 120 11/14/2019       Lab Results   Component Value Date    HDL 30 (L) 02/22/2022    HDL 53 08/21/2020    HDL 37 (L) 11/14/2019       Lab Results   Component Value Date    LDLCALC 76.2 02/22/2022    LDLCALC 89.8 08/21/2020    LDLCALC 68.2 11/14/2019       Lab Results   Component Value Date    TRIG 119 02/22/2022    TRIG 116 08/21/2020    TRIG 74 11/14/2019       Lab Results   Component Value Date    CHOLHDL 23.1 02/22/2022    CHOLHDL 31.9 08/21/2020    CHOLHDL 30.8 11/14/2019     Lab Results   Component Value Date    ALT 34 04/01/2022    AST 39 (H) 04/01/2022    ALKPHOS 94 04/01/2022    BILITOT 0.6 04/01/2022         Hypothyroid [E03.9]  Yes     The patient presents with hypothyroidism.  The patient denies agitation, anxiety, blurred vision, chest pain, cold intolerance, constipation, dizziness, dry skin, fatigue, lightheadedness, paresthesias, skin coarsening, tachycardia, tremor, weight gain or weight loss.  The patient's current treatment has included Synthroid with a good response.    Lab Results   Component Value Date    TSH 3.320 08/10/2020           Resolved Hospital Problems    Diagnosis Date Resolved POA    Epigastric pain [R10.13] 04/01/2023 Unknown     Priority: 2     Hematemesis with nausea [K92.0] 04/01/2023 Unknown

## 2023-04-01 NOTE — SUBJECTIVE & OBJECTIVE
Interval History: No acute events since admission. Denies CP, SOB, Abdominal pain, fevers/chills. Discussed case with Dr. Flower who is at bedside this AM obtaining consent for 1uPRBC transfusion.      Review of Systems  Objective:     Vital Signs (Most Recent):  Temp: 98.4 °F (36.9 °C) (04/01/23 0759)  Pulse: 89 (04/01/23 0759)  Resp: 14 (04/01/23 0759)  BP: (!) 99/50 (04/01/23 0759)  SpO2: 100 % (04/01/23 0759)   Vital Signs (24h Range):  Temp:  [97.1 °F (36.2 °C)-98.5 °F (36.9 °C)] 98.4 °F (36.9 °C)  Pulse:  [] 89  Resp:  [11-43] 14  SpO2:  [92 %-100 %] 100 %  BP: ()/(46-63) 99/50     Weight: 63 kg (138 lb 14.2 oz)  Body mass index is 24.6 kg/m².  No intake or output data in the 24 hours ending 04/01/23 0839   Physical Exam    Significant Labs: BMP:   Recent Labs   Lab 04/01/23  0631   *      K 3.6      CO2 21*   BUN 43*   CREATININE 0.7   CALCIUM 8.4*     CBC:   Recent Labs   Lab 03/31/23  1720 04/01/23  0631   WBC 11.72 8.10   HGB 9.0* 7.8*   HCT 27.7* 24.0*    276     CMP:   Recent Labs   Lab 03/31/23  1720 04/01/23  0631    137   K 3.3* 3.6    107   CO2 23 21*   GLU 96 141*   BUN 47* 43*   CREATININE 0.7 0.7   CALCIUM 9.1 8.4*   PROT 6.8 6.0   ALBUMIN 3.4* 3.1*   BILITOT 0.4 0.4   ALKPHOS 75 55   AST 13 10   ALT 12 10   ANIONGAP 14 9     Cardiac Markers: No results for input(s): CKMB, MYOGLOBIN, BNP, TROPISTAT in the last 48 hours.  Coagulation:   Recent Labs   Lab 03/31/23  1720   INR 1.1   APTT 24.8     Lactic Acid: No results for input(s): LACTATE in the last 48 hours.  Magnesium: No results for input(s): MG in the last 48 hours.  Troponin: No results for input(s): TROPONINI, TROPONINIHS in the last 48 hours.  TSH:   Recent Labs   Lab 01/06/23  1450   TSH 1.328     Urine Studies:   No results for input(s): COLORU, APPEARANCEUA, PHUR, SPECGRAV, PROTEINUA, GLUCUA, KETONESU, BILIRUBINUA, OCCULTUA, NITRITE, UROBILINOGEN, LEUKOCYTESUR, RBCUA, WBCUA, BACTERIA,  LEE HYALINECASTS in the last 48 hours.    Invalid input(s): ABDIAZIZ  '    Significant Imaging: I have reviewed all pertinent imaging results/findings within the past 24 hours.

## 2023-04-01 NOTE — ASSESSMENT & PLAN NOTE
Significant cardiac disease, CABG in 2017 (per patient), has been on ASA, plavis since that time, last dose day prior to admission  --holding asa, plavix d/t UGIB  --low threshold to consult Cardiology for development of ACS symptoms  --high risk patient, monitor closely on telemetry

## 2023-04-01 NOTE — ASSESSMENT & PLAN NOTE
2/2 UGIB  Known hx of PUD - DU with duodenal stricture  Took 2 Ibuprofen Tuesday  On Plavix and ASA last taken 3/31/23  Protonix 80mg IV bolus given

## 2023-04-01 NOTE — PLAN OF CARE
[facility] - Med Surg  Initial Discharge Assessment       Primary Care Provider: Patrick Hernandez MD    Admission Diagnosis: Melena [K92.1]  Epigastric pain [R10.13]  Upper GI bleeding [K92.2]  Chest pain [R07.9]  Anemia, unspecified type [D64.9]  Acute duodenal ulcer with hemorrhage and obstruction [K26.0]    Admission Date: 3/31/2023  Expected Discharge Date:     Discharge Barriers Identified: None    Payor: MEDICARE / Plan: MEDICARE PART A & B / Product Type: Government /     Extended Emergency Contact Information  Primary Emergency Contact: Humberto Root  Address: [address] [place] [address] [facility]  Home Phone: [phone]  Mobile Phone: [phone]  Relation: Spouse    Discharge Plan A: Home with family  Discharge Plan B: Home with family      [id number] - [place] - [address] AT [facility] [address]  [address] [place] [place] [facility] [address]  Phone: [phone] Fax: [phone]    [facility]  [address]  [place] [address]  Phone: [phone] Fax: [phone]      Initial Assessment (most recent)       Adult Discharge Assessment - 04/01/23 1129          Discharge Assessment    Assessment Type Discharge Planning Assessment     Confirmed/corrected address, phone number and insurance Yes     Confirmed Demographics Correct on Facesheet     Source of Information patient;family     If unable to respond/provide information was family/caregiver contacted? Yes     Contact Name/Number Humberto Root (spouse) [phone]     Communicated DELICIA with patient/caregiver Yes     People in Home spouse     Facility Arrived From: Home     Do you expect to return to your current living situation? Yes     Do you have help at home or someone to help you manage your care at home? Yes     Who are your caregiver(s) and their phone number(s)? Humberto Root (spouse) [phone]     Prior to hospitilization cognitive status: Alert/Oriented     Current  cognitive status: Alert/Oriented     Equipment Currently Used at Home none     Readmission within 30 days? No     Patient currently being followed by outpatient case management? No     Do you currently have service(s) that help you manage your care at home? No     Do you take prescription medications? Yes     Do you have prescription coverage? Yes     Do you have any problems affording any of your prescribed medications? No     Is the patient taking medications as prescribed? yes     Who is going to help you get home at discharge? Humberto Root (spouse) [phone]     How do you get to doctors appointments? family or friend will provide     Are you on dialysis? No     Do you take coumadin? No     Discharge Plan A Home with family     Discharge Plan B Home with family     DME Needed Upon Discharge  oxygen     Discharge Plan discussed with: Patient;Spouse/sig other     Name(s) and Number(s) Humberto Root (spouse) [phone]     Discharge Barriers Identified None

## 2023-04-01 NOTE — CONSULTS
[facility] - Emergency Dept.  Gastroenterology  Consult Note    Patient Name: Dulce Root  MRN: [id number]  Admission Date: 3/31/2023  Hospital Length of Stay: 1 days  Code Status: Full Code   Attending Provider: Vimal Ferris MD   Consulting Provider: Tracy Flower MD  Primary Care Physician: Patrick Hernandez MD  Principal Problem:UGIB (upper gastrointestinal bleed)    Inpatient consult to Gastroenterology  Consult performed by: Tracy Flower MD  Consult ordered by: Pascual Khan MD  Reason for consult: melena        Subjective:     HPI:  67 y.o female with CAD s/p CABG and CHF with EF 35% who is on anticoagulation with plavix and ASA. Her GI history is notable for peptic ulcer disease diagnosed in 05/5021. Patient had presented to ED at that time with coffee ground emesis and melena. EGD discovered a duodenal bulb ulcer and duodenal stricture in the first portion of the duodenum. She was treated conservatively and repeat EGD a few months later showed resolution of the ulcer but the duodenal stricture remained. She was continued on low dose Protonix 20mg daily.     Patient recently contacted our GI offices in late January with abdomianl bloating, gas and constipation. She was seen virtually by Dr. Johnson who discontinued the Protonix and advised to avoid NSAIDs. She was also started on Miralax for her constipation. Unfortunately, patient took 2 Ibuprofen on Tuesday for a headache. Later that evening she had a few episodes of brown emesis. By Thursday she was experiencing melena. Labs on presentation show a H&H 9/27, her baseline is 10.6/33.9. Repeat this morning is 7.8/24. Plavix and ASA were last taken yesterday 3/31/23. She has been NPO since her presentation to the ED presentation yesterday afternoon.    At present, patient denies abdominal pain. She has remained hemodynamically stable       Past Medical History:   Diagnosis Date    Allergy     Anticoagulant long-term use     Plavix    Breast  cancer 2008    Carotid stenosis, bilateral 10/13/2017    Coronary artery disease     Coronary artery disease involving coronary bypass graft of native heart without angina pectoris 08/16/2019 2/19  1.  Left main is a small vessel with a 60%-65% ostial lesion. 2.  LAD is a medium-sized vessel diffuse 70% proximally  Ramus intermedius is a medium size vessel with a 40%-50% ostial lesion. 3.  Circumflex 60%-70% ostial  remainder of circumflex and obtuse marginal normal in appearance. Right coronary artery is normal size vessel, short discrete 70%mid 4.  Vein graft to right coronary is occ    Coronary artery disease involving native coronary artery of native heart without angina pectoris 06/27/2017    DCIS (ductal carcinoma in situ) of breast 11/06/2012    Encounter for blood transfusion     Essential hypertension 11/06/2012    GERD (gastroesophageal reflux disease)     GI bleed 02/2021    Hodgkin lymphoma     Hx of Hodgkin's disease - s/p chemo and radiation 11/06/2012    Hyperlipidemia     Hyperlipidemia 11/06/2012    The patient presents with hyperlipidemia.  The patient reports tolerating the medication well and is in excellent compliance.  There have been no medication side effects.  The patient denies chest pain, neuropathy, and myalgias.  The patient has reduced fat intake and has been exercising.  Current treatment has included the medications listed in the med card.   Lab Results Component Value Date  CH    Hypertension     LBBB (left bundle branch block) 05/22/2018    Osteoporosis     Paroxysmal atrial fibrillation - single post-op episode 08/24/2017    Pemphigoid     Pemphigoid     pt receives IVIG infusions    Pulmonary hypertension 1/13/2023    S/P AVR (aortic valve replacement) - pericardial valve 08/21/2017    Perceval aortic tissue valve PVS23. 23mm    serial # [id number]    S/P CABG x 2 08/21/2017 8/17 CABG X 2 with SVG-LAD and SVG-distal RCA  SVG LAD due to absent LIMA after  chest radiation and lymph node biopsy     Stented coronary artery     She had 2 stents placed in 2/2019.  She has had CAD and bypasses in the past in 2017.      Thyroid disease        Past Surgical History:   Procedure Laterality Date    ARTERIOGRAPHY OF AORTIC ROOT N/A 02/15/2019    Procedure: ARTERIOGRAM, AORTIC ROOT;  Surgeon: Sujit Chaudhry MD;  Location: [facility] C[facility];  Service: Cardiology;  Laterality: N/A;    AXILLARY NODE DISSECTION Left 11/25/2022    Procedure: LYMPHADENECTOMY, AXILLARY-Left;  Surgeon: Rosa Maradiaga MD;  Location: [facility];  Service: General;  Laterality: Left;    BREAST BIOPSY      BREAST RECONSTRUCTION      bilateral mastectomy    CARDIAC CATHETERIZATION      stents x 2    CARDIAC SURGERY  08/2017    Aortic valve replacement , CABG 2 vessel    CARDIAC VALVE REPLACEMENT  08/2017    aortic valve, bovine per pt    CORONARY ANGIOGRAPHY N/A 02/15/2019    Procedure: ANGIOGRAM, CORONARY ARTERY;  Surgeon: Sujit Chaudhry MD;  [facility];  Service: Cardiology;  Laterality: N/A;    CORONARY ANGIOGRAPHY N/A 4/1/2022    Procedure: ANGIOGRAM, CORONARY ARTERY;  Surgeon: Sujit Chaudhry MD;  Location: [facility];  Service: Cardiology;  Laterality: N/A;    CORONARY BYPASS GRAFT ANGIOGRAPHY  02/15/2019    Procedure: Bypass graft study;  Surgeon: Sujit Chaudhry MD;  [facility];  Service: Cardiology;;    COSMETIC SURGERY      bereast reconstruction    ESOPHAGOGASTRODUODENOSCOPY N/A 05/14/2021    Procedure: EGD (ESOPHAGOGASTRODUODENOSCOPY);  Surgeon: Tracy Flower MD;  Location: [facility];  Service: Endoscopy;  Laterality: N/A;    ESOPHAGOGASTRODUODENOSCOPY N/A 11/04/2021    Procedure: EGD (ESOPHAGOGASTRODUODENOSCOPY);  Surgeon: Tracy Flower MD;  Location: [facility];  Service: Endoscopy;  Laterality: N/A;    EYE SURGERY      eye lids    INJECTION FOR SENTINEL NODE IDENTIFICATION Left 11/25/2022    Procedure: INJECTION, FOR SENTINEL NODE IDENTIFICATION-Left;  Surgeon:  Rosa Maradiaga MD;  Location: [facility] OR;  Service: General;  Laterality: Left;    LEFT HEART CATHETERIZATION Right 02/15/2019    Procedure: Left heart cath;  Surgeon: Sujit Chaudhry MD;  Location: [facility];  Service: Cardiology;  Laterality: Right;    LEFT HEART CATHETERIZATION Left 4/1/2022    Procedure: Left heart cath;  Surgeon: Sujit Chaudhry MD;  Location: [facility];  Service: Cardiology;  Laterality: Left;    LUMBAR LAMINECTOMY WITH DISCECTOMY N/A 02/27/2020    Procedure: LAMINECTOMY, SPINE, LUMBAR, WITH DISCECTOMY;  Surgeon: Vimal Villegas MD;  Location[facility];  Service: Neurosurgery;  Laterality: N/A;  left L5-S1  Laminectomy L4-5    LYMPHADENECTOMY      MASTECTOMY      MASTECTOMY, PARTIAL Left 11/25/2022    Procedure: MASTECTOMY, PARTIAL-Left ultrasound guided;  Surgeon: Rosa Maradiaga MD;  Location: [facility] OR;  Service: General;  Laterality: Left;    RIGHT HEART CATHETERIZATION Right 4/1/2022    Procedure: INSERTION, CATHETER, RIGHT HEART;  Surgeon: Sujit Chaudhry MD;  Location: [facility];  Service: Cardiology;  Laterality: Right;    SENTINEL LYMPH NODE BIOPSY Left 11/25/2022    Procedure: BIOPSY, LYMPH NODE, SENTINEL-Left;  Surgeon: Rosa Maradiaga MD;  Location: [facility];  Service: General;  Laterality: Left;    SKIN BIOPSY      SPLENECTOMY, TOTAL      TRANSFORAMINAL EPIDURAL INJECTION OF STEROID Left 12/31/2019    Procedure: Left L5/S1 TF SKIP with local;  Surgeon: Canelo Niño MD;  Location: [facility];  Service: Pain Management;  Laterality: Left;    TUMOR REMOVAL      neck- lymphoma       Review of patient's allergies indicates:   Allergen Reactions    Amlodipine Shortness Of Breath and Other (See Comments)     unknown  Other reaction(s): Swelling  unknown  unknown  Other reaction(s): Swelling  unknown  Other reaction(s): Swelling  unknown    Levofloxacin Hives and Swelling    Sulfa (sulfonamide antibiotics) Shortness Of Breath, Itching and Other (See Comments)     elevated blood  pressure    Ace inhibitors     Ciprofloxacin Itching    Iodinated contrast media     Iodine      Other reaction(s): Unknown    Latex      Other reaction(s): Itching    Latex, natural rubber Itching     Family History       Problem Relation (Age of Onset)    Hypertension Mother, Father          Tobacco Use    Smoking status: Former     Packs/day: 1.00     Years: 20.00     Pack years: 20.00     Types: Cigarettes     Quit date: 1981     Years since quittin.4    Smokeless tobacco: Never   Substance and Sexual Activity    Alcohol use: No     Comment: 2 drinks/month; none 72 hrs prior to surgery    Drug use: No    Sexual activity: Yes     Partners: Male     Review of Systems  Objective:     Vital Signs (Most Recent):  Temp: 97.1 °F (36.2 °C) (23 2300)  Pulse: 82 (23 0700)  Resp: 20 (23 0700)  BP: (!) 115/56 (23 0700)  SpO2: 96 % (23 07)   Vital Signs (24h Range):  Temp:  [97.1 °F (36.2 °C)-98.5 °F (36.9 °C)] 97.1 °F (36.2 °C)  Pulse:  [] 82  Resp:  [11-43] 20  SpO2:  [92 %-98 %] 96 %  BP: ()/(46-63) 115/56     Weight: 63 kg (138 lb 14.2 oz) (23 1615)  Body mass index is 24.6 kg/m².    No intake or output data in the 24 hours ending 23 0758    Lines/Drains/Airways       Drain  Duration                  Closed/Suction Drain 22 1105 Left Breast Bulb 15 Fr. 126 days              Peripheral Intravenous Line  Duration                  Peripheral IV - Single Lumen 23 1712 20 G Posterior;Proximal;Right Forearm <1 day                    Physical Exam  Constitutional:       Appearance: Normal appearance. She is ill-appearing.   Cardiovascular:      Rate and Rhythm: Normal rate and regular rhythm.   Pulmonary:      Effort: Pulmonary effort is normal.      Breath sounds: Normal breath sounds.   Abdominal:      General: Abdomen is flat. There is no distension.      Palpations: Abdomen is soft.      Tenderness: There is no abdominal tenderness.  There is no guarding or rebound.          Comments: Midline abdominal scar   Neurological:      Mental Status: She is alert and oriented to person, place, and time. Mental status is at baseline.   Psychiatric:         Attention and Perception: Attention normal.         Mood and Affect: Mood normal.         Speech: Speech normal.         Behavior: Behavior normal. Behavior is cooperative.         Thought Content: Thought content normal.         Cognition and Memory: Cognition normal.         Judgment: Judgment normal.       Significant Labs:  CBC:   Recent Labs   Lab 03/31/23  1720 04/01/23  0631   WBC 11.72 8.10   HGB 9.0* 7.8*   HCT 27.7* 24.0*    276     CMP:   Recent Labs   Lab 04/01/23  0631   *   CALCIUM 8.4*   ALBUMIN 3.1*   PROT 6.0      K 3.6   CO2 21*      BUN 43*   CREATININE 0.7   ALKPHOS 55   ALT 10   AST 10   BILITOT 0.4     Coagulation:   Recent Labs   Lab 03/31/23  1720   INR 1.1   APTT 24.8       Significant Imaging:  Imaging results within the past 24 hours have been reviewed.    Assessment/Plan:     Cardiac/Vascular  Chronic combined systolic and diastolic heart failure  CAD s/p CABG 8/17 ( SVG to LAD, SVG to PDA occluded), PCI 2019 LM, CX, RCA  AVR, bioprosthetic 8/17  Carotid artery stenosis (ANNA 60-69%, LICA 80-89%)  Pulmonary HTN PAP 57, Likely WHO class II  HFrEF, EF 35% since 2019  Plavix and ASA last taken 3/31/23    GI  Melena  2/2 UGIB  Known hx of PUD - DU with duodenal stricture  Took 2 Ibuprofen Tuesday  On Plavix and ASA last taken 3/31/23  Protonix 80mg IV bolus given    Epigastric pain-resolved as of 4/1/2023  resolved      Recommendations  1. Protonix gtt now  2. Keep NPO  3. EGD today  4. Given cardiac history keep Hgb >8  5. Transfuse 1U pRBCs now      The risks, benefits and alternatives of the procedure were discussed with the patient in detail. The risks include, risks of adverse reaction to sedation requiring the use of reversal agents, bleeding  requiring blood transfusion, perforation requiring surgical intervention and technical failure. Other risks include aspiration leading to respiratory distress and respiratory failure resulting in endotracheal intubation and mechanical ventilation including death. If anesthesia is being utilized for this procedure, it is up to the anesthesiologist to determine airway safety including elective endotracheal intubation. Questions were answered, they agree to proceed. There was no language barriers.       Thank you for your consult. I will follow-up with patient. Please contact us if you have any additional questions.    Tracy Flower MD  Gastroenterology  [facility] - Emergency Dept.

## 2023-04-01 NOTE — ASSESSMENT & PLAN NOTE
Patient is chronically on statin.will continue for now. Last Lipid Panel:   Lab Results   Component Value Date    CHOL 112 (L) 02/24/2023    HDL 39 (L) 02/24/2023    LDLCALC 58.8 (L) 02/24/2023    TRIG 71 02/24/2023    CHOLHDL 34.8 02/24/2023   Plan:  -Continue home medication  -low fat/low calorie diet

## 2023-04-01 NOTE — ANESTHESIA POSTPROCEDURE EVALUATION
Anesthesia Post Evaluation    Patient: Dulce Root    Procedure(s) Performed: Procedure(s) (LRB):  EGD (ESOPHAGOGASTRODUODENOSCOPY) (N/A)    Final Anesthesia Type: MAC      Patient location during evaluation: GI PACU  Patient participation: Yes- Able to Participate  Level of consciousness: awake and alert  Post-procedure vital signs: reviewed and stable  Pain management: adequate  Airway patency: patent    PONV status at discharge: No PONV  Anesthetic complications: no      Cardiovascular status: hemodynamically stable  Respiratory status: unassisted, room air and spontaneous ventilation  Hydration status: euvolemic  Follow-up not needed.                No case tracking events are documented in the log.      Pain/Pawan Score: No data recorded

## 2023-04-01 NOTE — TRANSFER OF CARE
"Anesthesia Transfer of Care Note    Patient: Dulce Root    Procedure(s) Performed: Procedure(s) (LRB):  EGD (ESOPHAGOGASTRODUODENOSCOPY) (N/A)    Patient location: GI    Anesthesia Type: MAC    Transport from OR: Transported from OR on 2-3 L/min O2 by NC with adequate spontaneous ventilation    Post pain: adequate analgesia    Post assessment: no apparent anesthetic complications    Post vital signs: stable    Level of consciousness: awake, alert and oriented    Nausea/Vomiting: no nausea/vomiting    Complications: none    Transfer of care protocol was followed      Last vitals:   Visit Vitals  BP (!) 110/55   Pulse 92   Temp 36.9 °C (98.4 °F)   Resp 15   Ht 5' 3" (1.6 m)   Wt 63 kg (138 lb 14.2 oz)   SpO2 (!) 94%   BMI 24.60 kg/m²     "

## 2023-04-01 NOTE — ASSESSMENT & PLAN NOTE
Currently hypotensive. BP currently ranging from  systolic.  Plan:  -Optimize pain control   -hold home medications, titrate as needed   -Monitor BP

## 2023-04-01 NOTE — PROVATION PATIENT INSTRUCTIONS
Discharge Summary/Instructions after an Endoscopic Procedure  Patient Name: Dulce Root  Patient MRN: [id number]  Patient YOB: 1955 Saturday, April 1, 2023 Tracy Flower MD  Dear patient,  As a result of recent federal legislation (The Federal Cures Act), you may   receive lab or pathology results from your procedure in your [facility]   account before your physician is able to contact you. Your physician or   their representative will relay the results to you with their   recommendations at their soonest availability.  Thank you,  RESTRICTIONS:  During your procedure today, you received medications for sedation.  These   medications may affect your judgment, balance and coordination.  Therefore,   for 24 hours, you have the following restrictions:   - DO NOT drive a car, operate machinery, make legal/financial decisions,   sign important papers or drink alcohol.    ACTIVITY:  Today: no heavy lifting, straining or running due to procedural   sedation/anesthesia.  The following day: return to full activity including work.  DIET:  Eat and drink normally unless instructed otherwise.     TREATMENT FOR COMMON SIDE EFFECTS:  - Mild abdominal pain, nausea, belching, bloating or excessive gas:  rest,   eat lightly and use a heating pad.  - Sore Throat: treat with throat lozenges and/or gargle with warm salt   water.  - Because air was used during the procedure, expelling large amounts of air   from your rectum or belching is normal.  - If a bowel prep was taken, you may not have a bowel movement for 1-3 days.    This is normal.  SYMPTOMS TO WATCH FOR AND REPORT TO YOUR PHYSICIAN:  1. Abdominal pain or bloating, other than gas cramps.  2. Chest pain.  3. Back pain.  4. Signs of infection such as: chills or fever occurring within 24 hours   after the procedure.  5. Rectal bleeding, which would show as bright red, maroon, or black stools.   (A tablespoon of blood from the rectum is not serious, especially if    hemorrhoids are present.)  6. Vomiting.  7. Weakness or dizziness.  GO DIRECTLY TO THE NEAREST EMERGENCY ROOM IF YOU HAVE ANY OF THE FOLLOWING:      Difficulty breathing              Chills and/or fever over 101 F   Persistent vomiting and/or vomiting blood   Severe abdominal pain   Severe chest pain   Black, tarry stools   Bleeding- more than one tablespoon   Any other symptom or condition that you feel may need urgent attention  Your doctor recommends these additional instructions:  If any biopsies were taken, your doctors clinic will contact you in 1 to 2   weeks with any results.  - Return patient to hospital lim for ongoing care.   - Clear liquid diet.   - No aspirin, ibuprofen, naproxen, or other non-steroidal anti-inflammatory   drugs.   - Continue Protonix gtt  - Use sucralfate suspension 1 gram PO QID for 2 weeks.   - Observe patient's clinical course.   - Repeat upper endoscopy in 2 months to check healing.   - Refer to an interventional radiologist on call if patient shows signs of   continued bleeding.  - The findings and recommendations were discussed with the patient.   - The findings and recommendations were discussed with the patient's family.     - The findings and recommendations were discussed with the referring   physician, Dr. Ferris.  For questions, problems or results please call your physician Tracy Flower MD at Work:  [phone]  If you have any questions about the above instructions, call the GI   department at [phone] or call the endoscopy unit at [phone]   from 7am until 3 pm.  [facility] [place], EMERGENCY ROOM PHONE NUMBER:   [phone]  IF A COMPLICATION OR EMERGENCY SITUATION ARISES AND YOU ARE UNABLE TO REACH   YOUR PHYSICIAN - GO DIRECTLY TO THE EMERGENCY ROOM.  I have read or have had read to me these discharge instructions for my   procedure and have received a written copy.  I understand these   instructions and will follow-up with my  physician if I have any questions.     __________________________________       _____________________________________  Nurse Signature                                          Patient/Designated   Responsible Party Signature  MD Tracy Linn MD  4/1/2023 10:19:32 AM  This report has been verified and signed electronically.  Dear patient,  As a result of recent federal legislation (The Federal Cures Act), you may   receive lab or pathology results from your procedure in your [facility]   account before your physician is able to contact you. Your physician or   their representative will relay the results to you with their   recommendations at their soonest availability.  Thank you,  PROVATION

## 2023-04-01 NOTE — HPI
Dulce Root is a 67 y.o. female with a PMH  has a past medical history of Allergy, Anticoagulant long-term use, Breast cancer (2008), Carotid stenosis, bilateral (10/13/2017), Coronary artery disease, Coronary artery disease involving coronary bypass graft of native heart without angina pectoris (08/16/2019), Coronary artery disease involving native coronary artery of native heart without angina pectoris (06/27/2017), DCIS (ductal carcinoma in situ) of breast (11/06/2012), Encounter for blood transfusion, Essential hypertension (11/06/2012), GERD (gastroesophageal reflux disease), GI bleed (02/2021), Hodgkin lymphoma, Hx of Hodgkin's disease - s/p chemo and radiation (11/06/2012), Hyperlipidemia, Hyperlipidemia (11/06/2012), Hypertension, LBBB (left bundle branch block) (05/22/2018), Osteoporosis, Paroxysmal atrial fibrillation - single post-op episode (08/24/2017), Pemphigoid, Pemphigoid, Pulmonary hypertension (1/13/2023), S/P AVR (aortic valve replacement) - pericardial valve (08/21/2017), S/P CABG x 2 (08/21/2017), Stented coronary artery, and Thyroid disease. who presented to the ED for further evaluation after endorsing dark bloody stool x3 days duration.  Patient has history of gastric ulcers and had similar presentation try last occurrence.  Patient also reported 1 episode of hematemesis yesterday in addition to endorsing decreased oral intake, bloating throughout her epigastric region, feeling fatigued, and generalized weakness.  Patient denies abuse of NSAIDs the reports being taken off Protonix over the past year after being so she no longer needed to be on them but continues to be on aspirin and Plavix 2 2 significant heart history and stent placement.  Other associated symptoms included light headedness/dizziness and noticed that her blood pressure has been running on the lower end of normal with systolic running in the 90s and use a ranges 120s/70s.  She reported no known alleviating or aggravating  factors reported all other review of systems negative except as noted above.  GI was consulted by ED staff who recommended patient be admitted to Hospital Medicine and initiated on Protonix with further recommendations to follow in the morning.    PCP: Patrick Hernandez

## 2023-04-01 NOTE — ASSESSMENT & PLAN NOTE
Patient presented with persistent abdominal pain in setting of hematemesis and melena/dark stools concerning for GI bleed.  Patient with significant history of gastric ulcers and has and is found to have evidence of hypotension and anemia with H/H measuring 9.0/27.7.  GI consult by ED staff who recommended Protonix and admission with possible endoscopy in the morning.  Check/screen ordered and pending transfusion if hemoglobin drops below 8.0 in setting of cardiac patient.  Plan:  -NPO  -Continue current pain regimen, titrate as needed  -Bowel regimen  -Bedrest  -IVFs prn  -monitor H/H  -continue Protonix  -type/screen, transfuse if hemoglobin <8.0  -hold antiplatelet/anticoagulation therapies  -Antiemetics prn  -Tylenol as needed for fever   -f/u GI

## 2023-04-02 PROBLEM — K26.9 DUODENAL ULCER: Status: RESOLVED | Noted: 2021-11-04 | Resolved: 2023-04-02

## 2023-04-02 LAB
ALBUMIN SERPL BCP-MCNC: 3.1 G/DL (ref 3.5–5.2)
ALP SERPL-CCNC: 48 U/L (ref 55–135)
ALT SERPL W/O P-5'-P-CCNC: 8 U/L (ref 10–44)
ANION GAP SERPL CALC-SCNC: 11 MMOL/L (ref 8–16)
AST SERPL-CCNC: 22 U/L (ref 10–40)
BASOPHILS # BLD AUTO: 0.05 K/UL (ref 0–0.2)
BASOPHILS NFR BLD: 0.6 % (ref 0–1.9)
BILIRUB SERPL-MCNC: 0.6 MG/DL (ref 0.1–1)
BUN SERPL-MCNC: 27 MG/DL (ref 8–23)
CALCIUM SERPL-MCNC: 7.9 MG/DL (ref 8.7–10.5)
CHLORIDE SERPL-SCNC: 109 MMOL/L (ref 95–110)
CO2 SERPL-SCNC: 18 MMOL/L (ref 23–29)
CREAT SERPL-MCNC: 0.7 MG/DL (ref 0.5–1.4)
DIFFERENTIAL METHOD: ABNORMAL
EOSINOPHIL # BLD AUTO: 0.1 K/UL (ref 0–0.5)
EOSINOPHIL NFR BLD: 0.9 % (ref 0–8)
ERYTHROCYTE [DISTWIDTH] IN BLOOD BY AUTOMATED COUNT: 15.3 % (ref 11.5–14.5)
EST. GFR  (NO RACE VARIABLE): >60 ML/MIN/1.73 M^2
GLUCOSE SERPL-MCNC: 89 MG/DL (ref 70–110)
HCT VFR BLD AUTO: 25.2 % (ref 37–48.5)
HCT VFR BLD AUTO: 26.9 % (ref 37–48.5)
HGB BLD-MCNC: 8.3 G/DL (ref 12–16)
HGB BLD-MCNC: 8.9 G/DL (ref 12–16)
IMM GRANULOCYTES # BLD AUTO: 0.04 K/UL (ref 0–0.04)
IMM GRANULOCYTES NFR BLD AUTO: 0.5 % (ref 0–0.5)
LYMPHOCYTES # BLD AUTO: 2.1 K/UL (ref 1–4.8)
LYMPHOCYTES NFR BLD: 24 % (ref 18–48)
MCH RBC QN AUTO: 29.3 PG (ref 27–31)
MCHC RBC AUTO-ENTMCNC: 32.9 G/DL (ref 32–36)
MCV RBC AUTO: 89 FL (ref 82–98)
MONOCYTES # BLD AUTO: 0.9 K/UL (ref 0.3–1)
MONOCYTES NFR BLD: 10.6 % (ref 4–15)
NEUTROPHILS # BLD AUTO: 5.6 K/UL (ref 1.8–7.7)
NEUTROPHILS NFR BLD: 63.4 % (ref 38–73)
NRBC BLD-RTO: 0 /100 WBC
PLATELET # BLD AUTO: 222 K/UL (ref 150–450)
PMV BLD AUTO: 10 FL (ref 9.2–12.9)
POTASSIUM SERPL-SCNC: 3.4 MMOL/L (ref 3.5–5.1)
PROT SERPL-MCNC: 5.7 G/DL (ref 6–8.4)
RBC # BLD AUTO: 2.83 M/UL (ref 4–5.4)
SODIUM SERPL-SCNC: 138 MMOL/L (ref 136–145)
WBC # BLD AUTO: 8.81 K/UL (ref 3.9–12.7)

## 2023-04-02 PROCEDURE — 85025 COMPLETE CBC W/AUTO DIFF WBC: CPT | Performed by: INTERNAL MEDICINE

## 2023-04-02 PROCEDURE — C9113 INJ PANTOPRAZOLE SODIUM, VIA: HCPCS | Performed by: INTERNAL MEDICINE

## 2023-04-02 PROCEDURE — 25000003 PHARM REV CODE 250: Performed by: INTERNAL MEDICINE

## 2023-04-02 PROCEDURE — 85018 HEMOGLOBIN: CPT | Performed by: INTERNAL MEDICINE

## 2023-04-02 PROCEDURE — 80053 COMPREHEN METABOLIC PANEL: CPT | Performed by: INTERNAL MEDICINE

## 2023-04-02 PROCEDURE — 36415 COLL VENOUS BLD VENIPUNCTURE: CPT | Performed by: INTERNAL MEDICINE

## 2023-04-02 PROCEDURE — 85014 HEMATOCRIT: CPT | Performed by: INTERNAL MEDICINE

## 2023-04-02 PROCEDURE — 99900035 HC TECH TIME PER 15 MIN (STAT)

## 2023-04-02 PROCEDURE — 99232 PR SUBSEQUENT HOSPITAL CARE,LEVL II: ICD-10-PCS | Mod: ,,, | Performed by: INTERNAL MEDICINE

## 2023-04-02 PROCEDURE — 11000001 HC ACUTE MED/SURG PRIVATE ROOM

## 2023-04-02 PROCEDURE — 63600175 PHARM REV CODE 636 W HCPCS: Performed by: INTERNAL MEDICINE

## 2023-04-02 PROCEDURE — 99232 SBSQ HOSP IP/OBS MODERATE 35: CPT | Mod: ,,, | Performed by: INTERNAL MEDICINE

## 2023-04-02 RX ADMIN — LEVOTHYROXINE SODIUM 75 MCG: 75 TABLET ORAL at 05:04

## 2023-04-02 RX ADMIN — PANTOPRAZOLE SODIUM 8 MG/HR: 40 INJECTION, POWDER, FOR SOLUTION INTRAVENOUS at 05:04

## 2023-04-02 RX ADMIN — SUCRALFATE 1 G: 1 SUSPENSION ORAL at 05:04

## 2023-04-02 RX ADMIN — SUCRALFATE 1 G: 1 SUSPENSION ORAL at 06:04

## 2023-04-02 RX ADMIN — ATORVASTATIN CALCIUM 80 MG: 40 TABLET, FILM COATED ORAL at 09:04

## 2023-04-02 RX ADMIN — METOPROLOL SUCCINATE 25 MG: 25 TABLET, EXTENDED RELEASE ORAL at 09:04

## 2023-04-02 RX ADMIN — PANTOPRAZOLE SODIUM 8 MG/HR: 40 INJECTION, POWDER, FOR SOLUTION INTRAVENOUS at 06:04

## 2023-04-02 RX ADMIN — PANTOPRAZOLE SODIUM 8 MG/HR: 40 INJECTION, POWDER, FOR SOLUTION INTRAVENOUS at 12:04

## 2023-04-02 RX ADMIN — ESCITALOPRAM OXALATE 10 MG: 10 TABLET ORAL at 09:04

## 2023-04-02 RX ADMIN — PANTOPRAZOLE SODIUM 8 MG/HR: 40 INJECTION, POWDER, FOR SOLUTION INTRAVENOUS at 11:04

## 2023-04-02 RX ADMIN — PANTOPRAZOLE SODIUM 8 MG/HR: 40 INJECTION, POWDER, FOR SOLUTION INTRAVENOUS at 10:04

## 2023-04-02 RX ADMIN — SUCRALFATE 1 G: 1 SUSPENSION ORAL at 11:04

## 2023-04-02 RX ADMIN — DAPSONE 75 MG: 25 TABLET ORAL at 09:04

## 2023-04-02 NOTE — PLAN OF CARE
Discussed poc with pt, pt verbalized understanding  Purposeful rounding every 2hours  VS wnl  Fall precautions in place, remains injury free  Pain and nausea under control with PRN meds  Bed locked at lowest position  Call light within reach  Chart check complete  Will cont with POC

## 2023-04-02 NOTE — ASSESSMENT & PLAN NOTE
Hx of gastric ulcer with duodenal stricture, recent NSAID use, recent discontinuation of PPI in the OP setting  --s/p IV PPI 80mg in the ER  --agree with transitioning to PPI gtt given substantial risk factors  --discussed case with GI, EGD tentatively scheduled for later today  --H&H downtrending since admission  --1uPRBC ordered to be transfused prior to EGD, consent obtained by GI  --serial CBC to trend h&h  --low threshold to transfuse for hgb < 8 given significant cardiac history    4/2  --EGD with severe duodenal stricture with oozing ulcer  --GI following, appreciate assistance; may need IR intervention if rebleeds  --Serial H/H, transfuse if indicated

## 2023-04-02 NOTE — HOSPITAL COURSE
4/1/23  s/p EGD - severe duodenal stricture with oozing ulcer at distal end  Transfused 1 unit PRBC  On protonix drip, CLD, GI following  May need IR intervention if rebleeds    4/2/23  Patient denies abdominal pain, nausea, had melena overnight, denies bright red blood in stool.  GI following, appreicate assitance, discussed with Dr. Flower  Repeat H/H pending this afternoon, advanced to FLD    4/3  Patient denies nausea, abdominal pain, vomiting.    Last bowel movement yesterday morning;  Hemoglobin slightly trended down   Tolerated full liquid diet without issues, GI recommended to advance diet as tolerated   Protonix drip discontinued, transition to p.o. Protonix as per GI   Will monitor for tolerance on advance diet, follow up on H&H;  Patient currently saturating on 2 L nasal cannula, not on oxygen at baseline, we will follow up on home oxygen assessment test;      4/4  No acute events overnight   Noted to have bowel movement yesterday morning, dark, stated slowly clearing up.    Hemoglobin stable.    Currently saturating about 92 on 2 L nasal cannula, not on oxygen at home, qualified for 2 L oxygen with ambulation.    Follow-up with  regarding arrangements  Persistent low blood pressure, despite metoprolol changed frequency from b.i.d. to daily, given increased oxygen requirement, drop in blood pressure, will follow-up on chest x-ray, echocardiogram;       4/5    No acute events overnight   Status post ultrasound-guided left-sided thoracentesis yesterday with approximately 300 cc fluid removal, pending body fluid analysis.    Blood pressure improved this morning, patient stated improvement in shortness of breath.    Denied chest pain, palpitations.    Hemodynamically stable.    Hemoglobin stable at 8.4, denied any further episodes of GI bleed.    Considering clinical and hemodynamic stability, planning to discharge patient today  Given episodes of hypotension, withheld blood pressure medications  at the time of discharge, changed metoprolol frequency from b.i.d. to daily, strongly emphasized on monitoring blood pressure at home, to follow up with Cardiology, primary care within 1 week upon discharge and to resume medications accordingly.    Given presentation with GI bleed, aspirin, Plavix hold as per GI recommendations, to continue holding at the time of discharge, emphasized on following up with Cardiology/gastroenterology within 1 week upon discharge regarding resumption of blood thinners.    Patient qualified for 2 L oxygen at activity, oxygen delivered at bedside.        Discharging on Protonix b.i.d. for 2 months, sucralfate 1 g q.6 hourly for 2 weeks.      Patient agreed to the plan.

## 2023-04-02 NOTE — ASSESSMENT & PLAN NOTE
CAD s/p CABG 8/17 ( SVG to LAD, SVG to PDA occluded), PCI 2019 LM, CX, RCA  AVR, bioprosthetic 8/17  Carotid artery stenosis (ANNA 60-69%, LICA 80-89%)  Pulmonary HTN PAP 57, Likely WHO class II  HFrEF, EF 35% since 2019  Plavix and ASA last taken 3/31/23  stable

## 2023-04-02 NOTE — SUBJECTIVE & OBJECTIVE
Subjective:     Interval History: tolerated transfusion without incident. 1U given. One episode of melena last night. She remained stable.     Discussed case with Dr. Davis, IR last night. Patient aware. Denies abdominal pain. Tolerating clears.     Review of Systems  Objective:     Vital Signs (Most Recent):  Temp: 97.9 °F (36.6 °C) (04/02/23 0715)  Pulse: 74 (04/02/23 0715)  Resp: 12 (04/02/23 0715)  BP: (!) 97/46 (04/02/23 0715)  SpO2: 95 % (04/02/23 0715)   Vital Signs (24h Range):  Temp:  [97.2 °F (36.2 °C)-99 °F (37.2 °C)] 97.9 °F (36.6 °C)  Pulse:  [] 74  Resp:  [12-22] 12  SpO2:  [91 %-100 %] 95 %  BP: ()/(41-79) 97/46     Weight: 68.2 kg (150 lb 5.7 oz) (04/01/23 2354)  Body mass index is 26.63 kg/m².      Intake/Output Summary (Last 24 hours) at 4/2/2023 0753  Last data filed at 4/2/2023 0522  Gross per 24 hour   Intake 1113.43 ml   Output --   Net 1113.43 ml       Lines/Drains/Airways       Drain  Duration                  Closed/Suction Drain 11/25/22 1105 Left Breast Bulb 15 Fr. 127 days              Peripheral Intravenous Line  Duration                  Peripheral IV - Single Lumen 03/31/23 1712 20 G Posterior;Proximal;Right Forearm 1 day         Peripheral IV - Single Lumen 04/01/23 0848 22 G Left;Posterior Hand <1 day                    Physical Exam    Significant Labs:  CBC:   Recent Labs   Lab 04/01/23  0631 04/01/23  1614 04/02/23  0707   WBC 8.10 13.10* 8.81   HGB 7.8* 9.6* 8.3*   HCT 24.0* 28.7* 25.2*    251 222     CMP:   Recent Labs   Lab 04/01/23  0631   *   CALCIUM 8.4*   ALBUMIN 3.1*   PROT 6.0      K 3.6   CO2 21*      BUN 43*   CREATININE 0.7   ALKPHOS 55   ALT 10   AST 10   BILITOT 0.4     Coagulation:   Recent Labs   Lab 03/31/23  1720   INR 1.1   APTT 24.8         Significant Imaging:  Imaging results within the past 24 hours have been reviewed.

## 2023-04-02 NOTE — SUBJECTIVE & OBJECTIVE
Review of Systems   All other systems reviewed and are negative.  Objective:     Vital Signs (Most Recent):  Temp: 97.6 °F (36.4 °C) (04/02/23 1108)  Pulse: 72 (04/02/23 1108)  Resp: 18 (04/02/23 1108)  BP: (!) 91/44 (04/02/23 1108)  SpO2: 95 % (04/02/23 1108)   Vital Signs (24h Range):  Temp:  [97.6 °F (36.4 °C)-98.9 °F (37.2 °C)] 97.6 °F (36.4 °C)  Pulse:  [72-89] 72  Resp:  [12-18] 18  SpO2:  [91 %-98 %] 95 %  BP: ()/(44-79) 91/44     Weight: 68.2 kg (150 lb 5.7 oz)  Body mass index is 26.63 kg/m².    Intake/Output Summary (Last 24 hours) at 4/2/2023 1212  Last data filed at 4/2/2023 0522  Gross per 24 hour   Intake 863.43 ml   Output --   Net 863.43 ml      Physical Exam  Vitals and nursing note reviewed.   Constitutional:       General: She is not in acute distress.     Appearance: Normal appearance. She is normal weight.   Cardiovascular:      Rate and Rhythm: Normal rate and regular rhythm.      Heart sounds: No murmur heard.  Pulmonary:      Effort: Pulmonary effort is normal. No respiratory distress.   Neurological:      Mental Status: She is alert and oriented to person, place, and time. Mental status is at baseline.       Significant Labs: All pertinent labs within the past 24 hours have been reviewed.  CBC:   Recent Labs   Lab 04/01/23  0631 04/01/23  1614 04/02/23  0707   WBC 8.10 13.10* 8.81   HGB 7.8* 9.6* 8.3*   HCT 24.0* 28.7* 25.2*    251 222     CMP:   Recent Labs   Lab 03/31/23  1720 04/01/23  0631 04/02/23  0707    137 138   K 3.3* 3.6 3.4*    107 109   CO2 23 21* 18*   GLU 96 141* 89   BUN 47* 43* 27*   CREATININE 0.7 0.7 0.7   CALCIUM 9.1 8.4* 7.9*   PROT 6.8 6.0 5.7*   ALBUMIN 3.4* 3.1* 3.1*   BILITOT 0.4 0.4 0.6   ALKPHOS 75 55 48*   AST 13 10 22   ALT 12 10 8*   ANIONGAP 14 9 11       Significant Imaging: I have reviewed all pertinent imaging results/findings within the past 24 hours.

## 2023-04-02 NOTE — ASSESSMENT & PLAN NOTE
Chronic. Stable. Not in acute exacerbation and currently denies endorsing any suicidal/homicidal ideations.   Plan:  -Continue home medications

## 2023-04-02 NOTE — PLAN OF CARE
Patient remains free of injury. VSS. Continuing to monitor labs and signs of bleeding. Continuous protonix continued. Chart reviewed. Call light in reach. Will continue to monitor.    Problem: Adult Inpatient Plan of Care  Goal: Patient-Specific Goal (Individualized)  Outcome: Ongoing, Progressing     Problem: Adult Inpatient Plan of Care  Goal: Absence of Hospital-Acquired Illness or Injury  Outcome: Ongoing, Progressing     Problem: Fall Injury Risk  Goal: Absence of Fall and Fall-Related Injury  Outcome: Ongoing, Progressing     Problem: Adjustment to Illness (Gastrointestinal Bleeding)  Goal: Optimal Coping with Acute Illness  Outcome: Ongoing, Progressing     Problem: Bleeding (Gastrointestinal Bleeding)  Goal: Hemostasis  Outcome: Ongoing, Progressing

## 2023-04-02 NOTE — PROGRESS NOTES
[facility]  Gastroenterology  Progress Note    Patient Name: Dulce Root  MRN: [id number]  Admission Date: 3/31/2023  Hospital Length of Stay: 2 days  Code Status: Full Code   Attending Provider: Eric Groves MD  Consulting Provider: Tracy Flower MD  Primary Care Physician: Patrick Hernandez MD  Principal Problem: Acute duodenal ulcer with hemorrhage and obstruction      Subjective:     Interval History: tolerated transfusion without incident. 1U given. One episode of melena last night. She remained stable.     Discussed case with Dr. Davis, IR last night. Patient aware. Denies abdominal pain. Tolerating clears.     Review of Systems  Objective:     Vital Signs (Most Recent):  Temp: 97.9 °F (36.6 °C) (04/02/23 0715)  Pulse: 74 (04/02/23 0715)  Resp: 12 (04/02/23 0715)  BP: (!) 97/46 (04/02/23 0715)  SpO2: 95 % (04/02/23 0715)   Vital Signs (24h Range):  Temp:  [97.2 °F (36.2 °C)-99 °F (37.2 °C)] 97.9 °F (36.6 °C)  Pulse:  [] 74  Resp:  [12-22] 12  SpO2:  [91 %-100 %] 95 %  BP: ()/(41-79) 97/46     Weight: 68.2 kg (150 lb 5.7 oz) (04/01/23 2354)  Body mass index is 26.63 kg/m².      Intake/Output Summary (Last 24 hours) at 4/2/2023 0753  Last data filed at 4/2/2023 0522  Gross per 24 hour   Intake 1113.43 ml   Output --   Net 1113.43 ml       Lines/Drains/Airways       Drain  Duration                  Closed/Suction Drain 11/25/22 1105 Left Breast Bulb 15 Fr. 127 days              Peripheral Intravenous Line  Duration                  Peripheral IV - Single Lumen 03/31/23 1712 20 G Posterior;Proximal;Right Forearm 1 day         Peripheral IV - Single Lumen 04/01/23 0848 22 G Left;Posterior Hand <1 day                    Physical Exam    Significant Labs:  CBC:   Recent Labs   Lab 04/01/23  0631 04/01/23  1614 04/02/23  0707   WBC 8.10 13.10* 8.81   HGB 7.8* 9.6* 8.3*   HCT 24.0* 28.7* 25.2*    251 222     CMP:   Recent Labs   Lab 04/01/23  0631   *   CALCIUM 8.4*   ALBUMIN 3.1*    PROT 6.0      K 3.6   CO2 21*      BUN 43*   CREATININE 0.7   ALKPHOS 55   ALT 10   AST 10   BILITOT 0.4     Coagulation:   Recent Labs   Lab 03/31/23  1720   INR 1.1   APTT 24.8         Significant Imaging:  Imaging results within the past 24 hours have been reviewed.    Assessment/Plan:     Cardiac/Vascular  Chronic combined systolic and diastolic heart failure  CAD s/p CABG 8/17 ( SVG to LAD, SVG to PDA occluded), PCI 2019 LM, CX, RCA  AVR, bioprosthetic 8/17  Carotid artery stenosis (ANNA 60-69%, LICA 80-89%)  Pulmonary HTN PAP 57, Likely WHO class II  HFrEF, EF 35% since 2019  Plavix and ASA last taken 3/31/23  stable    GI  * Acute duodenal ulcer with hemorrhage and obstruction  2/2 UGIB  Known hx of PUD - DU with duodenal stricture in 2021  Took 2 Ibuprofen Tuesday  On Plavix and ASA last taken 3/30/23  Protonix 80mg IV bolus given  S/p 1U pRBC  EGD severe stricture with ulcer oozing on distal end  Protonix gtt and carafate suspension QID  H&H slowly drifting with blood thinners still in her system    Duodenal stricture  EGD 4/1/23: severe duodenal stricture - able to pass adult gastroscope but challenging        Recommendations:  1. Advance diet to full liquids  2. Continue to hold Plavix and ASA  3. Repeat H&H this afternoon  4. Repeat EGD in 2 months      Communicated with Dr. Groves, [facility] medicine . I will follow-up with patient. Please contact us if you have any additional questions.    Tracy Flower MD  Gastroenterology  [facility]

## 2023-04-02 NOTE — ASSESSMENT & PLAN NOTE
2/2 UGIB  Known hx of PUD - DU with duodenal stricture in 2021  Took 2 Ibuprofen Tuesday  On Plavix and ASA last taken 3/30/23  Protonix 80mg IV bolus given  S/p 1U pRBC  EGD severe stricture with ulcer oozing on distal end  Protonix gtt and carafate suspension QID  H&H slowly drifting with blood thinners still in her system

## 2023-04-02 NOTE — PROGRESS NOTES
[facility] Medicine  Progress Note    Patient Name: Dulce Root  MRN: [id number]  Patient Class: IP- Inpatient   Admission Date: 3/31/2023  Length of Stay: 2 days  Attending Physician: Eric Groves MD  Primary Care Provider: Patrick Hernandez MD        Subjective:     Principal Problem:Acute duodenal ulcer with hemorrhage and obstruction        HPI:  Dulce Root is a 67 y.o. female with a PMH  has a past medical history of Allergy, Anticoagulant long-term use, Breast cancer (2008), Carotid stenosis, bilateral (10/13/2017), Coronary artery disease, Coronary artery disease involving coronary bypass graft of native heart without angina pectoris (08/16/2019), Coronary artery disease involving native coronary artery of native heart without angina pectoris (06/27/2017), DCIS (ductal carcinoma in situ) of breast (11/06/2012), Encounter for blood transfusion, Essential hypertension (11/06/2012), GERD (gastroesophageal reflux disease), GI bleed (02/2021), Hodgkin lymphoma, Hx of Hodgkin's disease - s/p chemo and radiation (11/06/2012), Hyperlipidemia, Hyperlipidemia (11/06/2012), Hypertension, LBBB (left bundle branch block) (05/22/2018), Osteoporosis, Paroxysmal atrial fibrillation - single post-op episode (08/24/2017), Pemphigoid, Pemphigoid, Pulmonary hypertension (1/13/2023), S/P AVR (aortic valve replacement) - pericardial valve (08/21/2017), S/P CABG x 2 (08/21/2017), Stented coronary artery, and Thyroid disease. who presented to the ED for further evaluation after endorsing dark bloody stool x3 days duration.  Patient has history of gastric ulcers and had similar presentation try last occurrence.  Patient also reported 1 episode of hematemesis yesterday in addition to endorsing decreased oral intake, bloating throughout her epigastric region, feeling fatigued, and generalized weakness.  Patient denies abuse of NSAIDs the reports being taken off Protonix over the past year after being so she no longer  needed to be on them but continues to be on aspirin and Plavix 2 2 significant heart history and stent placement.  Other associated symptoms included light headedness/dizziness and noticed that her blood pressure has been running on the lower end of normal with systolic running in the 90s and use a ranges 120s/70s.  She reported no known alleviating or aggravating factors reported all other review of systems negative except as noted above.  GI was consulted by ED staff who recommended patient be admitted to Hospital Medicine and initiated on Protonix with further recommendations to follow in the morning.    PCP: Patrick Hernandez        Overview/Hospital Course:  4/1/23  s/p EGD - severe duodenal stricture with oozing ulcer at distal end  Transfused 1 unit PRBC  On protonix drip, CLD, GI following  May need IR intervention if rebleeds    4/2/23  Patient denies abdominal pain, nausea, had melena overnight, denies bright red blood in stool.  GI following, appreicate assitance, discussed with Dr. Flower  Repeat H/H pending this afternoon, advanced to FLD        Review of Systems   All other systems reviewed and are negative.  Objective:     Vital Signs (Most Recent):  Temp: 97.6 °F (36.4 °C) (04/02/23 1108)  Pulse: 72 (04/02/23 1108)  Resp: 18 (04/02/23 1108)  BP: (!) 91/44 (04/02/23 1108)  SpO2: 95 % (04/02/23 1108)   Vital Signs (24h Range):  Temp:  [97.6 °F (36.4 °C)-98.9 °F (37.2 °C)] 97.6 °F (36.4 °C)  Pulse:  [72-89] 72  Resp:  [12-18] 18  SpO2:  [91 %-98 %] 95 %  BP: ()/(44-79) 91/44     Weight: 68.2 kg (150 lb 5.7 oz)  Body mass index is 26.63 kg/m².    Intake/Output Summary (Last 24 hours) at 4/2/2023 1212  Last data filed at 4/2/2023 0522  Gross per 24 hour   Intake 863.43 ml   Output --   Net 863.43 ml      Physical Exam  Vitals and nursing note reviewed.   Constitutional:       General: She is not in acute distress.     Appearance: Normal appearance. She is normal weight.   Cardiovascular:      Rate and  Rhythm: Normal rate and regular rhythm.      Heart sounds: No murmur heard.  Pulmonary:      Effort: Pulmonary effort is normal. No respiratory distress.   Neurological:      Mental Status: She is alert and oriented to person, place, and time. Mental status is at baseline.       Significant Labs: All pertinent labs within the past 24 hours have been reviewed.  CBC:   Recent Labs   Lab 04/01/23  0631 04/01/23  1614 04/02/23  0707   WBC 8.10 13.10* 8.81   HGB 7.8* 9.6* 8.3*   HCT 24.0* 28.7* 25.2*    251 222     CMP:   Recent Labs   Lab 03/31/23  1720 04/01/23  0631 04/02/23  0707    137 138   K 3.3* 3.6 3.4*    107 109   CO2 23 21* 18*   GLU 96 141* 89   BUN 47* 43* 27*   CREATININE 0.7 0.7 0.7   CALCIUM 9.1 8.4* 7.9*   PROT 6.8 6.0 5.7*   ALBUMIN 3.4* 3.1* 3.1*   BILITOT 0.4 0.4 0.6   ALKPHOS 75 55 48*   AST 13 10 22   ALT 12 10 8*   ANIONGAP 14 9 11       Significant Imaging: I have reviewed all pertinent imaging results/findings within the past 24 hours.      Assessment/Plan:      Acute duodenal ulcer with hemorrhage and obstruction  Duodenal stricture  UGIB (upper gastrointestinal bleed)  GERD (gastroesophageal reflux disease)  Hx of gastric ulcer with duodenal stricture, recent NSAID use, recent discontinuation of PPI in the OP setting  --s/p IV PPI 80mg in the ER  --agree with transitioning to PPI gtt given substantial risk factors  --discussed case with GI, EGD tentatively scheduled for later today  --H&H downtrending since admission  --1uPRBC ordered to be transfused prior to EGD, consent obtained by GI  --serial CBC to trend h&h  --low threshold to transfuse for hgb < 8 given significant cardiac history    4/2  --EGD with severe duodenal stricture with oozing ulcer  --GI following, appreciate assistance; may need IR intervention if rebleeds  --Serial H/H, transfuse if indicated    Malignant neoplasm of central portion of left breast in female, estrogen receptor negative  Currently follows  Dr. Sepulveda from oncology.  Plan:  -continue outpatient f/u as directed     Chronic combined systolic and diastolic heart failure  --Last ECHO from NOV 2022 confirm EF 35 %  --euvolemic on exam, no signs of fluid overload  --monitor electrolytes, UOP  --strict I&O and daily weights ordered    Coronary artery disease involving coronary bypass graft of native heart without angina pectoris  Significant cardiac disease, CABG in 2017 (per patient), has been on ASA, plavis since that time, last dose day prior to admission  --holding asa, plavix d/t UGIB  --low threshold to consult Cardiology for development of ACS symptoms  --high risk patient, monitor closely on telemetry    Essential hypertension  Currently hypotensive. BP currently ranging from  systolic.  Plan:  -Optimize pain control   -hold home medications, titrate as needed   -Monitor BP      Hyperlipidemia  Patient is chronically on statin.will continue for now. Last Lipid Panel:   Lab Results   Component Value Date    CHOL 112 (L) 02/24/2023    HDL 39 (L) 02/24/2023    LDLCALC 58.8 (L) 02/24/2023    TRIG 71 02/24/2023    CHOLHDL 34.8 02/24/2023   Plan:  -Continue home medication  -low fat/low calorie diet    Hypothyroid  Patient has chronic hypothyroidism. TFTs reviewed-   Lab Results   Component Value Date    TSH 1.328 01/06/2023   Plan:  -Will continue chronic levothyroxine and adjust for and clinical changes.    Anxiety  Chronic. Stable. Not in acute exacerbation and currently denies endorsing any suicidal/homicidal ideations.   Plan:  -Continue home medications       VTE Risk Mitigation (From admission, onward)         Ordered     Reason for No Pharmacological VTE Prophylaxis  Once        Question Answer Comment   Reasons: Active Bleeding    Reasons: Risk of Bleeding        03/31/23 2119     IP VTE HIGH RISK PATIENT  Once         03/31/23 2119     Place sequential compression device  Until discontinued         03/31/23 2119                Discharge  Planning   DELICIA:      Code Status: Full Code   Is the patient medically ready for discharge?:     Reason for patient still in hospital (select all that apply): Patient trending condition, Laboratory test, Treatment and Consult recommendations  Discharge Plan A: Home with family                  Eric Groves MD  Department of Hospital Medicine   [facility] Surg

## 2023-04-03 LAB
ALBUMIN SERPL BCP-MCNC: 3 G/DL (ref 3.5–5.2)
ALP SERPL-CCNC: 50 U/L (ref 55–135)
ALT SERPL W/O P-5'-P-CCNC: 9 U/L (ref 10–44)
ANION GAP SERPL CALC-SCNC: 7 MMOL/L (ref 8–16)
AST SERPL-CCNC: 16 U/L (ref 10–40)
BASOPHILS # BLD AUTO: 0.09 K/UL (ref 0–0.2)
BASOPHILS NFR BLD: 1 % (ref 0–1.9)
BILIRUB SERPL-MCNC: 0.6 MG/DL (ref 0.1–1)
BUN SERPL-MCNC: 16 MG/DL (ref 8–23)
CALCIUM SERPL-MCNC: 8 MG/DL (ref 8.7–10.5)
CHLORIDE SERPL-SCNC: 110 MMOL/L (ref 95–110)
CO2 SERPL-SCNC: 23 MMOL/L (ref 23–29)
CREAT SERPL-MCNC: 0.6 MG/DL (ref 0.5–1.4)
DIFFERENTIAL METHOD: ABNORMAL
EOSINOPHIL # BLD AUTO: 0.4 K/UL (ref 0–0.5)
EOSINOPHIL NFR BLD: 4.5 % (ref 0–8)
ERYTHROCYTE [DISTWIDTH] IN BLOOD BY AUTOMATED COUNT: 15.4 % (ref 11.5–14.5)
EST. GFR  (NO RACE VARIABLE): >60 ML/MIN/1.73 M^2
GLUCOSE SERPL-MCNC: 86 MG/DL (ref 70–110)
HCT VFR BLD AUTO: 26.3 % (ref 37–48.5)
HGB BLD-MCNC: 8.4 G/DL (ref 12–16)
IMM GRANULOCYTES # BLD AUTO: 0.03 K/UL (ref 0–0.04)
IMM GRANULOCYTES NFR BLD AUTO: 0.3 % (ref 0–0.5)
LYMPHOCYTES # BLD AUTO: 1.6 K/UL (ref 1–4.8)
LYMPHOCYTES NFR BLD: 17.9 % (ref 18–48)
MCH RBC QN AUTO: 29 PG (ref 27–31)
MCHC RBC AUTO-ENTMCNC: 31.9 G/DL (ref 32–36)
MCV RBC AUTO: 91 FL (ref 82–98)
MONOCYTES # BLD AUTO: 1.1 K/UL (ref 0.3–1)
MONOCYTES NFR BLD: 12.1 % (ref 4–15)
NEUTROPHILS # BLD AUTO: 5.7 K/UL (ref 1.8–7.7)
NEUTROPHILS NFR BLD: 64.2 % (ref 38–73)
NRBC BLD-RTO: 0 /100 WBC
PLATELET # BLD AUTO: 249 K/UL (ref 150–450)
PMV BLD AUTO: 10.1 FL (ref 9.2–12.9)
POTASSIUM SERPL-SCNC: 3.2 MMOL/L (ref 3.5–5.1)
PROT SERPL-MCNC: 5.6 G/DL (ref 6–8.4)
RBC # BLD AUTO: 2.9 M/UL (ref 4–5.4)
SODIUM SERPL-SCNC: 140 MMOL/L (ref 136–145)
WBC # BLD AUTO: 8.85 K/UL (ref 3.9–12.7)

## 2023-04-03 PROCEDURE — 11000001 HC ACUTE MED/SURG PRIVATE ROOM

## 2023-04-03 PROCEDURE — 94761 N-INVAS EAR/PLS OXIMETRY MLT: CPT

## 2023-04-03 PROCEDURE — 99232 SBSQ HOSP IP/OBS MODERATE 35: CPT | Mod: ,,, | Performed by: PHYSICIAN ASSISTANT

## 2023-04-03 PROCEDURE — 25000003 PHARM REV CODE 250: Performed by: INTERNAL MEDICINE

## 2023-04-03 PROCEDURE — 94618 PULMONARY STRESS TESTING: CPT

## 2023-04-03 PROCEDURE — C9113 INJ PANTOPRAZOLE SODIUM, VIA: HCPCS | Performed by: INTERNAL MEDICINE

## 2023-04-03 PROCEDURE — 85025 COMPLETE CBC W/AUTO DIFF WBC: CPT | Performed by: INTERNAL MEDICINE

## 2023-04-03 PROCEDURE — 80053 COMPREHEN METABOLIC PANEL: CPT | Performed by: INTERNAL MEDICINE

## 2023-04-03 PROCEDURE — 25000003 PHARM REV CODE 250: Performed by: PHYSICIAN ASSISTANT

## 2023-04-03 PROCEDURE — 99232 PR SUBSEQUENT HOSPITAL CARE,LEVL II: ICD-10-PCS | Mod: ,,, | Performed by: PHYSICIAN ASSISTANT

## 2023-04-03 PROCEDURE — 99900035 HC TECH TIME PER 15 MIN (STAT)

## 2023-04-03 PROCEDURE — 36415 COLL VENOUS BLD VENIPUNCTURE: CPT | Performed by: INTERNAL MEDICINE

## 2023-04-03 PROCEDURE — 27000221 HC OXYGEN, UP TO 24 HOURS

## 2023-04-03 PROCEDURE — 94799 UNLISTED PULMONARY SVC/PX: CPT

## 2023-04-03 PROCEDURE — 63600175 PHARM REV CODE 636 W HCPCS: Performed by: INTERNAL MEDICINE

## 2023-04-03 RX ORDER — METOPROLOL SUCCINATE 25 MG/1
25 TABLET, EXTENDED RELEASE ORAL DAILY
Status: DISCONTINUED | OUTPATIENT
Start: 2023-04-04 | End: 2023-04-05 | Stop reason: HOSPADM

## 2023-04-03 RX ORDER — PANTOPRAZOLE SODIUM 40 MG/1
40 TABLET, DELAYED RELEASE ORAL 2 TIMES DAILY
Status: DISCONTINUED | OUTPATIENT
Start: 2023-04-03 | End: 2023-04-05 | Stop reason: HOSPADM

## 2023-04-03 RX ADMIN — PANTOPRAZOLE SODIUM 8 MG/HR: 40 INJECTION, POWDER, FOR SOLUTION INTRAVENOUS at 05:04

## 2023-04-03 RX ADMIN — METOPROLOL SUCCINATE 25 MG: 25 TABLET, EXTENDED RELEASE ORAL at 08:04

## 2023-04-03 RX ADMIN — DAPSONE 75 MG: 25 TABLET ORAL at 08:04

## 2023-04-03 RX ADMIN — SUCRALFATE 1 G: 1 SUSPENSION ORAL at 05:04

## 2023-04-03 RX ADMIN — LEVOTHYROXINE SODIUM 75 MCG: 75 TABLET ORAL at 05:04

## 2023-04-03 RX ADMIN — PANTOPRAZOLE SODIUM 40 MG: 40 TABLET, DELAYED RELEASE ORAL at 08:04

## 2023-04-03 RX ADMIN — ESCITALOPRAM OXALATE 10 MG: 10 TABLET ORAL at 08:04

## 2023-04-03 RX ADMIN — SUCRALFATE 1 G: 1 SUSPENSION ORAL at 11:04

## 2023-04-03 NOTE — SUBJECTIVE & OBJECTIVE
Interval History:       Patient denies nausea, abdominal pain, vomiting.    Last bowel movement yesterday morning;  Hemoglobin slightly trended down   Tolerated full liquid diet without issues, GI recommended to advance diet as tolerated   Protonix drip discontinued, transition to p.o. Protonix as per GI   Will monitor for tolerance on advance diet, follow up on H&H;  Patient currently saturating on 2 L nasal cannula, not on oxygen at baseline, we will follow up on home oxygen assessment test;        Review of Systems    Negative except as mentioned above     Objective:     Vital Signs (Most Recent):  Temp: 99.1 °F (37.3 °C) (04/03/23 1134)  Pulse: 83 (04/03/23 1134)  Resp: 18 (04/03/23 1134)  BP: (!) 116/55 (04/03/23 1134)  SpO2: 97 % (04/03/23 1134)   Vital Signs (24h Range):  Temp:  [98 °F (36.7 °C)-99.1 °F (37.3 °C)] 99.1 °F (37.3 °C)  Pulse:  [70-84] 83  Resp:  [16-18] 18  SpO2:  [89 %-97 %] 97 %  BP: ()/(49-56) 116/55     Weight: 66.1 kg (145 lb 11.6 oz)  Body mass index is 25.81 kg/m².  No intake or output data in the 24 hours ending 04/03/23 1236   Physical Exam    Constitutional:       General: She is not in acute distress.     Appearance: Normal appearance. She is normal weight.   Cardiovascular:      Rate and Rhythm: Normal rate and regular rhythm.      Heart sounds: No murmur heard.  Pulmonary:      Effort: Pulmonary effort is normal. No respiratory distress.   Neurological:      Mental Status: She is alert and oriented to person, place, and time. Mental status is at baseline.         Significant Labs: All pertinent labs within the past 24 hours have been reviewed.  CBC:   Recent Labs   Lab 04/01/23  1614 04/02/23  0707 04/02/23  1719 04/03/23  0528   WBC 13.10* 8.81  --  8.85   HGB 9.6* 8.3* 8.9* 8.4*   HCT 28.7* 25.2* 26.9* 26.3*    222  --  249     CMP:   Recent Labs   Lab 04/02/23  0707 04/03/23  0528    140   K 3.4* 3.2*    110   CO2 18* 23   GLU 89 86   BUN 27* 16    CREATININE 0.7 0.6   CALCIUM 7.9* 8.0*   PROT 5.7* 5.6*   ALBUMIN 3.1* 3.0*   BILITOT 0.6 0.6   ALKPHOS 48* 50*   AST 22 16   ALT 8* 9*   ANIONGAP 11 7*       Significant Imaging:     Imaging Results              CTA Acute GI [place], Abdomen and Pelvis (Final result)  Result time 03/31/23 19:18:13   Procedure changed from CTA Abdomen and Pelvis     Final result by Skip Almazan MD (03/31/23 19:18:13)                   Impression:      Multiple findings as described above.  No evidence for active hemorrhage    Questionable scarring in the left kidney upper lobe with poor enhancement of the renal parenchyma.  This may relate to scarring.  Recommend follow-up.      Electronically signed by: Skip Almazan  Date:    03/31/2023  Time:    19:18               Narrative:    EXAMINATION:  CT angiography acute GI bleed abdomen pelvis    CLINICAL HISTORY:  GI bleeding    TECHNIQUE:  CT angiography of the abdomen pelvis with and without contrast.  3 minute delays were provided.  MIP imaging was utilized.  A total of 100 cc of Omnipaque 350 was injected.    COMPARISON:  None    FINDINGS:  No hemorrhage identified.  No hydronephrosis.  Upper lobe scarring of left kidney.  Calcified uterine fibroid.  No high bowel obstruction.  No evidence for active hemorrhage.  Colonic diverticulosis.  Mild mucosal thickening of the sigmoid colon.  Uterine fibroid suspected.  Bladder is mildly distended.  Postsurgical changes anterior abdomen.  Mild mucosal thickening of the stomach.  Pancreas adrenal glands are grossly unremarkable.  No gallstones.  Mild moderate atherosclerotic disease of the aorta iliac vasculature.

## 2023-04-03 NOTE — PLAN OF CARE
Patient remains free of injury. VSS. Continuing to monitor labs and signs of bleeding. Continuous protonix continued. Chart reviewed. Call light in reach. Will continue to monitor.    Problem: Adult Inpatient Plan of Care  Goal: Patient-Specific Goal (Individualized)  Outcome: Ongoing, Progressing     Problem: Adult Inpatient Plan of Care  Goal: Optimal Comfort and Wellbeing  Outcome: Ongoing, Progressing     Problem: Fall Injury Risk  Goal: Absence of Fall and Fall-Related Injury  Outcome: Ongoing, Progressing     Problem: Adjustment to Illness (Gastrointestinal Bleeding)  Goal: Optimal Coping with Acute Illness  Outcome: Ongoing, Progressing     Problem: Bleeding (Gastrointestinal Bleeding)  Goal: Hemostasis  Outcome: Ongoing, Progressing

## 2023-04-03 NOTE — RESPIRATORY THERAPY
Home Oxygen Evaluation - [facility] - Cardiopulmonary Department      Date Performed: 4/3/2023      1) Patient's Home O2 Sat on room air, while at rest: Room Air SpO2 At Rest: 92 %        If O2 sats on room air at rest are 88% or below, patient qualifies.  Document O2 liter flow needed in Step 2.  If O2 sats are 89% or above, complete Step 3.        2)  If patient is not ambulated and O2 sats are <88%, what is the O2 liter flow required to meet ordered saturation?      If O2 sats on room air while exercising remain 89% or above patient does not qualify, no further testing needed Document N/A in step 3. If O2 sats on room air while exercising are 88% or below, continue to Step 4.    3) Patient's O2 Sat on room air while exercising: Room Air SpO2 During Ambulation: (!) 86 %        4) Patient's O2 Sat while exercising on O2: SpO2 During Ambulation on O2: 94 % at Ambulation O2 LPM: 2 LPM         (Must show improvement from #4 for patients to qualify)

## 2023-04-03 NOTE — ASSESSMENT & PLAN NOTE
S/p EGD which showed a severe stricture with ulcer oozing on distal end.  Hgb stable.   BUN normal.   No further melena in last 24 hours.   On Protonix drip and carafate suspension QID. Ok to change drip to po bid.  Ok to advance diet as tolerated.   Will need repeat EGD in two months to assess healing. Recommend having this done in [place] so dilatation can be performed.    Avoid NSAIDs.   Plavix and ASA on hold. Ok to restart one after she has been off antiplatelet for a total of 7 days.

## 2023-04-03 NOTE — PLAN OF CARE
Problem: Adult Inpatient Plan of Care  Goal: Plan of Care Review  Outcome: Ongoing, Progressing  Goal: Patient-Specific Goal (Individualized)  Outcome: Ongoing, Progressing  Goal: Absence of Hospital-Acquired Illness or Injury  Outcome: Ongoing, Progressing  Goal: Optimal Comfort and Wellbeing  Outcome: Ongoing, Progressing  Goal: Readiness for Transition of Care  Outcome: Ongoing, Progressing     Problem: Fall Injury Risk  Goal: Absence of Fall and Fall-Related Injury  Outcome: Ongoing, Progressing     Problem: Adjustment to Illness (Gastrointestinal Bleeding)  Goal: Optimal Coping with Acute Illness  Outcome: Ongoing, Progressing     Problem: Bleeding (Gastrointestinal Bleeding)  Goal: Hemostasis  Outcome: Ongoing, Progressing

## 2023-04-03 NOTE — NURSING
POC reviewed with patient. Pt verbalized understanding  AAO x4. VSS  NR on tele monitor.   PIV saline locked, clean, dry, intact  Pt on 2 L NC  No other c/o at this time.  Pt remains free of injuries and falls; fall precaution in place.   Bed low, side rails x2, call light in reach, personal belongings at bedside, bed alarm in use  Reminded to call for assistance.  Repositioned independently.  Hourly rounding complete. 12 hr chart check complete. Will continue to monitor.

## 2023-04-03 NOTE — PLAN OF CARE
Discussed poc with pt, pt verbalized understanding  Purposeful rounding every 2hours  VS wnl  Cardiac monitoring in use, pt is NSR  Fall precautions in place, remains injury free  IVFs: Pantoprazole   Accurate I&Os  Bed locked at lowest position  Call light within reach  Chart check complete  Will cont with POC

## 2023-04-03 NOTE — SUBJECTIVE & OBJECTIVE
Subjective:     Interval History: No overnight complaints. Patient just getting started with a soft diet this morning. Last black stool was yesterday morning. She denies nausea, vomiting or abdominal pain. Hgb stable. BUN normal.     Review of Systems   Constitutional:         See Interval History for daily ROS.    Objective:     Vital Signs (Most Recent):  Temp: 98 °F (36.7 °C) (04/03/23 0748)  Pulse: 81 (04/03/23 0748)  Resp: 16 (04/03/23 0748)  BP: (!) 119/56 (04/03/23 0748)  SpO2: (!) 90 % (04/03/23 0748)   Vital Signs (24h Range):  Temp:  [97.6 °F (36.4 °C)-98.9 °F (37.2 °C)] 98 °F (36.7 °C)  Pulse:  [69-84] 81  Resp:  [16-18] 16  SpO2:  [89 %-95 %] 90 %  BP: ()/(44-56) 119/56     Weight: 66.1 kg (145 lb 11.6 oz) (04/02/23 2342)  Body mass index is 25.81 kg/m².    No intake or output data in the 24 hours ending 04/03/23 0809    Lines/Drains/Airways       Drain  Duration                  Closed/Suction Drain 11/25/22 1105 Left Breast Bulb 15 Fr. 128 days              Peripheral Intravenous Line  Duration                  Peripheral IV - Single Lumen 03/31/23 1712 20 G Posterior;Proximal;Right Forearm 2 days         Peripheral IV - Single Lumen 04/01/23 0848 22 G Left;Posterior Hand 1 day                    Physical Exam  Constitutional:       General: She is not in acute distress.     Appearance: She is well-developed. She is not diaphoretic.   HENT:      Head: Normocephalic and atraumatic.   Eyes:      Extraocular Movements: Extraocular movements intact.   Cardiovascular:      Rate and Rhythm: Normal rate and regular rhythm.   Pulmonary:      Effort: Pulmonary effort is normal. No respiratory distress.      Breath sounds: Normal breath sounds. No wheezing.   Abdominal:      General: Bowel sounds are normal. There is no distension.      Palpations: Abdomen is soft.      Tenderness: There is no abdominal tenderness.   Neurological:      Mental Status: She is alert and oriented to person, place, and time.       Cranial Nerves: No cranial nerve deficit.   Psychiatric:         Behavior: Behavior normal.       Significant Labs:  CBC:   Recent Labs   Lab 04/01/23  1614 04/02/23  0707 04/02/23  1719 04/03/23  0528   WBC 13.10* 8.81  --  8.85   HGB 9.6* 8.3* 8.9* 8.4*   HCT 28.7* 25.2* 26.9* 26.3*    222  --  249     CMP:   Recent Labs   Lab 04/03/23  0528   GLU 86   CALCIUM 8.0*   ALBUMIN 3.0*   PROT 5.6*      K 3.2*   CO2 23      BUN 16   CREATININE 0.6   ALKPHOS 50*   ALT 9*   AST 16   BILITOT 0.6     Coagulation: No results for input(s): PT, INR, APTT in the last 48 hours.      Significant Imaging:  Imaging results within the past 24 hours have been reviewed.

## 2023-04-03 NOTE — PHYSICIAN QUERY
PT Name: Dulce Root  MR #: [id number]    DOCUMENTATION CLARIFICATION      CDS/: Zoie Garvey               Contact information: [email or web address]    This form is a permanent document in the medical record.      Query Date: April 3, 2023    By submitting this query, we are merely seeking further clarification of documentation. Please utilize your independent clinical judgment when addressing the question(s) below.    The Medical Record contains the following:   Indicators  Supporting Clinical Findings Location in Medical Record   x Anemia documented Upper GI bleeding  Anemia, unspecified type    Found to have evidence of hypotension and anemia with H/H measuring 9.0/27.7   ED Provider Note 03/31      Hospital Medicine H&P 03/31   x H&H Labs on presentation show a H&H 9/27, her baseline is 10.6/33.9. Repeat this morning is 7.8/24     03/31/23 17:20 04/01/23 06:31 04/01/23 16:14 04/02/23 07:07 04/02/23 17:19 04/03/23 05:28   Hemoglobin 9.0  7.8  9.6  8.3  8.9  8.4    Hematocrit 27.7  24.0  28.7  25.2  26.9  26.3         GI Consult 04/01    Labs, CBC 03/31 - 04/03    BP                    HR     x GI bleeding documented Presented to the ED for further evaluation after endorsing dark bloody stool x3 days duration.  Patient has history of gastric ulcers and had similar presentation try last occurrence.  Patient also reported 1 episode of hematemesis yesterday   Melena  Presented with persistent abdominal pain in setting of hematemesis and melena/dark stools concerning for GI bleed    Acute duodenal ulcer with hemorrhage and obstruction  S/p EGD which showed a severe stricture with ulcer oozing on distal end.   [facility] H&P 03/31        GI PN 04/03    Acute bleeding (Non GI site)     x Transfusion(s) 1 unit prbc Blood Bank   x Acute/Chronic illness peptic ulcer disease diagnosed in 05/5021. Patient had presented to ED at that time with coffee ground emesis and melena. EGD discovered a duodenal  bulb ulcer and duodenal stricture in the first portion of the duodenum. She was treated conservatively and repeat EGD a few months later showed resolution of the ulcer but the duodenal stricture remained. She was continued on low dose Protonix 20mg daily   GI Consult 04/01   x Treatments GI consult   NPO  Protonix  Type/screen, transfuse if hemoglobin <8.0  Hold antiplatelet/anticoagulation therapies    EGD today  Transfuse 1U pRBCs now    On Protonix drip and carafate suspension QID. Ok to change drip to po bid.  Ok to advance diet as tolerated.   Will need repeat EGD in two months to assess healing   [facility] H&P 03/31          GI Consult 04/01        GI PN 04/03    Other           Provider, please specify diagnosis or diagnoses associated with above clinical findings.     [  x ] Acute blood loss anemia    [   ] Chronic blood loss anemia     [   ] Anemia, unspecified    [   ] Other Hematological Diagnosis (please specify): _________________   [   ] Clinically Undetermined              Please document in your progress notes daily for the duration of treatment, until resolved, and include in your discharge summary.    Form No. [id number]

## 2023-04-03 NOTE — PROGRESS NOTES
[facility] Medicine  Progress Note    Patient Name: Dulce Root  MRN: [id number]  Patient Class: IP- Inpatient   Admission Date: 3/31/2023  Length of Stay: 3 days  Attending Physician: Paulina Natarajan, *  Primary Care Provider: Patrick Hernandez MD        Subjective:     Principal Problem:Acute duodenal ulcer with hemorrhage and obstruction        HPI:  Dulce Root is a 67 y.o. female with a PMH  has a past medical history of Allergy, Anticoagulant long-term use, Breast cancer (2008), Carotid stenosis, bilateral (10/13/2017), Coronary artery disease, Coronary artery disease involving coronary bypass graft of native heart without angina pectoris (08/16/2019), Coronary artery disease involving native coronary artery of native heart without angina pectoris (06/27/2017), DCIS (ductal carcinoma in situ) of breast (11/06/2012), Encounter for blood transfusion, Essential hypertension (11/06/2012), GERD (gastroesophageal reflux disease), GI bleed (02/2021), Hodgkin lymphoma, Hx of Hodgkin's disease - s/p chemo and radiation (11/06/2012), Hyperlipidemia, Hyperlipidemia (11/06/2012), Hypertension, LBBB (left bundle branch block) (05/22/2018), Osteoporosis, Paroxysmal atrial fibrillation - single post-op episode (08/24/2017), Pemphigoid, Pemphigoid, Pulmonary hypertension (1/13/2023), S/P AVR (aortic valve replacement) - pericardial valve (08/21/2017), S/P CABG x 2 (08/21/2017), Stented coronary artery, and Thyroid disease. who presented to the ED for further evaluation after endorsing dark bloody stool x3 days duration.  Patient has history of gastric ulcers and had similar presentation try last occurrence.  Patient also reported 1 episode of hematemesis yesterday in addition to endorsing decreased oral intake, bloating throughout her epigastric region, feeling fatigued, and generalized weakness.  Patient denies abuse of NSAIDs the reports being taken off Protonix over the past year after being so she  no longer needed to be on them but continues to be on aspirin and Plavix 2 2 significant heart history and stent placement.  Other associated symptoms included light headedness/dizziness and noticed that her blood pressure has been running on the lower end of normal with systolic running in the 90s and use a ranges 120s/70s.  She reported no known alleviating or aggravating factors reported all other review of systems negative except as noted above.  GI was consulted by ED staff who recommended patient be admitted to Hospital Medicine and initiated on Protonix with further recommendations to follow in the morning.    PCP: Patrick Hernandez        Overview/Hospital Course:  4/1/23  s/p EGD - severe duodenal stricture with oozing ulcer at distal end  Transfused 1 unit PRBC  On protonix drip, CLD, GI following  May need IR intervention if rebleeds    4/2/23  Patient denies abdominal pain, nausea, had melena overnight, denies bright red blood in stool.  GI following, appreicate assitance, discussed with Dr. Flower  Repeat H/H pending this afternoon, advanced to FLD    4/3  Patient denies nausea, abdominal pain, vomiting.    Last bowel movement yesterday morning;  Hemoglobin slightly trended down   Tolerated full liquid diet without issues, GI recommended to advance diet as tolerated   Protonix drip discontinued, transition to p.o. Protonix as per GI   Will monitor for tolerance on advance diet, follow up on H&H;  Patient currently saturating on 2 L nasal cannula, not on oxygen at baseline, we will follow up on home oxygen assessment test;        Interval History:       Patient denies nausea, abdominal pain, vomiting.    Last bowel movement yesterday morning;  Hemoglobin slightly trended down   Tolerated full liquid diet without issues, GI recommended to advance diet as tolerated   Protonix drip discontinued, transition to p.o. Protonix as per GI   Will monitor for tolerance on advance diet, follow up on H&H;  Patient  currently saturating on 2 L nasal cannula, not on oxygen at baseline, we will follow up on home oxygen assessment test;        Review of Systems    Negative except as mentioned above     Objective:     Vital Signs (Most Recent):  Temp: 99.1 °F (37.3 °C) (04/03/23 1134)  Pulse: 83 (04/03/23 1134)  Resp: 18 (04/03/23 1134)  BP: (!) 116/55 (04/03/23 1134)  SpO2: 97 % (04/03/23 1134)   Vital Signs (24h Range):  Temp:  [98 °F (36.7 °C)-99.1 °F (37.3 °C)] 99.1 °F (37.3 °C)  Pulse:  [70-84] 83  Resp:  [16-18] 18  SpO2:  [89 %-97 %] 97 %  BP: ()/(49-56) 116/55     Weight: 66.1 kg (145 lb 11.6 oz)  Body mass index is 25.81 kg/m².  No intake or output data in the 24 hours ending 04/03/23 1236   Physical Exam    Constitutional:       General: She is not in acute distress.     Appearance: Normal appearance. She is normal weight.   Cardiovascular:      Rate and Rhythm: Normal rate and regular rhythm.      Heart sounds: No murmur heard.  Pulmonary:      Effort: Pulmonary effort is normal. No respiratory distress.   Neurological:      Mental Status: She is alert and oriented to person, place, and time. Mental status is at baseline.         Significant Labs: All pertinent labs within the past 24 hours have been reviewed.  CBC:   Recent Labs   Lab 04/01/23  1614 04/02/23  0707 04/02/23  1719 04/03/23  0528   WBC 13.10* 8.81  --  8.85   HGB 9.6* 8.3* 8.9* 8.4*   HCT 28.7* 25.2* 26.9* 26.3*    222  --  249     CMP:   Recent Labs   Lab 04/02/23  0707 04/03/23  0528    140   K 3.4* 3.2*    110   CO2 18* 23   GLU 89 86   BUN 27* 16   CREATININE 0.7 0.6   CALCIUM 7.9* 8.0*   PROT 5.7* 5.6*   ALBUMIN 3.1* 3.0*   BILITOT 0.6 0.6   ALKPHOS 48* 50*   AST 22 16   ALT 8* 9*   ANIONGAP 11 7*       Significant Imaging:     Imaging Results              CTA Acute GI [place], Abdomen and Pelvis (Final result)  Result time 03/31/23 19:18:13   Procedure changed from CTA Abdomen and Pelvis     Final result by Skip Almazan MD  (03/31/23 19:18:13)                   Impression:      Multiple findings as described above.  No evidence for active hemorrhage    Questionable scarring in the left kidney upper lobe with poor enhancement of the renal parenchyma.  This may relate to scarring.  Recommend follow-up.      Electronically signed by: Skip Almazan  Date:    03/31/2023  Time:    19:18               Narrative:    EXAMINATION:  CT angiography acute GI bleed abdomen pelvis    CLINICAL HISTORY:  GI bleeding    TECHNIQUE:  CT angiography of the abdomen pelvis with and without contrast.  3 minute delays were provided.  MIP imaging was utilized.  A total of 100 cc of Omnipaque 350 was injected.    COMPARISON:  None    FINDINGS:  No hemorrhage identified.  No hydronephrosis.  Upper lobe scarring of left kidney.  Calcified uterine fibroid.  No high bowel obstruction.  No evidence for active hemorrhage.  Colonic diverticulosis.  Mild mucosal thickening of the sigmoid colon.  Uterine fibroid suspected.  Bladder is mildly distended.  Postsurgical changes anterior abdomen.  Mild mucosal thickening of the stomach.  Pancreas adrenal glands are grossly unremarkable.  No gallstones.  Mild moderate atherosclerotic disease of the aorta iliac vasculature.                                         Assessment/Plan:      * Acute duodenal ulcer with hemorrhage and obstruction        UGIB (upper gastrointestinal bleed)  Hx of gastric ulcer with duodenal stricture, recent NSAID use, recent discontinuation of PPI in the OP setting  --s/p IV PPI 80mg in the ER  --agree with transitioning to PPI gtt given substantial risk factors  --discussed case with GI, EGD tentatively scheduled for later today  --H&H downtrending since admission  --1uPRBC ordered to be transfused prior to EGD, consent obtained by GI  --serial CBC to trend h&h  --low threshold to transfuse for hgb < 8 given significant cardiac history    4/2  --EGD with severe duodenal stricture with oozing  ulcer  --GI following, appreciate assistance; may need IR intervention if rebleeds  --Serial H/H, transfuse if indicated    GERD (gastroesophageal reflux disease)  See plan for UGIB     Malignant neoplasm of central portion of left breast in female, estrogen receptor negative  Currently follows Dr. Sepulveda from oncology.  Plan:  -continue outpatient f/u as directed     Duodenal stricture        Chronic combined systolic and diastolic heart failure  --Last ECHO from NOV 2022 confirm EF 35 %  --euvolemic on exam, no signs of fluid overload  --monitor electrolytes, UOP  --strict I&O and daily weights ordered    Anxiety  Chronic. Stable. Not in acute exacerbation and currently denies endorsing any suicidal/homicidal ideations.   Plan:  -Continue home medications     Coronary artery disease involving coronary bypass graft of native heart without angina pectoris  Significant cardiac disease, CABG in 2017 (per patient), has been on ASA, plavis since that time, last dose day prior to admission  --holding asa, plavix d/t UGIB  --low threshold to consult Cardiology for development of ACS symptoms  --high risk patient, monitor closely on telemetry    Hypothyroid  Patient has chronic hypothyroidism. TFTs reviewed-   Lab Results   Component Value Date    TSH 1.328 01/06/2023   Plan:  -Will continue chronic levothyroxine and adjust for and clinical changes.    Hyperlipidemia  Patient is chronically on statin.will continue for now. Last Lipid Panel:   Lab Results   Component Value Date    CHOL 112 (L) 02/24/2023    HDL 39 (L) 02/24/2023    LDLCALC 58.8 (L) 02/24/2023    TRIG 71 02/24/2023    CHOLHDL 34.8 02/24/2023   Plan:  -Continue home medication  -low fat/low calorie diet    Essential hypertension  Currently hypotensive. BP currently ranging from  systolic.  Plan:  -Optimize pain control   -hold home medications, titrate as needed   -Monitor BP        VTE Risk Mitigation (From admission, onward)         Ordered     Reason  for No Pharmacological VTE Prophylaxis  Once        Question Answer Comment   Reasons: Active Bleeding    Reasons: Risk of Bleeding        03/31/23 2119     IP VTE HIGH RISK PATIENT  Once         03/31/23 2119     Place sequential compression device  Until discontinued         03/31/23 2119                Discharge Planning   DELICIA:      Code Status: Full Code   Is the patient medically ready for discharge?:     Reason for patient still in hospital (select all that apply):  Monitor clinical improvement, GI follow up  Discharge Plan A: Home with family                  Paulina Natarajan MD  Department of Hospital Medicine   [facility]

## 2023-04-03 NOTE — PROGRESS NOTES
[facility] S[facility]  Gastroenterology  Progress Note    Patient Name: Dulce Root  MRN: [id number]  Admission Date: 3/31/2023  Hospital Length of Stay: 3 days  Code Status: Full Code   Attending Provider: Paulina Natarajan, *  Consulting Provider: Blas Farrar PA-C  Primary Care Physician: Patrick Hernandez MD  Principal Problem: Acute duodenal ulcer with hemorrhage and obstruction      Subjective:     Interval History: No overnight complaints. Patient just getting started with a soft diet this morning. Last black stool was yesterday morning. She denies nausea, vomiting or abdominal pain. Hgb stable. BUN normal.     Review of Systems   Constitutional:         See Interval History for daily ROS.    Objective:     Vital Signs (Most Recent):  Temp: 98 °F (36.7 °C) (04/03/23 0748)  Pulse: 81 (04/03/23 0748)  Resp: 16 (04/03/23 0748)  BP: (!) 119/56 (04/03/23 0748)  SpO2: (!) 90 % (04/03/23 0748)   Vital Signs (24h Range):  Temp:  [97.6 °F (36.4 °C)-98.9 °F (37.2 °C)] 98 °F (36.7 °C)  Pulse:  [69-84] 81  Resp:  [16-18] 16  SpO2:  [89 %-95 %] 90 %  BP: ()/(44-56) 119/56     Weight: 66.1 kg (145 lb 11.6 oz) (04/02/23 2342)  Body mass index is 25.81 kg/m².    No intake or output data in the 24 hours ending 04/03/23 0809    Lines/Drains/Airways       Drain  Duration                  Closed/Suction Drain 11/25/22 1105 Left Breast Bulb 15 Fr. 128 days              Peripheral Intravenous Line  Duration                  Peripheral IV - Single Lumen 03/31/23 1712 20 G Posterior;Proximal;Right Forearm 2 days         Peripheral IV - Single Lumen 04/01/23 0848 22 G Left;Posterior Hand 1 day                    Physical Exam  Constitutional:       General: She is not in acute distress.     Appearance: She is well-developed. She is not diaphoretic.   HENT:      Head: Normocephalic and atraumatic.   Eyes:      Extraocular Movements: Extraocular movements intact.   Cardiovascular:      Rate and Rhythm: Normal rate and regular  rhythm.   Pulmonary:      Effort: Pulmonary effort is normal. No respiratory distress.      Breath sounds: Normal breath sounds. No wheezing.   Abdominal:      General: Bowel sounds are normal. There is no distension.      Palpations: Abdomen is soft.      Tenderness: There is no abdominal tenderness.   Neurological:      Mental Status: She is alert and oriented to person, place, and time.      Cranial Nerves: No cranial nerve deficit.   Psychiatric:         Behavior: Behavior normal.       Significant Labs:  CBC:   Recent Labs   Lab 04/01/23  1614 04/02/23  0707 04/02/23  1719 04/03/23  0528   WBC 13.10* 8.81  --  8.85   HGB 9.6* 8.3* 8.9* 8.4*   HCT 28.7* 25.2* 26.9* 26.3*    222  --  249     CMP:   Recent Labs   Lab 04/03/23  0528   GLU 86   CALCIUM 8.0*   ALBUMIN 3.0*   PROT 5.6*      K 3.2*   CO2 23      BUN 16   CREATININE 0.6   ALKPHOS 50*   ALT 9*   AST 16   BILITOT 0.6     Coagulation: No results for input(s): PT, INR, APTT in the last 48 hours.      Significant Imaging:  Imaging results within the past 24 hours have been reviewed.    Assessment/Plan:     Cardiac/Vascular  Chronic combined systolic and diastolic heart failure  CAD s/p CABG 8/17 ( SVG to LAD, SVG to PDA occluded), PCI 2019 LM, CX, RCA  AVR, bioprosthetic 8/17  Carotid artery stenosis (ANNA 60-69%, LICA 80-89%)  Pulmonary HTN PAP 57, Likely WHO class II  HFrEF, EF 35% since 2019  Plavix and ASA last taken 3/31/23  stable    GI  * Acute duodenal ulcer with hemorrhage and obstruction  S/p EGD which showed a severe stricture with ulcer oozing on distal end.  Hgb stable.   BUN normal.   No further melena in last 24 hours.   On Protonix drip and carafate suspension QID. Ok to change drip to po bid.  Ok to advance diet as tolerated.   Will need repeat EGD in two months to assess healing. Recommend having this done in [place] so dilatation can be performed.    Avoid NSAIDs.   Plavix and ASA on hold. Ok to restart one after she  has been off antiplatelet for a total of 7 days.     Duodenal stricture  EGD 4/1/23: severe duodenal stricture - able to pass adult gastroscope but challenging          Thank you for your consult. I will follow-up with patient. Please contact us if you have any additional questions.    Blas Farrar PA-C  Gastroenterology  [facility]

## 2023-04-03 NOTE — NURSING
Pts O2 sats are 89-91% on RA. 2L oxygen NC applied. Notified Paulina Natarajan MD. Will continue to monitor.

## 2023-04-04 ENCOUNTER — TELEPHONE (OUTPATIENT)
Dept: FAMILY MEDICINE | Facility: CLINIC | Age: 68
End: 2023-04-04
Payer: MEDICARE

## 2023-04-04 ENCOUNTER — DOCUMENTATION ONLY (OUTPATIENT)
Dept: GASTROENTEROLOGY | Facility: HOSPITAL | Age: 68
End: 2023-04-04
Payer: MEDICARE

## 2023-04-04 LAB
ANION GAP SERPL CALC-SCNC: 8 MMOL/L (ref 8–16)
AORTIC ROOT ANNULUS: 2.67 CM
ASCENDING AORTA: 2.43 CM
AV INDEX (PROSTH): 0.67
AV MEAN GRADIENT: 15 MMHG
AV PEAK GRADIENT: 20 MMHG
AV VALVE AREA: 1.64 CM2
AV VELOCITY RATIO: 0.56
BASOPHILS # BLD AUTO: 0.07 K/UL (ref 0–0.2)
BASOPHILS NFR BLD: 0.7 % (ref 0–1.9)
BSA FOR ECHO PROCEDURE: 1.73 M2
BUN SERPL-MCNC: 13 MG/DL (ref 8–23)
CALCIUM SERPL-MCNC: 7.9 MG/DL (ref 8.7–10.5)
CHLORIDE SERPL-SCNC: 109 MMOL/L (ref 95–110)
CO2 SERPL-SCNC: 22 MMOL/L (ref 23–29)
CREAT SERPL-MCNC: 0.6 MG/DL (ref 0.5–1.4)
CV ECHO LV RWT: 0.5 CM
D DIMER PPP IA.FEU-MCNC: 1.87 MG/L FEU
DIFFERENTIAL METHOD: ABNORMAL
DOP CALC AO PEAK VEL: 2.22 M/S
DOP CALC AO VTI: 40.1 CM
DOP CALC LVOT AREA: 2.5 CM2
DOP CALC LVOT DIAMETER: 1.77 CM
DOP CALC LVOT PEAK VEL: 1.24 M/S
DOP CALC LVOT STROKE VOLUME: 65.91 CM3
DOP CALC RVOT PEAK VEL: 0.94 M/S
DOP CALC RVOT VTI: 18.5 CM
DOP CALCLVOT PEAK VEL VTI: 26.8 CM
E WAVE DECELERATION TIME: 204.01 MSEC
E/A RATIO: 1.64
E/E' RATIO: 13.11 M/S
ECHO LV POSTERIOR WALL: 1.19 CM (ref 0.6–1.1)
EJECTION FRACTION: 40 %
EOSINOPHIL # BLD AUTO: 0.7 K/UL (ref 0–0.5)
EOSINOPHIL NFR BLD: 6.7 % (ref 0–8)
ERYTHROCYTE [DISTWIDTH] IN BLOOD BY AUTOMATED COUNT: 15.4 % (ref 11.5–14.5)
EST. GFR  (NO RACE VARIABLE): >60 ML/MIN/1.73 M^2
FRACTIONAL SHORTENING: 16 % (ref 28–44)
GLUCOSE SERPL-MCNC: 88 MG/DL (ref 70–110)
HCT VFR BLD AUTO: 25.9 % (ref 37–48.5)
HGB BLD-MCNC: 8.3 G/DL (ref 12–16)
IMM GRANULOCYTES # BLD AUTO: 0.04 K/UL (ref 0–0.04)
IMM GRANULOCYTES NFR BLD AUTO: 0.4 % (ref 0–0.5)
INTERVENTRICULAR SEPTUM: 1.29 CM (ref 0.6–1.1)
IVC DIAMETER: 1.87 CM
IVRT: 39.96 MSEC
LA MAJOR: 4.97 CM
LA MINOR: 4.81 CM
LA WIDTH: 3.6 CM
LEFT ATRIUM SIZE: 3.95 CM
LEFT ATRIUM VOLUME INDEX: 34.6 ML/M2
LEFT ATRIUM VOLUME: 59.09 CM3
LEFT INTERNAL DIMENSION IN SYSTOLE: 3.95 CM (ref 2.1–4)
LEFT VENTRICLE DIASTOLIC VOLUME INDEX: 60.51 ML/M2
LEFT VENTRICLE DIASTOLIC VOLUME: 103.47 ML
LEFT VENTRICLE MASS INDEX: 131 G/M2
LEFT VENTRICLE SYSTOLIC VOLUME INDEX: 39.6 ML/M2
LEFT VENTRICLE SYSTOLIC VOLUME: 67.78 ML
LEFT VENTRICULAR INTERNAL DIMENSION IN DIASTOLE: 4.72 CM (ref 3.5–6)
LEFT VENTRICULAR MASS: 223.65 G
LV LATERAL E/E' RATIO: 10.73 M/S
LV SEPTAL E/E' RATIO: 16.86 M/S
LVOT MG: 4.34 MMHG
LVOT MV: 1.03 CM/S
LYMPHOCYTES # BLD AUTO: 1.4 K/UL (ref 1–4.8)
LYMPHOCYTES NFR BLD: 14.4 % (ref 18–48)
MCH RBC QN AUTO: 29.2 PG (ref 27–31)
MCHC RBC AUTO-ENTMCNC: 32 G/DL (ref 32–36)
MCV RBC AUTO: 91 FL (ref 82–98)
MONOCYTES # BLD AUTO: 1.1 K/UL (ref 0.3–1)
MONOCYTES NFR BLD: 11.3 % (ref 4–15)
MV PEAK A VEL: 0.72 M/S
MV PEAK E VEL: 1.18 M/S
MV STENOSIS PRESSURE HALF TIME: 59.16 MS
MV VALVE AREA P 1/2 METHOD: 3.72 CM2
NEUTROPHILS # BLD AUTO: 6.5 K/UL (ref 1.8–7.7)
NEUTROPHILS NFR BLD: 66.5 % (ref 38–73)
NRBC BLD-RTO: 0 /100 WBC
PISA MRMAX VEL: 2.94 M/S
PISA TR MAX VEL: 3.1 M/S
PLATELET # BLD AUTO: 247 K/UL (ref 150–450)
PMV BLD AUTO: 10 FL (ref 9.2–12.9)
POTASSIUM SERPL-SCNC: 3.4 MMOL/L (ref 3.5–5.1)
PV MEAN GRADIENT: 2.3 MMHG
RA MAJOR: 3.84 CM
RA WIDTH: 2.9 CM
RBC # BLD AUTO: 2.84 M/UL (ref 4–5.4)
SODIUM SERPL-SCNC: 139 MMOL/L (ref 136–145)
STJ: 2.45 CM
TDI LATERAL: 0.11 M/S
TDI SEPTAL: 0.07 M/S
TDI: 0.09 M/S
TR MAX PG: 38 MMHG
TR MEAN GRADIENT: 32 MMHG
TRICUSPID ANNULAR PLANE SYSTOLIC EXCURSION: 1.7 CM
WBC # BLD AUTO: 9.74 K/UL (ref 3.9–12.7)

## 2023-04-04 PROCEDURE — 36415 COLL VENOUS BLD VENIPUNCTURE: CPT | Performed by: STUDENT IN AN ORGANIZED HEALTH CARE EDUCATION/TRAINING PROGRAM

## 2023-04-04 PROCEDURE — 85025 COMPLETE CBC W/AUTO DIFF WBC: CPT | Performed by: STUDENT IN AN ORGANIZED HEALTH CARE EDUCATION/TRAINING PROGRAM

## 2023-04-04 PROCEDURE — 85379 FIBRIN DEGRADATION QUANT: CPT | Performed by: STUDENT IN AN ORGANIZED HEALTH CARE EDUCATION/TRAINING PROGRAM

## 2023-04-04 PROCEDURE — 25000003 PHARM REV CODE 250: Performed by: PHYSICIAN ASSISTANT

## 2023-04-04 PROCEDURE — 25000003 PHARM REV CODE 250: Performed by: INTERNAL MEDICINE

## 2023-04-04 PROCEDURE — 94761 N-INVAS EAR/PLS OXIMETRY MLT: CPT

## 2023-04-04 PROCEDURE — 11000001 HC ACUTE MED/SURG PRIVATE ROOM

## 2023-04-04 PROCEDURE — 99900035 HC TECH TIME PER 15 MIN (STAT)

## 2023-04-04 PROCEDURE — 83615 LACTATE (LD) (LDH) ENZYME: CPT | Performed by: STUDENT IN AN ORGANIZED HEALTH CARE EDUCATION/TRAINING PROGRAM

## 2023-04-04 PROCEDURE — 27000221 HC OXYGEN, UP TO 24 HOURS

## 2023-04-04 PROCEDURE — 84157 ASSAY OF PROTEIN OTHER: CPT | Performed by: STUDENT IN AN ORGANIZED HEALTH CARE EDUCATION/TRAINING PROGRAM

## 2023-04-04 PROCEDURE — 87205 SMEAR GRAM STAIN: CPT | Performed by: STUDENT IN AN ORGANIZED HEALTH CARE EDUCATION/TRAINING PROGRAM

## 2023-04-04 PROCEDURE — 80048 BASIC METABOLIC PNL TOTAL CA: CPT | Performed by: STUDENT IN AN ORGANIZED HEALTH CARE EDUCATION/TRAINING PROGRAM

## 2023-04-04 RX ADMIN — DAPSONE 75 MG: 25 TABLET ORAL at 08:04

## 2023-04-04 RX ADMIN — METOPROLOL SUCCINATE 25 MG: 25 TABLET, EXTENDED RELEASE ORAL at 08:04

## 2023-04-04 RX ADMIN — SUCRALFATE 1 G: 1 SUSPENSION ORAL at 06:04

## 2023-04-04 RX ADMIN — PANTOPRAZOLE SODIUM 40 MG: 40 TABLET, DELAYED RELEASE ORAL at 08:04

## 2023-04-04 RX ADMIN — LEVOTHYROXINE SODIUM 75 MCG: 75 TABLET ORAL at 06:04

## 2023-04-04 RX ADMIN — SUCRALFATE 1 G: 1 SUSPENSION ORAL at 05:04

## 2023-04-04 RX ADMIN — ESCITALOPRAM OXALATE 10 MG: 10 TABLET ORAL at 08:04

## 2023-04-04 RX ADMIN — SUCRALFATE 1 G: 1 SUSPENSION ORAL at 11:04

## 2023-04-04 NOTE — PROGRESS NOTES
Patient needs repeat EGD in two months, but it is recommended she have it done in [place] since duodenal dilatation may be needed. Can you please help arrange scope there or at least a virtual visit with the team there?  Thanks  Blas

## 2023-04-04 NOTE — PROGRESS NOTES
[facility] Medicine  Progress Note    Patient Name: Dulce Root  MRN: [id number]  Patient Class: IP- Inpatient   Admission Date: 3/31/2023  Length of Stay: 4 days  Attending Physician: Paulina Natarajan, *  Primary Care Provider: Patrick Hernandez MD        Subjective:     Principal Problem:Acute duodenal ulcer with hemorrhage and obstruction        HPI:  Dulce Root is a 67 y.o. female with a PMH  has a past medical history of Allergy, Anticoagulant long-term use, Breast cancer (2008), Carotid stenosis, bilateral (10/13/2017), Coronary artery disease, Coronary artery disease involving coronary bypass graft of native heart without angina pectoris (08/16/2019), Coronary artery disease involving native coronary artery of native heart without angina pectoris (06/27/2017), DCIS (ductal carcinoma in situ) of breast (11/06/2012), Encounter for blood transfusion, Essential hypertension (11/06/2012), GERD (gastroesophageal reflux disease), GI bleed (02/2021), Hodgkin lymphoma, Hx of Hodgkin's disease - s/p chemo and radiation (11/06/2012), Hyperlipidemia, Hyperlipidemia (11/06/2012), Hypertension, LBBB (left bundle branch block) (05/22/2018), Osteoporosis, Paroxysmal atrial fibrillation - single post-op episode (08/24/2017), Pemphigoid, Pemphigoid, Pulmonary hypertension (1/13/2023), S/P AVR (aortic valve replacement) - pericardial valve (08/21/2017), S/P CABG x 2 (08/21/2017), Stented coronary artery, and Thyroid disease. who presented to the ED for further evaluation after endorsing dark bloody stool x3 days duration.  Patient has history of gastric ulcers and had similar presentation try last occurrence.  Patient also reported 1 episode of hematemesis yesterday in addition to endorsing decreased oral intake, bloating throughout her epigastric region, feeling fatigued, and generalized weakness.  Patient denies abuse of NSAIDs the reports being taken off Protonix over the past year after being so she  no longer needed to be on them but continues to be on aspirin and Plavix 2 2 significant heart history and stent placement.  Other associated symptoms included light headedness/dizziness and noticed that her blood pressure has been running on the lower end of normal with systolic running in the 90s and use a ranges 120s/70s.  She reported no known alleviating or aggravating factors reported all other review of systems negative except as noted above.  GI was consulted by ED staff who recommended patient be admitted to Hospital Medicine and initiated on Protonix with further recommendations to follow in the morning.    PCP: Patrick Hernandez        Overview/Hospital Course:  4/1/23  s/p EGD - severe duodenal stricture with oozing ulcer at distal end  Transfused 1 unit PRBC  On protonix drip, CLD, GI following  May need IR intervention if rebleeds    4/2/23  Patient denies abdominal pain, nausea, had melena overnight, denies bright red blood in stool.  GI following, appreicate assitance, discussed with Dr. Flower  Repeat H/H pending this afternoon, advanced to FLD    4/3  Patient denies nausea, abdominal pain, vomiting.    Last bowel movement yesterday morning;  Hemoglobin slightly trended down   Tolerated full liquid diet without issues, GI recommended to advance diet as tolerated   Protonix drip discontinued, transition to p.o. Protonix as per GI   Will monitor for tolerance on advance diet, follow up on H&H;  Patient currently saturating on 2 L nasal cannula, not on oxygen at baseline, we will follow up on home oxygen assessment test;      4/4  No acute events overnight   Noted to have bowel movement yesterday morning, dark, stated slowly clearing up.    Hemoglobin stable.    Currently saturating about 92 on 2 L nasal cannula, not on oxygen at home, qualified for 2 L oxygen with ambulation.    Follow-up with  regarding arrangements  Persistent low blood pressure, despite metoprolol changed frequency from  b.i.d. to daily, given increased oxygen requirement, drop in blood pressure, will follow-up on chest x-ray, echocardiogram, d dimer;         Interval History:       No acute events overnight   Noted to have bowel movement yesterday morning, dark, stated slowly clearing up.    Hemoglobin stable.    Currently saturating about 92 on 2 L nasal cannula, not on oxygen at home, qualified for 2 L oxygen with ambulation.    Follow-up with  regarding arrangements  Persistent low blood pressure, despite metoprolol changed frequency from b.i.d. to daily, given increased oxygen requirement, drop in blood pressure, will follow-up on chest x-ray, echocardiogram;           Review of Systems    Negative except as mentioned above     Denied chest pain, palpitations, chest tightness    Objective:     Vital Signs (Most Recent):  Temp: 98.1 °F (36.7 °C) (04/04/23 1124)  Pulse: 90 (04/04/23 1124)  Resp: 18 (04/04/23 1124)  BP: (!) 89/50 (04/04/23 1124)  SpO2: (!) 90 % (04/04/23 1124)   Vital Signs (24h Range):  Temp:  [97.1 °F (36.2 °C)-98.2 °F (36.8 °C)] 98.1 °F (36.7 °C)  Pulse:  [75-90] 90  Resp:  [16-20] 18  SpO2:  [90 %-99 %] 90 %  BP: ()/(50-60) 89/50     Weight: 68 kg (149 lb 14.6 oz)  Body mass index is 26.56 kg/m².  No intake or output data in the 24 hours ending 04/04/23 1241   Physical Exam    Constitutional:       General: She is not in acute distress.     Appearance: Normal appearance. She is normal weight.   Cardiovascular:      Rate and Rhythm: Normal rate and regular rhythm.      Heart sounds: No murmur heard.  Pulmonary:      Effort: Pulmonary effort is normal. No respiratory distress.   Neurological:      Mental Status: She is alert and oriented to person, place, and time. Mental status is at baseline.         Significant Labs: All pertinent labs within the past 24 hours have been reviewed.  CBC:   Recent Labs   Lab 04/02/23  1719 04/03/23  0528 04/04/23  0544   WBC  --  8.85 9.74   HGB 8.9* 8.4* 8.3*    HCT 26.9* 26.3* 25.9*   PLT  --  249 247     CMP:   Recent Labs   Lab 04/03/23  0528 04/04/23  0544    139   K 3.2* 3.4*    109   CO2 23 22*   GLU 86 88   BUN 16 13   CREATININE 0.6 0.6   CALCIUM 8.0* 7.9*   PROT 5.6*  --    ALBUMIN 3.0*  --    BILITOT 0.6  --    ALKPHOS 50*  --    AST 16  --    ALT 9*  --    ANIONGAP 7* 8       Significant Imaging:     Imaging Results              CTA Acute GI [place], Abdomen and Pelvis (Final result)  Result time 03/31/23 19:18:13   Procedure changed from CTA Abdomen and Pelvis     Final result by Skip Almazan MD (03/31/23 19:18:13)                   Impression:      Multiple findings as described above.  No evidence for active hemorrhage    Questionable scarring in the left kidney upper lobe with poor enhancement of the renal parenchyma.  This may relate to scarring.  Recommend follow-up.      Electronically signed by: Skip Almazan  Date:    03/31/2023  Time:    19:18               Narrative:    EXAMINATION:  CT angiography acute GI bleed abdomen pelvis    CLINICAL HISTORY:  GI bleeding    TECHNIQUE:  CT angiography of the abdomen pelvis with and without contrast.  3 minute delays were provided.  MIP imaging was utilized.  A total of 100 cc of Omnipaque 350 was injected.    COMPARISON:  None    FINDINGS:  No hemorrhage identified.  No hydronephrosis.  Upper lobe scarring of left kidney.  Calcified uterine fibroid.  No high bowel obstruction.  No evidence for active hemorrhage.  Colonic diverticulosis.  Mild mucosal thickening of the sigmoid colon.  Uterine fibroid suspected.  Bladder is mildly distended.  Postsurgical changes anterior abdomen.  Mild mucosal thickening of the stomach.  Pancreas adrenal glands are grossly unremarkable.  No gallstones.  Mild moderate atherosclerotic disease of the aorta iliac vasculature.                                         Assessment/Plan:      * Acute duodenal ulcer with hemorrhage and obstruction        UGIB (upper  gastrointestinal bleed)  Hx of gastric ulcer with duodenal stricture, recent NSAID use, recent discontinuation of PPI in the OP setting  --s/p IV PPI 80mg in the ER  --agree with transitioning to PPI gtt given substantial risk factors  --discussed case with GI, EGD tentatively scheduled for later today  --H&H downtrending since admission  --1uPRBC ordered to be transfused prior to EGD, consent obtained by GI  --serial CBC to trend h&h  --low threshold to transfuse for hgb < 8 given significant cardiac history    4/2  --EGD with severe duodenal stricture with oozing ulcer  --GI following, appreciate assistance; may need IR intervention if rebleeds  --Serial H/H, transfuse if indicated    GERD (gastroesophageal reflux disease)  See plan for UGIB     Malignant neoplasm of central portion of left breast in female, estrogen receptor negative  Currently follows Dr. Sepulveda from oncology.  Plan:  -continue outpatient f/u as directed     Duodenal stricture        Chronic combined systolic and diastolic heart failure  --Last ECHO from NOV 2022 confirm EF 35 %  --euvolemic on exam, no signs of fluid overload  --monitor electrolytes, UOP  --strict I&O and daily weights ordered    Anxiety  Chronic. Stable. Not in acute exacerbation and currently denies endorsing any suicidal/homicidal ideations.   Plan:  -Continue home medications     Coronary artery disease involving coronary bypass graft of native heart without angina pectoris  Significant cardiac disease, CABG in 2017 (per patient), has been on ASA, plavis since that time, last dose day prior to admission  --holding asa, plavix d/t UGIB  --low threshold to consult Cardiology for development of ACS symptoms  --high risk patient, monitor closely on telemetry    Hypothyroid  Patient has chronic hypothyroidism. TFTs reviewed-   Lab Results   Component Value Date    TSH 1.328 01/06/2023   Plan:  -Will continue chronic levothyroxine and adjust for and clinical  changes.    Hyperlipidemia  Patient is chronically on statin.will continue for now. Last Lipid Panel:   Lab Results   Component Value Date    CHOL 112 (L) 02/24/2023    HDL 39 (L) 02/24/2023    LDLCALC 58.8 (L) 02/24/2023    TRIG 71 02/24/2023    CHOLHDL 34.8 02/24/2023   Plan:  -Continue home medication  -low fat/low calorie diet    Essential hypertension  Currently hypotensive. BP currently ranging from  systolic.  Plan:  -Optimize pain control   -hold home medications, titrate as needed   -Monitor BP        VTE Risk Mitigation (From admission, onward)           Ordered     Reason for No Pharmacological VTE Prophylaxis  Once        Question Answer Comment   Reasons: Active Bleeding    Reasons: Risk of Bleeding        03/31/23 2119     IP VTE HIGH RISK PATIENT  Once         03/31/23 2119     Place sequential compression device  Until discontinued         03/31/23 2119                    Discharge Planning   DELICIA: 4/4/2023     Code Status: Full Code   Is the patient medically ready for discharge?:     Reason for patient still in hospital (select all that apply): f/u on echo, CXR;   Discharge Plan A: Home with family   Discharge Delays: None known at this time              Paulina Natarajan MD  Department of Hospital Medicine   [facility]

## 2023-04-04 NOTE — SUBJECTIVE & OBJECTIVE
Interval History:       No acute events overnight   Noted to have bowel movement yesterday morning, dark, stated slowly clearing up.    Hemoglobin stable.    Currently saturating about 92 on 2 L nasal cannula, not on oxygen at home, qualified for 2 L oxygen with ambulation.    Follow-up with  regarding arrangements  Persistent low blood pressure, despite metoprolol changed frequency from b.i.d. to daily, given increased oxygen requirement, drop in blood pressure, will follow-up on chest x-ray, echocardiogram;           Review of Systems    Negative except as mentioned above     Denied chest pain, palpitations, chest tightness    Objective:     Vital Signs (Most Recent):  Temp: 98.1 °F (36.7 °C) (04/04/23 1124)  Pulse: 90 (04/04/23 1124)  Resp: 18 (04/04/23 1124)  BP: (!) 89/50 (04/04/23 1124)  SpO2: (!) 90 % (04/04/23 1124)   Vital Signs (24h Range):  Temp:  [97.1 °F (36.2 °C)-98.2 °F (36.8 °C)] 98.1 °F (36.7 °C)  Pulse:  [75-90] 90  Resp:  [16-20] 18  SpO2:  [90 %-99 %] 90 %  BP: ()/(50-60) 89/50     Weight: 68 kg (149 lb 14.6 oz)  Body mass index is 26.56 kg/m².  No intake or output data in the 24 hours ending 04/04/23 1241   Physical Exam    Constitutional:       General: She is not in acute distress.     Appearance: Normal appearance. She is normal weight.   Cardiovascular:      Rate and Rhythm: Normal rate and regular rhythm.      Heart sounds: No murmur heard.  Pulmonary:      Effort: Pulmonary effort is normal. No respiratory distress.   Neurological:      Mental Status: She is alert and oriented to person, place, and time. Mental status is at baseline.         Significant Labs: All pertinent labs within the past 24 hours have been reviewed.  CBC:   Recent Labs   Lab 04/02/23  1719 04/03/23  0528 04/04/23  0544   WBC  --  8.85 9.74   HGB 8.9* 8.4* 8.3*   HCT 26.9* 26.3* 25.9*   PLT  --  249 247     CMP:   Recent Labs   Lab 04/03/23  0528 04/04/23  0544    139   K 3.2* 3.4*    109    CO2 23 22*   GLU 86 88   BUN 16 13   CREATININE 0.6 0.6   CALCIUM 8.0* 7.9*   PROT 5.6*  --    ALBUMIN 3.0*  --    BILITOT 0.6  --    ALKPHOS 50*  --    AST 16  --    ALT 9*  --    ANIONGAP 7* 8       Significant Imaging:     Imaging Results              CTA Acute GI [place], Abdomen and Pelvis (Final result)  Result time 03/31/23 19:18:13   Procedure changed from CTA Abdomen and Pelvis     Final result by Skip Almzaan MD (03/31/23 19:18:13)                   Impression:      Multiple findings as described above.  No evidence for active hemorrhage    Questionable scarring in the left kidney upper lobe with poor enhancement of the renal parenchyma.  This may relate to scarring.  Recommend follow-up.      Electronically signed by: Skip Almazan  Date:    03/31/2023  Time:    19:18               Narrative:    EXAMINATION:  CT angiography acute GI bleed abdomen pelvis    CLINICAL HISTORY:  GI bleeding    TECHNIQUE:  CT angiography of the abdomen pelvis with and without contrast.  3 minute delays were provided.  MIP imaging was utilized.  A total of 100 cc of Omnipaque 350 was injected.    COMPARISON:  None    FINDINGS:  No hemorrhage identified.  No hydronephrosis.  Upper lobe scarring of left kidney.  Calcified uterine fibroid.  No high bowel obstruction.  No evidence for active hemorrhage.  Colonic diverticulosis.  Mild mucosal thickening of the sigmoid colon.  Uterine fibroid suspected.  Bladder is mildly distended.  Postsurgical changes anterior abdomen.  Mild mucosal thickening of the stomach.  Pancreas adrenal glands are grossly unremarkable.  No gallstones.  Mild moderate atherosclerotic disease of the aorta iliac vasculature.

## 2023-04-04 NOTE — PLAN OF CARE
04/04/23 1251   Post-Acute Status   Post-Acute Authorization HME   HME Status (!) Pending Delivery   Discharge Delays None known at this time     Home O2 order sent to [facility].

## 2023-04-04 NOTE — TELEPHONE ENCOUNTER
----- Message from Quyen Flores RN sent at 4/4/2023  8:39 AM CDT -----  Patient will discharge today. The patient will need a hospital follow up appointment with this provider within 2 weeks.I scheduled an appt and added patient to the waiting list but if there is any way, can you schedule something sooner?    Thanks,     Quyen Flores RN Case Manager   [facility]  Ph: [phone]

## 2023-04-04 NOTE — PLAN OF CARE
[facility]  Discharge Final Note    Primary Care Provider: Patrick Hernandez MD    Expected Discharge Date: 4/4/2023    Final Discharge Note (most recent)       Final Note - 04/04/23 0843          Final Note    Assessment Type Final Discharge Note     Anticipated Discharge Disposition Home or Self Care     Hospital Resources/Appts/Education Provided Appointments scheduled by Navigator/Coordinator        Post-Acute Status    Discharge Delays None known at this time                     Important Message from Medicare      PCP follow up scheduled for 4/26/23 at 0900. Inbasket message sent to PCP office to move appt up further and pt was added to the wait list.

## 2023-04-04 NOTE — PROGRESS NOTES
Included a secure chat to Ms Maria Guadalupe Green MA in [place] along with Dr Church and Dr Flower, to get the pt scheduled in [place] for duodenal dilatation in 2 months per Dr Flower recommendations.  Also sent a separate message to Maria Guadalupe.

## 2023-04-04 NOTE — PLAN OF CARE
Patient remains free of injury. VSS. Continuing to monitor labs and signs of bleeding. Chart reviewed. Call light in reach. Will continue to monitor.    Problem: Fall Injury Risk  Goal: Absence of Fall and Fall-Related Injury  Outcome: Ongoing, Progressing     Problem: Adjustment to Illness (Gastrointestinal Bleeding)  Goal: Optimal Coping with Acute Illness  Outcome: Ongoing, Progressing     Problem: Bleeding (Gastrointestinal Bleeding)  Goal: Hemostasis  Outcome: Ongoing, Progressing     Problem: Adult Inpatient Plan of Care  Goal: Patient-Specific Goal (Individualized)  Outcome: Ongoing, Progressing

## 2023-04-04 NOTE — PLAN OF CARE
Case Management  met with patient at bedside. Hospitals are required to deliver the Important Message from Medicare (IMM) to all Medicare beneficiaries who are hospital inpatients. The IMM informs hospitalized inpatient beneficiaries of their hospital discharge appeal rights. Explained IMM appeal process and answered all questions. Pt referred to  if appeal is sought.

## 2023-04-04 NOTE — PLAN OF CARE
Remains injury free. Denies c/o pain. Left thoracentesis today, tolerated well. Tele monitoring in progress. No s/s acute distress. Will monitor.

## 2023-04-04 NOTE — OP NOTE
Pre Op Diagnosis: left pleural effusion     Post Op Diagnosis: same     Procedure:  left thoracentesis     Procedure performed by: Jean-Claude HAIRSTON, Ej MUELLER     Written Informed Consent Obtained: Yes     Specimen Removed:  yes     Estimated Blood Loss:  minimal     Findings: Local anesthesia and moderate sedation were used.     The patient tolerated the procedure well and there were no complications.      Disposition:  F/U in clinic or with ordering physician    Discharge instructions:  Light activity for 24 hours.  Remove band aid in 24 hours.  No baths (showers are appropriate).      Sterile technique was performed in the posterior left thorax, lidocaine was used as a local anesthetic.  350 ccs of light yaritza fluid removed and sent to lab.  Pt tolerated the procedure well without immediate complications.  Please see radiologist report for details. F/u with PCP and/or ordering physician.

## 2023-04-05 VITALS
WEIGHT: 149 LBS | HEIGHT: 63 IN | BODY MASS INDEX: 26.4 KG/M2 | OXYGEN SATURATION: 90 % | SYSTOLIC BLOOD PRESSURE: 118 MMHG | HEART RATE: 81 BPM | RESPIRATION RATE: 16 BRPM | TEMPERATURE: 98 F | DIASTOLIC BLOOD PRESSURE: 57 MMHG

## 2023-04-05 LAB
ANION GAP SERPL CALC-SCNC: 7 MMOL/L (ref 8–16)
BASOPHILS # BLD AUTO: 0.06 K/UL (ref 0–0.2)
BASOPHILS NFR BLD: 0.6 % (ref 0–1.9)
BODY FLUID SOURCE, LDH: NORMAL
BUN SERPL-MCNC: 14 MG/DL (ref 8–23)
CALCIUM SERPL-MCNC: 8 MG/DL (ref 8.7–10.5)
CHLORIDE SERPL-SCNC: 109 MMOL/L (ref 95–110)
CO2 SERPL-SCNC: 24 MMOL/L (ref 23–29)
CREAT SERPL-MCNC: 0.6 MG/DL (ref 0.5–1.4)
DIFFERENTIAL METHOD: ABNORMAL
EOSINOPHIL # BLD AUTO: 0.6 K/UL (ref 0–0.5)
EOSINOPHIL NFR BLD: 5.6 % (ref 0–8)
ERYTHROCYTE [DISTWIDTH] IN BLOOD BY AUTOMATED COUNT: 15.5 % (ref 11.5–14.5)
EST. GFR  (NO RACE VARIABLE): >60 ML/MIN/1.73 M^2
GLUCOSE SERPL-MCNC: 83 MG/DL (ref 70–110)
GRAM STN SPEC: NORMAL
GRAM STN SPEC: NORMAL
HCT VFR BLD AUTO: 26 % (ref 37–48.5)
HGB BLD-MCNC: 8.4 G/DL (ref 12–16)
IMM GRANULOCYTES # BLD AUTO: 0.04 K/UL (ref 0–0.04)
IMM GRANULOCYTES NFR BLD AUTO: 0.4 % (ref 0–0.5)
LDH FLD L TO P-CCNC: 56 U/L
LYMPHOCYTES # BLD AUTO: 1.4 K/UL (ref 1–4.8)
LYMPHOCYTES NFR BLD: 13.8 % (ref 18–48)
MCH RBC QN AUTO: 29.6 PG (ref 27–31)
MCHC RBC AUTO-ENTMCNC: 32.3 G/DL (ref 32–36)
MCV RBC AUTO: 92 FL (ref 82–98)
MONOCYTES # BLD AUTO: 1.3 K/UL (ref 0.3–1)
MONOCYTES NFR BLD: 12.4 % (ref 4–15)
NEUTROPHILS # BLD AUTO: 6.8 K/UL (ref 1.8–7.7)
NEUTROPHILS NFR BLD: 67.2 % (ref 38–73)
NRBC BLD-RTO: 0 /100 WBC
PLATELET # BLD AUTO: 270 K/UL (ref 150–450)
PMV BLD AUTO: 9.9 FL (ref 9.2–12.9)
POTASSIUM SERPL-SCNC: 3.3 MMOL/L (ref 3.5–5.1)
PROT FLD-MCNC: 1.2 G/DL
RBC # BLD AUTO: 2.84 M/UL (ref 4–5.4)
SODIUM SERPL-SCNC: 140 MMOL/L (ref 136–145)
SPECIMEN SOURCE: NORMAL
WBC # BLD AUTO: 10.16 K/UL (ref 3.9–12.7)

## 2023-04-05 PROCEDURE — 80048 BASIC METABOLIC PNL TOTAL CA: CPT | Performed by: STUDENT IN AN ORGANIZED HEALTH CARE EDUCATION/TRAINING PROGRAM

## 2023-04-05 PROCEDURE — 94760 N-INVAS EAR/PLS OXIMETRY 1: CPT

## 2023-04-05 PROCEDURE — 25000003 PHARM REV CODE 250: Performed by: INTERNAL MEDICINE

## 2023-04-05 PROCEDURE — 99900035 HC TECH TIME PER 15 MIN (STAT)

## 2023-04-05 PROCEDURE — 25000003 PHARM REV CODE 250: Performed by: PHYSICIAN ASSISTANT

## 2023-04-05 PROCEDURE — 85025 COMPLETE CBC W/AUTO DIFF WBC: CPT | Performed by: STUDENT IN AN ORGANIZED HEALTH CARE EDUCATION/TRAINING PROGRAM

## 2023-04-05 PROCEDURE — 36415 COLL VENOUS BLD VENIPUNCTURE: CPT | Performed by: STUDENT IN AN ORGANIZED HEALTH CARE EDUCATION/TRAINING PROGRAM

## 2023-04-05 RX ORDER — METOPROLOL SUCCINATE 25 MG/1
25 TABLET, EXTENDED RELEASE ORAL DAILY
Qty: 30 TABLET | Refills: 0 | Status: SHIPPED | OUTPATIENT
Start: 2023-04-06 | End: 2023-10-09 | Stop reason: SDUPTHER

## 2023-04-05 RX ORDER — SUCRALFATE 1 G/1
1 TABLET ORAL 4 TIMES DAILY
Qty: 40 TABLET | Refills: 0 | Status: SHIPPED | OUTPATIENT
Start: 2023-04-05 | End: 2023-04-15

## 2023-04-05 RX ORDER — PANTOPRAZOLE SODIUM 40 MG/1
40 TABLET, DELAYED RELEASE ORAL 2 TIMES DAILY
Qty: 120 TABLET | Refills: 0 | Status: ON HOLD | OUTPATIENT
Start: 2023-04-05 | End: 2023-06-08 | Stop reason: HOSPADM

## 2023-04-05 RX ADMIN — SUCRALFATE 1 G: 1 SUSPENSION ORAL at 11:04

## 2023-04-05 RX ADMIN — PANTOPRAZOLE SODIUM 40 MG: 40 TABLET, DELAYED RELEASE ORAL at 09:04

## 2023-04-05 RX ADMIN — ESCITALOPRAM OXALATE 10 MG: 10 TABLET ORAL at 09:04

## 2023-04-05 RX ADMIN — SUCRALFATE 1 G: 1 SUSPENSION ORAL at 05:04

## 2023-04-05 RX ADMIN — METOPROLOL SUCCINATE 25 MG: 25 TABLET, EXTENDED RELEASE ORAL at 09:04

## 2023-04-05 RX ADMIN — LEVOTHYROXINE SODIUM 75 MCG: 75 TABLET ORAL at 05:04

## 2023-04-05 NOTE — CONSULTS
Chart reviewed by Dr. Bermeo.       ASSESSMENT/PLAN:    Left pleural effusion    The order for a Left sided thoracentesis has been placed and the procedure has been performed.          Thank you for the consult.

## 2023-04-05 NOTE — PLAN OF CARE
Pt provided with discharge instructions. Pt verbalized understanding of home medication changes, follow up appointments, and discharge education. IV and Tele monitor removed. Oxygen to be delivered to their home. Pt awaiting transport for dishcarge

## 2023-04-05 NOTE — DISCHARGE SUMMARY
[facility]  Discharge Summary      Patient Name: Dulce Root  MRN: [id number]  ODALYS: [id number]  Patient Class: IP- Inpatient  Admission Date: 3/31/2023  Hospital Length of Stay: 5 days  Discharge Date and Time: 4/5/2023  Attending Physician: Paulina Natarajan, *   Discharging Provider: Paulina Natarajan MD  Primary Care Provider: Patrick Hernandez MD    Primary Care Team: Networked reference to record PCT     HPI:   Dulce Root is a 67 y.o. female with a PMH  has a past medical history of Allergy, Anticoagulant long-term use, Breast cancer (2008), Carotid stenosis, bilateral (10/13/2017), Coronary artery disease, Coronary artery disease involving coronary bypass graft of native heart without angina pectoris (08/16/2019), Coronary artery disease involving native coronary artery of native heart without angina pectoris (06/27/2017), DCIS (ductal carcinoma in situ) of breast (11/06/2012), Encounter for blood transfusion, Essential hypertension (11/06/2012), GERD (gastroesophageal reflux disease), GI bleed (02/2021), Hodgkin lymphoma, Hx of Hodgkin's disease - s/p chemo and radiation (11/06/2012), Hyperlipidemia, Hyperlipidemia (11/06/2012), Hypertension, LBBB (left bundle branch block) (05/22/2018), Osteoporosis, Paroxysmal atrial fibrillation - single post-op episode (08/24/2017), Pemphigoid, Pemphigoid, Pulmonary hypertension (1/13/2023), S/P AVR (aortic valve replacement) - pericardial valve (08/21/2017), S/P CABG x 2 (08/21/2017), Stented coronary artery, and Thyroid disease. who presented to the ED for further evaluation after endorsing dark bloody stool x3 days duration.  Patient has history of gastric ulcers and had similar presentation try last occurrence.  Patient also reported 1 episode of hematemesis yesterday in addition to endorsing decreased oral intake, bloating throughout her epigastric region, feeling fatigued, and generalized weakness.  Patient denies abuse of  NSAIDs the reports being taken off Protonix over the past year after being so she no longer needed to be on them but continues to be on aspirin and Plavix 2 2 significant heart history and stent placement.  Other associated symptoms included light headedness/dizziness and noticed that her blood pressure has been running on the lower end of normal with systolic running in the 90s and use a ranges 120s/70s.  She reported no known alleviating or aggravating factors reported all other review of systems negative except as noted above.  GI was consulted by ED staff who recommended patient be admitted to Hospital Medicine and initiated on Protonix with further recommendations to follow in the morning.    PCP: Patrick Hernandez        Procedure(s) (LRB):  EGD (ESOPHAGOGASTRODUODENOSCOPY) (N/A)      Hospital Course:   4/1/23  s/p EGD - severe duodenal stricture with oozing ulcer at distal end  Transfused 1 unit PRBC  On protonix drip, CLD, GI following  May need IR intervention if rebleeds    4/2/23  Patient denies abdominal pain, nausea, had melena overnight, denies bright red blood in stool.  GI following, appreicate assitance, discussed with Dr. Flower  Repeat H/H pending this afternoon, advanced to FLD    4/3  Patient denies nausea, abdominal pain, vomiting.    Last bowel movement yesterday morning;  Hemoglobin slightly trended down   Tolerated full liquid diet without issues, GI recommended to advance diet as tolerated   Protonix drip discontinued, transition to p.o. Protonix as per GI   Will monitor for tolerance on advance diet, follow up on H&H;  Patient currently saturating on 2 L nasal cannula, not on oxygen at baseline, we will follow up on home oxygen assessment test;      4/4  No acute events overnight   Noted to have bowel movement yesterday morning, dark, stated slowly clearing up.    Hemoglobin stable.    Currently saturating about 92 on 2 L nasal cannula, not on oxygen at home, qualified for 2 L oxygen with  ambulation.    Follow-up with  regarding arrangements  Persistent low blood pressure, despite metoprolol changed frequency from b.i.d. to daily, given increased oxygen requirement, drop in blood pressure, will follow-up on chest x-ray, echocardiogram;       4/5    No acute events overnight   Status post ultrasound-guided left-sided thoracentesis yesterday with approximately 300 cc fluid removal, pending body fluid analysis.    Blood pressure improved this morning, patient stated improvement in shortness of breath.    Denied chest pain, palpitations.    Hemodynamically stable.    Hemoglobin stable at 8.4, denied any further episodes of GI bleed.    Considering clinical and hemodynamic stability, planning to discharge patient today  Given episodes of hypotension, withheld blood pressure medications at the time of discharge, changed metoprolol frequency from b.i.d. to daily, strongly emphasized on monitoring blood pressure at home, to follow up with Cardiology, primary care within 1 week upon discharge and to resume medications accordingly.    Given presentation with GI bleed, aspirin, Plavix hold as per GI recommendations, to continue holding at the time of discharge, emphasized on following up with Cardiology/gastroenterology within 1 week upon discharge regarding resumption of blood thinners.    Patient qualified for 2 L oxygen at activity, oxygen delivered at bedside.      To undergo repeat EGD in 2 months, gastroenterology team recommended repeat EGD to be considered at [place] for possible dilation at the time.       Discharging on Protonix b.i.d. for 2 months, sucralfate 1 g q.6 hourly for 2 weeks.      Patient agreed to the plan.          Review of Systems     Negative except as mentioned above      Denied chest pain, palpitations, chest tightness       Physical Exam     Constitutional:       General: She is not in acute distress.     Appearance: Normal appearance. She is normal weight.    Cardiovascular:      Rate and Rhythm: Normal rate and regular rhythm.      Heart sounds: No murmur heard.  Pulmonary:      Effort: Pulmonary effort is normal. No respiratory distress.   Neurological:      Mental Status: She is alert and oriented to person, place, and time. Mental status is at baseline.       Goals of Care Treatment Preferences:  Code Status: Full Code      Consults:   Consults (From admission, onward)          Status Ordering Provider     Inpatient consult to Interventional Radiology  Once        Provider:  Akbar Bermeo MD    Completed OREN GUIDO     Inpatient consult to Gastroenterology  Once        Provider:  Tracy Flower MD    Completed ROBYN WELLINGTON            No new Assessment & Plan notes have been filed under this hospital service since the last note was generated.  Service: Hospital Medicine    Final Active Diagnoses:    Diagnosis Date Noted POA    PRINCIPAL PROBLEM:  Acute duodenal ulcer with hemorrhage and obstruction [K26.0] 04/01/2023 Yes    GERD (gastroesophageal reflux disease) [K21.9] 04/01/2023 Yes    UGIB (upper gastrointestinal bleed) [K92.2] 04/01/2023 Yes    Malignant neoplasm of central portion of left breast in female, estrogen receptor negative [C50.112, Z17.1] 11/03/2022 Not Applicable    Duodenal stricture [K31.5] 11/04/2021 Yes    Chronic combined systolic and diastolic heart failure [I50.42] 05/11/2021 Yes    Anxiety [F41.9] 04/07/2020 Yes    Coronary artery disease involving coronary bypass graft of native heart without angina pectoris [I25.810] 08/16/2019 Yes    Essential hypertension [I10] 11/06/2012 Yes    Hyperlipidemia [E78.5] 11/06/2012 Yes    Hypothyroid [E03.9] 11/06/2012 Yes      Problems Resolved During this Admission:    Diagnosis Date Noted Date Resolved POA    Epigastric pain [R10.13] 04/01/2023 04/01/2023 Yes    Hematemesis with nausea [K92.0] 05/11/2021 04/01/2023 Yes       Discharged Condition: stable    Disposition:  "    Follow Up:   Follow-up Information       Patrick Hernandez MD Follow up in 1 week(s).    Specialty: Family Medicine  Contact information:  [address] [facility] [address]  [facility] [address] [phone]               Tracy Flower MD Follow up.    Specialty: Gastroenterology  Why: Recommend outpatient follow-up with Gastroenterology within 1 week upon discharge to monitor for any signs of bleeding, follow-up on h/h, regarding resumption of your antiplatelet therapy including aspirin, Plavix  Contact information:  [address] [facility] [address] [phone]               Esau Henriquez MD Follow up in 1 week(s).    Specialties: Cardiology, Cardiovascular Disease  Why: Recommend outpatient follow-up withcardiology within 1 week upon discharge  regarding resumption of your antiplatelet therapy including aspirin, Plavix  Contact information:  [address] [facility] [address] [phone]                           Patient Instructions:      OXYGEN FOR HOME USE     Order Specific Question Answer Comments   Liter Flow 2    Duration Continuous    Qualifying Test Performed at: Activity    Oxygen saturation at rest 92    Oxygen saturation with activity 86    Oxygen saturation with activity on oxygen 94    Portable mode: continuous    Route nasal cannula    Device: home concentrator with portable tanks    Length of need (in months): 3 mos    Patient condition with qualifying saturation CHF    Height: 5' 3" (1.6 m)    Weight: 68 kg (149 lb 14.6 oz)    Alternative treatment measures have been tried or considered and deemed clinically ineffective. Yes      OXYGEN FOR HOME USE     Order Specific Question Answer Comments   Liter Flow 2    Duration With activity    Qualifying Test Performed at: Activity    Oxygen saturation at rest 92    Oxygen saturation with activity 86    Oxygen saturation with activity on oxygen 94    Portable mode: continuous    Route nasal cannula    Device: home concentrator with " "portable tanks    Length of need (in months): 3 mos    Patient condition with qualifying saturation CHF    Height: 5' 3" (1.6 m)    Weight: 67.6 kg (149 lb)    Alternative treatment measures have been tried or considered and deemed clinically ineffective. Yes        Significant Diagnostic Studies:     Results for orders placed or performed during the hospital encounter of 03/31/23   Gram stain    Specimen: Pleural; Body Fluid   Result Value Ref Range    Gram Stain Result Rare WBC's     Gram Stain Result No organisms seen    APTT   Result Value Ref Range    aPTT 24.8 21.0 - 32.0 sec   CBC auto differential   Result Value Ref Range    WBC 11.72 3.90 - 12.70 K/uL    RBC 3.20 (L) 4.00 - 5.40 M/uL    Hemoglobin 9.0 (L) 12.0 - 16.0 g/dL    Hematocrit 27.7 (L) 37.0 - 48.5 %    MCV 87 82 - 98 fL    MCH 28.1 27.0 - 31.0 pg    MCHC 32.5 32.0 - 36.0 g/dL    RDW 15.4 (H) 11.5 - 14.5 %    Platelets 301 150 - 450 K/uL    MPV 9.7 9.2 - 12.9 fL    Immature Granulocytes 0.3 0.0 - 0.5 %    Gran # (ANC) 7.7 1.8 - 7.7 K/uL    Immature Grans (Abs) 0.04 0.00 - 0.04 K/uL    Lymph # 2.4 1.0 - 4.8 K/uL    Mono # 1.3 (H) 0.3 - 1.0 K/uL    Eos # 0.2 0.0 - 0.5 K/uL    Baso # 0.08 0.00 - 0.20 K/uL    nRBC 0 0 /100 WBC    Gran % 65.4 38.0 - 73.0 %    Lymph % 20.6 18.0 - 48.0 %    Mono % 11.2 4.0 - 15.0 %    Eosinophil % 1.8 0.0 - 8.0 %    Basophil % 0.7 0.0 - 1.9 %    Differential Method Automated    Comprehensive metabolic panel   Result Value Ref Range    Sodium 139 136 - 145 mmol/L    Potassium 3.3 (L) 3.5 - 5.1 mmol/L    Chloride 102 95 - 110 mmol/L    CO2 23 23 - 29 mmol/L    Glucose 96 70 - 110 mg/dL    BUN 47 (H) 8 - 23 mg/dL    Creatinine 0.7 0.5 - 1.4 mg/dL    Calcium 9.1 8.7 - 10.5 mg/dL    Total Protein 6.8 6.0 - 8.4 g/dL    Albumin 3.4 (L) 3.5 - 5.2 g/dL    Total Bilirubin 0.4 0.1 - 1.0 mg/dL    Alkaline Phosphatase 75 55 - 135 U/L    AST 13 10 - 40 U/L    ALT 12 10 - 44 U/L    Anion Gap 14 8 - 16 mmol/L    eGFR >60 >60 mL/min/1.73 " m^2   Protime-INR   Result Value Ref Range    Prothrombin Time 11.3 9.0 - 12.5 sec    INR 1.1 0.8 - 1.2   Occult blood x 1, stool    Specimen: Stool   Result Value Ref Range    Occult Blood Positive (A) Negative   Comprehensive Metabolic Panel (CMP)   Result Value Ref Range    Sodium 137 136 - 145 mmol/L    Potassium 3.6 3.5 - 5.1 mmol/L    Chloride 107 95 - 110 mmol/L    CO2 21 (L) 23 - 29 mmol/L    Glucose 141 (H) 70 - 110 mg/dL    BUN 43 (H) 8 - 23 mg/dL    Creatinine 0.7 0.5 - 1.4 mg/dL    Calcium 8.4 (L) 8.7 - 10.5 mg/dL    Total Protein 6.0 6.0 - 8.4 g/dL    Albumin 3.1 (L) 3.5 - 5.2 g/dL    Total Bilirubin 0.4 0.1 - 1.0 mg/dL    Alkaline Phosphatase 55 55 - 135 U/L    AST 10 10 - 40 U/L    ALT 10 10 - 44 U/L    Anion Gap 9 8 - 16 mmol/L    eGFR >60 >60 mL/min/1.73 m^2   CBC with Automated Differential   Result Value Ref Range    WBC 8.10 3.90 - 12.70 K/uL    RBC 2.71 (L) 4.00 - 5.40 M/uL    Hemoglobin 7.8 (L) 12.0 - 16.0 g/dL    Hematocrit 24.0 (L) 37.0 - 48.5 %    MCV 89 82 - 98 fL    MCH 28.8 27.0 - 31.0 pg    MCHC 32.5 32.0 - 36.0 g/dL    RDW 15.6 (H) 11.5 - 14.5 %    Platelets 276 150 - 450 K/uL    MPV 9.6 9.2 - 12.9 fL    Immature Granulocytes 0.4 0.0 - 0.5 %    Gran # (ANC) 7.0 1.8 - 7.7 K/uL    Immature Grans (Abs) 0.03 0.00 - 0.04 K/uL    Lymph # 0.8 (L) 1.0 - 4.8 K/uL    Mono # 0.3 0.3 - 1.0 K/uL    Eos # 0.0 0.0 - 0.5 K/uL    Baso # 0.01 0.00 - 0.20 K/uL    nRBC 0 0 /100 WBC    Gran % 86.4 (H) 38.0 - 73.0 %    Lymph % 9.4 (L) 18.0 - 48.0 %    Mono % 3.7 (L) 4.0 - 15.0 %    Eosinophil % 0.0 0.0 - 8.0 %    Basophil % 0.1 0.0 - 1.9 %    Differential Method Automated    CBC auto differential   Result Value Ref Range    WBC 13.10 (H) 3.90 - 12.70 K/uL    RBC 3.24 (L) 4.00 - 5.40 M/uL    Hemoglobin 9.6 (L) 12.0 - 16.0 g/dL    Hematocrit 28.7 (L) 37.0 - 48.5 %    MCV 89 82 - 98 fL    MCH 29.6 27.0 - 31.0 pg    MCHC 33.4 32.0 - 36.0 g/dL    RDW 15.2 (H) 11.5 - 14.5 %    Platelets 251 150 - 450 K/uL    MPV  9.7 9.2 - 12.9 fL    Immature Granulocytes 0.5 0.0 - 0.5 %    Gran # (ANC) 9.9 (H) 1.8 - 7.7 K/uL    Immature Grans (Abs) 0.06 (H) 0.00 - 0.04 K/uL    Lymph # 1.9 1.0 - 4.8 K/uL    Mono # 1.3 (H) 0.3 - 1.0 K/uL    Eos # 0.0 0.0 - 0.5 K/uL    Baso # 0.02 0.00 - 0.20 K/uL    nRBC 0 0 /100 WBC    Gran % 75.2 (H) 38.0 - 73.0 %    Lymph % 14.6 (L) 18.0 - 48.0 %    Mono % 9.5 4.0 - 15.0 %    Eosinophil % 0.0 0.0 - 8.0 %    Basophil % 0.2 0.0 - 1.9 %    Differential Method Automated    Comprehensive Metabolic Panel (CMP)   Result Value Ref Range    Sodium 138 136 - 145 mmol/L    Potassium 3.4 (L) 3.5 - 5.1 mmol/L    Chloride 109 95 - 110 mmol/L    CO2 18 (L) 23 - 29 mmol/L    Glucose 89 70 - 110 mg/dL    BUN 27 (H) 8 - 23 mg/dL    Creatinine 0.7 0.5 - 1.4 mg/dL    Calcium 7.9 (L) 8.7 - 10.5 mg/dL    Total Protein 5.7 (L) 6.0 - 8.4 g/dL    Albumin 3.1 (L) 3.5 - 5.2 g/dL    Total Bilirubin 0.6 0.1 - 1.0 mg/dL    Alkaline Phosphatase 48 (L) 55 - 135 U/L    AST 22 10 - 40 U/L    ALT 8 (L) 10 - 44 U/L    Anion Gap 11 8 - 16 mmol/L    eGFR >60 >60 mL/min/1.73 m^2   CBC with Automated Differential   Result Value Ref Range    WBC 8.81 3.90 - 12.70 K/uL    RBC 2.83 (L) 4.00 - 5.40 M/uL    Hemoglobin 8.3 (L) 12.0 - 16.0 g/dL    Hematocrit 25.2 (L) 37.0 - 48.5 %    MCV 89 82 - 98 fL    MCH 29.3 27.0 - 31.0 pg    MCHC 32.9 32.0 - 36.0 g/dL    RDW 15.3 (H) 11.5 - 14.5 %    Platelets 222 150 - 450 K/uL    MPV 10.0 9.2 - 12.9 fL    Immature Granulocytes 0.5 0.0 - 0.5 %    Gran # (ANC) 5.6 1.8 - 7.7 K/uL    Immature Grans (Abs) 0.04 0.00 - 0.04 K/uL    Lymph # 2.1 1.0 - 4.8 K/uL    Mono # 0.9 0.3 - 1.0 K/uL    Eos # 0.1 0.0 - 0.5 K/uL    Baso # 0.05 0.00 - 0.20 K/uL    nRBC 0 0 /100 WBC    Gran % 63.4 38.0 - 73.0 %    Lymph % 24.0 18.0 - 48.0 %    Mono % 10.6 4.0 - 15.0 %    Eosinophil % 0.9 0.0 - 8.0 %    Basophil % 0.6 0.0 - 1.9 %    Differential Method Automated    Hemoglobin   Result Value Ref Range    Hemoglobin 8.9 (L) 12.0 - 16.0  g/dL   Hematocrit   Result Value Ref Range    Hematocrit 26.9 (L) 37.0 - 48.5 %   Comprehensive Metabolic Panel (CMP)   Result Value Ref Range    Sodium 140 136 - 145 mmol/L    Potassium 3.2 (L) 3.5 - 5.1 mmol/L    Chloride 110 95 - 110 mmol/L    CO2 23 23 - 29 mmol/L    Glucose 86 70 - 110 mg/dL    BUN 16 8 - 23 mg/dL    Creatinine 0.6 0.5 - 1.4 mg/dL    Calcium 8.0 (L) 8.7 - 10.5 mg/dL    Total Protein 5.6 (L) 6.0 - 8.4 g/dL    Albumin 3.0 (L) 3.5 - 5.2 g/dL    Total Bilirubin 0.6 0.1 - 1.0 mg/dL    Alkaline Phosphatase 50 (L) 55 - 135 U/L    AST 16 10 - 40 U/L    ALT 9 (L) 10 - 44 U/L    Anion Gap 7 (L) 8 - 16 mmol/L    eGFR >60 >60 mL/min/1.73 m^2   CBC with Automated Differential   Result Value Ref Range    WBC 8.85 3.90 - 12.70 K/uL    RBC 2.90 (L) 4.00 - 5.40 M/uL    Hemoglobin 8.4 (L) 12.0 - 16.0 g/dL    Hematocrit 26.3 (L) 37.0 - 48.5 %    MCV 91 82 - 98 fL    MCH 29.0 27.0 - 31.0 pg    MCHC 31.9 (L) 32.0 - 36.0 g/dL    RDW 15.4 (H) 11.5 - 14.5 %    Platelets 249 150 - 450 K/uL    MPV 10.1 9.2 - 12.9 fL    Immature Granulocytes 0.3 0.0 - 0.5 %    Gran # (ANC) 5.7 1.8 - 7.7 K/uL    Immature Grans (Abs) 0.03 0.00 - 0.04 K/uL    Lymph # 1.6 1.0 - 4.8 K/uL    Mono # 1.1 (H) 0.3 - 1.0 K/uL    Eos # 0.4 0.0 - 0.5 K/uL    Baso # 0.09 0.00 - 0.20 K/uL    nRBC 0 0 /100 WBC    Gran % 64.2 38.0 - 73.0 %    Lymph % 17.9 (L) 18.0 - 48.0 %    Mono % 12.1 4.0 - 15.0 %    Eosinophil % 4.5 0.0 - 8.0 %    Basophil % 1.0 0.0 - 1.9 %    Differential Method Automated    CBC auto differential   Result Value Ref Range    WBC 9.74 3.90 - 12.70 K/uL    RBC 2.84 (L) 4.00 - 5.40 M/uL    Hemoglobin 8.3 (L) 12.0 - 16.0 g/dL    Hematocrit 25.9 (L) 37.0 - 48.5 %    MCV 91 82 - 98 fL    MCH 29.2 27.0 - 31.0 pg    MCHC 32.0 32.0 - 36.0 g/dL    RDW 15.4 (H) 11.5 - 14.5 %    Platelets 247 150 - 450 K/uL    MPV 10.0 9.2 - 12.9 fL    Immature Granulocytes 0.4 0.0 - 0.5 %    Gran # (ANC) 6.5 1.8 - 7.7 K/uL    Immature Grans (Abs) 0.04 0.00 -  0.04 K/uL    Lymph # 1.4 1.0 - 4.8 K/uL    Mono # 1.1 (H) 0.3 - 1.0 K/uL    Eos # 0.7 (H) 0.0 - 0.5 K/uL    Baso # 0.07 0.00 - 0.20 K/uL    nRBC 0 0 /100 WBC    Gran % 66.5 38.0 - 73.0 %    Lymph % 14.4 (L) 18.0 - 48.0 %    Mono % 11.3 4.0 - 15.0 %    Eosinophil % 6.7 0.0 - 8.0 %    Basophil % 0.7 0.0 - 1.9 %    Differential Method Automated    Basic metabolic panel   Result Value Ref Range    Sodium 139 136 - 145 mmol/L    Potassium 3.4 (L) 3.5 - 5.1 mmol/L    Chloride 109 95 - 110 mmol/L    CO2 22 (L) 23 - 29 mmol/L    Glucose 88 70 - 110 mg/dL    BUN 13 8 - 23 mg/dL    Creatinine 0.6 0.5 - 1.4 mg/dL    Calcium 7.9 (L) 8.7 - 10.5 mg/dL    Anion Gap 8 8 - 16 mmol/L    eGFR >60 >60 mL/min/1.73 m^2   D dimer, quantitative   Result Value Ref Range    D-Dimer 1.87 (H) <0.50 mg/L FEU   Protein, Peritoneal, Pleural Fluid or ROMAN Drainage, In-House Pleural Fluid, Left   Result Value Ref Range    Body Fluid Source, Total Protein Pleural Fluid, Left     Body Fluid, Protein 1.2 Not established g/dL   LDH, Peritoneal, Pleural Fluid or ROMAN Drainage, In-House Pleural Fluid, Left   Result Value Ref Range    Body Fluid Source, LDH Pleural Fluid, Left     LD, Fluid 56 Not established U/L   CBC auto differential   Result Value Ref Range    WBC 10.16 3.90 - 12.70 K/uL    RBC 2.84 (L) 4.00 - 5.40 M/uL    Hemoglobin 8.4 (L) 12.0 - 16.0 g/dL    Hematocrit 26.0 (L) 37.0 - 48.5 %    MCV 92 82 - 98 fL    MCH 29.6 27.0 - 31.0 pg    MCHC 32.3 32.0 - 36.0 g/dL    RDW 15.5 (H) 11.5 - 14.5 %    Platelets 270 150 - 450 K/uL    MPV 9.9 9.2 - 12.9 fL    Immature Granulocytes 0.4 0.0 - 0.5 %    Gran # (ANC) 6.8 1.8 - 7.7 K/uL    Immature Grans (Abs) 0.04 0.00 - 0.04 K/uL    Lymph # 1.4 1.0 - 4.8 K/uL    Mono # 1.3 (H) 0.3 - 1.0 K/uL    Eos # 0.6 (H) 0.0 - 0.5 K/uL    Baso # 0.06 0.00 - 0.20 K/uL    nRBC 0 0 /100 WBC    Gran % 67.2 38.0 - 73.0 %    Lymph % 13.8 (L) 18.0 - 48.0 %    Mono % 12.4 4.0 - 15.0 %    Eosinophil % 5.6 0.0 - 8.0 %    Basophil %  0.6 0.0 - 1.9 %    Differential Method Automated    Basic metabolic panel   Result Value Ref Range    Sodium 140 136 - 145 mmol/L    Potassium 3.3 (L) 3.5 - 5.1 mmol/L    Chloride 109 95 - 110 mmol/L    CO2 24 23 - 29 mmol/L    Glucose 83 70 - 110 mg/dL    BUN 14 8 - 23 mg/dL    Creatinine 0.6 0.5 - 1.4 mg/dL    Calcium 8.0 (L) 8.7 - 10.5 mg/dL    Anion Gap 7 (L) 8 - 16 mmol/L    eGFR >60 >60 mL/min/1.73 m^2   Echo   Result Value Ref Range    BSA 1.73 m2    TDI SEPTAL 0.07 m/s    LV LATERAL E/E' RATIO 10.73 m/s    LV SEPTAL E/E' RATIO 16.86 m/s    LA WIDTH 3.60 cm    IVC diameter 1.87 cm    Left Ventricular Outflow Tract Mean Velocity 1.03 cm/s    Left Ventricular Outflow Tract Mean Gradient 4.34 mmHg    TV mean gradient 32 mmHg    TDI LATERAL 0.11 m/s    LVIDd 4.72 3.5 - 6.0 cm    IVS 1.29 (A) 0.6 - 1.1 cm    Posterior Wall 1.19 (A) 0.6 - 1.1 cm    Ao root annulus 2.67 cm    LVIDs 3.95 2.1 - 4.0 cm    FS 16 28 - 44 %    LA volume 59.09 cm3    STJ 2.45 cm    Ascending aorta 2.43 cm    LV mass 223.65 g    LA size 3.95 cm    TAPSE 1.70 cm    Left Ventricle Relative Wall Thickness 0.50 cm    AV mean gradient 15 mmHg    AV valve area 1.64 cm2    AV Velocity Ratio 0.56     AV index (prosthetic) 0.67     MV valve area p 1/2 method 3.72 cm2    E/A ratio 1.64     Mean e' 0.09 m/s    E wave deceleration time 204.01 msec    IVRT 39.96 msec    LVOT diameter 1.77 cm    LVOT area 2.5 cm2    LVOT peak johny 1.24 m/s    LVOT peak VTI 26.80 cm    Ao peak johny 2.22 m/s    Ao VTI 40.1 cm    RVOT peak johny 0.94 m/s    RVOT peak VTI 18.5 cm    Mr max johny 2.94 m/s    LVOT stroke volume 65.91 cm3    AV peak gradient 20 mmHg    PV mean gradient 2.30 mmHg    E/E' ratio 13.11 m/s    MV Peak E Johny 1.18 m/s    TR Max Johny 3.10 m/s    MV stenosis pressure 1/2 time 59.16 ms    MV Peak A Johny 0.72 m/s    LV Systolic Volume 67.78 mL    LV Systolic Volume Index 39.6 mL/m2    LV Diastolic Volume 103.47 mL    LV Diastolic Volume Index 60.51 mL/m2    LA  Volume Index 34.6 mL/m2    LV Mass Index 131 g/m2    RA Major Axis 3.84 cm    Left Atrium Minor Axis 4.81 cm    Left Atrium Major Axis 4.97 cm    Triscuspid Valve Regurgitation Peak Gradient 38 mmHg    RA Width 2.90 cm    EF 40 %   Type & Screen   Result Value Ref Range    Group & Rh O POS     Indirect Gisel NEG     Specimen Outdate 04/03/2023 23:59    Prepare RBC 1 Unit   Result Value Ref Range    UNIT NUMBER [id number]     Product Code U9426I46     DISPENSE STATUS TRANSFUSED     CODING SYSTEM [id number]     Unit Blood Type Code 5100     Unit Blood Type O POS     Unit Expiration [id number]     CROSSMATCH INTERPRETATION Compatible      *Note: Due to a large number of results and/or encounters for the requested time period, some results have not been displayed. A complete set of results can be found in Results Review.       Imaging Results              CTA Acute GI [place], Abdomen and Pelvis (Final result)  Result time 03/31/23 19:18:13   Procedure changed from CTA Abdomen and Pelvis     Final result by Skip Almazan MD (03/31/23 19:18:13)                   Impression:      Multiple findings as described above.  No evidence for active hemorrhage    Questionable scarring in the left kidney upper lobe with poor enhancement of the renal parenchyma.  This may relate to scarring.  Recommend follow-up.      Electronically signed by: Skip Almazan  Date:    03/31/2023  Time:    19:18               Narrative:    EXAMINATION:  CT angiography acute GI bleed abdomen pelvis    CLINICAL HISTORY:  GI bleeding    TECHNIQUE:  CT angiography of the abdomen pelvis with and without contrast.  3 minute delays were provided.  MIP imaging was utilized.  A total of 100 cc of Omnipaque 350 was injected.    COMPARISON:  None    FINDINGS:  No hemorrhage identified.  No hydronephrosis.  Upper lobe scarring of left kidney.  Calcified uterine fibroid.  No high bowel obstruction.  No evidence for active hemorrhage.  Colonic diverticulosis.   Mild mucosal thickening of the sigmoid colon.  Uterine fibroid suspected.  Bladder is mildly distended.  Postsurgical changes anterior abdomen.  Mild mucosal thickening of the stomach.  Pancreas adrenal glands are grossly unremarkable.  No gallstones.  Mild moderate atherosclerotic disease of the aorta iliac vasculature.                                      Pending Diagnostic Studies:       None           Medications:         Medication List        START taking these medications      sucralfate 1 gram tablet  Commonly known as: CARAFATE  Take 1 tablet (1 g total) by mouth 4 (four) times daily. for 10 days            CHANGE how you take these medications      metoprolol succinate 25 MG 24 hr tablet  Commonly known as: TOPROL-XL  Take 1 tablet (25 mg total) by mouth once daily.  Start taking on: April 6, 2023  What changed: when to take this     pantoprazole 40 MG tablet  Commonly known as: PROTONIX  Take 1 tablet (40 mg total) by mouth 2 (two) times daily.  What changed:   medication strength  how much to take  when to take this            CONTINUE taking these medications      calcium carbonate 600 mg calcium (1,500 mg) Tab  Commonly known as: OS-CALVIN     dapsone 25 MG Tab     dexAMETHasone 0.5 mg/5 mL Elix  Commonly known as: DECADRON     EScitalopram oxalate 10 MG tablet  Commonly known as: LEXAPRO  Take 1 tablet (10 mg total) by mouth once daily.     levothyroxine 75 MCG tablet  Commonly known as: SYNTHROID  Take 1 tablet (75 mcg total) by mouth before breakfast.     rosuvastatin 20 MG tablet  Commonly known as: CRESTOR  Take 1 tablet (20 mg total) by mouth every evening.     VITAMIN D3 100 mcg (4,000 unit) Cap capsule  Generic drug: cholecalciferol (vitamin D3)            STOP taking these medications      ALPRAZolam 1 MG tablet  Commonly known as: XANAX     clopidogreL 75 mg tablet  Commonly known as: PLAVIX     fluconazole 200 MG Tab  Commonly known as: DIFLUCAN     hydroCHLOROthiazide 25 MG tablet  Commonly  known as: HYDRODIURIL     losartan 50 MG tablet  Commonly known as: COZAAR     valACYclovir 1000 MG tablet  Commonly known as: VALTREX               Where to Get Your Medications        These medications were sent to [facility]  [id number] [place] [address] [place] [address]      Hours: Mon-Fri, 8a-5:30p Phone: [phone]   metoprolol succinate 25 MG 24 hr tablet  pantoprazole 40 MG tablet  sucralfate 1 gram tablet         Indwelling Lines/Drains at time of discharge:   Lines/Drains/Airways       Drain  Duration                  Closed/Suction Drain 11/25/22 1105 Left Breast Bulb 15 Fr. 131 days                    Time spent on the discharge of patient: 87 minutes      Discharge dispo: home        Paulina Natarajan MD  Department of Hospital Medicine  [facility]

## 2023-04-05 NOTE — PROGRESS NOTES
Home Oxygen Evaluation    Date Performed: 2023    1) Patient's Home O2 Sat on room air, while at rest: 90        If O2 sats on room air at rest are 88% or below, patient qualifies. No additional testing needed. Document N/A in steps 2 and 3. If 89% or above, complete steps 2.      2) Patient's O2 Sat on room air while exercisin        If O2 sats on room air while exercising remain 89% or above patient does not qualify, no further testing needed Document N/A in step 3. If O2 sats on room air while exercising are 88% or below, continue to step 3.      3) Patient's O2 Sat while exercising on O2: NA at NA LPM         (Must show improvement from #2 for patients to qualify)    If O2 sats improve on oxygen, patient qualifies for portable oxygen. If not, the patient does not qualify.

## 2023-04-05 NOTE — PLAN OF CARE
Patient remains free of injury. VSS. Continuing to monitor labs and signs of bleeding. Chart reviewed. Call light in reach. Will continue to monitor.    Problem: Fall Injury Risk  Goal: Absence of Fall and Fall-Related Injury  Outcome: Ongoing, Progressing     Problem: Adjustment to Illness (Gastrointestinal Bleeding)  Goal: Optimal Coping with Acute Illness  Outcome: Ongoing, Progressing       Problem: Adult Inpatient Plan of Care  Goal: Absence of Hospital-Acquired Illness or Injury  Outcome: Ongoing, Progressing     Problem: Adult Inpatient Plan of Care  Goal: Optimal Comfort and Wellbeing  Outcome: Ongoing, Progressing

## 2023-04-05 NOTE — DISCHARGE INSTRUCTIONS
Recommend outpatient follow-up with Gastroenterology within 1 week upon discharge to monitor for any signs of bleeding, follow-up on h/h, regarding resumption of your antiplatelet therapy including aspirin, Plavix    Strongly recommend outpatient follow-up with Cardiology given episodes of hypertension.    Given intermittent episodes of hypotension , blood pressure medications have been held at the time of discharge, metoprolol frequency has been changed from b.i.d. to daily, strongly recommend to monitor blood pressure at home, follow-up with Primary Care, Gastroenterology, Cardiology within 1 week upon discharge next

## 2023-04-06 ENCOUNTER — PATIENT OUTREACH (OUTPATIENT)
Dept: ADMINISTRATIVE | Facility: CLINIC | Age: 68
End: 2023-04-06
Payer: MEDICARE

## 2023-04-06 ENCOUNTER — TELEPHONE (OUTPATIENT)
Dept: GASTROENTEROLOGY | Facility: CLINIC | Age: 68
End: 2023-04-06
Payer: MEDICARE

## 2023-04-06 NOTE — TELEPHONE ENCOUNTER
----- Message from Jami Tariq MD sent at 4/6/2023  3:50 PM CDT -----  Tj Rothman,    That's fine, set up EGD at [facility] on 2 endo with [facility] provider in 2 months after initial EGD to eval healing of duodenal ulcer and consider duodenal dilation per exam findings.    [facility] clinic visit in the next 3-5 weeks, can be virtual to check in and review her case with the patient so she knows why she's coming for a procedure. Clinic visit should not delay scheduling of endoscopy.    Duodenal narrowing could've been in setting of significant ulcer disease, so please make sure she's compliant with high dose PPI therapy until otherwise instructed.    Thanks  ----- Message -----  From: Maria Guadalupe Green MA  Sent: 4/5/2023   9:39 PM CDT  To: Jami Tariq MD    Please review  ----- Message -----  From: Anabelle Bay LPN  Sent: 4/4/2023   1:14 PM CDT  To: NOE Saunders,  Can you help me with this?    Patient needs repeat EGD in two months, but it is recommended she have it done in [place] since duodenal dilatation may be needed. Can you please help arrange scope there or at least a virtual visit with the team there?  Thanks  ERVIN Reis LPN-GI dept     ----- Message -----  From: Blas Farrar PA-C  Sent: 4/4/2023   8:12 AM CDT  To: Charan Humphries

## 2023-04-13 ENCOUNTER — LAB VISIT (OUTPATIENT)
Dept: LAB | Facility: HOSPITAL | Age: 68
End: 2023-04-13
Attending: NURSE PRACTITIONER
Payer: MEDICARE

## 2023-04-13 ENCOUNTER — OFFICE VISIT (OUTPATIENT)
Dept: FAMILY MEDICINE | Facility: CLINIC | Age: 68
End: 2023-04-13
Payer: MEDICARE

## 2023-04-13 VITALS
DIASTOLIC BLOOD PRESSURE: 59 MMHG | HEIGHT: 63 IN | WEIGHT: 142 LBS | BODY MASS INDEX: 25.16 KG/M2 | RESPIRATION RATE: 18 BRPM | SYSTOLIC BLOOD PRESSURE: 97 MMHG | HEART RATE: 82 BPM

## 2023-04-13 DIAGNOSIS — K92.2 UPPER GI BLEED: ICD-10-CM

## 2023-04-13 DIAGNOSIS — K31.5 DUODENAL STRICTURE: ICD-10-CM

## 2023-04-13 DIAGNOSIS — Z09 HOSPITAL DISCHARGE FOLLOW-UP: Primary | ICD-10-CM

## 2023-04-13 DIAGNOSIS — D64.9 ANEMIA, UNSPECIFIED TYPE: ICD-10-CM

## 2023-04-13 DIAGNOSIS — K26.9 DUODENAL ULCER: ICD-10-CM

## 2023-04-13 DIAGNOSIS — Z09 HOSPITAL DISCHARGE FOLLOW-UP: ICD-10-CM

## 2023-04-13 LAB
ANION GAP SERPL CALC-SCNC: 16 MMOL/L (ref 8–16)
BASOPHILS # BLD AUTO: 0.08 K/UL (ref 0–0.2)
BASOPHILS NFR BLD: 0.9 % (ref 0–1.9)
BUN SERPL-MCNC: 19 MG/DL (ref 8–23)
CALCIUM SERPL-MCNC: 9.8 MG/DL (ref 8.7–10.5)
CHLORIDE SERPL-SCNC: 97 MMOL/L (ref 95–110)
CO2 SERPL-SCNC: 26 MMOL/L (ref 23–29)
CREAT SERPL-MCNC: 0.7 MG/DL (ref 0.5–1.4)
DIFFERENTIAL METHOD: ABNORMAL
EOSINOPHIL # BLD AUTO: 0.2 K/UL (ref 0–0.5)
EOSINOPHIL NFR BLD: 2.4 % (ref 0–8)
ERYTHROCYTE [DISTWIDTH] IN BLOOD BY AUTOMATED COUNT: 15.7 % (ref 11.5–14.5)
EST. GFR  (NO RACE VARIABLE): >60 ML/MIN/1.73 M^2
FERRITIN SERPL-MCNC: 30 NG/ML (ref 20–300)
GLUCOSE SERPL-MCNC: 78 MG/DL (ref 70–110)
HCT VFR BLD AUTO: 27.7 % (ref 37–48.5)
HGB BLD-MCNC: 8.3 G/DL (ref 12–16)
IMM GRANULOCYTES # BLD AUTO: 0.03 K/UL (ref 0–0.04)
IMM GRANULOCYTES NFR BLD AUTO: 0.3 % (ref 0–0.5)
IRON SERPL-MCNC: 28 UG/DL (ref 30–160)
LYMPHOCYTES # BLD AUTO: 1.1 K/UL (ref 1–4.8)
LYMPHOCYTES NFR BLD: 11.4 % (ref 18–48)
MCH RBC QN AUTO: 28.2 PG (ref 27–31)
MCHC RBC AUTO-ENTMCNC: 30 G/DL (ref 32–36)
MCV RBC AUTO: 94 FL (ref 82–98)
MONOCYTES # BLD AUTO: 1 K/UL (ref 0.3–1)
MONOCYTES NFR BLD: 10.9 % (ref 4–15)
NEUTROPHILS # BLD AUTO: 6.9 K/UL (ref 1.8–7.7)
NEUTROPHILS NFR BLD: 74.1 % (ref 38–73)
NRBC BLD-RTO: 0 /100 WBC
PLATELET # BLD AUTO: 433 K/UL (ref 150–450)
PMV BLD AUTO: 10.1 FL (ref 9.2–12.9)
POTASSIUM SERPL-SCNC: 3.1 MMOL/L (ref 3.5–5.1)
RBC # BLD AUTO: 2.94 M/UL (ref 4–5.4)
SATURATED IRON: 7 % (ref 20–50)
SODIUM SERPL-SCNC: 139 MMOL/L (ref 136–145)
TOTAL IRON BINDING CAPACITY: 389 UG/DL (ref 250–450)
TRANSFERRIN SERPL-MCNC: 263 MG/DL (ref 200–375)
WBC # BLD AUTO: 9.36 K/UL (ref 3.9–12.7)

## 2023-04-13 PROCEDURE — 99999 PR PBB SHADOW E&M-EST. PATIENT-LVL IV: CPT | Mod: PBBFAC,,, | Performed by: NURSE PRACTITIONER

## 2023-04-13 PROCEDURE — 85025 COMPLETE CBC W/AUTO DIFF WBC: CPT | Performed by: NURSE PRACTITIONER

## 2023-04-13 PROCEDURE — 82728 ASSAY OF FERRITIN: CPT | Performed by: NURSE PRACTITIONER

## 2023-04-13 PROCEDURE — 80048 BASIC METABOLIC PNL TOTAL CA: CPT | Performed by: NURSE PRACTITIONER

## 2023-04-13 PROCEDURE — 99999 PR PBB SHADOW E&M-EST. PATIENT-LVL IV: ICD-10-PCS | Mod: PBBFAC,,, | Performed by: NURSE PRACTITIONER

## 2023-04-13 PROCEDURE — 84466 ASSAY OF TRANSFERRIN: CPT | Performed by: NURSE PRACTITIONER

## 2023-04-13 PROCEDURE — 99214 OFFICE O/P EST MOD 30 MIN: CPT | Mod: PBBFAC,PO | Performed by: NURSE PRACTITIONER

## 2023-04-13 PROCEDURE — 99214 OFFICE O/P EST MOD 30 MIN: CPT | Mod: S$PBB,,, | Performed by: NURSE PRACTITIONER

## 2023-04-13 PROCEDURE — 99214 PR OFFICE/OUTPT VISIT, EST, LEVL IV, 30-39 MIN: ICD-10-PCS | Mod: S$PBB,,, | Performed by: NURSE PRACTITIONER

## 2023-04-13 PROCEDURE — 36415 COLL VENOUS BLD VENIPUNCTURE: CPT | Mod: PO | Performed by: NURSE PRACTITIONER

## 2023-04-13 NOTE — PROGRESS NOTES
Subjective:       Patient ID: Dulce Root is a 67 y.o. female.    Chief Complaint: Hospital Follow Up    HPI    She comes in for hospital follow up. She was admitted to [facility] on 4/1/23.  She presented to the ER with complaints of  blood in stool, onset 3 days .  She was diagnosed with GI bleed, duodenal ulcer and stricture. Completed test: labs, CT abdomen, EGD. Treatment included IV fluid replacement, PPI, carafate, holding Plavix and ASA. Medical history includes HTN, hyperlipidemia, CAD, CABG, Hodgkins,  breast cancer, CHF, A fib, and GERD. Discharged on 4/5/23.      She is scheduled to follow up with cardiology next week to discuss restarting Plavix. She was advised to  repeat EGD in 2 months but was recommended that she have this done in [place] due to possibility of needing duodenal dilation. She is concerned because no one has reached out to her to schedule an appointment. She continues to have some fatigue and shortness of breath with exertion, mild generalized abdominal pain.      Transitional Care Note    Family and/or Caretaker present at visit?  No.  Diagnostic tests reviewed/disposition: I have reviewed all completed as well as pending diagnostic tests at the time of discharge.  Disease/illness education: hypotension   Home health/community services discussion/referrals: Patient does not have home health established from hospital visit.  They do not need home health.  If needed, we will set up home health for the patient.   Establishment or re-establishment of referral orders for community resources: No other necessary community resources.   Discussion with other health care providers: No discussion with other health care providers necessary.          Review of Systems   Constitutional:  Positive for fatigue. Negative for fever and unexpected weight change.   HENT:  Negative for ear pain and sore throat.    Eyes:  Negative for pain and visual disturbance.   Respiratory:  Positive for  shortness of breath. Negative for cough.    Cardiovascular:  Negative for chest pain and palpitations.   Gastrointestinal:  Positive for abdominal pain. Negative for diarrhea and vomiting.   Musculoskeletal:  Negative for arthralgias and myalgias.   Skin:  Negative for color change and rash.   Neurological:  Negative for dizziness and headaches.   Psychiatric/Behavioral:  Negative for dysphoric mood and sleep disturbance. The patient is not nervous/anxious.      Vitals:    04/13/23 1003   BP: (!) 97/59   Pulse: 82   Resp: 18       Objective:     Current Outpatient Medications   Medication Sig Dispense Refill    calcium carbonate (OS-CALVIN) 600 mg (1,500 mg) Tab Take 600 mg by mouth 2 (two) times daily with meals.      cholecalciferol, vitamin D3, (VITAMIN D3) 100 mcg (4,000 unit) Cap Take 4,000 Units by mouth once daily.      dapsone 25 MG Tab TAKE 2 TABLETS BY MOUTH EVERY DAY FOR 30 DAYS      dexAMETHasone (DECADRON) 0.5 mg/5 mL Elix SWISH 3.75MLS IN MOUTH FOR 30 SECONDS AND SWALLOW TWICE DAILY AS NEEDED FOR LICHEN PLANUS FLARE UPS      EScitalopram oxalate (LEXAPRO) 10 MG tablet Take 1 tablet (10 mg total) by mouth once daily. 90 tablet 3    levothyroxine (SYNTHROID) 75 MCG tablet Take 1 tablet (75 mcg total) by mouth before breakfast. 90 tablet 3    metoprolol succinate (TOPROL-XL) 25 MG 24 hr tablet Take 1 tablet (25 mg total) by mouth once daily. 30 tablet 0    pantoprazole (PROTONIX) 40 MG tablet Take 1 tablet (40 mg total) by mouth 2 (two) times daily. 120 tablet 0    rosuvastatin (CRESTOR) 20 MG tablet Take 1 tablet (20 mg total) by mouth every evening. 90 tablet 3    sucralfate (CARAFATE) 1 gram tablet Take 1 tablet (1 g total) by mouth 4 (four) times daily. for 10 days 40 tablet 0     No current facility-administered medications for this visit.     Facility-Administered Medications Ordered in Other Visits   Medication Dose Route Frequency Provider Last Rate Last Admin    0.9%  NaCl infusion   Intravenous  Continuous Ender Morrison MD           Physical Exam  Vitals and nursing note reviewed.   Constitutional:       General: She is not in acute distress.     Appearance: She is well-developed.   HENT:      Head: Normocephalic and atraumatic.   Eyes:      Pupils: Pupils are equal, round, and reactive to light.   Cardiovascular:      Rate and Rhythm: Normal rate and regular rhythm.   Pulmonary:      Effort: Pulmonary effort is normal.      Breath sounds: Normal breath sounds.   Musculoskeletal:         General: Normal range of motion.      Cervical back: Normal range of motion and neck supple.   Skin:     General: Skin is warm and dry.      Findings: No rash.   Neurological:      Mental Status: She is alert and oriented to person, place, and time.   Psychiatric:         Judgment: Judgment normal.       Assessment:       1. Hospital discharge follow-up    2. Upper GI bleed    3. Anemia, unspecified type    4. Duodenal ulcer    5. Duodenal stricture        Plan:   Hospital discharge follow-up  -     Basic Metabolic Panel; Future; Expected date: 04/13/2023  -     CBC Auto Differential; Future; Expected date: 04/13/2023  -     Ferritin; Future; Expected date: 04/13/2023  -     Iron and TIBC; Future; Expected date: 04/13/2023    Upper GI bleed  -     Basic Metabolic Panel; Future; Expected date: 04/13/2023  -     CBC Auto Differential; Future; Expected date: 04/13/2023  -     Ferritin; Future; Expected date: 04/13/2023  -     Iron and TIBC; Future; Expected date: 04/13/2023    Anemia, unspecified type  -     Ferritin; Future; Expected date: 04/13/2023  -     Iron and TIBC; Future; Expected date: 04/13/2023    Duodenal ulcer  -     Ambulatory referral/consult to Gastroenterology; Future; Expected date: 04/20/2023    Duodenal stricture  -     Ambulatory referral/consult to Gastroenterology; Future; Expected date: 04/20/2023    Continue to hold plavix and ASA until follow up with cardiology per hospital discharge  Hold  losartan and HCTZ due to hypotension    Continue metoprolol once daily    Stay well hydrated        No follow-ups on file.    There are no Patient Instructions on file for this visit.

## 2023-04-14 ENCOUNTER — PATIENT MESSAGE (OUTPATIENT)
Dept: RHEUMATOLOGY | Facility: CLINIC | Age: 68
End: 2023-04-14
Payer: MEDICARE

## 2023-04-14 ENCOUNTER — TELEPHONE (OUTPATIENT)
Dept: FAMILY MEDICINE | Facility: CLINIC | Age: 68
End: 2023-04-14
Payer: MEDICARE

## 2023-04-14 NOTE — TELEPHONE ENCOUNTER
"Caroline, this patient is scheduled to see you on 04/28/2023. Please advise.    Caroline Theodore (Allye), [facility]  Rheumatology Department     "

## 2023-04-17 ENCOUNTER — PATIENT MESSAGE (OUTPATIENT)
Dept: ENDOSCOPY | Facility: HOSPITAL | Age: 68
End: 2023-04-17
Payer: MEDICARE

## 2023-04-17 ENCOUNTER — TELEPHONE (OUTPATIENT)
Dept: ENDOSCOPY | Facility: HOSPITAL | Age: 68
End: 2023-04-17

## 2023-04-17 DIAGNOSIS — K25.9 GASTRIC ULCER, UNSPECIFIED CHRONICITY, UNSPECIFIED WHETHER GASTRIC ULCER HEMORRHAGE OR PERFORATION PRESENT: Primary | ICD-10-CM

## 2023-04-18 ENCOUNTER — PATIENT MESSAGE (OUTPATIENT)
Dept: FAMILY MEDICINE | Facility: CLINIC | Age: 68
End: 2023-04-18
Payer: MEDICARE

## 2023-04-18 NOTE — PROGRESS NOTES
Subjective:   Patient ID:  Dulce Root is a 67 y.o. female who presents for evaluation of No chief complaint on file.      PROBLEM LIST:  Breast cancer, left 11/22 (Previous cancer of the right breast s/p XRT, s/p bilateral mastectomy)  Hodgkins lymphoma 1991 (ABVD, XRT)  CAD s/p CABG 8/17 ( SVG to LAD, SVG to PDA occluded), PCI 2019 LM, CX, RCA  AVR, bioprosthetic 8/17  Carotid artery stenosis (ANNA 60-69%, LICA 80-89%)  Pulmonary HTN PAP 57, Likely WHO class II  HFrEF, EF 35% since 2019  HTN  HLD    HPI:  She presents today to [facility] care and cardio Oncology Clinic.  She is referred by Dr. Sepulveda for recurrent HER2+ breast cancer diagnosed 11/22.  She has a significant oncologic and cardiovascular history.  She was diagnosed with Hodgkin's lymphoma back in 1991 and underwent treatment with chemotherapy as well as mental radiation therapy.  She subsequently developed right-sided breast cancer and underwent bilateral mastectomy and radiation seed implantation.  In 2017 she underwent coronary artery bypass grafting x2 with an SVG to LAD and SVG to PDA for significant left main and right coronary artery disease as well as bioprosthetic AVR due to radiation induced valvulopathy.  At that time her ejection fraction was normal.  In 2019, she developed a cardiomyopathy with a reduced ejection fraction of 35%.  Repeat coronary angiography was performed at that time which showed an occlusion of her SVG to PDA.  She subsequently underwent PCI of her left main coronary artery, circumflex artery, and right coronary artery.  She has been on guideline directed medical therapy with losartan and metoprolol.  Her LVEF has remained at 35%. She also has significant carotid artery disease with moderate stenosis in her right carotid artery and a severe stenosis in her left carotid artery.  She has been on dual anti-platelet therapy with aspirin and Plavix as well as high-intensity statin therapy with rosuvastatin 20 mg daily. She  reports MARCH with everyday activities. No chest pain. She has frequent palpitations associated with near syncope.     Interval history: Admitted 3/30/23 with UGIB and found to have duodenal ulcer.  She was transfused 1 unit of blood and had cauterization performed of the ulcer.  She was noted to have a stricture in the area of the ulcer and needs follow-up with GI.  During her hospitalization her losartan and her hydrochlorothiazide were discontinued as well as her aspirin and Plavix.  She has been monitoring her blood pressure at home which has mainly been normal but will occasionally drop down.  She reports significant dyspnea with exertion since her discharge.  Her weight is up 7 lb and she was short of breath walking back into the exam room.  She denies any PND, orthopnea, or lower extremity edema.  Her last hemoglobin was stable at 8.5.  She has not had any chest pain or palpitations.  An echo done during her hospitalization showed slight improvement in her ejection fraction to 40%.    ECG 22-NOV-2022 16:31:45: Personally reviewed by me shows NSR with LBBB QRS duration 180 ms    Echo 4/23:  Summary    The left ventricle is normal in size with concentric hypertrophy and mildly decreased systolic function.  Mild left atrial enlargement.  There are bilateral pleural effusions.  There is a bioprosthetic aortic valve present. There is no aortic insufficiency present. Prosthetic aortic valve is normal.  The aortic valve mean gradient is 15 mmHg with a dimensionless index of 0.67.  There is abnormal septal wall motion consistent with post-operative status.  Mild tricuspid regurgitation.  The estimated ejection fraction is 40%.  Grade II left ventricular diastolic dysfunction.  Normal right ventricular size with low normal right ventricular systolic function.  Mild right atrial enlargement.    Echo 11/22:  Summary    The left ventricle is mildly enlarged with concentric hypertrophy and moderately decreased systolic  function.  Left ventricular diastolic dysfunction.  The estimated PA systolic pressure is 57 mmHg.  Normal right ventricular size with low normal right ventricular systolic function.  There is pulmonary hypertension.  Normal central venous pressure (3 mmHg).  The estimated ejection fraction is 35%.  There is moderate left ventricular global hypokinesis.  Mild tricuspid regurgitation.  There is a bioprosthetic aortic valve present.  The aortic valve mean gradient is 13 mmHg with a dimensionless index of 0.28.    [facility] 4/22:  Angiographic findings:  Left main coronary-medium size vessel mild disease less than 15%.  Left anterior descending-chronic total occlusion its proximal portion.  Ramus intermedius-medium size vessel diffuse mild calcific disease less than 20%.  Circumflex-small to medium size vessel gives rise to 1 obtuse marginal.  Normal appearance patient's age.  Right coronary-medium size dominant vessel diffuse disease proximal mid up to 45% short discrete lesions in its midportion.  Vein graft to the LAD-patent with normal appearing native LAD mid to distal.  Could not find vein graft to right coronary.     Hemodynamics:  Right ventricular pressure  69/11/20 mmHg   pulmonary artery pressure 65/33/46 mmHg  Pulmonary catheter wedge pressure 34/30 1/30 mmHg  Right atrial pressure 17/31.    Cardiac output/cardiac index 4.2/2.59    Carotid US 3/22:  Conclusion    There is 60-69% right Internal Carotid Stenosis.  There is 80-99% left Internal Carotid Stenosis.  Mild to moderate heterogeneous calcified plaque bilaterally.  Normal antegrade vertebral flow bilaterally.    Past Medical History:   Diagnosis Date    Allergy     Anticoagulant long-term use     Plavix    Breast cancer 2008    Carotid stenosis, bilateral 10/13/2017    Coronary artery disease     Coronary artery disease involving coronary bypass graft of native heart without angina pectoris 08/16/2019 2/19  1.  Left main is a small vessel with a 60%-65%  ostial lesion. 2.  LAD is a medium-sized vessel diffuse 70% proximally  Ramus intermedius is a medium size vessel with a 40%-50% ostial lesion. 3.  Circumflex 60%-70% ostial  remainder of circumflex and obtuse marginal normal in appearance. Right coronary artery is normal size vessel, short discrete 70%mid 4.  Vein graft to right coronary is occ    Coronary artery disease involving native coronary artery of native heart without angina pectoris 06/27/2017    DCIS (ductal carcinoma in situ) of breast 11/06/2012    Encounter for blood transfusion     Epigastric pain 4/1/2023    Essential hypertension 11/06/2012    GERD (gastroesophageal reflux disease)     GI bleed 02/2021    Hematemesis with nausea 5/11/2021    Hodgkin lymphoma     Hx of Hodgkin's disease - s/p chemo and radiation 11/06/2012    Hyperlipidemia     Hyperlipidemia 11/06/2012    The patient presents with hyperlipidemia.  The patient reports tolerating the medication well and is in excellent compliance.  There have been no medication side effects.  The patient denies chest pain, neuropathy, and myalgias.  The patient has reduced fat intake and has been exercising.  Current treatment has included the medications listed in the med card.   Lab Results Component Value Date  CH    Hypertension     LBBB (left bundle branch block) 05/22/2018    Osteoporosis     Paroxysmal atrial fibrillation - single post-op episode 08/24/2017    Pemphigoid     Pemphigoid     pt receives IVIG infusions    Pulmonary hypertension 1/13/2023    S/P AVR (aortic valve replacement) - pericardial valve 08/21/2017    Perceval aortic tissue valve PVS23. 23mm    serial # [id number]    S/P CABG x 2 08/21/2017 8/17 CABG X 2 with SVG-LAD and SVG-distal RCA  SVG LAD due to absent LIMA after chest radiation and lymph node biopsy     Stented coronary artery     She had 2 stents placed in 2/2019.  She has had CAD and bypasses in the past in 2017.      Thyroid disease        Past Surgical History:    Procedure Laterality Date    ARTERIOGRAPHY OF AORTIC ROOT N/A 02/15/2019    Procedure: ARTERIOGRAM, AORTIC ROOT;  Surgeon: Sujit Chaudhry MD;  Location: [facility] CATH;  Service: Cardiology;  Laterality: N/A;    AXILLARY NODE DISSECTION Left 11/25/2022    Procedure: LYMPHADENECTOMY, AXILLARY-Left;  Surgeon: Rosa Maradiaga MD;  Location: [facility];  Service: General;  Laterality: Left;    BREAST BIOPSY      BREAST RECONSTRUCTION      bilateral mastectomy    CARDIAC CATHETERIZATION      stents x 2    CARDIAC SURGERY  08/2017    Aortic valve replacement , CABG 2 vessel    CARDIAC VALVE REPLACEMENT  08/2017    aortic valve, bovine per pt    CORONARY ANGIOGRAPHY N/A 02/15/2019    Procedure: ANGIOGRAM, CORONARY ARTERY;  Surgeon: Sujit Chaudhry MD;  Location: [facility] CATH;  Service: Cardiology;  Laterality: N/A;    CORONARY ANGIOGRAPHY N/A 4/1/2022    Procedure: ANGIOGRAM, CORONARY ARTERY;  Surgeon: Sujit Chaudhry MD;  Location: [facility] CATH;  Service: Cardiology;  Laterality: N/A;    CORONARY BYPASS GRAFT ANGIOGRAPHY  02/15/2019    Procedure: Bypass graft study;  Surgeon: Sujit Chaudhry MD;  Location: [facility] CATH;  Service: Cardiology;;    COSMETIC SURGERY      bereast reconstruction    ESOPHAGOGASTRODUODENOSCOPY N/A 05/14/2021    Procedure: EGD (ESOPHAGOGASTRODUODENOSCOPY);  Surgeon: Tracy Flower MD;  Location: [facility];  Service: Endoscopy;  Laterality: N/A;    ESOPHAGOGASTRODUODENOSCOPY N/A 11/04/2021    Procedure: EGD (ESOPHAGOGASTRODUODENOSCOPY);  Surgeon: Tracy Flower MD;  Location: [facility];  Service: Endoscopy;  Laterality: N/A;    ESOPHAGOGASTRODUODENOSCOPY N/A 4/1/2023    Procedure: EGD (ESOPHAGOGASTRODUODENOSCOPY);  Surgeon: Tracy Flower MD;  Location: [facility];  Service: Endoscopy;  Laterality: N/A;    EYE SURGERY      eye lids    INJECTION FOR SENTINEL NODE IDENTIFICATION Left 11/25/2022    Procedure: INJECTION, FOR SENTINEL NODE IDENTIFICATION-Left;  Surgeon: Rosa Maradiaga MD;  Location: [facility];   Service: General;  Laterality: Left;    LEFT HEART CATHETERIZATION Right 02/15/2019    Procedure: Left heart cath;  Surgeon: Sujit Chaudhry MD;  Location[facility];  Service: Cardiology;  Laterality: Right;    LEFT HEART CATHETERIZATION Left 2022    Procedure: Left heart cath;  Surgeon: Sujit Chaudhry MD;  Location: [facility] CATH;  Service: Cardiology;  Laterality: Left;    LUMBAR LAMINECTOMY WITH DISCECTOMY N/A 2020    Procedure: LAMINECTOMY, SPINE, LUMBAR, WITH DISCECTOMY;  Surgeon: Vimal Villegas MD;  Location[facility];  Service: Neurosurgery;  Laterality: N/A;  left L5-S1  Laminectomy L4-5    LYMPHADENECTOMY      MASTECTOMY      MASTECTOMY, PARTIAL Left 2022    Procedure: MASTECTOMY, PARTIAL-Left ultrasound guided;  Surgeon: Rosa Maradiaga MD;  Location: [facility];  Service: General;  Laterality: Left;    RIGHT HEART CATHETERIZATION Right 2022    Procedure: INSERTION, CATHETER, RIGHT HEART;  Surgeon: Sujit Chaudhry MD;  Location: [facility] CATH;  Service: Cardiology;  Laterality: Right;    SENTINEL LYMPH NODE BIOPSY Left 2022    Procedure: BIOPSY, LYMPH NODE, SENTINEL-Left;  Surgeon: Rosa Maradiaga MD;  Location: [facility];  Service: General;  Laterality: Left;    SKIN BIOPSY      SPLENECTOMY, TOTAL      TRANSFORAMINAL EPIDURAL INJECTION OF STEROID Left 2019    Procedure: Left L5/S1 TF SKIP with local;  Surgeon: Canelo Niño MD;  Location: [facility];  Service: Pain Management;  Laterality: Left;    TUMOR REMOVAL      neck- lymphoma       Social History     Socioeconomic History    Marital status:    Tobacco Use    Smoking status: Former     Packs/day: 1.00     Years: 20.00     Pack years: 20.00     Types: Cigarettes     Quit date: 1981     Years since quittin.4    Smokeless tobacco: Never   Substance and Sexual Activity    Alcohol use: No     Comment: 2 drinks/month; none 72 hrs prior to surgery    Drug use: No    Sexual activity: Yes     Partners: Male   Social  History Narrative    Live w/ spouse and  1 dog      Social Determinants of Health     Financial Resource Strain: Low Risk     Difficulty of Paying Living Expenses: Not hard at all   Food Insecurity: No Food Insecurity    Worried About Running Out of Food in the Last Year: Never true    Ran Out of Food in the Last Year: Never true   Transportation Needs: No Transportation Needs    Lack of Transportation (Medical): No    Lack of Transportation (Non-Medical): No   Physical Activity: Insufficiently Active    Days of Exercise per Week: 3 days    Minutes of Exercise per Session: 30 min   Stress: No Stress Concern Present    Feeling of Stress : Only a little   Social Connections: Moderately Integrated    Frequency of Communication with Friends and Family: More than three times a week    Frequency of Social Gatherings with Friends and Family: Once a week    Attends Sikhism Services: More than 4 times per year    Active Member of Clubs or Organizations: No    Attends Club or Organization Meetings: Never    Marital Status:    Housing Stability: Low Risk     Unable to Pay for Housing in the Last Year: No    Number of Places Lived in the Last Year: 1    Unstable Housing in the Last Year: No       Family History   Problem Relation Age of Onset    Hypertension Mother     Hypertension Father     Cancer Neg Hx        Patient's Medications   New Prescriptions    No medications on file   Previous Medications    CALCIUM CARBONATE (OS-CALVIN) 600 MG (1,500 MG) TAB    Take 600 mg by mouth 2 (two) times daily with meals.    CHOLECALCIFEROL, VITAMIN D3, (VITAMIN D3) 100 MCG (4,000 UNIT) CAP    Take 4,000 Units by mouth once daily.    DAPSONE 25 MG TAB    TAKE 2 TABLETS BY MOUTH EVERY DAY FOR 30 DAYS    DEXAMETHASONE (DECADRON) 0.5 MG/5 ML ELIX    SWISH 3.75MLS IN MOUTH FOR 30 SECONDS AND SWALLOW TWICE DAILY AS NEEDED FOR LICHEN PLANUS FLARE UPS    ESCITALOPRAM OXALATE (LEXAPRO) 10 MG TABLET    Take 1 tablet (10 mg total) by mouth  once daily.    LEVOTHYROXINE (SYNTHROID) 75 MCG TABLET    Take 1 tablet (75 mcg total) by mouth before breakfast.    METOPROLOL SUCCINATE (TOPROL-XL) 25 MG 24 HR TABLET    Take 1 tablet (25 mg total) by mouth once daily.    PANTOPRAZOLE (PROTONIX) 40 MG TABLET    Take 1 tablet (40 mg total) by mouth 2 (two) times daily.    ROSUVASTATIN (CRESTOR) 20 MG TABLET    Take 1 tablet (20 mg total) by mouth every evening.   Modified Medications    No medications on file   Discontinued Medications    No medications on file       Review of Systems   Constitutional: Positive for weight gain. Negative for malaise/fatigue.   HENT:  Negative for hearing loss.    Eyes:  Negative for visual disturbance.   Cardiovascular:  Positive for dyspnea on exertion. Negative for chest pain, claudication, leg swelling, orthopnea, paroxysmal nocturnal dyspnea and syncope.   Respiratory:  Negative for cough, shortness of breath, sleep disturbances due to breathing, snoring and wheezing.    Endocrine: Negative for cold intolerance, heat intolerance, polydipsia, polyphagia and polyuria.   Hematologic/Lymphatic: Negative for bleeding problem.   Skin:  Negative for rash and suspicious lesions.   Musculoskeletal:  Negative for arthritis, falls, joint pain, muscle weakness and myalgias.   Gastrointestinal:  Negative for abdominal pain, change in bowel habit, constipation, diarrhea, heartburn, hematochezia, melena and nausea.   Genitourinary:  Negative for hematuria and nocturia.   Neurological:  Negative for excessive daytime sleepiness, dizziness, headaches, light-headedness, loss of balance and weakness.   Psychiatric/Behavioral:  Negative for depression. The patient is not nervous/anxious.    Allergic/Immunologic: Negative for environmental allergies.     There were no vitals taken for this visit.    Objective:   Physical Exam  Constitutional:       Appearance: She is well-developed.      Comments:      HENT:      Head: Normocephalic and atraumatic.    Eyes:      General: No scleral icterus.     Conjunctiva/sclera: Conjunctivae normal.      Pupils: Pupils are equal, round, and reactive to light.   Neck:      Thyroid: No thyromegaly.      Vascular: Hepatojugular reflux and JVD present.      Trachea: No tracheal deviation.      Comments: JVD to angle of jaw  Cardiovascular:      Rate and Rhythm: Normal rate and regular rhythm.      Chest Wall: PMI is not displaced.      Pulses: Intact distal pulses.           Carotid pulses are 2+ on the right side with bruit and 2+ on the left side with bruit.       Radial pulses are 2+ on the right side and 2+ on the left side.        Dorsalis pedis pulses are 2+ on the right side and 2+ on the left side.        Posterior tibial pulses are 2+ on the right side and 2+ on the left side.      Heart sounds: Murmur heard.   Harsh midsystolic murmur is present with a grade of 2/6 at the upper right sternal border radiating to the neck.   Pulmonary:      Effort: Pulmonary effort is normal.      Breath sounds: Examination of the right-lower field reveals rales. Examination of the left-lower field reveals rales. Rales present.   Abdominal:      General: Bowel sounds are normal. There is no distension.      Palpations: Abdomen is soft. There is no mass.      Tenderness: There is no abdominal tenderness.   Musculoskeletal:         General: No tenderness.      Cervical back: Normal range of motion and neck supple.   Lymphadenopathy:      Cervical: No cervical adenopathy.   Skin:     General: Skin is warm and dry.      Findings: No erythema or rash.      Nails: There is no clubbing.   Neurological:      Mental Status: She is alert and oriented to person, place, and time.   Psychiatric:         Speech: Speech normal.         Behavior: Behavior normal.       Lab Results   Component Value Date     04/13/2023    K 3.1 (L) 04/13/2023    CL 97 04/13/2023    CO2 26 04/13/2023    BUN 19 04/13/2023    CREATININE 0.7 04/13/2023    GLU 78  04/13/2023    HGBA1C 5.8 (H) 02/10/2019    MG 2.2 02/18/2021    AST 16 04/03/2023    ALT 9 (L) 04/03/2023    ALBUMIN 3.0 (L) 04/03/2023    PROT 5.6 (L) 04/03/2023    BILITOT 0.6 04/03/2023    WBC 9.36 04/13/2023    HGB 8.3 (L) 04/13/2023    HCT 27.7 (L) 04/13/2023    HCT 24 (L) 08/21/2017    MCV 94 04/13/2023     04/13/2023    INR 1.1 03/31/2023    TSH 1.328 01/06/2023    CHOL 112 (L) 02/24/2023    HDL 39 (L) 02/24/2023    LDLCALC 58.8 (L) 02/24/2023    TRIG 71 02/24/2023     (H) 01/13/2023       Assessment:     1. Malignant neoplasm of central portion of left breast in female, estrogen receptor negative :  She follows with Dr. Sepulveda.  Her treatment has been on hold due to her cardiomyopathy and comorbidities.  Her last ejection fraction did improve to 40% with guideline directed medical therapy.   2. Hx of Hodgkin's disease - s/p chemo and radiation    3. Chronic combined systolic and diastolic heart failure : She has NYHA FC III symptoms and appears volume overloaded today.  I have given her a prescription to start Lasix 20 mg daily with labs in 1 week time.  She is currently only on metoprolol succinate 25 mg daily for guideline directed medical therapy.  She is still having intermittent low blood pressure readings, so I do not want to adjust her heart failure therapy until she has follow-up with GI and we are sure she is not having ongoing bleeding.  I will see her back here in 2 weeks' time to adjust her GDMT and reassess her volume status. CBC today.   4. Coronary artery disease involving coronary bypass graft of native heart without angina pectoris :  She is asymptomatic.  She remains on rosuvastatin.  Her aspirin and Plavix were stopped during her recent hospitalization for GI bleeding.  Once cleared by GI, we discussed continuing Plavix only.   5. Essential hypertension : Blood pressure is at goal with intermittent low blood pressure readings.   6. Mixed hyperlipidemia : Lipids are at goal.  Continue statin therapy.   7. S/P CABG x 2    8. S/P AVR (aortic valve replacement) - pericardial valve : Valve functioning normally on recent echo. Continue asa. SBE prophylaxis is recommended prior to dental procedures.   9. Carotid stenosis, bilateral : She has significant carotid artery disease. She is asymptomatic on medical therapy.  Vascular surgery recommended ongoing medical treatment.   10.   Pulmonary HTN: Likely WHO group II. Elevated PAP on RHC in the setting of significantly elevated PCWP. Diuresis and plan as above.    Plan:     Diagnoses and all orders for this visit:    Malignant neoplasm of central portion of left breast in female, estrogen receptor negative    Hx of Hodgkin's disease - s/p chemo and radiation    Chronic combined systolic and diastolic heart failure    Coronary artery disease involving coronary bypass graft of native heart without angina pectoris    S/P CABG x 2    S/P AVR (aortic valve replacement) - pericardial valve    Essential hypertension    Mixed hyperlipidemia    Carotid stenosis, bilateral    Pulmonary hypertension        Thank you for allowing me to participate in this patient's care. Please do not hesitate to contact me with any questions or concerns.

## 2023-04-18 NOTE — TELEPHONE ENCOUNTER
Jami Tariq MD     That's fine, set up EGD at [facility] on 2 endo with [facility] provider in 2 months after initial EGD to eval healing of duodenal ulcer and consider duodenal dilation per exam findings.

## 2023-04-19 ENCOUNTER — OFFICE VISIT (OUTPATIENT)
Dept: CARDIOLOGY | Facility: CLINIC | Age: 68
End: 2023-04-19
Payer: MEDICARE

## 2023-04-19 ENCOUNTER — LAB VISIT (OUTPATIENT)
Dept: LAB | Facility: HOSPITAL | Age: 68
End: 2023-04-19
Payer: MEDICARE

## 2023-04-19 ENCOUNTER — OFFICE VISIT (OUTPATIENT)
Dept: GASTROENTEROLOGY | Facility: CLINIC | Age: 68
End: 2023-04-19
Payer: MEDICARE

## 2023-04-19 VITALS
WEIGHT: 147.25 LBS | SYSTOLIC BLOOD PRESSURE: 119 MMHG | BODY MASS INDEX: 26.09 KG/M2 | DIASTOLIC BLOOD PRESSURE: 65 MMHG | HEART RATE: 104 BPM | HEIGHT: 63 IN | OXYGEN SATURATION: 97 %

## 2023-04-19 DIAGNOSIS — C50.112 MALIGNANT NEOPLASM OF CENTRAL PORTION OF LEFT BREAST IN FEMALE, ESTROGEN RECEPTOR NEGATIVE: Primary | ICD-10-CM

## 2023-04-19 DIAGNOSIS — I27.20 PULMONARY HYPERTENSION: ICD-10-CM

## 2023-04-19 DIAGNOSIS — K26.9 DUODENAL ULCER: Primary | ICD-10-CM

## 2023-04-19 DIAGNOSIS — I65.23 CAROTID STENOSIS, BILATERAL: ICD-10-CM

## 2023-04-19 DIAGNOSIS — M81.0 AGE RELATED OSTEOPOROSIS, UNSPECIFIED PATHOLOGICAL FRACTURE PRESENCE: ICD-10-CM

## 2023-04-19 DIAGNOSIS — I25.810 CORONARY ARTERY DISEASE INVOLVING CORONARY BYPASS GRAFT OF NATIVE HEART WITHOUT ANGINA PECTORIS: ICD-10-CM

## 2023-04-19 DIAGNOSIS — Z95.1 S/P CABG X 2: ICD-10-CM

## 2023-04-19 DIAGNOSIS — I50.42 CHRONIC COMBINED SYSTOLIC AND DIASTOLIC HEART FAILURE: ICD-10-CM

## 2023-04-19 DIAGNOSIS — I10 ESSENTIAL HYPERTENSION: ICD-10-CM

## 2023-04-19 DIAGNOSIS — Z95.2 S/P AVR (AORTIC VALVE REPLACEMENT): ICD-10-CM

## 2023-04-19 DIAGNOSIS — K26.0 ACUTE DUODENAL ULCER WITH HEMORRHAGE AND OBSTRUCTION: ICD-10-CM

## 2023-04-19 DIAGNOSIS — K31.5 DUODENAL STRICTURE: ICD-10-CM

## 2023-04-19 DIAGNOSIS — I70.0 ATHEROSCLEROSIS OF AORTA: ICD-10-CM

## 2023-04-19 DIAGNOSIS — R93.3 ABNORMAL ENDOSCOPY OF UPPER GASTROINTESTINAL TRACT: ICD-10-CM

## 2023-04-19 DIAGNOSIS — Z85.71 HX OF HODGKIN'S DISEASE: ICD-10-CM

## 2023-04-19 DIAGNOSIS — E78.2 MIXED HYPERLIPIDEMIA: ICD-10-CM

## 2023-04-19 DIAGNOSIS — Z17.1 MALIGNANT NEOPLASM OF CENTRAL PORTION OF LEFT BREAST IN FEMALE, ESTROGEN RECEPTOR NEGATIVE: Primary | ICD-10-CM

## 2023-04-19 PROBLEM — I48.0 PAROXYSMAL ATRIAL FIBRILLATION: Status: RESOLVED | Noted: 2017-08-24 | Resolved: 2023-04-19

## 2023-04-19 LAB
ALBUMIN SERPL BCP-MCNC: 3.7 G/DL (ref 3.5–5.2)
ALP SERPL-CCNC: 90 U/L (ref 55–135)
ALT SERPL W/O P-5'-P-CCNC: 18 U/L (ref 10–44)
ANION GAP SERPL CALC-SCNC: 10 MMOL/L (ref 8–16)
ANION GAP SERPL CALC-SCNC: 10 MMOL/L (ref 8–16)
AST SERPL-CCNC: 25 U/L (ref 10–40)
BASOPHILS # BLD AUTO: 0.08 K/UL (ref 0–0.2)
BASOPHILS NFR BLD: 1 % (ref 0–1.9)
BILIRUB SERPL-MCNC: 0.8 MG/DL (ref 0.1–1)
BNP SERPL-MCNC: 1077 PG/ML (ref 0–99)
BUN SERPL-MCNC: 15 MG/DL (ref 8–23)
BUN SERPL-MCNC: 15 MG/DL (ref 8–23)
CALCIUM SERPL-MCNC: 9.1 MG/DL (ref 8.7–10.5)
CALCIUM SERPL-MCNC: 9.1 MG/DL (ref 8.7–10.5)
CHLORIDE SERPL-SCNC: 104 MMOL/L (ref 95–110)
CHLORIDE SERPL-SCNC: 104 MMOL/L (ref 95–110)
CO2 SERPL-SCNC: 26 MMOL/L (ref 23–29)
CO2 SERPL-SCNC: 26 MMOL/L (ref 23–29)
CREAT SERPL-MCNC: 0.7 MG/DL (ref 0.5–1.4)
CREAT SERPL-MCNC: 0.7 MG/DL (ref 0.5–1.4)
DIFFERENTIAL METHOD: ABNORMAL
EOSINOPHIL # BLD AUTO: 0.3 K/UL (ref 0–0.5)
EOSINOPHIL NFR BLD: 3.4 % (ref 0–8)
ERYTHROCYTE [DISTWIDTH] IN BLOOD BY AUTOMATED COUNT: 15.1 % (ref 11.5–14.5)
EST. GFR  (NO RACE VARIABLE): >60 ML/MIN/1.73 M^2
EST. GFR  (NO RACE VARIABLE): >60 ML/MIN/1.73 M^2
GLUCOSE SERPL-MCNC: 103 MG/DL (ref 70–110)
GLUCOSE SERPL-MCNC: 103 MG/DL (ref 70–110)
HCT VFR BLD AUTO: 28.7 % (ref 37–48.5)
HGB BLD-MCNC: 8.9 G/DL (ref 12–16)
IMM GRANULOCYTES # BLD AUTO: 0.02 K/UL (ref 0–0.04)
IMM GRANULOCYTES NFR BLD AUTO: 0.2 % (ref 0–0.5)
LYMPHOCYTES # BLD AUTO: 1.4 K/UL (ref 1–4.8)
LYMPHOCYTES NFR BLD: 17.1 % (ref 18–48)
MCH RBC QN AUTO: 28.3 PG (ref 27–31)
MCHC RBC AUTO-ENTMCNC: 31 G/DL (ref 32–36)
MCV RBC AUTO: 91 FL (ref 82–98)
MONOCYTES # BLD AUTO: 1 K/UL (ref 0.3–1)
MONOCYTES NFR BLD: 12 % (ref 4–15)
NEUTROPHILS # BLD AUTO: 5.3 K/UL (ref 1.8–7.7)
NEUTROPHILS NFR BLD: 66.3 % (ref 38–73)
NRBC BLD-RTO: 0 /100 WBC
PLATELET # BLD AUTO: 431 K/UL (ref 150–450)
PMV BLD AUTO: 9.4 FL (ref 9.2–12.9)
POTASSIUM SERPL-SCNC: 3.8 MMOL/L (ref 3.5–5.1)
PROT SERPL-MCNC: 7.2 G/DL (ref 6–8.4)
RBC # BLD AUTO: 3.14 M/UL (ref 4–5.4)
SODIUM SERPL-SCNC: 140 MMOL/L (ref 136–145)
SODIUM SERPL-SCNC: 140 MMOL/L (ref 136–145)
WBC # BLD AUTO: 8.02 K/UL (ref 3.9–12.7)

## 2023-04-19 PROCEDURE — 83880 ASSAY OF NATRIURETIC PEPTIDE: CPT | Performed by: INTERNAL MEDICINE

## 2023-04-19 PROCEDURE — 99214 OFFICE O/P EST MOD 30 MIN: CPT | Mod: PBBFAC | Performed by: INTERNAL MEDICINE

## 2023-04-19 PROCEDURE — 99999 PR PBB SHADOW E&M-EST. PATIENT-LVL IV: CPT | Mod: PBBFAC,,, | Performed by: INTERNAL MEDICINE

## 2023-04-19 PROCEDURE — 99214 PR OFFICE/OUTPT VISIT, EST, LEVL IV, 30-39 MIN: ICD-10-PCS | Mod: S$PBB,,, | Performed by: INTERNAL MEDICINE

## 2023-04-19 PROCEDURE — 99999 PR PBB SHADOW E&M-EST. PATIENT-LVL IV: ICD-10-PCS | Mod: PBBFAC,,, | Performed by: INTERNAL MEDICINE

## 2023-04-19 PROCEDURE — 80053 COMPREHEN METABOLIC PANEL: CPT

## 2023-04-19 PROCEDURE — 85025 COMPLETE CBC W/AUTO DIFF WBC: CPT | Performed by: INTERNAL MEDICINE

## 2023-04-19 PROCEDURE — 36415 COLL VENOUS BLD VENIPUNCTURE: CPT

## 2023-04-19 PROCEDURE — 99214 OFFICE O/P EST MOD 30 MIN: CPT | Mod: S$PBB,,, | Performed by: INTERNAL MEDICINE

## 2023-04-19 PROCEDURE — 99213 PR OFFICE/OUTPT VISIT, EST, LEVL III, 20-29 MIN: ICD-10-PCS | Mod: 95,,,

## 2023-04-19 PROCEDURE — 99213 OFFICE O/P EST LOW 20 MIN: CPT | Mod: 95,,,

## 2023-04-19 RX ORDER — FUROSEMIDE 20 MG/1
20 TABLET ORAL DAILY
Qty: 30 TABLET | Refills: 11 | Status: SHIPPED | OUTPATIENT
Start: 2023-04-19 | End: 2023-04-26

## 2023-04-19 NOTE — PATIENT INSTRUCTIONS
Our  will call you to set up your Endoscopy.  No eating/drinking after midnight the night before your procedure.  You will need someone to drive you home after your procedure as you will be receiving anesthesia  Please contact our office for any questions/concerns regarding your upcoming procedure. [phone]  .

## 2023-04-19 NOTE — PROGRESS NOTES
[facility]    The patient location is: [place]  The chief complaint leading to consultation is: duodenal ulcer & duodenal stricture  Visit type: Virtual visit with synchronous audio and video  Total time spent with patient: 11 min    Each patient to whom he or she provides medical services by telemedicine is:  (1) informed of the relationship between the physician and patient and the respective role of any other health care provider with respect to management of the patient; and (2) notified that he or she may decline to receive medical services by telemedicine and may withdraw from such care at any time.       Reason for Visit:  The primary encounter diagnosis was Duodenal ulcer. Diagnoses of Duodenal stricture and Abnormal endoscopy of upper gastrointestinal tract were also pertinent to this visit.    PCP:   Patrick Hernandez   [id number] [facility] / [place] [id number]      Initial HPI   This is a 67 y.o. female referred by Dr. Tracy Flower for EGD. Patient hospitalized 3/31/23 for GIB in context of daily plavix and ASA use. EGD 4/1/23 included finding of severe duodenal stricture with distal ulceration. Dr. Tracy Flower has requested that repeat EGD be performed by [facility] in the case that duodenal dilation is required.     Tobacco use- n/a  ETOH intake- 1 drink per week  NSAID use- n/a  Blood thinners-  previously on plavix and ASA, stopped by cardiologist 2/2 recent GI Bleed    ROS:  Review of Systems   Constitutional:  Negative for chills, fever and weight loss.   Eyes:  Negative for double vision and pain.   Gastrointestinal:  Negative for abdominal pain, blood in stool, diarrhea, nausea and vomiting.   Skin:  Negative for itching and rash.   Neurological:  Negative for loss of consciousness.      Medical History:  has a past medical history of Allergy, Anticoagulant long-term use, Breast cancer (2008), Carotid stenosis, bilateral (10/13/2017), Coronary artery disease, Coronary artery  disease involving coronary bypass graft of native heart without angina pectoris (08/16/2019), Coronary artery disease involving native coronary artery of native heart without angina pectoris (06/27/2017), DCIS (ductal carcinoma in situ) of breast (11/06/2012), Encounter for blood transfusion, Epigastric pain (4/1/2023), Essential hypertension (11/06/2012), GERD (gastroesophageal reflux disease), GI bleed (02/2021), Hematemesis with nausea (5/11/2021), Hodgkin lymphoma, Hx of Hodgkin's disease - s/p chemo and radiation (11/06/2012), Hyperlipidemia, Hyperlipidemia (11/06/2012), Hypertension, LBBB (left bundle branch block) (05/22/2018), Osteoporosis, Paroxysmal atrial fibrillation - single post-op episode (08/24/2017), Pemphigoid, Pemphigoid, Pulmonary hypertension (1/13/2023), S/P AVR (aortic valve replacement) - pericardial valve (08/21/2017), S/P CABG x 2 (08/21/2017), Stented coronary artery, and Thyroid disease.    Surgical History:  has a past surgical history that includes Breast biopsy; Mastectomy; Splenectomy, total; Tumor removal; Lymphadenectomy; Breast reconstruction; Cosmetic surgery; Eye surgery; Skin biopsy; Left heart catheterization (Right, 02/15/2019); Coronary angiography (N/A, 02/15/2019); Coronary bypass graft angiography (02/15/2019); Arteriography of aortic root (N/A, 02/15/2019); Cardiac surgery (08/2017); Cardiac catheterization; Transforaminal epidural injection of steroid (Left, 12/31/2019); Lumbar laminectomy with discectomy (N/A, 02/27/2020); Esophagogastroduodenoscopy (N/A, 05/14/2021); Esophagogastroduodenoscopy (N/A, 11/04/2021); Cardiac valve replacement (08/2017); Coronary angiography (N/A, 4/1/2022); Left heart catheterization (Left, 4/1/2022); Right heart catheterization (Right, 4/1/2022); Mastectomy, partial (Left, 11/25/2022); Nolan lymph node biopsy (Left, 11/25/2022); Injection for sentinel node identification (Left, 11/25/2022); Axillary node dissection (Left, 11/25/2022);  and Esophagogastroduodenoscopy (N/A, 4/1/2023).    Family History: family history includes Hypertension in her father and mother..       Review of patient's allergies indicates:   Allergen Reactions    Amlodipine Shortness Of Breath and Other (See Comments)     unknown  Other reaction(s): Swelling  unknown  unknown  Other reaction(s): Swelling  unknown  Other reaction(s): Swelling  unknown    Levofloxacin Hives and Swelling    Sulfa (sulfonamide antibiotics) Shortness Of Breath, Itching and Other (See Comments)     elevated blood pressure    Ace inhibitors     Ciprofloxacin Itching    Iodinated contrast media     Iodine      Other reaction(s): Unknown    Latex      Other reaction(s): Itching    Latex, natural rubber Itching       Current Outpatient Medications on File Prior to Visit   Medication Sig Dispense Refill    calcium carbonate (OS-CALVIN) 600 mg (1,500 mg) Tab Take 600 mg by mouth 2 (two) times daily with meals.      cholecalciferol, vitamin D3, (VITAMIN D3) 100 mcg (4,000 unit) Cap Take 4,000 Units by mouth once daily.      dapsone 25 MG Tab TAKE 2 TABLETS BY MOUTH EVERY DAY FOR 30 DAYS      dexAMETHasone (DECADRON) 0.5 mg/5 mL Elix SWISH 3.75MLS IN MOUTH FOR 30 SECONDS AND SWALLOW TWICE DAILY AS NEEDED FOR LICHEN PLANUS FLARE UPS      EScitalopram oxalate (LEXAPRO) 10 MG tablet Take 1 tablet (10 mg total) by mouth once daily. 90 tablet 3    furosemide (LASIX) 20 MG tablet Take 1 tablet (20 mg total) by mouth once daily. 30 tablet 11    levothyroxine (SYNTHROID) 75 MCG tablet Take 1 tablet (75 mcg total) by mouth before breakfast. 90 tablet 3    metoprolol succinate (TOPROL-XL) 25 MG 24 hr tablet Take 1 tablet (25 mg total) by mouth once daily. 30 tablet 0    pantoprazole (PROTONIX) 40 MG tablet Take 1 tablet (40 mg total) by mouth 2 (two) times daily. 120 tablet 0    rosuvastatin (CRESTOR) 20 MG tablet Take 1 tablet (20 mg total) by mouth every evening. 90 tablet 3     Current Facility-Administered  Medications on File Prior to Visit   Medication Dose Route Frequency Provider Last Rate Last Admin    0.9%  NaCl infusion   Intravenous Continuous Ender Morrison MD             Objective Findings: (Limited due to virtual nature of encounter)    Physical Exam:  Physical Exam  Constitutional:       General: She is not in acute distress.  Eyes:      General: No scleral icterus.  Skin:     Coloration: Skin is not jaundiced.   Neurological:      Mental Status: She is alert and oriented to person, place, and time.           Labs:  Lab Results   Component Value Date    WBC 8.02 04/19/2023    HGB 8.9 (L) 04/19/2023    HCT 28.7 (L) 04/19/2023     04/19/2023    CHOL 112 (L) 02/24/2023    TRIG 71 02/24/2023    HDL 39 (L) 02/24/2023    ALKPHOS 90 04/19/2023    LIPASE 39 05/11/2021    ALT 18 04/19/2023    AST 25 04/19/2023     04/19/2023     04/19/2023    K 3.8 04/19/2023    K 3.8 04/19/2023    K 3.8 04/19/2023     04/19/2023     04/19/2023    CREATININE 0.7 04/19/2023    CREATININE 0.7 04/19/2023    BUN 15 04/19/2023    BUN 15 04/19/2023    CO2 26 04/19/2023    CO2 26 04/19/2023    TSH 1.328 01/06/2023    INR 1.1 03/31/2023    HGBA1C 5.8 (H) 02/10/2019       Imaging reviewed:  [facility] Acute GI [place], Abdomen and Pelvis 3/31/23   FINDINGS:  No hemorrhage identified.  No hydronephrosis.  Upper lobe scarring of left kidney.  Calcified uterine fibroid.  No high bowel obstruction.  No evidence for active hemorrhage.  Colonic diverticulosis.  Mild mucosal thickening of the sigmoid colon.  Uterine fibroid suspected.  Bladder is mildly distended.  Postsurgical changes anterior abdomen.  Mild mucosal thickening of the stomach.  Pancreas adrenal glands are grossly unremarkable.  No gallstones.  Mild moderate atherosclerotic disease of the aorta iliac vasculature.     Impression:     Multiple findings as described above.  No evidence for active hemorrhage     Questionable scarring in the left kidney  upper lobe with poor enhancement of the renal parenchyma.  This may relate to scarring.  Recommend follow-up.    Endoscopy reviewed:  Upper EGD 4/1/23  Findings:        The second portion of the duodenum was normal.        A severe post-ulcer deformity was found in the first portion of the        duodenum.        One oozing superficial duodenal ulcer with adherent clot was found        in the first portion of the duodenum. The lesion was 8 mm in largest        dimension. Area was successfully injected with 5 mL of a 0.1 mg/mL        solution of epinephrine for hemostasis. Due to stricture, area not        amenable to second modality to prevent future bleeding such as        cautery or hemoclip.        The duodenal bulb was normal.        Red blood was found in the gastric antrum. Suction, area underneath        normal.        The cardia, gastric fundus, gastric body and incisura were normal.        The Z-line was variable and was found 39 cm from the incisors.        No gross lesions were noted in the entire esophagus.   Impression:            - Normal second portion of the duodenum.                          - Duodenal deformity.                          - Oozing duodenal ulcer with adherent clot at                          distal end of duodenal stricture. Injected.                          Stricture prevents further treatment with cautery                          or hemoclip.                          - Normal duodenal bulb.                          - Red blood in the gastric antrum. Suctioned. Area                          underneath normal                          - Normal cardia, gastric fundus, gastric body and                          incisura.                          - Z-line variable, 39 cm from the incisors.                          - No gross lesions in the entire esophagus.                          - No specimens collected.     Assessment:  1. Duodenal ulcer    2. Duodenal stricture    3. Abnormal  endoscopy of upper gastrointestinal tract        Recommendations:  EGD +/- duodenal dilation in 6-8 weeks (case request previously entered by Dr. Tariq). I have explained the risks and benefits of endoscopy procedures to the patient including but not limited to bleeding, perforation, infection, and anesthesia complications.    Thank you so much for allowing me to participate in the care of Dulce VITA Root.        Merissa Rutherford, MSN, FNP-C  Advanced Endoscopy Nurse Practitioner  [facility]

## 2023-04-20 ENCOUNTER — PATIENT MESSAGE (OUTPATIENT)
Dept: CARDIOLOGY | Facility: CLINIC | Age: 68
End: 2023-04-20
Payer: MEDICARE

## 2023-04-20 DIAGNOSIS — I10 ESSENTIAL HYPERTENSION: ICD-10-CM

## 2023-04-20 DIAGNOSIS — I27.20 PULMONARY HYPERTENSION: Primary | ICD-10-CM

## 2023-04-24 ENCOUNTER — PATIENT MESSAGE (OUTPATIENT)
Dept: CARDIOLOGY | Facility: CLINIC | Age: 68
End: 2023-04-24
Payer: MEDICARE

## 2023-04-24 ENCOUNTER — PATIENT MESSAGE (OUTPATIENT)
Dept: GASTROENTEROLOGY | Facility: CLINIC | Age: 68
End: 2023-04-24
Payer: MEDICARE

## 2023-04-24 ENCOUNTER — TELEPHONE (OUTPATIENT)
Dept: CARDIOLOGY | Facility: HOSPITAL | Age: 68
End: 2023-04-24
Payer: MEDICARE

## 2023-04-26 ENCOUNTER — LAB VISIT (OUTPATIENT)
Dept: LAB | Facility: HOSPITAL | Age: 68
End: 2023-04-26
Attending: INTERNAL MEDICINE
Payer: MEDICARE

## 2023-04-26 ENCOUNTER — OFFICE VISIT (OUTPATIENT)
Dept: FAMILY MEDICINE | Facility: CLINIC | Age: 68
End: 2023-04-26
Payer: MEDICARE

## 2023-04-26 ENCOUNTER — PATIENT MESSAGE (OUTPATIENT)
Dept: CARDIOLOGY | Facility: CLINIC | Age: 68
End: 2023-04-26
Payer: MEDICARE

## 2023-04-26 VITALS
HEIGHT: 62 IN | RESPIRATION RATE: 16 BRPM | SYSTOLIC BLOOD PRESSURE: 130 MMHG | BODY MASS INDEX: 25.95 KG/M2 | WEIGHT: 141 LBS | DIASTOLIC BLOOD PRESSURE: 76 MMHG | HEART RATE: 98 BPM | TEMPERATURE: 98 F

## 2023-04-26 DIAGNOSIS — I10 ESSENTIAL HYPERTENSION: ICD-10-CM

## 2023-04-26 DIAGNOSIS — Z23 IMMUNIZATION DUE: ICD-10-CM

## 2023-04-26 DIAGNOSIS — I27.20 PULMONARY HYPERTENSION: ICD-10-CM

## 2023-04-26 DIAGNOSIS — I50.42 CHRONIC COMBINED SYSTOLIC AND DIASTOLIC HEART FAILURE: ICD-10-CM

## 2023-04-26 DIAGNOSIS — D50.0 IRON DEFICIENCY ANEMIA DUE TO CHRONIC BLOOD LOSS: Primary | ICD-10-CM

## 2023-04-26 LAB
ANION GAP SERPL CALC-SCNC: 10 MMOL/L (ref 8–16)
BUN SERPL-MCNC: 14 MG/DL (ref 8–23)
CALCIUM SERPL-MCNC: 9.2 MG/DL (ref 8.7–10.5)
CHLORIDE SERPL-SCNC: 102 MMOL/L (ref 95–110)
CO2 SERPL-SCNC: 27 MMOL/L (ref 23–29)
CREAT SERPL-MCNC: 0.8 MG/DL (ref 0.5–1.4)
EST. GFR  (NO RACE VARIABLE): >60 ML/MIN/1.73 M^2
GLUCOSE SERPL-MCNC: 99 MG/DL (ref 70–110)
POTASSIUM SERPL-SCNC: 3.4 MMOL/L (ref 3.5–5.1)
SODIUM SERPL-SCNC: 139 MMOL/L (ref 136–145)

## 2023-04-26 PROCEDURE — 36415 COLL VENOUS BLD VENIPUNCTURE: CPT | Mod: PO | Performed by: INTERNAL MEDICINE

## 2023-04-26 PROCEDURE — 80048 BASIC METABOLIC PNL TOTAL CA: CPT | Performed by: INTERNAL MEDICINE

## 2023-04-26 PROCEDURE — 99999 PR PBB SHADOW E&M-EST. PATIENT-LVL IV: ICD-10-PCS | Mod: PBBFAC,,, | Performed by: FAMILY MEDICINE

## 2023-04-26 PROCEDURE — 99214 PR OFFICE/OUTPT VISIT, EST, LEVL IV, 30-39 MIN: ICD-10-PCS | Mod: S$PBB,,, | Performed by: FAMILY MEDICINE

## 2023-04-26 PROCEDURE — 99999 PR PBB SHADOW E&M-EST. PATIENT-LVL IV: CPT | Mod: PBBFAC,,, | Performed by: FAMILY MEDICINE

## 2023-04-26 PROCEDURE — 99214 OFFICE O/P EST MOD 30 MIN: CPT | Mod: PBBFAC,PO,25 | Performed by: FAMILY MEDICINE

## 2023-04-26 PROCEDURE — 99214 OFFICE O/P EST MOD 30 MIN: CPT | Mod: S$PBB,,, | Performed by: FAMILY MEDICINE

## 2023-04-26 PROCEDURE — 90677 PCV20 VACCINE IM: CPT | Mod: PBBFAC,PO

## 2023-04-26 RX ORDER — FERROUS SULFATE 325(65) MG
325 TABLET ORAL 2 TIMES DAILY
Qty: 60 TABLET | Refills: 3 | Status: SHIPPED | OUTPATIENT
Start: 2023-04-26 | End: 2023-06-14

## 2023-04-26 RX ORDER — FUROSEMIDE 20 MG/1
20 TABLET ORAL 2 TIMES DAILY
Qty: 60 TABLET | Refills: 11 | Status: ON HOLD | OUTPATIENT
Start: 2023-04-26 | End: 2023-10-16

## 2023-04-26 NOTE — PROGRESS NOTES
Subjective:      Patient ID: Dulce Root is a 67 y.o. female.    Chief Complaint: Hospital Follow Up (Ulcer )    She is f/u on hosp for GI bleed with a duodenal ulcer being found and needing dilation. She has seen the GI NP and is having an EGD planned. She has also seen cardiology and will be starting plavix only after she is cleared by GI for resumption of this.  She will not start the ASA when cleared by GI-only plavix.  She has taken a few doses of oral iron and she tolerated. She was afraid to continue due to it turning the stool black and she was afraid as they told her that she could be bleeding if she had black stool.  She agrees to try the oral and will infuse if not tolerated.  The patient's Health Maintenance was reviewed and the following appears to be due at this time:   Health Maintenance Due   Topic Date Due    TETANUS VACCINE  Never done    Shingles Vaccine (1 of 2) Never done    Pneumococcal Vaccines (Age 65+) (2 - PPSV23 if available, else PCV20) 12/05/2019    COVID-19 Vaccine (4 - Booster for Pfizer series) 11/25/2021    Hemoglobin A1c (Diabetic Prevention Screening)  02/10/2022    Influenza Vaccine (1) 09/01/2022        Past Medical History:  Past Medical History:   Diagnosis Date    Allergy     Anticoagulant long-term use     Plavix    Breast cancer 2008    Carotid stenosis, bilateral 10/13/2017    Coronary artery disease     Coronary artery disease involving coronary bypass graft of native heart without angina pectoris 08/16/2019 2/19  1.  Left main is a small vessel with a 60%-65% ostial lesion. 2.  LAD is a medium-sized vessel diffuse 70% proximally  Ramus intermedius is a medium size vessel with a 40%-50% ostial lesion. 3.  Circumflex 60%-70% ostial  remainder of circumflex and obtuse marginal normal in appearance. Right coronary artery is normal size vessel, short discrete 70%mid 4.  Vein graft to right coronary is occ    Coronary artery disease involving native coronary artery of  native heart without angina pectoris 06/27/2017    DCIS (ductal carcinoma in situ) of breast 11/06/2012    Encounter for blood transfusion     Epigastric pain 4/1/2023    Essential hypertension 11/06/2012    GERD (gastroesophageal reflux disease)     GI bleed 02/2021    Hematemesis with nausea 5/11/2021    Hodgkin lymphoma     Hx of Hodgkin's disease - s/p chemo and radiation 11/06/2012    Hyperlipidemia     Hyperlipidemia 11/06/2012    The patient presents with hyperlipidemia.  The patient reports tolerating the medication well and is in excellent compliance.  There have been no medication side effects.  The patient denies chest pain, neuropathy, and myalgias.  The patient has reduced fat intake and has been exercising.  Current treatment has included the medications listed in the med card.   Lab Results Component Value Date  CH    Hypertension     LBBB (left bundle branch block) 05/22/2018    Osteoporosis     Paroxysmal atrial fibrillation - single post-op episode 08/24/2017    Pemphigoid     Pemphigoid     pt receives IVIG infusions    Pulmonary hypertension 1/13/2023    S/P AVR (aortic valve replacement) - pericardial valve 08/21/2017    Perceval aortic tissue valve PVS23. 23mm    serial # [id number]    S/P CABG x 2 08/21/2017 8/17 CABG X 2 with SVG-LAD and SVG-distal RCA  SVG LAD due to absent LIMA after chest radiation and lymph node biopsy     Stented coronary artery     She had 2 stents placed in 2/2019.  She has had CAD and bypasses in the past in 2017.      Thyroid disease      Past Surgical History:   Procedure Laterality Date    ARTERIOGRAPHY OF AORTIC ROOT N/A 02/15/2019    Procedure: ARTERIOGRAM, AORTIC ROOT;  Surgeon: Sujit Chaudhry MD;  Location: [facility] CATH;  Service: Cardiology;  Laterality: N/A;    AXILLARY NODE DISSECTION Left 11/25/2022    Procedure: LYMPHADENECTOMY, AXILLARY-Left;  Surgeon: Rosa Maradiaga MD;  Location: [facility] OR;  Service: General;  Laterality: Left;    BREAST BIOPSY      BREAST  RECONSTRUCTION      bilateral mastectomy    CARDIAC CATHETERIZATION      stents x 2    CARDIAC SURGERY  08/2017    Aortic valve replacement , CABG 2 vessel    CARDIAC VALVE REPLACEMENT  08/2017    aortic valve, bovine per pt    CORONARY ANGIOGRAPHY N/A 02/15/2019    Procedure: ANGIOGRAM, CORONARY ARTERY;  Surgeon: Sujit Chaudhry MD;  Location: [facility] CATH;  Service: Cardiology;  Laterality: N/A;    CORONARY ANGIOGRAPHY N/A 4/1/2022    Procedure: ANGIOGRAM, CORONARY ARTERY;  Surgeon: Sujit Chaudhry MD;  Location: [facility] CATH;  Service: Cardiology;  Laterality: N/A;    CORONARY BYPASS GRAFT ANGIOGRAPHY  02/15/2019    Procedure: Bypass graft study;  Surgeon: Sujit Chaudhry MD;  [facility]ATH;  Service: Cardiology;;    COSMETIC SURGERY      bereast reconstruction    ESOPHAGOGASTRODUODENOSCOPY N/A 05/14/2021    Procedure: EGD (ESOPHAGOGASTRODUODENOSCOPY);  Surgeon: Tracy Flower MD;  Location: [facility];  Service: Endoscopy;  Laterality: N/A;    ESOPHAGOGASTRODUODENOSCOPY N/A 11/04/2021    Procedure: EGD (ESOPHAGOGASTRODUODENOSCOPY);  Surgeon: Tracy Flower MD;  Location: [facility];  Service: Endoscopy;  Laterality: N/A;    ESOPHAGOGASTRODUODENOSCOPY N/A 4/1/2023    Procedure: EGD (ESOPHAGOGASTRODUODENOSCOPY);  Surgeon: Tracy Flower MD;  Location: [facility];  Service: Endoscopy;  Laterality: N/A;    EYE SURGERY      eye lids    INJECTION FOR SENTINEL NODE IDENTIFICATION Left 11/25/2022    Procedure: INJECTION, FOR SENTINEL NODE IDENTIFICATION-Left;  Surgeon: Rosa Maradiaga MD;  Location: [facility];  Service: General;  Laterality: Left;    LEFT HEART CATHETERIZATION Right 02/15/2019    Procedure: Left heart cath;  Surgeon: Sujit Chaudhry MD;  Location: [facility] CATH;  Service: Cardiology;  Laterality: Right;    LEFT HEART CATHETERIZATION Left 4/1/2022    Procedure: Left heart cath;  Surgeon: Sujit Chaudhry MD;  Location: [facility] CATH;  Service: Cardiology;  Laterality: Left;    LUMBAR LAMINECTOMY WITH DISCECTOMY  N/A 02/27/2020    Procedure: LAMINECTOMY, SPINE, LUMBAR, WITH DISCECTOMY;  Surgeon: Vimal Villegas MD;  Location: [facility] OR;  Service: Neurosurgery;  Laterality: N/A;  left L5-S1  Laminectomy L4-5    LYMPHADENECTOMY      MASTECTOMY      MASTECTOMY, PARTIAL Left 11/25/2022    Procedure: MASTECTOMY, PARTIAL-Left ultrasound guided;  Surgeon: Rosa Maradiaga MD;  Location: [facility] OR;  Service: General;  Laterality: Left;    RIGHT HEART CATHETERIZATION Right 4/1/2022    Procedure: INSERTION, CATHETER, RIGHT HEART;  Surgeon: Sujit Chaudhry MD;  Location: [facility] CATH;  Service: Cardiology;  Laterality: Right;    SENTINEL LYMPH NODE BIOPSY Left 11/25/2022    Procedure: BIOPSY, LYMPH NODE, SENTINEL-Left;  Surgeon: Rosa Maradiaga MD;  Location: [facility] OR;  Service: General;  Laterality: Left;    SKIN BIOPSY      SPLENECTOMY, TOTAL      TRANSFORAMINAL EPIDURAL INJECTION OF STEROID Left 12/31/2019    Procedure: Left L5/S1 TF SKIP with local;  Surgeon: Canelo Niño MD;  Location: [facility] PAIN MGT;  Service: Pain Management;  Laterality: Left;    TUMOR REMOVAL      neck- lymphoma     Review of patient's allergies indicates:   Allergen Reactions    Amlodipine Shortness Of Breath and Other (See Comments)     unknown  Other reaction(s): Swelling  unknown  unknown  Other reaction(s): Swelling  unknown  Other reaction(s): Swelling  unknown    Levofloxacin Hives and Swelling    Sulfa (sulfonamide antibiotics) Shortness Of Breath, Itching and Other (See Comments)     elevated blood pressure    Ace inhibitors     Ciprofloxacin Itching    Iodinated contrast media     Iodine      Other reaction(s): Unknown    Latex      Other reaction(s): Itching    Latex, natural rubber Itching     Current Outpatient Medications on File Prior to Visit   Medication Sig Dispense Refill    calcium carbonate (OS-CALVIN) 600 mg (1,500 mg) Tab Take 600 mg by mouth 2 (two) times daily with meals.      cholecalciferol, vitamin D3, (VITAMIN D3) 100 mcg (4,000 unit) Cap  Take 4,000 Units by mouth once daily.      dapsone 25 MG Tab TAKE 2 TABLETS BY MOUTH EVERY DAY FOR 30 DAYS      dexAMETHasone (DECADRON) 0.5 mg/5 mL Elix SWISH 3.75MLS IN MOUTH FOR 30 SECONDS AND SWALLOW TWICE DAILY AS NEEDED FOR LICHEN PLANUS FLARE UPS      EScitalopram oxalate (LEXAPRO) 10 MG tablet Take 1 tablet (10 mg total) by mouth once daily. 90 tablet 3    levothyroxine (SYNTHROID) 75 MCG tablet Take 1 tablet (75 mcg total) by mouth before breakfast. 90 tablet 3    metoprolol succinate (TOPROL-XL) 25 MG 24 hr tablet Take 1 tablet (25 mg total) by mouth once daily. 30 tablet 0    pantoprazole (PROTONIX) 40 MG tablet Take 1 tablet (40 mg total) by mouth 2 (two) times daily. 120 tablet 0    rosuvastatin (CRESTOR) 20 MG tablet Take 1 tablet (20 mg total) by mouth every evening. 90 tablet 3    [DISCONTINUED] furosemide (LASIX) 20 MG tablet Take 1 tablet (20 mg total) by mouth once daily. 30 tablet 11     Current Facility-Administered Medications on File Prior to Visit   Medication Dose Route Frequency Provider Last Rate Last Admin    0.9%  NaCl infusion   Intravenous Continuous Ender Morrison MD         Social History     Socioeconomic History    Marital status:    Tobacco Use    Smoking status: Former     Packs/day: 1.00     Years: 20.00     Pack years: 20.00     Types: Cigarettes     Quit date: 1981     Years since quittin.4    Smokeless tobacco: Never   Substance and Sexual Activity    Alcohol use: No     Comment: 2 drinks/month; none 72 hrs prior to surgery    Drug use: No    Sexual activity: Yes     Partners: Male   Social History Narrative    Live w/ spouse and  1 dog      Social Determinants of Health     Financial Resource Strain: Low Risk     Difficulty of Paying Living Expenses: Not hard at all   Food Insecurity: No Food Insecurity    Worried About Running Out of Food in the Last Year: Never true    Ran Out of Food in the Last Year: Never true   Transportation Needs: No  "Transportation Needs    Lack of Transportation (Medical): No    Lack of Transportation (Non-Medical): No   Physical Activity: Insufficiently Active    Days of Exercise per Week: 3 days    Minutes of Exercise per Session: 30 min   Stress: No Stress Concern Present    Feeling of Stress : Only a little   Social Connections: Moderately Integrated    Frequency of Communication with Friends and Family: More than three times a week    Frequency of Social Gatherings with Friends and Family: Once a week    Attends [employer] Services: More than 4 times per year    Active Member of Clubs or Organizations: No    Attends Club or Organization Meetings: Never    Marital Status:    Housing Stability: Low Risk     Unable to Pay for Housing in the Last Year: No    Number of Places Lived in the Last Year: 1    Unstable Housing in the Last Year: No     Family History   Problem Relation Age of Onset    Hypertension Mother     Hypertension Father     Cancer Neg Hx        Review of Systems   Constitutional:  Positive for fatigue.   Respiratory:  Negative for cough, shortness of breath and wheezing.    Cardiovascular:  Negative for palpitations and leg swelling.     Objective:   /76 (BP Location: Right arm, Patient Position: Sitting, BP Method: Medium (Automatic))   Pulse 98   Temp 97.7 °F (36.5 °C)   Resp 16   Ht 5' 2" (1.575 m)   Wt 64 kg (141 lb)   BMI 25.79 kg/m²     Physical Exam  Constitutional:       General: She is not in acute distress.     Appearance: She is well-developed. She is not diaphoretic.   Cardiovascular:      Rate and Rhythm: Normal rate and regular rhythm.      Heart sounds: No murmur heard.    No friction rub. No gallop.   Pulmonary:      Effort: Pulmonary effort is normal. No respiratory distress.      Breath sounds: Normal breath sounds. No wheezing or rales.     Assessment:     1. Iron deficiency anemia due to chronic blood loss    2. Chronic combined systolic and diastolic heart failure    3. " Immunization due      Plan:   I have changed Dulce Root's furosemide. I am also having her start on ferrous sulfate. Additionally, I am having her maintain her calcium carbonate, VITAMIN D3, dexAMETHasone, dapsone, rosuvastatin, levothyroxine, EScitalopram oxalate, metoprolol succinate, and pantoprazole.  No problem-specific Assessment & Plan notes found for this encounter.      Follow up in about 2 months (around 6/26/2023), or check the blood ordered today..    Dulce was seen today for hospital follow up.    Diagnoses and all orders for this visit:    Iron deficiency anemia due to chronic blood loss  -     ferrous sulfate (IRON, FERROUS SULFATE,) 325 mg (65 mg iron) Tab tablet; Take 1 tablet (325 mg total) by mouth 2 (two) times daily.  -     CBC Auto Differential; Future  -     IRON AND TIBC; Future    Chronic combined systolic and diastolic heart failure  -     furosemide (LASIX) 20 MG tablet; Take 1 tablet (20 mg total) by mouth 2 (two) times a day.    Immunization due  -     Pneumococcal Conjugate Vaccine (20 Valent) (IM); Future      Cont lasix.   Add iron and if not tolerated, infuse.   Recheck labs in 2 months for the cbc and iron levels.   Medications Ordered This Encounter   Medications    ferrous sulfate (IRON, FERROUS SULFATE,) 325 mg (65 mg iron) Tab tablet     Sig: Take 1 tablet (325 mg total) by mouth 2 (two) times daily.     Dispense:  60 tablet     Refill:  3    furosemide (LASIX) 20 MG tablet     Sig: Take 1 tablet (20 mg total) by mouth 2 (two) times a day.     Dispense:  60 tablet     Refill:  11     The patient was instructed to stop the following meds:  Medications Discontinued During This Encounter   Medication Reason    furosemide (LASIX) 20 MG tablet      Orders Placed This Encounter   Procedures    Pneumococcal Conjugate Vaccine (20 Valent) (IM)     Standing Status:   Future     Standing Expiration Date:   10/26/2024    CBC Auto Differential     Standing Status:   Future     Standing  Expiration Date:   4/26/2024    IRON AND TIBC     Standing Status:   Future     Standing Expiration Date:   6/24/2024       Medication List with Changes/Refills   New Medications    FERROUS SULFATE (IRON, FERROUS SULFATE,) 325 MG (65 MG IRON) TAB TABLET    Take 1 tablet (325 mg total) by mouth 2 (two) times daily.   Current Medications    CALCIUM CARBONATE (OS-CALVIN) 600 MG (1,500 MG) TAB    Take 600 mg by mouth 2 (two) times daily with meals.    CHOLECALCIFEROL, VITAMIN D3, (VITAMIN D3) 100 MCG (4,000 UNIT) CAP    Take 4,000 Units by mouth once daily.    DAPSONE 25 MG TAB    TAKE 2 TABLETS BY MOUTH EVERY DAY FOR 30 DAYS    DEXAMETHASONE (DECADRON) 0.5 MG/5 ML ELIX    SWISH 3.75MLS IN MOUTH FOR 30 SECONDS AND SWALLOW TWICE DAILY AS NEEDED FOR LICHEN PLANUS FLARE UPS    ESCITALOPRAM OXALATE (LEXAPRO) 10 MG TABLET    Take 1 tablet (10 mg total) by mouth once daily.    LEVOTHYROXINE (SYNTHROID) 75 MCG TABLET    Take 1 tablet (75 mcg total) by mouth before breakfast.    METOPROLOL SUCCINATE (TOPROL-XL) 25 MG 24 HR TABLET    Take 1 tablet (25 mg total) by mouth once daily.    PANTOPRAZOLE (PROTONIX) 40 MG TABLET    Take 1 tablet (40 mg total) by mouth 2 (two) times daily.    ROSUVASTATIN (CRESTOR) 20 MG TABLET    Take 1 tablet (20 mg total) by mouth every evening.   Changed and/or Refilled Medications    Modified Medication Previous Medication    FUROSEMIDE (LASIX) 20 MG TABLET furosemide (LASIX) 20 MG tablet       Take 1 tablet (20 mg total) by mouth 2 (two) times a day.    Take 1 tablet (20 mg total) by mouth once daily.      Medication List with Changes/Refills   New Medications    FERROUS SULFATE (IRON, FERROUS SULFATE,) 325 MG (65 MG IRON) TAB TABLET    Take 1 tablet (325 mg total) by mouth 2 (two) times daily.       Start Date: 4/26/2023 End Date: --   Current Medications    CALCIUM CARBONATE (OS-CALVIN) 600 MG (1,500 MG) TAB    Take 600 mg by mouth 2 (two) times daily with meals.       Start Date: --        End Date:  --    CHOLECALCIFEROL, VITAMIN D3, (VITAMIN D3) 100 MCG (4,000 UNIT) CAP    Take 4,000 Units by mouth once daily.       Start Date: --        End Date: --    DAPSONE 25 MG TAB    TAKE 2 TABLETS BY MOUTH EVERY DAY FOR 30 DAYS       Start Date: 10/27/2022End Date: --    DEXAMETHASONE (DECADRON) 0.5 MG/5 ML ELIX    SWISH 3.75MLS IN MOUTH FOR 30 SECONDS AND SWALLOW TWICE DAILY AS NEEDED FOR LICHEN PLANUS FLARE UPS       Start Date: 6/29/2022 End Date: --    ESCITALOPRAM OXALATE (LEXAPRO) 10 MG TABLET    Take 1 tablet (10 mg total) by mouth once daily.       Start Date: 2/16/2023 End Date: --    LEVOTHYROXINE (SYNTHROID) 75 MCG TABLET    Take 1 tablet (75 mcg total) by mouth before breakfast.       Start Date: 2/16/2023 End Date: --    METOPROLOL SUCCINATE (TOPROL-XL) 25 MG 24 HR TABLET    Take 1 tablet (25 mg total) by mouth once daily.       Start Date: 4/6/2023  End Date: 5/6/2023    PANTOPRAZOLE (PROTONIX) 40 MG TABLET    Take 1 tablet (40 mg total) by mouth 2 (two) times daily.       Start Date: 4/5/2023  End Date: 6/4/2023    ROSUVASTATIN (CRESTOR) 20 MG TABLET    Take 1 tablet (20 mg total) by mouth every evening.       Start Date: 1/13/2023 End Date: --   Changed and/or Refilled Medications    Modified Medication Previous Medication    FUROSEMIDE (LASIX) 20 MG TABLET furosemide (LASIX) 20 MG tablet       Take 1 tablet (20 mg total) by mouth 2 (two) times a day.    Take 1 tablet (20 mg total) by mouth once daily.       Start Date: 4/26/2023 End Date: --    Start Date: 4/19/2023 End Date: 4/26/2023

## 2023-04-27 NOTE — PROGRESS NOTES
RHEUMATOLOGY OUTPATIENT CLINIC NOTE    04/28/2023    Subjective:       Patient ID: Dulce Root is a 67 y.o. female.    Chief Complaint: No chief complaint on file.      HPI   Dulce Root is a 67 y.o. pleasant female here for rheumatology follow up for osteoporosis.        Had a right lower molar extracted 4/24/23. Went well no complications. Healing well. Pushed out second prolia for 5/12/23. Now taking calcium gummies as theyre easier to swallow. Taking vit d daily. No falls or fractures since last appointment.     Was unable to get Tymlos therapy due to high out of pocket cost from patient. She started prolia injections 10/27/2022. Tolerated the injection well without side effect.     Denies any planned invasive dental work.     Osteoporosis questionnaire  Current meds for osteopenia/ osteoporosis: 1200 mg calcium on and off  Prior meds for osteopenia/ osteoporosis: fosamax many years ago  Prev dexa scan:  We will discuss today  Vit D supplement:  D3 two tablets a day, unsure dosage  Menopause:  40s? Cycles stopped with chemotherapy  Hystrectomy: no  HRT: no  Smoker/tobacco: stopped in 1981 since 16 yo  Etoh:  No  Falls:  No  Activity level: no formal exercise  Kidney problems : none  Prev fracture :  compression fracture  Steroids : no  Family history :  mother and fathers side   PPI/gerd:   protonix, pepcid  Other risk factors :   Dental issues: one tooth needing implant  Anticipated dental work :  one implant at some point  H/o cancer/tx: hodgkins x2 with radiation,  chemo . Breast.       Rheumatologic review of systems negative otherwise.     Physical exam  Able to rise from a chair independently.  Walks without assistance.  Steady gait.  Kyphosis present  Fingers and toes cooler to touch than surrounding tissues. Mild purple discoloration some tomes. No ulcers    Past Medical History:   Diagnosis Date    Allergy     Anticoagulant long-term use     Plavix    Breast cancer 2008    Carotid stenosis,  bilateral 10/13/2017    Coronary artery disease     Coronary artery disease involving coronary bypass graft of native heart without angina pectoris 08/16/2019 2/19  1.  Left main is a small vessel with a 60%-65% ostial lesion. 2.  LAD is a medium-sized vessel diffuse 70% proximally  Ramus intermedius is a medium size vessel with a 40%-50% ostial lesion. 3.  Circumflex 60%-70% ostial  remainder of circumflex and obtuse marginal normal in appearance. Right coronary artery is normal size vessel, short discrete 70%mid 4.  Vein graft to right coronary is occ    Coronary artery disease involving native coronary artery of native heart without angina pectoris 06/27/2017    DCIS (ductal carcinoma in situ) of breast 11/06/2012    Encounter for blood transfusion     Epigastric pain 4/1/2023    Essential hypertension 11/06/2012    GERD (gastroesophageal reflux disease)     GI bleed 02/2021    Hematemesis with nausea 5/11/2021    Hodgkin lymphoma     Hx of Hodgkin's disease - s/p chemo and radiation 11/06/2012    Hyperlipidemia     Hyperlipidemia 11/06/2012    The patient presents with hyperlipidemia.  The patient reports tolerating the medication well and is in excellent compliance.  There have been no medication side effects.  The patient denies chest pain, neuropathy, and myalgias.  The patient has reduced fat intake and has been exercising.  Current treatment has included the medications listed in the med card.   Lab Results Component Value Date  CH    Hypertension     LBBB (left bundle branch block) 05/22/2018    Osteoporosis     Paroxysmal atrial fibrillation - single post-op episode 08/24/2017    Pemphigoid     Pemphigoid     pt receives IVIG infusions    Pulmonary hypertension 1/13/2023    S/P AVR (aortic valve replacement) - pericardial valve 08/21/2017    Perceval aortic tissue valve PVS23. 23mm    serial # [id number]    S/P CABG x 2 08/21/2017 8/17 CABG X 2 with SVG-LAD and SVG-distal RCA  SVG LAD due to absent LIMA  after chest radiation and lymph node biopsy     Stented coronary artery     She had 2 stents placed in 2/2019.  She has had CAD and bypasses in the past in 2017.      Thyroid disease      Past Surgical History:   Procedure Laterality Date    ARTERIOGRAPHY OF AORTIC ROOT N/A 02/15/2019    Procedure: ARTERIOGRAM, AORTIC ROOT;  Surgeon: Sujit Chaudhry MD;  [facility];  Service: Cardiology;  Laterality: N/A;    AXILLARY NODE DISSECTION Left 11/25/2022    Procedure: LYMPHADENECTOMY, AXILLARY-Left;  Surgeon: Rosa Maradiaga MD;  Location: [facility];  Service: General;  Laterality: Left;    BREAST BIOPSY      BREAST RECONSTRUCTION      bilateral mastectomy    CARDIAC CATHETERIZATION      stents x 2    CARDIAC SURGERY  08/2017    Aortic valve replacement , CABG 2 vessel    CARDIAC VALVE REPLACEMENT  08/2017    aortic valve, bovine per pt    CORONARY ANGIOGRAPHY N/A 02/15/2019    Procedure: ANGIOGRAM, CORONARY ARTERY;  Surgeon: Sujit Chaudhry MD;  [facility];  Service: Cardiology;  Laterality: N/A;    CORONARY ANGIOGRAPHY N/A 4/1/2022    Procedure: ANGIOGRAM, CORONARY ARTERY;  Surgeon: Sujit Chaudhry MD;  [facility];  Service: Cardiology;  Laterality: N/A;    CORONARY BYPASS GRAFT ANGIOGRAPHY  02/15/2019    Procedure: Bypass graft study;  Surgeon: Sujit Chaudhry MD;  [facility];  Service: Cardiology;;    COSMETIC SURGERY      bereast reconstruction    ESOPHAGOGASTRODUODENOSCOPY N/A 05/14/2021    Procedure: EGD (ESOPHAGOGASTRODUODENOSCOPY);  Surgeon: Tracy Flower MD;  Location: [facility];  Service: Endoscopy;  Laterality: N/A;    ESOPHAGOGASTRODUODENOSCOPY N/A 11/04/2021    Procedure: EGD (ESOPHAGOGASTRODUODENOSCOPY);  Surgeon: Tracy Flower MD;  Location: [facility];  Service: Endoscopy;  Laterality: N/A;    ESOPHAGOGASTRODUODENOSCOPY N/A 4/1/2023    Procedure: EGD (ESOPHAGOGASTRODUODENOSCOPY);  Surgeon: Tracy Flower MD;  Location: [facility];  Service: Endoscopy;  Laterality:  N/A;    EYE SURGERY      eye lids    INJECTION FOR SENTINEL NODE IDENTIFICATION Left 11/25/2022    Procedure: INJECTION, FOR SENTINEL NODE IDENTIFICATION-Left;  Surgeon: Rosa Maradiaga MD;  Location: [facility];  Service: General;  Laterality: Left;    LEFT HEART CATHETERIZATION Right 02/15/2019    Procedure: Left heart cath;  Surgeon: Sujit Chaudhry MD;  Location: [facility];  Service: Cardiology;  Laterality: Right;    LEFT HEART CATHETERIZATION Left 4/1/2022    Procedure: Left heart cath;  Surgeon: Sujit Chaudhry MD;  Location: [facility] CATH;  Service: Cardiology;  Laterality: Left;    LUMBAR LAMINECTOMY WITH DISCECTOMY N/A 02/27/2020    Procedure: LAMINECTOMY, SPINE, LUMBAR, WITH DISCECTOMY;  Surgeon: Vimal Villegas MD;  Location: [facility] OR;  Service: Neurosurgery;  Laterality: N/A;  left L5-S1  Laminectomy L4-5    LYMPHADENECTOMY      MASTECTOMY      MASTECTOMY, PARTIAL Left 11/25/2022    Procedure: MASTECTOMY, PARTIAL-Left ultrasound guided;  Surgeon: Rosa Maradiaga MD;  Location: [facility];  Service: General;  Laterality: Left;    RIGHT HEART CATHETERIZATION Right 4/1/2022    Procedure: INSERTION, CATHETER, RIGHT HEART;  Surgeon: Sujit Chaudhry MD;  Location: [facility];  Service: Cardiology;  Laterality: Right;    SENTINEL LYMPH NODE BIOPSY Left 11/25/2022    Procedure: BIOPSY, LYMPH NODE, SENTINEL-Left;  Surgeon: Rosa Maradiaga MD;  Location: [facility];  Service: General;  Laterality: Left;    SKIN BIOPSY      SPLENECTOMY, TOTAL      TRANSFORAMINAL EPIDURAL INJECTION OF STEROID Left 12/31/2019    Procedure: Left L5/S1 TF SKIP with local;  Surgeon: Canelo Niño MD;  Location: [facility] PAIN T;  Service: Pain Management;  Laterality: Left;    TUMOR REMOVAL      neck- lymphoma     Family History   Problem Relation Age of Onset    Hypertension Mother     Hypertension Father     Cancer Neg Hx      Social History     Socioeconomic History    Marital status:    Tobacco Use    Smoking status: Former     Packs/day: 1.00      Years: 20.00     Pack years: 20.00     Types: Cigarettes     Quit date: 1981     Years since quittin.5    Smokeless tobacco: Never   Substance and Sexual Activity    Alcohol use: No     Comment: 2 drinks/month; none 72 hrs prior to surgery    Drug use: No    Sexual activity: Yes     Partners: Male   Social History Narrative    Live w/ spouse and  1 dog      Social Determinants of Health     Financial Resource Strain: Low Risk     Difficulty of Paying Living Expenses: Not hard at all   Food Insecurity: No Food Insecurity    Worried About Running Out of Food in the Last Year: Never true    Ran Out of Food in the Last Year: Never true   Transportation Needs: No Transportation Needs    Lack of Transportation (Medical): No    Lack of Transportation (Non-Medical): No   Physical Activity: Insufficiently Active    Days of Exercise per Week: 3 days    Minutes of Exercise per Session: 30 min   Stress: No Stress Concern Present    Feeling of Stress : Only a little   Social Connections: Moderately Integrated    Frequency of Communication with Friends and Family: More than three times a week    Frequency of Social Gatherings with Friends and Family: Once a week    Attends [employer] Services: More than 4 times per year    Active Member of Clubs or Organizations: No    Attends Club or Organization Meetings: Never    Marital Status:    Housing Stability: Low Risk     Unable to Pay for Housing in the Last Year: No    Number of Places Lived in the Last Year: 1    Unstable Housing in the Last Year: No     Review of patient's allergies indicates:   Allergen Reactions    Amlodipine Shortness Of Breath and Other (See Comments)     unknown  Other reaction(s): Swelling  unknown  unknown  Other reaction(s): Swelling  unknown  Other reaction(s): Swelling  unknown    Levofloxacin Hives and Swelling    Sulfa (sulfonamide antibiotics) Shortness Of Breath, Itching and Other (See Comments)     elevated blood pressure    Ace  "inhibitors     Ciprofloxacin Itching    Iodinated contrast media     Iodine      Other reaction(s): Unknown    Latex      Other reaction(s): Itching    Latex, natural rubber Itching           Objective:   /78   Pulse 102   Resp 18   Ht 5' 2" (1.575 m)   Wt 64 kg (141 lb 1.5 oz)   SpO2 98%   BMI 25.81 kg/m²   Immunization History   Administered Date(s) Administered    COVID-19, MRNA, LN-S, PF (Pfizer) (Purple Cap) 12/30/2020, 01/20/2021, 09/30/2021    Influenza 11/28/2021    Influenza (FLUAD) - Quadrivalent - Adjuvanted - PF *Preferred* (65+) 11/28/2021    Influenza - Quadrivalent - MDCK - PF 02/01/2018    Pneumococcal Conjugate - 13 Valent 10/10/2019    Pneumococcal Conjugate - 20 Valent 04/26/2023              Recent Results (from the past 672 hour(s))   APTT    Collection Time: 03/31/23  5:20 PM   Result Value Ref Range    aPTT 24.8 21.0 - 32.0 sec   CBC auto differential    Collection Time: 03/31/23  5:20 PM   Result Value Ref Range    WBC 11.72 3.90 - 12.70 K/uL    RBC 3.20 (L) 4.00 - 5.40 M/uL    Hemoglobin 9.0 (L) 12.0 - 16.0 g/dL    Hematocrit 27.7 (L) 37.0 - 48.5 %    MCV 87 82 - 98 fL    MCH 28.1 27.0 - 31.0 pg    MCHC 32.5 32.0 - 36.0 g/dL    RDW 15.4 (H) 11.5 - 14.5 %    Platelets 301 150 - 450 K/uL    MPV 9.7 9.2 - 12.9 fL    Immature Granulocytes 0.3 0.0 - 0.5 %    Gran # (ANC) 7.7 1.8 - 7.7 K/uL    Immature Grans (Abs) 0.04 0.00 - 0.04 K/uL    Lymph # 2.4 1.0 - 4.8 K/uL    Mono # 1.3 (H) 0.3 - 1.0 K/uL    Eos # 0.2 0.0 - 0.5 K/uL    Baso # 0.08 0.00 - 0.20 K/uL    nRBC 0 0 /100 WBC    Gran % 65.4 38.0 - 73.0 %    Lymph % 20.6 18.0 - 48.0 %    Mono % 11.2 4.0 - 15.0 %    Eosinophil % 1.8 0.0 - 8.0 %    Basophil % 0.7 0.0 - 1.9 %    Differential Method Automated    Comprehensive metabolic panel    Collection Time: 03/31/23  5:20 PM   Result Value Ref Range    Sodium 139 136 - 145 mmol/L    Potassium 3.3 (L) 3.5 - 5.1 mmol/L    Chloride 102 95 - 110 mmol/L    CO2 23 23 - 29 mmol/L    Glucose " 96 70 - 110 mg/dL    BUN 47 (H) 8 - 23 mg/dL    Creatinine 0.7 0.5 - 1.4 mg/dL    Calcium 9.1 8.7 - 10.5 mg/dL    Total Protein 6.8 6.0 - 8.4 g/dL    Albumin 3.4 (L) 3.5 - 5.2 g/dL    Total Bilirubin 0.4 0.1 - 1.0 mg/dL    Alkaline Phosphatase 75 55 - 135 U/L    AST 13 10 - 40 U/L    ALT 12 10 - 44 U/L    Anion Gap 14 8 - 16 mmol/L    eGFR >60 >60 mL/min/1.73 m^2   Protime-INR    Collection Time: 03/31/23  5:20 PM   Result Value Ref Range    Prothrombin Time 11.3 9.0 - 12.5 sec    INR 1.1 0.8 - 1.2   Type & Screen    Collection Time: 03/31/23  5:20 PM   Result Value Ref Range    Group & Rh O POS     Indirect Gisel NEG     Specimen Outdate 04/03/2023 23:59    Prepare RBC 1 Unit    Collection Time: 03/31/23  5:20 PM   Result Value Ref Range    UNIT NUMBER [id number]     Product Code M8351M08     DISPENSE STATUS TRANSFUSED     CODING SYSTEM KJIJ111     Unit Blood Type Code 5100     Unit Blood Type O POS     Unit Expiration [id number]     CROSSMATCH INTERPRETATION Compatible    Occult blood x 1, stool    Collection Time: 03/31/23  7:39 PM    Specimen: Stool   Result Value Ref Range    Occult Blood Positive (A) Negative   Comprehensive Metabolic Panel (CMP)    Collection Time: 04/01/23  6:31 AM   Result Value Ref Range    Sodium 137 136 - 145 mmol/L    Potassium 3.6 3.5 - 5.1 mmol/L    Chloride 107 95 - 110 mmol/L    CO2 21 (L) 23 - 29 mmol/L    Glucose 141 (H) 70 - 110 mg/dL    BUN 43 (H) 8 - 23 mg/dL    Creatinine 0.7 0.5 - 1.4 mg/dL    Calcium 8.4 (L) 8.7 - 10.5 mg/dL    Total Protein 6.0 6.0 - 8.4 g/dL    Albumin 3.1 (L) 3.5 - 5.2 g/dL    Total Bilirubin 0.4 0.1 - 1.0 mg/dL    Alkaline Phosphatase 55 55 - 135 U/L    AST 10 10 - 40 U/L    ALT 10 10 - 44 U/L    Anion Gap 9 8 - 16 mmol/L    eGFR >60 >60 mL/min/1.73 m^2   CBC with Automated Differential    Collection Time: 04/01/23  6:31 AM   Result Value Ref Range    WBC 8.10 3.90 - 12.70 K/uL    RBC 2.71 (L) 4.00 - 5.40 M/uL    Hemoglobin 7.8 (L) 12.0 - 16.0 g/dL     Hematocrit 24.0 (L) 37.0 - 48.5 %    MCV 89 82 - 98 fL    MCH 28.8 27.0 - 31.0 pg    MCHC 32.5 32.0 - 36.0 g/dL    RDW 15.6 (H) 11.5 - 14.5 %    Platelets 276 150 - 450 K/uL    MPV 9.6 9.2 - 12.9 fL    Immature Granulocytes 0.4 0.0 - 0.5 %    Gran # (ANC) 7.0 1.8 - 7.7 K/uL    Immature Grans (Abs) 0.03 0.00 - 0.04 K/uL    Lymph # 0.8 (L) 1.0 - 4.8 K/uL    Mono # 0.3 0.3 - 1.0 K/uL    Eos # 0.0 0.0 - 0.5 K/uL    Baso # 0.01 0.00 - 0.20 K/uL    nRBC 0 0 /100 WBC    Gran % 86.4 (H) 38.0 - 73.0 %    Lymph % 9.4 (L) 18.0 - 48.0 %    Mono % 3.7 (L) 4.0 - 15.0 %    Eosinophil % 0.0 0.0 - 8.0 %    Basophil % 0.1 0.0 - 1.9 %    Differential Method Automated    CBC auto differential    Collection Time: 04/01/23  4:14 PM   Result Value Ref Range    WBC 13.10 (H) 3.90 - 12.70 K/uL    RBC 3.24 (L) 4.00 - 5.40 M/uL    Hemoglobin 9.6 (L) 12.0 - 16.0 g/dL    Hematocrit 28.7 (L) 37.0 - 48.5 %    MCV 89 82 - 98 fL    MCH 29.6 27.0 - 31.0 pg    MCHC 33.4 32.0 - 36.0 g/dL    RDW 15.2 (H) 11.5 - 14.5 %    Platelets 251 150 - 450 K/uL    MPV 9.7 9.2 - 12.9 fL    Immature Granulocytes 0.5 0.0 - 0.5 %    Gran # (ANC) 9.9 (H) 1.8 - 7.7 K/uL    Immature Grans (Abs) 0.06 (H) 0.00 - 0.04 K/uL    Lymph # 1.9 1.0 - 4.8 K/uL    Mono # 1.3 (H) 0.3 - 1.0 K/uL    Eos # 0.0 0.0 - 0.5 K/uL    Baso # 0.02 0.00 - 0.20 K/uL    nRBC 0 0 /100 WBC    Gran % 75.2 (H) 38.0 - 73.0 %    Lymph % 14.6 (L) 18.0 - 48.0 %    Mono % 9.5 4.0 - 15.0 %    Eosinophil % 0.0 0.0 - 8.0 %    Basophil % 0.2 0.0 - 1.9 %    Differential Method Automated    Comprehensive Metabolic Panel (CMP)    Collection Time: 04/02/23  7:07 AM   Result Value Ref Range    Sodium 138 136 - 145 mmol/L    Potassium 3.4 (L) 3.5 - 5.1 mmol/L    Chloride 109 95 - 110 mmol/L    CO2 18 (L) 23 - 29 mmol/L    Glucose 89 70 - 110 mg/dL    BUN 27 (H) 8 - 23 mg/dL    Creatinine 0.7 0.5 - 1.4 mg/dL    Calcium 7.9 (L) 8.7 - 10.5 mg/dL    Total Protein 5.7 (L) 6.0 - 8.4 g/dL    Albumin 3.1 (L) 3.5 - 5.2  g/dL    Total Bilirubin 0.6 0.1 - 1.0 mg/dL    Alkaline Phosphatase 48 (L) 55 - 135 U/L    AST 22 10 - 40 U/L    ALT 8 (L) 10 - 44 U/L    Anion Gap 11 8 - 16 mmol/L    eGFR >60 >60 mL/min/1.73 m^2   CBC with Automated Differential    Collection Time: 04/02/23  7:07 AM   Result Value Ref Range    WBC 8.81 3.90 - 12.70 K/uL    RBC 2.83 (L) 4.00 - 5.40 M/uL    Hemoglobin 8.3 (L) 12.0 - 16.0 g/dL    Hematocrit 25.2 (L) 37.0 - 48.5 %    MCV 89 82 - 98 fL    MCH 29.3 27.0 - 31.0 pg    MCHC 32.9 32.0 - 36.0 g/dL    RDW 15.3 (H) 11.5 - 14.5 %    Platelets 222 150 - 450 K/uL    MPV 10.0 9.2 - 12.9 fL    Immature Granulocytes 0.5 0.0 - 0.5 %    Gran # (ANC) 5.6 1.8 - 7.7 K/uL    Immature Grans (Abs) 0.04 0.00 - 0.04 K/uL    Lymph # 2.1 1.0 - 4.8 K/uL    Mono # 0.9 0.3 - 1.0 K/uL    Eos # 0.1 0.0 - 0.5 K/uL    Baso # 0.05 0.00 - 0.20 K/uL    nRBC 0 0 /100 WBC    Gran % 63.4 38.0 - 73.0 %    Lymph % 24.0 18.0 - 48.0 %    Mono % 10.6 4.0 - 15.0 %    Eosinophil % 0.9 0.0 - 8.0 %    Basophil % 0.6 0.0 - 1.9 %    Differential Method Automated    Hemoglobin    Collection Time: 04/02/23  5:19 PM   Result Value Ref Range    Hemoglobin 8.9 (L) 12.0 - 16.0 g/dL   Hematocrit    Collection Time: 04/02/23  5:19 PM   Result Value Ref Range    Hematocrit 26.9 (L) 37.0 - 48.5 %   Comprehensive Metabolic Panel (CMP)    Collection Time: 04/03/23  5:28 AM   Result Value Ref Range    Sodium 140 136 - 145 mmol/L    Potassium 3.2 (L) 3.5 - 5.1 mmol/L    Chloride 110 95 - 110 mmol/L    CO2 23 23 - 29 mmol/L    Glucose 86 70 - 110 mg/dL    BUN 16 8 - 23 mg/dL    Creatinine 0.6 0.5 - 1.4 mg/dL    Calcium 8.0 (L) 8.7 - 10.5 mg/dL    Total Protein 5.6 (L) 6.0 - 8.4 g/dL    Albumin 3.0 (L) 3.5 - 5.2 g/dL    Total Bilirubin 0.6 0.1 - 1.0 mg/dL    Alkaline Phosphatase 50 (L) 55 - 135 U/L    AST 16 10 - 40 U/L    ALT 9 (L) 10 - 44 U/L    Anion Gap 7 (L) 8 - 16 mmol/L    eGFR >60 >60 mL/min/1.73 m^2   CBC with Automated Differential    Collection Time:  04/03/23  5:28 AM   Result Value Ref Range    WBC 8.85 3.90 - 12.70 K/uL    RBC 2.90 (L) 4.00 - 5.40 M/uL    Hemoglobin 8.4 (L) 12.0 - 16.0 g/dL    Hematocrit 26.3 (L) 37.0 - 48.5 %    MCV 91 82 - 98 fL    MCH 29.0 27.0 - 31.0 pg    MCHC 31.9 (L) 32.0 - 36.0 g/dL    RDW 15.4 (H) 11.5 - 14.5 %    Platelets 249 150 - 450 K/uL    MPV 10.1 9.2 - 12.9 fL    Immature Granulocytes 0.3 0.0 - 0.5 %    Gran # (ANC) 5.7 1.8 - 7.7 K/uL    Immature Grans (Abs) 0.03 0.00 - 0.04 K/uL    Lymph # 1.6 1.0 - 4.8 K/uL    Mono # 1.1 (H) 0.3 - 1.0 K/uL    Eos # 0.4 0.0 - 0.5 K/uL    Baso # 0.09 0.00 - 0.20 K/uL    nRBC 0 0 /100 WBC    Gran % 64.2 38.0 - 73.0 %    Lymph % 17.9 (L) 18.0 - 48.0 %    Mono % 12.1 4.0 - 15.0 %    Eosinophil % 4.5 0.0 - 8.0 %    Basophil % 1.0 0.0 - 1.9 %    Differential Method Automated    CBC auto differential    Collection Time: 04/04/23  5:44 AM   Result Value Ref Range    WBC 9.74 3.90 - 12.70 K/uL    RBC 2.84 (L) 4.00 - 5.40 M/uL    Hemoglobin 8.3 (L) 12.0 - 16.0 g/dL    Hematocrit 25.9 (L) 37.0 - 48.5 %    MCV 91 82 - 98 fL    MCH 29.2 27.0 - 31.0 pg    MCHC 32.0 32.0 - 36.0 g/dL    RDW 15.4 (H) 11.5 - 14.5 %    Platelets 247 150 - 450 K/uL    MPV 10.0 9.2 - 12.9 fL    Immature Granulocytes 0.4 0.0 - 0.5 %    Gran # (ANC) 6.5 1.8 - 7.7 K/uL    Immature Grans (Abs) 0.04 0.00 - 0.04 K/uL    Lymph # 1.4 1.0 - 4.8 K/uL    Mono # 1.1 (H) 0.3 - 1.0 K/uL    Eos # 0.7 (H) 0.0 - 0.5 K/uL    Baso # 0.07 0.00 - 0.20 K/uL    nRBC 0 0 /100 WBC    Gran % 66.5 38.0 - 73.0 %    Lymph % 14.4 (L) 18.0 - 48.0 %    Mono % 11.3 4.0 - 15.0 %    Eosinophil % 6.7 0.0 - 8.0 %    Basophil % 0.7 0.0 - 1.9 %    Differential Method Automated    Basic metabolic panel    Collection Time: 04/04/23  5:44 AM   Result Value Ref Range    Sodium 139 136 - 145 mmol/L    Potassium 3.4 (L) 3.5 - 5.1 mmol/L    Chloride 109 95 - 110 mmol/L    CO2 22 (L) 23 - 29 mmol/L    Glucose 88 70 - 110 mg/dL    BUN 13 8 - 23 mg/dL    Creatinine 0.6 0.5 -  1.4 mg/dL    Calcium 7.9 (L) 8.7 - 10.5 mg/dL    Anion Gap 8 8 - 16 mmol/L    eGFR >60 >60 mL/min/1.73 m^2   D dimer, quantitative    Collection Time: 04/04/23  2:04 PM   Result Value Ref Range    D-Dimer 1.87 (H) <0.50 mg/L FEU   Echo    Collection Time: 04/04/23  2:19 PM   Result Value Ref Range    BSA 1.73 m2    TDI SEPTAL 0.07 m/s    LV LATERAL E/E' RATIO 10.73 m/s    LV SEPTAL E/E' RATIO 16.86 m/s    LA WIDTH 3.60 cm    IVC diameter 1.87 cm    Left Ventricular Outflow Tract Mean Velocity 1.03 cm/s    Left Ventricular Outflow Tract Mean Gradient 4.34 mmHg    TV mean gradient 32 mmHg    TDI LATERAL 0.11 m/s    LVIDd 4.72 3.5 - 6.0 cm    IVS 1.29 (A) 0.6 - 1.1 cm    Posterior Wall 1.19 (A) 0.6 - 1.1 cm    Ao root annulus 2.67 cm    LVIDs 3.95 2.1 - 4.0 cm    FS 16 28 - 44 %    LA volume 59.09 cm3    STJ 2.45 cm    Ascending aorta 2.43 cm    LV mass 223.65 g    LA size 3.95 cm    TAPSE 1.70 cm    Left Ventricle Relative Wall Thickness 0.50 cm    AV mean gradient 15 mmHg    AV valve area 1.64 cm2    AV Velocity Ratio 0.56     AV index (prosthetic) 0.67     MV valve area p 1/2 method 3.72 cm2    E/A ratio 1.64     Mean e' 0.09 m/s    E wave deceleration time 204.01 msec    IVRT 39.96 msec    LVOT diameter 1.77 cm    LVOT area 2.5 cm2    LVOT peak johny 1.24 m/s    LVOT peak VTI 26.80 cm    Ao peak johny 2.22 m/s    Ao VTI 40.1 cm    RVOT peak johny 0.94 m/s    RVOT peak VTI 18.5 cm    Mr max johny 2.94 m/s    LVOT stroke volume 65.91 cm3    AV peak gradient 20 mmHg    PV mean gradient 2.30 mmHg    E/E' ratio 13.11 m/s    MV Peak E Johny 1.18 m/s    TR Max Johny 3.10 m/s    MV stenosis pressure 1/2 time 59.16 ms    MV Peak A Johny 0.72 m/s    LV Systolic Volume 67.78 mL    LV Systolic Volume Index 39.6 mL/m2    LV Diastolic Volume 103.47 mL    LV Diastolic Volume Index 60.51 mL/m2    LA Volume Index 34.6 mL/m2    LV Mass Index 131 g/m2    RA Major Axis 3.84 cm    Left Atrium Minor Axis 4.81 cm    Left Atrium Major Axis 4.97 cm     Triscuspid Valve Regurgitation Peak Gradient 38 mmHg    RA Width 2.90 cm    EF 40 %   Gram stain    Collection Time: 04/04/23  4:14 PM    Specimen: Pleural; Body Fluid   Result Value Ref Range    Gram Stain Result Rare WBC's     Gram Stain Result No organisms seen    Protein, Peritoneal, Pleural Fluid or ROMAN Drainage, In-House Pleural Fluid, Left    Collection Time: 04/04/23  4:15 PM   Result Value Ref Range    Body Fluid Source, Total Protein Pleural Fluid, Left     Body Fluid, Protein 1.2 Not established g/dL   LDH, Peritoneal, Pleural Fluid or ROMAN Drainage, In-House Pleural Fluid, Left    Collection Time: 04/04/23  4:15 PM   Result Value Ref Range    Body Fluid Source, LDH Pleural Fluid, Left     LD, Fluid 56 Not established U/L   CBC auto differential    Collection Time: 04/05/23  6:49 AM   Result Value Ref Range    WBC 10.16 3.90 - 12.70 K/uL    RBC 2.84 (L) 4.00 - 5.40 M/uL    Hemoglobin 8.4 (L) 12.0 - 16.0 g/dL    Hematocrit 26.0 (L) 37.0 - 48.5 %    MCV 92 82 - 98 fL    MCH 29.6 27.0 - 31.0 pg    MCHC 32.3 32.0 - 36.0 g/dL    RDW 15.5 (H) 11.5 - 14.5 %    Platelets 270 150 - 450 K/uL    MPV 9.9 9.2 - 12.9 fL    Immature Granulocytes 0.4 0.0 - 0.5 %    Gran # (ANC) 6.8 1.8 - 7.7 K/uL    Immature Grans (Abs) 0.04 0.00 - 0.04 K/uL    Lymph # 1.4 1.0 - 4.8 K/uL    Mono # 1.3 (H) 0.3 - 1.0 K/uL    Eos # 0.6 (H) 0.0 - 0.5 K/uL    Baso # 0.06 0.00 - 0.20 K/uL    nRBC 0 0 /100 WBC    Gran % 67.2 38.0 - 73.0 %    Lymph % 13.8 (L) 18.0 - 48.0 %    Mono % 12.4 4.0 - 15.0 %    Eosinophil % 5.6 0.0 - 8.0 %    Basophil % 0.6 0.0 - 1.9 %    Differential Method Automated    Basic metabolic panel    Collection Time: 04/05/23  6:49 AM   Result Value Ref Range    Sodium 140 136 - 145 mmol/L    Potassium 3.3 (L) 3.5 - 5.1 mmol/L    Chloride 109 95 - 110 mmol/L    CO2 24 23 - 29 mmol/L    Glucose 83 70 - 110 mg/dL    BUN 14 8 - 23 mg/dL    Creatinine 0.6 0.5 - 1.4 mg/dL    Calcium 8.0 (L) 8.7 - 10.5 mg/dL    Anion Gap 7 (L) 8 - 16  mmol/L    eGFR >60 >60 mL/min/1.73 m^2   Basic Metabolic Panel    Collection Time: 04/13/23 11:12 AM   Result Value Ref Range    Sodium 139 136 - 145 mmol/L    Potassium 3.1 (L) 3.5 - 5.1 mmol/L    Chloride 97 95 - 110 mmol/L    CO2 26 23 - 29 mmol/L    Glucose 78 70 - 110 mg/dL    BUN 19 8 - 23 mg/dL    Creatinine 0.7 0.5 - 1.4 mg/dL    Calcium 9.8 8.7 - 10.5 mg/dL    Anion Gap 16 8 - 16 mmol/L    eGFR >60.0 >60 mL/min/1.73 m^2   CBC Auto Differential    Collection Time: 04/13/23 11:12 AM   Result Value Ref Range    WBC 9.36 3.90 - 12.70 K/uL    RBC 2.94 (L) 4.00 - 5.40 M/uL    Hemoglobin 8.3 (L) 12.0 - 16.0 g/dL    Hematocrit 27.7 (L) 37.0 - 48.5 %    MCV 94 82 - 98 fL    MCH 28.2 27.0 - 31.0 pg    MCHC 30.0 (L) 32.0 - 36.0 g/dL    RDW 15.7 (H) 11.5 - 14.5 %    Platelets 433 150 - 450 K/uL    MPV 10.1 9.2 - 12.9 fL    Immature Granulocytes 0.3 0.0 - 0.5 %    Gran # (ANC) 6.9 1.8 - 7.7 K/uL    Immature Grans (Abs) 0.03 0.00 - 0.04 K/uL    Lymph # 1.1 1.0 - 4.8 K/uL    Mono # 1.0 0.3 - 1.0 K/uL    Eos # 0.2 0.0 - 0.5 K/uL    Baso # 0.08 0.00 - 0.20 K/uL    nRBC 0 0 /100 WBC    Gran % 74.1 (H) 38.0 - 73.0 %    Lymph % 11.4 (L) 18.0 - 48.0 %    Mono % 10.9 4.0 - 15.0 %    Eosinophil % 2.4 0.0 - 8.0 %    Basophil % 0.9 0.0 - 1.9 %    Differential Method Automated    Ferritin    Collection Time: 04/13/23 11:12 AM   Result Value Ref Range    Ferritin 30 20.0 - 300.0 ng/mL   Iron and TIBC    Collection Time: 04/13/23 11:12 AM   Result Value Ref Range    Iron 28 (L) 30 - 160 ug/dL    Transferrin 263 200 - 375 mg/dL    TIBC 389 250 - 450 ug/dL    Saturated Iron 7 (L) 20 - 50 %   Comprehensive Metabolic Panel    Collection Time: 04/19/23 10:55 AM   Result Value Ref Range    Sodium 140 136 - 145 mmol/L    Potassium 3.8 3.5 - 5.1 mmol/L    Chloride 104 95 - 110 mmol/L    CO2 26 23 - 29 mmol/L    Glucose 103 70 - 110 mg/dL    BUN 15 8 - 23 mg/dL    Creatinine 0.7 0.5 - 1.4 mg/dL    Calcium 9.1 8.7 - 10.5 mg/dL    Total Protein  7.2 6.0 - 8.4 g/dL    Albumin 3.7 3.5 - 5.2 g/dL    Total Bilirubin 0.8 0.1 - 1.0 mg/dL    Alkaline Phosphatase 90 55 - 135 U/L    AST 25 10 - 40 U/L    ALT 18 10 - 44 U/L    Anion Gap 10 8 - 16 mmol/L    eGFR >60.0 >60 mL/min/1.73 m^2   BNP    Collection Time: 04/19/23 10:55 AM   Result Value Ref Range    BNP 1,077 (H) 0 - 99 pg/mL   CBC Auto Differential    Collection Time: 04/19/23 10:55 AM   Result Value Ref Range    WBC 8.02 3.90 - 12.70 K/uL    RBC 3.14 (L) 4.00 - 5.40 M/uL    Hemoglobin 8.9 (L) 12.0 - 16.0 g/dL    Hematocrit 28.7 (L) 37.0 - 48.5 %    MCV 91 82 - 98 fL    MCH 28.3 27.0 - 31.0 pg    MCHC 31.0 (L) 32.0 - 36.0 g/dL    RDW 15.1 (H) 11.5 - 14.5 %    Platelets 431 150 - 450 K/uL    MPV 9.4 9.2 - 12.9 fL    Immature Granulocytes 0.2 0.0 - 0.5 %    Gran # (ANC) 5.3 1.8 - 7.7 K/uL    Immature Grans (Abs) 0.02 0.00 - 0.04 K/uL    Lymph # 1.4 1.0 - 4.8 K/uL    Mono # 1.0 0.3 - 1.0 K/uL    Eos # 0.3 0.0 - 0.5 K/uL    Baso # 0.08 0.00 - 0.20 K/uL    nRBC 0 0 /100 WBC    Gran % 66.3 38.0 - 73.0 %    Lymph % 17.1 (L) 18.0 - 48.0 %    Mono % 12.0 4.0 - 15.0 %    Eosinophil % 3.4 0.0 - 8.0 %    Basophil % 1.0 0.0 - 1.9 %    Differential Method Automated    POTASSIUM    Collection Time: 04/19/23 10:55 AM   Result Value Ref Range    Potassium 3.8 3.5 - 5.1 mmol/L   Basic Metabolic Panel    Collection Time: 04/19/23 10:55 AM   Result Value Ref Range    Sodium 140 136 - 145 mmol/L    Potassium 3.8 3.5 - 5.1 mmol/L    Chloride 104 95 - 110 mmol/L    CO2 26 23 - 29 mmol/L    Glucose 103 70 - 110 mg/dL    BUN 15 8 - 23 mg/dL    Creatinine 0.7 0.5 - 1.4 mg/dL    Calcium 9.1 8.7 - 10.5 mg/dL    Anion Gap 10 8 - 16 mmol/L    eGFR >60.0 >60 mL/min/1.73 m^2   Basic Metabolic Panel    Collection Time: 04/26/23  9:26 AM   Result Value Ref Range    Sodium 139 136 - 145 mmol/L    Potassium 3.4 (L) 3.5 - 5.1 mmol/L    Chloride 102 95 - 110 mmol/L    CO2 27 23 - 29 mmol/L    Glucose 99 70 - 110 mg/dL    BUN 14 8 - 23 mg/dL     Creatinine 0.8 0.5 - 1.4 mg/dL    Calcium 9.2 8.7 - 10.5 mg/dL    Anion Gap 10 8 - 16 mmol/L    eGFR >60.0 >60 mL/min/1.73 m^2          No results found for: TBGOLDPLUS   Lab Results   Component Value Date    HEPCAB Negative 05/11/2021        DEXA 02/04/2021   Spine -1.9, FN -2.9, TH -2.7    DEXA 05/19/2022   Spine -1.7, FN -3.0, TH -3.2    Assessment:       1. Age related osteoporosis, unspecified pathological fracture presence    2. Raynaud's disease without gangrene    3. Counseling on health promotion and disease prevention    4. 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  - last vitamin-D level 35 on 02/2021  - Avoid immobility, fall prevention   -Tried for tymlos but too expensive out of pocket.   -Started prolia 10/27/2022. Tolerated well.   - recent dental extraction right lower molar 04/24/2023.  Tolerated well, healing well.    Raynaud's   Fingers and toes cold to touch, slight purplish discoloration on some tips of toes.  No digital ulcers     Other specified counseling  - over 10 minutes spent regarding below topics:  - Nutrition and exercise counseling.  - Medication counseling provided.    Plan:          Continue prolia q 6 months unless contraindicated   Pushed out to May 12, 2023 due to recent dental work     Continue calcium in diet or calcium chewable 6075-2079 mg daily.      Weight bearing activity as tolerated  Caution to avoid falls     Continue otc Vitamin D    For Raynaud's, core warming, avoid prolonged  exposure to cold environments    Follow up 6 monthsafter prolia for prolia, DEXA with early CMP in Brooke Howard PA-C  [facility]  Rheumatology       30 minutes of total time spent on the encounter, which includes face to face time and non-face to face time preparing to see the patient (eg, review of tests), Obtaining and/or reviewing separately obtained history, Documenting clinical information in the electronic or other health record, Independently interpreting results (not separately reported) and communicating results to the patient/family/caregiver, or Care coordination (not separately reported).

## 2023-04-28 ENCOUNTER — OFFICE VISIT (OUTPATIENT)
Dept: RHEUMATOLOGY | Facility: CLINIC | Age: 68
End: 2023-04-28
Payer: MEDICARE

## 2023-04-28 VITALS
HEIGHT: 62 IN | SYSTOLIC BLOOD PRESSURE: 125 MMHG | OXYGEN SATURATION: 98 % | DIASTOLIC BLOOD PRESSURE: 78 MMHG | RESPIRATION RATE: 18 BRPM | HEART RATE: 102 BPM | BODY MASS INDEX: 25.97 KG/M2 | WEIGHT: 141.13 LBS

## 2023-04-28 DIAGNOSIS — Z71.89 COUNSELING ON HEALTH PROMOTION AND DISEASE PREVENTION: ICD-10-CM

## 2023-04-28 DIAGNOSIS — M81.0 AGE RELATED OSTEOPOROSIS, UNSPECIFIED PATHOLOGICAL FRACTURE PRESENCE: Primary | ICD-10-CM

## 2023-04-28 DIAGNOSIS — I73.00 RAYNAUD'S DISEASE WITHOUT GANGRENE: ICD-10-CM

## 2023-04-28 DIAGNOSIS — S22.069D CLOSED FRACTURE OF SEVENTH THORACIC VERTEBRA WITH ROUTINE HEALING, UNSPECIFIED FRACTURE MORPHOLOGY, SUBSEQUENT ENCOUNTER: ICD-10-CM

## 2023-04-28 PROCEDURE — 99999 PR PBB SHADOW E&M-EST. PATIENT-LVL IV: ICD-10-PCS | Mod: PBBFAC,,,

## 2023-04-28 PROCEDURE — 99214 PR OFFICE/OUTPT VISIT, EST, LEVL IV, 30-39 MIN: ICD-10-PCS | Mod: S$PBB,,,

## 2023-04-28 PROCEDURE — 99214 OFFICE O/P EST MOD 30 MIN: CPT | Mod: S$PBB,,,

## 2023-04-28 PROCEDURE — 99214 OFFICE O/P EST MOD 30 MIN: CPT | Mod: PBBFAC

## 2023-04-28 PROCEDURE — 99999 PR PBB SHADOW E&M-EST. PATIENT-LVL IV: CPT | Mod: PBBFAC,,,

## 2023-05-05 ENCOUNTER — PATIENT MESSAGE (OUTPATIENT)
Dept: CARDIOLOGY | Facility: CLINIC | Age: 68
End: 2023-05-05
Payer: MEDICARE

## 2023-05-08 ENCOUNTER — PATIENT MESSAGE (OUTPATIENT)
Dept: FAMILY MEDICINE | Facility: CLINIC | Age: 68
End: 2023-05-08
Payer: MEDICARE

## 2023-05-11 ENCOUNTER — PATIENT MESSAGE (OUTPATIENT)
Dept: RHEUMATOLOGY | Facility: CLINIC | Age: 68
End: 2023-05-11
Payer: MEDICARE

## 2023-05-12 ENCOUNTER — OFFICE VISIT (OUTPATIENT)
Dept: CARDIOLOGY | Facility: CLINIC | Age: 68
End: 2023-05-12
Payer: MEDICARE

## 2023-05-12 ENCOUNTER — TELEPHONE (OUTPATIENT)
Dept: HEMATOLOGY/ONCOLOGY | Facility: CLINIC | Age: 68
End: 2023-05-12
Payer: MEDICARE

## 2023-05-12 VITALS
OXYGEN SATURATION: 90 % | DIASTOLIC BLOOD PRESSURE: 70 MMHG | HEART RATE: 89 BPM | WEIGHT: 135.81 LBS | SYSTOLIC BLOOD PRESSURE: 130 MMHG | HEIGHT: 63 IN | BODY MASS INDEX: 24.06 KG/M2

## 2023-05-12 DIAGNOSIS — Z95.2 S/P AVR (AORTIC VALVE REPLACEMENT): ICD-10-CM

## 2023-05-12 DIAGNOSIS — C50.112 MALIGNANT NEOPLASM OF CENTRAL PORTION OF LEFT BREAST IN FEMALE, ESTROGEN RECEPTOR NEGATIVE: ICD-10-CM

## 2023-05-12 DIAGNOSIS — I65.23 CAROTID STENOSIS, BILATERAL: ICD-10-CM

## 2023-05-12 DIAGNOSIS — Z95.1 S/P CABG X 2: ICD-10-CM

## 2023-05-12 DIAGNOSIS — K92.2 UGIB (UPPER GASTROINTESTINAL BLEED): ICD-10-CM

## 2023-05-12 DIAGNOSIS — Z17.1 MALIGNANT NEOPLASM OF CENTRAL PORTION OF LEFT BREAST IN FEMALE, ESTROGEN RECEPTOR NEGATIVE: ICD-10-CM

## 2023-05-12 DIAGNOSIS — E78.2 MIXED HYPERLIPIDEMIA: ICD-10-CM

## 2023-05-12 DIAGNOSIS — I50.42 CHRONIC COMBINED SYSTOLIC AND DIASTOLIC HEART FAILURE: Primary | ICD-10-CM

## 2023-05-12 DIAGNOSIS — I10 ESSENTIAL HYPERTENSION: ICD-10-CM

## 2023-05-12 DIAGNOSIS — I25.810 CORONARY ARTERY DISEASE INVOLVING CORONARY BYPASS GRAFT OF NATIVE HEART WITHOUT ANGINA PECTORIS: ICD-10-CM

## 2023-05-12 PROCEDURE — 99999 PR PBB SHADOW E&M-EST. PATIENT-LVL IV: ICD-10-PCS | Mod: PBBFAC,,, | Performed by: PHYSICIAN ASSISTANT

## 2023-05-12 PROCEDURE — 99214 OFFICE O/P EST MOD 30 MIN: CPT | Mod: S$PBB,,, | Performed by: PHYSICIAN ASSISTANT

## 2023-05-12 PROCEDURE — 99214 OFFICE O/P EST MOD 30 MIN: CPT | Mod: PBBFAC | Performed by: PHYSICIAN ASSISTANT

## 2023-05-12 PROCEDURE — 99999 PR PBB SHADOW E&M-EST. PATIENT-LVL IV: CPT | Mod: PBBFAC,,, | Performed by: PHYSICIAN ASSISTANT

## 2023-05-12 PROCEDURE — 99214 PR OFFICE/OUTPT VISIT, EST, LEVL IV, 30-39 MIN: ICD-10-PCS | Mod: S$PBB,,, | Performed by: PHYSICIAN ASSISTANT

## 2023-05-12 RX ORDER — SACUBITRIL AND VALSARTAN 24; 26 MG/1; MG/1
1 TABLET, FILM COATED ORAL 2 TIMES DAILY
Qty: 60 TABLET | Refills: 11 | Status: SHIPPED | OUTPATIENT
Start: 2023-05-12 | End: 2023-06-14

## 2023-05-12 NOTE — PROGRESS NOTES
Cardiology Clinic Note  Reason for Visit: HFrEF, CAD  LOV w/ Dr. Brian: 4/19/2023    HPI:     PROBLEM LIST:  Breast cancer, left 11/22 (Previous cancer of the right breast s/p XRT, s/p bilateral mastectomy)  Hodgkins lymphoma 1991 (ABVD, XRT)  CAD s/p CABG 8/17 ( SVG to LAD, SVG to PDA occluded), PCI 2019 LM, CX, RCA  AVR, bioprosthetic 8/17  Carotid artery stenosis (ANNA 60-69%, LICA 80-89%)  Pulmonary HTN PAP 57, Likely WHO class II  HFrEF, EF 35% since 2019  HTN  HLD      Dulce Root is a 68 y.o. Female who presents for follow-up regarding titration of GDMT.  She was admitted to the hospital for GI bleed March 30, 2023.  Prior to that she had been taking Entresto, but it was discontinued when her blood pressure started to drop. In retrospect this was more likely related to GI bleeding rather than effect of entresto.  EGD was performed in the hospital on April 1st which confirmed bleeding duodenal ulcer.  Per GI recommendation she will be repeating EGD 2 months later, and was recommended to continue holding aspirin and Plavix for the time being.  While in the hospital losartan and hydrochlorothiazide were stopped.  She saw Dr. Brian for hospital follow-up on April 19th.  She had complaint of shortness of breath and peripheral edema, and was prescribed Lasix 20 mg b.i.d..  Labs 1 week later showed mild hypokalemia.  She has continued metoprolol succinate 25 mg q.d. she is been monitoring her blood pressure at home, and states that it is on par with the reading in the clinic today of 130/70.  Additionally she reports improvement in her symptom of shortness of breath and almost full resolution of peripheral edema.  Generally speaking she feels better and has more energy.  For the past week or so she reduced Lasix 20 mg to once daily.  She is been monitoring her weight at home and is down to 130 on her home scale. She is using the digital HTN cuff at home, but it isn't transmitting to her chart. She plans to  reach out to the O bar.       ROS:    Pertinent ROS included in HPI and below.  PMH:     Past Medical History:   Diagnosis Date    Allergy     Anticoagulant long-term use     Plavix    Breast cancer 2008    Carotid stenosis, bilateral 10/13/2017    Coronary artery disease     Coronary artery disease involving coronary bypass graft of native heart without angina pectoris 08/16/2019 2/19  1.  Left main is a small vessel with a 60%-65% ostial lesion. 2.  LAD is a medium-sized vessel diffuse 70% proximally  Ramus intermedius is a medium size vessel with a 40%-50% ostial lesion. 3.  Circumflex 60%-70% ostial  remainder of circumflex and obtuse marginal normal in appearance. Right coronary artery is normal size vessel, short discrete 70%mid 4.  Vein graft to right coronary is occ    Coronary artery disease involving native coronary artery of native heart without angina pectoris 06/27/2017    DCIS (ductal carcinoma in situ) of breast 11/06/2012    Encounter for blood transfusion     Epigastric pain 4/1/2023    Essential hypertension 11/06/2012    GERD (gastroesophageal reflux disease)     GI bleed 02/2021    Hematemesis with nausea 5/11/2021    Hodgkin lymphoma     Hx of Hodgkin's disease - s/p chemo and radiation 11/06/2012    Hyperlipidemia     Hyperlipidemia 11/06/2012    The patient presents with hyperlipidemia.  The patient reports tolerating the medication well and is in excellent compliance.  There have been no medication side effects.  The patient denies chest pain, neuropathy, and myalgias.  The patient has reduced fat intake and has been exercising.  Current treatment has included the medications listed in the med card.   Lab Results Component Value Date  CH    Hypertension     LBBB (left bundle branch block) 05/22/2018    Osteoporosis     Paroxysmal atrial fibrillation - single post-op episode 08/24/2017    Pemphigoid     Pemphigoid     pt receives IVIG infusions    Pulmonary hypertension 1/13/2023    S/P AVR  (aortic valve replacement) - pericardial valve 08/21/2017    Perceval aortic tissue valve PVS23. 23mm    serial # [id number]    S/P CABG x 2 08/21/2017 8/17 CABG X 2 with SVG-LAD and SVG-distal RCA  SVG LAD due to absent LIMA after chest radiation and lymph node biopsy     Stented coronary artery     She had 2 stents placed in 2/2019.  She has had CAD and bypasses in the past in 2017.      Thyroid disease      Past Surgical History:   Procedure Laterality Date    ARTERIOGRAPHY OF AORTIC ROOT N/A 02/15/2019    Procedure: ARTERIOGRAM, AORTIC ROOT;  Surgeon: Sujit Chaudhry MD;  Location: [facility] CATH;  Service: Cardiology;  Laterality: N/A;    AXILLARY NODE DISSECTION Left 11/25/2022    Procedure: LYMPHADENECTOMY, AXILLARY-Left;  Surgeon: Rosa Maradiaga MD;  Location: [facility];  Service: General;  Laterality: Left;    BREAST BIOPSY      BREAST RECONSTRUCTION      bilateral mastectomy    CARDIAC CATHETERIZATION      stents x 2    CARDIAC SURGERY  08/2017    Aortic valve replacement , CABG 2 vessel    CARDIAC VALVE REPLACEMENT  08/2017    aortic valve, bovine per pt    CORONARY ANGIOGRAPHY N/A 02/15/2019    Procedure: ANGIOGRAM, CORONARY ARTERY;  Surgeon: Sujit Chaudhry MD;  Location: [facility] CATH;  Service: Cardiology;  Laterality: N/A;    CORONARY ANGIOGRAPHY N/A 4/1/2022    Procedure: ANGIOGRAM, CORONARY ARTERY;  Surgeon: Sujit Chaudhry MD;  [facility];  Service: Cardiology;  Laterality: N/A;    CORONARY BYPASS GRAFT ANGIOGRAPHY  02/15/2019    Procedure: Bypass graft study;  Surgeon: Sujit Chaudhry MD;  [facility]ATH;  Service: Cardiology;;    COSMETIC SURGERY      bereast reconstruction    ESOPHAGOGASTRODUODENOSCOPY N/A 05/14/2021    Procedure: EGD (ESOPHAGOGASTRODUODENOSCOPY);  Surgeon: Tracy Flower MD;  Location: [facility] ENDO;  Service: Endoscopy;  Laterality: N/A;    ESOPHAGOGASTRODUODENOSCOPY N/A 11/04/2021    Procedure: EGD (ESOPHAGOGASTRODUODENOSCOPY);  Surgeon: Tracy Flower MD;  Location: [facility]  ENDO;  Service: Endoscopy;  Laterality: N/A;    ESOPHAGOGASTRODUODENOSCOPY N/A 4/1/2023    Procedure: EGD (ESOPHAGOGASTRODUODENOSCOPY);  Surgeon: Tracy Flower MD;  Location: [facility] ENDO;  Service: Endoscopy;  Laterality: N/A;    EYE SURGERY      eye lids    INJECTION FOR SENTINEL NODE IDENTIFICATION Left 11/25/2022    Procedure: INJECTION, FOR SENTINEL NODE IDENTIFICATION-Left;  Surgeon: Rosa Maradiaga MD;  Location: [facility] OR;  Service: General;  Laterality: Left;    LEFT HEART CATHETERIZATION Right 02/15/2019    Procedure: Left heart cath;  Surgeon: Sujit Chaudhry MD;  Location: [facility] CATH;  Service: Cardiology;  Laterality: Right;    LEFT HEART CATHETERIZATION Left 4/1/2022    Procedure: Left heart cath;  Surgeon: Sujit Chaudhry MD;  [facility];  Service: Cardiology;  Laterality: Left;    LUMBAR LAMINECTOMY WITH DISCECTOMY N/A 02/27/2020    Procedure: LAMINECTOMY, SPINE, LUMBAR, WITH DISCECTOMY;  Surgeon: Vimal Villegas MD;  Location: [facility] OR;  Service: Neurosurgery;  Laterality: N/A;  left L5-S1  Laminectomy L4-5    LYMPHADENECTOMY      MASTECTOMY      MASTECTOMY, PARTIAL Left 11/25/2022    Procedure: MASTECTOMY, PARTIAL-Left ultrasound guided;  Surgeon: Rosa Maradiaga MD;  Location: [facility];  Service: General;  Laterality: Left;    RIGHT HEART CATHETERIZATION Right 4/1/2022    Procedure: INSERTION, CATHETER, RIGHT HEART;  Surgeon: Sujit Chaudhry MD;  [facility];  Service: Cardiology;  Laterality: Right;    SENTINEL LYMPH NODE BIOPSY Left 11/25/2022    Procedure: BIOPSY, LYMPH NODE, SENTINEL-Left;  Surgeon: Rosa Maradiaga MD;  Location: [facility] OR;  Service: General;  Laterality: Left;    SKIN BIOPSY      SPLENECTOMY, TOTAL      TRANSFORAMINAL EPIDURAL INJECTION OF STEROID Left 12/31/2019    Procedure: Left L5/S1 TF SKIP with local;  Surgeon: Canelo Niño MD;  Location: [facility] PAIN MGT;  Service: Pain Management;  Laterality: Left;    TUMOR REMOVAL      neck- lymphoma     Allergies:      Review of patient's allergies indicates:   Allergen Reactions    Amlodipine Shortness Of Breath and Other (See Comments)     unknown  Other reaction(s): Swelling  unknown  unknown  Other reaction(s): Swelling  unknown  Other reaction(s): Swelling  unknown    Levofloxacin Hives and Swelling    Sulfa (sulfonamide antibiotics) Shortness Of Breath, Itching and Other (See Comments)     elevated blood pressure    Ace inhibitors     Ciprofloxacin Itching    Iodinated contrast media     Iodine      Other reaction(s): Unknown    Latex      Other reaction(s): Itching    Latex, natural rubber Itching     Medications:     Current Outpatient Medications on File Prior to Visit   Medication Sig Dispense Refill    calcium carbonate (OS-CALVIN) 600 mg (1,500 mg) Tab Take 600 mg by mouth 2 (two) times daily with meals.      cholecalciferol, vitamin D3, (VITAMIN D3) 100 mcg (4,000 unit) Cap Take 4,000 Units by mouth once daily.      dapsone 25 MG Tab TAKE 2 TABLETS BY MOUTH EVERY DAY FOR 30 DAYS      dexAMETHasone (DECADRON) 0.5 mg/5 mL Elix SWISH 3.75MLS IN MOUTH FOR 30 SECONDS AND SWALLOW TWICE DAILY AS NEEDED FOR LICHEN PLANUS FLARE UPS      EScitalopram oxalate (LEXAPRO) 10 MG tablet Take 1 tablet (10 mg total) by mouth once daily. 90 tablet 3    levothyroxine (SYNTHROID) 75 MCG tablet Take 1 tablet (75 mcg total) by mouth before breakfast. 90 tablet 3    pantoprazole (PROTONIX) 40 MG tablet Take 1 tablet (40 mg total) by mouth 2 (two) times daily. 120 tablet 0    rosuvastatin (CRESTOR) 20 MG tablet Take 1 tablet (20 mg total) by mouth every evening. 90 tablet 3    ferrous sulfate (IRON, FERROUS SULFATE,) 325 mg (65 mg iron) Tab tablet Take 1 tablet (325 mg total) by mouth 2 (two) times daily. (Patient not taking: Reported on 5/12/2023) 60 tablet 3    furosemide (LASIX) 20 MG tablet Take 1 tablet (20 mg total) by mouth 2 (two) times a day. (Patient not taking: Reported on 5/12/2023) 60 tablet 11    metoprolol succinate  "(TOPROL-XL) 25 MG 24 hr tablet Take 1 tablet (25 mg total) by mouth once daily. 30 tablet 0     Current Facility-Administered Medications on File Prior to Visit   Medication Dose Route Frequency Provider Last Rate Last Admin    0.9%  NaCl infusion   Intravenous Continuous Ender Morrison MD         Social History:     Social History     Tobacco Use    Smoking status: Former     Packs/day: 1.00     Years: 20.00     Pack years: 20.00     Types: Cigarettes     Quit date: 1981     Years since quittin.5    Smokeless tobacco: Never   Substance Use Topics    Alcohol use: No     Comment: 2 drinks/month; none 72 hrs prior to surgery     Family History:     Family History   Problem Relation Age of Onset    Hypertension Mother     Hypertension Father     Cancer Neg Hx      Physical Exam:   /70   Pulse 89   Ht 5' 3" (1.6 m)   Wt 61.6 kg (135 lb 12.9 oz)   SpO2 (!) 90%   BMI 24.06 kg/m²      Physical Exam  Constitutional:       Appearance: She is well-developed.      Comments:      HENT:      Head: Normocephalic and atraumatic.   Eyes:      General: No scleral icterus.     Conjunctiva/sclera: Conjunctivae normal.      Pupils: Pupils are equal, round, and reactive to light.   Neck:      Thyroid: No thyromegaly.      Vascular: No hepatojugular reflux or JVD.      Trachea: No tracheal deviation.   Cardiovascular:      Rate and Rhythm: Normal rate and regular rhythm.      Chest Wall: PMI is not displaced.      Pulses: Intact distal pulses.           Carotid pulses are 2+ on the right side with bruit and 2+ on the left side with bruit.       Radial pulses are 2+ on the right side and 2+ on the left side.        Dorsalis pedis pulses are 2+ on the right side and 2+ on the left side.        Posterior tibial pulses are 2+ on the right side and 2+ on the left side.      Heart sounds: Murmur heard.   Harsh midsystolic murmur is present with a grade of 2/6 at the upper right sternal border radiating to the " neck.   Pulmonary:      Effort: Pulmonary effort is normal.      Breath sounds: Examination of the left-upper field reveals wheezing. Wheezing present.   Abdominal:      General: Bowel sounds are normal. There is no distension.      Palpations: Abdomen is soft. There is no mass.      Tenderness: There is no abdominal tenderness.   Musculoskeletal:         General: No tenderness.      Cervical back: Normal range of motion and neck supple.   Lymphadenopathy:      Cervical: No cervical adenopathy.   Skin:     General: Skin is warm and dry.      Findings: No erythema or rash.      Nails: There is no clubbing.   Neurological:      Mental Status: She is alert and oriented to person, place, and time.   Psychiatric:         Speech: Speech normal.         Behavior: Behavior normal.        Labs:     Blood Tests:  Lab Results   Component Value Date    BNP 1,077 (H) 04/19/2023     04/26/2023    K 3.4 (L) 04/26/2023     04/26/2023    CO2 27 04/26/2023    BUN 14 04/26/2023    CREATININE 0.8 04/26/2023    GLU 99 04/26/2023    HGBA1C 5.8 (H) 02/10/2019    MG 2.2 02/18/2021    AST 25 04/19/2023    ALT 18 04/19/2023    ALBUMIN 3.7 04/19/2023    PROT 7.2 04/19/2023    BILITOT 0.8 04/19/2023    WBC 8.02 04/19/2023    HGB 8.9 (L) 04/19/2023    HCT 28.7 (L) 04/19/2023    HCT 24 (L) 08/21/2017    MCV 91 04/19/2023     04/19/2023    INR 1.1 03/31/2023    TSH 1.328 01/06/2023       Lab Results   Component Value Date    CHOL 112 (L) 02/24/2023    HDL 39 (L) 02/24/2023    TRIG 71 02/24/2023       Lab Results   Component Value Date    LDLCALC 58.8 (L) 02/24/2023         Imaging:     ECG 22-NOV-2022 16:31:45: NSR with LBBB QRS duration 180 ms     Echo 4/23:  Summary     The left ventricle is normal in size with concentric hypertrophy and mildly decreased systolic function.  Mild left atrial enlargement.  There are bilateral pleural effusions.  There is a bioprosthetic aortic valve present. There is no aortic insufficiency  present. Prosthetic aortic valve is normal.  The aortic valve mean gradient is 15 mmHg with a dimensionless index of 0.67.  There is abnormal septal wall motion consistent with post-operative status.  Mild tricuspid regurgitation.  The estimated ejection fraction is 40%.  Grade II left ventricular diastolic dysfunction.  Normal right ventricular size with low normal right ventricular systolic function.  Mild right atrial enlargement.     Echo 11/22:  Summary     The left ventricle is mildly enlarged with concentric hypertrophy and moderately decreased systolic function.  Left ventricular diastolic dysfunction.  The estimated PA systolic pressure is 57 mmHg.  Normal right ventricular size with low normal right ventricular systolic function.  There is pulmonary hypertension.  Normal central venous pressure (3 mmHg).  The estimated ejection fraction is 35%.  There is moderate left ventricular global hypokinesis.  Mild tricuspid regurgitation.  There is a bioprosthetic aortic valve present.  The aortic valve mean gradient is 13 mmHg with a dimensionless index of 0.28.     [facility] 4/22:  Angiographic findings:  Left main coronary-medium size vessel mild disease less than 15%.  Left anterior descending-chronic total occlusion its proximal portion.  Ramus intermedius-medium size vessel diffuse mild calcific disease less than 20%.  Circumflex-small to medium size vessel gives rise to 1 obtuse marginal.  Normal appearance patient's age.  Right coronary-medium size dominant vessel diffuse disease proximal mid up to 45% short discrete lesions in its midportion.  Vein graft to the LAD-patent with normal appearing native LAD mid to distal.  Could not find vein graft to right coronary.     Hemodynamics:  Right ventricular pressure  69/11/20 mmHg   pulmonary artery pressure 65/33/46 mmHg  Pulmonary catheter wedge pressure 34/30 1/30 mmHg  Right atrial pressure 17/31.    Cardiac output/cardiac index 4.2/2.59     Carotid US  3/22:  Conclusion     There is 60-69% right Internal Carotid Stenosis.  There is 80-99% left Internal Carotid Stenosis.  Mild to moderate heterogeneous calcified plaque bilaterally.  Normal antegrade vertebral flow bilaterally.    Assessment:     1. Chronic combined systolic and diastolic heart failure    2. Coronary artery disease involving coronary bypass graft of native heart without angina pectoris    3. S/P CABG x 2    4. Carotid stenosis, bilateral    5. UGIB (upper gastrointestinal bleed)    6. S/P AVR (aortic valve replacement) - pericardial valve    7. Malignant neoplasm of central portion of left breast in female, estrogen receptor negative    8. Essential hypertension    9. Mixed hyperlipidemia        Plan:     Chronic combined systolic and diastolic heart failure  -     sacubitriL-valsartan (ENTRESTO) 24-26 mg per tablet; Take 1 tablet by mouth 2 (two) times daily.  Dispense: 60 tablet; Refill: 11  -     Cancel: Basic metabolic panel; Future; Expected date: 05/19/2023  -     Comprehensive Metabolic Panel; Future; Expected date: 05/17/2023  Restart low-dose Entresto, and continue to monitor blood pressure at home.  We discussed that when taking Entresto it is unlikely that she will need an additional dose of furosemide 20 mg.  For now she should continue to take once daily.  Recommend that she continue weighing daily and document.  Continue to avoid dietary sodium.  Recommend echocardiogram be repeated in early August    Coronary artery disease involving coronary bypass graft of native heart without angina pectoris  S/P CABG x 2  She is asymptomatic.  She remains on rosuvastatin.  Her aspirin and Plavix were stopped during her recent hospitalization for GI bleeding.  Once cleared by GI, we discussed continuing Plavix only.    Carotid stenosis, bilateral  She has significant carotid artery disease. She is asymptomatic on medical therapy.  Vascular surgery recommended ongoing medical treatment.    UGIB  (upper gastrointestinal bleed)  -     Cancel: CBC Without Differential; Future; Expected date: 05/19/2023  -     CBC Auto Differential; Future; Expected date: 05/17/2023  Does not have schedule for repeat EGD yet, but she is in communication with the gastroenterology office.  Initial EGD report recommends repeat in early June.  I have sent a message to her oncologist office requesting that they contact the patient regarding possible iron infusion.    S/P AVR (aortic valve replacement) - pericardial valve  Valve functioning normally on recent echo. SBE prophylaxis is recommended prior to dental procedures.    Malignant neoplasm of central portion of left breast in female, estrogen receptor negative  She follows with Dr. Sepulveda.  Her treatment has been on hold due to her cardiomyopathy and comorbidities.  Her last ejection fraction did improve to 40% with guideline directed medical therapy.    Essential hypertension  Blood pressure is at goal.  Okay to restart Entresto.    Mixed hyperlipidemia  Lipids are at goal. Continue statin therapy.        Signed:  Mey Miller PA-C  Cardiology     5/12/2023 8:58 AM    Follow-up:     Future Appointments   Date Time Provider Department Center   5[facility]19[facility]2023 11:10 [facility] [place]   6/14/2023  2:00 PM Mey Miller PA-C [facility] CARDIO [facility]   6/27/2023  3:15 PM Rosa Maradiaga MD [facility] BRSUR [facility]   11/6/2023 11:45 [facility] [place]   11/6/2023 12:00 PM [facility] DEXA1 DH BMD [place]   11/13/2023  1:30 PM Caroline Howard PA-C [facility]   11/13/2023  2:45 PM INJECTION 1, [facility] INFUSION [facility]

## 2023-05-12 NOTE — TELEPHONE ENCOUNTER
----- Message from Mey Mliler PA-C sent at 5/12/2023  1:43 PM CDT -----  I am seeing Mrs. Root in clinic today regarding her recent episode of UGIB and her persistent blood loss anemia. She requested that I reach out to your office due to the fact that she has been trying to get in touch to discuss if an iron infusion can be orchestrated. Please contact the patient when possible.     Thank you,   Mey Miller PA-C

## 2023-05-16 ENCOUNTER — PATIENT MESSAGE (OUTPATIENT)
Dept: CARDIOLOGY | Facility: CLINIC | Age: 68
End: 2023-05-16
Payer: MEDICARE

## 2023-05-25 ENCOUNTER — INFUSION (OUTPATIENT)
Dept: INFUSION THERAPY | Facility: HOSPITAL | Age: 68
End: 2023-05-25
Payer: MEDICARE

## 2023-05-25 VITALS
DIASTOLIC BLOOD PRESSURE: 61 MMHG | TEMPERATURE: 98 F | SYSTOLIC BLOOD PRESSURE: 98 MMHG | RESPIRATION RATE: 18 BRPM | HEART RATE: 86 BPM | OXYGEN SATURATION: 94 %

## 2023-05-25 DIAGNOSIS — M80.00XD AGE-RELATED OSTEOPOROSIS WITH CURRENT PATHOLOGICAL FRACTURE WITH ROUTINE HEALING: Primary | ICD-10-CM

## 2023-05-25 PROCEDURE — 96372 THER/PROPH/DIAG INJ SC/IM: CPT

## 2023-05-25 PROCEDURE — 63600175 PHARM REV CODE 636 W HCPCS: Mod: JZ,JG

## 2023-05-25 RX ADMIN — DENOSUMAB 60 MG: 60 INJECTION SUBCUTANEOUS at 01:05

## 2023-05-25 NOTE — NURSING
Message sent to rheumatology staff to make aware appt scheduled for Caroline and prolia injection on 11/20/23 will be too soon for the next injection.

## 2023-05-25 NOTE — DISCHARGE INSTRUCTIONS
[facility]  [address] [address]  [address]  [phone] phone     [phone] fax  Hours of Operation: Monday- Friday 8:00am- 5:00pm  After hours phone  [phone]  Hematology / Oncology Physicians on call      ERVIN Nair Dr., Dr., NP Sydney Prescott, SHELDON Munoz FNP    Please call with any concerns regarding your appointment today.

## 2023-05-25 NOTE — NURSING
Prolia 60 mg q 6 months  Last dose given-10/27/22    Any invasive dental procedures in past 3 months or upcoming 3 months: yes. Patient has been cleared my Caroline Howard PA-C to receive injection today    Last Rheumatology provider visit- Seen by Caroline on 4/28/23    Recent labs? 5/25/23   Lab Results   Component Value Date    CALCIUM 9.8 05/25/2023     Lab Results   Component Value Date    CREATININE 0.8 05/25/2023     Lab Results   Component Value Date    ESTGFRAFRICA >60 04/01/2022     Lab Results   Component Value Date    EGFRNONAA >60 04/01/2022     Lab Results   Component Value Date    [id number] 35 02/18/2021           Prolia 60 mg/ml administered SQ to Left arm. Tolerated without any complaints. Patient chose to wait in lobby for 15 minutes.

## 2023-05-25 NOTE — NURSING
..Remember to take your calcium with vitamin D supplement as directed.   It is not advisable to undergo invasive dental procedure, within 3 months of your injection, unless approved by your treating provider.

## 2023-05-30 ENCOUNTER — PATIENT MESSAGE (OUTPATIENT)
Dept: GASTROENTEROLOGY | Facility: CLINIC | Age: 68
End: 2023-05-30
Payer: MEDICARE

## 2023-05-31 ENCOUNTER — TELEPHONE (OUTPATIENT)
Dept: ENDOSCOPY | Facility: HOSPITAL | Age: 68
End: 2023-05-31
Payer: MEDICARE

## 2023-05-31 ENCOUNTER — PATIENT MESSAGE (OUTPATIENT)
Dept: ENDOSCOPY | Facility: HOSPITAL | Age: 68
End: 2023-05-31
Payer: MEDICARE

## 2023-05-31 NOTE — TELEPHONE ENCOUNTER
Pt is scheduled for EGD on 6/7/23.  Is pt clear from cardiology standpoint to undergo procedure?  Please advise.  Thanks

## 2023-05-31 NOTE — TELEPHONE ENCOUNTER
Pt is scheduled for EGD on 6/7/23.  Reviewed medical history and instructions with pt.  Pt verbalized understanding.  Instructions sent to portal.

## 2023-06-08 ENCOUNTER — HOSPITAL ENCOUNTER (OUTPATIENT)
Facility: HOSPITAL | Age: 68
Discharge: HOME | End: 2023-06-08
Attending: INTERNAL MEDICINE | Admitting: INTERNAL MEDICINE
Payer: MEDICARE

## 2023-06-08 ENCOUNTER — ANESTHESIA (OUTPATIENT)
Dept: ENDOSCOPY | Facility: HOSPITAL | Age: 68
End: 2023-06-08
Payer: MEDICARE

## 2023-06-08 ENCOUNTER — ANESTHESIA EVENT (OUTPATIENT)
Dept: ENDOSCOPY | Facility: HOSPITAL | Age: 68
End: 2023-06-08
Payer: MEDICARE

## 2023-06-08 VITALS
OXYGEN SATURATION: 98 % | DIASTOLIC BLOOD PRESSURE: 57 MMHG | HEART RATE: 83 BPM | HEIGHT: 63 IN | TEMPERATURE: 97 F | RESPIRATION RATE: 20 BRPM | SYSTOLIC BLOOD PRESSURE: 112 MMHG | WEIGHT: 132 LBS | BODY MASS INDEX: 23.39 KG/M2

## 2023-06-08 DIAGNOSIS — K26.0 ACUTE DUODENAL ULCER WITH HEMORRHAGE AND OBSTRUCTION: Primary | ICD-10-CM

## 2023-06-08 DIAGNOSIS — K26.9 DUODENAL ULCER: ICD-10-CM

## 2023-06-08 PROCEDURE — 37000009 HC ANESTHESIA EA ADD 15 MINS: Performed by: INTERNAL MEDICINE

## 2023-06-08 PROCEDURE — 43245 EGD DILATE STRICTURE: CPT | Performed by: INTERNAL MEDICINE

## 2023-06-08 PROCEDURE — D9220A PRA ANESTHESIA: Mod: CRNA,,, | Performed by: NURSE ANESTHETIST, CERTIFIED REGISTERED

## 2023-06-08 PROCEDURE — 63600175 PHARM REV CODE 636 W HCPCS: Performed by: NURSE ANESTHETIST, CERTIFIED REGISTERED

## 2023-06-08 PROCEDURE — D9220A PRA ANESTHESIA: ICD-10-PCS | Mod: CRNA,,, | Performed by: NURSE ANESTHETIST, CERTIFIED REGISTERED

## 2023-06-08 PROCEDURE — 25000003 PHARM REV CODE 250: Performed by: NURSE ANESTHETIST, CERTIFIED REGISTERED

## 2023-06-08 PROCEDURE — C1726 CATH, BAL DIL, NON-VASCULAR: HCPCS | Performed by: INTERNAL MEDICINE

## 2023-06-08 PROCEDURE — 43245 EGD DILATE STRICTURE: CPT | Mod: ,,, | Performed by: INTERNAL MEDICINE

## 2023-06-08 PROCEDURE — D9220A PRA ANESTHESIA: Mod: ANES,,, | Performed by: ANESTHESIOLOGY

## 2023-06-08 PROCEDURE — 43245 PR EGD, FLEX, W/DILATION, GASTR/DUOD STRICTURE: ICD-10-PCS | Mod: ,,, | Performed by: INTERNAL MEDICINE

## 2023-06-08 PROCEDURE — 37000008 HC ANESTHESIA 1ST 15 MINUTES: Performed by: INTERNAL MEDICINE

## 2023-06-08 PROCEDURE — D9220A PRA ANESTHESIA: ICD-10-PCS | Mod: ANES,,, | Performed by: ANESTHESIOLOGY

## 2023-06-08 PROCEDURE — 25000003 PHARM REV CODE 250: Performed by: INTERNAL MEDICINE

## 2023-06-08 RX ORDER — PROPOFOL 10 MG/ML
VIAL (ML) INTRAVENOUS CONTINUOUS PRN
Status: DISCONTINUED | OUTPATIENT
Start: 2023-06-08 | End: 2023-06-08

## 2023-06-08 RX ORDER — LIDOCAINE HYDROCHLORIDE 20 MG/ML
INJECTION INTRAVENOUS
Status: DISCONTINUED | OUTPATIENT
Start: 2023-06-08 | End: 2023-06-08

## 2023-06-08 RX ORDER — PANTOPRAZOLE SODIUM 40 MG/1
40 TABLET, DELAYED RELEASE ORAL DAILY
Qty: 90 TABLET | Refills: 0 | Status: SHIPPED | OUTPATIENT
Start: 2023-06-08 | End: 2023-09-26 | Stop reason: SDUPTHER

## 2023-06-08 RX ORDER — SODIUM CHLORIDE 9 MG/ML
INJECTION, SOLUTION INTRAVENOUS CONTINUOUS
Status: DISCONTINUED | OUTPATIENT
Start: 2023-06-08 | End: 2023-06-08 | Stop reason: HOSPADM

## 2023-06-08 RX ORDER — PROPOFOL 10 MG/ML
VIAL (ML) INTRAVENOUS
Status: DISCONTINUED | OUTPATIENT
Start: 2023-06-08 | End: 2023-06-08

## 2023-06-08 RX ORDER — ETOMIDATE 2 MG/ML
INJECTION INTRAVENOUS
Status: DISCONTINUED | OUTPATIENT
Start: 2023-06-08 | End: 2023-06-08

## 2023-06-08 RX ADMIN — PROPOFOL 20 MG: 10 INJECTION, EMULSION INTRAVENOUS at 03:06

## 2023-06-08 RX ADMIN — LIDOCAINE HYDROCHLORIDE 80 MG: 20 INJECTION INTRAVENOUS at 02:06

## 2023-06-08 RX ADMIN — ETOMIDATE 6 MG: 2 INJECTION, SOLUTION INTRAVENOUS at 02:06

## 2023-06-08 RX ADMIN — ETOMIDATE 2 MG: 2 INJECTION, SOLUTION INTRAVENOUS at 03:06

## 2023-06-08 RX ADMIN — SODIUM CHLORIDE: 0.9 INJECTION, SOLUTION INTRAVENOUS at 02:06

## 2023-06-08 RX ADMIN — PROPOFOL 50 MCG/KG/MIN: 10 INJECTION, EMULSION INTRAVENOUS at 02:06

## 2023-06-08 RX ADMIN — PROPOFOL 20 MG: 10 INJECTION, EMULSION INTRAVENOUS at 02:06

## 2023-06-08 NOTE — ANESTHESIA PREPROCEDURE EVALUATION
06/08/2023  Dulce Root is a 68 y.o., female.  Patient Active Problem List   Diagnosis    Hx of Hodgkin's disease - s/p chemo and radiation    Essential hypertension    Hyperlipidemia    Hypothyroid    Pemphigoid    S/P AVR (aortic valve replacement) - pericardial valve    S/P CABG x 2    Carotid stenosis, bilateral    Near syncope    LBBB (left bundle branch block)    Elevated brain natriuretic peptide (BNP) level    Stented coronary artery    Coronary artery disease involving coronary bypass graft of native heart without angina pectoris    Lumbar radiculopathy    DDD (degenerative disc disease), lumbar    Anxiety    Absence of both breasts    Age-related osteoporosis with current pathological fracture with routine healing    Chronic combined systolic and diastolic heart failure    Duodenal ulcer with hemorrhage and obstruction    Duodenal stricture    History of ductal carcinoma in situ (DCIS) of breast    Malignant neoplasm of central portion of left breast in female, estrogen receptor negative    Palpitations    Pulmonary hypertension    Chronic idiopathic constipation    GERD (gastroesophageal reflux disease)    Acute duodenal ulcer with hemorrhage and obstruction    UGIB (upper gastrointestinal bleed)    Atherosclerosis of aorta           Pre-op Assessment          Review of Systems      Physical Exam    Airway:  No airway management difficulties anticipated  Dental:No active dental issues noted  Chest/Lungs:  Clear to auscultation    Heart:  Rate: Tachycardia  Rhythm: Regular Rhythm  Sounds: Normal        Anesthesia Plan  Type of Anesthesia, risks & benefits discussed:    Anesthesia Type: Gen Natural Airway  Informed Consent: Informed consent signed with the Patient and all parties understand the risks and agree with anesthesia plan.  All questions answered.   ASA Score:  3  Anesthesia Plan Notes: Chart reviewed. Patient seen and examined. Anesthesia plan discussed and questions answered. E-consent signed. Steve Andrew MD    Ready For Surgery From Anesthesia Perspective.     .

## 2023-06-08 NOTE — TRANSFER OF CARE
"Anesthesia Transfer of Care Note    Patient: Dulce Root    Procedure(s) Performed: Procedure(s) (LRB):  EGD (ESOPHAGOGASTRODUODENOSCOPY) (N/A)    Patient location: [facility]    Anesthesia Type: general    Transport from OR: Transported from OR on 6-10 L/min O2 by face mask with adequate spontaneous ventilation    Post pain: adequate analgesia    Post assessment: no apparent anesthetic complications and tolerated procedure well    Post vital signs: stable    Level of consciousness: awake and responds to stimulation    Nausea/Vomiting: no nausea/vomiting    Complications: none    Transfer of care protocol was followed      Last vitals:   Visit Vitals  /62   Pulse 103   Temp 36.2 °C (97.2 °F) (Temporal)   Resp 18   Ht 5' 3" (1.6 m)   Wt 59.9 kg (132 lb)   SpO2 97%   Breastfeeding No   BMI 23.38 kg/m²     "

## 2023-06-08 NOTE — H&P
Short Stay Endoscopy History and Physical    PCP - Patrick Hernandez MD  Referring Physician - Tracy Flower MD  [address] [place] [address]    Procedure - EGD  ASA - per anesthesia  Mallampati - per anesthesia  History of Anesthesia problems - no  Family history Anesthesia problems -  no   Plan of anesthesia - General    HPI:  This is a 68 y.o. female here for evaluation of: follow-up duodenal ulcer    Reflux - no  Dysphagia - no  Abdominal pain - no  Diarrhea - no    ROS:  Constitutional: No fevers, chills, No weight loss  CV: No chest pain  Pulm: No cough, No shortness of breath  GI: see HPI    Medical History:  has a past medical history of Allergy, Anticoagulant long-term use, Breast cancer (2008), Carotid stenosis, bilateral (10/13/2017), Coronary artery disease, Coronary artery disease involving coronary bypass graft of native heart without angina pectoris (08/16/2019), Coronary artery disease involving native coronary artery of native heart without angina pectoris (06/27/2017), DCIS (ductal carcinoma in situ) of breast (11/06/2012), Encounter for blood transfusion, Epigastric pain (4/1/2023), Essential hypertension (11/06/2012), GERD (gastroesophageal reflux disease), GI bleed (02/2021), Hematemesis with nausea (5/11/2021), Hodgkin lymphoma, Hx of Hodgkin's disease - s/p chemo and radiation (11/06/2012), Hyperlipidemia, Hyperlipidemia (11/06/2012), Hypertension, LBBB (left bundle branch block) (05/22/2018), Osteoporosis, Paroxysmal atrial fibrillation - single post-op episode (08/24/2017), Pemphigoid, Pemphigoid, Pulmonary hypertension (1/13/2023), S/P AVR (aortic valve replacement) - pericardial valve (08/21/2017), S/P CABG x 2 (08/21/2017), Stented coronary artery, and Thyroid disease.    Surgical History:  has a past surgical history that includes Breast biopsy; Mastectomy; Splenectomy, total; Tumor removal; Lymphadenectomy; Breast reconstruction; Cosmetic surgery; Eye  surgery; Skin biopsy; Left heart catheterization (Right, 02/15/2019); Coronary angiography (N/A, 02/15/2019); Coronary bypass graft angiography (02/15/2019); Arteriography of aortic root (N/A, 02/15/2019); Cardiac surgery (08/2017); Cardiac catheterization; Transforaminal epidural injection of steroid (Left, 12/31/2019); Lumbar laminectomy with discectomy (N/A, 02/27/2020); Esophagogastroduodenoscopy (N/A, 05/14/2021); Esophagogastroduodenoscopy (N/A, 11/04/2021); Cardiac valve replacement (08/2017); Coronary angiography (N/A, 4/1/2022); Left heart catheterization (Left, 4/1/2022); Right heart catheterization (Right, 4/1/2022); Mastectomy, partial (Left, 11/25/2022); [place] lymph node biopsy (Left, 11/25/2022); Injection for sentinel node identification (Left, 11/25/2022); Axillary node dissection (Left, 11/25/2022); and Esophagogastroduodenoscopy (N/A, 4/1/2023).    Family History: family history includes Hypertension in her father and mother..    Social History:  reports that she quit smoking about 41 years ago. Her smoking use included cigarettes. She has a 20.00 pack-year smoking history. She has never used smokeless tobacco. She reports that she does not drink alcohol and does not use drugs.    Review of patient's allergies indicates:   Allergen Reactions    Amlodipine Shortness Of Breath and Other (See Comments)     unknown  Other reaction(s): Swelling  unknown  unknown  Other reaction(s): Swelling  unknown  Other reaction(s): Swelling  unknown    Levofloxacin Hives and Swelling    Sulfa (sulfonamide antibiotics) Shortness Of Breath, Itching and Other (See Comments)     elevated blood pressure    Ace inhibitors     Ciprofloxacin Itching    Iodinated contrast media     Iodine      Other reaction(s): Unknown    Latex      Other reaction(s): Itching    Latex, natural rubber Itching       Medications:   Medications Prior to Admission   Medication Sig Dispense Refill Last Dose    calcium carbonate (OS-CALVIN) 600 mg  (1,500 mg) Tab Take 600 mg by mouth 2 (two) times daily with meals.       cholecalciferol, vitamin D3, (VITAMIN D3) 100 mcg (4,000 unit) Cap Take 4,000 Units by mouth once daily.       dapsone 25 MG Tab TAKE 2 TABLETS BY MOUTH EVERY DAY FOR 30 DAYS       dexAMETHasone (DECADRON) 0.5 mg/5 mL Elix SWISH 3.75MLS IN MOUTH FOR 30 SECONDS AND SWALLOW TWICE DAILY AS NEEDED FOR LICHEN PLANUS FLARE UPS       EScitalopram oxalate (LEXAPRO) 10 MG tablet Take 1 tablet (10 mg total) by mouth once daily. 90 tablet 3     ferrous sulfate (IRON, FERROUS SULFATE,) 325 mg (65 mg iron) Tab tablet Take 1 tablet (325 mg total) by mouth 2 (two) times daily. (Patient not taking: Reported on 5/12/2023) 60 tablet 3     furosemide (LASIX) 20 MG tablet Take 1 tablet (20 mg total) by mouth 2 (two) times a day. (Patient not taking: Reported on 5/12/2023) 60 tablet 11     levothyroxine (SYNTHROID) 75 MCG tablet Take 1 tablet (75 mcg total) by mouth before breakfast. 90 tablet 3     metoprolol succinate (TOPROL-XL) 25 MG 24 hr tablet Take 1 tablet (25 mg total) by mouth once daily. 30 tablet 0     pantoprazole (PROTONIX) 40 MG tablet Take 1 tablet (40 mg total) by mouth 2 (two) times daily. 120 tablet 0     rosuvastatin (CRESTOR) 20 MG tablet Take 1 tablet (20 mg total) by mouth every evening. 90 tablet 3     sacubitriL-valsartan (ENTRESTO) 24-26 mg per tablet Take 1 tablet by mouth 2 (two) times daily. 60 tablet 11        Physical Exam:    Vital Signs: There were no vitals filed for this visit.    General Appearance: Well appearing in no acute distress  Lungs: no labored breathing  CVS:  regular rate  Abdomen: non tender    Labs:  Lab Results   Component Value Date    WBC 8.02 04/19/2023    HGB 8.9 (L) 04/19/2023    HCT 28.7 (L) 04/19/2023     04/19/2023    CHOL 112 (L) 02/24/2023    TRIG 71 02/24/2023    HDL 39 (L) 02/24/2023    ALT 9 (L) 05/25/2023    AST 18 05/25/2023     05/25/2023    K 3.4 (L) 05/25/2023     05/25/2023     CREATININE 0.8 05/25/2023    BUN 13 05/25/2023    CO2 32 (H) 05/25/2023    TSH 1.328 01/06/2023    INR 1.1 03/31/2023    HGBA1C 5.8 (H) 02/10/2019       I have explained the risks and benefits of this endoscopic procedure to the patient including but not limited to bleeding, inflammation, infection, perforation, and death.      Mk Sanchez MD

## 2023-06-08 NOTE — ANESTHESIA POSTPROCEDURE EVALUATION
Anesthesia Post Evaluation    Patient: Dulce Root    Procedure(s) Performed: Procedure(s) (LRB):  EGD (ESOPHAGOGASTRODUODENOSCOPY) (N/A)    Final Anesthesia Type: general      Level of consciousness: awake and alert  Post-procedure vital signs: reviewed and stable  Pain control: Pain has been treated.  Airway patency: patent    PONV status: Absent or treated.  Anesthetic complications: no      Cardiovascular status: hemodynamically stable  Respiratory status: unassisted  Hydration status: euvolemic            Vitals Value Taken Time   /51 06/08/23 1631     06/08/23 1739   Pulse 85 06/08/23 1644   Resp 50 06/08/23 1644   SpO2 92 % 06/08/23 1635   Vitals shown include unvalidated device data.      No case tracking events are documented in the log.      Pain/Pawan Score: Pawan Score: 10 (6/8/2023  3:32 PM)

## 2023-06-08 NOTE — PROVATION PATIENT INSTRUCTIONS
Discharge Summary/Instructions after an Endoscopic Procedure  Patient Name: Dulce Root  Patient MRN: [id number]  Patient YOB: 1955 Thursday, June 8, 2023  Mk Sanchez MD  Dear patient,  As a result of recent federal legislation (The Federal Cures Act), you may   receive lab or pathology results from your procedure in your [facility]   account before your physician is able to contact you. Your physician or   their representative will relay the results to you with their   recommendations at their soonest availability.  Thank you,  RESTRICTIONS:  During your procedure today, you received medications for sedation.  These   medications may affect your judgment, balance and coordination.  Therefore,   for 24 hours, you have the following restrictions:   - DO NOT drive a car, operate machinery, make legal/financial decisions,   sign important papers or drink alcohol.    ACTIVITY:  Today: no heavy lifting, straining or running due to procedural   sedation/anesthesia.  The following day: return to full activity including work.  DIET:  Eat and drink normally unless instructed otherwise.     TREATMENT FOR COMMON SIDE EFFECTS:  - Mild abdominal pain, nausea, belching, bloating or excessive gas:  rest,   eat lightly and use a heating pad.  - Sore Throat: treat with throat lozenges and/or gargle with warm salt   water.  - Because air was used during the procedure, expelling large amounts of air   from your rectum or belching is normal.  - If a bowel prep was taken, you may not have a bowel movement for 1-3 days.    This is normal.  SYMPTOMS TO WATCH FOR AND REPORT TO YOUR PHYSICIAN:  1. Abdominal pain or bloating, other than gas cramps.  2. Chest pain.  3. Back pain.  4. Signs of infection such as: chills or fever occurring within 24 hours   after the procedure.  5. Rectal bleeding, which would show as bright red, maroon, or black stools.   (A tablespoon of blood from the rectum is not serious, especially if    hemorrhoids are present.)  6. Vomiting.  7. Weakness or dizziness.  GO DIRECTLY TO THE NEAREST EMERGENCY ROOM IF YOU HAVE ANY OF THE FOLLOWING:      Difficulty breathing              Chills and/or fever over 101 F   Persistent vomiting and/or vomiting blood   Severe abdominal pain   Severe chest pain   Black, tarry stools   Bleeding- more than one tablespoon   Any other symptom or condition that you feel may need urgent attention  Your doctor recommends these additional instructions:  If any biopsies were taken, your doctors clinic will contact you in 1 to 2   weeks with any results.  - Discharge patient to home (ambulatory).   - Resume previous diet; Discharge to home (ambulatory); Resume outpatient   medications  - Return to referring physician (GI clinic in [place]) as previously   scheduled.   - If daily ASA/NSAIDs is to be continued-> recommend daily PPI (I have sent   in a 90 day prescription - additional refills can be handled by [facility] team).  For questions, problems or results please call your physician - Mk Sanchez MD at Work:  [phone].  [facility] [place], EMERGENCY ROOM PHONE NUMBER: [phone]  IF A COMPLICATION OR EMERGENCY SITUATION ARISES AND YOU ARE UNABLE TO REACH   YOUR PHYSICIAN - GO DIRECTLY TO THE EMERGENCY ROOM.  Mk Sanchez MD  6/8/2023 3:33:04 PM  This report has been verified and signed electronically.  Dear patient,  As a result of recent federal legislation (The Federal Cures Act), you may   receive lab or pathology results from your procedure in your [facility]   account before your physician is able to contact you. Your physician or   their representative will relay the results to you with their   recommendations at their soonest availability.  Thank you,  PROVATION

## 2023-06-12 ENCOUNTER — PATIENT MESSAGE (OUTPATIENT)
Dept: CARDIOLOGY | Facility: CLINIC | Age: 68
End: 2023-06-12
Payer: MEDICARE

## 2023-06-14 ENCOUNTER — OFFICE VISIT (OUTPATIENT)
Dept: CARDIOLOGY | Facility: CLINIC | Age: 68
End: 2023-06-14
Payer: MEDICARE

## 2023-06-14 VITALS
HEIGHT: 63 IN | DIASTOLIC BLOOD PRESSURE: 77 MMHG | WEIGHT: 133 LBS | SYSTOLIC BLOOD PRESSURE: 132 MMHG | HEART RATE: 94 BPM | BODY MASS INDEX: 23.57 KG/M2

## 2023-06-14 DIAGNOSIS — I10 ESSENTIAL HYPERTENSION: ICD-10-CM

## 2023-06-14 DIAGNOSIS — Z17.1 MALIGNANT NEOPLASM OF CENTRAL PORTION OF LEFT BREAST IN FEMALE, ESTROGEN RECEPTOR NEGATIVE: ICD-10-CM

## 2023-06-14 DIAGNOSIS — C50.112 MALIGNANT NEOPLASM OF CENTRAL PORTION OF LEFT BREAST IN FEMALE, ESTROGEN RECEPTOR NEGATIVE: ICD-10-CM

## 2023-06-14 DIAGNOSIS — Z95.1 S/P CABG X 2: ICD-10-CM

## 2023-06-14 DIAGNOSIS — Z95.2 S/P AVR (AORTIC VALVE REPLACEMENT): ICD-10-CM

## 2023-06-14 DIAGNOSIS — E78.2 MIXED HYPERLIPIDEMIA: ICD-10-CM

## 2023-06-14 DIAGNOSIS — I25.810 CORONARY ARTERY DISEASE INVOLVING CORONARY BYPASS GRAFT OF NATIVE HEART WITHOUT ANGINA PECTORIS: ICD-10-CM

## 2023-06-14 DIAGNOSIS — K92.2 UGIB (UPPER GASTROINTESTINAL BLEED): ICD-10-CM

## 2023-06-14 DIAGNOSIS — I65.23 CAROTID STENOSIS, BILATERAL: ICD-10-CM

## 2023-06-14 DIAGNOSIS — I50.42 CHRONIC COMBINED SYSTOLIC AND DIASTOLIC HEART FAILURE: Primary | ICD-10-CM

## 2023-06-14 PROCEDURE — 99214 OFFICE O/P EST MOD 30 MIN: CPT | Mod: 95,,, | Performed by: PHYSICIAN ASSISTANT

## 2023-06-14 PROCEDURE — 99214 PR OFFICE/OUTPT VISIT, EST, LEVL IV, 30-39 MIN: ICD-10-PCS | Mod: 95,,, | Performed by: PHYSICIAN ASSISTANT

## 2023-06-14 RX ORDER — CLOPIDOGREL BISULFATE 75 MG/1
75 TABLET ORAL DAILY
Qty: 90 TABLET | Refills: 3 | Status: SHIPPED | OUTPATIENT
Start: 2023-06-14 | End: 2023-11-28

## 2023-06-14 RX ORDER — LOSARTAN POTASSIUM 25 MG/1
25 TABLET ORAL DAILY
COMMUNITY
End: 2023-10-06 | Stop reason: SDUPTHER

## 2023-06-14 NOTE — PROGRESS NOTES
The patient location is: [place]  The chief complaint leading to consultation is: HFrEF, CAD    Visit type: audiovisual    Face to Face time with patient: 30 minutes of total time spent on the encounter, which includes face to face time and non-face to face time preparing to see the patient (eg, review of tests), Obtaining and/or reviewing separately obtained history, Documenting clinical information in the electronic or other health record, Independently interpreting results (not separately reported) and communicating results to the patient/family/caregiver, or Care coordination (not separately reported).     Each patient to whom he or she provides medical services by telemedicine is:  (1) informed of the relationship between the physician and patient and the respective role of any other health care provider with respect to management of the patient; and (2) notified that he or she may decline to receive medical services by telemedicine and may withdraw from such care at any time.    Notes:         Cardiology Clinic Note  Reason for Visit: HFrEF, CAD  LOV w/ Cardiology: 5/12/2023    HPI:     PROBLEM LIST:  Breast cancer, left 11/22 (Previous cancer of the right breast s/p XRT, s/p bilateral mastectomy)  Hodgkins lymphoma 1991 (ABVD, XRT)  CAD s/p CABG 8/17 ( SVG to LAD, SVG to PDA occluded), PCI 2019 LM, CX, RCA  AVR, bioprosthetic 8/17  Carotid artery stenosis (ANNA 60-69%, LICA 80-89%)  Pulmonary HTN PAP 57, Likely WHO class II  HFrEF, EF 35% since 2019  HTN  HLD      Dulce Root is a 68 y.o. female, who presents for follow up  regarding UGIB in March 2023. Since her last visit she has undergone EGD, which confirms resolution of bleeding ulceration. She is consistent with PPI and denies reflux symptoms. She had an iron infusion with outside heme last week and is feeling more energy and less SOB. She denies PND, orthopnea or peripheral edema. She is currently taking lasix 20 mg qD, metoprolol succinate 25  mg qD and losartan 25 mg qD. Retrial of entresto following her last visit caused her to by symptomatically hypotensive. She monitors her BP regularly at home, and reports systolic is typically below 110.       HPI 5/12/2023  She was admitted to the hospital for GI bleed March 30, 2023.  Prior to that she had been taking Entresto, but it was discontinued when her blood pressure started to drop. In retrospect this was more likely related to GI bleeding rather than effect of entresto.  EGD was performed in the hospital on April 1st which confirmed bleeding duodenal ulcer.  Per GI recommendation she will be repeating EGD 2 months later, and was recommended to continue holding aspirin and Plavix for the time being.  While in the hospital losartan and hydrochlorothiazide were stopped.  She saw Dr. Brian for hospital follow-up on April 19th.  She had complaint of shortness of breath and peripheral edema, and was prescribed Lasix 20 mg b.i.d..  Labs 1 week later showed mild hypokalemia.  She has continued metoprolol succinate 25 mg q.d. she is been monitoring her blood pressure at home, and states that it is on par with the reading in the clinic today of 130/70.  Additionally she reports improvement in her symptom of shortness of breath and almost full resolution of peripheral edema.  Generally speaking she feels better and has more energy.  For the past week or so she reduced Lasix 20 mg to once daily.  She is been monitoring her weight at home and is down to 130 on her home scale. She is using the digital HTN cuff at home, but it isn't transmitting to her chart. She plans to reach out to the O bar.        ROS:    Pertinent ROS included in HPI and below.  PMH:     Past Medical History:   Diagnosis Date    Allergy     Anticoagulant long-term use     Plavix    Breast cancer 2008    Carotid stenosis, bilateral 10/13/2017    Coronary artery disease     Coronary artery disease involving coronary bypass graft of native heart  without angina pectoris 08/16/2019 2/19  1.  Left main is a small vessel with a 60%-65% ostial lesion. 2.  LAD is a medium-sized vessel diffuse 70% proximally  Ramus intermedius is a medium size vessel with a 40%-50% ostial lesion. 3.  Circumflex 60%-70% ostial  remainder of circumflex and obtuse marginal normal in appearance. Right coronary artery is normal size vessel, short discrete 70%mid 4.  Vein graft to right coronary is occ    Coronary artery disease involving native coronary artery of native heart without angina pectoris 06/27/2017    DCIS (ductal carcinoma in situ) of breast 11/06/2012    Encounter for blood transfusion     Epigastric pain 4/1/2023    Essential hypertension 11/06/2012    GERD (gastroesophageal reflux disease)     GI bleed 02/2021    Hematemesis with nausea 5/11/2021    Hodgkin lymphoma     Hx of Hodgkin's disease - s/p chemo and radiation 11/06/2012    Hyperlipidemia     Hyperlipidemia 11/06/2012    The patient presents with hyperlipidemia.  The patient reports tolerating the medication well and is in excellent compliance.  There have been no medication side effects.  The patient denies chest pain, neuropathy, and myalgias.  The patient has reduced fat intake and has been exercising.  Current treatment has included the medications listed in the med card.   Lab Results Component Value Date  CH    Hypertension     LBBB (left bundle branch block) 05/22/2018    Osteoporosis     Paroxysmal atrial fibrillation - single post-op episode 08/24/2017    Pemphigoid     Pemphigoid     pt receives IVIG infusions    Pulmonary hypertension 1/13/2023    S/P AVR (aortic valve replacement) - pericardial valve 08/21/2017    Perceval aortic tissue valve PVS23. 23mm    serial # [id number]    S/P CABG x 2 08/21/2017 8/17 CABG X 2 with SVG-LAD and SVG-distal RCA  SVG LAD due to absent LIMA after chest radiation and lymph node biopsy     Stented coronary artery     She had 2 stents placed in 2/2019.  She has had  CAD and bypasses in the past in 2017.      Thyroid disease      Past Surgical History:   Procedure Laterality Date    ARTERIOGRAPHY OF AORTIC ROOT N/A 02/15/2019    Procedure: ARTERIOGRAM, AORTIC ROOT;  Surgeon: Sujit Chaudhry MD;  Location: [facility] CATH;  Service: Cardiology;  Laterality: N/A;    AXILLARY NODE DISSECTION Left 11/25/2022    Procedure: LYMPHADENECTOMY, AXILLARY-Left;  Surgeon: Rosa Maradiaga MD;  Location: [facility];  Service: General;  Laterality: Left;    BREAST BIOPSY      BREAST RECONSTRUCTION      bilateral mastectomy    CARDIAC CATHETERIZATION      stents x 2    CARDIAC SURGERY  08/2017    Aortic valve replacement , CABG 2 vessel    CARDIAC VALVE REPLACEMENT  08/2017    aortic valve, bovine per pt    CORONARY ANGIOGRAPHY N/A 02/15/2019    Procedure: ANGIOGRAM, CORONARY ARTERY;  Surgeon: Sujit Chaudhry MD;  [facility];  Service: Cardiology;  Laterality: N/A;    CORONARY ANGIOGRAPHY N/A 4/1/2022    Procedure: ANGIOGRAM, CORONARY ARTERY;  Surgeon: Sujit Chaudhry MD;  Location: [facility] CATH;  Service: Cardiology;  Laterality: N/A;    CORONARY BYPASS GRAFT ANGIOGRAPHY  02/15/2019    Procedure: Bypass graft study;  Surgeon: Sujit Chaudhry MD;  [facility];  Service: Cardiology;;    COSMETIC SURGERY      bereast reconstruction    ESOPHAGOGASTRODUODENOSCOPY N/A 05/14/2021    Procedure: EGD (ESOPHAGOGASTRODUODENOSCOPY);  Surgeon: Tracy Flower MD;  Location: [facility];  Service: Endoscopy;  Laterality: N/A;    ESOPHAGOGASTRODUODENOSCOPY N/A 11/04/2021    Procedure: EGD (ESOPHAGOGASTRODUODENOSCOPY);  Surgeon: Tracy Flower MD;  Location: [facility];  Service: Endoscopy;  Laterality: N/A;    ESOPHAGOGASTRODUODENOSCOPY N/A 4/1/2023    Procedure: EGD (ESOPHAGOGASTRODUODENOSCOPY);  Surgeon: Tracy Flower MD;  Location: [facility];  Service: Endoscopy;  Laterality: N/A;    ESOPHAGOGASTRODUODENOSCOPY N/A 6/8/2023    Procedure: EGD (ESOPHAGOGASTRODUODENOSCOPY);  Surgeon: Mk Sanchez MD;   Location: [facility] ENDO (2ND FLR);  Service: Endoscopy;  Laterality: N/A;  inst via portal  cardiac clearance-see encounter dated 5/31/23  precall confirmed 6/2 EB    EYE SURGERY      eye lids    INJECTION FOR SENTINEL NODE IDENTIFICATION Left 11/25/2022    Procedure: INJECTION, FOR SENTINEL NODE IDENTIFICATION-Left;  Surgeon: Rosa Maradiaga MD;  Location: [facility];  Service: General;  Laterality: Left;    LEFT HEART CATHETERIZATION Right 02/15/2019    Procedure: Left heart cath;  Surgeon: Sujit Chaudhry MD;  Location: [facility] CATH;  Service: Cardiology;  Laterality: Right;    LEFT HEART CATHETERIZATION Left 4/1/2022    Procedure: Left heart cath;  Surgeon: Sujit Chaudhry MD;  [facility];  Service: Cardiology;  Laterality: Left;    LUMBAR LAMINECTOMY WITH DISCECTOMY N/A 02/27/2020    Procedure: LAMINECTOMY, SPINE, LUMBAR, WITH DISCECTOMY;  Surgeon: Vimal Villegas MD;  Location: [facility];  Service: Neurosurgery;  Laterality: N/A;  left L5-S1  Laminectomy L4-5    LYMPHADENECTOMY      MASTECTOMY      MASTECTOMY, PARTIAL Left 11/25/2022    Procedure: MASTECTOMY, PARTIAL-Left ultrasound guided;  Surgeon: Rosa Maradiaga MD;  Location: [facility];  Service: General;  Laterality: Left;    RIGHT HEART CATHETERIZATION Right 4/1/2022    Procedure: INSERTION, CATHETER, RIGHT HEART;  Surgeon: Sujit Chaudhry MD;  Location: [facility] CATH;  Service: Cardiology;  Laterality: Right;    SENTINEL LYMPH NODE BIOPSY Left 11/25/2022    Procedure: BIOPSY, LYMPH NODE, SENTINEL-Left;  Surgeon: Rosa Maradiaga MD;  Location: [facility] OR;  Service: General;  Laterality: Left;    SKIN BIOPSY      SPLENECTOMY, TOTAL      TRANSFORAMINAL EPIDURAL INJECTION OF STEROID Left 12/31/2019    Procedure: Left L5/S1 TF SKIP with local;  Surgeon: Canelo Niño MD;  Location: [facility] PAIN MGT;  Service: Pain Management;  Laterality: Left;    TUMOR REMOVAL      neck- lymphoma     Allergies:     Review of patient's allergies indicates:   Allergen Reactions     Amlodipine Shortness Of Breath and Other (See Comments)     unknown  Other reaction(s): Swelling  unknown  unknown  Other reaction(s): Swelling  unknown  Other reaction(s): Swelling  unknown    Levofloxacin Hives and Swelling    Sulfa (sulfonamide antibiotics) Shortness Of Breath, Itching and Other (See Comments)     elevated blood pressure    Ace inhibitors     Ciprofloxacin Itching    Iodinated contrast media     Iodine      Other reaction(s): Unknown    Latex      Other reaction(s): Itching    Latex, natural rubber Itching     Medications:     Current Outpatient Medications:     calcium carbonate (OS-CALVIN) 600 mg (1,500 mg) Tab, Take 600 mg by mouth 2 (two) times daily with meals., Disp: , Rfl:     cholecalciferol, vitamin D3, (VITAMIN D3) 100 mcg (4,000 unit) Cap, Take 4,000 Units by mouth once daily., Disp: , Rfl:     dapsone 25 MG Tab, 75 mg., Disp: , Rfl:     dexAMETHasone (DECADRON) 0.5 mg/5 mL Elix, SWISH 3.75MLS IN MOUTH FOR 30 SECONDS AND SWALLOW TWICE DAILY AS NEEDED FOR LICHEN PLANUS FLARE UPS, Disp: , Rfl:     EScitalopram oxalate (LEXAPRO) 10 MG tablet, Take 1 tablet (10 mg total) by mouth once daily., Disp: 90 tablet, Rfl: 3    furosemide (LASIX) 20 MG tablet, Take 1 tablet (20 mg total) by mouth 2 (two) times a day. (Patient taking differently: Take 20 mg by mouth once daily.), Disp: 60 tablet, Rfl: 11    levothyroxine (SYNTHROID) 75 MCG tablet, Take 1 tablet (75 mcg total) by mouth before breakfast., Disp: 90 tablet, Rfl: 3    losartan (COZAAR) 25 MG tablet, Take 25 mg by mouth once daily., Disp: , Rfl:     metoprolol succinate (TOPROL-XL) 25 MG 24 hr tablet, Take 1 tablet (25 mg total) by mouth once daily., Disp: 30 tablet, Rfl: 0    pantoprazole (PROTONIX) 40 MG tablet, Take 1 tablet (40 mg total) by mouth once daily., Disp: 90 tablet, Rfl: 0    rosuvastatin (CRESTOR) 20 MG tablet, Take 1 tablet (20 mg total) by mouth every evening., Disp: 90 tablet, Rfl: 3    clopidogreL (PLAVIX) 75 mg tablet,  Take 1 tablet (75 mg total) by mouth once daily., Disp: 90 tablet, Rfl: 3  No current facility-administered medications for this visit.    Facility-Administered Medications Ordered in Other Visits:     0.9%  NaCl infusion, , Intravenous, Continuous, Ender Morrison MD   Social History:     Social History     Tobacco Use    Smoking status: Former     Packs/day: 1.00     Years: 20.00     Pack years: 20.00     Types: Cigarettes     Quit date: 1981     Years since quittin.6    Smokeless tobacco: Never   Substance Use Topics    Alcohol use: No     Comment: 2 drinks/month; none 72 hrs prior to surgery     Family History:     Family History   Problem Relation Age of Onset    Hypertension Mother     Hypertension Father     Cancer Neg Hx      Physical Exam:     Physical not exam was not performed due this encounter being a virtual visit.    Labs:     Blood Tests:  Lab Results   Component Value Date    BNP 1,077 (H) 2023     2023    K 3.4 (L) 2023     2023    CO2 32 (H) 2023    BUN 13 2023    CREATININE 0.8 2023    GLU 92 2023    HGBA1C 5.8 (H) 02/10/2019    MG 2.2 2021    AST 18 2023    ALT 9 (L) 2023    ALBUMIN 3.8 2023    PROT 7.3 2023    BILITOT 0.9 2023    WBC 8.02 2023    HGB 8.9 (L) 2023    HCT 28.7 (L) 2023    HCT 24 (L) 2017    MCV 91 2023     2023    INR 1.1 2023    TSH 1.328 2023       Lab Results   Component Value Date    CHOL 112 (L) 2023    HDL 39 (L) 2023    TRIG 71 2023       Lab Results   Component Value Date    LDLCALC 58.8 (L) 2023       Urine Tests:  Lab Results   Component Value Date    COLORU Yellow 2021    APPEARANCEUA Clear 2021    PHUR 7.0 2021    SPECGRAV 1.020 2021    PROTEINUA Negative 2021    GLUCUA Negative 2021    KETONESU Negative 2021    BILIRUBINUA Negative  05/11/2021    OCCULTUA 1+ (A) 05/11/2021    NITRITE Negative 05/11/2021    UROBILINOGEN Negative 05/11/2021    LEUKOCYTESUR Negative 05/11/2021       Imaging:        ECG 22-NOV-2022 16:31:45: NSR with LBBB QRS duration 180 ms     Echo 4/23:  Summary     The left ventricle is normal in size with concentric hypertrophy and mildly decreased systolic function.  Mild left atrial enlargement.  There are bilateral pleural effusions.  There is a bioprosthetic aortic valve present. There is no aortic insufficiency present. Prosthetic aortic valve is normal.  The aortic valve mean gradient is 15 mmHg with a dimensionless index of 0.67.  There is abnormal septal wall motion consistent with post-operative status.  Mild tricuspid regurgitation.  The estimated ejection fraction is 40%.  Grade II left ventricular diastolic dysfunction.  Normal right ventricular size with low normal right ventricular systolic function.  Mild right atrial enlargement.     Echo 11/22:  Summary     The left ventricle is mildly enlarged with concentric hypertrophy and moderately decreased systolic function.  Left ventricular diastolic dysfunction.  The estimated PA systolic pressure is 57 mmHg.  Normal right ventricular size with low normal right ventricular systolic function.  There is pulmonary hypertension.  Normal central venous pressure (3 mmHg).  The estimated ejection fraction is 35%.  There is moderate left ventricular global hypokinesis.  Mild tricuspid regurgitation.  There is a bioprosthetic aortic valve present.  The aortic valve mean gradient is 13 mmHg with a dimensionless index of 0.28.     [facility] 4/22:  Angiographic findings:  Left main coronary-medium size vessel mild disease less than 15%.  Left anterior descending-chronic total occlusion its proximal portion.  Ramus intermedius-medium size vessel diffuse mild calcific disease less than 20%.  Circumflex-small to medium size vessel gives rise to 1 obtuse marginal.  Normal appearance  patient's age.  Right coronary-medium size dominant vessel diffuse disease proximal mid up to 45% short discrete lesions in its midportion.  Vein graft to the LAD-patent with normal appearing native LAD mid to distal.  Could not find vein graft to right coronary.     Hemodynamics:  Right ventricular pressure  69/11/20 mmHg   pulmonary artery pressure 65/33/46 mmHg  Pulmonary catheter wedge pressure 34/30 1/30 mmHg  Right atrial pressure 17/31.    Cardiac output/cardiac index 4.2/2.59     Carotid US 3/22:  Conclusion     There is 60-69% right Internal Carotid Stenosis.  There is 80-99% left Internal Carotid Stenosis.  Mild to moderate heterogeneous calcified plaque bilaterally.  Normal antegrade vertebral flow bilaterally.    Assessment:     1. Chronic combined systolic and diastolic heart failure    2. Coronary artery disease involving coronary bypass graft of native heart without angina pectoris    3. S/P CABG x 2    4. Carotid stenosis, bilateral    5. UGIB (upper gastrointestinal bleed)    6. S/P AVR (aortic valve replacement) - pericardial valve    7. Malignant neoplasm of central portion of left breast in female, estrogen receptor negative    8. Essential hypertension    9. Mixed hyperlipidemia        Plan:     Chronic combined systolic and diastolic heart failure  Continue losartan 25 mg qD  Increase to 50 mg qD if BP is > 130/80 more than 4-5 times in one week  Continue metoprolol succinate 25 mg qD  Continue furosemide 20 mg qD  Recommend that she continue weighing daily and document.  Continue to avoid dietary sodium.  Recommend echocardiogram be repeated in early August    Coronary artery disease involving coronary bypass graft of native heart without angina pectoris  S/P CABG x 2  -     clopidogreL (PLAVIX) 75 mg tablet; Take 1 tablet (75 mg total) by mouth once daily.  Dispense: 90 tablet; Refill: 3  She is asymptomatic.  She remains on rosuvastatin.  Her aspirin and Plavix were stopped during her recent  hospitalization for GI bleeding, which is now resolved. Restart plavix, and continue PPI.     Per UTD: Despite the known pharmacodynamic interaction between PPIs and clopidogrel, which decreases the antiplatelet effect of clopidogrel, the available evidence suggests that PPIs do not decrease the clinical efficacy of clopidogrel     Carotid stenosis, bilateral  She has significant carotid artery disease. She is asymptomatic on medical therapy.  Vascular surgery recommended ongoing medical treatment.    UGIB (upper gastrointestinal bleed)  Resolved. Recommended continued follow up with outpatient gastroenterologist. Patient would prefer to see specialist in [place] and will request referral from her PCP.     S/P AVR (aortic valve replacement) - pericardial valve  Valve functioning normally on recent echo. SBE prophylaxis is recommended prior to dental procedures.     Malignant neoplasm of central portion of left breast in female, estrogen receptor negative  She follows with Dr. Sepulveda.  Her treatment has been on hold due to her cardiomyopathy and comorbidities.  Her last ejection fraction did improve to 40% with guideline directed medical therapy.    Essential hypertension  Continue to monitor BP at home and recommend low sodium diet.   Increase losartan to 50 mg qD if BP > 130/80    Mixed hyperlipidemia  Lipids are at goal. Continue statin therapy.  Repeat lipid panel in Feb 2024      Signed:  Mey Miller PA-C  Cardiology     6/14/2023 9:22 AM    Follow-up:     Future Appointments   Date Time Provider Department Center   6/27/2023  3:15 PM Rosa Maradiaga MD [facility]   11/6/2023 11:45 AM LABORATORY, [place] [facility] LAB [place]   11/6/2023 12:00 PM [facility] DEXA1 [facility] [place]   11/20/2023  1:00 PM Caroline Howard PA-C [facility]   11/20/2023  2:00 PM INJECTION 1, [facility] IN[facility]

## 2023-06-27 ENCOUNTER — OFFICE VISIT (OUTPATIENT)
Dept: SURGERY | Facility: CLINIC | Age: 68
End: 2023-06-27
Payer: MEDICARE

## 2023-06-27 VITALS
WEIGHT: 132 LBS | DIASTOLIC BLOOD PRESSURE: 58 MMHG | HEART RATE: 89 BPM | HEIGHT: 63 IN | BODY MASS INDEX: 23.39 KG/M2 | SYSTOLIC BLOOD PRESSURE: 121 MMHG

## 2023-06-27 DIAGNOSIS — Z17.1 MALIGNANT NEOPLASM OF CENTRAL PORTION OF LEFT BREAST IN FEMALE, ESTROGEN RECEPTOR NEGATIVE: Primary | ICD-10-CM

## 2023-06-27 DIAGNOSIS — C50.112 MALIGNANT NEOPLASM OF CENTRAL PORTION OF LEFT BREAST IN FEMALE, ESTROGEN RECEPTOR NEGATIVE: Primary | ICD-10-CM

## 2023-06-27 PROCEDURE — 99999 PR PBB SHADOW E&M-EST. PATIENT-LVL III: CPT | Mod: PBBFAC,,, | Performed by: SURGERY

## 2023-06-27 PROCEDURE — 99213 OFFICE O/P EST LOW 20 MIN: CPT | Mod: PBBFAC | Performed by: SURGERY

## 2023-06-27 PROCEDURE — 99213 OFFICE O/P EST LOW 20 MIN: CPT | Mod: S$PBB,,, | Performed by: SURGERY

## 2023-06-27 PROCEDURE — 99999 PR PBB SHADOW E&M-EST. PATIENT-LVL III: ICD-10-PCS | Mod: PBBFAC,,, | Performed by: SURGERY

## 2023-06-27 PROCEDURE — 99213 PR OFFICE/OUTPT VISIT, EST, LEVL III, 20-29 MIN: ICD-10-PCS | Mod: S$PBB,,, | Performed by: SURGERY

## 2023-06-27 NOTE — PROGRESS NOTES
"Date of Service:06/27/2023      DIAGNOSIS:   This is a 68 y.o. female with a stage pT4b N0 Mx grade 3 ER - [place] - HER2 + IDC of the left breast.  Previous history of bilateral DCIS s/p bilateral flap reconstruction in 2009.      TREATMENT:   1. The patient is status post left partial mastectomy and sentinel node biopsy on 11/25/2022. YOANNA Maradiaga. Surgical Oncology    Patient has a history of bilateral ductal carcinoma in situ and a remote history of Hodgkin's disease. Patient is known to Dr. Sepulveda. Per his note, her history is as follows: She developed ductal carcinoma in situ of the right breast in 09/2006, treated with lumpectomy  and radiation therapy.  In 08/2009, she was diagnosed with ductal carcinoma in situ of the left breast and in 08/2009, she had bilateral nipple sparing mastectomy. The left breast showed widespread high-grade DCIS and the right breast was without evidence of malignancy.    She has a remote history of Hodgkin's disease, originally diagnosed in 1991, treated with radiation therapy and then treated with salvage chemotherapy with ABVD on protocol [id number] in 1996.    Bleeding duodenal ulcer in April - recovering    HISTORY OF PRESENT ILLNESS:   Dulce Root is a 68 y.o. female comes in for oncological follow up.  She denies change in her breast self-exam specifically denying new masses, skin or nipple changes, or nipple discharge. Past medical and surgical history is updated with several new changes. Hospitalized for a bleeding duodenal ulcer in March 2023. There have been no changes to family history. The patient denies constitutional symptoms of night sweats, weight loss, new headaches, visual changes, new back or bony pain, chest pain.     IMAGING:   No new pertinent imaging.        PHYSICAL EXAM:   BP (!) 121/58 (BP Location: Right arm, Patient Position: Sitting, BP Method: Medium (Automatic))   Pulse 89   Ht 5' 3" (1.6 m)   Wt 59.9 kg (132 lb)   BMI 23.38 kg/m²   General: The patient " appears well and is in no acute distress.   Neuro: Alert & oriented x3.   Breasts: The exam was done with the patient seated and supine. Left breast - within normal limits. No palpable masses and no abnormal skin or nipple findings. No supraclavicular or axillary lymphadenopathy on the left side.   Right breast - within normal limits. No palpable masses and no abnormal skin or nipple findings. No supraclavicular or axillary lymphadenopathy on the right side.  Extremities: No clubbing or cyanosis. Bilateral full arm range of motion  without  lymphedema.     ASSESSMENT:   This is a 68 y.o. female without evidence of recurrence by exam, history or imaging.       PLAN:   1. Continue monthly self breast exams and call the clinic with any changes or problems.  2. Return to clinic in 1 year. The patient is in agreement with the plan. Questions were encouraged and answered to patient's satisfaction. Dulce will call our office with any questions or concerns.   3. Continue heme onc follow up - Dr. Sepulveda    25 minutes were spent on this encounter, 15 of which was face to face counseling and 10 minutes were spent on chart review and coordination of care.

## 2023-07-03 PROBLEM — K26.0 ACUTE DUODENAL ULCER WITH HEMORRHAGE AND OBSTRUCTION: Status: RESOLVED | Noted: 2023-04-01 | Resolved: 2023-07-03

## 2023-07-03 PROBLEM — K92.2 UGIB (UPPER GASTROINTESTINAL BLEED): Status: RESOLVED | Noted: 2023-04-01 | Resolved: 2023-07-03

## 2023-07-05 DIAGNOSIS — E78.5 HYPERLIPIDEMIA, UNSPECIFIED HYPERLIPIDEMIA TYPE: ICD-10-CM

## 2023-07-06 RX ORDER — ROSUVASTATIN CALCIUM 20 MG/1
20 TABLET, COATED ORAL NIGHTLY
Qty: 90 TABLET | Refills: 3 | Status: SHIPPED | OUTPATIENT
Start: 2023-07-06 | End: 2023-11-28

## 2023-08-10 ENCOUNTER — PATIENT MESSAGE (OUTPATIENT)
Dept: GASTROENTEROLOGY | Facility: CLINIC | Age: 68
End: 2023-08-10
Payer: MEDICARE

## 2023-08-24 ENCOUNTER — HOSPITAL ENCOUNTER (OUTPATIENT)
Dept: RADIOLOGY | Facility: HOSPITAL | Age: 68
Discharge: HOME | End: 2023-08-24
Attending: STUDENT IN AN ORGANIZED HEALTH CARE EDUCATION/TRAINING PROGRAM
Payer: MEDICARE

## 2023-08-24 ENCOUNTER — OFFICE VISIT (OUTPATIENT)
Dept: FAMILY MEDICINE | Facility: CLINIC | Age: 68
End: 2023-08-24
Payer: MEDICARE

## 2023-08-24 ENCOUNTER — ON-DEMAND VIRTUAL (OUTPATIENT)
Dept: URGENT CARE | Facility: CLINIC | Age: 68
End: 2023-08-24
Payer: MEDICARE

## 2023-08-24 ENCOUNTER — TELEPHONE (OUTPATIENT)
Dept: FAMILY MEDICINE | Facility: CLINIC | Age: 68
End: 2023-08-24

## 2023-08-24 VITALS
HEIGHT: 63 IN | HEART RATE: 85 BPM | SYSTOLIC BLOOD PRESSURE: 125 MMHG | DIASTOLIC BLOOD PRESSURE: 79 MMHG | RESPIRATION RATE: 18 BRPM | TEMPERATURE: 97 F | OXYGEN SATURATION: 91 % | WEIGHT: 130.69 LBS | BODY MASS INDEX: 23.16 KG/M2

## 2023-08-24 DIAGNOSIS — D50.0 IRON DEFICIENCY ANEMIA DUE TO CHRONIC BLOOD LOSS: ICD-10-CM

## 2023-08-24 DIAGNOSIS — N30.90 CYSTITIS: ICD-10-CM

## 2023-08-24 DIAGNOSIS — R39.15 URINARY URGENCY: Primary | ICD-10-CM

## 2023-08-24 DIAGNOSIS — R79.81 LOW O2 SATURATION: ICD-10-CM

## 2023-08-24 DIAGNOSIS — R30.0 DYSURIA: Primary | ICD-10-CM

## 2023-08-24 LAB
BACTERIA #/AREA URNS HPF: ABNORMAL /HPF
BILIRUB UR QL STRIP: NEGATIVE
CLARITY UR: CLEAR
COLOR UR: YELLOW
GLUCOSE UR QL STRIP: NEGATIVE
HGB UR QL STRIP: ABNORMAL
KETONES UR QL STRIP: NEGATIVE
LEUKOCYTE ESTERASE UR QL STRIP: ABNORMAL
MICROSCOPIC COMMENT: ABNORMAL
NITRITE UR QL STRIP: NEGATIVE
PH UR STRIP: 6.5 [PH] (ref 5–8)
PROT UR QL STRIP: NEGATIVE
RBC #/AREA URNS HPF: 4 /HPF (ref 0–4)
SP GR UR STRIP: 1.01 (ref 1–1.03)
SQUAMOUS #/AREA URNS HPF: 3 /HPF
URN SPEC COLLECT METH UR: ABNORMAL
WBC #/AREA URNS HPF: >100 /HPF (ref 0–5)

## 2023-08-24 PROCEDURE — 81000 URINALYSIS NONAUTO W/SCOPE: CPT | Mod: PO | Performed by: STUDENT IN AN ORGANIZED HEALTH CARE EDUCATION/TRAINING PROGRAM

## 2023-08-24 PROCEDURE — 71046 XR CHEST PA AND LATERAL: ICD-10-PCS | Mod: 26,,, | Performed by: RADIOLOGY

## 2023-08-24 PROCEDURE — 99999 PR PBB SHADOW E&M-EST. PATIENT-LVL V: ICD-10-PCS | Mod: PBBFAC,,, | Performed by: STUDENT IN AN ORGANIZED HEALTH CARE EDUCATION/TRAINING PROGRAM

## 2023-08-24 PROCEDURE — 99212 OFFICE O/P EST SF 10 MIN: CPT | Mod: 95,,, | Performed by: FAMILY MEDICINE

## 2023-08-24 PROCEDURE — 87088 URINE BACTERIA CULTURE: CPT | Performed by: STUDENT IN AN ORGANIZED HEALTH CARE EDUCATION/TRAINING PROGRAM

## 2023-08-24 PROCEDURE — 87086 URINE CULTURE/COLONY COUNT: CPT | Performed by: STUDENT IN AN ORGANIZED HEALTH CARE EDUCATION/TRAINING PROGRAM

## 2023-08-24 PROCEDURE — 87186 SC STD MICRODIL/AGAR DIL: CPT | Performed by: STUDENT IN AN ORGANIZED HEALTH CARE EDUCATION/TRAINING PROGRAM

## 2023-08-24 PROCEDURE — 71046 X-RAY EXAM CHEST 2 VIEWS: CPT | Mod: TC,PO

## 2023-08-24 PROCEDURE — 71046 X-RAY EXAM CHEST 2 VIEWS: CPT | Mod: 26,,, | Performed by: RADIOLOGY

## 2023-08-24 PROCEDURE — 99999 PR PBB SHADOW E&M-EST. PATIENT-LVL V: CPT | Mod: PBBFAC,,, | Performed by: STUDENT IN AN ORGANIZED HEALTH CARE EDUCATION/TRAINING PROGRAM

## 2023-08-24 PROCEDURE — 87077 CULTURE AEROBIC IDENTIFY: CPT | Performed by: STUDENT IN AN ORGANIZED HEALTH CARE EDUCATION/TRAINING PROGRAM

## 2023-08-24 PROCEDURE — 99215 OFFICE O/P EST HI 40 MIN: CPT | Mod: PBBFAC,PO,25 | Performed by: STUDENT IN AN ORGANIZED HEALTH CARE EDUCATION/TRAINING PROGRAM

## 2023-08-24 PROCEDURE — 99212 PR OFFICE/OUTPT VISIT, EST, LEVL II, 10-19 MIN: ICD-10-PCS | Mod: 95,,, | Performed by: FAMILY MEDICINE

## 2023-08-24 PROCEDURE — 99214 PR OFFICE/OUTPT VISIT, EST, LEVL IV, 30-39 MIN: ICD-10-PCS | Mod: S$PBB,,, | Performed by: STUDENT IN AN ORGANIZED HEALTH CARE EDUCATION/TRAINING PROGRAM

## 2023-08-24 PROCEDURE — 99214 OFFICE O/P EST MOD 30 MIN: CPT | Mod: S$PBB,,, | Performed by: STUDENT IN AN ORGANIZED HEALTH CARE EDUCATION/TRAINING PROGRAM

## 2023-08-24 RX ORDER — NITROFURANTOIN 25; 75 MG/1; MG/1
100 CAPSULE ORAL 2 TIMES DAILY
Qty: 10 CAPSULE | Refills: 0 | Status: SHIPPED | OUTPATIENT
Start: 2023-08-24 | End: 2023-08-29

## 2023-08-24 NOTE — PROGRESS NOTES
Subjective:      Patient ID: Dulce Root is a 68 y.o. female.    Vitals:  vitals were not taken for this visit.     Chief Complaint: Urinary Urgency      Visit Type: TELE AUDIOVISUAL    Present with the patient at the time of consultation: TELEMED PRESENT WITH PATIENT: None    Past Medical History:   Diagnosis Date    Allergy     Anticoagulant long-term use     Plavix    Breast cancer 2008    Carotid stenosis, bilateral 10/13/2017    Coronary artery disease     Coronary artery disease involving coronary bypass graft of native heart without angina pectoris 08/16/2019 2/19  1.  Left main is a small vessel with a 60%-65% ostial lesion. 2.  LAD is a medium-sized vessel diffuse 70% proximally  Ramus intermedius is a medium size vessel with a 40%-50% ostial lesion. 3.  Circumflex 60%-70% ostial  remainder of circumflex and obtuse marginal normal in appearance. Right coronary artery is normal size vessel, short discrete 70%mid 4.  Vein graft to right coronary is occ    Coronary artery disease involving native coronary artery of native heart without angina pectoris 06/27/2017    DCIS (ductal carcinoma in situ) of breast 11/06/2012    Encounter for blood transfusion     Epigastric pain 4/1/2023    Essential hypertension 11/06/2012    GERD (gastroesophageal reflux disease)     GI bleed 02/2021    Hematemesis with nausea 5/11/2021    Hodgkin lymphoma     Hx of Hodgkin's disease - s/p chemo and radiation 11/06/2012    Hyperlipidemia     Hyperlipidemia 11/06/2012    The patient presents with hyperlipidemia.  The patient reports tolerating the medication well and is in excellent compliance.  There have been no medication side effects.  The patient denies chest pain, neuropathy, and myalgias.  The patient has reduced fat intake and has been exercising.  Current treatment has included the medications listed in the med card.   Lab Results Component Value Date  CH    Hypertension     LBBB (left bundle branch block) 05/22/2018     Osteoporosis     Paroxysmal atrial fibrillation - single post-op episode 08/24/2017    Pemphigoid     Pemphigoid     pt receives IVIG infusions    Pulmonary hypertension 1/13/2023    S/P AVR (aortic valve replacement) - pericardial valve 08/21/2017    Perceval aortic tissue valve PVS23. 23mm    serial # [id number]    S/P CABG x 2 08/21/2017 8/17 CABG X 2 with SVG-LAD and SVG-distal RCA  SVG LAD due to absent LIMA after chest radiation and lymph node biopsy     Stented coronary artery     She had 2 stents placed in 2/2019.  She has had CAD and bypasses in the past in 2017.      Thyroid disease      Past Surgical History:   Procedure Laterality Date    ARTERIOGRAPHY OF AORTIC ROOT N/A 02/15/2019    Procedure: ARTERIOGRAM, AORTIC ROOT;  Surgeon: Sujit Chaudhry MD;  [facility];  Service: Cardiology;  Laterality: N/A;    AXILLARY NODE DISSECTION Left 11/25/2022    Procedure: LYMPHADENECTOMY, AXILLARY-Left;  Surgeon: Rosa Maradiaga MD;  Location: [facility];  Service: General;  Laterality: Left;    BREAST BIOPSY      BREAST RECONSTRUCTION      bilateral mastectomy    CARDIAC CATHETERIZATION      stents x 2    CARDIAC SURGERY  08/2017    Aortic valve replacement , CABG 2 vessel    CARDIAC VALVE REPLACEMENT  08/2017    aortic valve, bovine per pt    CORONARY ANGIOGRAPHY N/A 02/15/2019    Procedure: ANGIOGRAM, CORONARY ARTERY;  Surgeon: Sujit Chaudhry MD;  Location: [facility];  Service: Cardiology;  Laterality: N/A;    CORONARY ANGIOGRAPHY N/A 4/1/2022    Procedure: ANGIOGRAM, CORONARY ARTERY;  Surgeon: Sujit Chaudhry MD;  Location: [facility];  Service: Cardiology;  Laterality: N/A;    CORONARY BYPASS GRAFT ANGIOGRAPHY  02/15/2019    Procedure: Bypass graft study;  Surgeon: Sujit Chaudhry MD;  Location: [facility];  Service: Cardiology;;    COSMETIC SURGERY      bereast reconstruction    ESOPHAGOGASTRODUODENOSCOPY N/A 05/14/2021    Procedure: EGD (ESOPHAGOGASTRODUODENOSCOPY);  Surgeon: Tracy Flower MD;  Location:  [facility] ENDO;  Service: Endoscopy;  Laterality: N/A;    ESOPHAGOGASTRODUODENOSCOPY N/A 11/04/2021    Procedure: EGD (ESOPHAGOGASTRODUODENOSCOPY);  Surgeon: Tracy Flower MD;  Location: [facility];  Service: Endoscopy;  Laterality: N/A;    ESOPHAGOGASTRODUODENOSCOPY N/A 4/1/2023    Procedure: EGD (ESOPHAGOGASTRODUODENOSCOPY);  Surgeon: Tracy Flower MD;  Location: [facility];  Service: Endoscopy;  Laterality: N/A;    ESOPHAGOGASTRODUODENOSCOPY N/A 6/8/2023    Procedure: EGD (ESOPHAGOGASTRODUODENOSCOPY);  Surgeon: Mk Sanchez MD;  Location: [facility] ([address] [place] [address]);  Service: Endoscopy;  Laterality: N/A;  inst via portal  cardiac clearance-see encounter dated 5/31/23  precall confirmed 6/2 EB    EYE SURGERY      eye lids    INJECTION FOR SENTINEL NODE IDENTIFICATION Left 11/25/2022    Procedure: INJECTION, FOR SENTINEL NODE IDENTIFICATION-Left;  Surgeon: Rosa Maradiaga MD;  Location: [facility];  Service: General;  Laterality: Left;    LEFT HEART CATHETERIZATION Right 02/15/2019    Procedure: Left heart cath;  Surgeon: Sujit Chaudhry MD;  [facility];  Service: Cardiology;  Laterality: Right;    LEFT HEART CATHETERIZATION Left 4/1/2022    Procedure: Left heart cath;  Surgeon: Sujit Chaudhry MD;  [facility];  Service: Cardiology;  Laterality: Left;    LUMBAR LAMINECTOMY WITH DISCECTOMY N/A 02/27/2020    Procedure: LAMINECTOMY, SPINE, LUMBAR, WITH DISCECTOMY;  Surgeon: Vimal Villegas MD;  Location: [facility];  Service: Neurosurgery;  Laterality: N/A;  left L5-S1  Laminectomy L4-5    LYMPHADENECTOMY      MASTECTOMY      MASTECTOMY, PARTIAL Left 11/25/2022    Procedure: MASTECTOMY, PARTIAL-Left ultrasound guided;  Surgeon: Rosa Maradiaga MD;  Location: [facility];  Service: General;  Laterality: Left;    RIGHT HEART CATHETERIZATION Right 4/1/2022    Procedure: INSERTION, CATHETER, RIGHT HEART;  Surgeon: Sujit Chaudhry MD;  Location: [facility];  Service: Cardiology;  Laterality: Right;    SENTINEL LYMPH NODE  BIOPSY Left 11/25/2022    Procedure: BIOPSY, LYMPH NODE, SENTINEL-Left;  Surgeon: Rosa Maradiaga MD;  Location: [facility];  Service: General;  Laterality: Left;    SKIN BIOPSY      SPLENECTOMY, TOTAL      TRANSFORAMINAL EPIDURAL INJECTION OF STEROID Left 12/31/2019    Procedure: Left L5/S1 TF SKIP with local;  Surgeon: Canelo Niño MD;  [facility];  Service: Pain Management;  Laterality: Left;    TUMOR REMOVAL      neck- lymphoma     Review of patient's allergies indicates:   Allergen Reactions    Amlodipine Shortness Of Breath and Other (See Comments)     unknown  Other reaction(s): Swelling  unknown  unknown  Other reaction(s): Swelling  unknown  Other reaction(s): Swelling  unknown    Levofloxacin Hives and Swelling    Sulfa (sulfonamide antibiotics) Shortness Of Breath, Itching and Other (See Comments)     elevated blood pressure    Ace inhibitors     Ciprofloxacin Itching    Iodinated contrast media     Iodine      Other reaction(s): Unknown    Latex      Other reaction(s): Itching    Latex, natural rubber Itching     Current Outpatient Medications on File Prior to Visit   Medication Sig Dispense Refill    calcium carbonate (OS-CALVIN) 600 mg (1,500 mg) Tab Take 600 mg by mouth 2 (two) times daily with meals.      cholecalciferol, vitamin D3, (VITAMIN D3) 100 mcg (4,000 unit) Cap Take 4,000 Units by mouth once daily.      clopidogreL (PLAVIX) 75 mg tablet Take 1 tablet (75 mg total) by mouth once daily. 90 tablet 3    dapsone 25 MG Tab 75 mg.      dexAMETHasone (DECADRON) 0.5 mg/5 mL Elix SWISH 3.75MLS IN MOUTH FOR 30 SECONDS AND SWALLOW TWICE DAILY AS NEEDED FOR LICHEN PLANUS FLARE UPS      EScitalopram oxalate (LEXAPRO) 10 MG tablet Take 1 tablet (10 mg total) by mouth once daily. 90 tablet 3    furosemide (LASIX) 20 MG tablet Take 1 tablet (20 mg total) by mouth 2 (two) times a day. (Patient taking differently: Take 20 mg by mouth once daily.) 60 tablet 11    levothyroxine (SYNTHROID) 75 MCG  "tablet Take 1 tablet (75 mcg total) by mouth before breakfast. 90 tablet 3    losartan (COZAAR) 25 MG tablet Take 25 mg by mouth once daily.      metoprolol succinate (TOPROL-XL) 25 MG 24 hr tablet Take 1 tablet (25 mg total) by mouth once daily. 30 tablet 0    pantoprazole (PROTONIX) 40 MG tablet Take 1 tablet (40 mg total) by mouth once daily. 90 tablet 0    rosuvastatin (CRESTOR) 20 MG tablet Take 1 tablet (20 mg total) by mouth every evening. 90 tablet 3     Current Facility-Administered Medications on File Prior to Visit   Medication Dose Route Frequency Provider Last Rate Last Admin    [DISCONTINUED] 0.9%  NaCl infusion   Intravenous Continuous Ender Pulido MD         Family History   Problem Relation Age of Onset    Hypertension Mother     Hypertension Father     Cancer Neg Hx            Ohs Peq Odvv Intake    8/24/2023  6:53 AM CDT - Filed by Patient   Describe your reason for todays visit    What is your current physical address in the event of a medical emergency?    Are you able to take your vital signs? No   Please attach any relevant images or files          68-year-old female with 2 week history of urinary complaints.  She reports feeling a "pulling".  They started with some urinary urgency.  No dysuria.  No fever or back pain.  She can not recall last UTI.  Multiple chronic medical issues and multiple drug allergies.      ROS     Objective:   The physical exam was conducted virtually.  Physical Exam   Constitutional: She is oriented to person, place, and time.  Non-toxic appearance. She does not appear ill. No distress.   HENT:   Head: Normocephalic and atraumatic.   Ears:   Right Ear: External ear normal.   Left Ear: External ear normal.   Mouth/Throat: Oropharynx is clear and moist and mucous membranes are normal.   Eyes: Conjunctivae are normal. No scleral icterus.   Pulmonary/Chest: Effort normal. No stridor. No respiratory distress.   Abdominal: There is no left CVA tenderness and no " right CVA tenderness.   Neurological: She is alert and oriented to person, place, and time.   Skin: Skin is not diaphoretic.   Psychiatric: Her behavior is normal. Judgment and thought content normal.   Vitals reviewed.      Assessment:     1. Urinary urgency        Plan:       Urinary urgency    AS WE DISCUSSED, IN LIGHT OF MULTIPLE MEDICAL ISSUES, MEDICATION ALLERGIES, AND SYMPTOMS FOR 2 WEEKS, AN INPATIENT EVALUATION TODAY IS ADVISED THAT CAN INCLUDE URINALYSIS AND URINE CULTURE WHICH WILL BE HELPFUL IN YOUR SITUATION.

## 2023-08-24 NOTE — PROGRESS NOTES
SUBJECTIVE: Dulce Root is a 68 y.o. female who complains of urinary frequency, urgency and dysuria x few weeks, without flank pain, fever, chills, or abnormal vaginal discharge or bleeding.     Hx of FRANCISCA and rectal bleeding in setting of hemorrhoids. Reports daily blood streak on toilet paper.     Denies fevers, chills, chest pain, SOB, fatigue, abdominal pain, nausea, vomiting, hematuria, or melena.       OBJECTIVE:   Vitals:    08/24/23 1106   BP: 125/79   Pulse: 85   Resp: 18   Temp: 97.2 °F (36.2 °C)   Satting 86-91% on RA, asx    Physical Exam   Constitutional: oriented to person, place, and time. well-developed and well-nourished. No distress.   HENT: WNL  Head: Normocephalic and atraumatic.   Eyes: EOM are normal.   Neck: Normal range of motion. Neck supple.   Cardiovascular: Normal rate  Pulmonary/Chest: Effort normal. No respiratory distress. Ctab, no wheezing, rales, rhonchi   GI: soft, non distended, no ttp, no rebound/guarding  Musculoskeletal: Normal range of motion. no edema. No cva ttp  Neurological: CN II-XII intact  Skin: warm and dry.   Psychiatric: normal mood and affect. behavior is normal.       ASSESSMENT: UTI uncomplicated without evidence of pyelonephritis    PLAN: Treatment per orders - also push fluids, may use Pyridium OTC prn. Call or return to clinic prn if these symptoms worsen or fail to improve as anticipated.      Medications Ordered This Encounter   Medications    nitrofurantoin, macrocrystal-monohydrate, (MACROBID) 100 MG capsule     Sig: Take 1 capsule (100 mg total) by mouth 2 (two) times daily. for 5 days     Dispense:  10 capsule     Refill:  0       Orders Placed This Encounter   Procedures    Urine culture     Specimen Source->Urine    X-Ray Chest PA And Lateral     Standing Status:   Future     Number of Occurrences:   1     Standing Expiration Date:   8/24/2024     Order Specific Question:   May the Radiologist modify the order per protocol to meet the clinical needs  of the patient?     Answer:   Yes     Order Specific Question:   Release to patient     Answer:   Immediate    Urinalysis, Reflex to Urine Culture Urine, Clean Catch     Specimen Source->Urine     Order Specific Question:   Preferred Collection Type     Answer:   Urine, Clean Catch     Order Specific Question:   Specimen Source     Answer:   Urine     Order Specific Question:   Collection Type     Answer:   Urine, Clean Catch    Urinalysis Microscopic     Specimen Source->Urine    Iron and TIBC     Standing Status:   Future     Number of Occurrences:   1     Standing Expiration Date:   10/22/2024    Ferritin     Standing Status:   Future     Number of Occurrences:   1     Standing Expiration Date:   10/22/2024    Reticulocytes     Standing Status:   Future     Number of Occurrences:   1     Standing Expiration Date:   10/22/2024    CBC Auto Differential     Standing Status:   Future     Number of Occurrences:   1     Standing Expiration Date:   8/23/2024     Problem List Items Addressed This Visit    None  Visit Diagnoses       Dysuria    -  Primary    Relevant Medications    nitrofurantoin, macrocrystal-monohydrate, (MACROBID) 100 MG capsule    Other Relevant Orders    Urinalysis, Reflex to Urine Culture Urine, Clean Catch (Completed)    Low O2 saturation        Relevant Orders    Iron and TIBC    Ferritin    Reticulocytes    CBC Auto Differential    X-Ray Chest PA And Lateral    Iron deficiency anemia due to chronic blood loss        Relevant Orders    Iron and TIBC    Ferritin    Reticulocytes    CBC Auto Differential    X-Ray Chest PA And Lateral    Cystitis        Relevant Medications    nitrofurantoin, macrocrystal-monohydrate, (MACROBID) 100 MG capsule

## 2023-08-24 NOTE — TELEPHONE ENCOUNTER
----- Message from Chalo Damon sent at 8/24/2023 12:11 PM CDT -----  Contact: Pt  Type:Patient Returning call    Who called:Pt  Who left the message for patient:Dr office did not get a name  Does the patient know what this was regarding:No but just left office   Best call back number:[phone]    Additional Information Please Advise -Thank you

## 2023-08-24 NOTE — PROGRESS NOTES
I have sent a msg to patient with the following interpretation (see below):      Cxr grossly unremarkable. Please let us know if you develop chest pain, shortness of breath    Please do not hesitate to call or message with any questions or concerns    Jacqueline Owen MD

## 2023-08-24 NOTE — PATIENT INSTRUCTIONS
AS WE DISCUSSED, IN LIGHT OF MULTIPLE MEDICAL ISSUES, MEDICATION ALLERGIES, AND SYMPTOMS FOR 2 WEEKS, AN INPATIENT EVALUATION TODAY IS ADVISED THAT CAN INCLUDE URINALYSIS AND URINE CULTURE WHICH WILL BE HELPFUL IN YOUR SITUATION.

## 2023-08-26 LAB — BACTERIA UR CULT: ABNORMAL

## 2023-08-28 NOTE — PROGRESS NOTES
Dear Dulce Root    I have reviewed your recent urine culture. The bacteria is sensitive to the antibiotic prescribed. Please be sure to complete full antibiotic course.

## 2023-09-03 NOTE — MR AVS SNAPSHOT
[facility] Internal Medicine  [address]  [facility] [address]  Phone: [phone]  Fax: [phone]                  Dulce Root   3/10/2017 10:20 AM   Office Visit    Description:  Female : 1955   Provider:  Marky Manrique MD   Department:  [facility] - Internal Medicine           Reason for Visit     Annual Exam           Diagnoses this Visit        Comments    Essential hypertension    -  Primary     Hyperlipidemia, unspecified hyperlipidemia type         Hypothyroidism, unspecified type         Preventative health care         Murmur, heart                To Do List           Future Appointments        Provider Department Dept Phone    3/10/2017 10:20 AM Marky Manrique MD [facility] Internal Medicine [phone]    2017 8:00 AM Valery Brian MD [facility] Cardiology [phone]      Goals (5 Years of Data)     None      Follow-Up and Disposition     Return in about 6 months (around 9/10/2017), or if symptoms worsen or fail to improve.    Follow-up and Disposition History       These Medications        Disp Refills Start End    rosuvastatin (CRESTOR) 20 MG tablet 90 tablet 3 3/10/2017     Take 1 tablet (20 mg total) by mouth nightly. - Oral    Pharmacy: [facility]/pharmacy #[id number] - [address] Ph #: [phone]       levothyroxine (SYNTHROID) 75 MCG tablet 90 tablet 3 3/10/2017     Take 1 tablet (75 mcg total) by mouth once daily. - Oral    Pharmacy: [facility]/pharmacy #[id number] - [place], [place] - [address] Ph #: [phone]       benazepril (LOTENSIN) 10 MG tablet 90 tablet 3 3/10/2017     Take 1 tablet (10 mg total) by mouth once daily. - Oral    Pharmacy: [facility]/pharmacy #[id number] - [place], [place] - [address] [place] [address] Ph #: [phone]       fluticasone (FLONASE) 50 mcg/actuation nasal spray 16 g 12 3/10/2017 2017    2 sprays by Each Nare route once daily. -  Due to stroke  SLP to evaluate and treat  s/p PEG placement  PEG tube self removed 8/26/23  G-tube replaced 8/28    - continue tube feeds   Each Nare    Pharmacy: [facility]/pharmacy #[id number] - [place], [place] - [address] [facility]  #: [phone]         [facility] On Call     [facility] On Call Nurse Care Line -  Assistance  Registered nurses in the [facility] On Call Center provide clinical advisement, health education, appointment booking, and other advisory services.  Call for this free service at [phone].             Medications           Message regarding Medications     Verify the changes and/or additions to your medication regime listed below are the same as discussed with your clinician today.  If any of these changes or additions are incorrect, please notify your healthcare provider.        START taking these NEW medications        Refills    fluticasone (FLONASE) 50 mcg/actuation nasal spray 12    Si sprays by Each Nare route once daily.    Class: Normal    Route: Each Nare           Verify that the below list of medications is an accurate representation of the medications you are currently taking.  If none reported, the list may be blank. If incorrect, please contact your healthcare provider. Carry this list with you in case of emergency.           Current Medications     albuterol 90 mcg/actuation inhaler Inhale 2 puffs into the lungs every 6 (six) hours as needed for Wheezing.    benazepril (LOTENSIN) 10 MG tablet Take 1 tablet (10 mg total) by mouth once daily.    brimonidine-timolol (COMBIGAN) 0.2-0.5 % Drop Place 1 drop into both eyes.    calcium carbonate (OS-CALVIN) 600 mg (1,500 mg) Tab Take 600 mg by mouth 2 (two) times daily with meals.    citalopram (CELEXA) 10 MG tablet Take 1 tablet (10 mg total) by mouth every morning.    folic acid (FOLVITE) 1 MG tablet     folic acid (FOLVITE) 400 MCG tablet Take 400 mcg by mouth once daily.    IMMUNE GLOBULIN,GAMMA,IGG, (PRIVIGEN IV) Inject into the vein. Monthly    methotrexate 2.5 MG Tab     methotrexate 5 MG tablet Take by mouth. 8 tablets once a week     "mycophenolate (CELLCEPT) 500 mg Tab     benazepril (LOTENSIN) 10 MG tablet Take 1 tablet (10 mg total) by mouth once daily.    fluticasone (FLONASE) 50 mcg/actuation nasal spray 2 sprays by Each Nare route once daily.    levothyroxine (SYNTHROID) 75 MCG tablet Take 1 tablet (75 mcg total) by mouth once daily.    rosuvastatin (CRESTOR) 20 MG tablet Take 1 tablet (20 mg total) by mouth nightly.           Clinical Reference Information           Your Vitals Were     BP Pulse Height Weight BMI    137/70 66 5' 3" (1.6 m) 78.5 kg (173 lb 1 oz) 30.66 kg/m2      Blood Pressure          Most Recent Value    BP  137/70      Allergies as of 3/10/2017     Sulfa (Sulfonamide Antibiotics)    Iodinated Contrast Media - Iv Dye    Iodine    Latex    Norvasc  [Amlodipine]      Immunizations Administered on Date of Encounter - 3/10/2017     None      Orders Placed During Today's Visit     Future Labs/Procedures Expected by Expires    Hepatitis C antibody  3/10/2017 5/9/2018    Lipid panel  3/10/2017 3/10/2018    TSH  3/10/2017 6/8/2017    2D Echo Only  As directed 3/10/2018      Language Assistance Services     ATTENTION: Language assistance services are available, free of charge. Please call [phone].      ATENCIÓN: Si cesar lauren, tiene a goins disposición servicios gratuitos de asistencia lingüística. Llame al [phone].     [facility] Ý: N?u b?n nói Ti?ng Vi?t, có các d?ch v? h? tr? ngôn ng? mi?n phí dành cho b?n. G?i s? [phone].         [facility] complies with applicable Federal civil rights laws and does not discriminate on the basis of race, color, national origin, age, disability, or sex.        "

## 2023-09-08 ENCOUNTER — TELEPHONE (OUTPATIENT)
Dept: HEPATOLOGY | Facility: CLINIC | Age: 68
End: 2023-09-08
Payer: MEDICARE

## 2023-09-14 ENCOUNTER — HOSPITAL ENCOUNTER (OUTPATIENT)
Dept: CARDIOLOGY | Facility: HOSPITAL | Age: 68
Discharge: HOME | End: 2023-09-14
Attending: PHYSICIAN ASSISTANT
Payer: MEDICARE

## 2023-09-14 VITALS
HEIGHT: 63 IN | HEART RATE: 65 BPM | SYSTOLIC BLOOD PRESSURE: 118 MMHG | BODY MASS INDEX: 23.04 KG/M2 | WEIGHT: 130 LBS | DIASTOLIC BLOOD PRESSURE: 74 MMHG

## 2023-09-14 DIAGNOSIS — I50.42 CHRONIC COMBINED SYSTOLIC AND DIASTOLIC HEART FAILURE: ICD-10-CM

## 2023-09-14 LAB
ASCENDING AORTA: 2.35 CM
AV INDEX (PROSTH): 0.16
AV MEAN GRADIENT: 21 MMHG
AV PEAK GRADIENT: 40 MMHG
AV VALVE AREA BY VELOCITY RATIO: 0.94 CM²
AV VALVE AREA: 0.6 CM²
AV VELOCITY RATIO: 0.25
BSA FOR ECHO PROCEDURE: 1.62 M2
CV ECHO LV RWT: 0.31 CM
DOP CALC AO PEAK VEL: 3.17 M/S
DOP CALC AO VTI: 92.81 CM
DOP CALC LVOT AREA: 3.7 CM2
DOP CALC LVOT DIAMETER: 2.18 CM
DOP CALC LVOT PEAK VEL: 0.8 M/S
DOP CALC LVOT STROKE VOLUME: 56 CM3
DOP CALCLVOT PEAK VEL VTI: 15.01 CM
E WAVE DECELERATION TIME: 199.44 MSEC
E/A RATIO: 1.33
E/E' RATIO: 15.27 M/S
ECHO LV POSTERIOR WALL: 0.89 CM (ref 0.6–1.1)
FRACTIONAL SHORTENING: 12 % (ref 28–44)
GLOBAL LONGITUIDAL STRAIN: 5 %
INTERVENTRICULAR SEPTUM: 0.76 CM (ref 0.6–1.1)
LA MAJOR: 4 CM
LA MINOR: 4 CM
LA WIDTH: 3.39 CM
LEFT ATRIUM SIZE: 3.5 CM
LEFT ATRIUM VOLUME INDEX MOD: 14.5 ML/M2
LEFT ATRIUM VOLUME INDEX: 25.1 ML/M2
LEFT ATRIUM VOLUME MOD: 23.33 CM3
LEFT ATRIUM VOLUME: 40.34 CM3
LEFT INTERNAL DIMENSION IN SYSTOLE: 5.07 CM (ref 2.1–4)
LEFT VENTRICLE DIASTOLIC VOLUME INDEX: 100.93 ML/M2
LEFT VENTRICLE DIASTOLIC VOLUME: 162.49 ML
LEFT VENTRICLE MASS INDEX: 111 G/M2
LEFT VENTRICLE SYSTOLIC VOLUME INDEX: 75.9 ML/M2
LEFT VENTRICLE SYSTOLIC VOLUME: 122.24 ML
LEFT VENTRICULAR INTERNAL DIMENSION IN DIASTOLE: 5.74 CM (ref 3.5–6)
LEFT VENTRICULAR MASS: 179.03 G
LV LATERAL E/E' RATIO: 10.5 M/S
LV SEPTAL E/E' RATIO: 28 M/S
MV PEAK A VEL: 0.63 M/S
MV PEAK E VEL: 0.84 M/S
MV STENOSIS PRESSURE HALF TIME: 57.84 MS
MV VALVE AREA P 1/2 METHOD: 3.8 CM2
OHS LV EJECTION FRACTION SIMPSONS BIPLANE MOD: 20 %
PISA TR MAX VEL: 3.2 M/S
RA MAJOR: 4.1 CM
RA PRESSURE ESTIMATED: 15 MMHG
RA WIDTH: 3.8 CM
RIGHT VENTRICULAR END-DIASTOLIC DIMENSION: 3.51 CM
RV TB RVSP: 18 MMHG
SINUS: 2.67 CM
STJ: 2.07 CM
TDI LATERAL: 0.08 M/S
TDI SEPTAL: 0.03 M/S
TDI: 0.06 M/S
TR MAX PG: 41 MMHG
TRICUSPID ANNULAR PLANE SYSTOLIC EXCURSION: 1.1 CM
TV REST PULMONARY ARTERY PRESSURE: 56 MMHG
Z-SCORE OF LEFT VENTRICULAR DIMENSION IN END DIASTOLE: 2.28
Z-SCORE OF LEFT VENTRICULAR DIMENSION IN END SYSTOLE: 4.6

## 2023-09-14 PROCEDURE — 93306 ECHO (CUPID ONLY): ICD-10-PCS | Mod: 26,,, | Performed by: INTERNAL MEDICINE

## 2023-09-14 PROCEDURE — 25500020 PHARM REV CODE 255: Performed by: PHYSICIAN ASSISTANT

## 2023-09-14 PROCEDURE — C8929 TTE W OR WO FOL WCON,DOPPLER: HCPCS

## 2023-09-14 PROCEDURE — 93306 TTE W/DOPPLER COMPLETE: CPT | Mod: 26,,, | Performed by: INTERNAL MEDICINE

## 2023-09-14 RX ADMIN — HUMAN ALBUMIN MICROSPHERES AND PERFLUTREN 0.11 MG: 10; .22 INJECTION, SOLUTION INTRAVENOUS at 02:09

## 2023-09-15 ENCOUNTER — PATIENT MESSAGE (OUTPATIENT)
Dept: ADMINISTRATIVE | Facility: OTHER | Age: 68
End: 2023-09-15
Payer: MEDICARE

## 2023-09-18 DIAGNOSIS — I50.20 HFREF (HEART FAILURE WITH REDUCED EJECTION FRACTION): Primary | ICD-10-CM

## 2023-09-18 NOTE — PROGRESS NOTES
Spoke to the patient regarding recent echo showing EF 20-25%. Reviewed with Dr. Brian who recommends EP consult and starting Jardiance. Mrs. Root does not typically have UTIs, but was recently treated for one. Therefore she will wait 2 weeks for full resolution of those symptoms and then begin Jardiance. BMP ordered 2 weeks after beginning Jardiance.

## 2023-09-26 ENCOUNTER — PATIENT MESSAGE (OUTPATIENT)
Dept: GASTROENTEROLOGY | Facility: CLINIC | Age: 68
End: 2023-09-26
Payer: MEDICARE

## 2023-09-26 RX ORDER — PANTOPRAZOLE SODIUM 40 MG/1
40 TABLET, DELAYED RELEASE ORAL DAILY
Qty: 90 TABLET | Refills: 0 | Status: SHIPPED | OUTPATIENT
Start: 2023-09-26 | End: 2023-10-10

## 2023-09-27 ENCOUNTER — PATIENT MESSAGE (OUTPATIENT)
Dept: HEMATOLOGY/ONCOLOGY | Facility: CLINIC | Age: 68
End: 2023-09-27
Payer: MEDICARE

## 2023-09-27 PROBLEM — C50.112 MALIGNANT NEOPLASM OF CENTRAL PORTION OF LEFT BREAST IN FEMALE, ESTROGEN RECEPTOR NEGATIVE: Status: RESOLVED | Noted: 2022-11-03 | Resolved: 2023-09-27

## 2023-09-27 PROBLEM — Z85.3 HISTORY OF BREAST CANCER IN FEMALE: Status: ACTIVE | Noted: 2023-09-27

## 2023-09-27 PROBLEM — Z17.1 MALIGNANT NEOPLASM OF CENTRAL PORTION OF LEFT BREAST IN FEMALE, ESTROGEN RECEPTOR NEGATIVE: Status: RESOLVED | Noted: 2022-11-03 | Resolved: 2023-09-27

## 2023-09-28 ENCOUNTER — PATIENT MESSAGE (OUTPATIENT)
Dept: HEMATOLOGY/ONCOLOGY | Facility: CLINIC | Age: 68
End: 2023-09-28
Payer: MEDICARE

## 2023-10-06 ENCOUNTER — PATIENT MESSAGE (OUTPATIENT)
Dept: CARDIOLOGY | Facility: CLINIC | Age: 68
End: 2023-10-06
Payer: MEDICARE

## 2023-10-06 RX ORDER — LOSARTAN POTASSIUM 25 MG/1
25 TABLET ORAL DAILY
Qty: 90 TABLET | Refills: 3 | Status: ON HOLD | OUTPATIENT
Start: 2023-10-06 | End: 2023-10-16

## 2023-10-09 ENCOUNTER — PATIENT MESSAGE (OUTPATIENT)
Dept: CARDIOLOGY | Facility: CLINIC | Age: 68
End: 2023-10-09
Payer: MEDICARE

## 2023-10-09 ENCOUNTER — PATIENT MESSAGE (OUTPATIENT)
Dept: GASTROENTEROLOGY | Facility: CLINIC | Age: 68
End: 2023-10-09
Payer: MEDICARE

## 2023-10-09 ENCOUNTER — TELEPHONE (OUTPATIENT)
Dept: CARDIOLOGY | Facility: CLINIC | Age: 68
End: 2023-10-09
Payer: MEDICARE

## 2023-10-09 RX ORDER — METOPROLOL SUCCINATE 25 MG/1
25 TABLET, EXTENDED RELEASE ORAL DAILY
Qty: 30 TABLET | Refills: 6 | Status: SHIPPED | OUTPATIENT
Start: 2023-10-09 | End: 2023-10-10 | Stop reason: SDUPTHER

## 2023-10-10 ENCOUNTER — OFFICE VISIT (OUTPATIENT)
Dept: GASTROENTEROLOGY | Facility: CLINIC | Age: 68
End: 2023-10-10
Payer: MEDICARE

## 2023-10-10 VITALS
SYSTOLIC BLOOD PRESSURE: 122 MMHG | BODY MASS INDEX: 22.71 KG/M2 | HEART RATE: 86 BPM | WEIGHT: 123.44 LBS | HEIGHT: 62 IN | DIASTOLIC BLOOD PRESSURE: 76 MMHG

## 2023-10-10 DIAGNOSIS — K26.9 DUODENAL ULCER: Primary | ICD-10-CM

## 2023-10-10 PROCEDURE — 99999 PR PBB SHADOW E&M-EST. PATIENT-LVL IV: ICD-10-PCS | Mod: PBBFAC,,, | Performed by: NURSE PRACTITIONER

## 2023-10-10 PROCEDURE — 99213 PR OFFICE/OUTPT VISIT, EST, LEVL III, 20-29 MIN: ICD-10-PCS | Mod: S$PBB,,, | Performed by: NURSE PRACTITIONER

## 2023-10-10 PROCEDURE — 99213 OFFICE O/P EST LOW 20 MIN: CPT | Mod: S$PBB,,, | Performed by: NURSE PRACTITIONER

## 2023-10-10 PROCEDURE — 99999 PR PBB SHADOW E&M-EST. PATIENT-LVL IV: CPT | Mod: PBBFAC,,, | Performed by: NURSE PRACTITIONER

## 2023-10-10 PROCEDURE — 99214 OFFICE O/P EST MOD 30 MIN: CPT | Mod: PBBFAC | Performed by: NURSE PRACTITIONER

## 2023-10-10 RX ORDER — METHOTREXATE 2.5 MG/1
5 TABLET ORAL 2 TIMES DAILY
COMMUNITY
Start: 2023-10-05

## 2023-10-10 RX ORDER — PANTOPRAZOLE SODIUM 40 MG/1
40 TABLET, DELAYED RELEASE ORAL DAILY
Qty: 90 TABLET | Refills: 3 | Status: SHIPPED | OUTPATIENT
Start: 2023-10-10 | End: 2023-12-26

## 2023-10-10 RX ORDER — FOLIC ACID 1 MG/1
1 TABLET ORAL DAILY
COMMUNITY
Start: 2023-10-05

## 2023-10-10 RX ORDER — METOPROLOL TARTRATE 25 MG/1
TABLET, FILM COATED ORAL NIGHTLY
Status: ON HOLD | COMMUNITY
End: 2023-10-16

## 2023-10-10 NOTE — PROGRESS NOTES
Clinic Follow Up:  [facility] Follow Up Note    Reason for Follow Up:  The encounter diagnosis was Duodenal ulcer.    PCP: Patrick Hernandez       HPI:  This is a 68 y.o. female here for follow up.   Had GI bleed/melena on April 2023.  She underwent EGD and was found to have bleeding duodenal ulcer with severe duodenal stricture.  She was referred to advanced endoscopy for possible dilation of duodenal stricture as well as recheck of bleeding ulcer.  She underwent repeat scope in June 2023 that showed healed duodenal ulcer and duodenal stricture was dilated.  She is been on pantoprazole 40 mg daily since.  She is no longer on aspirin, but continues on Plavix. She needs refill of pantoprazole.     Review of Systems   Constitutional:  Negative for activity change and appetite change.        As per interval history above   Respiratory:  Negative for cough and shortness of breath.    Cardiovascular:  Negative for chest pain.   Gastrointestinal:  Negative for abdominal pain, blood in stool, constipation, diarrhea, nausea and vomiting.   Skin:  Negative for color change and rash.       Medical History:  Past Medical History:   Diagnosis Date    Allergy     Anticoagulant long-term use     Plavix    Breast cancer 2008    Carotid stenosis, bilateral 10/13/2017    Coronary artery disease     Coronary artery disease involving coronary bypass graft of native heart without angina pectoris 08/16/2019 2/19  1.  Left main is a small vessel with a 60%-65% ostial lesion. 2.  LAD is a medium-sized vessel diffuse 70% proximally  Ramus intermedius is a medium size vessel with a 40%-50% ostial lesion. 3.  Circumflex 60%-70% ostial  remainder of circumflex and obtuse marginal normal in appearance. Right coronary artery is normal size vessel, short discrete 70%mid 4.  Vein graft to right coronary is occ    Coronary artery disease involving native coronary artery of native heart without angina pectoris 06/27/2017     DCIS (ductal carcinoma in situ) of breast 11/06/2012    Encounter for blood transfusion     Epigastric pain 4/1/2023    Essential hypertension 11/06/2012    GERD (gastroesophageal reflux disease)     GI bleed 02/2021    Hematemesis with nausea 5/11/2021    Hodgkin lymphoma     Hx of Hodgkin's disease - s/p chemo and radiation 11/06/2012    Hyperlipidemia     Hyperlipidemia 11/06/2012    The patient presents with hyperlipidemia.  The patient reports tolerating the medication well and is in excellent compliance.  There have been no medication side effects.  The patient denies chest pain, neuropathy, and myalgias.  The patient has reduced fat intake and has been exercising.  Current treatment has included the medications listed in the med card.   Lab Results Component Value Date  CH    Hypertension     LBBB (left bundle branch block) 05/22/2018    Malignant neoplasm of central portion of left breast in female, estrogen receptor negative 11/3/2022    Osteoporosis     Paroxysmal atrial fibrillation - single post-op episode 08/24/2017    Pemphigoid     Pemphigoid     pt receives IVIG infusions    Pulmonary hypertension 1/13/2023    S/P AVR (aortic valve replacement) - pericardial valve 08/21/2017    Perceval aortic tissue valve PVS23. 23mm    serial # [id number]    S/P CABG x 2 08/21/2017 8/17 CABG X 2 with SVG-LAD and SVG-distal RCA  SVG LAD due to absent LIMA after chest radiation and lymph node biopsy     Stented coronary artery     She had 2 stents placed in 2/2019.  She has had CAD and bypasses in the past in 2017.      Thyroid disease        Surgical History:   Past Surgical History:   Procedure Laterality Date    ARTERIOGRAPHY OF AORTIC ROOT N/A 02/15/2019    Procedure: ARTERIOGRAM, AORTIC ROOT;  Surgeon: Sujit Chaudhry MD;  Location: [facility];  Service: Cardiology;  Laterality: N/A;    AXILLARY NODE DISSECTION Left 11/25/2022    Procedure: LYMPHADENECTOMY, AXILLARY-Left;  Surgeon: Rosa Maradiaga MD;  Location:  [facility] OR;  Service: General;  Laterality: Left;    BREAST BIOPSY      BREAST RECONSTRUCTION      bilateral mastectomy    CARDIAC CATHETERIZATION      stents x 2    CARDIAC SURGERY  08/2017    Aortic valve replacement , CABG 2 vessel    CARDIAC VALVE REPLACEMENT  08/2017    aortic valve, bovine per pt    CORONARY ANGIOGRAPHY N/A 02/15/2019    Procedure: ANGIOGRAM, CORONARY ARTERY;  Surgeon: Sujit Chaudhry MD;  Location: [facility] CATH;  Service: Cardiology;  Laterality: N/A;    CORONARY ANGIOGRAPHY N/A 4/1/2022    Procedure: ANGIOGRAM, CORONARY ARTERY;  Surgeon: Sujit Chaudhry MD;  Location: [facility] CATH;  Service: Cardiology;  Laterality: N/A;    CORONARY BYPASS GRAFT ANGIOGRAPHY  02/15/2019    Procedure: Bypass graft study;  Surgeon: Sujit Chaudhry MD;  Location: [facility] CATH;  Service: Cardiology;;    COSMETIC SURGERY      bereast reconstruction    ESOPHAGOGASTRODUODENOSCOPY N/A 05/14/2021    Procedure: EGD (ESOPHAGOGASTRODUODENOSCOPY);  Surgeon: Tracy Flower MD;  Location: [facility];  Service: Endoscopy;  Laterality: N/A;    ESOPHAGOGASTRODUODENOSCOPY N/A 11/04/2021    Procedure: EGD (ESOPHAGOGASTRODUODENOSCOPY);  Surgeon: Tracy Flower MD;  Location: [facility];  Service: Endoscopy;  Laterality: N/A;    ESOPHAGOGASTRODUODENOSCOPY N/A 4/1/2023    Procedure: EGD (ESOPHAGOGASTRODUODENOSCOPY);  Surgeon: Tracy Flower MD;  Location: [facility];  Service: Endoscopy;  Laterality: N/A;    ESOPHAGOGASTRODUODENOSCOPY N/A 6/8/2023    Procedure: EGD (ESOPHAGOGASTRODUODENOSCOPY);  Surgeon: Mk Sanchez MD;  Location: [facility] ([address] [place] [address]);  Service: Endoscopy;  Laterality: N/A;  inst via portal  cardiac clearance-see encounter dated 5/31/23  precall confirmed 6/2 EB    EYE SURGERY      eye lids    INJECTION FOR SENTINEL NODE IDENTIFICATION Left 11/25/2022    Procedure: INJECTION, FOR SENTINEL NODE IDENTIFICATION-Left;  Surgeon: Rosa Maradiaga MD;  Location: [facility] OR;  Service: General;  Laterality: Left;    LEFT HEART  CATHETERIZATION Right 02/15/2019    Procedure: Left heart cath;  Surgeon: Sujit Chaudhry MD;  Location: [facility] CATH;  Service: Cardiology;  Laterality: Right;    LEFT HEART CATHETERIZATION Left 2022    Procedure: Left heart cath;  Surgeon: Sujit Chaudhry MD;  Location: [facility] CATH;  Service: Cardiology;  Laterality: Left;    LUMBAR LAMINECTOMY WITH DISCECTOMY N/A 2020    Procedure: LAMINECTOMY, SPINE, LUMBAR, WITH DISCECTOMY;  Surgeon: Vimal Villegas MD;  Location: [facility] OR;  Service: Neurosurgery;  Laterality: N/A;  left L5-S1  Laminectomy L4-5    LYMPHADENECTOMY      MASTECTOMY      MASTECTOMY, PARTIAL Left 2022    Procedure: MASTECTOMY, PARTIAL-Left ultrasound guided;  Surgeon: Rosa Maradiaga MD;  Location: [facility] OR;  Service: General;  Laterality: Left;    RIGHT HEART CATHETERIZATION Right 2022    Procedure: INSERTION, CATHETER, RIGHT HEART;  Surgeon: Sujit Chaudhry MD;  Location: [facility] CATH;  Service: Cardiology;  Laterality: Right;    SENTINEL LYMPH NODE BIOPSY Left 2022    Procedure: BIOPSY, LYMPH NODE, SENTINEL-Left;  Surgeon: Rosa Maradiaga MD;  Location: [facility] OR;  Service: General;  Laterality: Left;    SKIN BIOPSY      SPLENECTOMY, TOTAL      TRANSFORAMINAL EPIDURAL INJECTION OF STEROID Left 2019    Procedure: Left L5/S1 TF SKIP with local;  Surgeon: Canelo Niño MD;  Location: [facility];  Service: Pain Management;  Laterality: Left;    TUMOR REMOVAL      neck- lymphoma       Family History:   Family History   Problem Relation Age of Onset    Hypertension Mother     Hypertension Father     Cancer Neg Hx        Social History:   Social History     Tobacco Use    Smoking status: Former     Current packs/day: 0.00     Average packs/day: 1 pack/day for 20.0 years (20.0 ttl pk-yrs)     Types: Cigarettes     Start date: 1961     Quit date: 1981     Years since quittin.9    Smokeless tobacco: Never   Substance Use Topics    Alcohol use: No     Comment: 2  drinks/month; none 72 hrs prior to surgery    Drug use: No       Allergies:   Review of patient's allergies indicates:   Allergen Reactions    Amlodipine Shortness Of Breath and Other (See Comments)     unknown  Other reaction(s): Swelling  unknown  unknown  Other reaction(s): Swelling  unknown  Other reaction(s): Swelling  unknown    Levofloxacin Hives and Swelling    Sulfa (sulfonamide antibiotics) Shortness Of Breath, Itching and Other (See Comments)     elevated blood pressure    Ace inhibitors     Ciprofloxacin Itching    Iodinated contrast media     Iodine      Other reaction(s): Unknown    Latex      Other reaction(s): Itching    Latex, natural rubber Itching       Home Medications:  Current Outpatient Medications on File Prior to Visit   Medication Sig Dispense Refill    calcium carbonate (OS-CALVIN) 600 mg (1,500 mg) Tab Take 600 mg by mouth 2 (two) times daily with meals.      cholecalciferol, vitamin D3, (VITAMIN D3) 100 mcg (4,000 unit) Cap Take 4,000 Units by mouth once daily.      clopidogreL (PLAVIX) 75 mg tablet Take 1 tablet (75 mg total) by mouth once daily. 90 tablet 3    dexAMETHasone (DECADRON) 0.5 mg/5 mL Elix SWISH 3.75MLS IN MOUTH FOR 30 SECONDS AND SWALLOW TWICE DAILY AS NEEDED FOR LICHEN PLANUS FLARE UPS      empagliflozin (JARDIANCE) 10 mg tablet Take 1 tablet (10 mg total) by mouth once daily. 30 tablet 11    EScitalopram oxalate (LEXAPRO) 10 MG tablet Take 1 tablet (10 mg total) by mouth once daily. 90 tablet 3    folic acid (FOLVITE) 1 MG tablet Take 1,000 mcg by mouth.      furosemide (LASIX) 20 MG tablet Take 1 tablet (20 mg total) by mouth 2 (two) times a day. (Patient taking differently: Take 20 mg by mouth once daily.) 60 tablet 11    levothyroxine (SYNTHROID) 75 MCG tablet Take 1 tablet (75 mcg total) by mouth before breakfast. 90 tablet 3    losartan (COZAAR) 25 MG tablet Take 1 tablet (25 mg total) by mouth once daily. 90 tablet 3    methotrexate 2.5 MG Tab Take by mouth.       "metoprolol tartrate (LOPRESSOR) 25 MG tablet 1 tablet with food Orally once daily      rosuvastatin (CRESTOR) 20 MG tablet Take 1 tablet (20 mg total) by mouth every evening. 90 tablet 3    [DISCONTINUED] pantoprazole (PROTONIX) 40 MG tablet Take 1 tablet (40 mg total) by mouth once daily. 90 tablet 0    dapsone 25 MG Tab 75 mg.      [DISCONTINUED] metoprolol succinate (TOPROL-XL) 25 MG 24 hr tablet Take 1 tablet (25 mg total) by mouth once daily. 30 tablet 6     No current facility-administered medications on file prior to visit.       /76 (BP Location: Left arm, Patient Position: Sitting, BP Method: Medium (Manual))   Pulse 86   Ht 5' 2" (1.575 m)   Wt 56 kg (123 lb 7.3 oz)   BMI 22.58 kg/m²   Body mass index is 22.58 kg/m².  Physical Exam  Constitutional:       General: She is not in acute distress.  HENT:      Head: Normocephalic.   Neurological:      General: No focal deficit present.      Mental Status: She is alert.   Psychiatric:         Mood and Affect: Mood normal.         Judgment: Judgment normal.         Labs: Pertinent labs reviewed.    Assessment:   1. Duodenal ulcer        Recommendations:   - continue pantoprazole. Refilled.     Duodenal ulcer  -     pantoprazole (PROTONIX) 40 MG tablet; Take 1 tablet (40 mg total) by mouth once daily.  Dispense: 90 tablet; Refill: 3    Return to Clinic:  Follow up in about 1 year (around 10/10/2024).    Thank you for the opportunity to participate in the care of this patient.  BOO Elizabeth        "

## 2023-10-11 ENCOUNTER — OFFICE VISIT (OUTPATIENT)
Dept: CARDIOLOGY | Facility: CLINIC | Age: 68
End: 2023-10-11
Payer: MEDICARE

## 2023-10-11 VITALS
RESPIRATION RATE: 18 BRPM | SYSTOLIC BLOOD PRESSURE: 131 MMHG | HEIGHT: 62 IN | WEIGHT: 125 LBS | HEART RATE: 97 BPM | DIASTOLIC BLOOD PRESSURE: 79 MMHG | BODY MASS INDEX: 23 KG/M2

## 2023-10-11 DIAGNOSIS — Z95.2 S/P AVR (AORTIC VALVE REPLACEMENT): ICD-10-CM

## 2023-10-11 DIAGNOSIS — R55 NEAR SYNCOPE: ICD-10-CM

## 2023-10-11 DIAGNOSIS — I65.23 CAROTID STENOSIS, BILATERAL: ICD-10-CM

## 2023-10-11 DIAGNOSIS — I44.7 LBBB (LEFT BUNDLE BRANCH BLOCK): ICD-10-CM

## 2023-10-11 DIAGNOSIS — E78.2 MIXED HYPERLIPIDEMIA: ICD-10-CM

## 2023-10-11 DIAGNOSIS — R79.89 ELEVATED BRAIN NATRIURETIC PEPTIDE (BNP) LEVEL: ICD-10-CM

## 2023-10-11 DIAGNOSIS — I27.20 PULMONARY HYPERTENSION: ICD-10-CM

## 2023-10-11 DIAGNOSIS — R00.2 PALPITATIONS: ICD-10-CM

## 2023-10-11 DIAGNOSIS — I70.0 ATHEROSCLEROSIS OF AORTA: ICD-10-CM

## 2023-10-11 DIAGNOSIS — Z95.5 STENTED CORONARY ARTERY: ICD-10-CM

## 2023-10-11 DIAGNOSIS — Z85.71 HX OF HODGKIN'S DISEASE: ICD-10-CM

## 2023-10-11 DIAGNOSIS — I25.810 CORONARY ARTERY DISEASE INVOLVING CORONARY BYPASS GRAFT OF NATIVE HEART WITHOUT ANGINA PECTORIS: ICD-10-CM

## 2023-10-11 DIAGNOSIS — Z86.000 HISTORY OF DUCTAL CARCINOMA IN SITU (DCIS) OF BREAST: ICD-10-CM

## 2023-10-11 DIAGNOSIS — I10 ESSENTIAL HYPERTENSION: Primary | ICD-10-CM

## 2023-10-11 DIAGNOSIS — I50.20 HFREF (HEART FAILURE WITH REDUCED EJECTION FRACTION): ICD-10-CM

## 2023-10-11 DIAGNOSIS — Z95.1 S/P CABG X 2: ICD-10-CM

## 2023-10-11 DIAGNOSIS — I25.810 ATHEROSCLEROSIS OF AUTOLOGOUS VEIN CORONARY ARTERY BYPASS GRAFT, UNSPECIFIED WHETHER ANGINA PRESENT: Primary | ICD-10-CM

## 2023-10-11 DIAGNOSIS — I50.42 CHRONIC COMBINED SYSTOLIC AND DIASTOLIC HEART FAILURE: ICD-10-CM

## 2023-10-11 DIAGNOSIS — Z90.13 ABSENCE OF BOTH BREASTS: ICD-10-CM

## 2023-10-11 PROCEDURE — 99214 OFFICE O/P EST MOD 30 MIN: CPT | Mod: PBBFAC,PO | Performed by: INTERNAL MEDICINE

## 2023-10-11 PROCEDURE — 99999 PR PBB SHADOW E&M-EST. PATIENT-LVL IV: ICD-10-PCS | Mod: PBBFAC,,, | Performed by: INTERNAL MEDICINE

## 2023-10-11 PROCEDURE — 99999 PR PBB SHADOW E&M-EST. PATIENT-LVL IV: CPT | Mod: PBBFAC,,, | Performed by: INTERNAL MEDICINE

## 2023-10-11 PROCEDURE — 99214 PR OFFICE/OUTPT VISIT, EST, LEVL IV, 30-39 MIN: ICD-10-PCS | Mod: S$PBB,,, | Performed by: INTERNAL MEDICINE

## 2023-10-11 PROCEDURE — 99214 OFFICE O/P EST MOD 30 MIN: CPT | Mod: S$PBB,,, | Performed by: INTERNAL MEDICINE

## 2023-10-11 RX ORDER — SODIUM CHLORIDE 9 MG/ML
INJECTION, SOLUTION INTRAVENOUS ONCE
Status: CANCELLED | OUTPATIENT
Start: 2023-10-11 | End: 2023-10-11

## 2023-10-11 RX ORDER — SODIUM CHLORIDE 0.9 % (FLUSH) 0.9 %
10 SYRINGE (ML) INJECTION
Status: SHIPPED | OUTPATIENT
Start: 2023-10-11

## 2023-10-11 NOTE — PROGRESS NOTES
Subjective:    Patient ID:  Dulce Root is a 68 y.o. female patient here for evaluation Establish Care (09/14 Abn. Echo (ejection fraction) )      History of Present Illness: cardiology follow-up.  Complex past history, CABG with aortic valve replacement 2017.   Left heart catheterization with coronary intervention 2019, angioplasty and stenting of the left main, angioplasty and stenting to the ostial circ with   Balloon in the ramus and LAD as a T technique.  Follow-up PCI stent right coronary artery , 2 months later.  Left heart catheterization performed 04/2022 with patent stent sites, patent vein graft to the LAD.      In the interim EF has fallen from 45% to 20%.  Last echo 09/14/2022.  EKG with left bundle-branch block.       Other concomitant medical problems include history of lymphoma treated remotely, in resolution.  History of breast cancer.  Also gives a history of upper GI bleed x2 from bleeding ulcers.  No known chronic kidney disease.  No liver disease     No history of CVA.  No history of DVT PE.        Review of patient's allergies indicates:   Allergen Reactions    Amlodipine Shortness Of Breath and Other (See Comments)     unknown  Other reaction(s): Swelling  unknown  unknown  Other reaction(s): Swelling  unknown  Other reaction(s): Swelling  unknown    Levofloxacin Hives and Swelling    Sulfa (sulfonamide antibiotics) Shortness Of Breath, Itching and Other (See Comments)     elevated blood pressure    Ace inhibitors     Ciprofloxacin Itching    Iodinated contrast media     Iodine      Other reaction(s): Unknown    Latex      Other reaction(s): Itching    Latex, natural rubber Itching       Past Medical History:   Diagnosis Date    Allergy     Anticoagulant long-term use     Plavix    Breast cancer 2008    Carotid stenosis, bilateral 10/13/2017    Coronary artery disease     Coronary artery disease involving coronary bypass graft of native heart without angina pectoris 08/16/2019 2/19  1.   Left main is a small vessel with a 60%-65% ostial lesion. 2.  LAD is a medium-sized vessel diffuse 70% proximally  Ramus intermedius is a medium size vessel with a 40%-50% ostial lesion. 3.  Circumflex 60%-70% ostial  remainder of circumflex and obtuse marginal normal in appearance. Right coronary artery is normal size vessel, short discrete 70%mid 4.  Vein graft to right coronary is occ    Coronary artery disease involving native coronary artery of native heart without angina pectoris 06/27/2017    DCIS (ductal carcinoma in situ) of breast 11/06/2012    Encounter for blood transfusion     Epigastric pain 4/1/2023    Essential hypertension 11/06/2012    GERD (gastroesophageal reflux disease)     GI bleed 02/2021    Hematemesis with nausea 5/11/2021    Hodgkin lymphoma     Hx of Hodgkin's disease - s/p chemo and radiation 11/06/2012    Hyperlipidemia     Hyperlipidemia 11/06/2012    The patient presents with hyperlipidemia.  The patient reports tolerating the medication well and is in excellent compliance.  There have been no medication side effects.  The patient denies chest pain, neuropathy, and myalgias.  The patient has reduced fat intake and has been exercising.  Current treatment has included the medications listed in the med card.   Lab Results Component Value Date  CH    Hypertension     LBBB (left bundle branch block) 05/22/2018    Malignant neoplasm of central portion of left breast in female, estrogen receptor negative 11/3/2022    Osteoporosis     Paroxysmal atrial fibrillation - single post-op episode 08/24/2017    Pemphigoid     Pemphigoid     pt receives IVIG infusions    Pulmonary hypertension 1/13/2023    S/P AVR (aortic valve replacement) - pericardial valve 08/21/2017    Perceval aortic tissue valve PVS23. 23mm    serial # [id number]    S/P CABG x 2 08/21/2017 8/17 CABG X 2 with SVG-LAD and SVG-distal RCA  SVG LAD due to absent LIMA after chest radiation and lymph node biopsy     Stented coronary  artery     She had 2 stents placed in 2/2019.  She has had CAD and bypasses in the past in 2017.      Thyroid disease      Past Surgical History:   Procedure Laterality Date    ARTERIOGRAPHY OF AORTIC ROOT N/A 02/15/2019    Procedure: ARTERIOGRAM, AORTIC ROOT;  Surgeon: Sujit Chaudhry MD;  Location: [facility] CATH;  Service: Cardiology;  Laterality: N/A;    AXILLARY NODE DISSECTION Left 11/25/2022    Procedure: LYMPHADENECTOMY, AXILLARY-Left;  Surgeon: Rosa Maradiaga MD;  Location: [facility];  Service: General;  Laterality: Left;    BREAST BIOPSY      BREAST RECONSTRUCTION      bilateral mastectomy    CARDIAC CATHETERIZATION      stents x 2    CARDIAC SURGERY  08/2017    Aortic valve replacement , CABG 2 vessel    CARDIAC VALVE REPLACEMENT  08/2017    aortic valve, bovine per pt    CORONARY ANGIOGRAPHY N/A 02/15/2019    Procedure: ANGIOGRAM, CORONARY ARTERY;  Surgeon: Sujit Chaudhry MD;  Location: [facility] CATH;  Service: Cardiology;  Laterality: N/A;    CORONARY ANGIOGRAPHY N/A 4/1/2022    Procedure: ANGIOGRAM, CORONARY ARTERY;  Surgeon: Sujit Chaudhry MD;  [facility]ATH;  Service: Cardiology;  Laterality: N/A;    CORONARY BYPASS GRAFT ANGIOGRAPHY  02/15/2019    Procedure: Bypass graft study;  Surgeon: Sujit Chaudhry MD;  Location: [facility] CATH;  Service: Cardiology;;    COSMETIC SURGERY      bereast reconstruction    ESOPHAGOGASTRODUODENOSCOPY N/A 05/14/2021    Procedure: EGD (ESOPHAGOGASTRODUODENOSCOPY);  Surgeon: Tracy Flower MD;  Location: [facility];  Service: Endoscopy;  Laterality: N/A;    ESOPHAGOGASTRODUODENOSCOPY N/A 11/04/2021    Procedure: EGD (ESOPHAGOGASTRODUODENOSCOPY);  Surgeon: Tracy Flower MD;  Location: [facility];  Service: Endoscopy;  Laterality: N/A;    ESOPHAGOGASTRODUODENOSCOPY N/A 4/1/2023    Procedure: EGD (ESOPHAGOGASTRODUODENOSCOPY);  Surgeon: Tracy Flower MD;  Location: [facility];  Service: Endoscopy;  Laterality: N/A;    ESOPHAGOGASTRODUODENOSCOPY N/A 6/8/2023    Procedure: EGD  (ESOPHAGOGASTRODUODENOSCOPY);  Surgeon: Mk Sanchez MD;  Location: [facility] ENDO ([address] [place] [address]);  Service: Endoscopy;  Laterality: N/A;  inst via portal  cardiac clearance-see encounter dated 5/31/23  precall confirmed 6/2 EB    EYE SURGERY      eye lids    INJECTION FOR SENTINEL NODE IDENTIFICATION Left 11/25/2022    Procedure: INJECTION, FOR SENTINEL NODE IDENTIFICATION-Left;  Surgeon: Rosa Maradiaga MD;  Location: [facility];  Service: General;  Laterality: Left;    LEFT HEART CATHETERIZATION Right 02/15/2019    Procedure: Left heart cath;  Surgeon: Sujit Chaudhry MD;  [facility];  Service: Cardiology;  Laterality: Right;    LEFT HEART CATHETERIZATION Left 4/1/2022    Procedure: Left heart cath;  Surgeon: Sujit Chaudhry MD;  Location: [facility] CATH;  Service: Cardiology;  Laterality: Left;    LUMBAR LAMINECTOMY WITH DISCECTOMY N/A 02/27/2020    Procedure: LAMINECTOMY, SPINE, LUMBAR, WITH DISCECTOMY;  Surgeon: Vimal Villegas MD;  Location: [facility] OR;  Service: Neurosurgery;  Laterality: N/A;  left L5-S1  Laminectomy L4-5    LYMPHADENECTOMY      MASTECTOMY      MASTECTOMY, PARTIAL Left 11/25/2022    Procedure: MASTECTOMY, PARTIAL-Left ultrasound guided;  Surgeon: Rosa Maradiaga MD;  Location: [facility];  Service: General;  Laterality: Left;    RIGHT HEART CATHETERIZATION Right 4/1/2022    Procedure: INSERTION, CATHETER, RIGHT HEART;  Surgeon: Sujit Chaudhry MD;  [facility];  Service: Cardiology;  Laterality: Right;    SENTINEL LYMPH NODE BIOPSY Left 11/25/2022    Procedure: BIOPSY, LYMPH NODE, SENTINEL-Left;  Surgeon: Rosa Maradiaga MD;  Location: [facility];  Service: General;  Laterality: Left;    SKIN BIOPSY      SPLENECTOMY, TOTAL      TRANSFORAMINAL EPIDURAL INJECTION OF STEROID Left 12/31/2019    Procedure: Left L5/S1 TF SKIP with local;  Surgeon: Canelo Niño MD;  Location: [facility] PAIN MGT;  Service: Pain Management;  Laterality: Left;    TUMOR REMOVAL      neck- lymphoma     Social History     Tobacco Use     Smoking status: Former     Current packs/day: 0.00     Average packs/day: 1 pack/day for 20.0 years (20.0 ttl pk-yrs)     Types: Cigarettes     Start date: 1961     Quit date: 1981     Years since quittin.9    Smokeless tobacco: Never   Substance Use Topics    Alcohol use: No     Comment: 2 drinks/month; none 72 hrs prior to surgery    Drug use: No        Review of Systems:    As noted in HPI in addition      REVIEW OF SYSTEMS  Review of Systems   Constitutional: Negative for decreased appetite, diaphoresis, night sweats, weight gain and weight loss.   HENT:  Negative for nosebleeds and odynophagia.    Eyes:  Negative for double vision and photophobia.   Cardiovascular:  Negative for chest pain, claudication, cyanosis, dyspnea on exertion, irregular heartbeat, leg swelling, near-syncope, orthopnea, palpitations, paroxysmal nocturnal dyspnea and syncope.   Respiratory:  Negative for cough, hemoptysis, shortness of breath and wheezing.    Hematologic/Lymphatic: Negative for adenopathy.   Skin:  Negative for flushing, skin cancer and suspicious lesions.   Musculoskeletal:  Negative for gout, myalgias and neck pain.   Gastrointestinal:  Negative for abdominal pain, heartburn, hematemesis and hematochezia.   Genitourinary:  Negative for bladder incontinence, hesitancy and nocturia.   Neurological:  Negative for focal weakness, headaches, light-headedness and paresthesias.   Psychiatric/Behavioral:  Negative for memory loss and substance abuse.               Objective:        Vitals:    10/11/23 1406   BP: 131/79   Pulse: 97   Resp: 18       Lab Results   Component Value Date    WBC 6.99 2023    HGB 12.1 2023    HCT 38.2 2023     2023    CHOL 112 (L) 2023    TRIG 71 2023    HDL 39 (L) 2023    ALT 9 (L) 2023    AST 18 2023     2023    K 3.6 2023     2023    CREATININE 0.7 2023    BUN 9 2023    CO2 22 (L)  09/14/2023    TSH 1.328 01/06/2023    INR 1.1 03/31/2023    HGBA1C 5.8 (H) 02/10/2019        ECHOCARDIOGRAM RESULTS  Results for orders placed during the hospital encounter of 09/14/23    Echo    Interpretation Summary    Left Ventricle: The left ventricle is mildly dilated. Normal wall thickness. There is mild eccentric hypertrophy. Global hypokinesis present. Septal motion is consistent with post-operative status. There is severely reduced systolic function with a visually estimated ejection fraction of 20 - 25%. Biplane (2D) method of discs ejection fraction is 20%. Global longitudinal strain is -5.0%. There is normal diastolic function.    Right Ventricle: Normal right ventricular cavity size. Wall thickness is normal. Right ventricle wall motion  is normal. Systolic function is normal.    Aortic Valve: There is a bioprosthetic valve in the aortic position. It is reported to be a Perceval valve. There is aortic valve sclerosis. Moderately restricted motion. Aortic valve area by VTI is 0.60 cm². Aortic valve peak velocity is 3.17 m/s. Mean gradient is 21 mmHg. The dimensionless index is 0.16. AcT is short <100msec, likely indicating PPM , rather than a true stenosis. The gradients have not changed since the implantation of the valve, however LV function has worsened. Clinical correlation is required. There is no significant regurgitation.    Mitral Valve: There is mild regurgitation.    Tricuspid Valve: There is moderate regurgitation.    Pulmonary Artery: There is moderate pulmonary hypertension. The estimated pulmonary artery systolic pressure is 56 mmHg.    IVC/SVC: Elevated venous pressure at 15 mmHg.    Pericardium: There is a small circumferential effusion.    OPERATIONS PERFORMED:  1.  Aortic valve replacement with a medium Perceval pericardial valve.  2.  Coronary artery bypass grafting x2, with saphenous vein graft to the distal  right coronary, and saphenous vein graft to the left anterior  descending.        CURRENT/PREVIOUS VISIT EKG  Results for orders placed or performed during the hospital encounter of 11/22/22   EKG 12-lead    Collection Time: 11/22/22  4:31 PM    Narrative    Test Reason : PRE-OP    Vent. Rate : 072 BPM     Atrial Rate : 072 BPM     P-R Int : 192 ms          QRS Dur : 180 ms      QT Int : 508 ms       P-R-T Axes : 038 -42 107 degrees     QTc Int : 556 ms    Normal sinus rhythm  Left axis deviation  Left bundle branch block  Abnormal ECG    Confirmed by Milla HAIRSTON, Omega ROUSE ([id number]) on 11/23/2022 2:55:43 PM    Referred By:  MICKIE           Confirmed By:Omega Loyola MD     No valid procedures specified.   Results for orders placed during the hospital encounter of 02/24/22    Nuclear Stress - Cardiology Interpreted    Interpretation Summary    Normal myocardial perfusion scan. There is no evidence of myocardial ischemia or infarction.    The gated perfusion images showed an ejection fraction of 24% at rest.    The EKG portion of this study is negative for ischemia.    Wall Motion: Hypokinetic septum, mid to apical anterior wall, apical inferior wall.    No valid procedures specified.    PHYSICAL EXAM  CONSTITUTIONAL: Well built, well nourished in no apparent distress  NECK: no carotid bruit, no JVD  LUNGS: CTA  CHEST WALL: no tenderness,  HEART: regular rate and rhythm, S1, S2 normal, no murmur, click, rub or gallop   ABDOMEN: soft, non-tender; bowel sounds normal; no masses,  no organomegaly  EXTREMITIES: Extremities normal, no edema, no calf tenderness noted  NEURO: AAO X 3    I HAVE REVIEWED :    The vital signs, nurses notes, and all the pertinent radiology and labs.         Current Outpatient Medications   Medication Instructions    calcium carbonate (OS-CALVIN) 600 mg, Oral, 2 times daily with meals    clopidogreL (PLAVIX) 75 mg, Oral, Daily    dexAMETHasone (DECADRON) 0.5 mg/5 mL Elix SWISH 3.75MLS IN MOUTH FOR 30 SECONDS AND SWALLOW TWICE DAILY AS NEEDED FOR LICHEN PLANUS  FLARE UPS    empagliflozin (JARDIANCE) 10 mg, Oral, Daily    EScitalopram oxalate (LEXAPRO) 10 mg, Oral, Daily    folic acid (FOLVITE) 1,000 mcg, Oral    furosemide (LASIX) 20 mg, Oral, 2 times daily    levothyroxine (SYNTHROID) 75 mcg, Oral, Before breakfast    losartan (COZAAR) 25 mg, Oral, Daily    methotrexate 2.5 MG Tab Oral    metoprolol tartrate (LOPRESSOR) 25 MG tablet 1 tablet with food Orally once daily    pantoprazole (PROTONIX) 40 mg, Oral, Daily    rosuvastatin (CRESTOR) 20 MG tablet Take 1 tablet (20 mg total) by mouth every evening.    VITAMIN D3 4,000 Units, Oral, Daily          Assessment:     Coronary artery disease.  CABG aortic valve replacement, pericardial tissue valve 2017.     SVG tothe LAD. Presents now with interim decrease in EF from 40-45% to 20%.  Baseline EKG with left bundle-branch block.      Past history of congestive heart failure combined systolic diastolic      Staged PCI stent procedure 2019, left main, ostial circ, RCA.      Hypertension dyslipidemia.        History of GI bleed x2 remote past.      Carotid artery disease, followed by Medical therapy at present vascular surgery.          Remote history of lymphoma, breast cancer.  Remission.        Plan:       Meds reviewed and reconciled.  Patient has been intolerant to Entresto in the past at low blood pressures.  Would advise repeating left heart catheterization prior to entertaining implantation of AICD/CRT device.        No follow-ups on file.

## 2023-10-11 NOTE — PATIENT INSTRUCTIONS
Angiogram    Arrive for procedure at: [facility] Monday October 16 th at 10 am    You will receive a phone call from [facility] Pre-Op Department with further instructions and exact arrival time prior to your scheduled procedure.    Notify the nurse if you are ALLERGIC TO IODINE.    FASTING: You MAY NOT have anything to eat or drink AFTER MIDNIGHT the day before your procedure. If your procedure is scheduled in the afternoon, you may have a LIGHT BREAKFAST 6-8 hours prior to your procedure.  For example: Two slices of toast; black coffee or black tea.    MEDICATIONS: You may take your regular morning medications with water. If there are any medications that you should not take, you will be instructed to hold them for that morning.    CARDIOLOGY PRE-PROCEDURE MEDICATION ORDERS:      CONTINUE the Following Medications   Plavix      Effient     Aspirin    WHAT TO EXPECT:    How long will the procedure take?  The procedure will take an average of 1 - 2 hours to perform.  After the procedure, you will need to lay flat for around 4 - 6 hours to minimize bleeding from the puncture site. If the wrist is accessed you will need to keep your arm still as instructed by the nurse.    When can I go home?  You may be able to be discharged home that same afternoon if there were no complications.  If you have one of the following: balloon; stent; pacemaker or defibrillator procedures, you may spend one night for observation.  Your doctor will determine your discharge based upon your progress.  The results of your procedure will be discussed with you before you are discharged.  Any further testing or procedures will be scheduled for you either before you leave or you will be instructed to call for a future appointment.      TRANSPORTATION:  PLEASE ARRANGE TO HAVE SOMEONE DRIVE YOU HOME FOLLOWING YOUR PROCEDURE, YOU WILL NOT BE ALLOWED TO DRIVE.

## 2023-10-17 PROBLEM — I50.23 ACUTE ON CHRONIC HFREF (HEART FAILURE WITH REDUCED EJECTION FRACTION): Status: ACTIVE | Noted: 2019-02-12

## 2023-10-17 PROBLEM — Z71.89 ACP (ADVANCE CARE PLANNING): Status: ACTIVE | Noted: 2017-03-10

## 2023-10-17 PROBLEM — E44.0 MODERATE MALNUTRITION: Status: ACTIVE | Noted: 2023-10-17

## 2023-10-20 ENCOUNTER — TELEPHONE (OUTPATIENT)
Dept: CARDIOLOGY | Facility: CLINIC | Age: 68
End: 2023-10-20
Payer: MEDICARE

## 2023-10-20 NOTE — TELEPHONE ENCOUNTER
----- Message from Aleena Lunsford sent at 10/20/2023  1:29 PM CDT -----  Type:  Sooner Appointment Request    Caller is requesting a sooner appointment.  Caller declined first available appointment listed below.  Caller will not accept being placed on the waitlist and is requesting a message be sent to doctor.    Name of Caller:  [facility]  When is the first available appointment?  2/7/24  Symptoms:  hospital f/u  Would the patient rather a call back or a response via [facility]? Call back  Best Call Back Number:  [phone]  Additional Information:  pl call bk to advise thanks

## 2023-10-24 ENCOUNTER — TELEPHONE (OUTPATIENT)
Dept: CARDIOLOGY | Facility: CLINIC | Age: 68
End: 2023-10-24
Payer: MEDICARE

## 2023-10-24 NOTE — TELEPHONE ENCOUNTER
----- Message from Whitney Bai sent at 10/24/2023  1:30 PM CDT -----  Contact: [facility]  Type: Sooner Appointment Request        Caller is requesting a sooner appointment. Caller declined first available appointment listed below. Caller will not accept being placed on the waitlist and is requesting a message be sent to doctor.        Name of Caller: Patient   Best Call Back Number: [phone]  Additional Information: Pt wants to get established with Dr. Arik odonnell call to schedule. Pt needs hospital follow up as well. Thanks

## 2023-10-30 ENCOUNTER — OFFICE VISIT (OUTPATIENT)
Dept: FAMILY MEDICINE | Facility: CLINIC | Age: 68
End: 2023-10-30
Payer: MEDICARE

## 2023-10-30 VITALS
DIASTOLIC BLOOD PRESSURE: 71 MMHG | HEIGHT: 62 IN | BODY MASS INDEX: 21.71 KG/M2 | WEIGHT: 118 LBS | SYSTOLIC BLOOD PRESSURE: 110 MMHG | HEART RATE: 90 BPM

## 2023-10-30 DIAGNOSIS — Z09 HOSPITAL DISCHARGE FOLLOW-UP: Primary | ICD-10-CM

## 2023-10-30 DIAGNOSIS — Z12.11 COLON CANCER SCREENING: ICD-10-CM

## 2023-10-30 PROCEDURE — 99999 PR PBB SHADOW E&M-EST. PATIENT-LVL IV: CPT | Mod: PBBFAC,,, | Performed by: NURSE PRACTITIONER

## 2023-10-30 PROCEDURE — 99495 TRANSJ CARE MGMT MOD F2F 14D: CPT | Mod: S$PBB,,, | Performed by: NURSE PRACTITIONER

## 2023-10-30 PROCEDURE — 99495 TCM SERVICES (MODERATE COMPLEXITY): ICD-10-PCS | Mod: S$PBB,,, | Performed by: NURSE PRACTITIONER

## 2023-10-30 PROCEDURE — 99999 PR PBB SHADOW E&M-EST. PATIENT-LVL IV: ICD-10-PCS | Mod: PBBFAC,,, | Performed by: NURSE PRACTITIONER

## 2023-10-30 PROCEDURE — 99214 OFFICE O/P EST MOD 30 MIN: CPT | Mod: PBBFAC,PO | Performed by: NURSE PRACTITIONER

## 2023-10-30 NOTE — Clinical Note
Please change her cardio appt to visit with Dr Elise per pt request. He saw her while she was inpatient and needs D/c f/u appt

## 2023-10-30 NOTE — PROGRESS NOTES
Subjective:       Patient ID: Dulce Root is a 68 y.o. female.    Chief Complaint: Hospital Follow Up    HPI    She comes in for hospital follow up. She was admitted to [facility] on 10/16/23.  She presented to the ER with complaints of shortness of breath.  She was diagnosed with CHF. Completed test: labs, angiogram, EKG. Treatment included diuresis. Medical history includes HTN, hyperlipidemia, CAD, CHF, carotid stenosis, aortic valve replacement, hodgkins dx, hypothyroidism, pemphigoid, anxiet, breast cancer. Discharged on 10/20/23.  She is feeling better , denies symptoms. Needs to follow up with cardiology    She is due for colon cancer screening     Transitional Care Note    Family and/or Caretaker present at visit?  No.  Diagnostic tests reviewed/disposition: I have reviewed all completed as well as pending diagnostic tests at the time of discharge.  Disease/illness education: CHF and daily weight  Home health/community services discussion/referrals: Patient does not have home health established from hospital visit.  They do not need home health.  If needed, we will set up home health for the patient.   Establishment or re-establishment of referral orders for community resources: No other necessary community resources.   Discussion with other health care providers: No discussion with other health care providers necessary.           Review of Systems   Constitutional:  Negative for fatigue, fever and unexpected weight change.   HENT:  Negative for ear pain and sore throat.    Eyes:  Negative for pain and visual disturbance.   Respiratory:  Negative for cough and shortness of breath.    Cardiovascular:  Negative for chest pain and palpitations.   Gastrointestinal:  Negative for abdominal pain, diarrhea and vomiting.   Musculoskeletal:  Negative for arthralgias and myalgias.   Skin:  Negative for color change and rash.   Neurological:  Negative for dizziness and headaches.   Psychiatric/Behavioral:  Negative  for dysphoric mood and sleep disturbance. The patient is not nervous/anxious.        Vitals:    10/30/23 1012   BP: 110/71   Pulse: 90       Objective:     Current Outpatient Medications   Medication Sig Dispense Refill    ALPRAZolam (XANAX) 1 MG tablet Take 1 mg by mouth nightly as needed for Anxiety.      aspirin (ECOTRIN) 81 MG EC tablet Take 1 tablet (81 mg total) by mouth once daily. 30 tablet 11    calcium carbonate/vitamin D3 (CALCIUM WITH VITAMIN D3 ORAL) Take 1 Dose by mouth 2 (two) times a day. 1 dose = 2 gummies      cholecalciferol, vitamin D3, (VITAMIN D3 ORAL) Take 1 Dose by mouth 2 (two) times a day. 1 dose = 1 gummy      clopidogreL (PLAVIX) 75 mg tablet Take 1 tablet (75 mg total) by mouth once daily. 90 tablet 3    empagliflozin (JARDIANCE) 10 mg tablet Take 1 tablet (10 mg total) by mouth once daily. 30 tablet 11    EScitalopram oxalate (LEXAPRO) 10 MG tablet Take 1 tablet (10 mg total) by mouth once daily. 90 tablet 3    folic acid (FOLVITE) 1 MG tablet Take 1 mg by mouth once daily. Except on Friday      furosemide (LASIX) 20 MG tablet Take 1 tablet (20 mg total) by mouth once daily. 30 tablet 0    hypromellose (TEARS LUBRICANT EYE DROP OPHT) Place 1 drop into both eyes daily as needed (dry eyes).      levothyroxine (SYNTHROID) 75 MCG tablet Take 1 tablet (75 mcg total) by mouth before breakfast. 90 tablet 3    lifitegrast (XIIDRA) 5 % Dpet Place 1 drop into both eyes 2 (two) times daily as needed (dry eyes).      losartan (COZAAR) 25 MG tablet Take 0.5 tablets (12.5 mg total) by mouth once daily. 15 tablet 0    methotrexate 2.5 MG Tab Take 5 mg by mouth 2 (two) times a day. On Friday only.      pantoprazole (PROTONIX) 40 MG tablet Take 1 tablet (40 mg total) by mouth once daily. 90 tablet 3    rosuvastatin (CRESTOR) 20 MG tablet Take 1 tablet (20 mg total) by mouth every evening. 90 tablet 3     Current Facility-Administered Medications   Medication Dose Route Frequency Provider Last Rate Last  Admin    sodium chloride 0.9% flush 10 mL  10 mL Intravenous PRN Manuel Sims MD           Physical Exam  Vitals and nursing note reviewed.   Constitutional:       General: She is not in acute distress.     Appearance: She is well-developed.   HENT:      Head: Normocephalic and atraumatic.   Eyes:      Pupils: Pupils are equal, round, and reactive to light.   Cardiovascular:      Rate and Rhythm: Normal rate and regular rhythm.   Pulmonary:      Effort: Pulmonary effort is normal.      Breath sounds: Normal breath sounds.   Musculoskeletal:         General: Normal range of motion.      Cervical back: Normal range of motion and neck supple.   Skin:     General: Skin is warm and dry.      Findings: No rash.   Neurological:      Mental Status: She is alert and oriented to person, place, and time.   Psychiatric:         Judgment: Judgment normal.         Assessment:       1. Hospital discharge follow-up    2. Colon cancer screening        Plan:   Hospital discharge follow-up    Colon cancer screening  -     Cologuard Screening (Multitarget Stool DNA); Future; Expected date: 10/30/2023    Continue current meds    Will schedule f/u with Dr Elise, cardiology, for hospital D/c appt    No follow-ups on file.    There are no Patient Instructions on file for this visit.

## 2023-11-06 ENCOUNTER — LAB VISIT (OUTPATIENT)
Dept: LAB | Facility: HOSPITAL | Age: 68
End: 2023-11-06
Attending: DERMATOLOGY
Payer: MEDICARE

## 2023-11-06 DIAGNOSIS — L12.0 SENILE DERMATITIS HERPETIFORMIS: ICD-10-CM

## 2023-11-06 DIAGNOSIS — Z79.899 ENCOUNTER FOR LONG-TERM (CURRENT) USE OF OTHER MEDICATIONS: ICD-10-CM

## 2023-11-06 DIAGNOSIS — Z79.899 ENCOUNTER FOR LONG-TERM (CURRENT) USE OF OTHER MEDICATIONS: Primary | ICD-10-CM

## 2023-11-06 LAB
ALBUMIN SERPL BCP-MCNC: 4 G/DL (ref 3.5–5.2)
ALP SERPL-CCNC: 86 U/L (ref 55–135)
ALT SERPL W/O P-5'-P-CCNC: 19 U/L (ref 10–44)
ANION GAP SERPL CALC-SCNC: 10 MMOL/L (ref 8–16)
AST SERPL-CCNC: 23 U/L (ref 10–40)
BASOPHILS # BLD AUTO: 0.06 K/UL (ref 0–0.2)
BASOPHILS NFR BLD: 1.1 % (ref 0–1.9)
BILIRUB SERPL-MCNC: 0.9 MG/DL (ref 0.1–1)
BUN SERPL-MCNC: 16 MG/DL (ref 8–23)
CALCIUM SERPL-MCNC: 9.6 MG/DL (ref 8.7–10.5)
CHLORIDE SERPL-SCNC: 103 MMOL/L (ref 95–110)
CO2 SERPL-SCNC: 29 MMOL/L (ref 23–29)
CREAT SERPL-MCNC: 0.7 MG/DL (ref 0.5–1.4)
DIFFERENTIAL METHOD: ABNORMAL
EOSINOPHIL # BLD AUTO: 0.1 K/UL (ref 0–0.5)
EOSINOPHIL NFR BLD: 1.8 % (ref 0–8)
ERYTHROCYTE [DISTWIDTH] IN BLOOD BY AUTOMATED COUNT: 14.7 % (ref 11.5–14.5)
EST. GFR  (NO RACE VARIABLE): >60 ML/MIN/1.73 M^2
GLUCOSE SERPL-MCNC: 90 MG/DL (ref 70–110)
HCT VFR BLD AUTO: 36.1 % (ref 37–48.5)
HGB BLD-MCNC: 11.7 G/DL (ref 12–16)
IMM GRANULOCYTES # BLD AUTO: 0.01 K/UL (ref 0–0.04)
IMM GRANULOCYTES NFR BLD AUTO: 0.2 % (ref 0–0.5)
LYMPHOCYTES # BLD AUTO: 1 K/UL (ref 1–4.8)
LYMPHOCYTES NFR BLD: 17.5 % (ref 18–48)
MCH RBC QN AUTO: 31.7 PG (ref 27–31)
MCHC RBC AUTO-ENTMCNC: 32.4 G/DL (ref 32–36)
MCV RBC AUTO: 98 FL (ref 82–98)
MONOCYTES # BLD AUTO: 0.5 K/UL (ref 0.3–1)
MONOCYTES NFR BLD: 8.1 % (ref 4–15)
NEUTROPHILS # BLD AUTO: 4 K/UL (ref 1.8–7.7)
NEUTROPHILS NFR BLD: 71.3 % (ref 38–73)
NRBC BLD-RTO: 0 /100 WBC
PLATELET # BLD AUTO: 304 K/UL (ref 150–450)
PMV BLD AUTO: 10.5 FL (ref 9.2–12.9)
POTASSIUM SERPL-SCNC: 3.1 MMOL/L (ref 3.5–5.1)
PROT SERPL-MCNC: 7.1 G/DL (ref 6–8.4)
RBC # BLD AUTO: 3.69 M/UL (ref 4–5.4)
SODIUM SERPL-SCNC: 142 MMOL/L (ref 136–145)
WBC # BLD AUTO: 5.55 K/UL (ref 3.9–12.7)

## 2023-11-06 PROCEDURE — 36415 COLL VENOUS BLD VENIPUNCTURE: CPT | Mod: PO | Performed by: DERMATOLOGY

## 2023-11-06 PROCEDURE — 80053 COMPREHEN METABOLIC PANEL: CPT | Performed by: DERMATOLOGY

## 2023-11-06 PROCEDURE — 85025 COMPLETE CBC W/AUTO DIFF WBC: CPT | Performed by: DERMATOLOGY

## 2023-11-07 ENCOUNTER — PATIENT MESSAGE (OUTPATIENT)
Dept: RHEUMATOLOGY | Facility: CLINIC | Age: 68
End: 2023-11-07
Payer: MEDICARE

## 2023-11-08 ENCOUNTER — PATIENT MESSAGE (OUTPATIENT)
Dept: RHEUMATOLOGY | Facility: CLINIC | Age: 68
End: 2023-11-08
Payer: MEDICARE

## 2023-11-13 ENCOUNTER — PATIENT MESSAGE (OUTPATIENT)
Dept: RHEUMATOLOGY | Facility: CLINIC | Age: 68
End: 2023-11-13
Payer: MEDICARE

## 2023-11-13 NOTE — TELEPHONE ENCOUNTER
Spoke to patient. She thought her appointment with JACQUELINE Randle was in office on 11/17/2023.  I explained it is VV.  She said leave schedule only.

## 2023-11-17 ENCOUNTER — OFFICE VISIT (OUTPATIENT)
Dept: RHEUMATOLOGY | Facility: CLINIC | Age: 68
End: 2023-11-17
Payer: MEDICARE

## 2023-11-17 DIAGNOSIS — Z51.81 MEDICATION MONITORING ENCOUNTER: ICD-10-CM

## 2023-11-17 DIAGNOSIS — Z92.21 HISTORY OF CHEMOTHERAPY: ICD-10-CM

## 2023-11-17 DIAGNOSIS — Z87.310 H/O HEALED FRAGILITY FRACTURE: ICD-10-CM

## 2023-11-17 DIAGNOSIS — C50.919 INFILTRATING DUCTAL CARCINOMA OF BREAST, UNSPECIFIED LATERALITY: ICD-10-CM

## 2023-11-17 DIAGNOSIS — M81.0 AGE RELATED OSTEOPOROSIS, UNSPECIFIED PATHOLOGICAL FRACTURE PRESENCE: Primary | ICD-10-CM

## 2023-11-17 DIAGNOSIS — Z92.3 HISTORY OF RADIATION THERAPY: ICD-10-CM

## 2023-11-17 PROCEDURE — 99442 PR PHYSICIAN TELEPHONE EVALUATION 11-20 MIN: CPT | Mod: 95,,, | Performed by: PHYSICIAN ASSISTANT

## 2023-11-17 PROCEDURE — 99442 PR PHYSICIAN TELEPHONE EVALUATION 11-20 MIN: ICD-10-PCS | Mod: 95,,, | Performed by: PHYSICIAN ASSISTANT

## 2023-11-17 NOTE — PROGRESS NOTES
Audio Only Telehealth Visit    The patient location is: home  The chief complaint leading to consultation is: f/u OP  Visit type: Virtual visit with audio only (telephone)  Total time spent with patient: 15 min     The reason for the audio only service rather than synchronous audio and video virtual visit was related to technical difficulties or patient preference/necessity.     Each patient to whom I provide medical services by telemedicine is:  (1) informed of the relationship between the physician and patient and the respective role of any other health care provider with respect to management of the patient; and (2) notified that they may decline to receive medical services by telemedicine and may withdraw from such care at any time. Patient verbally consented to receive this service via voice-only telephone call.     This service was not originating from a related E/M service provided within the previous 7 days nor will  to an E/M service or procedure within the next 24 hours or my soonest available appointment.  Prevailing standard of care was able to be met in this audio-only visit.      Subjective:       Patient ID: Dulce Root is a 68 y.o. female.    Chief Complaint: No chief complaint on file.    Dulce Root  is a 68 y.o. female seen today for f/u OP.  She is on prolia and tolerating it well.  First dose was 10/27/2022.  Had a right lower molar extracted 4/24/23 and prolia was pushed to 5/12/23. She healed well.  Now taking calcium gummies as theyre easier to swallow. Taking vit d daily. No falls or fractures since last appointment.     Was unable to get Tymlos therapy due to high out of pocket cost from patient.  Has h/o Hodgkin's lymphoma status post chemo and radiation therapy (to the chest).   Also with history of breast cancer.  Underwent surgical excision recently.  Has CHF and not good candidate, so chemo was deferred.  Has f/u w Dr. Sepulveda next week.  Not currently on letrozole or  arimidex.    Current Tx:   Prolia - due 11/20/23; (1st dose 10/27/2022)  OTC calcium  Previous Tx:  Fosamax   GERD: protonix/pepcid   Gait:  steady  Dental:  has a bridge, no longer planning on doing a dental implant  History of Fragility fracture: Compression fracture    Rheumatologic systems otherwise negative.    Current Outpatient Medications:     ALPRAZolam (XANAX) 1 MG tablet, Take 1 mg by mouth nightly as needed for Anxiety., Disp: , Rfl:     aspirin (ECOTRIN) 81 MG EC tablet, Take 1 tablet (81 mg total) by mouth once daily., Disp: 30 tablet, Rfl: 11    calcium carbonate/vitamin D3 (CALCIUM WITH VITAMIN D3 ORAL), Take 1 Dose by mouth 2 (two) times a day. 1 dose = 2 gummies, Disp: , Rfl:     cholecalciferol, vitamin D3, (VITAMIN D3 ORAL), Take 1 Dose by mouth 2 (two) times a day. 1 dose = 1 gummy, Disp: , Rfl:     clopidogreL (PLAVIX) 75 mg tablet, Take 1 tablet (75 mg total) by mouth once daily., Disp: 90 tablet, Rfl: 3    empagliflozin (JARDIANCE) 10 mg tablet, Take 1 tablet (10 mg total) by mouth once daily., Disp: 30 tablet, Rfl: 11    EScitalopram oxalate (LEXAPRO) 10 MG tablet, Take 1 tablet (10 mg total) by mouth once daily., Disp: 90 tablet, Rfl: 3    folic acid (FOLVITE) 1 MG tablet, Take 1 mg by mouth once daily. Except on Friday, Disp: , Rfl:     furosemide (LASIX) 20 MG tablet, Take 1 tablet (20 mg total) by mouth once daily., Disp: 30 tablet, Rfl: 0    hypromellose (TEARS LUBRICANT EYE DROP OPHT), Place 1 drop into both eyes daily as needed (dry eyes)., Disp: , Rfl:     levothyroxine (SYNTHROID) 75 MCG tablet, Take 1 tablet (75 mcg total) by mouth before breakfast., Disp: 90 tablet, Rfl: 3    lifitegrast (XIIDRA) 5 % Dpet, Place 1 drop into both eyes 2 (two) times daily as needed (dry eyes)., Disp: , Rfl:     losartan (COZAAR) 25 MG tablet, Take 0.5 tablets (12.5 mg total) by mouth once daily., Disp: 15 tablet, Rfl: 0    methotrexate 2.5 MG Tab, Take 5 mg by mouth 2 (two) times a day. On Friday  only., Disp: , Rfl:     pantoprazole (PROTONIX) 40 MG tablet, Take 1 tablet (40 mg total) by mouth once daily., Disp: 90 tablet, Rfl: 3    rosuvastatin (CRESTOR) 20 MG tablet, Take 1 tablet (20 mg total) by mouth every evening., Disp: 90 tablet, Rfl: 3    Current Facility-Administered Medications:     sodium chloride 0.9% flush 10 mL, 10 mL, Intravenous, PRN, Manuel Sims MD  Past Medical History:   Diagnosis Date    Allergy     Anticoagulant long-term use     Plavix    Breast cancer 2008    Carotid stenosis, bilateral 10/13/2017    CHF (congestive heart failure)     Coronary artery disease     Coronary artery disease involving coronary bypass graft of native heart without angina pectoris 08/16/2019 2/19  1.  Left main is a small vessel with a 60%-65% ostial lesion. 2.  LAD is a medium-sized vessel diffuse 70% proximally  Ramus intermedius is a medium size vessel with a 40%-50% ostial lesion. 3.  Circumflex 60%-70% ostial  remainder of circumflex and obtuse marginal normal in appearance. Right coronary artery is normal size vessel, short discrete 70%mid 4.  Vein graft to right coronary is occ    Coronary artery disease involving native coronary artery of native heart without angina pectoris 06/27/2017    DCIS (ductal carcinoma in situ) of breast 11/06/2012    Encounter for blood transfusion     Epigastric pain 04/01/2023    Essential hypertension 11/06/2012    GERD (gastroesophageal reflux disease)     GI bleed 02/2021    Hematemesis with nausea 05/11/2021    Hodgkin lymphoma     Hx of Hodgkin's disease - s/p chemo and radiation 11/06/2012    Hyperlipidemia     Hyperlipidemia 11/06/2012    The patient presents with hyperlipidemia.  The patient reports tolerating the medication well and is in excellent compliance.  There have been no medication side effects.  The patient denies chest pain, neuropathy, and myalgias.  The patient has reduced fat intake and has been exercising.  Current treatment has included  the medications listed in the med card.   Lab Results Component Value Date  CH    Hypertension     LBBB (left bundle branch block) 2018    Malignant neoplasm of central portion of left breast in female, estrogen receptor negative 2022    Osteoporosis     Paroxysmal atrial fibrillation - single post-op episode 2017    Pemphigoid     Pemphigoid     pt receives IVIG infusions    Pulmonary hypertension 2023    S/P AVR (aortic valve replacement) - pericardial valve 2017    Perceval aortic tissue valve PVS23. 23mm    serial # [id number]    S/P CABG x 2 2017 CABG X 2 with SVG-LAD and SVG-distal RCA  SVG LAD due to absent LIMA after chest radiation and lymph node biopsy     Stented coronary artery     She had 2 stents placed in 2019.  She has had CAD and bypasses in the past in .      Thyroid disease      Family History   Problem Relation Age of Onset    Hypertension Mother     Hypertension Father     Cancer Neg Hx      Social History     Socioeconomic History    Marital status:    Tobacco Use    Smoking status: Former     Current packs/day: 0.00     Average packs/day: 1 pack/day for 20.0 years (20.0 ttl pk-yrs)     Types: Cigarettes     Start date: 1961     Quit date: 1981     Years since quittin.0    Smokeless tobacco: Never   Substance and Sexual Activity    Alcohol use: No     Comment: 2 drinks/month; none 72 hrs prior to surgery    Drug use: No    Sexual activity: Yes     Partners: Male   Social History Narrative    Live w/ spouse and  1 dog      Social Determinants of Health     Financial Resource Strain: Low Risk  (2022)    Overall Financial Resource Strain (CARDIA)     Difficulty of Paying Living Expenses: Not hard at all   Food Insecurity: No Food Insecurity (2022)    Hunger Vital Sign     Worried About Running Out of Food in the Last Year: Never true     Ran Out of Food in the Last Year: Never true   Transportation Needs: No Transportation  Needs (7/8/2022)    PRAPARE - Transportation     Lack of Transportation (Medical): No     Lack of Transportation (Non-Medical): No   Physical Activity: Insufficiently Active (7/8/2022)    Exercise Vital Sign     Days of Exercise per Week: 3 days     Minutes of Exercise per Session: 30 min   Stress: No Stress Concern Present (7/8/2022)    [place] [employer] - Occupational Stress Questionnaire     Feeling of Stress : Only a little   Social Connections: Unknown (7/8/2022)    Social Connection and Isolation Panel [NHANES]     Frequency of Communication with Friends and Family: More than three times a week   Housing Stability: Low Risk  (7/8/2022)    Housing Stability Vital Sign     Unable to Pay for Housing in the Last Year: No     Number of Places Lived in the Last Year: 1     Unstable Housing in the Last Year: No     Review of patient's allergies indicates:   Allergen Reactions    Amlodipine Shortness Of Breath and Other (See Comments)     unknown  Other reaction(s): Swelling  unknown  unknown  Other reaction(s): Swelling  unknown  Other reaction(s): Swelling  unknown    Levofloxacin Hives and Swelling    Sulfa (sulfonamide antibiotics) Shortness Of Breath, Itching and Other (See Comments)     elevated blood pressure    Ace inhibitors     Ciprofloxacin Itching    Iodinated contrast media     Iodine      Other reaction(s): Unknown    Latex      Other reaction(s): Itching    Latex, natural rubber Itching       Objective:   There were no vitals taken for this visit.  Immunization History   Administered Date(s) Administered    COVID-19, MRNA, LN-S, PF (Pfizer) (Purple Cap) 12/30/2020, 01/20/2021, 09/30/2021    Influenza 11/28/2021    Influenza (FLUAD) - Quadrivalent - Adjuvanted - PF *Preferred* (65+) 11/28/2021    Influenza - Quadrivalent - MDCK - PF 02/01/2018    Pneumococcal Conjugate - 13 Valent 10/10/2019    Pneumococcal Conjugate - 20 Valent 04/26/2023       Physical Exam   Pulmonary/Chest:  "Effort normal. No respiratory distress.   Neurological: She is alert.   Psychiatric: Mood and thought content normal.   Seemingly anxious         Recent Results (from the past 336 hour(s))   CBC Auto Differential    Collection Time: 11/06/23  9:27 AM   Result Value Ref Range    WBC 5.55 3.90 - 12.70 K/uL    RBC 3.69 (L) 4.00 - 5.40 M/uL    Hemoglobin 11.7 (L) 12.0 - 16.0 g/dL    Hematocrit 36.1 (L) 37.0 - 48.5 %    MCV 98 82 - 98 fL    MCH 31.7 (H) 27.0 - 31.0 pg    MCHC 32.4 32.0 - 36.0 g/dL    RDW 14.7 (H) 11.5 - 14.5 %    Platelets 304 150 - 450 K/uL    MPV 10.5 9.2 - 12.9 fL    Immature Granulocytes 0.2 0.0 - 0.5 %    Gran # (ANC) 4.0 1.8 - 7.7 K/uL    Immature Grans (Abs) 0.01 0.00 - 0.04 K/uL    Lymph # 1.0 1.0 - 4.8 K/uL    Mono # 0.5 0.3 - 1.0 K/uL    Eos # 0.1 0.0 - 0.5 K/uL    Baso # 0.06 0.00 - 0.20 K/uL    nRBC 0 0 /100 WBC    Gran % 71.3 38.0 - 73.0 %    Lymph % 17.5 (L) 18.0 - 48.0 %    Mono % 8.1 4.0 - 15.0 %    Eosinophil % 1.8 0.0 - 8.0 %    Basophil % 1.1 0.0 - 1.9 %    Differential Method Automated    Comprehensive Metabolic Panel    Collection Time: 11/06/23  9:27 AM   Result Value Ref Range    Sodium 142 136 - 145 mmol/L    Potassium 3.1 (L) 3.5 - 5.1 mmol/L    Chloride 103 95 - 110 mmol/L    CO2 29 23 - 29 mmol/L    Glucose 90 70 - 110 mg/dL    BUN 16 8 - 23 mg/dL    Creatinine 0.7 0.5 - 1.4 mg/dL    Calcium 9.6 8.7 - 10.5 mg/dL    Total Protein 7.1 6.0 - 8.4 g/dL    Albumin 4.0 3.5 - 5.2 g/dL    Total Bilirubin 0.9 0.1 - 1.0 mg/dL    Alkaline Phosphatase 86 55 - 135 U/L    AST 23 10 - 40 U/L    ALT 19 10 - 44 U/L    eGFR >60.0 >60 mL/min/1.73 m^2    Anion Gap 10 8 - 16 mmol/L       No results found for: "TBGOLDPLUS"   Lab Results   Component Value Date    HEPCAB Negative 05/11/2021        DEXA - I have personally reviewed results from 5/19/2022   FINDINGS:  The L1 to L4 vertebral bone mineral density is equal to 0.976 g/cm squared with a T score of -1.7.  There has been 2% increase relative to " the prior study.     The total hip bone mineral density is equal to 0.605 g/cm squared with a T score of -3.2.     Impression:  Osteoporosis    Assessment:     1. Age related osteoporosis, unspecified pathological fracture presence    2. H/O healed fragility fracture    3. Medication monitoring encounter    4. History of chemotherapy    5. History of radiation therapy    6. Infiltrating ductal carcinoma of breast, unspecified laterality          Plan:     Diagnoses and all orders for this visit:    Age related osteoporosis, unspecified pathological fracture presence    H/O healed fragility fracture    Medication monitoring encounter    History of chemotherapy    History of radiation therapy    Infiltrating ductal carcinoma of breast, unspecified laterality      Osteoporosis w h/o healed compression fx T7  Calcium 9.6 - WNL  GFR > 60  Vit D 35 2/2021  Would avoid anabolics   h/o chest wall radiation  Invsasive ductal carcinoma of the (risk of bony mets)  No contra indication to c/w Prolia  Check CMP in 10-14 days in pts w CKD  Patient instructed to notify the office if he/she has any new falls or new fractures  Discussed risks associated w Prolia including but not limited to hypocalcemia, ONJ, AFF  DEXA due 5/19/2024  If pt begins arimidex or letrozol, would recommend yearly DEXA scans to mornitor BMD more closely  Drug therapy requiring intensive monitoring for toxicity  High Risk Medication Monitoring encounter  No current medication related issues, no evidence of toxicity  I ordered labs for toxicity monitoring, have personally reviewed the findings, and discussed them with the patient.  Pending labs will be sent via the portal  Return to clinic: 6 mos w BMP and Vit D prior if able    No follow-ups on file.    The patient understands, chooses and consents to this plan and accepts all   the risks which include but are not limited to the risks mentioned above.     Disclaimer: This note was prepared using a voice  recognition system and is likely to have sound alike errors within the text.

## 2023-11-20 ENCOUNTER — INFUSION (OUTPATIENT)
Dept: INFUSION THERAPY | Facility: HOSPITAL | Age: 68
End: 2023-11-20
Attending: INTERNAL MEDICINE
Payer: MEDICARE

## 2023-11-20 ENCOUNTER — PATIENT MESSAGE (OUTPATIENT)
Dept: HEMATOLOGY/ONCOLOGY | Facility: CLINIC | Age: 68
End: 2023-11-20
Payer: MEDICARE

## 2023-11-20 VITALS
RESPIRATION RATE: 16 BRPM | OXYGEN SATURATION: 98 % | TEMPERATURE: 98 F | HEART RATE: 82 BPM | SYSTOLIC BLOOD PRESSURE: 106 MMHG | DIASTOLIC BLOOD PRESSURE: 59 MMHG

## 2023-11-20 DIAGNOSIS — M80.00XD AGE-RELATED OSTEOPOROSIS WITH CURRENT PATHOLOGICAL FRACTURE WITH ROUTINE HEALING: Primary | ICD-10-CM

## 2023-11-20 PROCEDURE — 63600175 PHARM REV CODE 636 W HCPCS: Mod: JZ,JG | Performed by: PHYSICIAN ASSISTANT

## 2023-11-20 PROCEDURE — 96372 THER/PROPH/DIAG INJ SC/IM: CPT

## 2023-11-20 RX ADMIN — DENOSUMAB 60 MG: 60 INJECTION SUBCUTANEOUS at 02:11

## 2023-11-20 NOTE — DISCHARGE INSTRUCTIONS
.  [facility]  [address] [address]  [address]  [phone] phone     [phone] fax  Hours of Operation: Monday- Friday 8:00am- 5:00pm  After hours phone  [phone]  Hematology / Oncology Physicians on call      [address] SHELDON Castellanos, SHELDON [place], SALONI Goodrich    Please call with any concerns regarding your appointment today.      .WAYS TO HELP PREVENT INFECTION        WASH YOUR HANDS OFTEN DURING THE DAY, ESPECIALLY BEFORE YOU EAT, AFTER USING THE BATHROOM, AND AFTER TOUCHING ANIMALS    STAY AWAY FROM PEOPLE WHO HAVE ILLNESSES YOU CAN CATCH; SUCH AS COLDS, FLU, CHICKEN POX    TRY TO AVOID CROWDS    STAY AWAY FROM CHILDREN WHO RECENTLY HAVE RECEIVED LIVE VIRUS VACCINES    MAINTAIN GOOD MOUTH CARE    DO NOT SQUEEZE OR SCRATCH PIMPLES    CLEAN CUTS & SCRAPES RIGHT AWAY AND DAILY UNTIL HEALED WITH WARM WATER, SOAP & AN ANTISEPTIC    AVOID CONTACT WITH LITTER BOXES, BIRD CAGES, & FISH TANKS    AVOID STANDING WATER, IE., BIRD BATHS, FLOWER POTS/VASES, OR HUMIDIFIERS    WEAR GLOVES WHEN GARDENING OR CLEANING UP AFTER OTHERS, ESPECIALLY BABIES & SMALL CHILDREN    DO NOT EAT RAW FISH, SEAFOOD, MEAT, OR EGGS    FALL PREVENTION   Falls often occur due to slipping, tripping or losing your balance. Here are ways to reduce your risk of falling again.   Was there anything that caused your fall that can be fixed, removed or replaced?   Make your home safe by keeping walkways clear of objects you may trip over.   Use non-slip pads under rugs.   Do not walk in poorly lit areas.   Do not stand on chairs or wobbly ladders.   Use caution when reaching overhead or looking upward. This position can cause a loss of balance.   Be sure your shoes fit properly, have non-slip bottoms and are in good condition.   Be cautious when going up and down stairs, curbs, and when walking on uneven sidewalks.   If  your balance is poor, consider using a cane or walker.   If your fall was related to alcohol use, stop or limit alcohol intake.   If your fall was related to use of sleeping medicines, talk to your doctor about this. You may need to reduce your dosage at bedtime if you awaken during the night to go to the bathroom.   To reduce the need for nighttime bathroom trips:   Avoid drinking fluids for several hours before going to bed   Empty your bladder before going to bed   Men can keep a urinal at the bedside   © 5359-1213 [facility], [address] [place] [address], [place] [address]. All rights reserved. This information is not intended as a substitute for professional medical care. Always follow your healthcare professional's instructions.

## 2023-11-20 NOTE — NURSING
Prolia 60 mg q 6 months  Last dose given-05/25/23    Any invasive dental procedures in past 3 months or upcoming 3 months: Denies    Last Rheumatology provider visit- Seen by JACQUELINE Christensen on 11/17/23    Recent labs? yes;  CKD pt needing repeat labs in 10 days-No   Lab Results   Component Value Date    CALCIUM 9.6 11/06/2023     Lab Results   Component Value Date    CREATININE 0.7 11/06/2023     Lab Results   Component Value Date    ESTGFRAFRICA >60 04/01/2022     Lab Results   Component Value Date    EGFRNONAA >60 04/01/2022     Lab Results   Component Value Date    VUELCYOX83WB 35 02/18/2021          ex 03/31/26  lot [id number]         Prolia 60 mg/ml administered SQ to Right posterior upper arm. Tolerated without any complaints. No redness, swelling, or drainage noted to site. Instructed to remain in clinic 15 minutes after administration to monitor for any s/sx of reaction. Pt instructed on signs and symptoms of reaction to report. Verbalizes understanding.

## 2023-11-21 LAB — NONINV COLON CA DNA+OCC BLD SCRN STL QL: POSITIVE

## 2023-11-27 DIAGNOSIS — E78.5 HYPERLIPIDEMIA, UNSPECIFIED HYPERLIPIDEMIA TYPE: ICD-10-CM

## 2023-11-27 DIAGNOSIS — Z95.1 S/P CABG X 2: ICD-10-CM

## 2023-11-27 DIAGNOSIS — I50.20 HFREF (HEART FAILURE WITH REDUCED EJECTION FRACTION): ICD-10-CM

## 2023-11-28 ENCOUNTER — TELEPHONE (OUTPATIENT)
Dept: FAMILY MEDICINE | Facility: CLINIC | Age: 68
End: 2023-11-28
Payer: MEDICARE

## 2023-11-28 DIAGNOSIS — F41.9 ANXIETY: ICD-10-CM

## 2023-11-28 DIAGNOSIS — E03.9 ACQUIRED HYPOTHYROIDISM: ICD-10-CM

## 2023-11-28 DIAGNOSIS — R19.5 POSITIVE COLORECTAL CANCER SCREENING USING COLOGUARD TEST: Primary | ICD-10-CM

## 2023-11-28 RX ORDER — LEVOTHYROXINE SODIUM 75 UG/1
75 TABLET ORAL
Qty: 90 TABLET | Refills: 3 | Status: SHIPPED | OUTPATIENT
Start: 2023-11-28

## 2023-11-28 RX ORDER — LOSARTAN POTASSIUM 25 MG/1
25 TABLET ORAL DAILY
Qty: 90 TABLET | Refills: 3 | Status: SHIPPED | OUTPATIENT
Start: 2023-11-28 | End: 2024-03-25

## 2023-11-28 RX ORDER — ROSUVASTATIN CALCIUM 20 MG/1
20 TABLET, COATED ORAL DAILY
Qty: 90 TABLET | Refills: 3 | Status: SHIPPED | OUTPATIENT
Start: 2023-11-28 | End: 2024-03-25

## 2023-11-28 RX ORDER — FUROSEMIDE 20 MG/1
20 TABLET ORAL DAILY
Qty: 90 TABLET | Refills: 3 | Status: SHIPPED | OUTPATIENT
Start: 2023-11-28 | End: 2024-03-25

## 2023-11-28 RX ORDER — ESCITALOPRAM OXALATE 10 MG/1
10 TABLET ORAL DAILY
Qty: 90 TABLET | Refills: 3 | Status: SHIPPED | OUTPATIENT
Start: 2023-11-28

## 2023-11-28 RX ORDER — CLOPIDOGREL BISULFATE 75 MG/1
75 TABLET ORAL DAILY
Qty: 90 TABLET | Refills: 3 | Status: SHIPPED | OUTPATIENT
Start: 2023-11-28

## 2023-11-28 NOTE — TELEPHONE ENCOUNTER
----- Message from Ivet Valenzuela NP sent at 11/22/2023  5:23 PM CST -----  Please call pt  Cologuard is positive  It is recommended that a colonoscopy is done to visualize the colon and take biopsies. Although cologaurd is not 100% a positive does suggest concern for colorectal cancer.   If she is in agreement to proceed with a colonoscopy, Dr Hernandez can do this at our [place] location on Tuesdays. She will need someone to drive her. If she is unable to get to that location, I can refer her to someone locally

## 2023-11-28 NOTE — TELEPHONE ENCOUNTER
I spoke with the patient about this. Pt is agreeable to have colonoscopy. Please view and sign pended order if you agree

## 2023-11-29 ENCOUNTER — HOSPITAL ENCOUNTER (OUTPATIENT)
Dept: PREADMISSION TESTING | Facility: HOSPITAL | Age: 68
Discharge: HOME | End: 2023-11-29
Attending: STUDENT IN AN ORGANIZED HEALTH CARE EDUCATION/TRAINING PROGRAM
Payer: MEDICARE

## 2023-11-29 DIAGNOSIS — R19.5 POSITIVE COLORECTAL CANCER SCREENING USING COLOGUARD TEST: Primary | ICD-10-CM

## 2023-11-30 ENCOUNTER — E-CONSULT (OUTPATIENT)
Dept: CARDIOLOGY | Facility: CLINIC | Age: 68
End: 2023-11-30

## 2023-11-30 ENCOUNTER — OFFICE VISIT (OUTPATIENT)
Dept: CARDIOLOGY | Facility: CLINIC | Age: 68
End: 2023-11-30
Payer: MEDICARE

## 2023-11-30 ENCOUNTER — HOSPITAL ENCOUNTER (OUTPATIENT)
Dept: RADIOLOGY | Facility: HOSPITAL | Age: 68
Discharge: HOME | End: 2023-11-30
Attending: NURSE PRACTITIONER
Payer: MEDICARE

## 2023-11-30 VITALS
BODY MASS INDEX: 21.79 KG/M2 | DIASTOLIC BLOOD PRESSURE: 82 MMHG | SYSTOLIC BLOOD PRESSURE: 125 MMHG | HEIGHT: 62 IN | WEIGHT: 118.38 LBS | HEART RATE: 100 BPM

## 2023-11-30 DIAGNOSIS — I50.20 HFREF (HEART FAILURE WITH REDUCED EJECTION FRACTION): ICD-10-CM

## 2023-11-30 DIAGNOSIS — Z01.810 PRE-OPERATIVE CARDIOVASCULAR EXAMINATION: Primary | ICD-10-CM

## 2023-11-30 DIAGNOSIS — Z95.2 S/P AVR (AORTIC VALVE REPLACEMENT): ICD-10-CM

## 2023-11-30 DIAGNOSIS — I70.0 ATHEROSCLEROSIS OF AORTA: ICD-10-CM

## 2023-11-30 DIAGNOSIS — I50.42 CHRONIC COMBINED SYSTOLIC AND DIASTOLIC HEART FAILURE: ICD-10-CM

## 2023-11-30 DIAGNOSIS — Z95.1 S/P CABG X 2: ICD-10-CM

## 2023-11-30 DIAGNOSIS — I27.20 PULMONARY HYPERTENSION: ICD-10-CM

## 2023-11-30 DIAGNOSIS — E78.2 MIXED HYPERLIPIDEMIA: ICD-10-CM

## 2023-11-30 DIAGNOSIS — I10 ESSENTIAL HYPERTENSION: Primary | ICD-10-CM

## 2023-11-30 DIAGNOSIS — I65.23 CAROTID STENOSIS, BILATERAL: ICD-10-CM

## 2023-11-30 DIAGNOSIS — I44.7 LBBB (LEFT BUNDLE BRANCH BLOCK): ICD-10-CM

## 2023-11-30 DIAGNOSIS — I50.23 ACUTE ON CHRONIC HFREF (HEART FAILURE WITH REDUCED EJECTION FRACTION): ICD-10-CM

## 2023-11-30 PROCEDURE — 71046 X-RAY EXAM CHEST 2 VIEWS: CPT | Mod: 26,,, | Performed by: RADIOLOGY

## 2023-11-30 PROCEDURE — 99215 OFFICE O/P EST HI 40 MIN: CPT | Mod: S$PBB,,,

## 2023-11-30 PROCEDURE — 99213 OFFICE O/P EST LOW 20 MIN: CPT | Mod: PBBFAC,25,PO

## 2023-11-30 PROCEDURE — 99451 NTRPROF PH1/NTRNET/EHR 5/>: CPT | Mod: S$PBB,,, | Performed by: INTERNAL MEDICINE

## 2023-11-30 PROCEDURE — 71046 XR CHEST PA AND LATERAL: ICD-10-PCS | Mod: 26,,, | Performed by: RADIOLOGY

## 2023-11-30 PROCEDURE — 99999 PR PBB SHADOW E&M-EST. PATIENT-LVL III: ICD-10-PCS | Mod: PBBFAC,,,

## 2023-11-30 PROCEDURE — 99215 PR OFFICE/OUTPT VISIT, EST, LEVL V, 40-54 MIN: ICD-10-PCS | Mod: S$PBB,,,

## 2023-11-30 PROCEDURE — 99451 PR INTERPROF, PHONE/INTERNET/EHR, CONSULT, >= 5MINS: ICD-10-PCS | Mod: S$PBB,,, | Performed by: INTERNAL MEDICINE

## 2023-11-30 PROCEDURE — 99999 PR PBB SHADOW E&M-EST. PATIENT-LVL III: CPT | Mod: PBBFAC,,,

## 2023-11-30 PROCEDURE — 71046 X-RAY EXAM CHEST 2 VIEWS: CPT | Mod: TC,FY,PO

## 2023-11-30 RX ORDER — METOPROLOL SUCCINATE 25 MG/1
25 TABLET, EXTENDED RELEASE ORAL NIGHTLY
Qty: 90 TABLET | Refills: 3 | Status: SHIPPED | OUTPATIENT
Start: 2023-11-30

## 2023-11-30 NOTE — Clinical Note
Just HELEN, I ordered the dobutrex echo with you as reading physician in the case that valve in valve is in her near future.

## 2023-11-30 NOTE — CONSULTS
[place] - Cardiology  Response for E-Consult     Patient Name: Dulce Root  MRN: [id number]  Primary Care Provider: Patrick Hernandez MD   Requesting Provider: Jessica Wood NP  E-Consult to Cardiology  Consult performed by: Arik Roca MD  Consult ordered by: Jessica Wood NP          Chart reviewed personally.      Patient with known history of significantly reduced ejection fraction, chronic congestive heart failure, and recent hospitalization for acute heart failure exacerbation.  In addition, there is current concern for prosthetic valve stenosis.  Pending further evaluation with stress echocardiogram to determine if intervention on valve is needed.      At this time, unless emergent, would NOT PURSUE COLONOSCOPY.  Would defer until further workup from a cardiology standpoint is performed.      Total time of Consultation: 10 minute    I did not speak to the requesting provider verbally about this.     *This eConsult is based on the clinical data available to me and is furnished without benefit of a physical examination. The eConsult will need to be interpreted in light of any clinical issues or changes in patient status not available to me at the time of filing this eConsults. Significant changes in patient condition or level of acuity should result in immediate formal consultation and reevaluation. Please alert me if you have further questions.    Thank you for this eConsult referral.     Arik Roca MD  [facility]

## 2023-11-30 NOTE — PROGRESS NOTES
Subjective:    Patient ID:  Dulce Root is a 68 y.o. female patient here for evaluation Hospital Follow Up    History of Present Illness:     Mrs. Root is a 68 year old F who previously [place] [facility] but is now wanting to establish care here, most recently saw Dr. Sims, here today for an angiogram follow up after ejection fraction was shown to be below 35% again on most recent echo. No intervention needed. Concern for possible need for valve in valve as well as CRT-D device due to intolerance of maximal GDMT therapy in past.   Femoral access ok.     Volume ok, weight at 118 lbs, was at 125 lbs last OV. Having palpitations and SOB. No chest pain. No syncope.       Most Recent Echocardiogram Results  Results for orders placed during the hospital encounter of 09/14/23    Echo    Interpretation Summary    Left Ventricle: The left ventricle is mildly dilated. Normal wall thickness. There is mild eccentric hypertrophy. Global hypokinesis present. Septal motion is consistent with post-operative status. There is severely reduced systolic function with a visually estimated ejection fraction of 20 - 25%. Biplane (2D) method of discs ejection fraction is 20%. Global longitudinal strain is -5.0%. There is normal diastolic function.    Right Ventricle: Normal right ventricular cavity size. Wall thickness is normal. Right ventricle wall motion  is normal. Systolic function is normal.    Aortic Valve: There is a bioprosthetic valve in the aortic position. It is reported to be a Perceval valve. There is aortic valve sclerosis. Moderately restricted motion. Aortic valve area by VTI is 0.60 cm². Aortic valve peak velocity is 3.17 m/s. Mean gradient is 21 mmHg. The dimensionless index is 0.16. AcT is short <100msec, likely indicating PPM , rather than a true stenosis. The gradients have not changed since the implantation of the valve, however LV function has worsened. Clinical correlation is required. There is no significant  regurgitation.    Mitral Valve: There is mild regurgitation.    Tricuspid Valve: There is moderate regurgitation.    Pulmonary Artery: There is moderate pulmonary hypertension. The estimated pulmonary artery systolic pressure is 56 mmHg.    IVC/SVC: Elevated venous pressure at 15 mmHg.    Pericardium: There is a small circumferential effusion.    OPERATIONS PERFORMED:  1.  Aortic valve replacement with a medium Perceval pericardial valve.  2.  Coronary artery bypass grafting x2, with saphenous vein graft to the distal  right coronary, and saphenous vein graft to the left anterior descending.      Most Recent Nuclear Stress Test Results  Results for orders placed during the hospital encounter of 02/24/22    Nuclear Stress - Cardiology Interpreted    Interpretation Summary    Normal myocardial perfusion scan. There is no evidence of myocardial ischemia or infarction.    The gated perfusion images showed an ejection fraction of 24% at rest.    The EKG portion of this study is negative for ischemia.    Wall Motion: Hypokinetic septum, mid to apical anterior wall, apical inferior wall.      Most Recent Cardiac PET Stress Test Results  No results found for this or any previous visit.      Most Recent Cardiovascular Angiogram results  Results for orders placed during the hospital encounter of 10/16/23    Cardiac catheterization    Conclusion  Procedures:  Moderate sedation  Left heart cath  iFR of left circumflex and ramus intermedius    Conclusions:  InStent restenosis of the proximal circumflex was not hemodynamically significant; PCI deferred  Elevated left filling pressure    Recommendations:  continue guideline directed medical therapy for systolic heart failure.  Recommend 1 more day of IV diuresis.        Description of procedure:  The patient presented to the Cath Lab in a(n) elective fashion.  The patient was prepped and draped in a sterile manner.  Timeout, airway and ASA assessment were completed.  Local  anesthesia with 2% lidocaine was administered and sedation/analgesia were administered as above.    Access was obtained using the modified Seldinger technique and a micropuncture needle in the LFA with 6F sheath under US guidance. Diagnostic angiography was performed on 10/16/23.  Intervention was deferred at that time due to the patient's severely elevated filling pressures and markedly reduced cardiac output      Coronary anatomy:  Dominance:  Right  Left main:  Widely patent  Left anterior descending:  Proximal   Ramus intermedius:  Large bifurcating vessel that supplies the lateral wall with mild diffuse disease  Left circumflex:  Moderate-sized vessel of that ends in a small obtuse marginal on the inferolateral wall.  The proximal circumflex has a stent with questionable eccentric 70-80% in stent restenosis seen best in caudal views  Right coronary artery:  Large dominant vessel with moderate diffuse disease.  It gives rise to a moderate-sized RPDA and RPL.  The RCA has a patent stent in its mid segment  Aortocoronary SVG to LAD:  Widely patent graft with excellent runoff to the entire LAD distribution both antegrade and retrograde with 2 large diagonals originating proximal to the anastomosis  Aortocoronary SVG to RCA:  Presumed flush occlusion at the ostium.  Not visualized      Percutaneous coronary intervention:  Anticoagulation: IA/IV heparin with ACT>250s. DAPT status confirmed.  Guiding catheter: Six Yoruba JL4    Using an Omni wire, the circumflex was wired easily and IFR was measured to be 0.98  Additionally the ramus intermedius was wired and IFR was measured to be 0.98        The patient tolerated the procedure well and left the cath lab in stable condition.  IV protamine was given at the end of the procedure    Hemodynamics:  /21      Hemostasis:  Perclose x1    Contrast used: 40 mL      Complications: none  Estimated blood loss: minimal    Jeff Guzman MD, FACC  Interventional  Cardiology/Structural Heart Disease  [facility]  Office: [phone]    The clinically important portion of this report has been dictated in the section above. Our Epic reporting system does not allow us to provide a clinically meaningful report without this dictation. Please beware that there may be errors and discrepancies in the computer transcription and all of the clicks required to finalize the report.  The procedure log was documented by No documenter listed and verified by Jeff Guzman MD.    Date: 10/20/2023  Time: 8:19 AM      Other Most Recent Cardiology Results  Results for orders placed during the hospital encounter of 10/16/23    CARDIAC MONITORING STRIPS      REVIEW OF SYSTEMS: As noted in HPI   CARDIOVASCULAR: +palps. No recent chest pain, arm/neck/jaw pain, or edema.  RESPIRATORY: +MARCH. No recent fever, cough.  : No blood in the urine  GI: No reflux, nausea, vomiting, or blood in stool.   MUSCULOSKELETAL: No falls.   NEURO: No headaches, syncope, or dizziness.  EYES: No sudden changes in vision.     Past Medical History:   Diagnosis Date    Allergy     Anticoagulant long-term use     Plavix    Breast cancer 2008    Carotid stenosis, bilateral 10/13/2017    CHF (congestive heart failure)     Coronary artery disease     Coronary artery disease involving coronary bypass graft of native heart without angina pectoris 08/16/2019 2/19  1.  Left main is a small vessel with a 60%-65% ostial lesion. 2.  LAD is a medium-sized vessel diffuse 70% proximally  Ramus intermedius is a medium size vessel with a 40%-50% ostial lesion. 3.  Circumflex 60%-70% ostial  remainder of circumflex and obtuse marginal normal in appearance. Right coronary artery is normal size vessel, short discrete 70%mid 4.  Vein graft to right coronary is occ    Coronary artery disease involving native coronary artery of native heart without angina pectoris 06/27/2017    DCIS (ductal carcinoma  in situ) of breast 11/06/2012    Encounter for blood transfusion     Epigastric pain 04/01/2023    Essential hypertension 11/06/2012    GERD (gastroesophageal reflux disease)     GI bleed 02/2021    Hematemesis with nausea 05/11/2021    Hodgkin lymphoma     Hx of Hodgkin's disease - s/p chemo and radiation 11/06/2012    Hyperlipidemia     Hyperlipidemia 11/06/2012    The patient presents with hyperlipidemia.  The patient reports tolerating the medication well and is in excellent compliance.  There have been no medication side effects.  The patient denies chest pain, neuropathy, and myalgias.  The patient has reduced fat intake and has been exercising.  Current treatment has included the medications listed in the med card.   Lab Results Component Value Date  CH    Hypertension     LBBB (left bundle branch block) 05/22/2018    Malignant neoplasm of central portion of left breast in female, estrogen receptor negative 11/03/2022    Osteoporosis     Paroxysmal atrial fibrillation - single post-op episode 08/24/2017    Pemphigoid     Pemphigoid     pt receives IVIG infusions    Pulmonary hypertension 01/13/2023    S/P AVR (aortic valve replacement) - pericardial valve 08/21/2017    Perceval aortic tissue valve PVS23. 23mm    serial # [id number]    S/P CABG x 2 08/21/2017 8/17 CABG X 2 with SVG-LAD and SVG-distal RCA  SVG LAD due to absent LIMA after chest radiation and lymph node biopsy     Stented coronary artery     She had 2 stents placed in 2/2019.  She has had CAD and bypasses in the past in 2017.      Thyroid disease      Past Surgical History:   Procedure Laterality Date    ANGIOGRAM, CORONARY, WITH LEFT HEART CATHETERIZATION  10/16/2023    Procedure: Left Heart Cath w/Graft study;  Surgeon: Jeff Guzman MD;  Location: [facility] CATH;  Service: Cardiology;;    AORTOGRAPHY N/A 10/16/2023    Procedure: AO Root angiogram;  Surgeon: Jeff Guzman MD;  Location: [facility] CATH;  Service: Cardiology;  Laterality: N/A;    ARTERIOGRAPHY  OF AORTIC ROOT N/A 02/15/2019    Procedure: ARTERIOGRAM, AORTIC ROOT;  Surgeon: Sujit Chaudhry MD;  [facility];  Service: Cardiology;  Laterality: N/A;    AXILLARY NODE DISSECTION Left 11/25/2022    Procedure: LYMPHADENECTOMY, AXILLARY-Left;  Surgeon: Rosa Maradiaga MD;  Location: [facility] [place] OR;  Service: General;  Laterality: Left;    BREAST BIOPSY      BREAST RECONSTRUCTION      bilateral mastectomy    CARDIAC CATHETERIZATION      stents x 2    CARDIAC SURGERY  08/2017    Aortic valve replacement , CABG 2 vessel    CARDIAC VALVE REPLACEMENT  08/2017    aortic valve, bovine per pt    CORONARY ANGIOGRAPHY N/A 02/15/2019    Procedure: ANGIOGRAM, CORONARY ARTERY;  Surgeon: Sujit Chaudhry MD;  Location: [facility] CATH;  Service: Cardiology;  Laterality: N/A;    CORONARY ANGIOGRAPHY N/A 4/1/2022    Procedure: ANGIOGRAM, CORONARY ARTERY;  Surgeon: Sujit Chaudhry MD;  Location: [facility] CATH;  Service: Cardiology;  Laterality: N/A;    CORONARY BYPASS GRAFT ANGIOGRAPHY  02/15/2019    Procedure: Bypass graft study;  Surgeon: Sujit Chaudhry MD;  Location: [facility] CATH;  Service: Cardiology;;    COSMETIC SURGERY      bereast reconstruction    ESOPHAGOGASTRODUODENOSCOPY N/A 05/14/2021    Procedure: EGD (ESOPHAGOGASTRODUODENOSCOPY);  Surgeon: Tracy Flower MD;  Location: [facility];  Service: Endoscopy;  Laterality: N/A;    ESOPHAGOGASTRODUODENOSCOPY N/A 11/04/2021    Procedure: EGD (ESOPHAGOGASTRODUODENOSCOPY);  Surgeon: Tracy Flower MD;  Location: [facility];  Service: Endoscopy;  Laterality: N/A;    ESOPHAGOGASTRODUODENOSCOPY N/A 4/1/2023    Procedure: EGD (ESOPHAGOGASTRODUODENOSCOPY);  Surgeon: Tracy Flower MD;  Location: [facility];  Service: Endoscopy;  Laterality: N/A;    ESOPHAGOGASTRODUODENOSCOPY N/A 6/8/2023    Procedure: EGD (ESOPHAGOGASTRODUODENOSCOPY);  Surgeon: Mk Sanchez MD;  [facility]  Service: Endoscopy;  Laterality: N/A;  inst via portal  cardiac clearance-see encounter dated  5/31/23  precall confirmed 6/2 EB    EYE SURGERY      eye lids    FRACTIONAL FLOW RESERVE (FFR), CORONARY  10/20/2023    Procedure: (IFR) Ramus;  Surgeon: Jeff Guzman MD;  Location: [facility] CATH;  Service: Cardiology;;    INJECTION FOR SENTINEL NODE IDENTIFICATION Left 11/25/2022    Procedure: INJECTION, FOR SENTINEL NODE IDENTIFICATION-Left;  Surgeon: Rosa Maradiaga MD;  Location: [facility] OR;  Service: General;  Laterality: Left;    INSTANTANEOUS WAVE-FREE RATIO (IFR)  10/20/2023    Procedure: (IFR) LCX;  Surgeon: Jeff Guzman MD;  Location: [facility] CATH;  Service: Cardiology;;    LEFT HEART CATHETERIZATION Right 02/15/2019    Procedure: Left heart cath;  Surgeon: Sujit Chaudhry MD;  Location: [facility] CATH;  Service: Cardiology;  Laterality: Right;    LEFT HEART CATHETERIZATION Left 4/1/2022    Procedure: Left heart cath;  Surgeon: Sujit Chaudhry MD;  Location: [facility] CATH;  Service: Cardiology;  Laterality: Left;    LEFT HEART CATHETERIZATION  10/20/2023    Procedure: Left heart cath;  Surgeon: Jeff Guzman MD;  Location: [facility] CATH;  Service: Cardiology;;    LUMBAR LAMINECTOMY WITH DISCECTOMY N/A 02/27/2020    Procedure: LAMINECTOMY, SPINE, LUMBAR, WITH DISCECTOMY;  Surgeon: Vimal Villegas MD;  Location: [facility] OR;  Service: Neurosurgery;  Laterality: N/A;  left L5-S1  Laminectomy L4-5    LYMPHADENECTOMY      MASTECTOMY      MASTECTOMY, PARTIAL Left 11/25/2022    Procedure: MASTECTOMY, PARTIAL-Left ultrasound guided;  Surgeon: Rosa Maradiaga MD;  Location: [facility];  Service: General;  Laterality: Left;    RIGHT HEART CATHETERIZATION Right 4/1/2022    Procedure: INSERTION, CATHETER, RIGHT HEART;  Surgeon: Sujit Chaudhry MD;  Location: [facility] CATH;  Service: Cardiology;  Laterality: Right;    RIGHT HEART CATHETERIZATION  10/16/2023    Procedure: Right heart cath;  Surgeon: Jeff Guzman MD;  Location: [facility] CATH;  Service: Cardiology;;    SENTINEL LYMPH NODE BIOPSY Left 11/25/2022    Procedure: BIOPSY, LYMPH NODE, SENTINEL-Left;   Surgeon: Rosa Maradiaga MD;  Location: [facility] OR;  Service: General;  Laterality: Left;    SKIN BIOPSY      SPLENECTOMY, TOTAL      TRANSFORAMINAL EPIDURAL INJECTION OF STEROID Left 2019    Procedure: Left L5/S1 TF SKIP with local;  Surgeon: Canelo Niño MD;  Location: [facility] PAIN MGT;  Service: Pain Management;  Laterality: Left;    TUMOR REMOVAL      neck- lymphoma     Social History     Tobacco Use    Smoking status: Former     Current packs/day: 0.00     Average packs/day: 1 pack/day for 20.0 years (20.0 ttl pk-yrs)     Types: Cigarettes     Start date: 1961     Quit date: 1981     Years since quittin.0    Smokeless tobacco: Never   Substance Use Topics    Alcohol use: No     Comment: 2 drinks/month; none 72 hrs prior to surgery    Drug use: No         Objective    There were no vitals filed for this visit.    LAST EKG  Results for orders placed or performed during the hospital encounter of 10/16/23   EKG 12-lead    Collection Time: 10/16/23  8:46 AM    Narrative    Test Reason : I25.810,    Vent. Rate : 075 BPM     Atrial Rate : 075 BPM     P-R Int : 232 ms          QRS Dur : 170 ms      QT Int : 492 ms       P-R-T Axes : 043 -44 113 degrees     QTc Int : 549 ms    Sinus rhythm with 1st degree A-V block  Left axis deviation  Nonspecific intraventricular block  Minimal voltage criteria for LVH, may be normal variant ( [place] product )  Possible Lateral infarct ,age undetermined  Abnormal ECG  When compared with ECG of 2022 16:31,  AZ interval has increased  Confirmed by Chanelle HAIRSTON, Arik BHAKTA ([id number]) on 10/17/2023 5:42:34 PM    Referred By:  DWAIN           Confirmed By:Arik Roca MD     LIPIDS - LAST 2   Lab Results   Component Value Date    CHOL 112 (L) 2023    CHOL 130 2022    HDL 39 (L) 2023    HDL 30 (L) 2022    LDLCALC 58.8 (L) 2023    LDLCALC 76.2 2022    TRIG 71 2023    TRIG 119 2022    CHOLHDL 34.8 2023    CHOLHDL  23.1 02/22/2022     CARDIAC PROFILE - LAST 2  Lab Results   Component Value Date    BNP 1,077 (H) 04/19/2023     (H) 01/13/2023    CPK 33 02/22/2022     02/11/2019     06/26/2008    TROPONINI <0.012 02/12/2019    TROPONINI 0.014 02/10/2019      CBC - LAST 2  Lab Results   Component Value Date    WBC 5.55 11/06/2023    WBC 5.30 10/20/2023    RBC 3.69 (L) 11/06/2023    RBC 4.86 10/20/2023    HGB 11.7 (L) 11/06/2023    HGB 14.9 10/20/2023    HCT 36.1 (L) 11/06/2023    HCT 44.0 10/20/2023     11/06/2023     10/20/2023     Lab Results   Component Value Date    LABPT 12.7 02/10/2019    LABPT 12.2 07/14/2017    INR 1.1 03/31/2023    INR 1.0 05/11/2021    APTT 24.8 03/31/2023    APTT 22.3 05/11/2021     CHEMISTRY - LAST 2  Lab Results   Component Value Date     11/06/2023     10/20/2023    K 3.1 (L) 11/06/2023    K 3.8 10/20/2023     11/06/2023    CL 94 (L) 10/20/2023    CO2 29 11/06/2023    CO2 30 10/20/2023    ANIONGAP 10 11/06/2023    ANIONGAP 12 10/20/2023    BUN 16 11/06/2023    BUN 26 (H) 10/20/2023    CREATININE 0.7 11/06/2023    CREATININE 0.84 10/20/2023    GLU 90 11/06/2023     (H) 10/20/2023    CALCIUM 9.6 11/06/2023    CALCIUM 9.1 10/20/2023    PH 7.34 (L) 10/16/2023    PH 7.38 10/16/2023    MG 2.1 10/20/2023    MG 1.9 10/19/2023    ALBUMIN 4.0 11/06/2023    ALBUMIN 4.5 10/16/2023    PROT 7.1 11/06/2023    PROT 8.0 10/16/2023    ALKPHOS 86 11/06/2023    ALKPHOS 88 10/16/2023    ALT 19 11/06/2023    ALT 31 10/16/2023    AST 23 11/06/2023    AST 46 (H) 10/16/2023    BILITOT 0.9 11/06/2023    BILITOT 1.2 10/16/2023      ENDOCRINE - LAST 2  Lab Results   Component Value Date    HGBA1C 5.8 (H) 02/10/2019    HGBA1C 5.5 04/12/2018    TSH 1.328 01/06/2023    TSH 3.320 08/10/2020        PHYSICAL EXAM  CONSTITUTIONAL: Well built, well nourished in no apparent distress  NECK: no carotid bruit, no JVD  LUNGS: CTA  CHEST WALL: no tenderness  HEART: regular rate and  rhythm, S1, S2 normal, systolic murmur present   ABDOMEN: soft, non-tender; bowel sounds normal; no masses,  no organomegaly  EXTREMITIES: Extremities normal, no edema, no calf tenderness noted  NEURO: AAO X 3    I HAVE REVIEWED :    The vital signs, most recent cardiac testing, and most recent pertinent non-cardiology provider notes.    Current Outpatient Medications   Medication Instructions    ALPRAZolam (XANAX) 1 mg, Oral, Nightly PRN    aspirin (ECOTRIN) 81 mg, Oral, Daily    calcium carbonate/vitamin D3 (CALCIUM WITH VITAMIN D3 ORAL) 1 Dose, Oral, 2 times daily, 1 dose = 2 gummies    cholecalciferol, vitamin D3, (VITAMIN D3 ORAL) 1 Dose, Oral, 2 times daily, 1 dose = 1 gummy    clopidogreL (PLAVIX) 75 mg, Oral, Daily    empagliflozin (JARDIANCE) 10 mg, Oral, Daily    EScitalopram oxalate (LEXAPRO) 10 mg, Oral, Daily    folic acid (FOLVITE) 1 mg, Oral, Daily, Except on Friday     furosemide (LASIX) 20 mg, Oral, Daily    hypromellose (TEARS LUBRICANT EYE DROP OPHT) 1 drop, Both Eyes, Daily PRN    levothyroxine (SYNTHROID) 75 mcg, Oral, Before breakfast    lifitegrast (XIIDRA) 5 % Dpet 1 drop, Both Eyes, 2 times daily PRN    losartan (COZAAR) 25 mg, Oral, Daily    methotrexate 5 mg, Oral, 2 times daily, On Friday only.    pantoprazole (PROTONIX) 40 mg, Oral, Daily    rosuvastatin (CRESTOR) 20 mg, Oral, Daily      Assessment & Plan     1. Essential hypertension  BP ok today but easily becomes hypotensive   Continue loop direutic and losartan   Reintroduce toprol at low dose     2. Mixed hyperlipidemia  Continue crestor 20 mg nightly     3. S/P AVR (aortic valve replacement) - pericardial valve  Concern for stenosis of prosthetic valve on most recent echo w/ drop in EF   Will obtain dobutrex stress echo to further assess     4. S/P CABG x 2  [facility] 10/2023 without intervention, negative iFR of circ   Continue ASA plavix statin     5. Carotid stenosis, bilateral  As per Dr. Hook     6. LBBB (left bundle branch  block)  Chronic  Likely a candidate for CRT device due to rEF again with LBBB and intoleratnce to up-titrating GDMT, will arrange after dobutrex stress echo     7. Acute on chronic HFrEF (heart failure with reduced ejection fraction)  EF 20% 9/2023   Continue jardiance 10 mg daily   Continue lasix 20 mg daily   Continue losartan 25 mg daily (has not tolerated entresto in past)   Resume metoprolol at XL 25 mg nightly     8. Atherosclerosis of aorta  Continue crestor 20 mg daily     9. Pulmonary hypertension  PASP 56 mmhg by most recent echo          F/u with Dr. Guzman in 8 weeks for possible discussion of valve in valve     Naomi Peters, PA-C [facility]   Office: [phone]

## 2023-12-01 RX ORDER — SODIUM PICOSULFATE, MAGNESIUM OXIDE, AND ANHYDROUS CITRIC ACID 12; 3.5; 1 G/175ML; G/175ML; MG/175ML
2 LIQUID ORAL SEE ADMIN INSTRUCTIONS
Qty: 350 ML | Refills: 0 | Status: SHIPPED | OUTPATIENT
Start: 2023-12-01

## 2023-12-14 ENCOUNTER — CLINICAL SUPPORT (OUTPATIENT)
Dept: CARDIOLOGY | Facility: HOSPITAL | Age: 68
End: 2023-12-14
Payer: MEDICARE

## 2023-12-14 VITALS — WEIGHT: 118 LBS | HEIGHT: 62 IN | BODY MASS INDEX: 21.71 KG/M2

## 2023-12-14 DIAGNOSIS — Z95.2 S/P AVR (AORTIC VALVE REPLACEMENT): ICD-10-CM

## 2023-12-14 DIAGNOSIS — I50.23 ACUTE ON CHRONIC HFREF (HEART FAILURE WITH REDUCED EJECTION FRACTION): ICD-10-CM

## 2023-12-14 LAB
AV INDEX (PROSTH): 0.3
AV LVOT PEAK GRADIENT POST STRESS: 5 MMHG
AV MEAN GRADIENT POST STRESS: 33 MMHG
AV MEAN GRADIENT: 33 MMHG
AV PEAK GRADIENT POST STRESS: 55 MMHG
AV PEAK GRADIENT: 55 MMHG
AV PEAK VELOCITY POST STRESS: 3.7 M/S
AV VALVE AREA BY VELOCITY RATIO: 0.82 CM²
AV VALVE AREA: 0.86 CM²
AV VELOCITY RATIO: 0.29
BSA FOR ECHO PROCEDURE: 1.53 M2
CV ECHO LV RWT: 0.35 CM
CV STRESS BASE HR: 89 BPM
DIASTOLIC BLOOD PRESSURE: 77 MMHG
DOP CALC AO PEAK VEL: 3.7 M/S
DOP CALC AO VTI: 61.4 CM
DOP CALC LVOT AREA: 2.8 CM2
DOP CALC LVOT DIAMETER: 1.9 CM
DOP CALC LVOT PEAK VEL: 1.07 M/S
DOP CALC LVOT STROKE VOLUME: 52.71 CM3
DOP CALCLVOT PEAK VEL VTI: 18.6 CM
ECHO LV POSTERIOR WALL: 0.95 CM (ref 0.6–1.1)
EJECTION FRACTION: 15 %
FRACTIONAL SHORTENING: 7 % (ref 28–44)
INTERVENTRICULAR SEPTUM: 0.88 CM (ref 0.6–1.1)
LEFT ATRIUM SIZE: 3.94 CM
LEFT INTERNAL DIMENSION IN SYSTOLE: 4.98 CM (ref 2.1–4)
LEFT VENTRICLE DIASTOLIC VOLUME INDEX: 91.05 ML/M2
LEFT VENTRICLE DIASTOLIC VOLUME: 139.3 ML
LEFT VENTRICLE MASS INDEX: 119 G/M2
LEFT VENTRICLE SYSTOLIC VOLUME INDEX: 76.6 ML/M2
LEFT VENTRICLE SYSTOLIC VOLUME: 117.27 ML
LEFT VENTRICULAR INTERNAL DIMENSION IN DIASTOLE: 5.37 CM (ref 3.5–6)
LEFT VENTRICULAR MASS: 182.3 G
LVOT MG: 0.75 MMHG
LVOT MV: 0.4 CM/S
OHS CV CPX 85 PERCENT MAX PREDICTED HEART RATE MALE: 130
OHS CV CPX MAX PREDICTED HEART RATE: 153
OHS CV CPX PATIENT IS FEMALE: 1
OHS CV CPX PATIENT IS MALE: 0
OHS CV CPX PEAK HEAR RATE: 115 BPM
OHS CV CPX PERCENT MAX PREDICTED HEART RATE ACHIEVED: 78
OHS CV CPX RATE PRESSURE PRODUCT PRESENTING: ABNORMAL
OHS CV INITIAL DOSE: 5 MCG/KG/MIN
OHS CV PEAK DOSE: 20 MCG/KG/MIN
PISA TR MAX VEL: 3.34 M/S
SYSTOLIC BLOOD PRESSURE: 120 MMHG
TR MAX PG: 45 MMHG
Z-SCORE OF LEFT VENTRICULAR DIMENSION IN END DIASTOLE: 1.85
Z-SCORE OF LEFT VENTRICULAR DIMENSION IN END SYSTOLE: 4.64

## 2023-12-14 PROCEDURE — 93325 DOPPLER ECHO COLOR FLOW MAPG: CPT | Mod: 26,,, | Performed by: INTERNAL MEDICINE

## 2023-12-14 PROCEDURE — 93320 DOPPLER ECHO COMPLETE: CPT | Mod: 26,,, | Performed by: INTERNAL MEDICINE

## 2023-12-14 PROCEDURE — 93325 STRESS ECHO (CUPID ONLY): ICD-10-PCS | Mod: 26,,, | Performed by: INTERNAL MEDICINE

## 2023-12-14 PROCEDURE — 93351 STRESS ECHO (CUPID ONLY): ICD-10-PCS | Mod: 26,,, | Performed by: INTERNAL MEDICINE

## 2023-12-14 PROCEDURE — 93320 STRESS ECHO (CUPID ONLY): ICD-10-PCS | Mod: 26,,, | Performed by: INTERNAL MEDICINE

## 2023-12-14 PROCEDURE — 93325 DOPPLER ECHO COLOR FLOW MAPG: CPT | Mod: PO

## 2023-12-14 PROCEDURE — 93351 STRESS TTE COMPLETE: CPT | Mod: 26,,, | Performed by: INTERNAL MEDICINE

## 2023-12-14 PROCEDURE — 93320 DOPPLER ECHO COMPLETE: CPT | Mod: PO

## 2023-12-14 NOTE — NURSING NOTE
Dobutamine Stress Echo procedure and medication explained; patient verbalized understanding. 24G IV started to right forearm; flushed and secured. Low Dose Dobutamine Protocol used. Patient's VS returned to baseline during recovery period. IV D/C'd, cath tip intact. Patient tolerated well; no distress noted.

## 2023-12-18 ENCOUNTER — TELEPHONE (OUTPATIENT)
Dept: PREADMISSION TESTING | Facility: HOSPITAL | Age: 68
End: 2023-12-18
Payer: MEDICARE

## 2023-12-18 NOTE — TELEPHONE ENCOUNTER
called to inform patient she was not cleared by cardiology to have colonoscopy which was scheduled 1/9 with dr batista so she will need to be cancelled. she v/u and was provided our phone number for calling back to schedule after she is cleared by card. She states she has an appointment with cardiology and that she thinks something is wrong with one of her valves.

## 2023-12-22 DIAGNOSIS — K26.9 DUODENAL ULCER: ICD-10-CM

## 2023-12-26 RX ORDER — PANTOPRAZOLE SODIUM 40 MG/1
40 TABLET, DELAYED RELEASE ORAL
Qty: 90 TABLET | Refills: 3 | Status: SHIPPED | OUTPATIENT
Start: 2023-12-26 | End: 2024-01-23

## 2024-01-01 ENCOUNTER — DOCUMENTATION ONLY (OUTPATIENT)
Dept: CARDIOLOGY | Facility: CLINIC | Age: 69
End: 2024-01-01

## 2024-01-01 ENCOUNTER — PATIENT MESSAGE (OUTPATIENT)
Dept: CARDIOLOGY | Facility: CLINIC | Age: 69
End: 2024-01-01
Payer: MEDICARE

## 2024-01-01 ENCOUNTER — TELEPHONE (OUTPATIENT)
Dept: CARDIOLOGY | Facility: HOSPITAL | Age: 69
End: 2024-01-01
Payer: MEDICARE

## 2024-01-03 ENCOUNTER — OFFICE VISIT (OUTPATIENT)
Dept: CARDIOLOGY | Facility: CLINIC | Age: 69
End: 2024-01-03
Payer: MEDICARE

## 2024-01-03 VITALS
WEIGHT: 112.44 LBS | HEIGHT: 62 IN | BODY MASS INDEX: 20.69 KG/M2 | DIASTOLIC BLOOD PRESSURE: 62 MMHG | SYSTOLIC BLOOD PRESSURE: 109 MMHG | HEART RATE: 81 BPM

## 2024-01-03 DIAGNOSIS — T82.09XS PROSTHETIC VALVE DYSFUNCTION, SEQUELA: ICD-10-CM

## 2024-01-03 DIAGNOSIS — Z95.2 S/P AVR (AORTIC VALVE REPLACEMENT): Primary | ICD-10-CM

## 2024-01-03 DIAGNOSIS — I25.10 CORONARY ARTERY DISEASE INVOLVING NATIVE CORONARY ARTERY OF NATIVE HEART WITHOUT ANGINA PECTORIS: ICD-10-CM

## 2024-01-03 DIAGNOSIS — I77.89 OTHER SPECIFIED DISORDERS OF ARTERIES AND ARTERIOLES: ICD-10-CM

## 2024-01-03 DIAGNOSIS — I25.810 CORONARY ARTERY DISEASE INVOLVING CORONARY BYPASS GRAFT OF NATIVE HEART WITHOUT ANGINA PECTORIS: ICD-10-CM

## 2024-01-03 DIAGNOSIS — I50.20 HFREF (HEART FAILURE WITH REDUCED EJECTION FRACTION): ICD-10-CM

## 2024-01-03 DIAGNOSIS — I44.7 LBBB (LEFT BUNDLE BRANCH BLOCK): ICD-10-CM

## 2024-01-03 DIAGNOSIS — Z95.1 S/P CABG X 2: ICD-10-CM

## 2024-01-03 PROCEDURE — 99999 PR PBB SHADOW E&M-EST. PATIENT-LVL IV: CPT | Mod: PBBFAC,,, | Performed by: INTERNAL MEDICINE

## 2024-01-03 PROCEDURE — 99214 OFFICE O/P EST MOD 30 MIN: CPT | Mod: PBBFAC,PO | Performed by: INTERNAL MEDICINE

## 2024-01-03 PROCEDURE — 99215 OFFICE O/P EST HI 40 MIN: CPT | Mod: S$PBB,,, | Performed by: INTERNAL MEDICINE

## 2024-01-03 RX ORDER — DIPHENHYDRAMINE HCL 50 MG
CAPSULE ORAL
Qty: 1 CAPSULE | Refills: 0 | Status: SHIPPED | OUTPATIENT
Start: 2024-01-03 | End: 2024-02-20

## 2024-01-03 RX ORDER — PREDNISONE 50 MG/1
TABLET ORAL
Qty: 3 TABLET | Refills: 0 | Status: SHIPPED | OUTPATIENT
Start: 2024-01-03

## 2024-01-03 NOTE — PROGRESS NOTES
Structural Cardiology Clinic Note  Date: 1/3/24    Patient: Dulce Root, 1955, [id number]  Primary Care Provider: Patrick Hernandez MD     Chief Complaint/Reason for Referral: prosthetic valve dysfunction     Subjective:       Dulce Root is a 68 y.o. female who presents for consult. They are not accompanied. They were referred by Dr. Sims. She had previously followed at [facility].     Did not tolerate Entresto or higher GDMT doses because of hypotension. Lightheaded and SOB with minimal walking. Cannot go up a flight of stairs or lift her pet. Almost fainted walking to garbage can. No pathologic bleeding.     Focused Past History includes:  CAD status post CABG and AVR with #23 Perceval in 2017 (at [facility]):   Cath October 2023: Severely elevated right and left filling pressures with severely reduced cardiac output.  Widely patent SVG that supplies most of the LAD distribution; occluded SVG to RCA (native is patent).  Equivocal ISR in the ostial circumflex, negative by IFR.  Widely patent RCA.    Dobutamine stress echo December 2023:  LVEF 15%.  Maximum stroke volume index reached 34.5.  Severe aortic stenosis with area 0.86, peak velocity 3.7, mean gradient 33, dimensionless index 0.3.  Achieved 78% MPHR  Chronic HFrEF  History of Hodgkin's lymphoma status post chemo and radiation - 1991  Carotid stenosis. 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  Former smoker 15 p-y, quit in 1981     Review of Systems  Constitutional: negative for fevers, night sweats, and weight loss  Eyes: negative for visual disturbance, diplopia  Respiratory: negative for cough, hemoptysis, sputum, and wheezing  Cardiovascular: see HPI  Gastrointestinal: negative for abdominal pain, bright red blood per rectum, change in bowel habits, dysphagia, melena, and reflux symptoms  Genitourinary:negative for dysuria, frequency, and hematuria  Hematologic/lymphatic: negative for  bleeding, easy bruising, and lymphadenopathy  Musculoskeletal:negative for arthralgias, back pain, and myalgias  Neurological: negative for gait problems, paresthesia, speech problems, vertigo, and weakness  Behavioral/Psych: negative for excessive alcohol consumption, illegal drug usage, and sleep disturbance    ----------------------------  Allergy  Anticoagulant long-term use      Comment:  Plavix  Breast cancer  Carotid stenosis, bilateral  CHF (congestive heart failure)  Coronary artery disease  Coronary artery disease involving coronary bypass graft of native   heart without angina pectoris      Comment:  2/19  1.  Left main is a small vessel with a 60%-65%                ostial lesion. 2.  LAD is a medium-sized vessel diffuse                70% proximally  Ramus intermedius is a medium size vessel               with a 40%-50% ostial lesion. 3.  Circumflex 60%-70%                ostial  remainder of circumflex and obtuse marginal                normal in appearance. Right coronary artery is normal                size vessel, short discrete 70%mid 4.  Vein graft to                right coronary is occ  Coronary artery disease involving native coronary artery of native   heart without angina pectoris  DCIS (ductal carcinoma in situ) of breast  Encounter for blood transfusion  Epigastric pain  Essential hypertension  GERD (gastroesophageal reflux disease)  GI bleed  Hematemesis with nausea  Hodgkin lymphoma  Hx of Hodgkin's disease - s/p chemo and radiation  Hyperlipidemia  Hyperlipidemia      Comment:  The patient presents with hyperlipidemia.  The patient                reports tolerating the medication well and is in                excellent compliance.  There have been no medication side               effects.  The patient denies chest pain, neuropathy, and                myalgias.  The patient has reduced fat intake and has                been exercising.  Current treatment has included the                 medications listed in the med card.   Lab Results                Component Value Date  CH  Hypertension  LBBB (left bundle branch block)  Malignant neoplasm of central portion of left breast in female,   estrogen receptor negative  Osteoporosis  Paroxysmal atrial fibrillation - single post-op episode  Pemphigoid  Pemphigoid      Comment:  pt receives IVIG infusions  Pulmonary hypertension  S/P AVR (aortic valve replacement) - pericardial valve      Comment:  Perceval aortic tissue valve PVS23. 23mm    serial #                [id number]  S/P CABG x 2      Comment:  8/17 CABG X 2 with SVG-LAD and SVG-distal RCA  SVG LAD                due to absent LIMA after chest radiation and lymph node                biopsy   Stented coronary artery      Comment:  She had 2 stents placed in 2/2019.  She has had CAD and                bypasses in the past in 2017.    Thyroid disease  ----------------------------  Angiogram, coronary, with left heart catheterization      Comment:  Procedure: Left Heart Cath w/Graft study;  Surgeon:                Jeff Guzman MD;  [facility];  Service:                Cardiology;;  Aortography      Comment:  Procedure: AO Root angiogram;  Surgeon: Jeff Guzman MD;                Location: [facility] CATH;  Service: Cardiology;  Laterality:                N/A;  Arteriography of aortic root      Comment:  Procedure: ARTERIOGRAM, AORTIC ROOT;  Surgeon: Sujit Chaudhry MD;  Location: [facility] CATH;  Service: Cardiology;                 Laterality: N/A;  Axillary node dissection      Comment:  Procedure: LYMPHADENECTOMY, AXILLARY-Left;  Surgeon: Rosa Maradiaga MD;  Location: [facility] OR;  Service: General;                 Laterality: Left;  Breast biopsy  Breast reconstruction      Comment:  bilateral mastectomy  Cardiac catheterization      Comment:  stents x 2  Cardiac surgery      Comment:  Aortic valve replacement , CABG 2 vessel  Cardiac valve replacement      Comment:  aortic valve,  bovine per pt  Coronary angiography      Comment:  Procedure: ANGIOGRAM, CORONARY ARTERY;  Surgeon: Sujit Chaudhry MD;  Location: [facility] CATH;  Service: Cardiology;                 Laterality: N/A;  Coronary angiography      Comment:  Procedure: ANGIOGRAM, CORONARY ARTERY;  Surgeon: Sujit Chaudhry MD;  Location: [facility] CATH;  Service: Cardiology;                 Laterality: N/A;  Coronary bypass graft angiography      Comment:  Procedure: Bypass graft study;  Surgeon: Sujit Chaudhry MD;  Location: [facility] CATH;  Service: Cardiology;;  Cosmetic surgery      Comment:  bereast reconstruction  Esophagogastroduodenoscopy      Comment:  Procedure: EGD (ESOPHAGOGASTRODUODENOSCOPY);  Surgeon:                Tracy Flower MD;  Location: [facility];  Service:                Endoscopy;  Laterality: N/A;  Esophagogastroduodenoscopy      Comment:  Procedure: EGD (ESOPHAGOGASTRODUODENOSCOPY);  Surgeon:                Tracy Flower MD;  Location: [facility];  Service:                Endoscopy;  Laterality: N/A;  Esophagogastroduodenoscopy      Comment:  Procedure: EGD (ESOPHAGOGASTRODUODENOSCOPY);  Surgeon:                Tracy Flower MD;  Location: [facility];  Service:                Endoscopy;  Laterality: N/A;  Esophagogastroduodenoscopy      Comment:  Procedure: EGD (ESOPHAGOGASTRODUODENOSCOPY);  Surgeon:                Mk Sanchez MD;  [facility] ([address] [place] [address]);                 Service: Endoscopy;  Laterality: N/A;  inst via                portal  cardiac clearance-see encounter dated                5/31/23  precall confirmed 6/2 EB  Eye surgery      Comment:  eye lids  Fractional flow reserve (ffr), coronary      Comment:  Procedure: (IFR) Ramus;  Surgeon: Jeff Guzman MD;                 Location: [facility] CATH;  Service: Cardiology;;  Injection for sentinel node identification      Comment:  Procedure: INJECTION, FOR SENTINEL NODE                IDENTIFICATION-Left;   Surgeon: Rosa Maradiaga MD;                 Location: [facility] OR;  Service: General;  Laterality: Left;  Instantaneous wave-free ratio (ifr)      Comment:  Procedure: (IFR) LCX;  Surgeon: Jeff Guzman MD;                 Location: [facility] CATH;  Service: Cardiology;;  Left heart catheterization      Comment:  Procedure: Left heart cath;  Surgeon: Sujit Chaudhry MD;               Location: [facility] CATH;  Service: Cardiology;  Laterality:                Right;  Left heart catheterization      Comment:  Procedure: Left heart cath;  Surgeon: Sujit Chaudhry MD;               Location: [facility] CATH;  Service: Cardiology;  Laterality:                Left;  Left heart catheterization      Comment:  Procedure: Left heart cath;  Surgeon: Jeff Guzman MD;                 Location: [facility] CATH;  Service: Cardiology;;  Lumbar laminectomy with discectomy      Comment:  Procedure: LAMINECTOMY, SPINE, LUMBAR, WITH DISCECTOMY;                Surgeon: Vimal Villegas MD;  Location: [facility] OR;                 Service: Neurosurgery;  Laterality: N/A;  left                L5-S1  Laminectomy L4-5  Lymphadenectomy  Mastectomy  Mastectomy, partial      Comment:  Procedure: MASTECTOMY, PARTIAL-Left ultrasound guided;                 Surgeon: Rosa Maradiaga MD;  Location: [facility] OR;  Service:               General;  Laterality: Left;  Right heart catheterization      Comment:  Procedure: INSERTION, CATHETER, RIGHT HEART;  Surgeon:                Sujit Chaudhry MD;  Location: [facility] CATH;  Service:                Cardiology;  Laterality: Right;  Right heart catheterization      Comment:  Procedure: Right heart cath;  Surgeon: Jeff Guzman MD;                 Location: [facility] CATH;  Service: Cardiology;;  [place] lymph node biopsy      Comment:  Procedure: BIOPSY, LYMPH NODE, SENTINEL-Left;  Surgeon:                Rosa Maradiaga MD;  Location: [facility] OR;  Service: General;               Laterality: Left;  Skin biopsy  Splenectomy, total  Transforaminal epidural  injection of steroid      Comment:  Procedure: Left L5/S1 TF SKIP with local;  Surgeon:                Canelo Niño MD;  Location: [facility];  Service:                Pain Management;  Laterality: Left;  Tumor removal      Comment:  neck- lymphoma     Family History   Problem Relation Age of Onset    Hypertension Mother     Hypertension Father     Cancer Neg Hx      Social History     Tobacco Use    Smoking status: Former     Current packs/day: 0.00     Average packs/day: 1 pack/day for 20.0 years (20.0 ttl pk-yrs)     Types: Cigarettes     Start date: 1961     Quit date: 1981     Years since quittin.1    Smokeless tobacco: Never   Substance Use Topics    Alcohol use: No     Comment: 2 drinks/month; none 72 hrs prior to surgery    Drug use: No       Current Outpatient Medications   Medication Sig Dispense Refill    ALPRAZolam (XANAX) 1 MG tablet Take 1 mg by mouth nightly as needed for Anxiety.      aspirin (ECOTRIN) 81 MG EC tablet Take 1 tablet (81 mg total) by mouth once daily. 30 tablet 11    calcium carbonate/vitamin D3 (CALCIUM WITH VITAMIN D3 ORAL) Take 1 Dose by mouth 2 (two) times a day. 1 dose = 2 gummies      cholecalciferol, vitamin D3, (VITAMIN D3 ORAL) Take 1 Dose by mouth 2 (two) times a day. 1 dose = 1 gummy      clopidogreL (PLAVIX) 75 mg tablet Take 1 tablet (75 mg total) by mouth once daily. 90 tablet 3    empagliflozin (JARDIANCE) 10 mg tablet Take 1 tablet (10 mg total) by mouth once daily. 90 tablet 3    EScitalopram oxalate (LEXAPRO) 10 MG tablet Take 1 tablet (10 mg total) by mouth once daily. 90 tablet 3    folic acid (FOLVITE) 1 MG tablet Take 1 mg by mouth once daily. Except on Friday      furosemide (LASIX) 20 MG tablet Take 1 tablet (20 mg total) by mouth once daily. 90 tablet 3    hypromellose (TEARS LUBRICANT EYE DROP OPHT) Place 1 drop into both eyes daily as needed (dry eyes).      levothyroxine (SYNTHROID) 75 MCG tablet Take 1 tablet (75 mcg total) by mouth  "before breakfast. 90 tablet 3    lifitegrast (XIIDRA) 5 % Dpet Place 1 drop into both eyes 2 (two) times daily as needed (dry eyes).      losartan (COZAAR) 25 MG tablet Take 1 tablet (25 mg total) by mouth once daily. 90 tablet 3    methotrexate 2.5 MG Tab Take 5 mg by mouth 2 (two) times a day. On Friday only.      metoprolol succinate (TOPROL-XL) 25 MG 24 hr tablet Take 1 tablet (25 mg total) by mouth every evening. 90 tablet 3    pantoprazole (PROTONIX) 40 MG tablet TAKE 1 TABLET BY MOUTH EVERY DAY 90 tablet 3    rosuvastatin (CRESTOR) 20 MG tablet Take 1 tablet (20 mg total) by mouth once daily. 90 tablet 3    sod picosulf-mag ox-citric ac (CLENPIQ) 10 mg-3.5 gram- 12 gram/175 mL Soln Take 2 Bottles by mouth As instructed (use as directed by facility). 350 mL 0    diphenhydrAMINE (BENADRYL) 50 MG capsule Take 50mg by mouth 1 hour before contrast administration. 1 capsule 0    predniSONE (DELTASONE) 50 MG Tab Take 50mg by mouth at 13 hours, 7 hours, and 1 hour before contrast administration. 3 tablet 0     Current Facility-Administered Medications   Medication Dose Route Frequency Provider Last Rate Last Admin    sodium chloride 0.9% flush 10 mL  10 mL Intravenous PRN Manuel Sims MD            Objective:      Physical Exam  /62 (BP Location: Left arm, Patient Position: Sitting, BP Method: Medium (Automatic))   Pulse 81   Ht 5' 2" (1.575 m)   Wt 51 kg (112 lb 7 oz)   BMI 20.56 kg/m²   Body surface area is 1.49 meters squared.  Body mass index is 20.56 kg/m².    General appearance: alert, appears stated age, cooperative, and no distress  Head: Normocephalic, without obvious abnormality, atraumatic  Neck: no carotid bruit, no JVD, and supple, symmetrical, trachea midline  Lungs: clear to auscultation bilaterally  Heart: regular rate and rhythm; 3/6 late-peaking systolic ejection murmur over the upper right sternal border with diminished S2  Abdomen: soft, non-tender, no distended  Extremities: " extremities atraumatic, no pitting edema  Skin: warm, no cyanosis, no pathologic ecchymosis in exposed portions  Neurologic: Grossly normal. A&O x3      Lab Review   Lab Results   Component Value Date    WBC 5.55 2023    HGB 11.7 (L) 2023    HCT 36.1 (L) 2023    MCV 98 2023     2023         BMP  Lab Results   Component Value Date     2023    K 3.1 (L) 2023     2023    CO2 29 2023    BUN 16 2023    CREATININE 0.7 2023    CALCIUM 9.6 2023    ANIONGAP 10 2023    ESTGFRAFRICA >60 2022    EGFRNONAA >60 2022       Lab Results   Component Value Date    LABPROT 11.3 2023    ALBUMIN 4.0 2023       Lab Results   Component Value Date    ALT 19 2023    AST 23 2023    ALKPHOS 86 2023    BILITOT 0.9 2023       Lab Results   Component Value Date    TSH 1.328 2023       Lab Results   Component Value Date    CHOL 112 (L) 2023    CHOL 130 2022    CHOL 166 2020     Lab Results   Component Value Date    HDL 39 (L) 2023    HDL 30 (L) 2022    HDL 53 2020     Lab Results   Component Value Date    LDLCALC 58.8 (L) 2023    LDLCALC 76.2 2022    LDLCALC 89.8 2020     Lab Results   Component Value Date    TRIG 71 2023    TRIG 119 2022    TRIG 116 2020     Lab Results   Component Value Date    CHOLHDL 34.8 2023    CHOLHDL 23.1 2022    CHOLHDL 31.9 2020      LVOT VTI AoV VTI AoV MG JORI      0 11.1 34.41 11 0.506694  LVOT area 2.8   5 13.4 35.7 10 1.38405      10 16.3 41.9 14 1.85963      15 16 57 22 0.985488      20 20.4 62.5 29 0.47623      reserve [id number] 0.070747          EKG 2023:  Normal sinus rhythm with left bundle-branch block,      Assessment & Plan:      This is a 68 y.o. pleasant  female with NYHA class 3 functional class and severely reduced LVEF.  She is adequately  revascularized and on maximum tolerated heart failure medical therapy.  She has severe prosthetic aortic valve dysfunction due to a combination of valve degeneration and patient prosthesis mismatch.  Despite her severely reduced LVEF she is able to mount a maximum velocity of 3.7.  I personally performed measurements from her dobutamine stress echo and they are summarized above.  The measurements are consistent with adequate contractile reserve and severe obstruction at her aortic valve.     1. S/P AVR (aortic valve replacement)        2. Prosthetic valve dysfunction, sequela  CTA Cardiac TAVR_Partners (xpd)      3. HFrEF (heart failure with reduced ejection fraction)        4. LBBB (left bundle branch block)        5. S/P CABG x 2        6. Coronary artery disease involving native coronary artery of native heart without angina pectoris             She would definitely benefit from biventricular pacing.  We will refer to EP at [facility] as soon as possible for CRT-D placement  Despite her chronological age being young she has not a candidate for redo open heart surgery given her severe LV dysfunction, chest radiation, carotid disease, immunosuppression, insufficient internal mammary conduit (noted during her 1st open heart surgery)  She would also benefit from valve in valve TAVR to relieve both her patient prosthesis mismatch and prosthetic valve degeneration especially given the expandability of the [place] valve  TAVR CTA.  PFTs, carotid duplex ultrasound, dental clearance    I appreciate the opportunity to participate in Dulce Root 's care today.  Please follow up with me in 2 weeks.      Jeff Guzman MD, [facility]  Interventional Cardiology/Structural Heart Disease  [facility]  Office: [phone]     Parts of this note were completed using voice recognition software. Please excuse any misspellings or syntax errors and reach out to me with questions.

## 2024-01-03 NOTE — LETTER
January 6, 2024        Patrick Hernandez MD  [id number]  [place] [address]             [facility]  [address] [place] [address]  Phone: [phone]   Patient: Dulce Root   MR Number: [id number]   YOB: 1955   Date of Visit: 1/3/2024       Dear Dr. Hernandez:    I had the pleasure of seeing Dulce Root today in consult. Attached you will find relevant portions of my assessment and plan of care.    If you have questions, please do not hesitate to call me. I look forward to following them along with you.    Sincerely,      Jeff Guzman MD, [facility]  Interventional Cardiology/Structural Heart Disease  [facility]  Office: [phone]   Cell: [phone]          CC  MAYUR SumnerC  Manuel Sims MD    Enclosure              Assessment & Plan:      This is a 68 y.o. pleasant  female with NYHA class 3 functional class and severely reduced LVEF.  She is adequately revascularized and on maximum tolerated heart failure medical therapy.  She has severe prosthetic aortic valve dysfunction due to a combination of valve degeneration and patient prosthesis mismatch.  Despite her severely reduced LVEF she is able to mount a maximum velocity of 3.7.  I personally performed measurements from her dobutamine stress echo and they are summarized above.  The measurements are consistent with adequate contractile reserve and severe obstruction at her aortic valve.     1. S/P AVR (aortic valve replacement)        2. Prosthetic valve dysfunction, sequela  CTA Cardiac TAVR_[facility]ners (xpd)      3. HFrEF (heart failure with reduced ejection fraction)        4. LBBB (left bundle branch block)        5. S/P CABG x 2        6. Coronary artery disease involving native coronary artery of native heart without angina pectoris             She would definitely benefit from biventricular pacing.  We will refer to EP at [facility] as soon as possible for  CRT-D placement  Despite her chronological age being young she has not a candidate for redo open heart surgery given her severe LV dysfunction, chest radiation, carotid disease, immunosuppression, insufficient internal mammary conduit (noted during her 1st open heart surgery)  She would also benefit from valve in valve TAVR to relieve both her patient prosthesis mismatch and prosthetic valve degeneration especially given the expandability of the [place] valve  TAVR CTA.  PFTs, carotid duplex ultrasound, dental clearance    I appreciate the opportunity to participate in Dulce Root 's care today.  Please follow up with me in 2 weeks.      Jeff Guzman MD, [facility]  Interventional Cardiology/Structural Heart Disease  [facility]  Office: [phone]

## 2024-01-05 ENCOUNTER — PATIENT MESSAGE (OUTPATIENT)
Dept: CARDIOLOGY | Facility: CLINIC | Age: 69
End: 2024-01-05
Payer: MEDICARE

## 2024-01-09 ENCOUNTER — CLINICAL SUPPORT (OUTPATIENT)
Dept: CARDIOLOGY | Facility: HOSPITAL | Age: 69
End: 2024-01-09
Attending: INTERNAL MEDICINE
Payer: MEDICARE

## 2024-01-09 ENCOUNTER — TELEPHONE (OUTPATIENT)
Dept: ELECTROPHYSIOLOGY | Facility: CLINIC | Age: 69
End: 2024-01-09
Payer: MEDICARE

## 2024-01-09 DIAGNOSIS — T82.09XS PROSTHETIC VALVE DYSFUNCTION, SEQUELA: ICD-10-CM

## 2024-01-09 DIAGNOSIS — I77.89 OTHER SPECIFIED DISORDERS OF ARTERIES AND ARTERIOLES: ICD-10-CM

## 2024-01-09 DIAGNOSIS — I25.810 CORONARY ARTERY DISEASE INVOLVING CORONARY BYPASS GRAFT OF NATIVE HEART WITHOUT ANGINA PECTORIS: ICD-10-CM

## 2024-01-09 DIAGNOSIS — I50.20 HFREF (HEART FAILURE WITH REDUCED EJECTION FRACTION): Primary | ICD-10-CM

## 2024-01-09 DIAGNOSIS — Z95.2 S/P AVR (AORTIC VALVE REPLACEMENT): ICD-10-CM

## 2024-01-09 PROCEDURE — 93880 EXTRACRANIAL BILAT STUDY: CPT | Mod: 26,,, | Performed by: INTERNAL MEDICINE

## 2024-01-09 PROCEDURE — 93880 EXTRACRANIAL BILAT STUDY: CPT | Mod: PO

## 2024-01-11 DIAGNOSIS — I44.7 LBBB (LEFT BUNDLE BRANCH BLOCK): Primary | ICD-10-CM

## 2024-01-12 ENCOUNTER — TELEPHONE (OUTPATIENT)
Dept: ELECTROPHYSIOLOGY | Facility: CLINIC | Age: 69
End: 2024-01-12
Payer: MEDICARE

## 2024-01-12 ENCOUNTER — TELEPHONE (OUTPATIENT)
Dept: CARDIOTHORACIC SURGERY | Facility: CLINIC | Age: 69
End: 2024-01-12
Payer: MEDICARE

## 2024-01-12 DIAGNOSIS — I65.23 CAROTID STENOSIS, BILATERAL: Primary | ICD-10-CM

## 2024-01-12 LAB
LEFT ARM DIASTOLIC BLOOD PRESSURE: 62 MMHG
LEFT ARM SYSTOLIC BLOOD PRESSURE: 109 MMHG
LEFT CBA DIAS: 24 CM/S
LEFT CBA SYS: 82 CM/S
LEFT CCA DIST DIAS: 22 CM/S
LEFT CCA DIST SYS: 65 CM/S
LEFT CCA MID DIAS: 22 CM/S
LEFT CCA MID SYS: 67 CM/S
LEFT CCA PROX DIAS: 24 CM/S
LEFT CCA PROX SYS: 82 CM/S
LEFT ECA DIAS: 59 CM/S
LEFT ECA SYS: 398 CM/S
LEFT ICA DIST DIAS: 52 CM/S
LEFT ICA DIST SYS: 136 CM/S
LEFT ICA MID DIAS: 78 CM/S
LEFT ICA MID SYS: 280 CM/S
LEFT ICA PROX DIAS: 139 CM/S
LEFT ICA PROX SYS: 378 CM/S
LEFT VERTEBRAL DIAS: 22 CM/S
LEFT VERTEBRAL SYS: 115 CM/S
OHS CV CAROTID RIGHT ICA EDV HIGHEST: 43
OHS CV CAROTID ULTRASOUND LEFT ICA/CCA RATIO: 5.82
OHS CV CAROTID ULTRASOUND RIGHT ICA/CCA RATIO: 2.15
OHS CV PV CAROTID LEFT HIGHEST CCA: 82
OHS CV PV CAROTID LEFT HIGHEST ICA: 378
OHS CV PV CAROTID RIGHT HIGHEST CCA: 93
OHS CV PV CAROTID RIGHT HIGHEST ICA: 196
OHS CV US CAROTID LEFT HIGHEST EDV: 139
RIGHT ARM DIASTOLIC BLOOD PRESSURE: 62 MMHG
RIGHT ARM SYSTOLIC BLOOD PRESSURE: 109 MMHG
RIGHT CBA DIAS: 28 CM/S
RIGHT CBA SYS: 199 CM/S
RIGHT CCA DIST DIAS: 15 CM/S
RIGHT CCA DIST SYS: 91 CM/S
RIGHT CCA MID DIAS: 13 CM/S
RIGHT CCA MID SYS: 93 CM/S
RIGHT CCA PROX DIAS: 9 CM/S
RIGHT CCA PROX SYS: 56 CM/S
RIGHT ECA DIAS: 28 CM/S
RIGHT ECA SYS: 266 CM/S
RIGHT ICA DIST DIAS: 13 CM/S
RIGHT ICA DIST SYS: 69 CM/S
RIGHT ICA MID DIAS: 43 CM/S
RIGHT ICA MID SYS: 137 CM/S
RIGHT ICA PROX DIAS: 40 CM/S
RIGHT ICA PROX SYS: 196 CM/S
RIGHT VERTEBRAL DIAS: 12 CM/S
RIGHT VERTEBRAL SYS: 80 CM/S

## 2024-01-12 NOTE — TELEPHONE ENCOUNTER
Called and scheduled pt for TAVR eval on 1/17.  Pt provided with appt time and location, which she verbalized understanding to.    ----- Message from Roxanne Madrigal RN sent at 1/12/2024 10:55 AM CST -----  Dr. Guzman will be doing TAVR at [facility]. Patient will need an appointment please. CTA TAVR being done today.    Thank you

## 2024-01-12 NOTE — TELEPHONE ENCOUNTER
----- Message from Tsering Gonzáles RN sent at 1/12/2024  1:53 AM CST -----  Sofia,     Did elroy send you a message to schedule this pt for Ros[facility] on Wednesday? [facility] is on hold and EKG scheduled, but patient not on his schedule. Can you please assist?     Tsering

## 2024-01-12 NOTE — PROGRESS NOTES
She will need her left carotid worked on after TAVR. Let us set her up to see CTS and vascular surgery at [facility] while we await the TAVR CTA.

## 2024-01-12 NOTE — TELEPHONE ENCOUNTER
Called patient to schedule an appointment with Dr. Gill. She was referred by Dr. Guzman for CRT-D. Patient made an appointment on line to see Dr. Samayoa in [place]. I offered the patient an appointment at [facility] with Dr. Gill on 1/17. Patient states she will call me back. I gave her my phone number. Patient verbalized understanding.

## 2024-01-14 NOTE — PROGRESS NOTES
206Suzakiersten Root is a 68 y.o. female referred by Dr Sims for evaluation of severe AS (NYHA Class IV sx).    The patient has undergone the following TAVR work-up:   ECHO (Date 12/14/23): LVEF 15%.  Maximum stroke volume index reached 34.5.  at max dobutamine:  Severe aortic stenosis with area 0.91, peak velocity 3.7, mean gradient 29, dimensionless index 0.32.  Achieved 78% MPHR   LHC (Date 10/16/23): SVG to LAD patent, nonobstructive ISR in ostial LCx, patent RCA   CT Surgery risk assessment: prohibitive risk, per Dr Reynoso due to prior OHS, chest radiation, frailty, severely reduced EF  Incidental findings on CT: none  PFTs (1/2024): moderate restriciton and moderate gas exchange impairment  Carotids (1/2024): ANNA 50-69%, LICA 70-99%. L-VA >50%; bilateral ECA>50%  Dental clearance: pending  Rhythm issues: wide LBBB - indicated for CRT-P (2/27/24-Dr. Gill)  Comorbidities: chest radiation      Dulce Root is a 23 mm CRYSTAL 3 Ultra valve candidate via RIGHT TF access.      TAVR CTAs personally analyzed:  Valve morphology: Perceval (Medium), expandable, sutureless valve. True ID [id number], stent ID [id number], height 33.  Leaflet base dimensions: area 329, perimeter 65.9, 17.4x23.4  Inflow dimensions: area 410, 19.7 x 26.4, perimeter 73.9  Sinus of Valsalva dimensions: 29.8x27.4x29.1  STJ height: 18.5  STJ dimensions: 24.4x24.6  Left coronary height: 12.3;    Right coronary height: 9.6  Annular angle to the horizontal plane: 42.7  Implant angle: [id number]     Right iliofemoral: adequate  Left iliofemoral: adequate. Min >5.5 in the EIA       Transcatheter Aortic valve replacement   Anesthesia: GA  Echo: EDUAR  Valve: 23-mm CRYSTAL 3 Ultra with left coronary protection (leaflet tips reach the level of coronary ostium and VTC<4). Might do an aortiogram during 23mm BAV  TAVR access: RIGHT   Pigtail access: LEFT if needed  Valvuloplasty balloon:   TAVR wire:  Sharon + Yung Soto size if needed: 7  Temporary pacing  wire: RFV  Post op destination: ICU  Antithrombotic therapy: Single antiplatelet therapy   Heart failure on admission? Yes, HFrEF  CONSENTING:  The patient was told that the procedure carries around a 12.5% risk of permanent pacemaker requirement and that risk depends on the patients underlying conduction system.  Prior to the [facility] visit, all available records from referring provider were reviewed.  I have personally reviewed all the lab tests available related to the patient's case  The patient's images were reviewed by myself and the procedural planning was done with my own interpretation of the iliac and aortic anatomy based on CTA, angiography or EDUAR. I have reviewed all other imaging studies relevant to the patient including echocardiography and coronary angiography.  Patient was educated abut the pathophysiology and natural history of severe aortic stenosis and was educated about the treatment options including surgical and transcatheter valve replacement. She agrees to be full code for the forseable future and to undergo workup for treatment of severe AS.   The risks, benefits, and alternatives of transcatheter aortic valve replacement were discussed with the patient. All questions were answered and informed consent was obtained. I had a detailed discussion with the patient regarding risk of stroke, MI, bleeding access site complications including limb loss, allergy, kidney failure including dialysis and death.  The patient understands the risks and benefits and wishes to go ahead with the procedure.  The referring Cardiologist was notified of the plan  All patient's questions were answered    Shared Decision Making:  This patient was seen today by a multidisciplinary heart team involving both a Structural Heart Specialist (Interventional Cardiologist) and a Cardiothoracic Surgeon. The patient was involved in shared decision-making to define clearer goals for treatment and align health decisions with their  current values.  The patient understands the risks and expected benefits of their treatment options.    Jeff Guzman MD, [facility]  Interventional Cardiology/Structural Heart Disease  [facility]  Office: [phone]

## 2024-01-16 ENCOUNTER — TELEPHONE (OUTPATIENT)
Dept: CARDIOTHORACIC SURGERY | Facility: CLINIC | Age: 69
End: 2024-01-16
Payer: MEDICARE

## 2024-01-16 ENCOUNTER — TELEPHONE (OUTPATIENT)
Dept: CARDIOLOGY | Facility: CLINIC | Age: 69
End: 2024-01-16
Payer: MEDICARE

## 2024-01-16 ENCOUNTER — TELEPHONE (OUTPATIENT)
Dept: ELECTROPHYSIOLOGY | Facility: CLINIC | Age: 69
End: 2024-01-16
Payer: MEDICARE

## 2024-01-16 NOTE — TELEPHONE ENCOUNTER
Called pt to confirm appointment for tomorrow. Pt does not think she will be able to make it due to weather. Rescheduled appointment for 1/24 at 10:20.    Julie Haase RN  [facility] Nurse Navigator [phone]

## 2024-01-16 NOTE — TELEPHONE ENCOUNTER
Called patient to confirm appt. For tomorrow. Patient lives in [place] and states she may not be able to make it here due to the weather. Patient rescheduled for 1/31 with Dr. Gill. Patient agreed to appt.

## 2024-01-18 ENCOUNTER — OFFICE VISIT (OUTPATIENT)
Dept: CARDIOLOGY | Facility: CLINIC | Age: 69
End: 2024-01-18
Payer: MEDICARE

## 2024-01-18 DIAGNOSIS — T82.09XS PROSTHETIC VALVE DYSFUNCTION, SEQUELA: Primary | ICD-10-CM

## 2024-01-18 DIAGNOSIS — Z95.1 S/P CABG X 2: ICD-10-CM

## 2024-01-18 DIAGNOSIS — I44.7 LBBB (LEFT BUNDLE BRANCH BLOCK): ICD-10-CM

## 2024-01-18 DIAGNOSIS — I65.23 CAROTID STENOSIS, BILATERAL: ICD-10-CM

## 2024-01-18 DIAGNOSIS — Z95.2 S/P AVR (AORTIC VALVE REPLACEMENT): ICD-10-CM

## 2024-01-18 DIAGNOSIS — I50.22 CHRONIC HFREF (HEART FAILURE WITH REDUCED EJECTION FRACTION): ICD-10-CM

## 2024-01-18 PROBLEM — T82.09XA PROSTHETIC VALVE DYSFUNCTION: Status: ACTIVE | Noted: 2024-01-18

## 2024-01-18 PROCEDURE — 99214 OFFICE O/P EST MOD 30 MIN: CPT | Mod: 95,,, | Performed by: INTERNAL MEDICINE

## 2024-01-18 NOTE — LETTER
January 18, 2024        Patrick Hernandez MD  [id number]  [place] [address]             [facility]  [address] [place] [address]  Phone: [phone]   Patient: Dulce Root   MR Number: [id number]   YOB: 1955   Date of Visit: 1/18/2024       Dear Dr. Hernandez:    I had the pleasure of seeing Dulce Root today in consult. Attached you will find relevant portions of my assessment and plan of care.    If you have questions, please do not hesitate to call me. I look forward to following them along with you.    Sincerely,      Jeff Guzman MD, [facility]  Interventional Cardiology/Structural Heart Disease  [facility]  Office: [phone]   Cell: [phone]          CC  No Recipients    Enclosure                Assessment & Plan:      This is a 68 y.o. pleasant  female with NYHA class 3 functional class and severely reduced LVEF.  She is adequately revascularized and on maximum tolerated heart failure medical therapy.  She has severe prosthetic aortic valve dysfunction due to a combination of valve degeneration and patient prosthesis mismatch.  Despite her severely reduced LVEF she is able to mount a maximum velocity of 3.7 and has excellent contractile reserve.  I personally performed measurements from her dobutamine stress echo and they are summarized above.  The measurements are consistent with adequate contractile reserve and severe obstruction at her aortic valve.     1. Prosthetic valve dysfunction, sequela          2. S/P AVR (aortic valve replacement)          3. Carotid stenosis, bilateral          4. LBBB (left bundle branch block)          5. S/P CABG x 2          6. Chronic HFrEF (heart failure with reduced ejection fraction)                   She would definitely benefit from biventricular pacing.  She will see Dr. Gill regarding CRT-D placement  She will also meet Dr. Reynoso for TAVR workup and discuss her  carotid stenosis  Will discuss her case at [facility] meeting next Monday 1/22  Despite her chronological age being young she has not a candidate for redo open heart surgery given her severe LV dysfunction, chest radiation, carotid disease, immunosuppression, insufficient internal mammary conduit (noted during her first open heart surgery)  She would also benefit from valve in valve TAVR to relieve both her patient prosthesis mismatch and prosthetic valve degeneration especially given the expandability of the Perceval valve. Her TAVR CTAs were personally analyzed and anatomy is amenable to TF TAVR with a 23mm CRYSTAL 3 Ultra        I appreciate the opportunity to participate in Dulce Root 's care today.  Please follow up with me in 2 months tentatively.        Jeff Guzman MD, FACC  Interventional Cardiology/Structural Heart Disease  [facility]  Office: [phone]

## 2024-01-23 DIAGNOSIS — K26.9 DUODENAL ULCER: ICD-10-CM

## 2024-01-23 RX ORDER — PANTOPRAZOLE SODIUM 40 MG/1
40 TABLET, DELAYED RELEASE ORAL DAILY
Qty: 30 TABLET | Refills: 11 | Status: SHIPPED | OUTPATIENT
Start: 2024-01-23 | End: 2025-01-22

## 2024-01-24 ENCOUNTER — OFFICE VISIT (OUTPATIENT)
Dept: CARDIOTHORACIC SURGERY | Facility: CLINIC | Age: 69
End: 2024-01-24
Payer: MEDICARE

## 2024-01-24 VITALS
DIASTOLIC BLOOD PRESSURE: 75 MMHG | HEART RATE: 86 BPM | OXYGEN SATURATION: 99 % | HEIGHT: 63 IN | WEIGHT: 111.69 LBS | SYSTOLIC BLOOD PRESSURE: 136 MMHG | BODY MASS INDEX: 19.79 KG/M2

## 2024-01-24 DIAGNOSIS — I35.0 AORTIC VALVE STENOSIS, ETIOLOGY OF CARDIAC VALVE DISEASE UNSPECIFIED: Primary | ICD-10-CM

## 2024-01-24 PROCEDURE — 99999 PR PBB SHADOW E&M-EST. PATIENT-LVL III: CPT | Mod: PBBFAC,,, | Performed by: THORACIC SURGERY (CARDIOTHORACIC VASCULAR SURGERY)

## 2024-01-24 PROCEDURE — 99213 OFFICE O/P EST LOW 20 MIN: CPT | Mod: PBBFAC | Performed by: THORACIC SURGERY (CARDIOTHORACIC VASCULAR SURGERY)

## 2024-01-24 PROCEDURE — 99205 OFFICE O/P NEW HI 60 MIN: CPT | Mod: S$PBB,,, | Performed by: THORACIC SURGERY (CARDIOTHORACIC VASCULAR SURGERY)

## 2024-01-24 NOTE — PROGRESS NOTES
Subjective:      Patient ID: Dulce Root is a 68 y.o. female.    Chief Complaint: No chief complaint on file.      HPI:  Dulce Root is a 68 y.o. female who presents as an initial consult for severe aortic stenosis.  She has a medical history significant for CAD status post CABG and AVR with #23 Perceval in with Dr. Flores, chronic HFrEF, history of Hodgkin's lymphoma status post chemo and radiation - 1991, carotid stenosis. ANNA 50-69% by CTA 9/2022, hypertension, mucous Pemphigoid - on methotrexate, and former smoker 15 p-y, quit in 1981. She is referred by Dr Guzman for [place]IANA evaluation.  Her primary cardiologist is Dr. Sims.  She reports lightheadedness and dyspnea on exertion with ambulation.  She reports near syncope, changes in activity and inability to walk up stairs.       Family and social history reviewed    Review of patient's allergies indicates:   Allergen Reactions    Amlodipine Shortness Of Breath and Other (See Comments)     unknown  Other reaction(s): Swelling  unknown  unknown  Other reaction(s): Swelling  unknown  Other reaction(s): Swelling  unknown    Levofloxacin Hives and Swelling    Sulfa (sulfonamide antibiotics) Shortness Of Breath, Itching and Other (See Comments)     elevated blood pressure    Ace inhibitors     Ciprofloxacin Itching    Iodinated contrast media     Iodine      Other reaction(s): Unknown    Latex      Other reaction(s): Itching    Latex, natural rubber Itching     Past Medical History:   Diagnosis Date    Allergy     Anticoagulant long-term use     Plavix    Breast cancer 2008    Carotid stenosis, bilateral 10/13/2017    CHF (congestive heart failure)     Coronary artery disease     Coronary artery disease involving coronary bypass graft of native heart without angina pectoris 08/16/2019 2/19  1.  Left main is a small vessel with a 60%-65% ostial lesion. 2.  LAD is a medium-sized vessel diffuse 70% proximally  Ramus intermedius is a medium size vessel with a  40%-50% ostial lesion. 3.  Circumflex 60%-70% ostial  remainder of circumflex and obtuse marginal normal in appearance. Right coronary artery is normal size vessel, short discrete 70%mid 4.  Vein graft to right coronary is occ    Coronary artery disease involving native coronary artery of native heart without angina pectoris 06/27/2017    DCIS (ductal carcinoma in situ) of breast 11/06/2012    Encounter for blood transfusion     Epigastric pain 04/01/2023    Essential hypertension 11/06/2012    GERD (gastroesophageal reflux disease)     GI bleed 02/2021    Hematemesis with nausea 05/11/2021    Hodgkin lymphoma     Hx of Hodgkin's disease - s/p chemo and radiation 11/06/2012    Hyperlipidemia     Hyperlipidemia 11/06/2012    The patient presents with hyperlipidemia.  The patient reports tolerating the medication well and is in excellent compliance.  There have been no medication side effects.  The patient denies chest pain, neuropathy, and myalgias.  The patient has reduced fat intake and has been exercising.  Current treatment has included the medications listed in the med card.   Lab Results Component Value Date  CH    Hypertension     LBBB (left bundle branch block) 05/22/2018    Malignant neoplasm of central portion of left breast in female, estrogen receptor negative 11/03/2022    Osteoporosis     Paroxysmal atrial fibrillation - single post-op episode 08/24/2017    Pemphigoid     Pemphigoid     pt receives IVIG infusions    Pulmonary hypertension 01/13/2023    S/P AVR (aortic valve replacement) - pericardial valve 08/21/2017    Perceval aortic tissue valve PVS23. 23mm    serial # [id number]    S/P CABG x 2 08/21/2017 8/17 CABG X 2 with SVG-LAD and SVG-distal RCA  SVG LAD due to absent LIMA after chest radiation and lymph node biopsy     Stented coronary artery     She had 2 stents placed in 2/2019.  She has had CAD and bypasses in the past in 2017.      Thyroid disease      Past Surgical History:   Procedure  Laterality Date    ANGIOGRAM, CORONARY, WITH LEFT HEART CATHETERIZATION  10/16/2023    Procedure: Left Heart Cath w/Graft study;  Surgeon: Jeff Guzman MD;  Location: [facility] CATH;  Service: Cardiology;;    AORTOGRAPHY N/A 10/16/2023    Procedure: AO Root angiogram;  Surgeon: Jeff Guzman MD;  Location: [facility] CATH;  Service: Cardiology;  Laterality: N/A;    ARTERIOGRAPHY OF AORTIC ROOT N/A 02/15/2019    Procedure: ARTERIOGRAM, AORTIC ROOT;  Surgeon: Sujit Chaudhry MD;  Location: [facility] CATH;  Service: Cardiology;  Laterality: N/A;    AXILLARY NODE DISSECTION Left 11/25/2022    Procedure: LYMPHADENECTOMY, AXILLARY-Left;  Surgeon: Rosa Maradiaga MD;  Location: [facility];  Service: General;  Laterality: Left;    BREAST BIOPSY      BREAST RECONSTRUCTION      bilateral mastectomy    CARDIAC CATHETERIZATION      stents x 2    CARDIAC SURGERY  08/2017    Aortic valve replacement , CABG 2 vessel    CARDIAC VALVE REPLACEMENT  08/2017    aortic valve, bovine per pt    CORONARY ANGIOGRAPHY N/A 02/15/2019    Procedure: ANGIOGRAM, CORONARY ARTERY;  Surgeon: Sujit Chaudhry MD;  Location: [facility] CATH;  Service: Cardiology;  Laterality: N/A;    CORONARY ANGIOGRAPHY N/A 4/1/2022    Procedure: ANGIOGRAM, CORONARY ARTERY;  Surgeon: Sujit Chaudhry MD;  Location: [facility] CATH;  Service: Cardiology;  Laterality: N/A;    CORONARY BYPASS GRAFT ANGIOGRAPHY  02/15/2019    Procedure: Bypass graft study;  Surgeon: Sujit Chaudhry MD;  Location: [facility] CATH;  Service: Cardiology;;    COSMETIC SURGERY      bereast reconstruction    ESOPHAGOGASTRODUODENOSCOPY N/A 05/14/2021    Procedure: EGD (ESOPHAGOGASTRODUODENOSCOPY);  Surgeon: Tracy Flower MD;  Location: [facility];  Service: Endoscopy;  Laterality: N/A;    ESOPHAGOGASTRODUODENOSCOPY N/A 11/04/2021    Procedure: EGD (ESOPHAGOGASTRODUODENOSCOPY);  Surgeon: Tracy Flower MD;  Location: [facility] ENDO;  Service: Endoscopy;  Laterality: N/A;    ESOPHAGOGASTRODUODENOSCOPY N/A 4/1/2023    Procedure: EGD  (ESOPHAGOGASTRODUODENOSCOPY);  Surgeon: Tracy Flower MD;  Location: [facility] ENDO;  Service: Endoscopy;  Laterality: N/A;    ESOPHAGOGASTRODUODENOSCOPY N/A 6/8/2023    Procedure: EGD (ESOPHAGOGASTRODUODENOSCOPY);  Surgeon: Mk Sanchez MD;  Location: [facility] ENDO (2ND FLR);  Service: Endoscopy;  Laterality: N/A;  inst via portal  cardiac clearance-see encounter dated 5/31/23  precall confirmed 6/2 EB    EYE SURGERY      eye lids    FRACTIONAL FLOW RESERVE (FFR), CORONARY  10/20/2023    Procedure: (IFR) Ramus;  Surgeon: Jeff Guzman MD;  Location: [facility] CATH;  Service: Cardiology;;    INJECTION FOR SENTINEL NODE IDENTIFICATION Left 11/25/2022    Procedure: INJECTION, FOR SENTINEL NODE IDENTIFICATION-Left;  Surgeon: Rosa Maradiaga MD;  Location: [facility];  Service: General;  Laterality: Left;    INSTANTANEOUS WAVE-FREE RATIO (IFR)  10/20/2023    Procedure: (IFR) LCX;  Surgeon: Jeff Guzman MD;  Location: [facility] CATH;  Service: Cardiology;;    LEFT HEART CATHETERIZATION Right 02/15/2019    Procedure: Left heart cath;  Surgeon: Sujit Chaudhry MD;  Location: [facility] CATH;  Service: Cardiology;  Laterality: Right;    LEFT HEART CATHETERIZATION Left 4/1/2022    Procedure: Left heart cath;  Surgeon: Sujit Chaudhry MD;  Location: [facility] CATH;  Service: Cardiology;  Laterality: Left;    LEFT HEART CATHETERIZATION  10/20/2023    Procedure: Left heart cath;  Surgeon: Jeff Guzman MD;  Location: [facility] CATH;  Service: Cardiology;;    LUMBAR LAMINECTOMY WITH DISCECTOMY N/A 02/27/2020    Procedure: LAMINECTOMY, SPINE, LUMBAR, WITH DISCECTOMY;  Surgeon: Vimal Villegas MD;  Location: [facility] OR;  Service: Neurosurgery;  Laterality: N/A;  left L5-S1  Laminectomy L4-5    LYMPHADENECTOMY      MASTECTOMY      MASTECTOMY, PARTIAL Left 11/25/2022    Procedure: MASTECTOMY, PARTIAL-Left ultrasound guided;  Surgeon: Rosa Maradiaga MD;  Location: [facility];  Service: General;  Laterality: Left;    RIGHT HEART CATHETERIZATION Right 4/1/2022    Procedure:  INSERTION, CATHETER, RIGHT HEART;  Surgeon: Sujit Chaudhry MD;  [facility];  Service: Cardiology;  Laterality: Right;    RIGHT HEART CATHETERIZATION  10/16/2023    Procedure: Right heart cath;  Surgeon: Jeff Guzman MD;  Location: [facility] CATH;  Service: Cardiology;;    SENTINEL LYMPH NODE BIOPSY Left 2022    Procedure: BIOPSY, LYMPH NODE, SENTINEL-Left;  Surgeon: Rosa Maradiaga MD;  Location: [facility] OR;  Service: General;  Laterality: Left;    SKIN BIOPSY      SPLENECTOMY, TOTAL      TRANSFORAMINAL EPIDURAL INJECTION OF STEROID Left 2019    Procedure: Left L5/S1 TF SKIP with local;  Surgeon: Canelo Niño MD;  Location: [facility] PAIN MGT;  Service: Pain Management;  Laterality: Left;    TUMOR REMOVAL      neck- lymphoma     Family History       Problem Relation (Age of Onset)    Hypertension Mother, Father          Social History     Socioeconomic History    Marital status:    Tobacco Use    Smoking status: Former     Current packs/day: 0.00     Average packs/day: 1 pack/day for 20.0 years (20.0 ttl pk-yrs)     Types: Cigarettes     Start date: 1961     Quit date: 1981     Years since quittin.2    Smokeless tobacco: Never   Substance and Sexual Activity    Alcohol use: No     Comment: 2 drinks/month; none 72 hrs prior to surgery    Drug use: No    Sexual activity: Yes     Partners: Male   Social History Narrative    Live w/ spouse and  1 dog      Social Determinants of Health     Financial Resource Strain: Low Risk  (2023)    Overall Financial Resource Strain (CARDIA)     Difficulty of Paying Living Expenses: Not very hard   Food Insecurity: No Food Insecurity (2023)    Hunger Vital Sign     Worried About Running Out of Food in the Last Year: Never true     Ran Out of Food in the Last Year: Never true   Transportation Needs: No Transportation Needs (2023)    PRAPARE - Transportation     Lack of Transportation (Medical): No     Lack of Transportation  (Non-Medical): No   Physical Activity: Unknown (12/14/2023)    Exercise Vital Sign     Days of Exercise per Week: 0 days   Stress: Patient Declined (12/14/2023)    [employer] - Occupational Stress Questionnaire     Feeling of Stress : Patient declined   Social Connections: Unknown (12/14/2023)    Social Connection and Isolation Panel [NHANES]     Frequency of Communication with Friends and Family: Three times a week     Frequency of Social Gatherings with Friends and Family: Once a week     Active Member of Clubs or Organizations: No     Attends Club or Organization Meetings: Patient declined     Marital Status:    Housing Stability: Low Risk  (12/14/2023)    Housing Stability Vital Sign     Unable to Pay for Housing in the Last Year: No     Number of Places Lived in the Last Year: 1     Unstable Housing in the Last Year: No       Current Outpatient Medications:     ALPRAZolam (XANAX) 1 MG tablet, Take 1 mg by mouth nightly as needed for Anxiety., Disp: , Rfl:     aspirin (ECOTRIN) 81 MG EC tablet, Take 1 tablet (81 mg total) by mouth once daily., Disp: 30 tablet, Rfl: 11    calcium carbonate/vitamin D3 (CALCIUM WITH VITAMIN D3 ORAL), Take 1 Dose by mouth 2 (two) times a day. 1 dose = 2 gummies, Disp: , Rfl:     cholecalciferol, vitamin D3, (VITAMIN D3 ORAL), Take 1 Dose by mouth 2 (two) times a day. 1 dose = 1 gummy, Disp: , Rfl:     clopidogreL (PLAVIX) 75 mg tablet, Take 1 tablet (75 mg total) by mouth once daily., Disp: 90 tablet, Rfl: 3    diphenhydrAMINE (BENADRYL) 50 MG capsule, Take 50mg by mouth 1 hour before contrast administration., Disp: 1 capsule, Rfl: 0    empagliflozin (JARDIANCE) 10 mg tablet, Take 1 tablet (10 mg total) by mouth once daily., Disp: 90 tablet, Rfl: 3    EScitalopram oxalate (LEXAPRO) 10 MG tablet, Take 1 tablet (10 mg total) by mouth once daily., Disp: 90 tablet, Rfl: 3    folic acid (FOLVITE) 1 MG tablet, Take 1 mg by mouth once daily. Except on  Friday, Disp: , Rfl:     furosemide (LASIX) 20 MG tablet, Take 1 tablet (20 mg total) by mouth once daily., Disp: 90 tablet, Rfl: 3    hypromellose (TEARS LUBRICANT EYE DROP OPHT), Place 1 drop into both eyes daily as needed (dry eyes)., Disp: , Rfl:     levothyroxine (SYNTHROID) 75 MCG tablet, Take 1 tablet (75 mcg total) by mouth before breakfast., Disp: 90 tablet, Rfl: 3    lifitegrast (XIIDRA) 5 % Dpet, Place 1 drop into both eyes 2 (two) times daily as needed (dry eyes)., Disp: , Rfl:     losartan (COZAAR) 25 MG tablet, Take 1 tablet (25 mg total) by mouth once daily., Disp: 90 tablet, Rfl: 3    methotrexate 2.5 MG Tab, Take 5 mg by mouth 2 (two) times a day. On Friday only., Disp: , Rfl:     metoprolol succinate (TOPROL-XL) 25 MG 24 hr tablet, Take 1 tablet (25 mg total) by mouth every evening., Disp: 90 tablet, Rfl: 3    pantoprazole (PROTONIX) 40 MG tablet, Take 1 tablet (40 mg total) by mouth once daily., Disp: 30 tablet, Rfl: 11    predniSONE (DELTASONE) 50 MG Tab, Take 50mg by mouth at 13 hours, 7 hours, and 1 hour before contrast administration., Disp: 3 tablet, Rfl: 0    rosuvastatin (CRESTOR) 20 MG tablet, Take 1 tablet (20 mg total) by mouth once daily., Disp: 90 tablet, Rfl: 3    sod picosulf-mag ox-citric ac (CLENPIQ) 10 mg-3.5 gram- 12 gram/175 mL Soln, Take 2 Bottles by mouth As instructed (use as directed by facility)., Disp: 350 mL, Rfl: 0    Current Facility-Administered Medications:     sodium chloride 0.9% flush 10 mL, 10 mL, Intravenous, PRN, Manuel Sims MD  Current medications Reviewed    Review of Systems   Constitutional:  Positive for activity change and fatigue. Negative for appetite change and fever.   HENT:  Negative for nosebleeds.    Respiratory:  Positive for shortness of breath. Negative for cough.    Cardiovascular:  Negative for chest pain, palpitations and leg swelling.   Gastrointestinal:  Negative for abdominal distention, abdominal pain and nausea.   Genitourinary:   Negative for frequency.   Musculoskeletal:  Negative for arthralgias and myalgias.   Skin:  Negative for rash.   Neurological:  Positive for dizziness and light-headedness. Negative for numbness.   Hematological:  Does not bruise/bleed easily.     Objective:   Physical Exam  HENT:      Head: Normocephalic and atraumatic.   Eyes:      Extraocular Movements: Extraocular movements intact.   Cardiovascular:      Rate and Rhythm: Normal rate and regular rhythm.   Pulmonary:      Effort: Pulmonary effort is normal.   Abdominal:      General: Abdomen is flat.      Palpations: Abdomen is soft.   Musculoskeletal:         General: Normal range of motion.      Cervical back: Normal range of motion.   Skin:     General: Skin is warm and dry.      Capillary Refill: Capillary refill takes less than 2 seconds.   Neurological:      General: No focal deficit present.         Diagnostic Results: Reviewed  Stress ECHO: 12/14/2023      Low-flow low gradient Aortic Stenosis dobutamine echocardiogram performed    Left Ventricle: There is severely reduced systolic function. Ejection fraction by visual approximation is 15%.    Max Stroke volume index reached was 34.45, test stopped because of end of protocol reached    Aortic Valve: There is a bioprosthetic valve in the aortic position. It is reported to be a Perceval valve. There is severe stenosis. Elevated prosthetic gradient. Aortic valve area by VTI is 0.86 cm². Aortic valve peak velocity is 3.72 m/s. Mean gradient is 33 mmHg. The dimensionless index is 0.30. Acceleration time - 64ms. Max SVI achieved was 34.45    Pericardium: There is a small circumferential effusion. No indication of cardiac tamponade.    Baseline ECG: The Baseline ECG reveals sinus rhythm.    ECG Conclusion: The ECG portion of the study is negative for ischemia. Sensitivity is reduced due to failure to reach target heart rate.    [facility]: 10/20/2023  Coronary anatomy:  Dominance:  Right  Left main:  Widely patent  Left  anterior descending:  Proximal    Ramus intermedius:  Large bifurcating vessel that supplies the lateral wall with mild diffuse disease  Left circumflex:  Moderate-sized vessel of that ends in a small obtuse marginal on the inferolateral wall.  The proximal circumflex has a stent with questionable eccentric 70-80% in stent restenosis seen best in caudal views  Right coronary artery:  Large dominant vessel with moderate diffuse disease.  It gives rise to a moderate-sized RPDA and RPL.  The RCA has a patent stent in its mid segment  Aortocoronary SVG to LAD:  Widely patent graft with excellent runoff to the entire LAD distribution both antegrade and retrograde with 2 large diagonals originating proximal to the anastomosis  Aortocoronary SVG to RCA:  Presumed flush occlusion at the ostium.  Not visualized        Percutaneous coronary intervention:  Anticoagulation: IA/IV heparin with ACT>250s. DAPT status confirmed.   Guiding catheter: Six Setswana JL4  Using an Omni wire, the circumflex was wired easily and IFR was measured to be 0.98  Additionally the ramus intermedius was wired and IFR was measured to be 0.98    CT TAVR: 1/12/2024  Impression:  1. Bilateral pleural effusions with interlobular septal thickening bilaterally.  2. Diverticulosis without pericolonic inflammation.    Carotids: 3/10/2023  Impression   =========     RIGHT SIDE:   60-79% Right ICA stenosis.   Heterogeneous plaque in the right internal carotid artery.   Heterogeneous plaque noted in the right common carotid artery.   Antegrade flow in the right vertebral artery.     LEFT SIDE:   80-99% Left ICA stenosis.   Calcific plaque noted in the left internal carotid artery.   Antegrade flow noted in the left vertebral artery.   There are elevated velocities in the proximal ECA measuring 366 cm/s   Assessment:   Severe Aortic Stenosis  Plan:     I have seen the patient and reviewed the nurse practitioner's note above. I have personally interviewed and  examined the patient at bedside and agree with the findings.     68 y.o. female with multiple comorbidities presented with symptomatic severe aortic stenosis. Deemed inoperable for surgical aortic valve replacement (SAVR) due to prior cardiac surgery and chest radiation for lymphoma.  Appropriate candidate for TAVR evaluation.  We explained the patient's condition, pathology and prognosis of severe aortic stenosis, treatment options including medical therapy, SAVR and TAVR, details of SAVR and the TAVR procedure, risks and benefits of SAVR and TAVR including myocardial infarction, stroke, pacemaker, bleeding, infection, acute renal injury, conversion to open surgery, need for emergency mechanical circulatory support, and potential complications and recovery course with patient and family, and also durability of surgical vs transcatheter valve and options for reintervention in the future.  Patient and family understood and agreed to proceed. All questions answered.    We spent >70 minutes reviewing, examining and evaluating this patient, including reviewing relevant diagnostic studies, and 50% of which were spent on patient counseling including the patient's condition, diagnosis, prognosis, expected recovery after SAVR vs TAVR, follow-up plan and next steps.      Hema Reynoso MD  Cardiothoracic Surgery  [facility]

## 2024-01-27 ENCOUNTER — PATIENT MESSAGE (OUTPATIENT)
Dept: CARDIOLOGY | Facility: CLINIC | Age: 69
End: 2024-01-27
Payer: MEDICARE

## 2024-01-29 PROBLEM — I42.0 CARDIOMYOPATHY, DILATED: Status: ACTIVE | Noted: 2024-01-29

## 2024-01-29 PROBLEM — I50.23 ACUTE ON CHRONIC HFREF (HEART FAILURE WITH REDUCED EJECTION FRACTION): Status: ACTIVE | Noted: 2024-01-29

## 2024-01-29 NOTE — H&P (VIEW-ONLY)
Subjective:   Patient ID:  Dulce Root is a 68 y.o. female who presents for evaluation of LBBB      HPI 69 yo female with LBBB, dilated cardiomyopathy, CAD and Aortic stenosis s/p CABGx2 + AVR (bioprosthetic), hodgkin's lymphoma s/p mantle radiation, Breast cancer with (right and then left sided s/p bilateral mastectomy with Lymph node removal on the left), HFrEF, hypothyroidism, pemphigoid.  Primary cardiologist is Dr. Sims. Has seen Dr. Guzman for consideration of TAVR, and Dr. Valery Brian for cardio-oncology.    CABG x2 + AVR (bioprosthetic) 8/21/17. EF was normal at that time. Post-operatively noted to have LBBB, which has persisted.  10/19 EF decreased to 35%. Repeat coronary angiography was performed at that time which showed an occlusion of her SVG to PDA.  She subsequently underwent PCI of her left main coronary artery, circumflex artery, and right coronary artery.     EF remained unchanged, and has more recently decreased to 20%.  Also noted to have worsening patient-prosthesis mismatch >> consideration being given to TAVR.    Echo 9/14/23    Left Ventricle: The left ventricle is mildly dilated. Normal wall thickness. There is mild eccentric hypertrophy. Global hypokinesis present. Septal motion is consistent with post-operative status. There is severely reduced systolic function with a visually estimated ejection fraction of 20 - 25%. Biplane (2D) method of discs ejection fraction is 20%. Global longitudinal strain is -5.0%. There is normal diastolic function.    Right Ventricle: Normal right ventricular cavity size. Wall thickness is normal. Right ventricle wall motion  is normal. Systolic function is normal.    Aortic Valve: There is a bioprosthetic valve in the aortic position. It is reported to be a Perceval valve. There is aortic valve sclerosis. Moderately restricted motion. Aortic valve area by VTI is 0.60 cm². Aortic valve peak velocity is 3.17 m/s. Mean gradient is 21 mmHg. The dimensionless  index is 0.16. AcT is short <100msec, likely indicating PPM , rather than a true stenosis. The gradients have not changed since the implantation of the valve, however LV function has worsened. Clinical correlation is required. There is no significant regurgitation.    Mitral Valve: There is mild regurgitation.    Tricuspid Valve: There is moderate regurgitation.    Pulmonary Artery: There is moderate pulmonary hypertension. The estimated pulmonary artery systolic pressure is 56 mmHg.    IVC/SVC: Elevated venous pressure at 15 mmHg.    Pericardium: There is a small circumferential effusion.    LVEDD 5.74 cm    DSE 12/14/23    Low-flow low gradient Aortic Stenosis dobutamine echocardiogram performed    Left Ventricle: There is severely reduced systolic function. Ejection fraction by visual approximation is 15%.    Max Stroke volume index reached was 34.45, test stopped because of end of protocol reached    Aortic Valve: There is a bioprosthetic valve in the aortic position. It is reported to be a Perceval valve. There is severe stenosis. Elevated prosthetic gradient. Aortic valve area by VTI is 0.86 cm². Aortic valve peak velocity is 3.72 m/s. Mean gradient is 33 mmHg. The dimensionless index is 0.30. Acceleration time - 64ms. Max SVI achieved was 34.45    Pericardium: There is a small circumferential effusion. No indication of cardiac tamponade.    Baseline ECG: The Baseline ECG reveals sinus rhythm.    ECG Conclusion: The ECG portion of the study is negative for ischemia. Sensitivity is reduced due to failure to reach target heart rate.     Findings most consistent with patient prosthesis mismatch.  Notes dyspnea on exertion c/w NYHA Class III CHF. Denies syncope. Notes occasional lightheadedness primarily when standing.  LUE is in generally larger than the right side, had port in left antecubital area.    ECG's reviewed by me, reveal LBBB with QRSd 160's to 170's.      Review of Systems   Constitutional: Negative.  Negative for fever and malaise/fatigue.   HENT:  Negative for congestion and sore throat.    Cardiovascular:  Positive for dyspnea on exertion. Negative for chest pain, irregular heartbeat, leg swelling, near-syncope, orthopnea, palpitations, paroxysmal nocturnal dyspnea and syncope.   Respiratory:  Positive for shortness of breath. Negative for cough.    Gastrointestinal:  Negative for abdominal pain, constipation and diarrhea.   Neurological:  Negative for dizziness, light-headedness and weakness.   Psychiatric/Behavioral:  Negative for depression. The patient is not nervous/anxious.    All other systems reviewed and are negative.      Objective:   Physical Exam  Constitutional:       Appearance: She is well-developed.   Eyes:      General: No scleral icterus.     Conjunctiva/sclera: Conjunctivae normal.   Neck:      Vascular: No JVD.      Trachea: No tracheal deviation.   Cardiovascular:      Rate and Rhythm: Normal rate and regular rhythm.      Chest Wall: PMI is not displaced.   Pulmonary:      Effort: Pulmonary effort is normal. No respiratory distress.      Breath sounds: Normal breath sounds.   Abdominal:      Palpations: Abdomen is soft.      Tenderness: There is no abdominal tenderness.   Musculoskeletal:         General: No tenderness.   Skin:     General: Skin is warm and dry.      Findings: No rash.   Neurological:      Mental Status: She is alert and oriented to person, place, and time.   Psychiatric:         Behavior: Behavior normal.         Assessment:      1. Cardiomyopathy, dilated    2. Acute on chronic HFrEF (heart failure with reduced ejection fraction)    3. Atherosclerosis of aorta    4. LBBB (left bundle branch block)    5. Coronary atherosclerosis of autologous vein bypass graft without angina    6. S/P CABG x 2    7. S/P AVR (aortic valve replacement) - pericardial valve    8. Essential hypertension    9. Prosthetic valve dysfunction, sequela    10. History of breast cancer in female     11. Acquired hypothyroidism        Plan:       Dilated cardiomyopathy, LBBB with NYHA Class III HF, EF < 35%.  We discussed at length consideration of CRT +/- ICD. We discussed risks and benefits of both. We discussed implications of deferring ICD. Her preference is CRT-P.  We discussed CS lead placement, as well as LBBA pacing. Prior radiation may reduce chances of viable veins.  Bilateral venogram first.  If patent >> will proceed with CRT-P    Of note, would endorse proceeding prior to TAVR, and re-assess EF.

## 2024-01-30 ENCOUNTER — TELEPHONE (OUTPATIENT)
Dept: ELECTROPHYSIOLOGY | Facility: CLINIC | Age: 69
End: 2024-01-30
Payer: MEDICARE

## 2024-01-31 ENCOUNTER — LAB VISIT (OUTPATIENT)
Dept: LAB | Facility: HOSPITAL | Age: 69
End: 2024-01-31
Attending: INTERNAL MEDICINE
Payer: MEDICARE

## 2024-01-31 ENCOUNTER — OFFICE VISIT (OUTPATIENT)
Dept: ELECTROPHYSIOLOGY | Facility: CLINIC | Age: 69
End: 2024-01-31
Payer: MEDICARE

## 2024-01-31 ENCOUNTER — PATIENT MESSAGE (OUTPATIENT)
Dept: ELECTROPHYSIOLOGY | Facility: CLINIC | Age: 69
End: 2024-01-31

## 2024-01-31 VITALS
BODY MASS INDEX: 19.96 KG/M2 | WEIGHT: 112.63 LBS | SYSTOLIC BLOOD PRESSURE: 122 MMHG | DIASTOLIC BLOOD PRESSURE: 62 MMHG | HEIGHT: 63 IN | HEART RATE: 90 BPM

## 2024-01-31 DIAGNOSIS — I10 ESSENTIAL HYPERTENSION: ICD-10-CM

## 2024-01-31 DIAGNOSIS — Z85.3 HISTORY OF BREAST CANCER IN FEMALE: ICD-10-CM

## 2024-01-31 DIAGNOSIS — I50.22 CHRONIC HFREF (HEART FAILURE WITH REDUCED EJECTION FRACTION): ICD-10-CM

## 2024-01-31 DIAGNOSIS — I50.23 ACUTE ON CHRONIC HFREF (HEART FAILURE WITH REDUCED EJECTION FRACTION): ICD-10-CM

## 2024-01-31 DIAGNOSIS — I25.810 CORONARY ATHEROSCLEROSIS OF AUTOLOGOUS VEIN BYPASS GRAFT WITHOUT ANGINA: ICD-10-CM

## 2024-01-31 DIAGNOSIS — Z95.1 S/P CABG X 2: ICD-10-CM

## 2024-01-31 DIAGNOSIS — I42.0 CARDIOMYOPATHY, DILATED: ICD-10-CM

## 2024-01-31 DIAGNOSIS — T82.09XS PROSTHETIC VALVE DYSFUNCTION, SEQUELA: ICD-10-CM

## 2024-01-31 DIAGNOSIS — I50.22 CHRONIC HFREF (HEART FAILURE WITH REDUCED EJECTION FRACTION): Primary | ICD-10-CM

## 2024-01-31 DIAGNOSIS — E03.9 ACQUIRED HYPOTHYROIDISM: ICD-10-CM

## 2024-01-31 DIAGNOSIS — Z88.8 ALLERGY TO IODINE: Primary | ICD-10-CM

## 2024-01-31 DIAGNOSIS — I42.0 CARDIOMYOPATHY, DILATED: Primary | ICD-10-CM

## 2024-01-31 DIAGNOSIS — I50.20 HFREF (HEART FAILURE WITH REDUCED EJECTION FRACTION): ICD-10-CM

## 2024-01-31 DIAGNOSIS — Z95.2 S/P AVR (AORTIC VALVE REPLACEMENT): ICD-10-CM

## 2024-01-31 DIAGNOSIS — I44.7 LBBB (LEFT BUNDLE BRANCH BLOCK): Primary | ICD-10-CM

## 2024-01-31 DIAGNOSIS — I70.0 ATHEROSCLEROSIS OF AORTA: ICD-10-CM

## 2024-01-31 DIAGNOSIS — L12.9 PEMPHIGOID: ICD-10-CM

## 2024-01-31 DIAGNOSIS — I44.7 LBBB (LEFT BUNDLE BRANCH BLOCK): ICD-10-CM

## 2024-01-31 DIAGNOSIS — I25.810 CORONARY ARTERY DISEASE INVOLVING CORONARY BYPASS GRAFT OF NATIVE HEART WITHOUT ANGINA PECTORIS: ICD-10-CM

## 2024-01-31 LAB
ANION GAP SERPL CALC-SCNC: 7 MMOL/L (ref 8–16)
BUN SERPL-MCNC: 12 MG/DL (ref 8–23)
CALCIUM SERPL-MCNC: 9.7 MG/DL (ref 8.7–10.5)
CHLORIDE SERPL-SCNC: 104 MMOL/L (ref 95–110)
CO2 SERPL-SCNC: 29 MMOL/L (ref 23–29)
CREAT SERPL-MCNC: 0.7 MG/DL (ref 0.5–1.4)
EST. GFR  (NO RACE VARIABLE): >60 ML/MIN/1.73 M^2
GLUCOSE SERPL-MCNC: 81 MG/DL (ref 70–110)
POTASSIUM SERPL-SCNC: 3.8 MMOL/L (ref 3.5–5.1)
SODIUM SERPL-SCNC: 140 MMOL/L (ref 136–145)

## 2024-01-31 PROCEDURE — 36415 COLL VENOUS BLD VENIPUNCTURE: CPT | Performed by: INTERNAL MEDICINE

## 2024-01-31 PROCEDURE — 99999 PR PBB SHADOW E&M-EST. PATIENT-LVL III: CPT | Mod: PBBFAC,,, | Performed by: INTERNAL MEDICINE

## 2024-01-31 PROCEDURE — 99213 OFFICE O/P EST LOW 20 MIN: CPT | Mod: PBBFAC | Performed by: INTERNAL MEDICINE

## 2024-01-31 PROCEDURE — 99205 OFFICE O/P NEW HI 60 MIN: CPT | Mod: S$PBB,,, | Performed by: INTERNAL MEDICINE

## 2024-01-31 PROCEDURE — 80048 BASIC METABOLIC PNL TOTAL CA: CPT | Performed by: INTERNAL MEDICINE

## 2024-01-31 RX ORDER — DIPHENHYDRAMINE HCL 50 MG
CAPSULE ORAL
Qty: 3 CAPSULE | Refills: 0 | Status: SHIPPED | OUTPATIENT
Start: 2024-01-31

## 2024-01-31 RX ORDER — PREDNISONE 50 MG/1
TABLET ORAL
Qty: 3 TABLET | Refills: 0 | Status: SHIPPED | OUTPATIENT
Start: 2024-01-31

## 2024-01-31 RX ORDER — CIMETIDINE 300 MG/1
TABLET, FILM COATED ORAL
Qty: 3 TABLET | Refills: 0 | Status: SHIPPED | OUTPATIENT
Start: 2024-01-31

## 2024-01-31 NOTE — Clinical Note
Tj Aguilar, I would favor proceeding with CRT prior to TAVR, if you are ok with that. Her timeline goes along with the cardiomyopathy being related to LBBB.

## 2024-02-02 ENCOUNTER — PATIENT MESSAGE (OUTPATIENT)
Dept: ELECTROPHYSIOLOGY | Facility: CLINIC | Age: 69
End: 2024-02-02
Payer: MEDICARE

## 2024-02-02 DIAGNOSIS — I50.20 HFREF (HEART FAILURE WITH REDUCED EJECTION FRACTION): ICD-10-CM

## 2024-02-02 DIAGNOSIS — I42.0 CARDIOMYOPATHY, DILATED: ICD-10-CM

## 2024-02-02 DIAGNOSIS — I44.7 LBBB (LEFT BUNDLE BRANCH BLOCK): Primary | ICD-10-CM

## 2024-02-05 ENCOUNTER — TELEPHONE (OUTPATIENT)
Dept: CARDIOLOGY | Facility: CLINIC | Age: 69
End: 2024-02-05
Payer: MEDICARE

## 2024-02-05 NOTE — TELEPHONE ENCOUNTER
I reviewed the patient's TAVR CTA in light of recent EP eval with Dr. Gill with and cardiac surgery evaluation with Dr. Reynoso.  I called the patient and we agreed on the following:     Vascular surgery evaluation for carotid stenosis   Proceed with CRT P placement later this month   Follow up with me in office end of March   TTE end of April early May   If there is clinical worsening or lack of improvement of LVEF or decline in valve dimensionless index then we will proceed with TAVR (23 mm Tamiko 3 ultra via right femoral access with left coronary protection and under EDUAR guidance)    Jeff Guzman MD, [facility]  Interventional Cardiology/Structural Heart Disease  [facility]  Office: [phone]

## 2024-02-05 NOTE — TELEPHONE ENCOUNTER
Spoke with patient regarding March appointment with Dr. Guzman. Patient agreeable and verbalized understanding.

## 2024-02-08 ENCOUNTER — PATIENT MESSAGE (OUTPATIENT)
Dept: CARDIOLOGY | Facility: CLINIC | Age: 69
End: 2024-02-08
Payer: MEDICARE

## 2024-02-16 ENCOUNTER — TELEPHONE (OUTPATIENT)
Dept: ELECTROPHYSIOLOGY | Facility: CLINIC | Age: 69
End: 2024-02-16
Payer: MEDICARE

## 2024-02-16 ENCOUNTER — PATIENT MESSAGE (OUTPATIENT)
Dept: ELECTROPHYSIOLOGY | Facility: CLINIC | Age: 69
End: 2024-02-16
Payer: MEDICARE

## 2024-02-16 NOTE — TELEPHONE ENCOUNTER
Spoke with patient and reviewed contrast prep instructions. The pharmacist told her she needs to get benadryl and cimetidine OTC. Reviewed dosing instructions and she verbalized understanding.

## 2024-02-16 NOTE — TELEPHONE ENCOUNTER
Left message for patient to return call to review pre op instructions for Venogram on 2/19/2024. The following reminders were left:  Contrast Prep  2/18/2024 at 9pm (13 hours prior) you will need to take Prednisone 50 mg, Tagamet (Cimetidine) 300 mg, and Benadryl 50 mg.  Please follow box warnings for Benadryl and we ask you do not drive 12 hours after taking.     2/19/2024 at 4am (7 hours prior) you will need to take Prednisone 50 mg, Tagamet (Cimetidine) 300 mg, and Benadryl 50 mg.  Please follow box warnings for Benadryl and we ask you do not drive 12 hours after taking.     2/19/2024 at 9am (1 hour prior) you will need to take Prednisone 50 mg, Tagamet (Cimetidine) 300 mg, and Benadryl 50 mg.  YOU WILL HAVE TO BRING THESE MEDICATIONS WITH YOU TO THE HOSPITAL TO  TAKE THE FINAL DOSE OF YOUR PREP  AFTER YOU ARRIVE ON THE DAY OF YOUR PROCEDURE.   No fasting necessary  Arrival time is 9am  Call back # left for confirmation

## 2024-02-19 ENCOUNTER — HOSPITAL ENCOUNTER (OUTPATIENT)
Facility: HOSPITAL | Age: 69
Discharge: HOME | End: 2024-02-19
Attending: INTERNAL MEDICINE | Admitting: INTERNAL MEDICINE
Payer: MEDICARE

## 2024-02-19 VITALS
OXYGEN SATURATION: 98 % | HEART RATE: 85 BPM | HEIGHT: 62 IN | BODY MASS INDEX: 19.69 KG/M2 | DIASTOLIC BLOOD PRESSURE: 67 MMHG | RESPIRATION RATE: 18 BRPM | SYSTOLIC BLOOD PRESSURE: 118 MMHG | WEIGHT: 107 LBS | TEMPERATURE: 98 F

## 2024-02-19 DIAGNOSIS — Z01.812 PRE-PROCEDURE LAB EXAM: ICD-10-CM

## 2024-02-19 DIAGNOSIS — I44.7 LBBB (LEFT BUNDLE BRANCH BLOCK): Primary | ICD-10-CM

## 2024-02-19 DIAGNOSIS — I42.0 CARDIOMYOPATHY, DILATED: ICD-10-CM

## 2024-02-19 LAB
OHS QRS DURATION: 180 MS
OHS QTC CALCULATION: 601 MS

## 2024-02-19 PROCEDURE — 36005 INJECTION EXT VENOGRAPHY: CPT | Mod: RT,,, | Performed by: INTERNAL MEDICINE

## 2024-02-19 PROCEDURE — 75820 VEIN X-RAY ARM/LEG: CPT | Performed by: INTERNAL MEDICINE

## 2024-02-19 PROCEDURE — 36005 INJECTION EXT VENOGRAPHY: CPT | Performed by: INTERNAL MEDICINE

## 2024-02-19 PROCEDURE — 93005 ELECTROCARDIOGRAM TRACING: CPT

## 2024-02-19 PROCEDURE — 93010 ELECTROCARDIOGRAM REPORT: CPT | Mod: ,,, | Performed by: INTERNAL MEDICINE

## 2024-02-19 PROCEDURE — 75820 VEIN X-RAY ARM/LEG: CPT | Mod: 26,,, | Performed by: INTERNAL MEDICINE

## 2024-02-19 NOTE — NURSING
Pt prepped in room 9 on [facility]. Pt's vs taken and wnl. Pt is free from s/s of distress and pain. Pt's family at bedside. All questions and concerns addressed. 2 ivs placed, 12 lead EKG completed and pre procedure questions completed.   MARI Parr with EP called and notified of pt's medical history of having a double mastectomy and history of port to her left arm. MD wants an IV to both arms.   MARI Parr with EP asked if pt is allowed to eat/drink since she wont be getting sedation. Rn for EP okayed and that pt has no reason  to be NPO.

## 2024-02-19 NOTE — INTERVAL H&P NOTE
Ms Root is a 68 year old female who presents today to [facility] for scheduled venogram for CRT-P with Dr. Gill. She denies any chest pain, palpitations, SOB, MARCH, dizziness, light headedness, weakness, LE swelling, syncope, or near syncopal episodes. She denies any bleeding, infections, fevers, rashes, or surgeries in the past 30 days. VSS. She is currently not taking Metformin. All contrast prep taken.     The patient has been examined and the H&P has been reviewed:     I concur with the findings and no changes have occurred since H&P was written.      Review of Systems   Constitution: Negative. Negative for fevers/chills, weakness and malaise/fatigue.   HENT: Negative.  Negative for ear pain and tinnitus.    Eyes: Negative for blurred vision.   Cardiovascular: Negative. Negative for chest pain, dyspnea on exertion, leg swelling, near-syncope, palpitations and syncope.   Respiratory: Negative. Negative for shortness of breath.    Endocrine: Negative.  Negative for polyuria.   Hematologic/Lymphatic: Negative for bruise/bleed easily. Negative for significant bleeding.  Skin: Negative.  Negative for rash.   Musculoskeletal: Negative.  Negative for joint pain and muscle weakness.   Gastrointestinal: Negative.  Negative for abdominal pain, hematemesis, and change in bowel habit.   Genitourinary: Negative for frequency or hematuria.   Neurological: Negative. Negative for dizziness.   Psychiatric/Behavioral: Negative. Negative for depression. The patient is not nervous/anxious.       Physical Exam   Constitutional: Oriented to person, place, and time. Appears well-developed and well-nourished.   HENT:   Head: Normocephalic and atraumatic.   Eyes: Conjunctivae, EOM and lids are normal. No scleral icterus.   Neck: Normal range of motion. No JVD present. No tracheal deviation present.  Cardiovascular: Normal rate and intact distal pulses. Regular rhythm present.   Pulses:       Radial pulses are 2+ on the right side, and 2+ on  the left side.   Pulmonary/Chest: Effort normal and breath sounds normal. No accessory muscle usage. No tachypnea. No respiratory distress. No wheezes.   Abdominal: Soft. Bowel sounds are normal. No distension. There is no tenderness.   Musculoskeletal: Normal range of motion. No edema. No focal numbness or weakness.  Neurological: Alert and oriented to person, place, and time. Normal muscle tone.   Skin: Skin is warm and dry. No rash noted.   Psychiatric: Normal mood and affect. Behavior is normal.   Nursing note and vitals reviewed.     Labs: Pre-procedure labs from 1/31/24 reviewed and interpreted.      Significant Studies: ECG today reveals sinus rhythm at 89 BPM.     Plan:  -Venogram    Discussed with the patient the risks, benefits, and alternatives of venogram. All questions were answered. Patient and family verbalized understanding and wish to proceed. Patient would like to proceed with just local anesthetic, and deferred conscious sedation. Dr. Gill at bedside. Consents signed.    SALONI Castillo-C  Cardiology Electrophysiology NP   [facility]    Attending: Palomo Gill MD

## 2024-02-19 NOTE — DISCHARGE SUMMARY
[facility] - Short Stay Cardiac Unit  Cardiac Electrophysiology  Discharge Summary      Patient Name: Dulce Root  MRN: [id number]  Admission Date: 2/19/2024  Hospital Length of Stay: 0 days  Discharge Date and Time: 2/19/2024 11:48 AM  Attending Physician:Palomo Gill MD  Discharging Provider: Jasmyn Regalado NP  Primary Care Physician: Patrick Hernandez MD    HPI:Ms Root is a 68 year old female who presents today to [facility] for scheduled venogram for CRT-P with Dr. Gill. She denies any chest pain, palpitations, SOB, MARCH, dizziness, light headedness, weakness, LE swelling, syncope, or near syncopal episodes. She denies any bleeding, infections, fevers, rashes, or surgeries in the past 30 days. VSS. She is currently not taking Metformin. All contrast prep taken.      Labs: Pre-procedure labs from 1/31/24 reviewed and interpreted.      Significant Studies: ECG today reveals sinus rhythm at 89 BPM.  Procedure(s) (LRB):  Venogram, EP Lab (N/A)     Indwelling Lines/Drains at time of discharge:  Lines/Drains/Airways    None    Hospital Course: The patient presented today for a venogram with Dr. Gill prior to device procedure. She tolerated the procedure well with no acute complications (see procedure note). VSS. No acute bronchospasm, hypotension, urticaria, nausea/vomiting, metallic taste in the mouth, fever, generalized warmth or flushing noted. She denies chills, abdominal pain, fatigue, and congestion. IV site is C/D/I with no redness, swelling, warmth, drainage or signs of extravasation noted. Discharge instructions discussed with the patient and family. Continue home medications. Dr. Gill's nurse will coordinate with patient regarding upcoming procedure--scheduled for 2/27/24.    Goals of Care Treatment Preferences:  Code Status: Full Code    Consults: None    Significant Diagnostic Studies: N/A    Final Active Diagnoses:    Diagnosis Date Noted POA    PRINCIPAL PROBLEM:  LBBB (left bundle branch block)  [I44.7] 05/22/2018 Yes      Problems Resolved During this Admission:     Discharged Condition: stable    Disposition: Home or Self Care    Follow Up:   Follow-up Information       Palomo Gill MD Follow up on 2/27/2024.    Specialties: Electrophysiology, Cardiology  Why: For scheduled procedure  Contact information:  [address]  [facility] [address]  [phone]                           Patient Instructions:      Notify your health care provider if you experience any of the following:  increased confusion or weakness     Notify your health care provider if you experience any of the following:  persistent dizziness, light-headedness, or visual disturbances     Notify your health care provider if you experience any of the following:  worsening rash     Notify your health care provider if you experience any of the following:  severe persistent headache     Notify your health care provider if you experience any of the following:  difficulty breathing or increased cough     Notify your health care provider if you experience any of the following:  redness, tenderness, or signs of infection (pain, swelling, redness, odor or green/yellow discharge around incision site)     Notify your health care provider if you experience any of the following:  severe uncontrolled pain     Notify your health care provider if you experience any of the following:  persistent nausea and vomiting or diarrhea     Notify your health care provider if you experience any of the following:  temperature >100.4     Activity as tolerated   Order Comments: Activity as Tolerated:  -Continue all home medications.  -Dr. Gill's nurse will call you to schedule your upcoming procedure.  -Call the arrhythmia clinic with any questions or concerns.    Notify your health care provider if you experience any of the following:  -Difficulty breathing.   -Low or high blood pressure.   -Itching, swelling, or hives.  -Nausea or vomiting  -Metallic taste in  the mouth  -Fever.  -Generalized warmth or flushing.   -Chills.  -Abdominal pain.  -Fatigue.   -Congestion.  -Redness, swelling, warmth, or drainage where your IVs were.  -For any concerning medical symptoms.     Medications:  Reconciled Home Medications:      Medication List        CONTINUE taking these medications      ALPRAZolam 1 MG tablet  Commonly known as: XANAX  Take 1 mg by mouth nightly as needed for Anxiety.     aspirin 81 MG EC tablet  Commonly known as: ECOTRIN  Take 1 tablet (81 mg total) by mouth once daily.     CALCIUM WITH VITAMIN D3 ORAL  Take 1 Dose by mouth 2 (two) times a day. 1 dose = 2 gummies     cimetidine 300 MG tablet  Commonly known as: TAGAMET  Take one tablet by mouth at 9pm  the evening prior to Venogram; take one tablet at 4am and 9am on day of Venogram     CLENPIQ 10 mg-3.5 gram- 12 gram/175 mL Soln  Generic drug: sod picosulf-mag ox-citric ac  Take 2 Bottles by mouth As instructed (use as directed by facility).     clopidogreL 75 mg tablet  Commonly known as: PLAVIX  Take 1 tablet (75 mg total) by mouth once daily.     * diphenhydrAMINE 50 MG capsule  Commonly known as: BENADRYL  Take 50mg by mouth 1 hour before contrast administration.     * diphenhydrAMINE 50 MG capsule  Commonly known as: BENADRYL  Take one tablet by mouth at 9pm  the evening prior to Venogram; take one tablet at 4am and 9am on day of Venogram     empagliflozin 10 mg tablet  Commonly known as: JARDIANCE  Take 1 tablet (10 mg total) by mouth once daily.     EScitalopram oxalate 10 MG tablet  Commonly known as: LEXAPRO  Take 1 tablet (10 mg total) by mouth once daily.     folic acid 1 MG tablet  Commonly known as: FOLVITE  Take 1 mg by mouth once daily. Except on Friday     furosemide 20 MG tablet  Commonly known as: LASIX  Take 1 tablet (20 mg total) by mouth once daily.     levothyroxine 75 MCG tablet  Commonly known as: SYNTHROID  Take 1 tablet (75 mcg total) by mouth before breakfast.     losartan 25 MG  tablet  Commonly known as: COZAAR  Take 1 tablet (25 mg total) by mouth once daily.     methotrexate 2.5 MG Tab  Take 5 mg by mouth 2 (two) times a day. On Friday only.     metoprolol succinate 25 MG 24 hr tablet  Commonly known as: TOPROL-XL  Take 1 tablet (25 mg total) by mouth every evening.     pantoprazole 40 MG tablet  Commonly known as: PROTONIX  Take 1 tablet (40 mg total) by mouth once daily.     * predniSONE 50 MG Tab  Commonly known as: DELTASONE  Take 50mg by mouth at 13 hours, 7 hours, and 1 hour before contrast administration.     * predniSONE 50 MG Tab  Commonly known as: DELTASONE  Take one tablet by mouth at 9pm  the evening prior to Venogram; take one tablet at 4am and 9am on day of Venogram     rosuvastatin 20 MG tablet  Commonly known as: CRESTOR  Take 1 tablet (20 mg total) by mouth once daily.     TEARS LUBRICANT EYE DROP OPHT  Place 1 drop into both eyes daily as needed (dry eyes).     VITAMIN D3 ORAL  Take 1 Dose by mouth 2 (two) times a day. 1 dose = 1 gummy     XIIDRA 5 % Dpet  Generic drug: lifitegrast  Place 1 drop into both eyes 2 (two) times daily as needed (dry eyes).           * This list has 4 medication(s) that are the same as other medications prescribed for you. Read the directions carefully, and ask your doctor or other care provider to review them with you.                Plan:  -Follow up with Dr. Gill for procedure scheduled on 2/27/24.    Time spent on the discharge of patient: 10 minutes    Jasmyn Regalado NP  Cardiac Electrophysiology  [facility] - Arrhythmia    Attending: Palomo Gill MD

## 2024-02-19 NOTE — NURSING
IV d/c'd with cath tip intact.  Pt and pt family verbalized understanding of d/c instructions.  Off unit via wheelchair for discharge home.

## 2024-02-19 NOTE — DISCHARGE INSTRUCTIONS
Activity as Tolerated:  -Continue all home medications.  -Dr. Gill's nurse will call you to schedule your upcoming procedure.  -Call the arrhythmia clinic with any questions or concerns.    Notify your health care provider if you experience any of the following:  -Difficulty breathing.   -Low or high blood pressure.   -Itching, swelling, or hives.  -Nausea or vomiting  -Metallic taste in the mouth  -Fever.  -Generalized warmth or flushing.   -Chills.  -Abdominal pain.  -Fatigue.   -Congestion.  -Redness, swelling, warmth, or drainage where your IVs were.  -For any concerning medical symptoms.

## 2024-02-19 NOTE — NURSING
Received via stretcher from procedure.  Report from Sondra RN, EP.  Denies pain or SOB.  Pt is post venogram.  IV to right hand clean dry and intact.  Tolerating cracker and juice on arrival.  Pt family called to bedside.  Dr. Gill at bedside and spoke with patient.

## 2024-02-20 DIAGNOSIS — Z88.8 ALLERGY TO IODINE: Primary | ICD-10-CM

## 2024-02-20 RX ORDER — DIPHENHYDRAMINE HCL 50 MG
CAPSULE ORAL
Qty: 3 CAPSULE | Refills: 0 | Status: SHIPPED | OUTPATIENT
Start: 2024-02-20

## 2024-02-20 RX ORDER — CIMETIDINE 300 MG/1
TABLET, FILM COATED ORAL
Qty: 3 TABLET | Refills: 0 | Status: SHIPPED | OUTPATIENT
Start: 2024-02-20

## 2024-02-20 RX ORDER — PREDNISONE 50 MG/1
TABLET ORAL
Qty: 3 TABLET | Refills: 0 | Status: SHIPPED | OUTPATIENT
Start: 2024-02-20

## 2024-02-22 ENCOUNTER — HOSPITAL ENCOUNTER (OUTPATIENT)
Dept: RADIOLOGY | Facility: HOSPITAL | Age: 69
Discharge: HOME | End: 2024-02-22
Attending: NURSE PRACTITIONER
Payer: MEDICARE

## 2024-02-22 DIAGNOSIS — I50.42 CHRONIC COMBINED SYSTOLIC AND DIASTOLIC HEART FAILURE: ICD-10-CM

## 2024-02-22 PROCEDURE — 71046 X-RAY EXAM CHEST 2 VIEWS: CPT | Mod: 26,,, | Performed by: RADIOLOGY

## 2024-02-22 PROCEDURE — 71046 X-RAY EXAM CHEST 2 VIEWS: CPT | Mod: TC,PO

## 2024-02-23 ENCOUNTER — TELEPHONE (OUTPATIENT)
Dept: ELECTROPHYSIOLOGY | Facility: CLINIC | Age: 69
End: 2024-02-23
Payer: MEDICARE

## 2024-02-23 NOTE — TELEPHONE ENCOUNTER
Spoke with patient to discuss pre op K+=2.9. States she saw Elie Cochran NP because she felt like she was retaining fluid. He advised her to double her lasix. She did that with good results. Weight is down and she is feeling better. He is aware of her K+ and will be sending rx to the pharmacy for her. Will repeat K+ on admit.

## 2024-02-26 ENCOUNTER — TELEPHONE (OUTPATIENT)
Dept: ELECTROPHYSIOLOGY | Facility: CLINIC | Age: 69
End: 2024-02-26
Payer: MEDICARE

## 2024-02-26 ENCOUNTER — PATIENT MESSAGE (OUTPATIENT)
Dept: ELECTROPHYSIOLOGY | Facility: CLINIC | Age: 69
End: 2024-02-26
Payer: MEDICARE

## 2024-02-26 DIAGNOSIS — Z88.8 ALLERGY TO IODINE: ICD-10-CM

## 2024-02-26 RX ORDER — DIPHENHYDRAMINE HCL 50 MG/1
CAPSULE ORAL
Qty: 3 CAPSULE | Refills: 0 | OUTPATIENT
Start: 2024-02-26

## 2024-02-26 RX ORDER — CIMETIDINE 300 MG/1
TABLET, FILM COATED ORAL
Qty: 3 TABLET | Refills: 0 | OUTPATIENT
Start: 2024-02-26

## 2024-02-26 NOTE — TELEPHONE ENCOUNTER
----- Message from Ellie Barrera sent at 2/26/2024  2:17 PM CST -----  Regarding: Procedure meds  Pt [phone] says her pre meds for her procedure have not been called in to [facility] please verify this with them and give her a call at the number listed.    [facility]/pharmacy #[id number] - [place], [place] - [address]   Phone: [phone]  Fax: [phone]      Thanks

## 2024-02-26 NOTE — TELEPHONE ENCOUNTER
----- Message from Ellie Barrera sent at 2/26/2024  2:17 PM CST -----  Regarding: Procedure meds  Pt [phone] says her pre meds for her procedure have not been called in to [facility] please verify this with them and give her a call at the number listed.    [facility]/pharmacy #[id number] - [place], [place] - [address]   Phone: [phone]  Fax: [phone]      Thanks

## 2024-02-26 NOTE — TELEPHONE ENCOUNTER
Spoke to patient    CONFIRMED procedure arrival time of 8am (new time) for right sided CRT-P vs LBAP lead with Dr Gill     Reiterated instructions including:  -Directions to check in desk  -NPO after midnight night prior to procedure  -High importance of HOLDING Jardiance in am  -Pre-procedure LABS reviewed, no alerts noted   -Confirmed absence or presence of implanted device/stimulator -denies any implanted devices  -Confirmed no fever, cough, or shortness of breath in the past 30 days  -Confirmed Contrast Prep Instructions of taking Benadryl 50 mg, Tagamet 300 mg, and Prednisone 50 mg 13 hrs, 7 hrs, and 1 hr prior to procedure (will start at 9pm, 3am, and take 3rd dose on arrival to [facility])  -Do not wear mascara day of procedure  -Bathe night prior and morning prior to procedure with Hibiclens solution or an antibacterial soap  -Reviewed current visitor policy    Patient verbalized understanding of above and appreciated the call.

## 2024-02-27 ENCOUNTER — HOSPITAL ENCOUNTER (OUTPATIENT)
Facility: HOSPITAL | Age: 69
Discharge: HOME | End: 2024-02-27
Attending: INTERNAL MEDICINE | Admitting: INTERNAL MEDICINE
Payer: MEDICARE

## 2024-02-27 ENCOUNTER — ANESTHESIA (OUTPATIENT)
Dept: MEDSURG UNIT | Facility: HOSPITAL | Age: 69
End: 2024-02-27
Payer: MEDICARE

## 2024-02-27 ENCOUNTER — ANESTHESIA EVENT (OUTPATIENT)
Dept: MEDSURG UNIT | Facility: HOSPITAL | Age: 69
End: 2024-02-27
Payer: MEDICARE

## 2024-02-27 VITALS
HEIGHT: 62 IN | HEART RATE: 64 BPM | TEMPERATURE: 98 F | DIASTOLIC BLOOD PRESSURE: 56 MMHG | SYSTOLIC BLOOD PRESSURE: 111 MMHG | WEIGHT: 105 LBS | RESPIRATION RATE: 18 BRPM | OXYGEN SATURATION: 95 % | BODY MASS INDEX: 19.32 KG/M2

## 2024-02-27 DIAGNOSIS — Z00.00 ENCOUNTER FOR MEDICARE ANNUAL WELLNESS EXAM: ICD-10-CM

## 2024-02-27 DIAGNOSIS — I44.7 LBBB (LEFT BUNDLE BRANCH BLOCK): ICD-10-CM

## 2024-02-27 DIAGNOSIS — I42.0 CARDIOMYOPATHY, DILATED: ICD-10-CM

## 2024-02-27 DIAGNOSIS — Z95.9 CARDIAC DEVICE IN SITU: ICD-10-CM

## 2024-02-27 DIAGNOSIS — I50.20 HFREF (HEART FAILURE WITH REDUCED EJECTION FRACTION): ICD-10-CM

## 2024-02-27 DIAGNOSIS — I49.9 ARRHYTHMIA: ICD-10-CM

## 2024-02-27 LAB
ANION GAP SERPL CALC-SCNC: 10 MMOL/L (ref 8–16)
BUN SERPL-MCNC: 12 MG/DL (ref 8–23)
CALCIUM SERPL-MCNC: 7.8 MG/DL (ref 8.7–10.5)
CHLORIDE SERPL-SCNC: 109 MMOL/L (ref 95–110)
CO2 SERPL-SCNC: 23 MMOL/L (ref 23–29)
CREAT SERPL-MCNC: 0.6 MG/DL (ref 0.5–1.4)
EST. GFR  (NO RACE VARIABLE): >60 ML/MIN/1.73 M^2
GLUCOSE SERPL-MCNC: 118 MG/DL (ref 70–110)
OHS QRS DURATION: 160 MS
OHS QTC CALCULATION: 574 MS
POTASSIUM SERPL-SCNC: 3.1 MMOL/L (ref 3.5–5.1)
SODIUM SERPL-SCNC: 142 MMOL/L (ref 136–145)

## 2024-02-27 PROCEDURE — 93010 ELECTROCARDIOGRAM REPORT: CPT | Mod: ,,, | Performed by: INTERNAL MEDICINE

## 2024-02-27 PROCEDURE — 37000009 HC ANESTHESIA EA ADD 15 MINS: Performed by: INTERNAL MEDICINE

## 2024-02-27 PROCEDURE — 33225 L VENTRIC PACING LEAD ADD-ON: CPT | Performed by: INTERNAL MEDICINE

## 2024-02-27 PROCEDURE — C1730 CATH, EP, 19 OR FEW ELECT: HCPCS | Performed by: INTERNAL MEDICINE

## 2024-02-27 PROCEDURE — 93005 ELECTROCARDIOGRAM TRACING: CPT | Mod: 59

## 2024-02-27 PROCEDURE — 80048 BASIC METABOLIC PNL TOTAL CA: CPT | Performed by: INTERNAL MEDICINE

## 2024-02-27 PROCEDURE — C1898 LEAD, PMKR, OTHER THAN TRANS: HCPCS | Performed by: INTERNAL MEDICINE

## 2024-02-27 PROCEDURE — 27201423 OPTIME MED/SURG SUP & DEVICES STERILE SUPPLY: Performed by: INTERNAL MEDICINE

## 2024-02-27 PROCEDURE — 25000003 PHARM REV CODE 250: Performed by: NURSE ANESTHETIST, CERTIFIED REGISTERED

## 2024-02-27 PROCEDURE — 25000003 PHARM REV CODE 250: Performed by: INTERNAL MEDICINE

## 2024-02-27 PROCEDURE — C1769 GUIDE WIRE: HCPCS | Performed by: INTERNAL MEDICINE

## 2024-02-27 PROCEDURE — D9220A PRA ANESTHESIA: Mod: ANES,,, | Performed by: ANESTHESIOLOGY

## 2024-02-27 PROCEDURE — 33208 INSRT HEART PM ATRIAL & VENT: CPT | Mod: KX | Performed by: INTERNAL MEDICINE

## 2024-02-27 PROCEDURE — 63600175 PHARM REV CODE 636 W HCPCS: Mod: JZ,JG | Performed by: INTERNAL MEDICINE

## 2024-02-27 PROCEDURE — 63600175 PHARM REV CODE 636 W HCPCS: Performed by: INTERNAL MEDICINE

## 2024-02-27 PROCEDURE — 63600175 PHARM REV CODE 636 W HCPCS: Performed by: NURSE ANESTHETIST, CERTIFIED REGISTERED

## 2024-02-27 PROCEDURE — D9220A PRA ANESTHESIA: Mod: CRNA,,, | Performed by: NURSE ANESTHETIST, CERTIFIED REGISTERED

## 2024-02-27 PROCEDURE — C1894 INTRO/SHEATH, NON-LASER: HCPCS | Performed by: INTERNAL MEDICINE

## 2024-02-27 PROCEDURE — C2621 PMKR, OTHER THAN SING/DUAL: HCPCS | Performed by: INTERNAL MEDICINE

## 2024-02-27 PROCEDURE — 25500020 PHARM REV CODE 255: Performed by: INTERNAL MEDICINE

## 2024-02-27 PROCEDURE — 37000008 HC ANESTHESIA 1ST 15 MINUTES: Performed by: INTERNAL MEDICINE

## 2024-02-27 PROCEDURE — 33208 INSRT HEART PM ATRIAL & VENT: CPT | Mod: KX,GC,, | Performed by: INTERNAL MEDICINE

## 2024-02-27 PROCEDURE — C1900 LEAD, CORONARY VENOUS: HCPCS | Performed by: INTERNAL MEDICINE

## 2024-02-27 PROCEDURE — 33225 L VENTRIC PACING LEAD ADD-ON: CPT | Mod: GC,,, | Performed by: INTERNAL MEDICINE

## 2024-02-27 PROCEDURE — C1887 CATHETER, GUIDING: HCPCS | Performed by: INTERNAL MEDICINE

## 2024-02-27 DEVICE — CRTP W4TR01 PERCEPTA QUAD CRTP MRI US
Type: IMPLANTABLE DEVICE | Site: CHEST | Status: FUNCTIONAL
Brand: PERCEPTA™ QUAD CRT-P MRI SURESCAN™

## 2024-02-27 DEVICE — LEAD 5076-45 MRI US RCMCRD
Type: IMPLANTABLE DEVICE | Site: HEART | Status: FUNCTIONAL
Brand: CAPSUREFIX NOVUS MRI™ SURESCAN®

## 2024-02-27 DEVICE — LEAD 429888 MRI CANT US
Type: IMPLANTABLE DEVICE | Site: HEART | Status: FUNCTIONAL
Brand: ATTAIN PERFORMA™ MRI SURESCAN™

## 2024-02-27 DEVICE — LEAD 5076-52 MRI US RCMCRD
Type: IMPLANTABLE DEVICE | Site: HEART | Status: FUNCTIONAL
Brand: CAPSUREFIX NOVUS MRI™ SURESCAN®

## 2024-02-27 RX ORDER — CEFAZOLIN SODIUM 1 G/3ML
INJECTION, POWDER, FOR SOLUTION INTRAMUSCULAR; INTRAVENOUS
Status: DISCONTINUED | OUTPATIENT
Start: 2024-02-27 | End: 2024-02-27

## 2024-02-27 RX ORDER — SODIUM CHLORIDE 0.9 % (FLUSH) 0.9 %
3 SYRINGE (ML) INJECTION
Status: DISCONTINUED | OUTPATIENT
Start: 2024-02-27 | End: 2024-02-27 | Stop reason: HOSPADM

## 2024-02-27 RX ORDER — HALOPERIDOL 5 MG/ML
0.5 INJECTION INTRAMUSCULAR EVERY 10 MIN PRN
Status: DISCONTINUED | OUTPATIENT
Start: 2024-02-27 | End: 2024-02-27 | Stop reason: HOSPADM

## 2024-02-27 RX ORDER — ETOMIDATE 2 MG/ML
INJECTION INTRAVENOUS
Status: DISCONTINUED | OUTPATIENT
Start: 2024-02-27 | End: 2024-02-27

## 2024-02-27 RX ORDER — ONDANSETRON HYDROCHLORIDE 2 MG/ML
INJECTION, SOLUTION INTRAVENOUS
Status: DISCONTINUED | OUTPATIENT
Start: 2024-02-27 | End: 2024-02-27

## 2024-02-27 RX ORDER — DEXAMETHASONE SODIUM PHOSPHATE 4 MG/ML
INJECTION, SOLUTION INTRA-ARTICULAR; INTRALESIONAL; INTRAMUSCULAR; INTRAVENOUS; SOFT TISSUE
Status: DISCONTINUED | OUTPATIENT
Start: 2024-02-27 | End: 2024-02-27

## 2024-02-27 RX ORDER — KETAMINE HCL IN 0.9 % NACL 50 MG/5 ML
SYRINGE (ML) INTRAVENOUS
Status: DISCONTINUED | OUTPATIENT
Start: 2024-02-27 | End: 2024-02-27

## 2024-02-27 RX ORDER — POTASSIUM CHLORIDE 20 MEQ/1
40 TABLET, EXTENDED RELEASE ORAL ONCE
Status: COMPLETED | OUTPATIENT
Start: 2024-02-27 | End: 2024-02-27

## 2024-02-27 RX ORDER — VANCOMYCIN HYDROCHLORIDE 1 G/20ML
INJECTION, POWDER, LYOPHILIZED, FOR SOLUTION INTRAVENOUS
Status: DISCONTINUED | OUTPATIENT
Start: 2024-02-27 | End: 2024-02-27 | Stop reason: HOSPADM

## 2024-02-27 RX ORDER — LIDOCAINE HYDROCHLORIDE 20 MG/ML
INJECTION, SOLUTION INFILTRATION; PERINEURAL
Status: DISCONTINUED | OUTPATIENT
Start: 2024-02-27 | End: 2024-02-27 | Stop reason: HOSPADM

## 2024-02-27 RX ORDER — ACETAMINOPHEN 325 MG/1
650 TABLET ORAL EVERY 4 HOURS PRN
Status: DISCONTINUED | OUTPATIENT
Start: 2024-02-27 | End: 2024-02-27 | Stop reason: HOSPADM

## 2024-02-27 RX ORDER — SODIUM CHLORIDE 0.9 G/100ML
IRRIGANT IRRIGATION
Status: DISCONTINUED | OUTPATIENT
Start: 2024-02-27 | End: 2024-02-27 | Stop reason: HOSPADM

## 2024-02-27 RX ORDER — IODIXANOL 320 MG/ML
INJECTION, SOLUTION INTRAVASCULAR
Status: DISCONTINUED | OUTPATIENT
Start: 2024-02-27 | End: 2024-02-27 | Stop reason: HOSPADM

## 2024-02-27 RX ORDER — DEXMEDETOMIDINE HYDROCHLORIDE 100 UG/ML
INJECTION, SOLUTION INTRAVENOUS
Status: DISCONTINUED | OUTPATIENT
Start: 2024-02-27 | End: 2024-02-27

## 2024-02-27 RX ORDER — ONDANSETRON HYDROCHLORIDE 2 MG/ML
4 INJECTION, SOLUTION INTRAVENOUS DAILY PRN
Status: DISCONTINUED | OUTPATIENT
Start: 2024-02-27 | End: 2024-02-27 | Stop reason: HOSPADM

## 2024-02-27 RX ORDER — HYDROMORPHONE HYDROCHLORIDE 1 MG/ML
0.2 INJECTION, SOLUTION INTRAMUSCULAR; INTRAVENOUS; SUBCUTANEOUS EVERY 5 MIN PRN
Status: DISCONTINUED | OUTPATIENT
Start: 2024-02-27 | End: 2024-02-27 | Stop reason: HOSPADM

## 2024-02-27 RX ORDER — FENTANYL CITRATE 50 UG/ML
INJECTION, SOLUTION INTRAMUSCULAR; INTRAVENOUS
Status: DISCONTINUED | OUTPATIENT
Start: 2024-02-27 | End: 2024-02-27

## 2024-02-27 RX ORDER — OXYCODONE AND ACETAMINOPHEN 5; 325 MG/1; MG/1
1 TABLET ORAL
Status: DISCONTINUED | OUTPATIENT
Start: 2024-02-27 | End: 2024-02-27 | Stop reason: HOSPADM

## 2024-02-27 RX ORDER — MIDAZOLAM HYDROCHLORIDE 1 MG/ML
INJECTION, SOLUTION INTRAMUSCULAR; INTRAVENOUS
Status: DISCONTINUED | OUTPATIENT
Start: 2024-02-27 | End: 2024-02-27

## 2024-02-27 RX ORDER — BUPIVACAINE HYDROCHLORIDE 2.5 MG/ML
INJECTION, SOLUTION EPIDURAL; INFILTRATION; INTRACAUDAL
Status: DISCONTINUED | OUTPATIENT
Start: 2024-02-27 | End: 2024-02-27 | Stop reason: HOSPADM

## 2024-02-27 RX ORDER — DOXYCYCLINE 100 MG/1
100 CAPSULE ORAL EVERY 12 HOURS
Qty: 10 CAPSULE | Refills: 0 | Status: SHIPPED | OUTPATIENT
Start: 2024-02-27 | End: 2024-03-03

## 2024-02-27 RX ADMIN — FENTANYL CITRATE 25 MCG: 50 INJECTION, SOLUTION INTRAMUSCULAR; INTRAVENOUS at 11:02

## 2024-02-27 RX ADMIN — Medication 10 MG: at 01:02

## 2024-02-27 RX ADMIN — FENTANYL CITRATE 50 MCG: 50 INJECTION, SOLUTION INTRAMUSCULAR; INTRAVENOUS at 01:02

## 2024-02-27 RX ADMIN — NOREPINEPHRINE BITARTRATE 0.03 MCG/KG/MIN: 1 INJECTION, SOLUTION, CONCENTRATE INTRAVENOUS at 11:02

## 2024-02-27 RX ADMIN — Medication 10 MG: at 10:02

## 2024-02-27 RX ADMIN — MIDAZOLAM HYDROCHLORIDE 1 MG: 1 INJECTION, SOLUTION INTRAMUSCULAR; INTRAVENOUS at 10:02

## 2024-02-27 RX ADMIN — DEXAMETHASONE SODIUM PHOSPHATE 4 MG: 4 INJECTION, SOLUTION INTRAMUSCULAR; INTRAVENOUS at 02:02

## 2024-02-27 RX ADMIN — Medication 10 MG: at 11:02

## 2024-02-27 RX ADMIN — ETOMIDATE 2 MG: 2 INJECTION, SOLUTION INTRAVENOUS at 11:02

## 2024-02-27 RX ADMIN — FENTANYL CITRATE 50 MCG: 50 INJECTION, SOLUTION INTRAMUSCULAR; INTRAVENOUS at 10:02

## 2024-02-27 RX ADMIN — POTASSIUM CHLORIDE 40 MEQ: 1500 TABLET, EXTENDED RELEASE ORAL at 10:02

## 2024-02-27 RX ADMIN — DEXMEDETOMIDINE 8 MCG: 100 INJECTION, SOLUTION, CONCENTRATE INTRAVENOUS at 11:02

## 2024-02-27 RX ADMIN — DEXMEDETOMIDINE 16 MCG: 100 INJECTION, SOLUTION, CONCENTRATE INTRAVENOUS at 11:02

## 2024-02-27 RX ADMIN — DEXMEDETOMIDINE 12 MCG: 100 INJECTION, SOLUTION, CONCENTRATE INTRAVENOUS at 01:02

## 2024-02-27 RX ADMIN — SODIUM CHLORIDE: 9 INJECTION, SOLUTION INTRAVENOUS at 10:02

## 2024-02-27 RX ADMIN — DEXMEDETOMIDINE 16 MCG: 100 INJECTION, SOLUTION, CONCENTRATE INTRAVENOUS at 10:02

## 2024-02-27 RX ADMIN — Medication 10 MG: at 12:02

## 2024-02-27 RX ADMIN — FENTANYL CITRATE 25 MCG: 50 INJECTION, SOLUTION INTRAMUSCULAR; INTRAVENOUS at 02:02

## 2024-02-27 RX ADMIN — FENTANYL CITRATE 50 MCG: 50 INJECTION, SOLUTION INTRAMUSCULAR; INTRAVENOUS at 11:02

## 2024-02-27 RX ADMIN — ONDANSETRON 4 MG: 2 INJECTION INTRAMUSCULAR; INTRAVENOUS at 02:02

## 2024-02-27 RX ADMIN — CEFAZOLIN 2 G: 330 INJECTION, POWDER, FOR SOLUTION INTRAMUSCULAR; INTRAVENOUS at 10:02

## 2024-02-27 NOTE — ASSESSMENT & PLAN NOTE
Patient is here for implantation of a right sided dcPPM with LBAP.  - CXR and ECG post-implant  - Device clinic follow up in 1 week  - Antibiotics x 5 days  - If patient is on oral anticoagulation, they will hold this for 5 days post-procedure    Constrast allergy:yes    The indication(s), risks, benefits and alternatives of the procedure were explained to the patient, patient's family and/or surrogate decision maker. Risks include (but not limited to) bleeding, pocket site hematoma, pocket and/or device infection which would often require extraction, pain, damage of vascular structures or surrounding structures, myocardial damage [perforation & tamponade requiring pericardiocentesis, valvular damage, injury to conduction system], pneumothorax, CVA, MI, and death. All questions were answered. Patient is understanding of these risks, and wishes to proceed. Consents signed.

## 2024-02-27 NOTE — PROGRESS NOTES
Nursing Transfer Note        Reason patient is being transferred: back to short stay post recovery    Transfer To: [facility]    Transfer via stretcher    Transfer with cardiac monitoring    Transported by RN    Telemetry: Box Number 0034, Rate 64, and Rhythm v-paced    Medicines sent: doxy from outpt pharmacy    Any special needs or follow-up needed: Ice pack to R upper chest. RUE sling w/ immobilizer. Bedrest until 1700.    Patient belongings transferred with patient: Yes- rx    Chart send with patient: Yes    Notified: family notified on arrival to short stay    Patient reassessed at: to be done by receiving RN (date, time)

## 2024-02-27 NOTE — HPI
HPI 67 yo female with LBBB, dilated cardiomyopathy, CAD and Aortic stenosis s/p CABGx2 + AVR (bioprosthetic), hodgkin's lymphoma s/p mantle radiation, Breast cancer with (right and then left sided s/p bilateral mastectomy with Lymph node removal on the left), HFrEF, hypothyroidism, pemphigoid. No fevers or infection. ECG shows NSR with LBBB.

## 2024-02-27 NOTE — NURSING
Received report from MARI Cota. Patient s/p Pacemaker, AAOx3. VSS, no c/o pain or discomfort at this time, resp even and unlabored. Mepilex dressing to right chest is CDI. No active bleeding. No hematoma noted. Post procedure protocol reviewed with patient and patient's family. Understanding verbalized. Family members at bedside. Nurse call bell within reach.

## 2024-02-27 NOTE — ANESTHESIA PREPROCEDURE EVALUATION
02/27/2024  Dulce Root is a 68 y.o., female.    Procedure: INSERTION, PACEMAKER, BIVENTRICULAR (Chest) - DCM, LBBB, Right sided CRT-P vs LBAP lead, MDT, MAC, SK, 3 Prep *Venogram 2/19/24* Anesthesia type: Monitor Anesthesia Care Diagnosis:       LBBB (left bundle branch block) [I44.7]       Cardiomyopathy, dilated [I42.0]       HFrEF (heart failure with reduced ejection fraction) [I50.20]     Pre-operative evaluation for Procedure(s) (LRB):  INSERTION, PACEMAKER, BIVENTRICULAR (N/A)    @[id number]@@    Encounter Diagnoses   Name Primary?    LBBB (left bundle branch block)     Cardiomyopathy, dilated     HFrEF (heart failure with reduced ejection fraction)     Cardiac device in situ        Review of patient's allergies indicates:   Allergen Reactions    Amlodipine Shortness Of Breath and Other (See Comments)     unknown  Other reaction(s): Swelling  unknown  unknown  Other reaction(s): Swelling  unknown  Other reaction(s): Swelling  unknown    Levofloxacin Hives and Swelling    Sulfa (sulfonamide antibiotics) Shortness Of Breath, Itching and Other (See Comments)     elevated blood pressure    Ace inhibitors     Ciprofloxacin Itching    Iodinated contrast media     Iodine      Other reaction(s): Unknown    Latex      Other reaction(s): Itching    Latex, natural rubber Itching       Facility-Administered Medications Prior to Admission   Medication Dose Route Frequency Provider Last Rate Last Admin    sodium chloride 0.9% flush 10 mL  10 mL Intravenous PRN Manuel Sims MD         Medications Prior to Admission   Medication Sig Dispense Refill Last Dose    ALPRAZolam (XANAX) 1 MG tablet Take 1 mg by mouth nightly as needed for Anxiety.   Past Month    aspirin (ECOTRIN) 81 MG EC tablet Take 1 tablet (81 mg total) by mouth once daily. 30 tablet 11 2/27/2024 at 0530    calcium carbonate/vitamin D3 (CALCIUM  WITH VITAMIN D3 ORAL) Take 1 Dose by mouth 2 (two) times a day. 1 dose = 2 gummies   2/26/2024 at 1800    cimetidine (TAGAMET) 300 MG tablet Take one tablet by mouth at 9pm  the evening prior to Venogram; take one tablet at 4am and 9am on day of Venogram 3 tablet 0 Past Week    cimetidine (TAGAMET) 300 MG tablet Take one tablet by mouth at 12mn the evening prior to procedure; take one tablet at 6am and 12 noon on day of procedure 3 tablet 0 2/27/2024 at 0300    clopidogreL (PLAVIX) 75 mg tablet Take 1 tablet (75 mg total) by mouth once daily. 90 tablet 3 2/27/2024 at 0530    diphenhydrAMINE (BENADRYL) 50 MG capsule Take one tablet by mouth at 9pm  the evening prior to Venogram; take one tablet at 4am and 9am on day of Venogram 3 capsule 0 Past Week    diphenhydrAMINE (BENADRYL) 50 MG capsule Take one tablet by mouth at 12mn the evening prior to procedure; take one tablet at 6am and 12 noon on day of procedure 3 capsule 0 2/27/2024 at 0300    empagliflozin (JARDIANCE) 10 mg tablet Take 1 tablet (10 mg total) by mouth once daily. 90 tablet 3 2/26/2024 at 0800    EScitalopram oxalate (LEXAPRO) 10 MG tablet Take 1 tablet (10 mg total) by mouth once daily. 90 tablet 3 2/27/2024 at 0530    folic acid (FOLVITE) 1 MG tablet Take 1 mg by mouth once daily. Except on Friday 2/27/2024 at 0530    furosemide (LASIX) 20 MG tablet Take 1 tablet (20 mg total) by mouth once daily. 90 tablet 3 2/27/2024 at 0530    levothyroxine (SYNTHROID) 75 MCG tablet Take 1 tablet (75 mcg total) by mouth before breakfast. 90 tablet 3 2/27/2024 at 0530    losartan (COZAAR) 25 MG tablet Take 1 tablet (25 mg total) by mouth once daily. 90 tablet 3 2/27/2024 at 0530    metoprolol succinate (TOPROL-XL) 25 MG 24 hr tablet Take 1 tablet (25 mg total) by mouth every evening. 90 tablet 3 2/26/2024 at 2000    pantoprazole (PROTONIX) 40 MG tablet Take 1 tablet (40 mg total) by mouth once daily. 30 tablet 11 2/26/2024 at 0800    potassium chloride 1 mEq/ml  Liqd liquid (PEDS) Take 20 mLs (20 mEq total) by mouth 2 (two) times daily. for 10 days 400 mL 0 2/26/2024 at 2100    predniSONE (DELTASONE) 50 MG Tab Take one tablet by mouth at 9pm  the evening prior to Venogram; take one tablet at 4am and 9am on day of Venogram 3 tablet 0 Past Week    predniSONE (DELTASONE) 50 MG Tab Take one tablet by mouth at 12mn the evening prior to procedure; take one tablet at 6am and 12 noon on day of procedure 3 tablet 0 2/27/2024 at 0300    rosuvastatin (CRESTOR) 20 MG tablet Take 1 tablet (20 mg total) by mouth once daily. 90 tablet 3 2/26/2024 at 2100    cholecalciferol, vitamin D3, (VITAMIN D3 ORAL) Take 1 Dose by mouth 2 (two) times a day. 1 dose = 1 gummy   More than a month    hypromellose (TEARS LUBRICANT EYE DROP OPHT) Place 1 drop into both eyes daily as needed (dry eyes).   More than a month    lifitegrast (XIIDRA) 5 % Dpet Place 1 drop into both eyes 2 (two) times daily as needed (dry eyes).   More than a month    methotrexate 2.5 MG Tab Take 5 mg by mouth 2 (two) times a day. On Friday only.   2/23/2024    predniSONE (DELTASONE) 50 MG Tab Take 50mg by mouth at 13 hours, 7 hours, and 1 hour before contrast administration. 3 tablet 0 Unknown at 0300    sod picosulf-mag ox-citric ac (CLENPIQ) 10 mg-3.5 gram- 12 gram/175 mL Soln Take 2 Bottles by mouth As instructed (use as directed by facility). 350 mL 0 Unknown            No current facility-administered medications on file prior to encounter.     Current Outpatient Medications on File Prior to Encounter   Medication Sig Dispense Refill    ALPRAZolam (XANAX) 1 MG tablet Take 1 mg by mouth nightly as needed for Anxiety.      aspirin (ECOTRIN) 81 MG EC tablet Take 1 tablet (81 mg total) by mouth once daily. 30 tablet 11    calcium carbonate/vitamin D3 (CALCIUM WITH VITAMIN D3 ORAL) Take 1 Dose by mouth 2 (two) times a day. 1 dose = 2 gummies      cimetidine (TAGAMET) 300 MG tablet Take one tablet by mouth at 9pm  the evening  prior to Venogram; take one tablet at 4am and 9am on day of Venogram 3 tablet 0    clopidogreL (PLAVIX) 75 mg tablet Take 1 tablet (75 mg total) by mouth once daily. 90 tablet 3    diphenhydrAMINE (BENADRYL) 50 MG capsule Take one tablet by mouth at 9pm  the evening prior to Venogram; take one tablet at 4am and 9am on day of Venogram 3 capsule 0    empagliflozin (JARDIANCE) 10 mg tablet Take 1 tablet (10 mg total) by mouth once daily. 90 tablet 3    EScitalopram oxalate (LEXAPRO) 10 MG tablet Take 1 tablet (10 mg total) by mouth once daily. 90 tablet 3    folic acid (FOLVITE) 1 MG tablet Take 1 mg by mouth once daily. Except on Friday      furosemide (LASIX) 20 MG tablet Take 1 tablet (20 mg total) by mouth once daily. 90 tablet 3    levothyroxine (SYNTHROID) 75 MCG tablet Take 1 tablet (75 mcg total) by mouth before breakfast. 90 tablet 3    losartan (COZAAR) 25 MG tablet Take 1 tablet (25 mg total) by mouth once daily. 90 tablet 3    metoprolol succinate (TOPROL-XL) 25 MG 24 hr tablet Take 1 tablet (25 mg total) by mouth every evening. 90 tablet 3    pantoprazole (PROTONIX) 40 MG tablet Take 1 tablet (40 mg total) by mouth once daily. 30 tablet 11    predniSONE (DELTASONE) 50 MG Tab Take one tablet by mouth at 9pm  the evening prior to Venogram; take one tablet at 4am and 9am on day of Venogram 3 tablet 0    rosuvastatin (CRESTOR) 20 MG tablet Take 1 tablet (20 mg total) by mouth once daily. 90 tablet 3    cholecalciferol, vitamin D3, (VITAMIN D3 ORAL) Take 1 Dose by mouth 2 (two) times a day. 1 dose = 1 gummy      hypromellose (TEARS LUBRICANT EYE DROP OPHT) Place 1 drop into both eyes daily as needed (dry eyes).      lifitegrast (XIIDRA) 5 % Dpet Place 1 drop into both eyes 2 (two) times daily as needed (dry eyes).      methotrexate 2.5 MG Tab Take 5 mg by mouth 2 (two) times a day. On Friday only.      predniSONE (DELTASONE) 50 MG Tab Take 50mg by mouth at 13 hours, 7 hours, and 1 hour before contrast  administration. 3 tablet 0    sod picosulf-mag ox-citric ac (CLENPIQ) 10 mg-3.5 gram- 12 gram/175 mL Soln Take 2 Bottles by mouth As instructed (use as directed by facility). 350 mL 0       Past Medical History:  No date: Allergy  No date: Anticoagulant long-term use      Comment:  Plavix  2008: Breast cancer  10/13/2017: Carotid stenosis, bilateral  No date: CHF (congestive heart failure)  No date: Coronary artery disease  08/16/2019: Coronary artery disease involving coronary bypass graft   of native heart without angina pectoris      Comment:  2/19  1.  Left main is a small vessel with a 60%-65%                ostial lesion. 2.  LAD is a medium-sized vessel diffuse                70% proximally  Ramus intermedius is a medium size vessel               with a 40%-50% ostial lesion. 3.  Circumflex 60%-70%                ostial  remainder of circumflex and obtuse marginal                normal in appearance. Right coronary artery is normal                size vessel, short discrete 70%mid 4.  Vein graft to                right coronary is occ  06/27/2017: Coronary artery disease involving native coronary artery   of native heart without angina pectoris  11/06/2012: DCIS (ductal carcinoma in situ) of breast  No date: Encounter for blood transfusion  04/01/2023: Epigastric pain  11/06/2012: Essential hypertension  No date: GERD (gastroesophageal reflux disease)  02/2021: GI bleed  05/11/2021: Hematemesis with nausea  No date: Hodgkin lymphoma  11/06/2012: Hx of Hodgkin's disease - s/p chemo and radiation  No date: Hyperlipidemia  11/06/2012: Hyperlipidemia      Comment:  The patient presents with hyperlipidemia.  The patient                reports tolerating the medication well and is in                excellent compliance.  There have been no medication side               effects.  The patient denies chest pain, neuropathy, and                myalgias.  The patient has reduced fat intake and has                been  exercising.  Current treatment has included the                medications listed in the med card.   Lab Results                Component Value Date  CH  No date: Hypertension  05/22/2018: LBBB (left bundle branch block)  11/03/2022: Malignant neoplasm of central portion of left breast in   female, estrogen receptor negative  No date: Osteoporosis  08/24/2017: Paroxysmal atrial fibrillation - single post-op episode  No date: Pemphigoid  No date: Pemphigoid      Comment:  pt receives IVIG infusions  01/13/2023: Pulmonary hypertension  08/21/2017: S/P AVR (aortic valve replacement) - pericardial valve      Comment:  Perceval aortic tissue valve PVS23. 23mm    serial #                [id number]  08/21/2017: S/P CABG x 2      Comment:  8/17 CABG X 2 with SVG-LAD and SVG-distal RCA  SVG LAD                due to absent LIMA after chest radiation and lymph node                biopsy   No date: Stented coronary artery      Comment:  She had 2 stents placed in 2/2019.  She has had CAD and                bypasses in the past in 2017.    No date: Thyroid disease    Past Surgical History:   Procedure Laterality Date    ANGIOGRAM, CORONARY, WITH LEFT HEART CATHETERIZATION  10/16/2023    Procedure: Left Heart Cath w/Graft study;  Surgeon: Jeff Guzman MD;  Location: [facility] CATH;  Service: Cardiology;;    AORTOGRAPHY N/A 10/16/2023    Procedure: AO Root angiogram;  Surgeon: Jeff Guzman MD;  Location: [facility] CATH;  Service: Cardiology;  Laterality: N/A;    ARTERIOGRAPHY OF AORTIC ROOT N/A 02/15/2019    Procedure: ARTERIOGRAM, AORTIC ROOT;  Surgeon: Sujit Chaudhry MD;  Location: [facility] CATH;  Service: Cardiology;  Laterality: N/A;    AXILLARY NODE DISSECTION Left 11/25/2022    Procedure: LYMPHADENECTOMY, AXILLARY-Left;  Surgeon: Rosa Maradiaga MD;  Location: [facility] [place] OR;  Service: General;  Laterality: Left;    BREAST BIOPSY      BREAST RECONSTRUCTION      bilateral mastectomy    CARDIAC CATHETERIZATION      stents x 2    CARDIAC SURGERY  08/2017     Aortic valve replacement , CABG 2 vessel    CARDIAC VALVE REPLACEMENT  08/2017    aortic valve, bovine per pt    CORONARY ANGIOGRAPHY N/A 02/15/2019    Procedure: ANGIOGRAM, CORONARY ARTERY;  Surgeon: Sujit Chaudhry MD;  [facility];  Service: Cardiology;  Laterality: N/A;    CORONARY ANGIOGRAPHY N/A 4/1/2022    Procedure: ANGIOGRAM, CORONARY ARTERY;  Surgeon: Sujit Chaudhry MD;  Location: [facility] CATH;  Service: Cardiology;  Laterality: N/A;    CORONARY BYPASS GRAFT ANGIOGRAPHY  02/15/2019    Procedure: Bypass graft study;  Surgeon: Sujit Chaudhry MD;  [facility];  Service: Cardiology;;    COSMETIC SURGERY      bereast reconstruction    ESOPHAGOGASTRODUODENOSCOPY N/A 05/14/2021    Procedure: EGD (ESOPHAGOGASTRODUODENOSCOPY);  Surgeon: Tracy Flower MD;  Location: [facility];  Service: Endoscopy;  Laterality: N/A;    ESOPHAGOGASTRODUODENOSCOPY N/A 11/04/2021    Procedure: EGD (ESOPHAGOGASTRODUODENOSCOPY);  Surgeon: Tracy Flower MD;  Location: [facility];  Service: Endoscopy;  Laterality: N/A;    ESOPHAGOGASTRODUODENOSCOPY N/A 4/1/2023    Procedure: EGD (ESOPHAGOGASTRODUODENOSCOPY);  Surgeon: Tracy Flower MD;  Location: [facility];  Service: Endoscopy;  Laterality: N/A;    ESOPHAGOGASTRODUODENOSCOPY N/A 6/8/2023    Procedure: EGD (ESOPHAGOGASTRODUODENOSCOPY);  Surgeon: Mk Sanchez MD;  Location: [facility] ([address] [place] [address]);  Service: Endoscopy;  Laterality: N/A;  inst via portal  cardiac clearance-see encounter dated 5/31/23  precall confirmed 6/2 EB    EYE SURGERY      eye lids    FRACTIONAL FLOW RESERVE (FFR), CORONARY  10/20/2023    Procedure: (IFR) Ramus;  Surgeon: Jeff Guzman MD;  Location: [facility] CATH;  Service: Cardiology;;    INJECTION FOR SENTINEL NODE IDENTIFICATION Left 11/25/2022    Procedure: INJECTION, FOR SENTINEL NODE IDENTIFICATION-Left;  Surgeon: Rosa Maradiaga MD;  Location: [facility] OR;  Service: General;  Laterality: Left;    INSTANTANEOUS WAVE-FREE RATIO (IFR)  10/20/2023     Procedure: (IFR) LCX;  Surgeon: Jeff Guzman MD;  Location: [facility] CATH;  Service: Cardiology;;    LEFT HEART CATHETERIZATION Right 02/15/2019    Procedure: Left heart cath;  Surgeon: Sujit Chaudhry MD;  Location: [facility] CATH;  Service: Cardiology;  Laterality: Right;    LEFT HEART CATHETERIZATION Left 4/1/2022    Procedure: Left heart cath;  Surgeon: Sujit Chaudhry MD;  Location: [facility] CATH;  Service: Cardiology;  Laterality: Left;    LEFT HEART CATHETERIZATION  10/20/2023    Procedure: Left heart cath;  Surgeon: Jeff Guzman MD;  Location: [facility] CATH;  Service: Cardiology;;    LUMBAR LAMINECTOMY WITH DISCECTOMY N/A 02/27/2020    Procedure: LAMINECTOMY, SPINE, LUMBAR, WITH DISCECTOMY;  Surgeon: Vimal Villegas MD;  Location: [facility] OR;  Service: Neurosurgery;  Laterality: N/A;  left L5-S1  Laminectomy L4-5    LYMPHADENECTOMY      MASTECTOMY      MASTECTOMY, PARTIAL Left 11/25/2022    Procedure: MASTECTOMY, PARTIAL-Left ultrasound guided;  Surgeon: Rosa Maradiaga MD;  Location: [facility] OR;  Service: General;  Laterality: Left;    RIGHT HEART CATHETERIZATION Right 4/1/2022    Procedure: INSERTION, CATHETER, RIGHT HEART;  Surgeon: Sujit Chaudhry MD;  Location: [facility] CATH;  Service: Cardiology;  Laterality: Right;    RIGHT HEART CATHETERIZATION  10/16/2023    Procedure: Right heart cath;  Surgeon: Jeff Guzman MD;  Location: [facility] CATH;  Service: Cardiology;;    SENTINEL LYMPH NODE BIOPSY Left 11/25/2022    Procedure: BIOPSY, LYMPH NODE, SENTINEL-Left;  Surgeon: Rosa Maradiaga MD;  Location: [facility] OR;  Service: General;  Laterality: Left;    SKIN BIOPSY      SPLENECTOMY, TOTAL      TRANSFORAMINAL EPIDURAL INJECTION OF STEROID Left 12/31/2019    Procedure: Left L5/S1 TF SKIP with local;  Surgeon: Canelo Niño MD;  Location: [facility] PAIN MGT;  Service: Pain Management;  Laterality: Left;    TUMOR REMOVAL      neck- lymphoma    VENOGRAM, EP LAB N/A 2/19/2024    Procedure: Venogram, EP Lab;  Surgeon: Palomo Gill MD;  Location: [facility]  "LAB;  Service: Cardiology;  Laterality: N/A;  DCM, CHF, Venogram- Bilateral, SK, 3 Prep *hx of bilateral mastectomy* *Contrast Prep*       Social History     Tobacco Use   Smoking Status Former    Current packs/day: 0.00    Average packs/day: 1 pack/day for 20.0 years (20.0 ttl pk-yrs)    Types: Cigarettes    Start date: 1961    Quit date: 1981    Years since quittin.3   Smokeless Tobacco Never       Social History     Substance and Sexual Activity   Alcohol Use No    Comment: 2 drinks/month; none 72 hrs prior to surgery       Physical Activity: Unknown (2023)    Exercise Vital Sign     Days of Exercise per Week: 0 days     Minutes of Exercise per Session: Not on file         No results for input(s): "HCT" in the last 72 hours.  No results for input(s): "PLT" in the last 72 hours.  No results for input(s): "K" in the last 72 hours.  No results for input(s): "CREATININE" in the last 72 hours.  No results for input(s): "GLU" in the last 72 hours.  No results for input(s): "PT" in the last 72 hours.                    Pre-op Assessment          Review of Systems  Hematology/Oncology:                   Hematology Comments: On plavix for CAD, took today      --  Cancer in past history (hodgkins  and , doing ok now):                 Oncology Comments: Breast , LUE larger since then.     Cardiovascular:     Hypertension, well controlled   Denies MI. CAD   CABG/stent (CABG/AVR , stents several times, last ) Dysrhythmias   Denies Angina. CHF (pooorly compensated)        23 10:55   BNP: 1,077 (H)       Coronary Artery Disease:               Congestive Heart Failure (CHF)                Hypertension     Disorder of Cardiac Rhythm, Atrial Fibrillation     Pulmonary:    Denies COPD.  Denies Asthma.  Shortness of breath   Sometimes can walk 100 yards, feels about like she can walk that now.               Renal/:   Denies Chronic Renal Disease.                Hepatic/GI:   PUD,  " GERD, well controlled Denies Liver Disease.     Gerd, Peptic Ulcer Disease          Musculoskeletal:  Arthritis        Arthritis          Neurological:  Denies TIA.  Denies CVA. Neuromuscular Disease,   Denies Seizures.        Arthritis                         Neuromuscular Disease   Endocrine:  Denies Diabetes. Hypothyroidism       Hypothyroidism              Physical Exam  General: Well nourished, Cooperative, Alert and Oriented    Airway:  Mallampati: II   Mouth Opening: Normal  TM Distance: Normal  Tongue: Normal  Neck ROM: Normal ROM        Anesthesia Plan  Type of Anesthesia, risks & benefits discussed:    Anesthesia Type: Gen Natural Airway  Intra-op Monitoring Plan: Standard ASA Monitors  Post Op Pain Control Plan: IV/PO Opioids PRN  Induction:  IV  Informed Consent: Informed consent signed with the Patient and all parties understand the risks and agree with anesthesia plan.  All questions answered.   ASA Score: 3  Day of Surgery Review of History & Physical: H&P Update referred to the surgeon/provider.    Ready For Surgery From Anesthesia Perspective.     .    Date/Time: 11/25/2022 9:25 AM  Performed by: Kristi Delvalle CRNA  Authorized by: Osman Romo MD      Intubation:     Induction:  Intravenous    Intubated:  Postinduction    Mask Ventilation:  Not attempted    Attempts:  1    Attempted By:  CRNA    Difficult Airway Encountered?: No      Complications:  None    Airway Device:  Supraglottic airway/LMA    Airway Device Size:  3.0    Style/Cuff Inflation:  Uncuffed    Secured at:  The lips    Complicating Factors:  None    Findings Post-Intubation:  BS equal bilateral and wheezing      Present Prior to Hospital Arrival?: No; Placement Date: 02/27/20; Placement Time: 0725; Method of Intubation: Direct laryngoscopy; Inserted by: CRNA; Airway Device: Endotracheal Tube; Mask Ventilation: Easy; Intubated: Postinduction; Blade: Coulter #2; Airway Device Size: 7.0; Style: Cuffed; Cuff Inflation: Minimal  occlusive pressure; Placement Verified By: Auscultation, Capnometry; Grade: Grade I; Complicating Factors: None; Intubation Findings: Positive EtCO2, Bilateral breath sounds, Atraumatic/Condition of teeth unchanged;  Depth of Insertion: 21; Securment: Lips; Complications: None; Breath Sounds: Equal Bilateral; Insertion Attempts: 1; Removal Date: 02/27/20;  Removal Time: 1106       12/2023    Low-flow low gradient Aortic Stenosis dobutamine echocardiogram performed    Left Ventricle: There is severely reduced systolic function. Ejection fraction by visual approximation is 15%.    Max Stroke volume index reached was 34.45, test stopped because of end of protocol reached    Aortic Valve: There is a bioprosthetic valve in the aortic position. It is reported to be a Perceval valve. There is severe stenosis. Elevated prosthetic gradient. Aortic valve area by VTI is 0.86 cm². Aortic valve peak velocity is 3.72 m/s. Mean gradient is 33 mmHg. The dimensionless index is 0.30. Acceleration time - 64ms. Max SVI achieved was 34.45    Pericardium: There is a small circumferential effusion. No indication of cardiac tamponade.    Baseline ECG: The Baseline ECG reveals sinus rhythm.    ECG Conclusion: The ECG portion of the study is negative for ischemia. Sensitivity is reduced due to failure to reach target heart rate.    10/2023  Procedures:  Moderate sedation  Left heart cath  iFR of left circumflex and ramus intermedius     Conclusions:  InStent restenosis of the proximal circumflex was not hemodynamically significant; PCI deferred  Elevated left filling pressure     Recommendations:   continue guideline directed medical therapy for systolic heart failure.  Recommend 1 more day of IV diuresis.       Date/Time Temp Temp #2 Pulse Resp BP Patient Position MAP (mmHg) SpO2 Weight Flow (L/min) Oxygen Concentration (%) Device (Oxygen Therapy) Arterial Line BP Arterial Line BP 2 Temp #2 [facility]   02/27/24 0837 36.8 °C (98.2 °F) -- 95 18  129/73 Lying 95 96 % 47.6 kg (105 lb) -- -- room air -- -- -- ND

## 2024-02-27 NOTE — SUBJECTIVE & OBJECTIVE
Past Medical History:   Diagnosis Date    Allergy     Anticoagulant long-term use     Plavix    Breast cancer 2008    Carotid stenosis, bilateral 10/13/2017    CHF (congestive heart failure)     Coronary artery disease     Coronary artery disease involving coronary bypass graft of native heart without angina pectoris 08/16/2019 2/19  1.  Left main is a small vessel with a 60%-65% ostial lesion. 2.  LAD is a medium-sized vessel diffuse 70% proximally  Ramus intermedius is a medium size vessel with a 40%-50% ostial lesion. 3.  Circumflex 60%-70% ostial  remainder of circumflex and obtuse marginal normal in appearance. Right coronary artery is normal size vessel, short discrete 70%mid 4.  Vein graft to right coronary is occ    Coronary artery disease involving native coronary artery of native heart without angina pectoris 06/27/2017    DCIS (ductal carcinoma in situ) of breast 11/06/2012    Encounter for blood transfusion     Epigastric pain 04/01/2023    Essential hypertension 11/06/2012    GERD (gastroesophageal reflux disease)     GI bleed 02/2021    Hematemesis with nausea 05/11/2021    Hodgkin lymphoma     Hx of Hodgkin's disease - s/p chemo and radiation 11/06/2012    Hyperlipidemia     Hyperlipidemia 11/06/2012    The patient presents with hyperlipidemia.  The patient reports tolerating the medication well and is in excellent compliance.  There have been no medication side effects.  The patient denies chest pain, neuropathy, and myalgias.  The patient has reduced fat intake and has been exercising.  Current treatment has included the medications listed in the med card.   Lab Results Component Value Date  CH    Hypertension     LBBB (left bundle branch block) 05/22/2018    Malignant neoplasm of central portion of left breast in female, estrogen receptor negative 11/03/2022    Osteoporosis     Paroxysmal atrial fibrillation - single post-op episode 08/24/2017    Pemphigoid     Pemphigoid     pt receives IVIG  infusions    Pulmonary hypertension 01/13/2023    S/P AVR (aortic valve replacement) - pericardial valve 08/21/2017    Perceval aortic tissue valve PVS23. 23mm    serial # [id number]    S/P CABG x 2 08/21/2017 8/17 CABG X 2 with SVG-LAD and SVG-distal RCA  SVG LAD due to absent LIMA after chest radiation and lymph node biopsy     Stented coronary artery     She had 2 stents placed in 2/2019.  She has had CAD and bypasses in the past in 2017.      Thyroid disease        Past Surgical History:   Procedure Laterality Date    ANGIOGRAM, CORONARY, WITH LEFT HEART CATHETERIZATION  10/16/2023    Procedure: Left Heart Cath w/Graft study;  Surgeon: Jeff Guzman MD;  Location: [facility];  Service: Cardiology;;    AORTOGRAPHY N/A 10/16/2023    Procedure: AO Root angiogram;  Surgeon: Jeff Guzman MD;  Location: [facility];  Service: Cardiology;  Laterality: N/A;    ARTERIOGRAPHY OF AORTIC ROOT N/A 02/15/2019    Procedure: ARTERIOGRAM, AORTIC ROOT;  Surgeon: Sujit Chaudhry MD;  Location: [facility];  Service: Cardiology;  Laterality: N/A;    AXILLARY NODE DISSECTION Left 11/25/2022    Procedure: LYMPHADENECTOMY, AXILLARY-Left;  Surgeon: Rosa Maradiaga MD;  Location: [facility];  Service: General;  Laterality: Left;    BREAST BIOPSY      BREAST RECONSTRUCTION      bilateral mastectomy    CARDIAC CATHETERIZATION      stents x 2    CARDIAC SURGERY  08/2017    Aortic valve replacement , CABG 2 vessel    CARDIAC VALVE REPLACEMENT  08/2017    aortic valve, bovine per pt    CORONARY ANGIOGRAPHY N/A 02/15/2019    Procedure: ANGIOGRAM, CORONARY ARTERY;  Surgeon: Sujit Chaudhry MD;  Location: [facility];  Service: Cardiology;  Laterality: N/A;    CORONARY ANGIOGRAPHY N/A 4/1/2022    Procedure: ANGIOGRAM, CORONARY ARTERY;  Surgeon: Sujit Chaudhry MD;  Location: [facility];  Service: Cardiology;  Laterality: N/A;    CORONARY BYPASS GRAFT ANGIOGRAPHY  02/15/2019    Procedure: Bypass graft study;  Surgeon: Sujit Chaudhry MD;  Location: [facility];   Service: Cardiology;;    COSMETIC SURGERY      bereast reconstruction    ESOPHAGOGASTRODUODENOSCOPY N/A 05/14/2021    Procedure: EGD (ESOPHAGOGASTRODUODENOSCOPY);  Surgeon: Tracy Flower MD;  Location: [facility];  Service: Endoscopy;  Laterality: N/A;    ESOPHAGOGASTRODUODENOSCOPY N/A 11/04/2021    Procedure: EGD (ESOPHAGOGASTRODUODENOSCOPY);  Surgeon: Tracy Flower MD;  Location: [facility];  Service: Endoscopy;  Laterality: N/A;    ESOPHAGOGASTRODUODENOSCOPY N/A 4/1/2023    Procedure: EGD (ESOPHAGOGASTRODUODENOSCOPY);  Surgeon: Tracy Flower MD;  Location: [facility];  Service: Endoscopy;  Laterality: N/A;    ESOPHAGOGASTRODUODENOSCOPY N/A 6/8/2023    Procedure: EGD (ESOPHAGOGASTRODUODENOSCOPY);  Surgeon: Mk Sanchez MD;  Location: [facility] ([address] [place] [address]);  Service: Endoscopy;  Laterality: N/A;  inst via portal  cardiac clearance-see encounter dated 5/31/23  precall confirmed 6/2 EB    EYE SURGERY      eye lids    FRACTIONAL FLOW RESERVE (FFR), CORONARY  10/20/2023    Procedure: (IFR) Ramus;  Surgeon: Jeff Guzman MD;  [facility];  Service: Cardiology;;    INJECTION FOR SENTINEL NODE IDENTIFICATION Left 11/25/2022    Procedure: INJECTION, FOR SENTINEL NODE IDENTIFICATION-Left;  Surgeon: Rosa Maradiaga MD;  Location: [facility];  Service: General;  Laterality: Left;    INSTANTANEOUS WAVE-FREE RATIO (IFR)  10/20/2023    Procedure: (IFR) LCX;  Surgeon: Jeff Guzman MD;  Location: [facility];  Service: Cardiology;;    LEFT HEART CATHETERIZATION Right 02/15/2019    Procedure: Left heart cath;  Surgeon: Sujit Chaudhry MD;  Location: [facility];  Service: Cardiology;  Laterality: Right;    LEFT HEART CATHETERIZATION Left 4/1/2022    Procedure: Left heart cath;  Surgeon: Sujit Chaudhry MD;  Location: [facility];  Service: Cardiology;  Laterality: Left;    LEFT HEART CATHETERIZATION  10/20/2023    Procedure: Left heart cath;  Surgeon: Jeff Guzman MD;  Location: [facility];  Service: Cardiology;;    LUMBAR  LAMINECTOMY WITH DISCECTOMY N/A 02/27/2020    Procedure: LAMINECTOMY, SPINE, LUMBAR, WITH DISCECTOMY;  Surgeon: Vimal Villegas MD;  Location: [facility] OR;  Service: Neurosurgery;  Laterality: N/A;  left L5-S1  Laminectomy L4-5    LYMPHADENECTOMY      MASTECTOMY      MASTECTOMY, PARTIAL Left 11/25/2022    Procedure: MASTECTOMY, PARTIAL-Left ultrasound guided;  Surgeon: Roas Maradiaga MD;  Location: [facility] OR;  Service: General;  Laterality: Left;    RIGHT HEART CATHETERIZATION Right 4/1/2022    Procedure: INSERTION, CATHETER, RIGHT HEART;  Surgeon: Sujit Chaudhry MD;  Location: [facility];  Service: Cardiology;  Laterality: Right;    RIGHT HEART CATHETERIZATION  10/16/2023    Procedure: Right heart cath;  Surgeon: Jeff Guzman MD;  Location: [facility];  Service: Cardiology;;    SENTINEL LYMPH NODE BIOPSY Left 11/25/2022    Procedure: BIOPSY, LYMPH NODE, SENTINEL-Left;  Surgeon: Rosa Maradiaga MD;  Location: [facility] OR;  Service: General;  Laterality: Left;    SKIN BIOPSY      SPLENECTOMY, TOTAL      TRANSFORAMINAL EPIDURAL INJECTION OF STEROID Left 12/31/2019    Procedure: Left L5/S1 TF SKIP with local;  Surgeon: Canelo Niño MD;  Location: [facility];  Service: Pain Management;  Laterality: Left;    TUMOR REMOVAL      neck- lymphoma    VENOGRAM, EP LAB N/A 2/19/2024    Procedure: Venogram, EP Lab;  Surgeon: Palomo Gill MD;  Location: [facility] EP LAB;  Service: Cardiology;  Laterality: N/A;  DCM, CHF, Venogram- Bilateral, SK, 3 Prep *hx of bilateral mastectomy* *Contrast Prep*       Review of patient's allergies indicates:   Allergen Reactions    Amlodipine Shortness Of Breath and Other (See Comments)     unknown  Other reaction(s): Swelling  unknown  unknown  Other reaction(s): Swelling  unknown  Other reaction(s): Swelling  unknown    Levofloxacin Hives and Swelling    Sulfa (sulfonamide antibiotics) Shortness Of Breath, Itching and Other (See Comments)     elevated blood pressure    Ace inhibitors     Ciprofloxacin  Itching    Iodinated contrast media     Iodine      Other reaction(s): Unknown    Latex      Other reaction(s): Itching    Latex, natural rubber Itching       No current facility-administered medications on file prior to encounter.     Current Outpatient Medications on File Prior to Encounter   Medication Sig    ALPRAZolam (XANAX) 1 MG tablet Take 1 mg by mouth nightly as needed for Anxiety.    aspirin (ECOTRIN) 81 MG EC tablet Take 1 tablet (81 mg total) by mouth once daily.    calcium carbonate/vitamin D3 (CALCIUM WITH VITAMIN D3 ORAL) Take 1 Dose by mouth 2 (two) times a day. 1 dose = 2 gummies    cimetidine (TAGAMET) 300 MG tablet Take one tablet by mouth at 9pm  the evening prior to Venogram; take one tablet at 4am and 9am on day of Venogram    clopidogreL (PLAVIX) 75 mg tablet Take 1 tablet (75 mg total) by mouth once daily.    diphenhydrAMINE (BENADRYL) 50 MG capsule Take one tablet by mouth at 9pm  the evening prior to Venogram; take one tablet at 4am and 9am on day of Venogram    empagliflozin (JARDIANCE) 10 mg tablet Take 1 tablet (10 mg total) by mouth once daily.    EScitalopram oxalate (LEXAPRO) 10 MG tablet Take 1 tablet (10 mg total) by mouth once daily.    folic acid (FOLVITE) 1 MG tablet Take 1 mg by mouth once daily. Except on Friday    furosemide (LASIX) 20 MG tablet Take 1 tablet (20 mg total) by mouth once daily.    levothyroxine (SYNTHROID) 75 MCG tablet Take 1 tablet (75 mcg total) by mouth before breakfast.    losartan (COZAAR) 25 MG tablet Take 1 tablet (25 mg total) by mouth once daily.    metoprolol succinate (TOPROL-XL) 25 MG 24 hr tablet Take 1 tablet (25 mg total) by mouth every evening.    pantoprazole (PROTONIX) 40 MG tablet Take 1 tablet (40 mg total) by mouth once daily.    predniSONE (DELTASONE) 50 MG Tab Take one tablet by mouth at 9pm  the evening prior to Venogram; take one tablet at 4am and 9am on day of Venogram    rosuvastatin (CRESTOR) 20 MG tablet Take 1 tablet (20 mg  total) by mouth once daily.    cholecalciferol, vitamin D3, (VITAMIN D3 ORAL) Take 1 Dose by mouth 2 (two) times a day. 1 dose = 1 gummy    hypromellose (TEARS LUBRICANT EYE DROP OPHT) Place 1 drop into both eyes daily as needed (dry eyes).    lifitegrast (XIIDRA) 5 % Dpet Place 1 drop into both eyes 2 (two) times daily as needed (dry eyes).    methotrexate 2.5 MG Tab Take 5 mg by mouth 2 (two) times a day. On Friday only.    predniSONE (DELTASONE) 50 MG Tab Take 50mg by mouth at 13 hours, 7 hours, and 1 hour before contrast administration.    sod picosulf-mag ox-citric ac (CLENPIQ) 10 mg-3.5 gram- 12 gram/175 mL Soln Take 2 Bottles by mouth As instructed (use as directed by facility).     Family History       Problem Relation (Age of Onset)    Hypertension Mother, Father          Tobacco Use    Smoking status: Former     Current packs/day: 0.00     Average packs/day: 1 pack/day for 20.0 years (20.0 ttl pk-yrs)     Types: Cigarettes     Start date: 1961     Quit date: 1981     Years since quittin.3    Smokeless tobacco: Never   Substance and Sexual Activity    Alcohol use: No     Comment: 2 drinks/month; none 72 hrs prior to surgery    Drug use: No    Sexual activity: Yes     Partners: Male     Review of Systems   All other systems reviewed and are negative.    Objective:     Vital Signs (Most Recent):  Temp: 98.2 °F (36.8 °C) (24 0837)  Pulse: 95 (24 0837)  Resp: 18 (24 0837)  BP: 129/73 (24 0837)  SpO2: 96 % (24 0837) Vital Signs (24h Range):  Temp:  [98.2 °F (36.8 °C)] 98.2 °F (36.8 °C)  Pulse:  [95] 95  Resp:  [18] 18  SpO2:  [96 %] 96 %  BP: (129)/(73) 129/73       Weight: 47.6 kg (105 lb)  Body mass index is 19.2 kg/m².    SpO2: 96 %        Physical Exam  General: NAD. AAO  HENT: EOMI  Neck: supple. No JVD  CV: RRR. Normal S1/S2. No gallops, rubs, or murmurs. 2+ radial pulses  Resp: CTAB. No increased work of breathing  Ext: warm. No edema.         Significant Labs:  All pertinent lab results from the last 24 hours have been reviewed.    Significant Imaging:  Reviewed

## 2024-02-27 NOTE — Clinical Note
A venogram was performed in the coronary sinus. A balloon was inserted. The vessel was injected via hand injection  with 8 mL of contrast.

## 2024-02-27 NOTE — NURSING
Pt ambulated around unit and to restroom. Pt voided. Tolerated walk well. No c/o discomfort at this time, resp even and unlabored. Mepilex dressing to right chest site is CDI. No active bleeding. No hematoma noted. Sling in place.

## 2024-02-27 NOTE — ANESTHESIA POSTPROCEDURE EVALUATION
Anesthesia Post Evaluation    Patient: Dulce Root    Procedure(s) Performed: Procedure(s) (LRB):  INSERTION, PACEMAKER, BIVENTRICULAR (N/A)    Final Anesthesia Type: general      Patient location during evaluation: PACU  Patient participation: Yes- Able to Participate  Level of consciousness: awake  Post-procedure vital signs: reviewed and stable  Pain management: adequate  Airway patency: patent    PONV status at discharge: No PONV  Anesthetic complications: no      Cardiovascular status: stable  Respiratory status: unassisted, spontaneous ventilation and face mask  Hydration status: euvolemic  Follow-up not needed.              Vitals Value Taken Time   /56 02/27/24 1432   Temp 36.6 °C (97.9 °F) 02/27/24 1415   Pulse 66 02/27/24 1446   Resp 15 02/27/24 1446   SpO2 96 % 02/27/24 1446   Vitals shown include unvalidated device data.      No case tracking events are documented in the log.      Pain/Pawan Score: Pawan Score: 8 (2/27/2024  2:15 PM)

## 2024-02-27 NOTE — H&P
[facility] Short Stay Cardiac Unit  Cardiac Electrophysiology  History and Physical     Admission Date: 2/27/2024  Code Status: Prior   Attending Provider: Palomo Gill MD   Principal Problem:<principal problem not specified>    Subjective:     Chief Complaint:  HFrEF & LBBB    HPI:  HPI 67 yo female with LBBB, dilated cardiomyopathy, CAD and Aortic stenosis s/p CABGx2 + AVR (bioprosthetic), hodgkin's lymphoma s/p mantle radiation, Breast cancer with (right and then left sided s/p bilateral mastectomy with Lymph node removal on the left), HFrEF, hypothyroidism, pemphigoid. No fevers or infection. ECG shows NSR with LBBB.     Past Medical History:   Diagnosis Date    Allergy     Anticoagulant long-term use     Plavix    Breast cancer 2008    Carotid stenosis, bilateral 10/13/2017    CHF (congestive heart failure)     Coronary artery disease     Coronary artery disease involving coronary bypass graft of native heart without angina pectoris 08/16/2019 2/19  1.  Left main is a small vessel with a 60%-65% ostial lesion. 2.  LAD is a medium-sized vessel diffuse 70% proximally  Ramus intermedius is a medium size vessel with a 40%-50% ostial lesion. 3.  Circumflex 60%-70% ostial  remainder of circumflex and obtuse marginal normal in appearance. Right coronary artery is normal size vessel, short discrete 70%mid 4.  Vein graft to right coronary is occ    Coronary artery disease involving native coronary artery of native heart without angina pectoris 06/27/2017    DCIS (ductal carcinoma in situ) of breast 11/06/2012    Encounter for blood transfusion     Epigastric pain 04/01/2023    Essential hypertension 11/06/2012    GERD (gastroesophageal reflux disease)     GI bleed 02/2021    Hematemesis with nausea 05/11/2021    Hodgkin lymphoma     Hx of Hodgkin's disease - s/p chemo and radiation 11/06/2012    Hyperlipidemia     Hyperlipidemia 11/06/2012    The patient presents with hyperlipidemia.  The patient reports tolerating  the medication well and is in excellent compliance.  There have been no medication side effects.  The patient denies chest pain, neuropathy, and myalgias.  The patient has reduced fat intake and has been exercising.  Current treatment has included the medications listed in the med card.   Lab Results Component Value Date  CH    Hypertension     LBBB (left bundle branch block) 05/22/2018    Malignant neoplasm of central portion of left breast in female, estrogen receptor negative 11/03/2022    Osteoporosis     Paroxysmal atrial fibrillation - single post-op episode 08/24/2017    Pemphigoid     Pemphigoid     pt receives IVIG infusions    Pulmonary hypertension 01/13/2023    S/P AVR (aortic valve replacement) - pericardial valve 08/21/2017    Perceval aortic tissue valve PVS23. 23mm    serial # [id number]    S/P CABG x 2 08/21/2017 8/17 CABG X 2 with SVG-LAD and SVG-distal RCA  SVG LAD due to absent LIMA after chest radiation and lymph node biopsy     Stented coronary artery     She had 2 stents placed in 2/2019.  She has had CAD and bypasses in the past in 2017.      Thyroid disease        Past Surgical History:   Procedure Laterality Date    ANGIOGRAM, CORONARY, WITH LEFT HEART CATHETERIZATION  10/16/2023    Procedure: Left Heart Cath w/Graft study;  Surgeon: Jeff Guzman MD;  Location: [facility] CATH;  Service: Cardiology;;    AORTOGRAPHY N/A 10/16/2023    Procedure: AO Root angiogram;  Surgeon: Jeff Guzman MD;  Location: [facility] CATH;  Service: Cardiology;  Laterality: N/A;    ARTERIOGRAPHY OF AORTIC ROOT N/A 02/15/2019    Procedure: ARTERIOGRAM, AORTIC ROOT;  Surgeon: Sujit Chaudhry MD;  Location: [facility] CATH;  Service: Cardiology;  Laterality: N/A;    AXILLARY NODE DISSECTION Left 11/25/2022    Procedure: LYMPHADENECTOMY, AXILLARY-Left;  Surgeon: Rosa Maradiaga MD;  Location: [facility] OR;  Service: General;  Laterality: Left;    BREAST BIOPSY      BREAST RECONSTRUCTION      bilateral mastectomy    CARDIAC CATHETERIZATION       stents x 2    CARDIAC SURGERY  08/2017    Aortic valve replacement , CABG 2 vessel    CARDIAC VALVE REPLACEMENT  08/2017    aortic valve, bovine per pt    CORONARY ANGIOGRAPHY N/A 02/15/2019    Procedure: ANGIOGRAM, CORONARY ARTERY;  Surgeon: Sujit Chaudhry MD;  Location: [facility] CATH;  Service: Cardiology;  Laterality: N/A;    CORONARY ANGIOGRAPHY N/A 4/1/2022    Procedure: ANGIOGRAM, CORONARY ARTERY;  Surgeon: Sujit Chaudhry MD;  [facility];  Service: Cardiology;  Laterality: N/A;    CORONARY BYPASS GRAFT ANGIOGRAPHY  02/15/2019    Procedure: Bypass graft study;  Surgeon: Sujit Chaudhry MD;  [facility];  Service: Cardiology;;    COSMETIC SURGERY      bereast reconstruction    ESOPHAGOGASTRODUODENOSCOPY N/A 05/14/2021    Procedure: EGD (ESOPHAGOGASTRODUODENOSCOPY);  Surgeon: Tracy Flower MD;  Location: [facility];  Service: Endoscopy;  Laterality: N/A;    ESOPHAGOGASTRODUODENOSCOPY N/A 11/04/2021    Procedure: EGD (ESOPHAGOGASTRODUODENOSCOPY);  Surgeon: Tarcy Flower MD;  Location: [facility];  Service: Endoscopy;  Laterality: N/A;    ESOPHAGOGASTRODUODENOSCOPY N/A 4/1/2023    Procedure: EGD (ESOPHAGOGASTRODUODENOSCOPY);  Surgeon: Tracy Flower MD;  Location: [facility];  Service: Endoscopy;  Laterality: N/A;    ESOPHAGOGASTRODUODENOSCOPY N/A 6/8/2023    Procedure: EGD (ESOPHAGOGASTRODUODENOSCOPY);  Surgeon: Mk Sanchez MD;  Location: [facility] ([address] [place] [address]);  Service: Endoscopy;  Laterality: N/A;  inst via portal  cardiac clearance-see encounter dated 5/31/23  precall confirmed 6/2 EB    EYE SURGERY      eye lids    FRACTIONAL FLOW RESERVE (FFR), CORONARY  10/20/2023    Procedure: (IFR) Ramus;  Surgeon: Jeff Guzman MD;  Location: [facility] CATH;  Service: Cardiology;;    INJECTION FOR SENTINEL NODE IDENTIFICATION Left 11/25/2022    Procedure: INJECTION, FOR SENTINEL NODE IDENTIFICATION-Left;  Surgeon: Rosa Maradiaga MD;  Location: [facility] OR;  Service: General;  Laterality: Left;     INSTANTANEOUS WAVE-FREE RATIO (IFR)  10/20/2023    Procedure: (IFR) LCX;  Surgeon: Jeff Guzman MD;  Location: [facility] CATH;  Service: Cardiology;;    LEFT HEART CATHETERIZATION Right 02/15/2019    Procedure: Left heart cath;  Surgeon: Sujit Chaudhry MD;  Location: [facility] CATH;  Service: Cardiology;  Laterality: Right;    LEFT HEART CATHETERIZATION Left 4/1/2022    Procedure: Left heart cath;  Surgeon: Sujit Chaudhry MD;  Location: [facility] CATH;  Service: Cardiology;  Laterality: Left;    LEFT HEART CATHETERIZATION  10/20/2023    Procedure: Left heart cath;  Surgeon: Jeff Guzman MD;  Location: [facility] CATH;  Service: Cardiology;;    LUMBAR LAMINECTOMY WITH DISCECTOMY N/A 02/27/2020    Procedure: LAMINECTOMY, SPINE, LUMBAR, WITH DISCECTOMY;  Surgeon: Vimal Villegas MD;  Location: [facility] OR;  Service: Neurosurgery;  Laterality: N/A;  left L5-S1  Laminectomy L4-5    LYMPHADENECTOMY      MASTECTOMY      MASTECTOMY, PARTIAL Left 11/25/2022    Procedure: MASTECTOMY, PARTIAL-Left ultrasound guided;  Surgeon: Rosa Maradiaga MD;  Location: [facility] OR;  Service: General;  Laterality: Left;    RIGHT HEART CATHETERIZATION Right 4/1/2022    Procedure: INSERTION, CATHETER, RIGHT HEART;  Surgeon: Sujit Chaudhry MD;  Location: [facility] CATH;  Service: Cardiology;  Laterality: Right;    RIGHT HEART CATHETERIZATION  10/16/2023    Procedure: Right heart cath;  Surgeon: Jeff Guzman MD;  Location: [facility] CATH;  Service: Cardiology;;    SENTINEL LYMPH NODE BIOPSY Left 11/25/2022    Procedure: BIOPSY, LYMPH NODE, SENTINEL-Left;  Surgeon: Rosa Maradiaga MD;  Location: [facility] OR;  Service: General;  Laterality: Left;    SKIN BIOPSY      SPLENECTOMY, TOTAL      TRANSFORAMINAL EPIDURAL INJECTION OF STEROID Left 12/31/2019    Procedure: Left L5/S1 TF SKIP with local;  Surgeon: Canelo Niño MD;  Location: [facility] PAIN MGT;  Service: Pain Management;  Laterality: Left;    TUMOR REMOVAL      neck- lymphoma    VENOGRAM, EP LAB N/A 2/19/2024    Procedure: Venogram, EP  Lab;  Surgeon: Palomo Gill MD;  Location: [facility] LAB;  Service: Cardiology;  Laterality: N/A;  DCM, CHF, Venogram- Bilateral, SK, 3 Prep *hx of bilateral mastectomy* *Contrast Prep*       Review of patient's allergies indicates:   Allergen Reactions    Amlodipine Shortness Of Breath and Other (See Comments)     unknown  Other reaction(s): Swelling  unknown  unknown  Other reaction(s): Swelling  unknown  Other reaction(s): Swelling  unknown    Levofloxacin Hives and Swelling    Sulfa (sulfonamide antibiotics) Shortness Of Breath, Itching and Other (See Comments)     elevated blood pressure    Ace inhibitors     Ciprofloxacin Itching    Iodinated contrast media     Iodine      Other reaction(s): Unknown    Latex      Other reaction(s): Itching    Latex, natural rubber Itching       No current facility-administered medications on file prior to encounter.     Current Outpatient Medications on File Prior to Encounter   Medication Sig    ALPRAZolam (XANAX) 1 MG tablet Take 1 mg by mouth nightly as needed for Anxiety.    aspirin (ECOTRIN) 81 MG EC tablet Take 1 tablet (81 mg total) by mouth once daily.    calcium carbonate/vitamin D3 (CALCIUM WITH VITAMIN D3 ORAL) Take 1 Dose by mouth 2 (two) times a day. 1 dose = 2 gummies    cimetidine (TAGAMET) 300 MG tablet Take one tablet by mouth at 9pm  the evening prior to Venogram; take one tablet at 4am and 9am on day of Venogram    clopidogreL (PLAVIX) 75 mg tablet Take 1 tablet (75 mg total) by mouth once daily.    diphenhydrAMINE (BENADRYL) 50 MG capsule Take one tablet by mouth at 9pm  the evening prior to Venogram; take one tablet at 4am and 9am on day of Venogram    empagliflozin (JARDIANCE) 10 mg tablet Take 1 tablet (10 mg total) by mouth once daily.    EScitalopram oxalate (LEXAPRO) 10 MG tablet Take 1 tablet (10 mg total) by mouth once daily.    folic acid (FOLVITE) 1 MG tablet Take 1 mg by mouth once daily. Except on Friday    furosemide (LASIX) 20 MG tablet Take 1  tablet (20 mg total) by mouth once daily.    levothyroxine (SYNTHROID) 75 MCG tablet Take 1 tablet (75 mcg total) by mouth before breakfast.    losartan (COZAAR) 25 MG tablet Take 1 tablet (25 mg total) by mouth once daily.    metoprolol succinate (TOPROL-XL) 25 MG 24 hr tablet Take 1 tablet (25 mg total) by mouth every evening.    pantoprazole (PROTONIX) 40 MG tablet Take 1 tablet (40 mg total) by mouth once daily.    predniSONE (DELTASONE) 50 MG Tab Take one tablet by mouth at 9pm  the evening prior to Venogram; take one tablet at 4am and 9am on day of Venogram    rosuvastatin (CRESTOR) 20 MG tablet Take 1 tablet (20 mg total) by mouth once daily.    cholecalciferol, vitamin D3, (VITAMIN D3 ORAL) Take 1 Dose by mouth 2 (two) times a day. 1 dose = 1 gummy    hypromellose (TEARS LUBRICANT EYE DROP OPHT) Place 1 drop into both eyes daily as needed (dry eyes).    lifitegrast (XIIDRA) 5 % Dpet Place 1 drop into both eyes 2 (two) times daily as needed (dry eyes).    methotrexate 2.5 MG Tab Take 5 mg by mouth 2 (two) times a day. On Friday only.    predniSONE (DELTASONE) 50 MG Tab Take 50mg by mouth at 13 hours, 7 hours, and 1 hour before contrast administration.    sod picosulf-mag ox-citric ac (CLENPIQ) 10 mg-3.5 gram- 12 gram/175 mL Soln Take 2 Bottles by mouth As instructed (use as directed by facility).     Family History       Problem Relation (Age of Onset)    Hypertension Mother, Father          Tobacco Use    Smoking status: Former     Current packs/day: 0.00     Average packs/day: 1 pack/day for 20.0 years (20.0 ttl pk-yrs)     Types: Cigarettes     Start date: 1961     Quit date: 1981     Years since quittin.3    Smokeless tobacco: Never   Substance and Sexual Activity    Alcohol use: No     Comment: 2 drinks/month; none 72 hrs prior to surgery    Drug use: No    Sexual activity: Yes     Partners: Male     Review of Systems   All other systems reviewed and are negative.    Objective:     Vital  Signs (Most Recent):  Temp: 98.2 °F (36.8 °C) (02/27/24 0837)  Pulse: 95 (02/27/24 0837)  Resp: 18 (02/27/24 0837)  BP: 129/73 (02/27/24 0837)  SpO2: 96 % (02/27/24 0837) Vital Signs (24h Range):  Temp:  [98.2 °F (36.8 °C)] 98.2 °F (36.8 °C)  Pulse:  [95] 95  Resp:  [18] 18  SpO2:  [96 %] 96 %  BP: (129)/(73) 129/73       Weight: 47.6 kg (105 lb)  Body mass index is 19.2 kg/m².    SpO2: 96 %        Physical Exam  General: NAD. AAO  HENT: EOMI  Neck: supple. No JVD  CV: RRR. Normal S1/S2. No gallops, rubs, or murmurs. 2+ radial pulses  Resp: CTAB. No increased work of breathing  Ext: warm. No edema.         Significant Labs: All pertinent lab results from the last 24 hours have been reviewed.    Significant Imaging:  Reviewed  Assessment and Plan:     Acute on chronic HFrEF (heart failure with reduced ejection fraction)  Patient is here for implantation of a right sided dcPPM with LBAP.  - CXR and ECG post-implant  - Device clinic follow up in 1 week  - Antibiotics x 5 days  - If patient is on oral anticoagulation, they will hold this for 5 days post-procedure    Constrast allergy:yes    The indication(s), risks, benefits and alternatives of the procedure were explained to the patient, patient's family and/or surrogate decision maker. Risks include (but not limited to) bleeding, pocket site hematoma, pocket and/or device infection which would often require extraction, pain, damage of vascular structures or surrounding structures, myocardial damage [perforation & tamponade requiring pericardiocentesis, valvular damage, injury to conduction system], pneumothorax, CVA, MI, and death. All questions were answered. Patient is understanding of these risks, and wishes to proceed. Consents signed.         Greg Gamez MD  Cardiac Electrophysiology  [facility] - Short Stay Cardiac Unit

## 2024-02-27 NOTE — TRANSFER OF CARE
"Anesthesia Transfer of Care Note    Patient: Dulce Root    Procedure(s) Performed: Procedure(s) (LRB):  INSERTION, PACEMAKER, BIVENTRICULAR (N/A)    Patient location: PACU    Anesthesia Type: general    Transport from OR: Transported from OR on 6-10 L/min O2 by face mask with adequate spontaneous ventilation    Post pain: adequate analgesia    Post assessment: no apparent anesthetic complications and tolerated procedure well    Post vital signs: stable    Level of consciousness: awake and responds to stimulation    Nausea/Vomiting: no nausea/vomiting    Complications: none    Transfer of care protocol was followed    Last vitals: Visit Vitals  /73 (BP Location: Right arm, Patient Position: Lying)   Pulse 95   Temp 36.8 °C (98.2 °F) (Temporal)   Resp 18   Ht 5' 2" (1.575 m)   Wt 47.6 kg (105 lb)   SpO2 96%   Breastfeeding No   BMI 19.20 kg/m²     "

## 2024-02-28 LAB
OHS QRS DURATION: 138 MS
OHS QTC CALCULATION: 622 MS

## 2024-02-28 NOTE — DISCHARGE SUMMARY
[facility] - Cardiology  Cardiac Electrophysiology  Discharge Summary      Patient Name: Dulce Root  MRN: [id number]  Admission Date: 2/27/2024  Hospital Length of Stay: 0 days  Discharge Date and Time: 2/27/2024  5:30 PM  Attending Physician: No att. providers found    Discharging Provider: Greg Gamez MD  Primary Care Physician: Patrick Hernandez MD    HPI:   HPI 67 yo female with LBBB, dilated cardiomyopathy, CAD and Aortic stenosis s/p CABGx2 + AVR (bioprosthetic), hodgkin's lymphoma s/p mantle radiation, Breast cancer with (right and then left sided s/p bilateral mastectomy with Lymph node removal on the left), HFrEF, hypothyroidism, pemphigoid. No fevers or infection. ECG shows NSR with LBBB.     Procedure(s) (LRB):  INSERTION, PACEMAKER, BIVENTRICULAR (N/A)     Indwelling Lines/Drains at time of discharge:  Lines/Drains/Airways       None                   Hospital Course:  S/p right-sided submuscular CRT-P. No immediate complications. Doxy x 5 days.     Goals of Care Treatment Preferences:  Code Status: Full Code      Consults:     Significant Diagnostic Studies: Labs: All labs within the past 24 hours have been reviewed    Pending Diagnostic Studies:       None            Final Active Diagnoses:    Diagnosis Date Noted POA    Acute on chronic HFrEF (heart failure with reduced ejection fraction) [I50.23] 01/29/2024 Yes      Problems Resolved During this Admission:     No new Assessment & Plan notes have been filed under this hospital service since the last note was generated.  Service: Arrhythmia      Discharged Condition: stable    Disposition: Home or Self Care    Follow Up:   Follow-up Information       Palomo Gill MD Follow up in 3 month(s).    Specialties: Electrophysiology, Cardiology  Contact information:  [address]  [facility] [address]  [phone]               DEVICE CHECK CLINIC Follow up in 1 week(s).    Why: For wound and device checks                         Patient  Instructions:   No discharge procedures on file.  Medications:  Reconciled Home Medications:      Medication List        START taking these medications      doxycycline 100 MG Cap  Commonly known as: VIBRAMYCIN  Take 1 capsule (100 mg total) by mouth every 12 (twelve) hours. for 5 days            CONTINUE taking these medications      ALPRAZolam 1 MG tablet  Commonly known as: XANAX  Take 1 mg by mouth nightly as needed for Anxiety.     aspirin 81 MG EC tablet  Commonly known as: ECOTRIN  Take 1 tablet (81 mg total) by mouth once daily.     CALCIUM WITH VITAMIN D3 ORAL  Take 1 Dose by mouth 2 (two) times a day. 1 dose = 2 gummies     * cimetidine 300 MG tablet  Commonly known as: TAGAMET  Take one tablet by mouth at 9pm  the evening prior to Venogram; take one tablet at 4am and 9am on day of Venogram     * cimetidine 300 MG tablet  Commonly known as: TAGAMET  Take one tablet by mouth at 12mn the evening prior to procedure; take one tablet at 6am and 12 noon on day of procedure     CLENPIQ 10 mg-3.5 gram- 12 gram/175 mL Soln  Generic drug: sod picosulf-mag ox-citric ac  Take 2 Bottles by mouth As instructed (use as directed by facility).     clopidogreL 75 mg tablet  Commonly known as: PLAVIX  Take 1 tablet (75 mg total) by mouth once daily.     * diphenhydrAMINE 50 MG capsule  Commonly known as: BENADRYL  Take one tablet by mouth at 9pm  the evening prior to Venogram; take one tablet at 4am and 9am on day of Venogram     * diphenhydrAMINE 50 MG capsule  Commonly known as: BENADRYL  Take one tablet by mouth at 12mn the evening prior to procedure; take one tablet at 6am and 12 noon on day of procedure     empagliflozin 10 mg tablet  Commonly known as: JARDIANCE  Take 1 tablet (10 mg total) by mouth once daily.     EScitalopram oxalate 10 MG tablet  Commonly known as: LEXAPRO  Take 1 tablet (10 mg total) by mouth once daily.     folic acid 1 MG tablet  Commonly known as: FOLVITE  Take 1 mg by mouth once daily. Except  on Friday     furosemide 20 MG tablet  Commonly known as: LASIX  Take 1 tablet (20 mg total) by mouth once daily.     levothyroxine 75 MCG tablet  Commonly known as: SYNTHROID  Take 1 tablet (75 mcg total) by mouth before breakfast.     losartan 25 MG tablet  Commonly known as: COZAAR  Take 1 tablet (25 mg total) by mouth once daily.     methotrexate 2.5 MG Tab  Take 5 mg by mouth 2 (two) times a day. On Friday only.     metoprolol succinate 25 MG 24 hr tablet  Commonly known as: TOPROL-XL  Take 1 tablet (25 mg total) by mouth every evening.     pantoprazole 40 MG tablet  Commonly known as: PROTONIX  Take 1 tablet (40 mg total) by mouth once daily.     potassium chloride 1 mEq/ml Liqd liquid (PEDS)  Take 20 mLs (20 mEq total) by mouth 2 (two) times daily. for 10 days     * predniSONE 50 MG Tab  Commonly known as: DELTASONE  Take 50mg by mouth at 13 hours, 7 hours, and 1 hour before contrast administration.     * predniSONE 50 MG Tab  Commonly known as: DELTASONE  Take one tablet by mouth at 9pm  the evening prior to Venogram; take one tablet at 4am and 9am on day of Venogram     * predniSONE 50 MG Tab  Commonly known as: DELTASONE  Take one tablet by mouth at 12mn the evening prior to procedure; take one tablet at 6am and 12 noon on day of procedure     rosuvastatin 20 MG tablet  Commonly known as: CRESTOR  Take 1 tablet (20 mg total) by mouth once daily.     TEARS LUBRICANT EYE DROP OPHT  Place 1 drop into both eyes daily as needed (dry eyes).     VITAMIN D3 ORAL  Take 1 Dose by mouth 2 (two) times a day. 1 dose = 1 gummy     XIIDRA 5 % Dpet  Generic drug: lifitegrast  Place 1 drop into both eyes 2 (two) times daily as needed (dry eyes).           * This list has 7 medication(s) that are the same as other medications prescribed for you. Read the directions carefully, and ask your doctor or other care provider to review them with you.                  Time spent on the discharge of patient: 15 minutes    Greg LERNER  MD Franco  Cardiac Electrophysiology  [facility]

## 2024-03-06 ENCOUNTER — CLINICAL SUPPORT (OUTPATIENT)
Dept: CARDIOLOGY | Facility: HOSPITAL | Age: 69
End: 2024-03-06
Attending: INTERNAL MEDICINE
Payer: MEDICARE

## 2024-03-06 DIAGNOSIS — I50.20 HFREF (HEART FAILURE WITH REDUCED EJECTION FRACTION): ICD-10-CM

## 2024-03-06 DIAGNOSIS — I44.7 LEFT BUNDLE-BRANCH BLOCK, UNSPECIFIED: ICD-10-CM

## 2024-03-06 DIAGNOSIS — I42.0 CARDIOMYOPATHY, DILATED: ICD-10-CM

## 2024-03-06 DIAGNOSIS — I44.7 LBBB (LEFT BUNDLE BRANCH BLOCK): ICD-10-CM

## 2024-03-06 PROCEDURE — 93281 PM DEVICE PROGR EVAL MULTI: CPT

## 2024-03-06 PROCEDURE — 93281 PM DEVICE PROGR EVAL MULTI: CPT | Mod: 26,,, | Performed by: INTERNAL MEDICINE

## 2024-03-25 ENCOUNTER — OFFICE VISIT (OUTPATIENT)
Dept: CARDIOLOGY | Facility: CLINIC | Age: 69
End: 2024-03-25
Payer: MEDICARE

## 2024-03-25 ENCOUNTER — HOSPITAL ENCOUNTER (OUTPATIENT)
Dept: RADIOLOGY | Facility: HOSPITAL | Age: 69
Discharge: HOME | End: 2024-03-25
Attending: INTERNAL MEDICINE
Payer: MEDICARE

## 2024-03-25 VITALS
SYSTOLIC BLOOD PRESSURE: 109 MMHG | DIASTOLIC BLOOD PRESSURE: 68 MMHG | WEIGHT: 110 LBS | HEART RATE: 76 BPM | BODY MASS INDEX: 19.49 KG/M2 | HEIGHT: 63 IN

## 2024-03-25 DIAGNOSIS — R13.12 OROPHARYNGEAL DYSPHAGIA: ICD-10-CM

## 2024-03-25 DIAGNOSIS — E78.5 HYPERLIPIDEMIA, UNSPECIFIED HYPERLIPIDEMIA TYPE: ICD-10-CM

## 2024-03-25 DIAGNOSIS — Z95.5 STENTED CORONARY ARTERY: ICD-10-CM

## 2024-03-25 DIAGNOSIS — Z95.1 S/P CABG X 2: ICD-10-CM

## 2024-03-25 DIAGNOSIS — I50.20 HFREF (HEART FAILURE WITH REDUCED EJECTION FRACTION): ICD-10-CM

## 2024-03-25 DIAGNOSIS — T82.09XS PROSTHETIC VALVE DYSFUNCTION, SEQUELA: Primary | ICD-10-CM

## 2024-03-25 DIAGNOSIS — I50.22 CHRONIC HFREF (HEART FAILURE WITH REDUCED EJECTION FRACTION): ICD-10-CM

## 2024-03-25 DIAGNOSIS — I65.23 CAROTID STENOSIS, BILATERAL: ICD-10-CM

## 2024-03-25 DIAGNOSIS — I44.7 LBBB (LEFT BUNDLE BRANCH BLOCK): ICD-10-CM

## 2024-03-25 DIAGNOSIS — R05.2 SUBACUTE COUGH: ICD-10-CM

## 2024-03-25 DIAGNOSIS — Z95.0 PRESENCE OF CARDIAC RESYNCHRONIZATION THERAPY PACEMAKER (CRT-P): ICD-10-CM

## 2024-03-25 DIAGNOSIS — Z95.2 S/P AVR (AORTIC VALVE REPLACEMENT): ICD-10-CM

## 2024-03-25 PROCEDURE — 71046 X-RAY EXAM CHEST 2 VIEWS: CPT | Mod: TC,FY,PO

## 2024-03-25 PROCEDURE — 71046 X-RAY EXAM CHEST 2 VIEWS: CPT | Mod: 26,,, | Performed by: RADIOLOGY

## 2024-03-25 PROCEDURE — 99215 OFFICE O/P EST HI 40 MIN: CPT | Mod: S$PBB,ICN,, | Performed by: INTERNAL MEDICINE

## 2024-03-25 PROCEDURE — 99215 OFFICE O/P EST HI 40 MIN: CPT | Mod: PBBFAC,25,PO | Performed by: INTERNAL MEDICINE

## 2024-03-25 PROCEDURE — 99999 PR PBB SHADOW E&M-EST. PATIENT-LVL V: CPT | Mod: PBBFAC,,, | Performed by: INTERNAL MEDICINE

## 2024-03-25 RX ORDER — LOSARTAN POTASSIUM 25 MG/1
12.5 TABLET ORAL DAILY
Qty: 90 TABLET | Refills: 3
Start: 2024-03-25

## 2024-03-25 RX ORDER — ROSUVASTATIN CALCIUM 20 MG/1
10 TABLET, COATED ORAL DAILY
Qty: 90 TABLET | Refills: 3
Start: 2024-03-25

## 2024-03-25 RX ORDER — FUROSEMIDE 20 MG/1
20 TABLET ORAL EVERY OTHER DAY
Qty: 90 TABLET | Refills: 3
Start: 2024-03-25

## 2024-03-25 NOTE — PATIENT INSTRUCTIONS
Stop aspirin. Continue clopidogrel (Plavix)  Reduce rosuvastatin to 10 mg once daily   Reduce furosemide (Lasix) to 20 mg every other day  Continue metoprolol and losartan at the same doses  Esophagram for swallowing difficulty  Chest x-ray  Echocardiogram end of May  Return early Dottie

## 2024-03-25 NOTE — PROGRESS NOTES
Structural Cardiology Clinic Note  Date: 3/25/24    Patient: Dulce Root, 1955, [id number]  Primary Care Provider: Patrick Hernandez MD     Chief Complaint/Reason for Referral: prosthetic valve dysfunction     Subjective:       Dulce Root is a 68 y.o. female who presents for follow-up. They were referred by Dr. Sims. She had previously followed at [facility]. Kaylah her daughter is with her.       No edema and even her abdominal distension is better. She does feel somewhat better since her CRT implant. No presyncope with walking anymore. Now can lift her pet (10lb [place] dog). No pathologic bleeding. No chest discomfort. Weight is 2 lbs down since december    Focused Past History includes:  CAD status post CABG and AVR with Medium Perceval in 2017 (at [facility]):   Cath October 2023: Severely elevated right and left filling pressures with severely reduced cardiac output.  Widely patent SVG that supplies most of the LAD distribution; occluded SVG to RCA (Widely patent RCA).  Equivocal ISR in the ostial circumflex, negative by IFR.    Dobutamine stress echo December 2023:  LVEF 15%.  Maximum stroke volume index reached 34.5.  Severe aortic stenosis with area 0.86, peak velocity 3.7, mean gradient 33, dimensionless index 0.3.  Achieved 78% MPHR  Discussed with Dr. Abdalla and Dr. Gill.  We decided to proceed with CRT P 1st to improve EF and monitor the surgical aortic valve function.  Planning for Elizabeth-GEORGIANA has been completed  Status post CRT-P 02/27/2024 (Dr. Gill)  Chronic HFrEF  History of Hodgkin's lymphoma status post chemo and radiation - 1991  Carotid stenosis.   Duplex ultrasound 1/2024:  ANNA 50-69%, LICA 70-99%. 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  Former smoker 15 p-y, quit in 1981     Review of Systems  Constitutional: negative for fevers, night sweats, and weight loss  Eyes: negative for visual disturbance,  diplopia  Respiratory: negative for cough, hemoptysis, sputum, and wheezing  Cardiovascular: see HPI  Gastrointestinal: negative for abdominal pain, bright red blood per rectum, change in bowel habits, dysphagia, melena, and reflux symptoms  Genitourinary:negative for dysuria, frequency, and hematuria  Hematologic/lymphatic: negative for bleeding, easy bruising, and lymphadenopathy  Musculoskeletal:negative for arthralgias, back pain, and myalgias  Neurological: negative for gait problems, paresthesia, speech problems, vertigo, and weakness  Behavioral/Psych: negative for excessive alcohol consumption, illegal drug usage, and sleep disturbance    ----------------------------  Allergy  Anticoagulant long-term use      Comment:  Plavix  Breast cancer  Carotid stenosis, bilateral  CHF (congestive heart failure)  Coronary artery disease  Coronary artery disease involving coronary bypass graft of native   heart without angina pectoris      Comment:  2/19  1.  Left main is a small vessel with a 60%-65%                ostial lesion. 2.  LAD is a medium-sized vessel diffuse                70% proximally  Ramus intermedius is a medium size vessel               with a 40%-50% ostial lesion. 3.  Circumflex 60%-70%                ostial  remainder of circumflex and obtuse marginal                normal in appearance. Right coronary artery is normal                size vessel, short discrete 70%mid 4.  Vein graft to                right coronary is occ  Coronary artery disease involving native coronary artery of native   heart without angina pectoris  DCIS (ductal carcinoma in situ) of breast  Encounter for blood transfusion  Epigastric pain  Essential hypertension  GERD (gastroesophageal reflux disease)  GI bleed  Hematemesis with nausea  Hodgkin lymphoma  Hx of Hodgkin's disease - s/p chemo and radiation  Hyperlipidemia  Hyperlipidemia      Comment:  The patient presents with hyperlipidemia.  The patient                reports  tolerating the medication well and is in                excellent compliance.  There have been no medication side               effects.  The patient denies chest pain, neuropathy, and                myalgias.  The patient has reduced fat intake and has                been exercising.  Current treatment has included the                medications listed in the med card.   Lab Results                Component Value Date  CH  Hypertension  LBBB (left bundle branch block)  Malignant neoplasm of central portion of left breast in female,   estrogen receptor negative  Osteoporosis  Paroxysmal atrial fibrillation - single post-op episode  Pemphigoid  Pemphigoid      Comment:  pt receives IVIG infusions  Pulmonary hypertension  S/P AVR (aortic valve replacement) - pericardial valve      Comment:  Perceval aortic tissue valve PVS23. 23mm    serial #                [id number]  S/P CABG x 2      Comment:  8/17 CABG X 2 with SVG-LAD and SVG-distal RCA  SVG LAD                due to absent LIMA after chest radiation and lymph node                biopsy   Stented coronary artery      Comment:  She had 2 stents placed in 2/2019.  She has had CAD and                bypasses in the past in 2017.    Thyroid disease  ----------------------------  Angiogram, coronary, with left heart catheterization      Comment:  Procedure: Left Heart Cath w/Graft study;  Surgeon:                Jeff Guzman MD;  Location: [facility];  Service:                Cardiology;;  Aortography      Comment:  Procedure: AO Root angiogram;  Surgeon: Jeff Guzman MD;                Location: [facility];  Service: Cardiology;  Laterality:                N/A;  Arteriography of aortic root      Comment:  Procedure: ARTERIOGRAM, AORTIC ROOT;  Surgeon: Sujit Chaudhry MD;  Location: [facility];  Service: Cardiology;                 Laterality: N/A;  Axillary node dissection      Comment:  Procedure: LYMPHADENECTOMY, AXILLARY-Left;  Surgeon: Rosa ANGELES  MD Hiren;  Location: [facility];  Service: General;                 Laterality: Left;  Breast biopsy  Breast reconstruction      Comment:  bilateral mastectomy  Cardiac catheterization      Comment:  stents x 2  Cardiac surgery      Comment:  Aortic valve replacement , CABG 2 vessel  Cardiac valve replacement      Comment:  aortic valve, bovine per pt  Coronary angiography      Comment:  Procedure: ANGIOGRAM, CORONARY ARTERY;  Surgeon: Sujit Chaudhry MD;  [facility];  Service: Cardiology;                 Laterality: N/A;  Coronary angiography      Comment:  Procedure: ANGIOGRAM, CORONARY ARTERY;  Surgeon: Sujit Chaudhry MD;  Location: [facility];  Service: Cardiology;                 Laterality: N/A;  Coronary bypass graft angiography      Comment:  Procedure: Bypass graft study;  Surgeon: Sujit Chaudhry MD;  [facility];  Service: Cardiology;;  Cosmetic surgery      Comment:  bereast reconstruction  Esophagogastroduodenoscopy      Comment:  Procedure: EGD (ESOPHAGOGASTRODUODENOSCOPY);  Surgeon:                Tracy Flower MD;  Location: [facility];  Service:                Endoscopy;  Laterality: N/A;  Esophagogastroduodenoscopy      Comment:  Procedure: EGD (ESOPHAGOGASTRODUODENOSCOPY);  Surgeon:                Tracy Flower MD;  Location: [facility];  Service:                Endoscopy;  Laterality: N/A;  Esophagogastroduodenoscopy      Comment:  Procedure: EGD (ESOPHAGOGASTRODUODENOSCOPY);  Surgeon:                Tracy Flower MD;  Location: [facility];  Service:                Endoscopy;  Laterality: N/A;  Esophagogastroduodenoscopy      Comment:  Procedure: EGD (ESOPHAGOGASTRODUODENOSCOPY);  Surgeon:                Mk Sanchez MD;  [facility]                 Service: Endoscopy;  Laterality: N/A;  inst via                portal  cardiac clearance-see encounter dated                5/31/23  precall confirmed 6/2  EB  Eye surgery      Comment:  eye lids  Fractional flow reserve (ffr), coronary      Comment:  Procedure: (IFR) Ramus;  Surgeon: Jeff Guzman MD;                 Location: [facility];  Service: Cardiology;;  Implantation of biventricular heart pacemaker      Comment:  Procedure: INSERTION, PACEMAKER, BIVENTRICULAR;                 Surgeon: Palomo Gill MD;  Location: [facility] EP LAB;                 Service: Cardiology;  Laterality: N/A;  DCM, LBBB, Right                sided CRT-P vs LBAP lead, MDT, MAC, SK, 3 Prep *Venogram                2/19/24*  Injection for sentinel node identification      Comment:  Procedure: INJECTION, FOR SENTINEL NODE                IDENTIFICATION-Left;  Surgeon: Rosa Maradiaga MD;                 Location: [facility] OR;  Service: General;  Laterality: Left;  Instantaneous wave-free ratio (ifr)      Comment:  Procedure: (IFR) LCX;  Surgeon: Jeff Guzman MD;                 Location: [facility];  Service: Cardiology;;  Left heart catheterization      Comment:  Procedure: Left heart cath;  Surgeon: Sujit Chaudhry MD;               Location: [facility];  Service: Cardiology;  Laterality:                Right;  Left heart catheterization      Comment:  Procedure: Left heart cath;  Surgeon: Sujit Chaudhry MD;               Location: [facility];  Service: Cardiology;  Laterality:                Left;  Left heart catheterization      Comment:  Procedure: Left heart cath;  Surgeon: Jeff Guzman MD;                 Location: [facility];  Service: Cardiology;;  Lumbar laminectomy with discectomy      Comment:  Procedure: LAMINECTOMY, SPINE, LUMBAR, WITH DISCECTOMY;                Surgeon: Vimal Villegas MD;  Location: [facility] OR;                 Service: Neurosurgery;  Laterality: N/A;  left                L5-S1  Laminectomy L4-5  Lymphadenectomy  Mastectomy  Mastectomy, partial      Comment:  Procedure: MASTECTOMY, PARTIAL-Left ultrasound guided;                 Surgeon: Rosa Maradiaga MD;  Location: [facility] OR;   Service:               General;  Laterality: Left;  Right heart catheterization      Comment:  Procedure: INSERTION, CATHETER, RIGHT HEART;  Surgeon:                Sujit Chaudhry MD;  Location: [facility];  Service:                Cardiology;  Laterality: Right;  Right heart catheterization      Comment:  Procedure: Right heart cath;  Surgeon: Jeff Guzman MD;                 Location: [facility];  Service: Cardiology;;  [place] lymph node biopsy      Comment:  Procedure: BIOPSY, LYMPH NODE, SENTINEL-Left;  Surgeon:                Rosa Maradiaga MD;  Location: [facility] OR;  Service: General;               Laterality: Left;  Skin biopsy  Splenectomy, total  Transforaminal epidural injection of steroid      Comment:  Procedure: Left L5/S1 TF SKIP with local;  Surgeon:                Canelo Niño MD;  Location: [facility] PAIN MGT;  Service:                Pain Management;  Laterality: Left;  Tumor removal      Comment:  neck- lymphoma  Venogram, ep lab      Comment:  Procedure: Venogram, EP Lab;  Surgeon: Palomo Gill MD;  Location: [facility] EP LAB;  Service: Cardiology;                 Laterality: N/A;  DCM, CHF, Venogram- Bilateral, SK, 3                Prep *hx of bilateral mastectomy* *Contrast Prep*     Family History   Problem Relation Age of Onset    Hypertension Mother     Hypertension Father     Cancer Neg Hx      Social History     Tobacco Use    Smoking status: Former     Current packs/day: 0.00     Average packs/day: 1 pack/day for 20.0 years (20.0 ttl pk-yrs)     Types: Cigarettes     Start date: 1961     Quit date: 1981     Years since quittin.4    Smokeless tobacco: Never   Substance Use Topics    Alcohol use: No     Comment: 2 drinks/month; none 72 hrs prior to surgery    Drug use: No       Current Outpatient Medications   Medication Sig Dispense Refill    calcium carbonate/vitamin D3 (CALCIUM WITH VITAMIN D3 ORAL) Take 1 Dose by mouth 2 (two) times a day. 1 dose = 2 gummies       cholecalciferol, vitamin D3, (VITAMIN D3 ORAL) Take 1 Dose by mouth 2 (two) times a day. 1 dose = 1 gummy      cimetidine (TAGAMET) 300 MG tablet Take one tablet by mouth at 12mn the evening prior to procedure; take one tablet at 6am and 12 noon on day of procedure 3 tablet 0    clopidogreL (PLAVIX) 75 mg tablet Take 1 tablet (75 mg total) by mouth once daily. 90 tablet 3    empagliflozin (JARDIANCE) 10 mg tablet Take 1 tablet (10 mg total) by mouth once daily. 90 tablet 3    EScitalopram oxalate (LEXAPRO) 10 MG tablet Take 1 tablet (10 mg total) by mouth once daily. 90 tablet 3    folic acid (FOLVITE) 1 MG tablet Take 1 mg by mouth once daily. Except on Friday      levothyroxine (SYNTHROID) 75 MCG tablet Take 1 tablet (75 mcg total) by mouth before breakfast. 90 tablet 3    lifitegrast (XIIDRA) 5 % Dpet Place 1 drop into both eyes 2 (two) times daily as needed (dry eyes).      methotrexate 2.5 MG Tab Take 5 mg by mouth 2 (two) times a day. On Friday only.      metoprolol succinate (TOPROL-XL) 25 MG 24 hr tablet Take 1 tablet (25 mg total) by mouth every evening. 90 tablet 3    pantoprazole (PROTONIX) 40 MG tablet Take 1 tablet (40 mg total) by mouth once daily. 30 tablet 11    predniSONE (DELTASONE) 50 MG Tab Take one tablet by mouth at 9pm  the evening prior to Venogram; take one tablet at 4am and 9am on day of Venogram 3 tablet 0    predniSONE (DELTASONE) 50 MG Tab Take one tablet by mouth at 12mn the evening prior to procedure; take one tablet at 6am and 12 noon on day of procedure 3 tablet 0    sod picosulf-mag ox-citric ac (CLENPIQ) 10 mg-3.5 gram- 12 gram/175 mL Soln Take 2 Bottles by mouth As instructed (use as directed by facility). 350 mL 0    ALPRAZolam (XANAX) 1 MG tablet Take 1 mg by mouth nightly as needed for Anxiety.      cimetidine (TAGAMET) 300 MG tablet Take one tablet by mouth at 9pm  the evening prior to Venogram; take one tablet at 4am and 9am on day of Venogram (Patient not taking: Reported on  3/25/2024) 3 tablet 0    diphenhydrAMINE (BENADRYL) 50 MG capsule Take one tablet by mouth at 9pm  the evening prior to Venogram; take one tablet at 4am and 9am on day of Venogram (Patient not taking: Reported on 3/25/2024) 3 capsule 0    diphenhydrAMINE (BENADRYL) 50 MG capsule Take one tablet by mouth at 12mn the evening prior to procedure; take one tablet at 6am and 12 noon on day of procedure (Patient not taking: Reported on 3/25/2024) 3 capsule 0    furosemide (LASIX) 20 MG tablet Take 1 tablet (20 mg total) by mouth every other day. 90 tablet 3    hypromellose (TEARS LUBRICANT EYE DROP OPHT) Place 1 drop into both eyes daily as needed (dry eyes).      losartan (COZAAR) 25 MG tablet Take 0.5 tablets (12.5 mg total) by mouth once daily. 90 tablet 3    predniSONE (DELTASONE) 50 MG Tab Take 50mg by mouth at 13 hours, 7 hours, and 1 hour before contrast administration. (Patient not taking: Reported on 3/25/2024) 3 tablet 0    rosuvastatin (CRESTOR) 20 MG tablet Take 0.5 tablets (10 mg total) by mouth once daily. 90 tablet 3     Current Facility-Administered Medications   Medication Dose Route Frequency Provider Last Rate Last Admin    sodium chloride 0.9% flush 10 mL  10 mL Intravenous PRN Manuel Sims MD            Objective:      Physical Exam  General:  No acute distress, frail and thin   Lungs:  Mild rales left base  Heart:  Regular rate and rhythm.  3/6 late-peaking systolic ejection murmur over the upper right sternal border with preserved S2   Abdomen:  Soft, nontender, nondistended   Extremities:  No pitting edema   Neuro:  Moving all extremities x4      Lab Review   Lab Results   Component Value Date    WBC 10.64 02/22/2024    HGB 13.5 02/22/2024    HCT 41.1 02/22/2024    MCV 97 02/22/2024     02/22/2024         BMP  Lab Results   Component Value Date     02/27/2024    K 3.1 (L) 02/27/2024     02/27/2024    CO2 23 02/27/2024    BUN 12 02/27/2024    CREATININE 0.6 02/27/2024     CALCIUM 7.8 (L) 2024    ANIONGAP 10 2024    ESTGFRAFRICA >60 2022    EGFRNONAA >60 2022       Lab Results   Component Value Date    LABPROT 11.4 2024    ALBUMIN 4.0 2023       Lab Results   Component Value Date    ALT 19 2023    AST 23 2023    ALKPHOS 86 2023    BILITOT 0.9 2023       Lab Results   Component Value Date    TSH 1.328 2023       Lab Results   Component Value Date    CHOL 112 (L) 2023    CHOL 130 2022    CHOL 166 2020     Lab Results   Component Value Date    HDL 39 (L) 2023    HDL 30 (L) 2022    HDL 53 2020     Lab Results   Component Value Date    LDLCALC 58.8 (L) 2023    LDLCALC 76.2 2022    LDLCALC 89.8 2020     Lab Results   Component Value Date    TRIG 71 2023    TRIG 119 2022    TRIG 116 2020     Lab Results   Component Value Date    CHOLHDL 34.8 2023    CHOLHDL 23.1 2022    CHOLHDL 31.9 2020      LVOT VTI AoV VTI AoV MG JORI      0 11.1 34.41 11 0.228995  LVOT area 2.8   5 13.4 35.7 10 1.14304      10 16.3 41.9 14 1.61582      15 16 57 22 0.853955      20 20.4 62.5 29 0.80968      reserve 0.174934 [id number]          EKG 2023:  Normal sinus rhythm with left bundle-branch block,      Assessment & Plan:      This is a 68 y.o. pleasant  female with NYHA class 3 functional class and severely reduced LVEF.  She is adequately revascularized and on maximum tolerated heart failure medical therapy.  She has severe prosthetic aortic valve dysfunction due to a combination of valve degeneration and patient prosthesis mismatch.  Despite her severely reduced LVEF she is able to mount a maximum velocity of 3.7 and has excellent contractile reserve.  I personally performed measurements from her dobutamine stress echo and they are summarized above.  The measurements are consistent with adequate contractile reserve and severe obstruction  at her aortic valve.     After multidisciplinary discussion we proceeded with CRT-P placement at the end of February.  Thankfully she is already noticing some subjective improvement and I am reducing her furosemide to every other day today.  We will recheck her TTE after 3 months of CRT implant for aortic valve indices realizing that the maximal improvement in her LV may take 6-12 months.    1. Prosthetic valve dysfunction, sequela  Echo      2. Oropharyngeal dysphagia  FL Esophagram Complete    Ambulatory referral/consult to Vascular Surgery      3. Subacute cough  X-Ray Chest PA And Lateral      4. Hyperlipidemia, unspecified hyperlipidemia type  rosuvastatin (CRESTOR) 20 MG tablet      5. HFrEF (heart failure with reduced ejection fraction)        6. S/P AVR (aortic valve replacement)  Echo      7. LBBB (left bundle branch block)        8. S/P CABG x 2        9. Chronic HFrEF (heart failure with reduced ejection fraction)        10. Stented coronary artery        11. Carotid stenosis, bilateral        12. Presence of cardiac resynchronization therapy pacemaker (CRT-P)                 We will refer to Dr. Arevalo to re-evaluate her carotid stenosis  Despite her chronological age being young she is not a candidate for redo open heart surgery given her severe LV dysfunction, chest radiation, carotid disease, immunosuppression, insufficient internal mammary conduit (noted during her first open heart surgery)  If/when there is predominant prosthetic stenosis (as opposed to patient prosthesis mismatch) we will proceed with high-risk Elizabeth GEORGIANA. Her TAVR CTAs were personally analyzed and anatomy is amenable to TF TAVR with a 23mm CRYSTAL 3 Ultra    Decrease furosemide to 20 mg every other day.  Continue losartan 12.5 mg once daily and metoprolol succinate 25 mg twice daily   Decrease rosuvastatin to 10 mg once daily   Discontinue aspirin continue clopidogrel monotherapy   Esophagram for dysphagia   Chest x-ray for her  cough of 3 weeks' duration given her immunosuppression  TTE end of May/early June as noted above. Will check BNP then      I appreciate the opportunity to participate in Dulce Root 's care today.  Please follow up with me in early June      Jeff Guzman MD, [facility]  Interventional Cardiology/Structural Heart Disease  [facility]  Office: [phone]     Parts of this note were completed using voice recognition software. Please excuse any misspellings or syntax errors and reach out to me with questions.

## 2024-03-27 ENCOUNTER — TELEPHONE (OUTPATIENT)
Dept: VASCULAR SURGERY | Facility: CLINIC | Age: 69
End: 2024-03-27
Payer: MEDICARE

## 2024-03-27 NOTE — TELEPHONE ENCOUNTER
Spoke w/ pt and scheduled appt for 3/28 w. Dr. SOTO. Directions given. Pt verbalized understanding.

## 2024-03-28 ENCOUNTER — INITIAL CONSULT (OUTPATIENT)
Dept: VASCULAR SURGERY | Facility: CLINIC | Age: 69
End: 2024-03-28
Payer: MEDICARE

## 2024-03-28 VITALS
HEART RATE: 83 BPM | DIASTOLIC BLOOD PRESSURE: 68 MMHG | BODY MASS INDEX: 19.17 KG/M2 | SYSTOLIC BLOOD PRESSURE: 102 MMHG | WEIGHT: 108.25 LBS

## 2024-03-28 DIAGNOSIS — R13.12 OROPHARYNGEAL DYSPHAGIA: ICD-10-CM

## 2024-03-28 PROCEDURE — 99214 OFFICE O/P EST MOD 30 MIN: CPT | Mod: S$PBB,,, | Performed by: STUDENT IN AN ORGANIZED HEALTH CARE EDUCATION/TRAINING PROGRAM

## 2024-03-28 PROCEDURE — 99999 PR PBB SHADOW E&M-EST. PATIENT-LVL IV: CPT | Mod: PBBFAC,,, | Performed by: STUDENT IN AN ORGANIZED HEALTH CARE EDUCATION/TRAINING PROGRAM

## 2024-03-28 PROCEDURE — 99214 OFFICE O/P EST MOD 30 MIN: CPT | Mod: PBBFAC,PO | Performed by: STUDENT IN AN ORGANIZED HEALTH CARE EDUCATION/TRAINING PROGRAM

## 2024-03-30 ENCOUNTER — CLINICAL SUPPORT (OUTPATIENT)
Dept: CARDIOLOGY | Facility: HOSPITAL | Age: 69
End: 2024-03-30
Attending: INTERNAL MEDICINE
Payer: MEDICARE

## 2024-03-30 DIAGNOSIS — I44.7 LEFT BUNDLE-BRANCH BLOCK, UNSPECIFIED: ICD-10-CM

## 2024-04-03 LAB — OHS CV RV PACING PERCENT: 99.2 %

## 2024-04-04 ENCOUNTER — PATIENT MESSAGE (OUTPATIENT)
Dept: INFUSION THERAPY | Facility: HOSPITAL | Age: 69
End: 2024-04-04
Payer: MEDICARE

## 2024-04-11 ENCOUNTER — PATIENT MESSAGE (OUTPATIENT)
Dept: CARDIOLOGY | Facility: CLINIC | Age: 69
End: 2024-04-11
Payer: MEDICARE

## 2024-04-11 DIAGNOSIS — R63.4 WEIGHT LOSS: ICD-10-CM

## 2024-04-11 DIAGNOSIS — I10 ESSENTIAL HYPERTENSION: Primary | ICD-10-CM

## 2024-04-11 DIAGNOSIS — I50.22 CHRONIC HFREF (HEART FAILURE WITH REDUCED EJECTION FRACTION): ICD-10-CM

## 2024-04-11 DIAGNOSIS — Z95.2 S/P AVR (AORTIC VALVE REPLACEMENT): ICD-10-CM

## 2024-04-11 NOTE — TELEPHONE ENCOUNTER
Yes for the losartan (at 12.5 would only hold for systolic <100). The weight trend is interesting. Let.s do CBC, BMP

## 2024-04-16 NOTE — PROGRESS NOTES
[place] [facility]  [facility]  History & Physical    SUBJECTIVE:     Chief Complaint: Bilateral   Carotid stenosis      History of Present Illness:  Dulce Root is a 68 y.o. female presents with  history of CHF, carotid stenosis, CAD,  status post CABG and AVR, HTN who presents with carotid artery stenosis evaluation.  She denies any symptoms consistent with stroke or TIA.  She was remote history of smoking but has sustained from tobacco abuse.   She is currently on Plavix  and a statin.      Review of patient's allergies indicates:   Allergen Reactions    Amlodipine Shortness Of Breath and Other (See Comments)     unknown  Other reaction(s): Swelling  unknown  unknown  Other reaction(s): Swelling  unknown  Other reaction(s): Swelling  unknown    Levofloxacin Hives and Swelling    Sulfa (sulfonamide antibiotics) Shortness Of Breath, Itching and Other (See Comments)     elevated blood pressure    Ace inhibitors     Ciprofloxacin Itching    Iodinated contrast media     Iodine      Other reaction(s): Unknown    Latex      Other reaction(s): Itching    Latex, natural rubber Itching       Past Medical History:   Diagnosis Date    Allergy     Anticoagulant long-term use     Plavix    Breast cancer 2008    Carotid stenosis, bilateral 10/13/2017    CHF (congestive heart failure)     Coronary artery disease     Coronary artery disease involving coronary bypass graft of native heart without angina pectoris 08/16/2019 2/19  1.  Left main is a small vessel with a 60%-65% ostial lesion. 2.  LAD is a medium-sized vessel diffuse 70% proximally  Ramus intermedius is a medium size vessel with a 40%-50% ostial lesion. 3.  Circumflex 60%-70% ostial  remainder of circumflex and obtuse marginal normal in appearance. Right coronary artery is normal size vessel, short discrete 70%mid 4.  Vein graft to right coronary is occ    Coronary artery disease involving native coronary artery of native heart  without angina pectoris 06/27/2017    DCIS (ductal carcinoma in situ) of breast 11/06/2012    Encounter for blood transfusion     Epigastric pain 04/01/2023    Essential hypertension 11/06/2012    GERD (gastroesophageal reflux disease)     GI bleed 02/2021    Hematemesis with nausea 05/11/2021    Hodgkin lymphoma     Hx of Hodgkin's disease - s/p chemo and radiation 11/06/2012    Hyperlipidemia     Hyperlipidemia 11/06/2012    The patient presents with hyperlipidemia.  The patient reports tolerating the medication well and is in excellent compliance.  There have been no medication side effects.  The patient denies chest pain, neuropathy, and myalgias.  The patient has reduced fat intake and has been exercising.  Current treatment has included the medications listed in the med card.   Lab Results Component Value Date  CH    Hypertension     LBBB (left bundle branch block) 05/22/2018    Malignant neoplasm of central portion of left breast in female, estrogen receptor negative 11/03/2022    Osteoporosis     Paroxysmal atrial fibrillation - single post-op episode 08/24/2017    Pemphigoid     Pemphigoid     pt receives IVIG infusions    Pulmonary hypertension 01/13/2023    S/P AVR (aortic valve replacement) - pericardial valve 08/21/2017    Perceval aortic tissue valve PVS23. 23mm    serial # [id number]    S/P CABG x 2 08/21/2017 8/17 CABG X 2 with SVG-LAD and SVG-distal RCA  SVG LAD due to absent LIMA after chest radiation and lymph node biopsy     Stented coronary artery     She had 2 stents placed in 2/2019.  She has had CAD and bypasses in the past in 2017.      Thyroid disease      Past Surgical History:   Procedure Laterality Date    ANGIOGRAM, CORONARY, WITH LEFT HEART CATHETERIZATION  10/16/2023    Procedure: Left Heart Cath w/Graft study;  Surgeon: Jeff Guzman MD;  Location: [facility] CATH;  Service: Cardiology;;    AORTOGRAPHY N/A 10/16/2023    Procedure: AO Root angiogram;  Surgeon: Jeff Guzman MD;  Location: [facility]  CATH;  Service: Cardiology;  Laterality: N/A;    ARTERIOGRAPHY OF AORTIC ROOT N/A 02/15/2019    Procedure: ARTERIOGRAM, AORTIC ROOT;  Surgeon: Sujit Chaudhry MD;  Location: [facility] CATH;  Service: Cardiology;  Laterality: N/A;    AXILLARY NODE DISSECTION Left 11/25/2022    Procedure: LYMPHADENECTOMY, AXILLARY-Left;  Surgeon: Rosa Maradiaga MD;  Location: [facility];  Service: General;  Laterality: Left;    BREAST BIOPSY      BREAST RECONSTRUCTION      bilateral mastectomy    CARDIAC CATHETERIZATION      stents x 2    CARDIAC SURGERY  08/2017    Aortic valve replacement , CABG 2 vessel    CARDIAC VALVE REPLACEMENT  08/2017    aortic valve, bovine per pt    CORONARY ANGIOGRAPHY N/A 02/15/2019    Procedure: ANGIOGRAM, CORONARY ARTERY;  Surgeon: Sujit Chaudhry MD;  Location: [facility] CATH;  Service: Cardiology;  Laterality: N/A;    CORONARY ANGIOGRAPHY N/A 4/1/2022    Procedure: ANGIOGRAM, CORONARY ARTERY;  Surgeon: Sujit Chaudhry MD;  [facility] CATH;  Service: Cardiology;  Laterality: N/A;    CORONARY BYPASS GRAFT ANGIOGRAPHY  02/15/2019    Procedure: Bypass graft study;  Surgeon: Sujit Chaudhry MD;  Location: [facility] CATH;  Service: Cardiology;;    COSMETIC SURGERY      bereast reconstruction    ESOPHAGOGASTRODUODENOSCOPY N/A 05/14/2021    Procedure: EGD (ESOPHAGOGASTRODUODENOSCOPY);  Surgeon: Tracy Flower MD;  Location: [facility];  Service: Endoscopy;  Laterality: N/A;    ESOPHAGOGASTRODUODENOSCOPY N/A 11/04/2021    Procedure: EGD (ESOPHAGOGASTRODUODENOSCOPY);  Surgeon: Tracy Flower MD;  Location: [facility];  Service: Endoscopy;  Laterality: N/A;    ESOPHAGOGASTRODUODENOSCOPY N/A 4/1/2023    Procedure: EGD (ESOPHAGOGASTRODUODENOSCOPY);  Surgeon: Tracy Flower MD;  Location: [facility];  Service: Endoscopy;  Laterality: N/A;    ESOPHAGOGASTRODUODENOSCOPY N/A 6/8/2023    Procedure: EGD (ESOPHAGOGASTRODUODENOSCOPY);  Surgeon: Mk Sanchez MD;  Location: [facility] ([address] [place] [address]);  Service: Endoscopy;  Laterality:  N/A;  inst via portal  cardiac clearance-see encounter dated 5/31/23  precall confirmed 6/2 EB    EYE SURGERY      eye lids    FRACTIONAL FLOW RESERVE (FFR), CORONARY  10/20/2023    Procedure: (IFR) Ramus;  Surgeon: Jeff Guzman MD;  Location: [facility] CATH;  Service: Cardiology;;    IMPLANTATION OF BIVENTRICULAR HEART PACEMAKER N/A 2/27/2024    Procedure: INSERTION, PACEMAKER, BIVENTRICULAR;  Surgeon: Palomo Gill MD;  Location: [facility] EP LAB;  Service: Cardiology;  Laterality: N/A;  DCM, LBBB, Right sided CRT-P vs LBAP lead, MDT, MAC, SK, 3 Prep *Venogram 2/19/24*    INJECTION FOR SENTINEL NODE IDENTIFICATION Left 11/25/2022    Procedure: INJECTION, FOR SENTINEL NODE IDENTIFICATION-Left;  Surgeon: Rosa Maradiaga MD;  Location: [facility];  Service: General;  Laterality: Left;    INSTANTANEOUS WAVE-FREE RATIO (IFR)  10/20/2023    Procedure: (IFR) LCX;  Surgeon: Jeff Guzman MD;  Location: [facility] CATH;  Service: Cardiology;;    LEFT HEART CATHETERIZATION Right 02/15/2019    Procedure: Left heart cath;  Surgeon: Sujit Chaudhry MD;  Location: [facility] CATH;  Service: Cardiology;  Laterality: Right;    LEFT HEART CATHETERIZATION Left 4/1/2022    Procedure: Left heart cath;  Surgeon: Sujit Chaudhry MD;  Location: [facility] CATH;  Service: Cardiology;  Laterality: Left;    LEFT HEART CATHETERIZATION  10/20/2023    Procedure: Left heart cath;  Surgeon: Jeff Guzman MD;  Location: [facility] CATH;  Service: Cardiology;;    LUMBAR LAMINECTOMY WITH DISCECTOMY N/A 02/27/2020    Procedure: LAMINECTOMY, SPINE, LUMBAR, WITH DISCECTOMY;  Surgeon: Vimal Villegas MD;  Location: [facility] OR;  Service: Neurosurgery;  Laterality: N/A;  left L5-S1  Laminectomy L4-5    LYMPHADENECTOMY      MASTECTOMY      MASTECTOMY, PARTIAL Left 11/25/2022    Procedure: MASTECTOMY, PARTIAL-Left ultrasound guided;  Surgeon: Rosa Maradiaga MD;  Location: [facility];  Service: General;  Laterality: Left;    RIGHT HEART CATHETERIZATION Right 4/1/2022    Procedure: INSERTION, CATHETER,  RIGHT HEART;  Surgeon: Sujit Chaudhry MD;  Location: [facility] CATH;  Service: Cardiology;  Laterality: Right;    RIGHT HEART CATHETERIZATION  10/16/2023    Procedure: Right heart cath;  Surgeon: Jeff Guzman MD;  Location: [facility] CATH;  Service: Cardiology;;    SENTINEL LYMPH NODE BIOPSY Left 2022    Procedure: BIOPSY, LYMPH NODE, SENTINEL-Left;  Surgeon: Rosa Maradiaga MD;  Location: [facility] OR;  Service: General;  Laterality: Left;    SKIN BIOPSY      SPLENECTOMY, TOTAL      TRANSFORAMINAL EPIDURAL INJECTION OF STEROID Left 2019    Procedure: Left L5/S1 TF SKIP with local;  Surgeon: Canelo Niño MD;  Location: [facility] PAIN MGT;  Service: Pain Management;  Laterality: Left;    TUMOR REMOVAL      neck- lymphoma    VENOGRAM, EP LAB N/A 2024    Procedure: Venogram, EP Lab;  Surgeon: Palomo Gill MD;  Location: [facility] EP LAB;  Service: Cardiology;  Laterality: N/A;  DCM, CHF, Venogram- Bilateral, SK, 3 Prep *hx of bilateral mastectomy* *Contrast Prep*     Family History   Problem Relation Name Age of Onset    Hypertension Mother      Hypertension Father      Cancer Neg Hx       Social History     Tobacco Use    Smoking status: Former     Current packs/day: 0.00     Average packs/day: 1 pack/day for 20.0 years (20.0 ttl pk-yrs)     Types: Cigarettes     Start date: 1961     Quit date: 1981     Years since quittin.4    Smokeless tobacco: Never   Substance Use Topics    Alcohol use: No     Comment: 2 drinks/month; none 72 hrs prior to surgery    Drug use: No        Review of Systems:  Review of Systems   All other systems reviewed and are negative.      OBJECTIVE:     Vital Signs (Most Recent):  Pulse: 83 (24 1118)  BP: 102/68 (24 1118)    Physical Exam:  Physical Exam   Constitutional: She is oriented to person, place, and time.  Non-toxic appearance. No distress.   HENT:   Head: Normocephalic and atraumatic.   Cardiovascular: Normal rate.    Palpable femoral pulses bilaterally  Pulmonary:      Effort: Pulmonary effort is normal. No respiratory distress.      Breath sounds: No wheezing.     Abdominal: Soft. She exhibits no distension. There is no abdominal tenderness.   Musculoskeletal:      Right lower leg: No edema.      Left lower leg: No edema.   Neurological: She is alert and oriented to person, place, and time.   Skin: Skin is warm and dry. Capillary refill takes less than 2 seconds.   Psychiatric: Her behavior is normal. Mood normal.   Vitals reviewed.      Laboratory:  Lab Results   Component Value Date    WBC 10.64 02/22/2024    HGB 13.5 02/22/2024    HCT 41.1 02/22/2024     02/22/2024    CHOL 112 (L) 02/24/2023    TRIG 71 02/24/2023    HDL 39 (L) 02/24/2023    ALT 19 11/06/2023    AST 23 11/06/2023     02/27/2024    K 3.1 (L) 02/27/2024     02/27/2024    CREATININE 0.6 02/27/2024    BUN 12 02/27/2024    CO2 23 02/27/2024    TSH 1.328 01/06/2023    INR 1.1 02/22/2024    HGBA1C 5.8 (H) 02/10/2019         Diagnostic Results:  Impression   =========     RIGHT SIDE:   60-79% Right ICA stenosis.   Heterogeneous plaque in the right internal carotid artery.   Heterogeneous plaque noted in the right common carotid artery.   Antegrade flow in the right vertebral artery.     LEFT SIDE:   80-99% Left ICA stenosis.   Calcific plaque noted in the left internal carotid artery.   Antegrade flow noted in the left vertebral artery.   There are elevated velocities in the proximal ECA measuring 366 cm/s       ASSESSMENT/PLAN:      68-year-old  female who has an extensive history of cardiovascular disease and is status post CABG and AVR now with  bilateral carotid artery stenosis with severe stenosis on the left and moderate stenosis on the right.     I did personally visualize the ultrasound head interpreted in the interpretation is consistent with what is documented of severe stenosis on the left and moderate stenosis on the right.    She certainly does meet indication for repair  on the left considering she was asymptomatic disease.  Unfortunately she is an extremely high-risk for surgery especially considering her cardiac risk an ongoing CSF with an EF of 15% on her last stress test.  She also has ongoing severe aortic stenosis which is currently being managed by her cardiologist Dr. Guzman.     I do think she currently is medically optimized for carotid stenosis and  and best medical management is  likely the only and best option at this time for the patient to minimize her risk of stroke.      I do appreciate the consult         Crystal Arevalo M.D.   [facility]

## 2024-04-22 ENCOUNTER — LAB VISIT (OUTPATIENT)
Dept: LAB | Facility: HOSPITAL | Age: 69
End: 2024-04-22
Attending: INTERNAL MEDICINE
Payer: MEDICARE

## 2024-04-22 DIAGNOSIS — Z95.2 S/P AVR (AORTIC VALVE REPLACEMENT): ICD-10-CM

## 2024-04-22 DIAGNOSIS — R63.4 WEIGHT LOSS: ICD-10-CM

## 2024-04-22 DIAGNOSIS — I50.22 CHRONIC HFREF (HEART FAILURE WITH REDUCED EJECTION FRACTION): ICD-10-CM

## 2024-04-22 DIAGNOSIS — I10 ESSENTIAL HYPERTENSION: ICD-10-CM

## 2024-04-22 LAB
ANION GAP SERPL CALC-SCNC: 10 MMOL/L (ref 8–16)
BASOPHILS # BLD AUTO: 0.06 K/UL (ref 0–0.2)
BASOPHILS NFR BLD: 0.7 % (ref 0–1.9)
BUN SERPL-MCNC: 13 MG/DL (ref 8–23)
CALCIUM SERPL-MCNC: 9.9 MG/DL (ref 8.7–10.5)
CHLORIDE SERPL-SCNC: 104 MMOL/L (ref 95–110)
CO2 SERPL-SCNC: 25 MMOL/L (ref 23–29)
CREAT SERPL-MCNC: 0.6 MG/DL (ref 0.5–1.4)
DIFFERENTIAL METHOD BLD: ABNORMAL
EOSINOPHIL # BLD AUTO: 0.2 K/UL (ref 0–0.5)
EOSINOPHIL NFR BLD: 1.7 % (ref 0–8)
ERYTHROCYTE [DISTWIDTH] IN BLOOD BY AUTOMATED COUNT: 15.9 % (ref 11.5–14.5)
EST. GFR  (NO RACE VARIABLE): >60 ML/MIN/1.73 M^2
GLUCOSE SERPL-MCNC: 88 MG/DL (ref 70–110)
HCT VFR BLD AUTO: 38.8 % (ref 37–48.5)
HGB BLD-MCNC: 12.2 G/DL (ref 12–16)
IMM GRANULOCYTES # BLD AUTO: 0.03 K/UL (ref 0–0.04)
IMM GRANULOCYTES NFR BLD AUTO: 0.3 % (ref 0–0.5)
LYMPHOCYTES # BLD AUTO: 1.5 K/UL (ref 1–4.8)
LYMPHOCYTES NFR BLD: 15.8 % (ref 18–48)
MCH RBC QN AUTO: 31.4 PG (ref 27–31)
MCHC RBC AUTO-ENTMCNC: 31.4 G/DL (ref 32–36)
MCV RBC AUTO: 100 FL (ref 82–98)
MONOCYTES # BLD AUTO: 0.7 K/UL (ref 0.3–1)
MONOCYTES NFR BLD: 7.7 % (ref 4–15)
NEUTROPHILS # BLD AUTO: 6.8 K/UL (ref 1.8–7.7)
NEUTROPHILS NFR BLD: 73.8 % (ref 38–73)
NRBC BLD-RTO: 0 /100 WBC
PLATELET # BLD AUTO: 233 K/UL (ref 150–450)
PMV BLD AUTO: 11.1 FL (ref 9.2–12.9)
POTASSIUM SERPL-SCNC: 4.2 MMOL/L (ref 3.5–5.1)
RBC # BLD AUTO: 3.88 M/UL (ref 4–5.4)
SODIUM SERPL-SCNC: 139 MMOL/L (ref 136–145)
WBC # BLD AUTO: 9.18 K/UL (ref 3.9–12.7)

## 2024-04-22 PROCEDURE — 36415 COLL VENOUS BLD VENIPUNCTURE: CPT | Mod: PO | Performed by: INTERNAL MEDICINE

## 2024-04-22 PROCEDURE — 85025 COMPLETE CBC W/AUTO DIFF WBC: CPT | Performed by: INTERNAL MEDICINE

## 2024-04-22 PROCEDURE — 80048 BASIC METABOLIC PNL TOTAL CA: CPT | Performed by: INTERNAL MEDICINE

## 2024-05-02 LAB
OHS CV AF BURDEN PERCENT: < 1
OHS CV BIV PACING PERCENT: 97.83 %
OHS CV DC REMOTE DEVICE TYPE: NORMAL
OHS CV RV PACING PERCENT: 97.86 %

## 2024-05-08 ENCOUNTER — TELEPHONE (OUTPATIENT)
Dept: ELECTROPHYSIOLOGY | Facility: CLINIC | Age: 69
End: 2024-05-08
Payer: MEDICARE

## 2024-05-08 NOTE — TELEPHONE ENCOUNTER
[employer]link transmission reviewed from last night for decrease in atrial sensing.  Currently measuring 0.25mV, was 1mV at implant and 0.5mV at wound check.    Atrial undersensing noted on presenting egrams, which is preventing BiV pacing.  BiV pacing currently at 86%.    CRTP implanted for LBBB, DCM, CHF.    Atrial thresholds and impedance WNL.    Device clinic appointment scheduled for 5/10/24 at 9am at the [facility] to increase sensitivity.    Has EP 3 month follow up with Ayesha 5/24/24.

## 2024-05-10 ENCOUNTER — HOSPITAL ENCOUNTER (OUTPATIENT)
Dept: CARDIOLOGY | Facility: HOSPITAL | Age: 69
Discharge: HOME | End: 2024-05-10
Attending: INTERNAL MEDICINE
Payer: MEDICARE

## 2024-05-10 DIAGNOSIS — I42.0 CARDIOMYOPATHY, DILATED: ICD-10-CM

## 2024-05-10 DIAGNOSIS — I44.7 LBBB (LEFT BUNDLE BRANCH BLOCK): ICD-10-CM

## 2024-05-10 DIAGNOSIS — I50.20 HFREF (HEART FAILURE WITH REDUCED EJECTION FRACTION): ICD-10-CM

## 2024-05-10 PROCEDURE — 93281 PM DEVICE PROGR EVAL MULTI: CPT | Mod: PO

## 2024-05-13 ENCOUNTER — PATIENT MESSAGE (OUTPATIENT)
Dept: CARDIOLOGY | Facility: CLINIC | Age: 69
End: 2024-05-13
Payer: MEDICARE

## 2024-05-14 ENCOUNTER — HOSPITAL ENCOUNTER (OUTPATIENT)
Dept: RADIOLOGY | Facility: HOSPITAL | Age: 69
Discharge: HOME | End: 2024-05-14
Attending: INTERNAL MEDICINE
Payer: MEDICARE

## 2024-05-14 ENCOUNTER — PATIENT MESSAGE (OUTPATIENT)
Dept: CARDIOLOGY | Facility: CLINIC | Age: 69
End: 2024-05-14
Payer: MEDICARE

## 2024-05-14 DIAGNOSIS — R13.12 OROPHARYNGEAL DYSPHAGIA: ICD-10-CM

## 2024-05-14 DIAGNOSIS — I50.20 HFREF (HEART FAILURE WITH REDUCED EJECTION FRACTION): ICD-10-CM

## 2024-05-14 PROCEDURE — 74220 X-RAY XM ESOPHAGUS 1CNTRST: CPT | Mod: TC,PO

## 2024-05-14 PROCEDURE — 25500020 PHARM REV CODE 255: Mod: PO | Performed by: INTERNAL MEDICINE

## 2024-05-14 PROCEDURE — A9698 NON-RAD CONTRAST MATERIALNOC: HCPCS | Mod: PO | Performed by: INTERNAL MEDICINE

## 2024-05-14 PROCEDURE — 74220 X-RAY XM ESOPHAGUS 1CNTRST: CPT | Mod: 26,,, | Performed by: RADIOLOGY

## 2024-05-14 RX ADMIN — BARIUM SULFATE 340 ML: 980 POWDER, FOR SUSPENSION ORAL at 08:05

## 2024-05-14 RX ADMIN — BARIUM SULFATE 355 ML: 0.6 SUSPENSION ORAL at 08:05

## 2024-05-22 NOTE — PROGRESS NOTES
Ms. Root is a patient of Dr. Gill and was last seen in clinic 1/31/2024.      Subjective:   Patient ID:  Dulce Root is a 69 y.o. female who presents for follow up of CRT-P  .     HPI:    Ms. Root is a 69 y.o. female with LBBB, dilated cardiomyopathy, CAD and Aortic stenosis s/p CABGx2 + AVR (bioprosthetic), hodgkin's lymphoma s/p mantle radiation, Breast cancer with (right and then left sided s/p bilateral mastectomy with Lymph node removal on the left), HFrEF, hypothyroidism, pemphigoid here for follow up after CRT-P implantation.    Background:    Primary cardiologist is Dr. Sims. Has seen Dr. Guzman for consideration of TAVR, and Dr. Valery Brian for cardio-oncology.    CABG x2 + AVR (bioprosthetic) 8/21/17. EF was normal at that time. Post-operatively noted to have LBBB, which has persisted.  10/19 EF decreased to 35%. Repeat coronary angiography was performed at that time which showed an occlusion of her SVG to PDA.  She subsequently underwent PCI of her left main coronary artery, circumflex artery, and right coronary artery.     EF remained unchanged, and has more recently decreased to 20%.  Also noted to have worsening patient-prosthesis mismatch >> consideration being given to TAVR.    Findings most consistent with patient prosthesis mismatch.  Notes dyspnea on exertion c/w NYHA Class III CHF. Denies syncope. Notes occasional lightheadedness primarily when standing.  LUE is in generally larger than the right side, had port in left antecubital area.    ECG's reviewed by me, reveal LBBB with QRSd 160's to 170's.  Dilated cardiomyopathy, LBBB with NYHA Class III HF, EF < 35%.  We discussed at length consideration of CRT +/- ICD. We discussed risks and benefits of both. We discussed implications of deferring ICD. Her preference is CRT-P.  We discussed CS lead placement, as well as LBBA pacing. Prior radiation may reduce chances of viable veins.  Bilateral venogram first.  If patent >> will proceed with  CRT-P    Of note, would endorse proceeding prior to TAVR, and re-assess EF.    Update (05/24/2024):    2/27/2024:    Successful implantation of PPM BIV, right sided, sub-muscular pocket    Attempted left bundle branch area pacing (not successful) prior to above    Atrial undersensing noted on presenting egrams, which is preventing BiV pacing.   5/10/2024: Sensitivity adjusted    Today she says she is already feeling symptom improvement, john since biv pacing increased. Says she could barely walk previously. Would sometimes have episodes where she didn't know if she needed to call 911. No such episodes since device. Breathing improved. She does feel dry and fatigued after taking lasix but does notice if she skips 4-5 days she will feel more SOB. Not yet on jardiance but this is ordered. Overall feeling well. No CP, palps, syncope reported.    Device Interrogation (5/24/2024) reveals an intrinsic SR with stable lead and device function. Hx of small p waves. No arrhythmias or treated episodes were noted.  She paces 0% in the RA and 100% in the BiV. Estimated battery longevity 11.4 years.     I have personally reviewed the patient's EKG today, which shows ASBP at 82bpm. AL interval is 184. QRS is 124. QTc is 537.    Relevant Cardiac Test Results:    2D Echo (9/14/2023):    Left Ventricle: The left ventricle is mildly dilated. Normal wall thickness. There is mild eccentric hypertrophy. Global hypokinesis present. Septal motion is consistent with post-operative status. There is severely reduced systolic function with a visually estimated ejection fraction of 20 - 25%. Biplane (2D) method of discs ejection fraction is 20%. Global longitudinal strain is -5.0%. There is normal diastolic function.    Right Ventricle: Normal right ventricular cavity size. Wall thickness is normal. Right ventricle wall motion  is normal. Systolic function is normal.    Aortic Valve: There is a bioprosthetic valve in the aortic position. It is  reported to be a Perceval valve. There is aortic valve sclerosis. Moderately restricted motion. Aortic valve area by VTI is 0.60 cm². Aortic valve peak velocity is 3.17 m/s. Mean gradient is 21 mmHg. The dimensionless index is 0.16. AcT is short <100msec, likely indicating PPM , rather than a true stenosis. The gradients have not changed since the implantation of the valve, however LV function has worsened. Clinical correlation is required. There is no significant regurgitation.    Mitral Valve: There is mild regurgitation.    Tricuspid Valve: There is moderate regurgitation.    Pulmonary Artery: There is moderate pulmonary hypertension. The estimated pulmonary artery systolic pressure is 56 mmHg.    IVC/SVC: Elevated venous pressure at 15 mmHg.    Pericardium: There is a small circumferential effusion.    Current Outpatient Medications   Medication Sig    ALPRAZolam (XANAX) 1 MG tablet Take 1 mg by mouth nightly as needed for Anxiety.    calcium carbonate/vitamin D3 (CALCIUM WITH VITAMIN D3 ORAL) Take 1 Dose by mouth 2 (two) times a day. 1 dose = 2 gummies    cholecalciferol, vitamin D3, (VITAMIN D3 ORAL) Take 1 Dose by mouth 2 (two) times a day. 1 dose = 1 gummy    cimetidine (TAGAMET) 300 MG tablet Take one tablet by mouth at 9pm  the evening prior to Venogram; take one tablet at 4am and 9am on day of Venogram (Patient not taking: Reported on 3/25/2024)    cimetidine (TAGAMET) 300 MG tablet Take one tablet by mouth at 12mn the evening prior to procedure; take one tablet at 6am and 12 noon on day of procedure    clopidogreL (PLAVIX) 75 mg tablet Take 1 tablet (75 mg total) by mouth once daily.    diphenhydrAMINE (BENADRYL) 50 MG capsule Take one tablet by mouth at 9pm  the evening prior to Venogram; take one tablet at 4am and 9am on day of Venogram (Patient not taking: Reported on 3/25/2024)    diphenhydrAMINE (BENADRYL) 50 MG capsule Take one tablet by mouth at 12mn the evening prior to procedure; take one tablet  at 6am and 12 noon on day of procedure (Patient not taking: Reported on 3/25/2024)    empagliflozin (JARDIANCE) 10 mg tablet Take 1 tablet (10 mg total) by mouth once daily.    EScitalopram oxalate (LEXAPRO) 10 MG tablet Take 1 tablet (10 mg total) by mouth once daily.    folic acid (FOLVITE) 1 MG tablet Take 1 mg by mouth once daily. Except on Friday    furosemide (LASIX) 20 MG tablet Take 1 tablet (20 mg total) by mouth every other day.    hypromellose (TEARS LUBRICANT EYE DROP OPHT) Place 1 drop into both eyes daily as needed (dry eyes).    levothyroxine (SYNTHROID) 75 MCG tablet Take 1 tablet (75 mcg total) by mouth before breakfast.    lifitegrast (XIIDRA) 5 % Dpet Place 1 drop into both eyes 2 (two) times daily as needed (dry eyes).    losartan (COZAAR) 25 MG tablet Take 0.5 tablets (12.5 mg total) by mouth once daily.    methotrexate 2.5 MG Tab Take 5 mg by mouth 2 (two) times a day. On Friday only.    metoprolol succinate (TOPROL-XL) 25 MG 24 hr tablet Take 1 tablet (25 mg total) by mouth every evening.    pantoprazole (PROTONIX) 40 MG tablet Take 1 tablet (40 mg total) by mouth once daily.    predniSONE (DELTASONE) 50 MG Tab Take 50mg by mouth at 13 hours, 7 hours, and 1 hour before contrast administration. (Patient not taking: Reported on 3/25/2024)    predniSONE (DELTASONE) 50 MG Tab Take one tablet by mouth at 9pm  the evening prior to Venogram; take one tablet at 4am and 9am on day of Venogram    predniSONE (DELTASONE) 50 MG Tab Take one tablet by mouth at 12mn the evening prior to procedure; take one tablet at 6am and 12 noon on day of procedure    rosuvastatin (CRESTOR) 20 MG tablet Take 0.5 tablets (10 mg total) by mouth once daily.    sod picosulf-mag ox-citric ac (CLENPIQ) 10 mg-3.5 gram- 12 gram/175 mL Soln Take 2 Bottles by mouth As instructed (use as directed by facility).     Current Facility-Administered Medications   Medication    sodium chloride 0.9% flush 10 mL       Review of Systems  "  Constitutional: Negative for malaise/fatigue.   Cardiovascular:  Positive for dyspnea on exertion (improving). Negative for chest pain, irregular heartbeat, leg swelling and palpitations.   Respiratory:  Negative for shortness of breath.    Hematologic/Lymphatic: Negative for bleeding problem.   Skin:  Negative for rash.   Musculoskeletal:  Negative for myalgias.   Gastrointestinal:  Negative for hematemesis, hematochezia and nausea.   Genitourinary:  Negative for hematuria.   Neurological:  Negative for light-headedness.   Psychiatric/Behavioral:  Negative for altered mental status.    Allergic/Immunologic: Negative for persistent infections.       Objective:          /70   Pulse 82   Ht 5' 3" (1.6 m)   Wt 46.6 kg (102 lb 11.8 oz)   BMI 18.20 kg/m²     Physical Exam  Vitals and nursing note reviewed.   Constitutional:       Appearance: Normal appearance. She is well-developed.   HENT:      Head: Normocephalic.      Nose: Nose normal.   Eyes:      Pupils: Pupils are equal, round, and reactive to light.   Cardiovascular:      Rate and Rhythm: Normal rate and regular rhythm.   Pulmonary:      Effort: No respiratory distress.      Breath sounds: Normal breath sounds.   Chest:      Comments: Device to RUCW. Incision and pocket in good repair.    Musculoskeletal:         General: Normal range of motion.   Skin:     General: Skin is warm and dry.      Findings: No erythema.   Neurological:      Mental Status: She is alert and oriented to person, place, and time.   Psychiatric:         Speech: Speech normal.         Behavior: Behavior normal.           Lab Results   Component Value Date     04/22/2024    K 4.2 04/22/2024    MG 2.1 10/20/2023    BUN 13 04/22/2024    CREATININE 0.6 04/22/2024    ALT 19 11/06/2023    AST 23 11/06/2023    HGB 12.2 04/22/2024    HCT 38.8 04/22/2024    HCT 24 (L) 08/21/2017    TSH 1.328 01/06/2023    LDLCALC 58.8 (L) 02/24/2023       Recent Labs   Lab 03/31/23  1720 " 02/22/24  1436   INR 1.1 1.1       Assessment:     1. Presence of cardiac resynchronization therapy pacemaker (CRT-P)    2. Cardiomyopathy, dilated    3. Chronic HFrEF (heart failure with reduced ejection fraction)    4. Essential hypertension    5. LBBB (left bundle branch block)        Plan:     In summary, Ms. Root is a 69 y.o. female with LBBB, dilated cardiomyopathy, CAD and Aortic stenosis s/p CABGx2 + AVR (bioprosthetic), hodgkin's lymphoma s/p mantle radiation, Breast cancer with (right and then left sided s/p bilateral mastectomy with Lymph node removal on the left), HFrEF, hypothyroidism, pemphigoid here for follow up after CRT-P implantation.  She is 3 mo s/p implantation of CRT-P in the context of NICM and LBBB.   Ms. Root is doing well from a device perspective with stable lead and device function. No arrhythmia noted. BiV pacing had been decreased due to atrial undersensing but once sensitivity adjusted her biv pacing increased to 100%. She is reporting symptom improvement. Follows with cardiology and echo is forthcoming. I advised her to consult her cardiologist re: lasix dosing. RTC 1 yr.    Continue current medication regimen and device settings.   Follow up in device clinic as scheduled.   Follow up in EP clinic in 1 year, sooner as needed.     *A copy of this note has been sent to Dr. Gill*    Follow up in about 1 year (around 5/24/2025).    ------------------------------------------------------------------    DANIEL Spann, NP-C  Cardiac Electrophysiology

## 2024-05-23 ENCOUNTER — TELEPHONE (OUTPATIENT)
Dept: ELECTROPHYSIOLOGY | Facility: CLINIC | Age: 69
End: 2024-05-23
Payer: MEDICARE

## 2024-05-24 ENCOUNTER — PATIENT MESSAGE (OUTPATIENT)
Dept: INFUSION THERAPY | Facility: HOSPITAL | Age: 69
End: 2024-05-24
Payer: MEDICARE

## 2024-05-24 ENCOUNTER — OFFICE VISIT (OUTPATIENT)
Dept: ELECTROPHYSIOLOGY | Facility: CLINIC | Age: 69
End: 2024-05-24
Payer: MEDICARE

## 2024-05-24 ENCOUNTER — HOSPITAL ENCOUNTER (OUTPATIENT)
Dept: CARDIOLOGY | Facility: CLINIC | Age: 69
Discharge: HOME | End: 2024-05-24
Attending: INTERNAL MEDICINE
Payer: MEDICARE

## 2024-05-24 ENCOUNTER — CLINICAL SUPPORT (OUTPATIENT)
Dept: CARDIOLOGY | Facility: HOSPITAL | Age: 69
End: 2024-05-24
Attending: INTERNAL MEDICINE
Payer: MEDICARE

## 2024-05-24 VITALS
HEART RATE: 82 BPM | BODY MASS INDEX: 18.21 KG/M2 | SYSTOLIC BLOOD PRESSURE: 122 MMHG | HEIGHT: 63 IN | DIASTOLIC BLOOD PRESSURE: 70 MMHG | WEIGHT: 102.75 LBS

## 2024-05-24 DIAGNOSIS — I10 ESSENTIAL HYPERTENSION: ICD-10-CM

## 2024-05-24 DIAGNOSIS — I44.7 LBBB (LEFT BUNDLE BRANCH BLOCK): ICD-10-CM

## 2024-05-24 DIAGNOSIS — M80.00XD AGE-RELATED OSTEOPOROSIS WITH CURRENT PATHOLOGICAL FRACTURE WITH ROUTINE HEALING: Primary | ICD-10-CM

## 2024-05-24 DIAGNOSIS — I50.22 CHRONIC HFREF (HEART FAILURE WITH REDUCED EJECTION FRACTION): ICD-10-CM

## 2024-05-24 DIAGNOSIS — I42.0 CARDIOMYOPATHY, DILATED: ICD-10-CM

## 2024-05-24 DIAGNOSIS — I50.20 HFREF (HEART FAILURE WITH REDUCED EJECTION FRACTION): ICD-10-CM

## 2024-05-24 DIAGNOSIS — Z95.0 PRESENCE OF CARDIAC RESYNCHRONIZATION THERAPY PACEMAKER (CRT-P): Primary | ICD-10-CM

## 2024-05-24 LAB
OHS QRS DURATION: 124 MS
OHS QTC CALCULATION: 537 MS

## 2024-05-24 PROCEDURE — 99214 OFFICE O/P EST MOD 30 MIN: CPT | Mod: S$PBB,,, | Performed by: NURSE PRACTITIONER

## 2024-05-24 PROCEDURE — 93281 PM DEVICE PROGR EVAL MULTI: CPT | Mod: 26,,, | Performed by: INTERNAL MEDICINE

## 2024-05-24 PROCEDURE — 99999 PR PBB SHADOW E&M-EST. PATIENT-LVL IV: CPT | Mod: PBBFAC,,, | Performed by: NURSE PRACTITIONER

## 2024-05-24 PROCEDURE — 93005 ELECTROCARDIOGRAM TRACING: CPT | Mod: PBBFAC | Performed by: INTERNAL MEDICINE

## 2024-05-24 PROCEDURE — 99214 OFFICE O/P EST MOD 30 MIN: CPT | Mod: PBBFAC,25 | Performed by: NURSE PRACTITIONER

## 2024-05-24 PROCEDURE — 93010 ELECTROCARDIOGRAM REPORT: CPT | Mod: S$PBB,,, | Performed by: INTERNAL MEDICINE

## 2024-05-24 PROCEDURE — 93281 PM DEVICE PROGR EVAL MULTI: CPT

## 2024-05-27 ENCOUNTER — TELEPHONE (OUTPATIENT)
Dept: RHEUMATOLOGY | Facility: CLINIC | Age: 69
End: 2024-05-27
Payer: MEDICARE

## 2024-05-27 ENCOUNTER — INFUSION (OUTPATIENT)
Dept: INFUSION THERAPY | Facility: HOSPITAL | Age: 69
End: 2024-05-27
Attending: INTERNAL MEDICINE
Payer: MEDICARE

## 2024-05-27 VITALS
SYSTOLIC BLOOD PRESSURE: 96 MMHG | HEART RATE: 78 BPM | OXYGEN SATURATION: 98 % | TEMPERATURE: 97 F | DIASTOLIC BLOOD PRESSURE: 55 MMHG | RESPIRATION RATE: 18 BRPM

## 2024-05-27 DIAGNOSIS — M80.00XD AGE-RELATED OSTEOPOROSIS WITH CURRENT PATHOLOGICAL FRACTURE WITH ROUTINE HEALING: Primary | ICD-10-CM

## 2024-05-27 PROCEDURE — 63600175 PHARM REV CODE 636 W HCPCS: Mod: JZ,JG | Performed by: PHYSICIAN ASSISTANT

## 2024-05-27 PROCEDURE — 96372 THER/PROPH/DIAG INJ SC/IM: CPT

## 2024-05-27 RX ADMIN — DENOSUMAB 60 MG: 60 INJECTION SUBCUTANEOUS at 01:05

## 2024-05-27 NOTE — TELEPHONE ENCOUNTER
Spoke to patient let her know that I made her appointment on 12/13/2024 at 2:30 at [facility] with Dr. Raya. Labs See  and Sebastien.

## 2024-05-27 NOTE — DISCHARGE INSTRUCTIONS
[facility]  [address] [address]  [address]  [phone] phone     [phone] fax  Hours of Operation: Monday- Friday 8:00am- 5:00pm  After hours phone  [phone]  Hematology / Oncology Physicians on call      ERVIN Reed Dr., NP Phaon Dunbar, NP Khelsea Conley, FNP    Please call with any concerns regarding your appointment today.

## 2024-05-29 ENCOUNTER — HOSPITAL ENCOUNTER (OUTPATIENT)
Dept: CARDIOLOGY | Facility: HOSPITAL | Age: 69
Discharge: HOME | End: 2024-05-29
Attending: INTERNAL MEDICINE
Payer: MEDICARE

## 2024-05-29 VITALS — BODY MASS INDEX: 18.07 KG/M2 | WEIGHT: 102 LBS | HEIGHT: 63 IN

## 2024-05-29 DIAGNOSIS — Z95.2 S/P AVR (AORTIC VALVE REPLACEMENT): ICD-10-CM

## 2024-05-29 DIAGNOSIS — T82.09XS PROSTHETIC VALVE DYSFUNCTION, SEQUELA: ICD-10-CM

## 2024-05-29 PROCEDURE — 93306 TTE W/DOPPLER COMPLETE: CPT | Mod: 26,,, | Performed by: INTERNAL MEDICINE

## 2024-05-29 PROCEDURE — 93306 TTE W/DOPPLER COMPLETE: CPT | Mod: PO

## 2024-06-04 LAB
AV INDEX (PROSTH): 0.28
AV MEAN GRADIENT: 21 MMHG
AV PEAK GRADIENT: 39 MMHG
AV VALVE AREA BY VELOCITY RATIO: 0.85 CM²
AV VALVE AREA: 0.84 CM²
AV VELOCITY RATIO: 0.29
BSA FOR ECHO PROCEDURE: 1.43 M2
CV ECHO LV RWT: 0.31 CM
DOP CALC AO PEAK VEL: 3.14 M/S
DOP CALC AO VTI: 67 CM
DOP CALC LVOT AREA: 3 CM2
DOP CALC LVOT DIAMETER: 1.94 CM
DOP CALC LVOT PEAK VEL: 0.9 M/S
DOP CALC LVOT STROKE VOLUME: 56.13 CM3
DOP CALC MV VTI: 28.2 CM
DOP CALCLVOT PEAK VEL VTI: 19 CM
E/E' RATIO: 47.6 M/S
ECHO LV POSTERIOR WALL: 0.86 CM (ref 0.6–1.1)
FRACTIONAL SHORTENING: 6 % (ref 28–44)
HR MV ECHO: 64 BPM
INTERVENTRICULAR SEPTUM: 0.97 CM (ref 0.6–1.1)
LEFT ATRIUM SIZE: 3.93 CM
LEFT ATRIUM VOLUME INDEX MOD: 27.3 ML/M2
LEFT ATRIUM VOLUME MOD: 39.61 CM3
LEFT INTERNAL DIMENSION IN SYSTOLE: 5.15 CM (ref 2.1–4)
LEFT VENTRICLE DIASTOLIC VOLUME INDEX: 101.17 ML/M2
LEFT VENTRICLE DIASTOLIC VOLUME: 146.69 ML
LEFT VENTRICLE MASS INDEX: 131 G/M2
LEFT VENTRICLE SYSTOLIC VOLUME INDEX: 87.3 ML/M2
LEFT VENTRICLE SYSTOLIC VOLUME: 126.62 ML
LEFT VENTRICULAR INTERNAL DIMENSION IN DIASTOLE: 5.49 CM (ref 3.5–6)
LEFT VENTRICULAR MASS: 189.26 G
LV LATERAL E/E' RATIO: 39.67 M/S
LV SEPTAL E/E' RATIO: 59.5 M/S
LVOT MG: 2.13 MMHG
LVOT MV: 0.7 CM/S
LVOT STOKE VOLUME INDEX: 39.3 ML/M2
Lab: 97 MS
MV MEAN GRADIENT: 2 MMHG
MV PEAK E VEL: 1.19 M/S
MV PEAK GRADIENT: 7 MMHG
MV STENOSIS PRESSURE HALF TIME: 47.42 MS
MV VALVE AREA BY CONTINUITY EQUATION: 1.99 CM2
MV VALVE AREA P 1/2 METHOD: 4.64 CM2
OHS CV RV/LV RATIO: 0.7 CM
PISA MRMAX VEL: 5.39 M/S
PISA TR MAX VEL: 3.79 M/S
RA PRESSURE ESTIMATED: 8 MMHG
RA VOL SYS: 33.63 ML
RIGHT ATRIAL AREA: 13.8 CM2
RIGHT VENTRICULAR END-DIASTOLIC DIMENSION: 3.85 CM
RIGHT VENTRICULAR LENGTH IN DIASTOLE (APICAL 4-CHAMBER VIEW): 8.73 CM
RV MID DIAMA: 2.36 CM
RV TB RVSP: 12 MMHG
RV TISSUE DOPPLER FREE WALL SYSTOLIC VELOCITY 1 (APICAL 4 CHAMBER VIEW): 3.83 CM/S
TDI LATERAL: 0.03 M/S
TDI SEPTAL: 0.02 M/S
TDI: 0.03 M/S
TR MAX PG: 57 MMHG
TRICUSPID ANNULAR PLANE SYSTOLIC EXCURSION: 0.89 CM
TV REST PULMONARY ARTERY PRESSURE: 65 MMHG
Z-SCORE OF LEFT VENTRICULAR DIMENSION IN END DIASTOLE: 2.25
Z-SCORE OF LEFT VENTRICULAR DIMENSION IN END SYSTOLE: 5.04

## 2024-06-10 LAB — OHS CV DC REMOTE DEVICE TYPE: NORMAL

## 2024-06-14 ENCOUNTER — TELEPHONE (OUTPATIENT)
Dept: HEMATOLOGY/ONCOLOGY | Facility: CLINIC | Age: 69
End: 2024-06-14
Payer: MEDICARE

## 2024-06-14 NOTE — TELEPHONE ENCOUNTER
Outgoing call to ayesha regarding message below. Informed ayesha I did not find a path report from 2013 related to diagnosis of Pemphigus. Medical records number given to ayesha as well. Will follow up with dr alan.       ----- Message from Bisi Sims sent at 6/14/2024 10:25 AM CDT -----  Regarding: Pathology Report  Contact: [phone]  Ayesha calling with [facility] would like to speak with someone in the office about getting the original pathology report from 2013. If possible please fax to : [phone] ..Thanks

## 2024-06-17 ENCOUNTER — OFFICE VISIT (OUTPATIENT)
Dept: CARDIOLOGY | Facility: CLINIC | Age: 69
End: 2024-06-17
Payer: MEDICARE

## 2024-06-17 VITALS
HEART RATE: 60 BPM | WEIGHT: 103.63 LBS | BODY MASS INDEX: 18.35 KG/M2 | DIASTOLIC BLOOD PRESSURE: 56 MMHG | SYSTOLIC BLOOD PRESSURE: 96 MMHG

## 2024-06-17 DIAGNOSIS — Z95.0 PRESENCE OF CARDIAC RESYNCHRONIZATION THERAPY PACEMAKER (CRT-P): ICD-10-CM

## 2024-06-17 DIAGNOSIS — I65.23 CAROTID STENOSIS, BILATERAL: ICD-10-CM

## 2024-06-17 DIAGNOSIS — Z95.1 S/P CABG X 2: ICD-10-CM

## 2024-06-17 DIAGNOSIS — T82.09XS PROSTHETIC VALVE DYSFUNCTION, SEQUELA: ICD-10-CM

## 2024-06-17 DIAGNOSIS — Z95.5 STENTED CORONARY ARTERY: ICD-10-CM

## 2024-06-17 DIAGNOSIS — I44.7 LBBB (LEFT BUNDLE BRANCH BLOCK): ICD-10-CM

## 2024-06-17 DIAGNOSIS — I50.20 HFREF (HEART FAILURE WITH REDUCED EJECTION FRACTION): ICD-10-CM

## 2024-06-17 DIAGNOSIS — Z95.2 S/P AVR (AORTIC VALVE REPLACEMENT): Primary | ICD-10-CM

## 2024-06-17 LAB
OHS QRS DURATION: 148 MS
OHS QTC CALCULATION: 542 MS

## 2024-06-17 PROCEDURE — 99999 PR PBB SHADOW E&M-EST. PATIENT-LVL IV: CPT | Mod: PBBFAC,,, | Performed by: INTERNAL MEDICINE

## 2024-06-17 PROCEDURE — 99215 OFFICE O/P EST HI 40 MIN: CPT | Mod: S$PBB,,, | Performed by: INTERNAL MEDICINE

## 2024-06-17 PROCEDURE — 99214 OFFICE O/P EST MOD 30 MIN: CPT | Mod: PBBFAC,PO | Performed by: INTERNAL MEDICINE

## 2024-06-17 PROCEDURE — 93010 ELECTROCARDIOGRAM REPORT: CPT | Mod: ,,, | Performed by: INTERNAL MEDICINE

## 2024-06-17 PROCEDURE — 93005 ELECTROCARDIOGRAM TRACING: CPT | Mod: PO

## 2024-06-17 NOTE — PROGRESS NOTES
Structural Cardiology Clinic Note  Date: 6/17/24    Patient: Dulce Root, 1955, [id number]  Primary Care Provider: Patrick Hernandez MD     Chief Complaint/Reason for Referral: prosthetic valve dysfunction     Subjective:       Dulce Root is a 69 y.o. female who presents for follow-up. They were referred by Dr. Sims. She had previously followed at [facility] [place] [facility]. Kaylah her daughter is not with her.       Weight continuing to go down due to poor intake from oral pemphigoid. No chest discomfort, no edema despite no furosemide over a week. No syncope. BP at home 120 systolic.    Focused Past History includes:  CAD status post CABG and AVR with Medium Perceval in 2017 (at [facility] [place]):   Cath October 2023: Severely elevated right and left filling pressures with severely reduced cardiac output.  Widely patent SVG that supplies most of the LAD distribution; occluded SVG to RCA (Widely patent RCA).  Equivocal ISR in the ostial circumflex, negative by IFR.    Dobutamine stress echo December 2023:  LVEF 15%.  Maximum stroke volume index reached 34.5.  Severe aortic stenosis with area 0.86, peak velocity 3.7, mean gradient 33, dimensionless index 0.3.  Achieved 78% MPHR  Discussed with Dr. Reynoso and Dr. Gill.  We decided to proceed with CRT P first to improve EF and monitor the surgical aortic valve function.  Planning for Elizabeth-GEORGIANA has been completed  TTE 6/2024: EF 25-30%. Moderate RV dysfunction. YIN Vmax 3.14, MG 21, DI 0.28, AT 97, trace AR. Moderate MR. Moderate TR. PASP 65  Status post CRT-P 02/27/2024 (Dr. Gill)  Chronic HFrEF  History of Hodgkin's lymphoma status post chemo and radiation - 1991  Carotid stenosis.   Duplex ultrasound 1/2024:  ANNA 50-69%, LICA 70-99%. 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   Former smoker 15 p-y, quit in 1981     Review of Systems  Constitutional: negative for fevers, night sweats, and weight  loss  Eyes: negative for visual disturbance, diplopia  Respiratory: negative for cough, hemoptysis, sputum, and wheezing  Cardiovascular: see HPI  Gastrointestinal: negative for abdominal pain, bright red blood per rectum, change in bowel habits, dysphagia, melena, and reflux symptoms  Genitourinary:negative for dysuria, frequency, and hematuria  Hematologic/lymphatic: negative for bleeding, easy bruising, and lymphadenopathy  Musculoskeletal:negative for arthralgias, back pain, and myalgias  Neurological: negative for gait problems, paresthesia, speech problems, vertigo, and weakness  Behavioral/Psych: negative for excessive alcohol consumption, illegal drug usage, and sleep disturbance    -------------------------------------    Allergy    Anticoagulant long-term use    Plavix    Breast cancer    Carotid stenosis, bilateral    CHF (congestive heart failure)    Coronary artery disease    Coronary artery disease involving coronary bypass graft of native heart without angina pectoris    2/19  1.  Left main is a small vessel with a 60%-65% ostial lesion. 2.  LAD is a medium-sized vessel diffuse 70% proximally  Ramus intermedius is a medium size vessel with a 40%-50% ostial lesion. 3.  Circumflex 60%-70% ostial  remainder of circumflex and obtuse marginal normal in appearance. Right coronary artery is normal size vessel, short discrete 70%mid 4.  Vein graft to right coronary is occ    Coronary artery disease involving native coronary artery of native heart without angina pectoris    DCIS (ductal carcinoma in situ) of breast    Encounter for blood transfusion    Epigastric pain    Essential hypertension    GERD (gastroesophageal reflux disease)    GI bleed    Hematemesis with nausea    Hodgkin lymphoma    Hx of Hodgkin's disease - s/p chemo and radiation    Hyperlipidemia    Hyperlipidemia    The patient presents with hyperlipidemia.  The patient reports tolerating the medication well and is in excellent compliance.   There have been no medication side effects.  The patient denies chest pain, neuropathy, and myalgias.  The patient has reduced fat intake and has been exercising.  Current treatment has included the medications listed in the med card.   Lab Results Component Value Date  CH    Hypertension    LBBB (left bundle branch block)    Malignant neoplasm of central portion of left breast in female, estrogen receptor negative    Osteoporosis    Paroxysmal atrial fibrillation - single post-op episode    Pemphigoid    Pemphigoid    pt receives IVIG infusions    Pulmonary hypertension    S/P AVR (aortic valve replacement) - pericardial valve    Perceval aortic tissue valve PVS23. 23mm    serial # [id number]    S/P CABG x 2    8/17 CABG X 2 with SVG-LAD and SVG-distal RCA  SVG LAD due to absent LIMA after chest radiation and lymph node biopsy     Stented coronary artery    She had 2 stents placed in 2/2019.  She has had CAD and bypasses in the past in 2017.      Thyroid disease     ----------------------------    Angiogram, coronary, with left heart catheterization    Procedure: Left Heart Cath w/Graft study;  Surgeon: Jeff Guzman MD;  Location: [facility] CATH;  Service: Cardiology;;    Aortography    Procedure: AO Root angiogram;  Surgeon: Jeff Guzman MD;  [facility]ATH;  Service: Cardiology;  Laterality: N/A;    Arteriography of aortic root    Procedure: ARTERIOGRAM, AORTIC ROOT;  Surgeon: Sujit Chaudhry MD;  Location: [facility] CATH;  Service: Cardiology;  Laterality: N/A;    Axillary node dissection    Procedure: LYMPHADENECTOMY, AXILLARY-Left;  Surgeon: Rosa Maradiaga MD;  Location: [facility] OR;  Service: General;  Laterality: Left;    Breast biopsy    Breast reconstruction    bilateral mastectomy    Cardiac catheterization    stents x 2    Cardiac surgery    Aortic valve replacement , CABG 2 vessel    Cardiac valve replacement    aortic valve, bovine per pt    Coronary angiography    Procedure: ANGIOGRAM, CORONARY ARTERY;  Surgeon:  Sujit Chaudhry MD;  Location: [facility] CATH;  Service: Cardiology;  Laterality: N/A;    Coronary angiography    Procedure: ANGIOGRAM, CORONARY ARTERY;  Surgeon: Sujit Chaudhry MD;  Location: [facility] CATH;  Service: Cardiology;  Laterality: N/A;    Coronary bypass graft angiography    Procedure: Bypass graft study;  Surgeon: Sujit Chaudhry MD;  Location: [facility] CATH;  Service: Cardiology;;    Cosmetic surgery    bereast reconstruction    Esophagogastroduodenoscopy    Procedure: EGD (ESOPHAGOGASTRODUODENOSCOPY);  Surgeon: Tracy Flower MD;  Location: [facility] ENDO;  Service: Endoscopy;  Laterality: N/A;    Esophagogastroduodenoscopy    Procedure: EGD (ESOPHAGOGASTRODUODENOSCOPY);  Surgeon: Tracy Flower MD;  Location: [facility] ENDO;  Service: Endoscopy;  Laterality: N/A;    Esophagogastroduodenoscopy    Procedure: EGD (ESOPHAGOGASTRODUODENOSCOPY);  Surgeon: Tracy Flower MD;  Location: [facility] ENDO;  Service: Endoscopy;  Laterality: N/A;    Esophagogastroduodenoscopy    Procedure: EGD (ESOPHAGOGASTRODUODENOSCOPY);  Surgeon: Mk Sanchez MD;  Location: [facility] ([address] [place] [address]);  Service: Endoscopy;  Laterality: N/A;  inst via portal  cardiac clearance-see encounter dated 5/31/23  precall confirmed 6/2     Eye surgery    eye lids    Fractional flow reserve (ffr), coronary    Procedure: (IFR) Ramus;  Surgeon: Jeff Guzman MD;  Location: [facility] CATH;  Service: Cardiology;;    Implantation of biventricular heart pacemaker    Procedure: INSERTION, PACEMAKER, BIVENTRICULAR;  Surgeon: Palomo Gill MD;  Location: [facility] EP LAB;  Service: Cardiology;  Laterality: N/A;  DCM, LBBB, Right sided CRT-P vs LBAP lead, MDT, MAC, SK, 3 Prep *Venogram 2/19/24*    Injection for sentinel node identification    Procedure: INJECTION, FOR SENTINEL NODE IDENTIFICATION-Left;  Surgeon: Rosa Maradiaga MD;  Location: [facility] OR;  Service: General;  Laterality: Left;    Instantaneous wave-free ratio (ifr)    Procedure: (IFR) LCX;  Surgeon: Jeff Guzman MD;   Location: [facility] CATH;  Service: Cardiology;;    Left heart catheterization    Procedure: Left heart cath;  Surgeon: Sujit Chaudhry MD;  Location: [facility] CATH;  Service: Cardiology;  Laterality: Right;    Left heart catheterization    Procedure: Left heart cath;  Surgeon: Sujit Chaudhry MD;  Location: [facility] CATH;  Service: Cardiology;  Laterality: Left;    Left heart catheterization    Procedure: Left heart cath;  Surgeon: Jeff Guzman MD;  Location: [facility] CATH;  Service: Cardiology;;    Lumbar laminectomy with discectomy    Procedure: LAMINECTOMY, SPINE, LUMBAR, WITH DISCECTOMY;  Surgeon: Vimal Villegas MD;  Location: [facility] OR;  Service: Neurosurgery;  Laterality: N/A;  left L5-S1  Laminectomy L4-5    Lymphadenectomy    Mastectomy    Mastectomy, partial    Procedure: MASTECTOMY, PARTIAL-Left ultrasound guided;  Surgeon: Rosa Maradiaga MD;  Location: [facility] OR;  Service: General;  Laterality: Left;    Right heart catheterization    Procedure: INSERTION, CATHETER, RIGHT HEART;  Surgeon: Sujit Chaudhry MD;  Location: [facility] CATH;  Service: Cardiology;  Laterality: Right;    Right heart catheterization    Procedure: Right heart cath;  Surgeon: Jeff Guzman MD;  Location: [facility] CATH;  Service: Cardiology;;    [place] lymph node biopsy    Procedure: BIOPSY, LYMPH NODE, SENTINEL-Left;  Surgeon: Rosa Maradiaga MD;  Location: [facility] OR;  Service: General;  Laterality: Left;    Skin biopsy    Splenectomy, total    Transforaminal epidural injection of steroid    Procedure: Left L5/S1 TF SKIP with local;  Surgeon: Canelo Niño MD;  Location: [facility] PAIN MGT;  Service: Pain Management;  Laterality: Left;    Tumor removal    neck- lymphoma    Venogram, ep lab    Procedure: Venogram, EP Lab;  Surgeon: Palomo Gill MD;  Location: [facility] EP LAB;  Service: Cardiology;  Laterality: N/A;  DCM, CHF, Venogram- Bilateral, SK, 3 Prep *hx of bilateral mastectomy* *Contrast Prep*        Family History   Problem Relation Name Age of Onset     Hypertension Mother      Hypertension Father      Cancer Neg Hx       Social History     Tobacco Use    Smoking status: Former     Current packs/day: 0.00     Average packs/day: 1 pack/day for 20.0 years (20.0 ttl pk-yrs)     Types: Cigarettes     Start date: 1961     Quit date: 1981     Years since quittin.6    Smokeless tobacco: Never   Substance Use Topics    Alcohol use: No     Comment: 2 drinks/month; none 72 hrs prior to surgery    Drug use: No       Current Outpatient Medications   Medication Sig Dispense Refill    calcium carbonate/vitamin D3 (CALCIUM WITH VITAMIN D3 ORAL) Take 1 Dose by mouth 2 (two) times a day. 1 dose = 2 gummies      cholecalciferol, vitamin D3, (VITAMIN D3 ORAL) Take 1 Dose by mouth 2 (two) times a day. 1 dose = 1 gummy      clopidogreL (PLAVIX) 75 mg tablet Take 1 tablet (75 mg total) by mouth once daily. 90 tablet 3    empagliflozin (JARDIANCE) 10 mg tablet Take 1 tablet (10 mg total) by mouth once daily. 90 tablet 3    EScitalopram oxalate (LEXAPRO) 10 MG tablet Take 1 tablet (10 mg total) by mouth once daily. 90 tablet 3    folic acid (FOLVITE) 1 MG tablet Take 1 mg by mouth once daily. Except on Friday      furosemide (LASIX) 20 MG tablet Take 1 tablet (20 mg total) by mouth every other day. 90 tablet 3    hypromellose (TEARS LUBRICANT EYE DROP OPHT) Place 1 drop into both eyes daily as needed (dry eyes).      levothyroxine (SYNTHROID) 75 MCG tablet Take 1 tablet (75 mcg total) by mouth before breakfast. 90 tablet 3    losartan (COZAAR) 25 MG tablet Take 0.5 tablets (12.5 mg total) by mouth once daily. 90 tablet 3    methotrexate 2.5 MG Tab Take 5 mg by mouth 2 (two) times a day. On Friday only.      metoprolol succinate (TOPROL-XL) 25 MG 24 hr tablet Take 1 tablet (25 mg total) by mouth every evening. 90 tablet 3    pantoprazole (PROTONIX) 40 MG tablet Take 1 tablet (40 mg total) by mouth once daily. 30 tablet 11    rosuvastatin (CRESTOR) 20 MG tablet Take 0.5  tablets (10 mg total) by mouth once daily. 90 tablet 3    ALPRAZolam (XANAX) 1 MG tablet Take 1 mg by mouth nightly as needed for Anxiety.      cimetidine (TAGAMET) 300 MG tablet Take one tablet by mouth at 9pm  the evening prior to Venogram; take one tablet at 4am and 9am on day of Venogram 3 tablet 0    cimetidine (TAGAMET) 300 MG tablet Take one tablet by mouth at 12mn the evening prior to procedure; take one tablet at 6am and 12 noon on day of procedure 3 tablet 0    diphenhydrAMINE (BENADRYL) 50 MG capsule Take one tablet by mouth at 9pm  the evening prior to Venogram; take one tablet at 4am and 9am on day of Venogram 3 capsule 0    diphenhydrAMINE (BENADRYL) 50 MG capsule Take one tablet by mouth at 12mn the evening prior to procedure; take one tablet at 6am and 12 noon on day of procedure 3 capsule 0    lifitegrast (XIIDRA) 5 % Dpet Place 1 drop into both eyes 2 (two) times daily as needed (dry eyes).      predniSONE (DELTASONE) 50 MG Tab Take 50mg by mouth at 13 hours, 7 hours, and 1 hour before contrast administration. 3 tablet 0    predniSONE (DELTASONE) 50 MG Tab Take one tablet by mouth at 9pm  the evening prior to Venogram; take one tablet at 4am and 9am on day of Venogram (Patient not taking: Reported on 6/17/2024) 3 tablet 0    predniSONE (DELTASONE) 50 MG Tab Take one tablet by mouth at 12mn the evening prior to procedure; take one tablet at 6am and 12 noon on day of procedure (Patient not taking: Reported on 6/17/2024) 3 tablet 0    sod picosulf-mag ox-citric ac (CLENPIQ) 10 mg-3.5 gram- 12 gram/175 mL Soln Take 2 Bottles by mouth As instructed (use as directed by facility). (Patient not taking: Reported on 6/17/2024) 350 mL 0     Current Facility-Administered Medications   Medication Dose Route Frequency Provider Last Rate Last Admin    sodium chloride 0.9% flush 10 mL  10 mL Intravenous PRN Manuel Sims MD            Objective:      Physical Exam  General:  No acute distress, frail and thin    Lungs:  Mild rales left base  Heart:  Regular rate and rhythm.  3/6 late-peaking systolic ejection murmur over the upper right sternal border with preserved S2   Abdomen:  Soft, nontender, nondistended   Extremities:  No pitting edema   Neuro:  Moving all extremities x4      Lab Review   Lab Results   Component Value Date    WBC 9.18 2024    HGB 12.2 2024    HCT 38.8 2024     (H) 2024     2024         BMP  Lab Results   Component Value Date     2024    K 4.2 2024     2024    CO2 27 2024    BUN 12 2024    CREATININE 0.7 2024    CALCIUM 9.8 2024    ANIONGAP 9 2024    ESTGFRAFRICA >60 2022    EGFRNONAA >60 2022       Lab Results   Component Value Date    LABPROT 11.4 2024    ALBUMIN 4.0 2024       Lab Results   Component Value Date    ALT 15 2024    AST 23 2024    ALKPHOS 86 2024    BILITOT 0.6 2024       Lab Results   Component Value Date    TSH 1.328 2023       Lab Results   Component Value Date    CHOL 112 (L) 2023    CHOL 130 2022    CHOL 166 2020     Lab Results   Component Value Date    HDL 39 (L) 2023    HDL 30 (L) 2022    HDL 53 2020     Lab Results   Component Value Date    LDLCALC 58.8 (L) 2023    LDLCALC 76.2 2022    LDLCALC 89.8 2020     Lab Results   Component Value Date    TRIG 71 2023    TRIG 119 2022    TRIG 116 2020     Lab Results   Component Value Date    CHOLHDL 34.8 2023    CHOLHDL 23.1 2022    CHOLHDL 31.9 2020      LVOT VTI AoV VTI AoV MG JORI      0 11.1 34.41 11 [id number]  LVOT area 2.8   5 13.4 35.7 10 1.85702      10 16.3 41.9 14 1.73408      15 16 57 22 0.431983      20 20.4 62.5 29 0.95298      reserve 0.793029 0.870903           [facility] Reference Range & Units 23 10:29 23 10:55   BNP 0 - 99 pg/mL 787 (H) 1,077 (H)   (H): Data is  abnormally high    EKG 12/14/2023:  Normal sinus rhythm with left bundle-branch block,       EKG today:  AsBiVp. PVCs inferior axis and RBBB morphology (looks similar to paced QRS in V1)     Assessment & Plan:      This is a 69 y.o. pleasant  female with NYHA class 3 functional class and severely reduced LVEF.  She is adequately revascularized and on maximum tolerated heart failure medical therapy.  She has severe prosthetic aortic valve dysfunction due to a combination of valve degeneration and patient prosthesis mismatch.  Despite her severely reduced LVEF she is able to mount a maximum velocity of 3.7 and has excellent contractile reserve.  I personally performed measurements from her dobutamine stress echo and they are summarized above.  The measurements are consistent with adequate contractile reserve and severe obstruction at her aortic valve.     After multidisciplinary discussion we proceeded with CRT-P placement at the end of February.  Thankfully she is already noticing subjective improvement and I have reduced her furosemide.  The maximal improvement in her LV may take 6-12 months.    1. S/P AVR (aortic valve replacement)  IN OFFICE EKG 12-LEAD (to Muse)    Echo      2. Prosthetic valve dysfunction, sequela  IN OFFICE EKG 12-LEAD (to Muse)    Echo      3. HFrEF (heart failure with reduced ejection fraction)  IN OFFICE EKG 12-LEAD (to Muse)    Echo      4. LBBB (left bundle branch block)        5. S/P CABG x 2        6. Presence of cardiac resynchronization therapy pacemaker (CRT-P)        7. Stented coronary artery        8. Carotid stenosis, bilateral                   Might reconsider carotid intervention (stenting versus TCAR in the future?)  Despite her chronological age being young she is not a candidate for redo open heart surgery given her severe LV dysfunction, chest radiation, carotid disease, immunosuppression, insufficient internal mammary conduit (noted during her first open  heart surgery)  If/when there is predominant prosthetic stenosis (as opposed to patient prosthesis mismatch) we will proceed with high-risk Elizabeth GEORGIANA. Her TAVR CTAs were personally analyzed and anatomy is amenable to TF TAVR with a 23mm CRYSTAL 3 Ultra    Continue furosemide as needed not to exceed 40 mg at once.  Continue losartan 12.5 mg once daily and metoprolol succinate 25 mg twice daily   continue rosuvastatin 10 mg once daily   continue clopidogrel monotherapy   Continue empagliflozin as is     TTE in 6 months as long as clinically stable       I appreciate the opportunity to participate in Dulce Root 's care today.  Please follow up with me in six months      Jeff Guzman MD, [facility]  Interventional Cardiology/Structural Heart Disease  [facility]  Office: [phone]     Parts of this note were completed using voice recognition software. Please excuse any misspellings or syntax errors and reach out to me with questions.

## 2024-06-27 ENCOUNTER — OFFICE VISIT (OUTPATIENT)
Dept: SURGERY | Facility: CLINIC | Age: 69
End: 2024-06-27
Payer: MEDICARE

## 2024-06-27 VITALS
HEART RATE: 81 BPM | DIASTOLIC BLOOD PRESSURE: 60 MMHG | HEIGHT: 63 IN | BODY MASS INDEX: 18.25 KG/M2 | WEIGHT: 103 LBS | SYSTOLIC BLOOD PRESSURE: 99 MMHG

## 2024-06-27 DIAGNOSIS — Z85.3 PERSONAL HISTORY OF BREAST CANCER: Primary | ICD-10-CM

## 2024-06-27 DIAGNOSIS — Z12.39 ENCOUNTER FOR SCREENING BREAST EXAMINATION: ICD-10-CM

## 2024-06-27 PROCEDURE — 99213 OFFICE O/P EST LOW 20 MIN: CPT | Mod: S$PBB,,, | Performed by: NURSE PRACTITIONER

## 2024-06-27 PROCEDURE — 99214 OFFICE O/P EST MOD 30 MIN: CPT | Mod: PBBFAC | Performed by: NURSE PRACTITIONER

## 2024-06-27 PROCEDURE — 99999 PR PBB SHADOW E&M-EST. PATIENT-LVL IV: CPT | Mod: PBBFAC,,, | Performed by: NURSE PRACTITIONER

## 2024-06-27 NOTE — PROGRESS NOTES
"[facility]  Department of Surgery        REFERRING PROVIDER: No referring provider defined for this encounter.    Chief Complaint: 1 Year CBE          Date of Service:06/27/2024      DIAGNOSIS:   This is a 68 y.o. female with a stage pT4b N0 Mx grade 3 ER - [place] - HER2 + IDC of the left breast.  Previous history of bilateral DCIS s/p bilateral flap reconstruction in 2009.      TREATMENT:   1. The patient is status post left partial mastectomy and sentinel node biopsy on 11/25/2022. YOANNA Maradiaga. Surgical Oncology    Patient has a history of bilateral ductal carcinoma in situ and a remote history of Hodgkin's disease. Patient is known to Dr. Sepulveda. Per his note, her history is as follows: She developed ductal carcinoma in situ of the right breast in 09/2006, treated with lumpectomy  and radiation therapy.  In 08/2009, she was diagnosed with ductal carcinoma in situ of the left breast and in 08/2009, she had bilateral nipple sparing mastectomy. The left breast showed widespread high-grade DCIS and the right breast was without evidence of malignancy.    She has a remote history of Hodgkin's disease, originally diagnosed in 1991, treated with radiation therapy and then treated with salvage chemotherapy with ABVD on protocol E-5489 in 1996.    HISTORY OF PRESENT ILLNESS:   Dulce Root is a 69 y.o. female comes in for oncological follow up. She denies change in her breast self-exam specifically denying new masses, skin or nipple changes, or nipple discharge. Past medical and surgical history is updated with several new changes. Her  passed earlier this year. She did undergo pacemaker in February and is doing well.   The patient denies constitutional symptoms of night sweats, weight loss, new headaches, visual changes, new back or bony pain, chest pain.     IMAGING:   No new pertinent imaging.        PHYSICAL EXAM:   BP 99/60   Pulse 81   Ht 5' 3" (1.6 m)   Wt 46.7 kg (103 lb)   BMI 18.25 kg/m²   Physical " Exam   Vitals reviewed.  Constitutional: She is oriented to person, place, and time.   Eyes: Right eye exhibits no discharge. Left eye exhibits no discharge.   Pulmonary/Chest: Effort normal. No respiratory distress. Right breast exhibits no inverted nipple, no mass, no nipple discharge, no skin change and no tenderness. Left breast exhibits no mass, no skin change and no tenderness.       Abdominal: Normal appearance.   Musculoskeletal: Normal range of motion.   Neurological: She is alert and oriented to person, place, and time.   Skin: Skin is warm and dry. No bruising, no lesion and no rash noted. No erythema. No pallor.     Exam done in the upright and supine position       ASSESSMENT:   This is a 69 y.o. female without evidence of recurrence by exam, history or imaging.       PLAN:   1. Continue monthly self breast exams and call the clinic with any changes or problems.  2. Return to clinic in 1 year.   3. Continue heme onc follow up - Dr. Sepulveda    The patient is in agreement with the plan. Questions were encouraged and answered to patient's satisfaction. Dulce will call our office with any questions or concerns.

## 2024-06-29 ENCOUNTER — CLINICAL SUPPORT (OUTPATIENT)
Dept: CARDIOLOGY | Facility: HOSPITAL | Age: 69
End: 2024-06-29
Payer: MEDICARE

## 2024-06-29 ENCOUNTER — CLINICAL SUPPORT (OUTPATIENT)
Dept: CARDIOLOGY | Facility: HOSPITAL | Age: 69
End: 2024-06-29
Attending: INTERNAL MEDICINE
Payer: MEDICARE

## 2024-06-29 DIAGNOSIS — I44.7 LEFT BUNDLE-BRANCH BLOCK, UNSPECIFIED: ICD-10-CM

## 2024-06-29 DIAGNOSIS — Z95.0 PRESENCE OF CARDIAC PACEMAKER: ICD-10-CM

## 2024-06-29 PROCEDURE — 93294 REM INTERROG EVL PM/LDLS PM: CPT | Mod: ,,, | Performed by: INTERNAL MEDICINE

## 2024-07-06 LAB
OHS CV AF BURDEN PERCENT: < 1
OHS CV BIV PACING PERCENT: 99.75 %
OHS CV DC REMOTE DEVICE TYPE: NORMAL
OHS CV RV PACING PERCENT: 99.77 %

## 2024-07-09 ENCOUNTER — PATIENT MESSAGE (OUTPATIENT)
Dept: OTOLARYNGOLOGY | Facility: CLINIC | Age: 69
End: 2024-07-09
Payer: MEDICARE

## 2024-07-17 ENCOUNTER — PATIENT MESSAGE (OUTPATIENT)
Dept: HEMATOLOGY/ONCOLOGY | Facility: CLINIC | Age: 69
End: 2024-07-17
Payer: MEDICARE

## 2024-07-24 NOTE — PROGRESS NOTES
[facility]       Post-Op        REFERRING PHYSICIAN:         Patrick Hernandez MD    MEDICAL ONCOLOGIST:    Rios Sepulveda MD  RADIATION ONCOLOGIST:   pending    DIAGNOSIS:    This is a 67 y.o. female with a stage pT4b N0 Mx grade 3 ER - [place] - HER2 + IDC of the left breast.    TREATMENT SUMMARY:  The patient is status post left partial mastectomy and sentinel node biopsy on 11/25/2022.  Final pathology showed   Final Pathologic Diagnosis   Date/Time Value Ref Range Status   11/25/2022 10:42 AM   Final    Part 1  Left breast, lumpectomy:  Invasive ductal carcinoma, poorly differentiated  Shanti grade 3: Tubule formation- 3, nuclear pleomorphism- 3 and mitotic  count -2  Invasive carcinoma measures 26 x 23 x 21 mm  All surgical margins are negative for invasive carcinoma:  -superior margin:  6 mm  -inferior margin:  2 mm  -anterior margin:  3 mm  -posterior/deep margin:  Greater than 10 mm  -medial margin:  Greater than 10 mm  -lateral margin:  Greater than 10 mm  The epidermis is involved by invasive carcinoma with associated ulceration  Dermal lymphovascular invasion is also identified  No carcinoma in Situ is identified  Pathologic stage classification: pT4b pN0(sn)  See synoptic report  See comment section below  Part 2  Left axilla, sentinel lymph node, hot 43, excision:  No lymphoid tissue identified  Part 3  Left axilla, sentinel lymph node, hot 104, excision:  No lymphoid tissue identified  Part 4  Left axilla, sentinel lymph node, hot 35, excision  One lymph node, negative for metastatic carcinoma (0/1)  See comment section below       Comment:     Interp By Colleen Raygoza M.D., Signed on 12/06/2022 at 12:34   Synoptic report :   Procedure:  Lumpectomy   Specimen laterality:  Left   Tumor site:  Retroareolar   Tumor size:   26 x 23 x 21 mm   Histologic type:  Invasive ductal   Histologic grade:   Tubular differentiation- 3   Nuclear pleomorphism- 3   Mitotic count - 2   Overall grade:  Grade 3  Phone call to patient.  Notified her of pathology results s/p total thyroidectomy on 7/18/24. She verbalized understanding and confirmed postop visit tomorrow with dr mccormick at 115p at \[facility]\"" office.  Copy sent to pcp   Tumor focality:  Unifocal   Ductal carcinoma in situ:  Not identified   Extent of DCIS:  No DCIS present   -Lobular carcinoma in situ (LCIS):  Not identified   Tumor Extension:   -Skin:  Involved with ulceration   -Nipple:  Can not be determined, See comment   Skeletal muscle:  Not present on submitted sections   Margins:   -Invasive carcinoma margins:  Uninvolved   -Distance from closest margin:   2 mm from inferior margin   -Distance from other margins:   -superior margin:  6 mm   -anterior margin:  3 mm   -posterior/deep margin:  Greater than 10 mm   -medial margin:  Greater than 10 mm   -lateral margin:  Greater than 10 mm   DCIS margins:  No DCIS present   Regional lymph nodes:  Uninvolved by tumor cells   -Total number of lymph nodes examined:  1   -Number of sentinel nodes examined:  1   Number of lymph nodes with macrometastasis:  0   Number of lymph nodes with micrometastasis: 0   Size of largest metastatic deposit:  n/a   Extranodal extension: n/a   Treatment effect in breast:  No known pre-surgical therapy   Treatment effect in lymph nodes:  No known pre-surgical therapy   Dermal lymphovascular invasion:  Present   Pathologic stage classification: pT4b pN0(sn)   Ancillary studies :  [id number], Interp By SALLY Caruso M.D.,   Estrogen receptor (ER):  Negative (0)   Progesterone receptor (AK):  Negative (0)   HER2 IHC:  Positive (3+)   Ki-67 proliferation index:  30%     INTERVAL HISTORY:   Dulce Root comes in for a post-op check.  She denies fever, chills, chest pain or shortness of breath.  Her pain is well controlled. Per patient, drain log is <30 CC's for the past four days.     MEDICATIONS:  Current Outpatient Medications   Medication Sig Dispense Refill    abaloparatide (TYMLOS) 80 mcg (3,120 mcg/1.56 mL) PnIj Inject 80 mcg into the skin once daily. 1 each 11    ALPRAZolam (XANAX) 1 MG tablet Take 1 mg by mouth nightly as needed. Rarely used      aspirin (ECOTRIN) 81 MG EC tablet Take 1  tablet (81 mg total) by mouth once daily. 90 tablet 12    brimonidine 0.2% (ALPHAGAN) 0.2 % Drop       calcium carbonate (OS-CALVIN) 600 mg (1,500 mg) Tab Take 600 mg by mouth 2 (two) times daily with meals.      chlorhexidine (PERIDEX) 0.12 % solution SMARTSI Tablespoon By Mouth Twice Daily      cholecalciferol, vitamin D3, (VITAMIN D3) 100 mcg (4,000 unit) Cap Take by mouth.      clopidogreL (PLAVIX) 75 mg tablet Take 1 tablet (75 mg total) by mouth once daily. (Patient taking differently: Take 75 mg by mouth once daily. MD OFFICE INSTRUCTED PATIENT NOT TO STOP PLAVIX AND SHE HAS RESTARTED IT.) 90 tablet 4    dapsone 25 MG Tab TAKE 2 TABLETS BY MOUTH EVERY DAY FOR 30 DAYS      dexAMETHasone (DECADRON) 0.5 mg/5 mL Elix SWISH 3.75MLS IN MOUTH FOR 30 SECONDS AND SWALLOW TWICE DAILY AS NEEDED FOR LICHEN PLANUS FLARE UPS      EScitalopram oxalate (LEXAPRO) 10 MG tablet TAKE 1 TABLET BY MOUTH EVERY DAY 90 tablet 3    fluconazole (DIFLUCAN) 200 MG Tab TAKE 1 TABLET ORALLY EVERY OTHER DAY X 4 DOSES, THEN 1 WEEKLY FOR 30 DAYS      hydroCHLOROthiazide (HYDRODIURIL) 25 MG tablet Take 1 tablet (25 mg total) by mouth every morning. 90 tablet 6    HYDROcodone-acetaminophen (NORCO) 5-325 mg per tablet Take 1 tablet by mouth every 6 (six) hours as needed for Pain. 20 tablet 0    levothyroxine (SYNTHROID) 75 MCG tablet TAKE 1 TABLET BY MOUTH EVERY DAY BEFORE BREAKFAST 30 tablet 0    losartan (COZAAR) 50 MG tablet Take 1 tablet (50 mg total) by mouth every evening. 90 tablet 3    metoprolol succinate (TOPROL-XL) 25 MG 24 hr tablet Take 1 tablet (25 mg total) by mouth 2 (two) times daily. 90 tablet 3    pantoprazole (PROTONIX) 20 MG tablet Take 1 tablet (20 mg total) by mouth once daily. 90 tablet 3    rosuvastatin (CRESTOR) 20 MG tablet TAKE 1 TABLET EVERY OTHER NIGHT 45 tablet 3    valACYclovir (VALTREX) 1000 MG tablet TAKE 1 TABLET EVERY 24 HOURS; prn 30 tablet 2     No current facility-administered medications for this visit.      Facility-Administered Medications Ordered in Other Visits   Medication Dose Route Frequency Provider Last Rate Last Admin    0.9%  NaCl infusion   Intravenous Continuous Ender Morrison MD           ALLERGIES:   Review of patient's allergies indicates:   Allergen Reactions    Amlodipine Shortness Of Breath and Other (See Comments)     unknown  Other reaction(s): Swelling  unknown  unknown  Other reaction(s): Swelling  unknown  Other reaction(s): Swelling  unknown    Levofloxacin Hives and Swelling    Sulfa (sulfonamide antibiotics) Shortness Of Breath, Itching and Other (See Comments)     elevated blood pressure    Ace inhibitors     Ciprofloxacin Itching    Iodinated contrast media     Iodine      Other reaction(s): Unknown    Latex      Other reaction(s): Itching    Latex, natural rubber Itching       PHYSICAL EXAMINATION:   General:  This is a well appearing female with appropriate speech, affect and gait.     Breast:  Incision clean, dry, and intact. Late stage ecchymosis surrounding lumpectomy incision. Drain in place - removed today.     IMPRESSION:   The patient has had an uneventful postoperative course.    PLAN:   1. return in 6 months for a follow up office visit and breast exam  2. Drain output <30 CC's for last four days. Drain removed today.   3. The patient is advised in continued exam of the breast chest wall and to report to this office sooner should she note any areas of abnormality or concern.   4.  Patient is known to Dr. Sepulveda. Will schedule follow up with him.   5. Tumor board  6. Refer to Dr. Brian for cardio-onc

## 2024-09-10 ENCOUNTER — PATIENT MESSAGE (OUTPATIENT)
Dept: CARDIOLOGY | Facility: CLINIC | Age: 69
End: 2024-09-10
Payer: MEDICARE

## 2024-09-10 ENCOUNTER — OFFICE VISIT (OUTPATIENT)
Dept: HEMATOLOGY/ONCOLOGY | Facility: CLINIC | Age: 69
End: 2024-09-10
Payer: MEDICARE

## 2024-09-10 VITALS
WEIGHT: 104.94 LBS | OXYGEN SATURATION: 100 % | BODY MASS INDEX: 18.59 KG/M2 | DIASTOLIC BLOOD PRESSURE: 71 MMHG | HEART RATE: 85 BPM | SYSTOLIC BLOOD PRESSURE: 129 MMHG | TEMPERATURE: 98 F | HEIGHT: 63 IN

## 2024-09-10 DIAGNOSIS — Z85.3 HISTORY OF BREAST CANCER IN FEMALE: Primary | ICD-10-CM

## 2024-09-10 DIAGNOSIS — Z85.71 HX OF HODGKIN'S DISEASE: ICD-10-CM

## 2024-09-10 PROCEDURE — 99999 PR PBB SHADOW E&M-EST. PATIENT-LVL IV: CPT | Mod: PBBFAC,,, | Performed by: INTERNAL MEDICINE

## 2024-09-10 PROCEDURE — 99214 OFFICE O/P EST MOD 30 MIN: CPT | Mod: PBBFAC | Performed by: INTERNAL MEDICINE

## 2024-09-10 PROCEDURE — 99213 OFFICE O/P EST LOW 20 MIN: CPT | Mod: S$PBB,,, | Performed by: INTERNAL MEDICINE

## 2024-09-10 NOTE — PROGRESS NOTES
Subjective:       Patient ID: Dulce Root is a 69 y.o. female.    Chief Complaint: No chief complaint on file.      HPI Ms. Root is a very pleasant 67-year-old female with recent diagnosis of stage 1 ER negative HER 2 positive breast cancer. She has a prior history of bilateral ductal carcinoma in situ and a remote history of Hodgkin's disease.She also has a cardiomyopathy.    For the last 2 days she has had increased MARCH - that had largely resolved after her pacemaker.  She has no pain.  Has lost weight as her oral pempigus makes it difficult to eat.  Her   in February of sepsis after a vascular graft in his leg.        Breast cancer history:  She had an area of nonhealing ulceration in her left breast reconstruction dating back to early .    A biopsy of that area on 2022 showed poorly differentiated invasive carcinoma which was ER negative, KS negative and HER2 3+.  Ki-67 was 30%.    PET 22  -no distant disease.    On 2022 left breast lumpectomy and sentinel lymph node biopsy was performed.  That showed a 2.6 x 2.3 x 2.1 cm poorly differentiated carcinoma (3+ 3+2).  Margins were negative.  There was dermal lymphovascular invasion present.  Nancy lymph node biopsy was negative.  Final pathological stage T2 N0.  Breast history:  She  developed ductal      carcinoma in situ of the right breast in 2006, treated with lumpectomy     and radiation therapy.  In 2009, she was diagnosed with ductal carcinoma  in situ of the left breast and in 2009, she had bilateral mastectomy.      The left breast showed widespread high-grade DCIS and the right breast was    without evidence of malignancy.      Due to her cardiac issues we decided to forego chemotherapy + HER 2 directed therapy.      She has a remote history of Hodgkin's disease, originally diagnosed in   , treated with radiation therapy and then treated with salvage   chemotherapy with ABVD on protocol [id number]  in 1996.    In August 2017 she underwent aortic valve replacement and coronary artery bypass grafting by Dr. Baxter.  She has also has stent placement in 2019.    Review of Systems   Constitutional:  Positive for fatigue and unexpected weight change.   HENT:  Positive for mouth sores (pemphigus) and trouble swallowing.    Respiratory:  Positive for shortness of breath (with minimal exertion).    Cardiovascular:  Negative for chest pain.   Gastrointestinal: Negative.    Musculoskeletal: Negative.    Neurological:  Negative for headaches.       Objective:      Physical Exam  Vitals reviewed.   Constitutional:       General: She is not in acute distress.     Appearance: She is well-developed. She is ill-appearing.      Comments: thin   Eyes:      General: No scleral icterus.  Cardiovascular:      Rate and Rhythm: Normal rate and regular rhythm.      Heart sounds: Murmur (systolic) heard.   Pulmonary:      Effort: Pulmonary effort is normal. No respiratory distress.      Breath sounds: No wheezing, rhonchi or rales.      Comments: Some coarse BS bilaterally  Chest:   Breasts:     Right: No mass.      Left: No mass.   Abdominal:      Palpations: Abdomen is soft. There is no mass.      Tenderness: There is no abdominal tenderness.   Lymphadenopathy:      Upper Body:      Right upper body: No supraclavicular or axillary adenopathy.      Left upper body: No supraclavicular or axillary adenopathy.   Neurological:      Mental Status: She is alert and oriented to person, place, and time.   Psychiatric:         Mood and Affect: Mood normal.         Behavior: Behavior normal.         Thought Content: Thought content normal.         Judgment: Judgment normal.         Assessment:       1. History of breast cancer in female    2. Hx of Hodgkin's disease - s/p chemo and radiation        Plan:    RTC 6 M    She will call her cardiologist to report her MARCH    Route Chart for Scheduling    Med Onc Chart Routing      Follow up with  physician 6 months.   Follow up with [facility]    Infusion scheduling note    Injection scheduling note    Labs None   Scheduling:  Preferred lab:  Lab interval:     Imaging None      Pharmacy appointment No pharmacy appointment needed      Other referrals no referral to Oncology Primary Care needed -  no Massage appointment needed    No additional referrals needed                 Therapy Plan Information  DENOSUMAB (PROLIA) Q6M for Age-related osteoporosis with current pathological fracture with routine healing, noted on 2/17/2021  Medications  denosumab (PROLIA) injection 60 mg  60 mg, Subcutaneous, Every 26 weeks      No therapy plan of the specified type found.    No therapy plan of the specified type found.

## 2024-09-11 ENCOUNTER — PATIENT MESSAGE (OUTPATIENT)
Dept: FAMILY MEDICINE | Facility: CLINIC | Age: 69
End: 2024-09-11
Payer: MEDICARE

## 2024-09-12 ENCOUNTER — TELEPHONE (OUTPATIENT)
Dept: CARDIOLOGY | Facility: CLINIC | Age: 69
End: 2024-09-12
Payer: MEDICARE

## 2024-09-12 DIAGNOSIS — I50.22 CHRONIC HFREF (HEART FAILURE WITH REDUCED EJECTION FRACTION): ICD-10-CM

## 2024-09-12 DIAGNOSIS — E78.2 MIXED HYPERLIPIDEMIA: ICD-10-CM

## 2024-09-12 DIAGNOSIS — R00.2 PALPITATIONS: ICD-10-CM

## 2024-09-12 DIAGNOSIS — I10 ESSENTIAL HYPERTENSION: Primary | ICD-10-CM

## 2024-09-12 NOTE — TELEPHONE ENCOUNTER
----- Message from Jeff Guzman MD sent at 9/11/2024  7:15 AM CDT -----  TTE, CBC, CMP, BNP (not NTproBNP) please  ----- Message -----  From: Rios Sepulveda MD  Sent: 9/10/2024   9:29 AM CDT  To: Patrick Hernandez MD; Jeff Guzman MD

## 2024-09-12 NOTE — TELEPHONE ENCOUNTER
Called pt to schedule echo and blood work. Pt V/U of appt time for Sep 27th. Pt had no questions during phone call.

## 2024-09-27 ENCOUNTER — HOSPITAL ENCOUNTER (OUTPATIENT)
Dept: CARDIOLOGY | Facility: HOSPITAL | Age: 69
Discharge: HOME | End: 2024-09-27
Attending: INTERNAL MEDICINE
Payer: MEDICARE

## 2024-09-27 VITALS — BODY MASS INDEX: 18.43 KG/M2 | WEIGHT: 104 LBS | HEIGHT: 63 IN

## 2024-09-27 DIAGNOSIS — I50.20 HFREF (HEART FAILURE WITH REDUCED EJECTION FRACTION): ICD-10-CM

## 2024-09-27 DIAGNOSIS — Z95.2 S/P AVR (AORTIC VALVE REPLACEMENT): ICD-10-CM

## 2024-09-27 DIAGNOSIS — T82.09XS PROSTHETIC VALVE DYSFUNCTION, SEQUELA: ICD-10-CM

## 2024-09-27 PROCEDURE — 93306 TTE W/DOPPLER COMPLETE: CPT | Mod: 26,,, | Performed by: INTERNAL MEDICINE

## 2024-09-27 PROCEDURE — 93306 TTE W/DOPPLER COMPLETE: CPT | Mod: PO

## 2024-09-28 ENCOUNTER — CLINICAL SUPPORT (OUTPATIENT)
Dept: CARDIOLOGY | Facility: HOSPITAL | Age: 69
End: 2024-09-28
Attending: INTERNAL MEDICINE
Payer: MEDICARE

## 2024-09-28 ENCOUNTER — PATIENT MESSAGE (OUTPATIENT)
Dept: CARDIOLOGY | Facility: CLINIC | Age: 69
End: 2024-09-28
Payer: MEDICARE

## 2024-09-28 ENCOUNTER — CLINICAL SUPPORT (OUTPATIENT)
Dept: CARDIOLOGY | Facility: HOSPITAL | Age: 69
End: 2024-09-28
Payer: MEDICARE

## 2024-09-28 DIAGNOSIS — Z95.0 PRESENCE OF CARDIAC PACEMAKER: ICD-10-CM

## 2024-09-28 DIAGNOSIS — I44.7 LEFT BUNDLE-BRANCH BLOCK, UNSPECIFIED: ICD-10-CM

## 2024-09-28 LAB
AV INDEX (PROSTH): 0.28
AV MEAN GRADIENT: 18 MMHG
AV PEAK GRADIENT: 28.3 MMHG
AV VALVE AREA BY VELOCITY RATIO: 0.9 CM²
AV VALVE AREA: 1 CM²
AV VELOCITY RATIO: 0.26
BSA FOR ECHO PROCEDURE: 1.45 M2
CV ECHO LV RWT: 0.4 CM
DOP CALC AO PEAK VEL: 2.66 M/S
DOP CALC AO VTI: 49.7 CM
DOP CALC LVOT AREA: 3.5 CM2
DOP CALC LVOT DIAMETER: 2.12 CM
DOP CALC LVOT PEAK VEL: 0.68 M/S
DOP CALC LVOT STROKE VOLUME: 48.7 CM3
DOP CALCLVOT PEAK VEL VTI: 13.8 CM
E WAVE DECELERATION TIME: 122.3 MSEC
E/A RATIO: 3.97
E/E' RATIO: 24.6 M/S
ECHO LV POSTERIOR WALL: 1.05 CM (ref 0.6–1.1)
FRACTIONAL SHORTENING: 6 % (ref 28–44)
INTERVENTRICULAR SEPTUM: 1.02 CM (ref 0.6–1.1)
IVRT: 53.28 MSEC
LEFT ATRIUM AREA SYSTOLIC (APICAL 2 CHAMBER): 13.74 CM2
LEFT ATRIUM AREA SYSTOLIC (APICAL 4 CHAMBER): 14.66 CM2
LEFT ATRIUM SIZE: 3.85 CM
LEFT ATRIUM VOLUME INDEX MOD: 23.8 ML/M2
LEFT ATRIUM VOLUME MOD: 34.74 ML
LEFT INTERNAL DIMENSION IN SYSTOLE: 4.97 CM (ref 2.1–4)
LEFT VENTRICLE DIASTOLIC VOLUME INDEX: 92.44 ML/M2
LEFT VENTRICLE DIASTOLIC VOLUME: 134.96 ML
LEFT VENTRICLE END SYSTOLIC VOLUME APICAL 2 CHAMBER: 32.96 ML
LEFT VENTRICLE END SYSTOLIC VOLUME APICAL 4 CHAMBER: 31.39 ML
LEFT VENTRICLE MASS INDEX: 143.2 G/M2
LEFT VENTRICLE SYSTOLIC VOLUME INDEX: 79.9 ML/M2
LEFT VENTRICLE SYSTOLIC VOLUME: 116.63 ML
LEFT VENTRICULAR INTERNAL DIMENSION IN DIASTOLE: 5.29 CM (ref 3.5–6)
LEFT VENTRICULAR MASS: 209.1 G
LV LATERAL E/E' RATIO: 30.75 M/S
LV SEPTAL E/E' RATIO: 20.5 M/S
LVED V (TEICH): 134.96 ML
LVES V (TEICH): 116.63 ML
LVOT MG: 1.13 MMHG
LVOT MV: 0.5 CM/S
LVOT STOKE VOLUME INDEX: 33.6 ML/M2
MV PEAK A VEL: 0.31 M/S
MV PEAK E VEL: 1.23 M/S
MV STENOSIS PRESSURE HALF TIME: 35.47 MS
MV VALVE AREA P 1/2 METHOD: 6.2 CM2
PISA MRMAX VEL: 5.32 M/S
PISA TR MAX VEL: 3.78 M/S
RA PRESSURE ESTIMATED: 8 MMHG
RIGHT VENTRICLE DIASTOLIC LENGTH: 7 CM
RIGHT VENTRICLE DIASTOLIC MID DIMENSION: 1.6 CM
RIGHT VENTRICULAR END-DIASTOLIC DIMENSION: 3.37 CM
RIGHT VENTRICULAR LENGTH IN DIASTOLE (APICAL 4-CHAMBER VIEW): 7.01 CM
RV MID DIAMA: 1.58 CM
RV TB RVSP: 12 MMHG
RV TISSUE DOPPLER FREE WALL SYSTOLIC VELOCITY 1 (APICAL 4 CHAMBER VIEW): 3.96 CM/S
SINUS: 2.45 CM
STJ: 2.31 CM
TASV: 0.4 CM/S
TDI LATERAL: 0.04 M/S
TDI SEPTAL: 0.06 M/S
TDI: 0.05 M/S
TR MAX PG: 57 MMHG
TRICUSPID ANNULAR PLANE SYSTOLIC EXCURSION: 0.98 CM
TV REST PULMONARY ARTERY PRESSURE: 65 MMHG
Z-SCORE OF LEFT VENTRICULAR DIMENSION IN END DIASTOLE: 1.86
Z-SCORE OF LEFT VENTRICULAR DIMENSION IN END SYSTOLE: 4.74

## 2024-09-28 PROCEDURE — 93294 REM INTERROG EVL PM/LDLS PM: CPT | Mod: ,,, | Performed by: INTERNAL MEDICINE

## 2024-09-30 ENCOUNTER — OFFICE VISIT (OUTPATIENT)
Dept: CARDIOLOGY | Facility: CLINIC | Age: 69
End: 2024-09-30
Payer: MEDICARE

## 2024-09-30 VITALS
SYSTOLIC BLOOD PRESSURE: 104 MMHG | WEIGHT: 100.5 LBS | DIASTOLIC BLOOD PRESSURE: 67 MMHG | HEART RATE: 85 BPM | HEIGHT: 62 IN | BODY MASS INDEX: 18.5 KG/M2

## 2024-09-30 DIAGNOSIS — I44.7 COMPLETE LEFT BUNDLE BRANCH BLOCK (LBBB): ICD-10-CM

## 2024-09-30 DIAGNOSIS — T82.09XS PROSTHETIC VALVE DYSFUNCTION, SEQUELA: ICD-10-CM

## 2024-09-30 DIAGNOSIS — I65.23 CAROTID STENOSIS, BILATERAL: ICD-10-CM

## 2024-09-30 DIAGNOSIS — Z95.0 PRESENCE OF CARDIAC RESYNCHRONIZATION THERAPY PACEMAKER (CRT-P): ICD-10-CM

## 2024-09-30 DIAGNOSIS — Z95.2 S/P AVR (AORTIC VALVE REPLACEMENT): ICD-10-CM

## 2024-09-30 DIAGNOSIS — I50.23 ACUTE ON CHRONIC HFREF (HEART FAILURE WITH REDUCED EJECTION FRACTION): Primary | ICD-10-CM

## 2024-09-30 PROCEDURE — 99215 OFFICE O/P EST HI 40 MIN: CPT | Mod: S$PBB,25,, | Performed by: INTERNAL MEDICINE

## 2024-09-30 PROCEDURE — 99213 OFFICE O/P EST LOW 20 MIN: CPT | Mod: PBBFAC,PO | Performed by: INTERNAL MEDICINE

## 2024-09-30 PROCEDURE — 99999 PR PBB SHADOW E&M-EST. PATIENT-LVL III: CPT | Mod: PBBFAC,,, | Performed by: INTERNAL MEDICINE

## 2024-09-30 RX ORDER — TORSEMIDE 20 MG/1
20 TABLET ORAL DAILY
Qty: 30 TABLET | Refills: 11 | Status: ON HOLD | OUTPATIENT
Start: 2024-09-30 | End: 2024-10-02 | Stop reason: HOSPADM

## 2024-09-30 RX ORDER — AZATHIOPRINE 50 MG/1
50 TABLET ORAL DAILY
COMMUNITY

## 2024-09-30 NOTE — PROGRESS NOTES
Structural Cardiology Clinic Note  Date: 24    Patient: Dulce Root, 1955, [id number]  Primary Care Provider: Patrick Hernandez MD     Chief Complaint/Reason for Referral: prosthetic valve dysfunction     Subjective:        History of Present Illness    Ms. Root presents today for follow up.    She reports shortness of breath with exertion, occurring unexpectedly throughout the day. She has difficulty breathing when lying flat, necessitating an adjustable bed to elevate her upper body. These symptoms are similar to those experienced before her pacemaker upgrade in February. She denies swelling, episodes of passing out, or significant chest pain, but notes occasional recent chest twinges. She has a history of pacemaker upgrade in February and previous attempts with Entresto, which lowered her blood pressure excessively on two occasions.    Her weight has been fluctuating between 98.5 and 101 lbs, with a current weight of 100 lbs, down from 105 lbs in February.    Current medications include:  - Plavix 75 mg daily  - Jardiance 10 mg daily  - Losartan 12.5 mg daily  - Metoprolol 25 mg daily  - Rosuvastatin 5 mg daily   - Imuran (switched from methotrexate about 2-3 weeks ago)  - Lasix (increased to daily use in past two weeks)    She took two Lasix tablets yesterday due to shortness of breath. Prior to the past two weeks, she had been taking Lasix less frequently due to reduced swelling and weight.    She has a history of pemphigoid, currently managed with Imuran.    She has experienced the loss of her  and son. Her daughter, who lives in [place], is raising her  son's child.    She is considering treatment options, weighing quality of life against potential increased longevity. She is contemplating a high-risk TAVR procedure (20% mortality risk, 60% chance of improvement if successful) versus palliative care at home. Without intervention, her life expectancy is likely less than six months.  Her primary motivation for considering treatment is to provide support for her daughter, who has already experienced significant loss.      ROS:  10-system ROS is negative unless otherwise indicated in the HPI.         Focused Past History includes:  CAD status post CABG and AVR with Medium Perceval in 2017 (at [facility]):   Cath October 2023: Severely elevated right and left filling pressures with severely reduced cardiac output.  Widely patent SVG that supplies most of the LAD distribution; occluded SVG to RCA (Widely patent RCA).  Equivocal ISR in the ostial circumflex, negative by IFR.    Dobutamine stress echo December 2023:  LVEF 15%.  Maximum stroke volume index reached 34.5.  Severe aortic stenosis with area 0.86, peak velocity 3.7, mean gradient 33, dimensionless index 0.3.  Achieved 78% MPHR  Discussed with Dr. Reynoso and Dr. Gill.  We decided to proceed with CRT P first to improve EF and monitor the surgical aortic valve function.  Planning for [facility] has been completed  TTE 6/2024: EF 25-30%. Moderate RV dysfunction. YIN Vmax 3.14, MG 21, DI 0.28, AT 97, trace AR. Moderate MR. Moderate TR. PASP 65  TTE September 2024:  EF 10-15%.  Moderate RV dysfunction. YIN peak velocity 2.7, mean gradient 18, DI 0.28, trace AR.  Moderate-to-severe MR and TR.  PASP 65  Status post CRT-P 02/27/2024 (Dr. Gill)  Chronic HFrEF  History of Hodgkin's lymphoma status post chemo and radiation - 1991  Carotid stenosis.   Duplex ultrasound 1/2024:  ANNA 50-69%, LICA 70-99%. 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 Oral.   Former smoker 15 p-y, quit in 1981     Review of Systems  Constitutional: negative for fevers, night sweats, and weight loss  Eyes: negative for visual disturbance, diplopia  Respiratory: negative for cough, hemoptysis, sputum, and wheezing  Cardiovascular: see HPI  Gastrointestinal: negative for  abdominal pain, bright red blood per rectum, change in bowel habits, dysphagia, melena, and reflux symptoms  Genitourinary:negative for dysuria, frequency, and hematuria  Hematologic/lymphatic: negative for bleeding, easy bruising, and lymphadenopathy  Musculoskeletal:negative for arthralgias, back pain, and myalgias  Neurological: negative for gait problems, paresthesia, speech problems, vertigo, and weakness  Behavioral/Psych: negative for excessive alcohol consumption, illegal drug usage, and sleep disturbance    -------------------------------------    Allergy    Anticoagulant long-term use    Plavix    Breast cancer    Carotid stenosis, bilateral    CHF (congestive heart failure)    Coronary artery disease    Coronary artery disease involving coronary bypass graft of native heart without angina pectoris    2/19  1.  Left main is a small vessel with a 60%-65% ostial lesion. 2.  LAD is a medium-sized vessel diffuse 70% proximally  Ramus intermedius is a medium size vessel with a 40%-50% ostial lesion. 3.  Circumflex 60%-70% ostial  remainder of circumflex and obtuse marginal normal in appearance. Right coronary artery is normal size vessel, short discrete 70%mid 4.  Vein graft to right coronary is occ    Coronary artery disease involving native coronary artery of native heart without angina pectoris    DCIS (ductal carcinoma in situ) of breast    Encounter for blood transfusion    Epigastric pain    Essential hypertension    GERD (gastroesophageal reflux disease)    GI bleed    Hematemesis with nausea    Hodgkin lymphoma    Hx of Hodgkin's disease - s/p chemo and radiation    Hyperlipidemia    Hyperlipidemia    The patient presents with hyperlipidemia.  The patient reports tolerating the medication well and is in excellent compliance.  There have been no medication side effects.  The patient denies chest pain, neuropathy, and myalgias.  The patient has reduced fat intake and has been exercising.  Current  treatment has included the medications listed in the med card.   Lab Results Component Value Date  CH    Hypertension    LBBB (left bundle branch block)    Malignant neoplasm of central portion of left breast in female, estrogen receptor negative    Osteoporosis    Paroxysmal atrial fibrillation - single post-op episode    Pemphigoid    Pemphigoid    pt receives IVIG infusions    Pulmonary hypertension    S/P AVR (aortic valve replacement) - pericardial valve    Perceval aortic tissue valve PVS23. 23mm    serial # [id number]    S/P CABG x 2    8/17 CABG X 2 with SVG-LAD and SVG-distal RCA  SVG LAD due to absent LIMA after chest radiation and lymph node biopsy     Stented coronary artery    She had 2 stents placed in 2/2019.  She has had CAD and bypasses in the past in 2017.      Thyroid disease     ----------------------------    Angiogram, coronary, with left heart catheterization    Procedure: Left Heart Cath w/Graft study;  Surgeon: Jeff Guzman MD;  Location: [facility] CATH;  Service: Cardiology;;    Aortography    Procedure: AO Root angiogram;  Surgeon: Jeff Guzman MD;  Location: [facility] CATH;  Service: Cardiology;  Laterality: N/A;    Arteriography of aortic root    Procedure: ARTERIOGRAM, AORTIC ROOT;  Surgeon: Sujit Chaudhry MD;  Location: [facility] CATH;  Service: Cardiology;  Laterality: N/A;    Axillary node dissection    Procedure: LYMPHADENECTOMY, AXILLARY-Left;  Surgeon: Rosa Maradiaga MD;  Location: [facility] OR;  Service: General;  Laterality: Left;    Breast biopsy    Breast reconstruction    bilateral mastectomy    Cardiac catheterization    stents x 2    Cardiac surgery    Aortic valve replacement , CABG 2 vessel    Cardiac valve replacement    aortic valve, bovine per pt    Coronary angiography    Procedure: ANGIOGRAM, CORONARY ARTERY;  Surgeon: Sujit Chaudhry MD;  Location: [facility] CATH;  Service: Cardiology;  Laterality: N/A;    Coronary angiography    Procedure: ANGIOGRAM, CORONARY ARTERY;  Surgeon: Sujit Chaudhry MD;   Location: [facility] CATH;  Service: Cardiology;  Laterality: N/A;    Coronary bypass graft angiography    Procedure: Bypass graft study;  Surgeon: Sujit Chaudhry MD;  [facility];  Service: Cardiology;;    Cosmetic surgery    bereast reconstruction    Esophagogastroduodenoscopy    Procedure: EGD (ESOPHAGOGASTRODUODENOSCOPY);  Surgeon: Tracy Flower MD;  Location: [facility] ENDO;  Service: Endoscopy;  Laterality: N/A;    Esophagogastroduodenoscopy    Procedure: EGD (ESOPHAGOGASTRODUODENOSCOPY);  Surgeon: Tracy Flower MD;  Location: [facility] ENDO;  Service: Endoscopy;  Laterality: N/A;    Esophagogastroduodenoscopy    Procedure: EGD (ESOPHAGOGASTRODUODENOSCOPY);  Surgeon: Tracy Flower MD;  Location: [facility];  Service: Endoscopy;  Laterality: N/A;    Esophagogastroduodenoscopy    Procedure: EGD (ESOPHAGOGASTRODUODENOSCOPY);  Surgeon: Mk Sanchez MD;  [facility]  Service: Endoscopy;  Laterality: N/A;  inst via portal  cardiac clearance-see encounter dated 5/31/23  precall confirmed 6/2 EB    Eye surgery    eye lids    Fractional flow reserve (ffr), coronary    Procedure: (IFR) Ramus;  Surgeon: Jeff Guzman MD;  Location: [facility] CATH;  Service: Cardiology;;    Implantation of biventricular heart pacemaker    Procedure: INSERTION, PACEMAKER, BIVENTRICULAR;  Surgeon: Palomo Gill MD;  Location: [facility] EP LAB;  Service: Cardiology;  Laterality: N/A;  DCM, LBBB, Right sided CRT-P vs LBAP lead, MDT, MAC, SK, 3 Prep *Venogram 2/19/24*    Injection for sentinel node identification    Procedure: INJECTION, FOR SENTINEL NODE IDENTIFICATION-Left;  Surgeon: Rosa Maradiaga MD;  Location: [facility] OR;  Service: General;  Laterality: Left;    Instantaneous wave-free ratio (ifr)    Procedure: (IFR) LCX;  Surgeon: Jeff Guzman MD;  Location: [facility] CATH;  Service: Cardiology;;    Left heart catheterization    Procedure: Left heart cath;  Surgeon: Sujit Chaudhry MD;  Location: [facility] CATH;  Service: Cardiology;   Laterality: Right;    Left heart catheterization    Procedure: Left heart cath;  Surgeon: Sujit Chaudhry MD;  Location: [facility] CATH;  Service: Cardiology;  Laterality: Left;    Left heart catheterization    Procedure: Left heart cath;  Surgeon: Jeff Guzman MD;  Location: [facility] CATH;  Service: Cardiology;;    Lumbar laminectomy with discectomy    Procedure: LAMINECTOMY, SPINE, LUMBAR, WITH DISCECTOMY;  Surgeon: Vimal Villegas MD;  Location: [facility] OR;  Service: Neurosurgery;  Laterality: N/A;  left L5-S1  Laminectomy L4-5    Lymphadenectomy    Mastectomy    Mastectomy, partial    Procedure: MASTECTOMY, PARTIAL-Left ultrasound guided;  Surgeon: Rosa Maradiaga MD;  Location: [facility] OR;  Service: General;  Laterality: Left;    Right heart catheterization    Procedure: INSERTION, CATHETER, RIGHT HEART;  Surgeon: Sujit Chaudhry MD;  Location: [facility] CATH;  Service: Cardiology;  Laterality: Right;    Right heart catheterization    Procedure: Right heart cath;  Surgeon: Jeff Guzman MD;  Location: [facility] CATH;  Service: Cardiology;;    [place] lymph node biopsy    Procedure: BIOPSY, LYMPH NODE, SENTINEL-Left;  Surgeon: Rosa Maradiaga MD;  Location: [facility] OR;  Service: General;  Laterality: Left;    Skin biopsy    Splenectomy, total    Transforaminal epidural injection of steroid    Procedure: Left L5/S1 TF SKIP with local;  Surgeon: Canelo Niño MD;  Location: [facility] PAIN MGT;  Service: Pain Management;  Laterality: Left;    Tumor removal    neck- lymphoma    Venogram, ep lab    Procedure: Venogram, EP Lab;  Surgeon: Palomo Gill MD;  Location: [facility] EP LAB;  Service: Cardiology;  Laterality: N/A;  DCM, CHF, Venogram- Bilateral, SK, 3 Prep *hx of bilateral mastectomy* *Contrast Prep*        Family History   Problem Relation Name Age of Onset    Hypertension Mother      Hypertension Father      Cancer Neg Hx       Social History     Tobacco Use    Smoking status: Former     Current packs/day: 0.00     Average packs/day: 1 pack/day  for 20.0 years (20.0 ttl pk-yrs)     Types: Cigarettes     Start date: 1961     Quit date: 1981     Years since quittin.9    Smokeless tobacco: Never   Substance Use Topics    Alcohol use: No     Comment: 2 drinks/month; none 72 hrs prior to surgery    Drug use: No       Current Facility-Administered Medications   Medication Dose Route Frequency Provider Last Rate Last Admin    sodium chloride 0.9% flush 10 mL  10 mL Intravenous PRN Manuel Sims MD         Current Outpatient Medications   Medication Sig Dispense Refill    azaTHIOprine (IMURAN) 50 mg Tab Take 50 mg by mouth once daily.      calcium carbonate/vitamin D3 (CALCIUM WITH VITAMIN D3 ORAL) Take 1 Dose by mouth 2 (two) times a day. 1 dose = 2 gummies      cholecalciferol, vitamin D3, (VITAMIN D3 ORAL) Take 1 Dose by mouth 2 (two) times a day. 1 dose = 1 gummy      clopidogreL (PLAVIX) 75 mg tablet Take 1 tablet (75 mg total) by mouth once daily. 90 tablet 3    empagliflozin (JARDIANCE) 10 mg tablet Take 1 tablet (10 mg total) by mouth once daily. 90 tablet 3    EScitalopram oxalate (LEXAPRO) 10 MG tablet Take 1 tablet (10 mg total) by mouth once daily. 90 tablet 3    levothyroxine (SYNTHROID) 75 MCG tablet Take 1 tablet (75 mcg total) by mouth before breakfast. 90 tablet 3    losartan (COZAAR) 25 MG tablet Take 0.5 tablets (12.5 mg total) by mouth once daily. 90 tablet 3    metoprolol succinate (TOPROL-XL) 25 MG 24 hr tablet Take 1 tablet (25 mg total) by mouth every evening. 90 tablet 3    pantoprazole (PROTONIX) 40 MG tablet Take 1 tablet (40 mg total) by mouth once daily. 30 tablet 11    rosuvastatin (CRESTOR) 20 MG tablet Take 0.5 tablets (10 mg total) by mouth once daily. 90 tablet 3    ALPRAZolam (XANAX) 1 MG tablet Take 1 mg by mouth nightly as needed for Anxiety. (Patient not taking: Reported on 2024)      hypromellose (TEARS LUBRICANT EYE DROP OPHT) Place 1 drop into both eyes daily as needed (dry eyes). (Patient not  taking: Reported on 9/30/2024)      torsemide (DEMADEX) 20 MG Tab Take 1 tablet (20 mg total) by mouth once daily. 30 tablet 11     Facility-Administered Medications Ordered in Other Visits   Medication Dose Route Frequency Provider Last Rate Last Admin    acetaminophen tablet 650 mg  650 mg Oral Q8H PRN Gilmar Holloway MD        atorvastatin tablet 40 mg  40 mg Oral Daily Gilmar Holloway MD        azaTHIOprine tablet 50 mg  50 mg Oral Daily Gilmar Holloway MD        clopidogreL tablet 75 mg  75 mg Oral Daily Gilmar Holloway MD        empagliflozin (Jardiance) tablet 10 mg  10 mg Oral Daily Gilmar Holloway MD        EScitalopram oxalate tablet 10 mg  10 mg Oral Daily Gilmar Holloway MD        [START ON 10/1/2024] furosemide injection 40 mg  40 mg Intravenous Daily Madeline Desouza PA        HYDROcodone-acetaminophen 5-325 mg per tablet 1 tablet  1 tablet Oral Q4H PRN Gilmar Holloway MD        [START ON 10/1/2024] levothyroxine tablet 75 mcg  75 mcg Oral Before breakfast Gilmar Holloway MD        losartan split tablet 12.5 mg  12.5 mg Oral Daily Gilmar Holloway MD        melatonin tablet 6 mg  6 mg Oral Nightly PRN Gilmar Holloway MD        metoprolol succinate (TOPROL-XL) 24 hr tablet 25 mg  25 mg Oral QHS Gilmar Holloway MD        ondansetron injection 4 mg  4 mg Intravenous Q8H PRN Gilmar Holloway MD        pantoprazole EC tablet 40 mg  40 mg Oral Daily Gilmar Holloway MD        sodium chloride 0.9% flush 10 mL  10 mL Intravenous PRN Gilmar Holloway MD            Objective:      Physical Exam  General:  No acute distress, frail and thin   Lungs:  Mild rales left base  Heart:  Regular rate and rhythm.  3/6 late-peaking systolic ejection murmur over the upper right sternal border with preserved S2   Abdomen:  Soft, nontender, nondistended   Extremities:  No pitting edema   Neuro:  Moving all extremities x4      Lab Review   Lab Results   Component Value Date    WBC 11.42  2024    HGB 15.3 2024    HCT 46.4 2024    MCV 97 2024     2024         BMP  Lab Results   Component Value Date     2024    K 3.8 2024     2024    CO2 29 2024    BUN 19 (H) 2024    CREATININE 0.57 2024    CALCIUM 9.0 2024    ANIONGAP 12 2024    ESTGFRAFRICA >60 2022    EGFRNONAA >60 2022       Lab Results   Component Value Date    LABPROT 11.4 2024    ALBUMIN 5.1 2024       Lab Results   Component Value Date    ALT 27 2024    AST 53 (H) 2024    ALKPHOS 81 2024    BILITOT 1.4 (H) 2024       Lab Results   Component Value Date    TSH 1.328 2023       Lab Results   Component Value Date    CHOL 112 (L) 2023    CHOL 130 2022    CHOL 166 2020     Lab Results   Component Value Date    HDL 39 (L) 2023    HDL 30 (L) 2022    HDL 53 2020     Lab Results   Component Value Date    LDLCALC 58.8 (L) 2023    LDLCALC 76.2 2022    LDLCALC 89.8 2020     Lab Results   Component Value Date    TRIG 71 2023    TRIG 119 2022    TRIG 116 2020     Lab Results   Component Value Date    CHOLHDL 34.8 2023    CHOLHDL 23.1 2022    CHOLHDL 31.9 2020      LVOT VTI AoV VTI AoV MG JORI      0 11.1 34.41 11 [id number]  LVOT area 2.8   5 13.4 35.7 10 1.48944      10 16.3 41.9 14 1.79027      15 16 57 22 0.019781      20 20.4 62.5 29 0.25422      reserve 0.538046 0.046162           Latest Reference Range & Units 18 11:18 23 10:29 23 10:55 24 13:29   BNP 0 - 99 pg/mL 156 (H) 787 (H) 1,077 (H) 1,515 (H)   (H): Data is abnormally high    EKG 2023:  Normal sinus rhythm with left bundle-branch block,       EKG today:  AsBiVp. PVCs inferior axis and RBBB morphology (looks similar to paced QRS in V1)     Assessment & Plan:     1. Acute on chronic HFrEF (heart failure with reduced ejection  fraction)        2. Prosthetic valve dysfunction, sequela        3. S/P AVR (aortic valve replacement)        4. Complete left bundle branch block (LBBB)        5. Carotid stenosis, bilateral        6. Presence of cardiac resynchronization therapy pacemaker (CRT-P)             This is a 69 y.o. pleasant  female with NYHA class 3 functional class and severely reduced LVEF.  She is adequately revascularized and on maximum tolerated heart failure medical therapy.  She has severe prosthetic aortic valve dysfunction due to a combination of valve degeneration and patient prosthesis mismatch.  Despite her severely reduced LVEF she was able to mount a maximum velocity of 3.7 and has excellent contractile reserve.  I personally performed measurements from her dobutamine stress echo and they are summarized above.  The measurements are consistent with adequate contractile reserve and severe obstruction at her aortic valve.     After multidisciplinary discussion we proceeded with CRT-P placement at the end of February.  She noticed early improvement but unfortunately reversed course in the past month or so. Her most recent TTE reveals worsened LV function now with EF 10%. RV and aortic valve parameters remain unchanged.     Assessment & Plan    IMPRESSION:  - Patient's heart function has worsened, with pleural effusion present, explaining recent symptoms  - Biventricular pacemaker upgrade did not improve heart function as hoped  - Patient not a candidate for open heart surgery, heart transplant, or LVAD due to radiation history and right heart dysfunction  - Considering TAVR as only remaining interventional option, with ?60% chance of improvement but elevated procedural risk (10-20% mortality). Her TAVR CTAs were personally analyzed and anatomy is amenable to TF TAVR with a 23mm CRYSTAL 3 Ultra  - Alternative is palliative care with medical management for symptoms  - Prognosis without intervention estimated at less than  6 months, potentially as short as a few weeks  - Plan to admit patient for thoracentesis, right heart catheterization, and IV diuretic therapy to guide further management    HEART FAILURE:   Educated patient on current cardiac status, including worsened heart function and pleural effusion.   Discussed limited treatment options, including elevated-risk TAVR and palliative care.   Explained potential outcomes and risks associated with TAVR procedure.   Informed about prognosis and end-of-life considerations if opting for palliative care.   Ms. Root to discuss treatment options with family members.   Recommend considering quality of life and treatment preferences when deciding between TAVR and palliative care.   Right heart catheterization.   IV diuretic therapy as needed.    PLEURAL EFFUSION:   Thoracentesis to drain pleural fluid.    MEDICATIONS/SUPPLEMENTS:   Discontinued Lasix.   Started torsemide 20 mg, take 1 tablet when arriving home.    HOSPITAL ADMISSION:   Admission to [facility] Emergency Room for further management.   Hospital staff to be notified of patient's expected arrival.                I appreciate the opportunity to participate in Dulce Root 's care today.  Please follow up with me in six months      Jeff Guzman MD, [facility]  Interventional Cardiology/Structural Heart Disease  [facility]  Office: [phone]     Parts of this note were completed using voice recognition software. Please excuse any misspellings or syntax errors and reach out to me with questions.

## 2024-10-01 DIAGNOSIS — I27.20 PULMONARY HYPERTENSION: ICD-10-CM

## 2024-10-01 DIAGNOSIS — I10 ESSENTIAL HYPERTENSION: Primary | ICD-10-CM

## 2024-10-01 DIAGNOSIS — I50.22 CHRONIC HFREF (HEART FAILURE WITH REDUCED EJECTION FRACTION): ICD-10-CM

## 2024-10-01 DIAGNOSIS — I35.0 NONRHEUMATIC AORTIC VALVE STENOSIS: ICD-10-CM

## 2024-10-02 PROBLEM — Z51.5 PALLIATIVE CARE BY SPECIALIST: Status: ACTIVE | Noted: 2024-10-02

## 2024-10-03 ENCOUNTER — TELEPHONE (OUTPATIENT)
Dept: CARDIOLOGY | Facility: CLINIC | Age: 69
End: 2024-10-03
Payer: MEDICARE

## 2024-10-03 ENCOUNTER — PATIENT MESSAGE (OUTPATIENT)
Dept: CARDIOLOGY | Facility: CLINIC | Age: 69
End: 2024-10-03
Payer: MEDICARE

## 2024-10-04 ENCOUNTER — LAB VISIT (OUTPATIENT)
Dept: LAB | Facility: HOSPITAL | Age: 69
End: 2024-10-04
Attending: INTERNAL MEDICINE
Payer: MEDICARE

## 2024-10-04 ENCOUNTER — PATIENT MESSAGE (OUTPATIENT)
Dept: CARDIOLOGY | Facility: CLINIC | Age: 69
End: 2024-10-04
Payer: MEDICARE

## 2024-10-04 DIAGNOSIS — I10 ESSENTIAL HYPERTENSION: ICD-10-CM

## 2024-10-04 DIAGNOSIS — I27.20 PULMONARY HYPERTENSION: ICD-10-CM

## 2024-10-04 DIAGNOSIS — I50.22 CHRONIC HFREF (HEART FAILURE WITH REDUCED EJECTION FRACTION): ICD-10-CM

## 2024-10-04 DIAGNOSIS — I35.0 NONRHEUMATIC AORTIC VALVE STENOSIS: ICD-10-CM

## 2024-10-04 LAB
ANION GAP SERPL CALC-SCNC: 12 MMOL/L (ref 8–16)
BUN SERPL-MCNC: 24 MG/DL (ref 8–23)
CALCIUM SERPL-MCNC: 10 MG/DL (ref 8.7–10.5)
CHLORIDE SERPL-SCNC: 99 MMOL/L (ref 95–110)
CO2 SERPL-SCNC: 29 MMOL/L (ref 23–29)
CREAT SERPL-MCNC: 0.9 MG/DL (ref 0.5–1.4)
EST. GFR  (NO RACE VARIABLE): >60 ML/MIN/1.73 M^2
GLUCOSE SERPL-MCNC: 95 MG/DL (ref 70–110)
POTASSIUM SERPL-SCNC: 3.7 MMOL/L (ref 3.5–5.1)
SODIUM SERPL-SCNC: 140 MMOL/L (ref 136–145)

## 2024-10-04 PROCEDURE — 36415 COLL VENOUS BLD VENIPUNCTURE: CPT | Mod: PO | Performed by: INTERNAL MEDICINE

## 2024-10-04 PROCEDURE — 80048 BASIC METABOLIC PNL TOTAL CA: CPT | Performed by: INTERNAL MEDICINE

## 2024-10-04 NOTE — TELEPHONE ENCOUNTER
We made recommendations about this yesterday.  Decrease metoprolol to 12.5 mg once daily.  Continue valsartan 40 mg twice daily.  Hold valsartan for systolic less than 95.

## 2024-10-06 DIAGNOSIS — I10 ESSENTIAL HYPERTENSION: Primary | ICD-10-CM

## 2024-10-07 RX ORDER — METOPROLOL SUCCINATE 25 MG/1
25 TABLET, EXTENDED RELEASE ORAL NIGHTLY
Qty: 90 TABLET | Refills: 3 | Status: SHIPPED | OUTPATIENT
Start: 2024-10-07

## 2024-10-07 NOTE — TELEPHONE ENCOUNTER
----- Message from Jeff Guzman MD sent at 10/7/2024  7:19 AM CDT -----  Labs stable. Continue as is

## 2024-10-11 ENCOUNTER — TELEPHONE (OUTPATIENT)
Dept: CARDIOLOGY | Facility: CLINIC | Age: 69
End: 2024-10-11
Payer: MEDICARE

## 2024-10-14 ENCOUNTER — TELEPHONE (OUTPATIENT)
Dept: CARDIOLOGY | Facility: CLINIC | Age: 69
End: 2024-10-14

## 2024-10-14 ENCOUNTER — OFFICE VISIT (OUTPATIENT)
Dept: CARDIOLOGY | Facility: CLINIC | Age: 69
End: 2024-10-14
Payer: MEDICARE

## 2024-10-14 VITALS
HEART RATE: 82 BPM | WEIGHT: 100.5 LBS | SYSTOLIC BLOOD PRESSURE: 98 MMHG | HEIGHT: 62 IN | DIASTOLIC BLOOD PRESSURE: 59 MMHG | BODY MASS INDEX: 18.5 KG/M2

## 2024-10-14 DIAGNOSIS — T82.09XS PROSTHETIC VALVE DYSFUNCTION, SEQUELA: ICD-10-CM

## 2024-10-14 DIAGNOSIS — I50.22 CHRONIC HFREF (HEART FAILURE WITH REDUCED EJECTION FRACTION): Primary | ICD-10-CM

## 2024-10-14 DIAGNOSIS — I65.23 CAROTID STENOSIS, BILATERAL: ICD-10-CM

## 2024-10-14 DIAGNOSIS — Z95.1 S/P CABG X 2: ICD-10-CM

## 2024-10-14 DIAGNOSIS — Z95.2 S/P AVR (AORTIC VALVE REPLACEMENT): ICD-10-CM

## 2024-10-14 DIAGNOSIS — I44.7 LBBB (LEFT BUNDLE BRANCH BLOCK): ICD-10-CM

## 2024-10-14 DIAGNOSIS — Z95.0 PRESENCE OF CARDIAC RESYNCHRONIZATION THERAPY PACEMAKER (CRT-P): ICD-10-CM

## 2024-10-14 PROCEDURE — 99213 OFFICE O/P EST LOW 20 MIN: CPT | Mod: PBBFAC,PO | Performed by: INTERNAL MEDICINE

## 2024-10-14 PROCEDURE — 99214 OFFICE O/P EST MOD 30 MIN: CPT | Mod: S$PBB,,, | Performed by: INTERNAL MEDICINE

## 2024-10-14 PROCEDURE — 99999 PR PBB SHADOW E&M-EST. PATIENT-LVL III: CPT | Mod: PBBFAC,,, | Performed by: INTERNAL MEDICINE

## 2024-10-14 RX ORDER — DIGOXIN 125 MCG
125 TABLET ORAL DAILY
Qty: 30 TABLET | Refills: 11 | Status: SHIPPED | OUTPATIENT
Start: 2024-10-14 | End: 2025-10-14

## 2024-10-14 NOTE — PROGRESS NOTES
Structural Cardiology Clinic Note  Date: 10/14/24    Patient: Dulce Root, 1955, [id number]  Primary Care Provider: Patrick Hernandez MD     Chief Complaint/Reason for Referral: prosthetic valve dysfunction     Subjective:        History of Present Illness    Ms. Root presents today for follow up.    She reports adjusting her valsartan dosage to half a pill daily, down from the prescribed twice-daily regimen. She has changed the timing of her metoprolol intake from morning to nighttime, taking it along with torsemide. This combination lowers her blood pressure effectively, allowing her to reduce the evening dose to half. She has been monitoring her blood pressure daily and adjusting medication intake based on the readings. She expresses concern about lightheadedness in relation to blood pressure management. She is willing to start a new medication to reduce the risk of heart failure hospitalization, understanding that her condition has progressed to a stage where such intervention is necessary. She expresses uncertainty about the potential benefits of valve replacement surgery, recalling previous discussions where the effectiveness of the procedure was questioned. She acknowledges undergoing a stress echocardiogram last year to evaluate cardiac function and reserve, and understands that a repeat may be necessary if valve replacement surgery is being considered.    Current medications include Imuran, Plavix, Jardiance, and Torsemide 10 mg daily. She reports apprehension about taking multiple medications but acknowledges previous experience with one of the medications, suggesting she tolerated it well in the past.    She notes a slight increase in weight, reporting a current weight of approximately 98-99 lbs without clothes.    She reports improvement in her functional status, stating she is doing better and is able to perform normal daily activities. She has been doing well on her daily loop diuretic  regimen.      ROS:  10-system ROS is negative unless otherwise indicated in the HPI.          Focused Past History includes:  CAD status post CABG and AVR with Medium Perceval in 2017 (at [facility] [place]   Cath October 2023: Severely elevated right and left filling pressures with severely reduced cardiac output.  Widely patent SVG that supplies most of the LAD distribution; occluded SVG to RCA (Widely patent RCA).  Equivocal ISR in the ostial circumflex, negative by IFR.    Dobutamine stress echo December 2023:  LVEF 15%.  Maximum stroke volume index reached 34.5.  Severe aortic stenosis with area 0.86, peak velocity 3.7, mean gradient 33, dimensionless index 0.3.  Achieved 78% MPHR  Discussed with Dr. Reynoso and Dr. Gill.  We decided to proceed with CRT P first to improve EF and monitor the surgical aortic valve function.  Planning for [facility] has been completed  TTE 6/2024: EF 25-30%. Moderate RV dysfunction. YIN Vmax 3.14, MG 21, DI 0.28, AT 97, trace AR. Moderate MR. Moderate TR. PASP 65  TTE September 2024:  EF 10-15%.  Moderate RV dysfunction. YIN peak velocity 2.7, mean gradient 18, DI 0.28, trace AR.  Moderate-to-severe MR and TR.  PASP 65  RHC 10/2024:  RA 1, wedge 12, PA 40/18 with a mean of 25, PVR 4.4, cardiac index 2.08 by thermodilution, 1.91 by Angelo  Status post CRT-P 02/27/2024 (Dr. Gill)  Chronic HFrEF  History of Hodgkin's lymphoma status post chemo and radiation - 1991  Carotid stenosis.   Duplex ultrasound 1/2024:  ANNA 50-69%, LICA 70-99%. 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 Oral.   Former smoker 15 p-y, quit in 1981     Review of Systems  Constitutional: negative for fevers, night sweats, and weight loss  Eyes: negative for visual disturbance, diplopia  Respiratory: negative for cough, hemoptysis, sputum, and wheezing  Cardiovascular: see HPI  Gastrointestinal: negative for abdominal  pain, bright red blood per rectum, change in bowel habits, dysphagia, melena, and reflux symptoms  Genitourinary:negative for dysuria, frequency, and hematuria  Hematologic/lymphatic: negative for bleeding, easy bruising, and lymphadenopathy  Musculoskeletal:negative for arthralgias, back pain, and myalgias  Neurological: negative for gait problems, paresthesia, speech problems, vertigo, and weakness  Behavioral/Psych: negative for excessive alcohol consumption, illegal drug usage, and sleep disturbance    -------------------------------------    Allergy    Anticoagulant long-term use    Plavix    Breast cancer    Carotid stenosis, bilateral    CHF (congestive heart failure)    Coronary artery disease    Coronary artery disease involving coronary bypass graft of native heart without angina pectoris    2/19  1.  Left main is a small vessel with a 60%-65% ostial lesion. 2.  LAD is a medium-sized vessel diffuse 70% proximally  Ramus intermedius is a medium size vessel with a 40%-50% ostial lesion. 3.  Circumflex 60%-70% ostial  remainder of circumflex and obtuse marginal normal in appearance. Right coronary artery is normal size vessel, short discrete 70%mid 4.  Vein graft to right coronary is occ    Coronary artery disease involving native coronary artery of native heart without angina pectoris    DCIS (ductal carcinoma in situ) of breast    Encounter for blood transfusion    Epigastric pain    Essential hypertension    GERD (gastroesophageal reflux disease)    GI bleed    Hematemesis with nausea    History of pemphigoid    Hodgkin lymphoma    Hx of Hodgkin's disease - s/p chemo and radiation    Hyperlipidemia    Hyperlipidemia    The patient presents with hyperlipidemia.  The patient reports tolerating the medication well and is in excellent compliance.  There have been no medication side effects.  The patient denies chest pain, neuropathy, and myalgias.  The patient has reduced fat intake and has been exercising.   Current treatment has included the medications listed in the med card.   Lab Results Component Value Date  CH    Hypertension    LBBB (left bundle branch block)    Malignant neoplasm of central portion of left breast in female, estrogen receptor negative    Osteoporosis    Paroxysmal atrial fibrillation - single post-op episode    Pemphigoid    Pemphigoid    pt receives IVIG infusions    Pulmonary hypertension    S/P AVR (aortic valve replacement) - pericardial valve    Perceval aortic tissue valve PVS23. 23mm    serial # [id number]    S/P CABG x 2    8/17 CABG X 2 with SVG-LAD and SVG-distal RCA  SVG LAD due to absent LIMA after chest radiation and lymph node biopsy     Stented coronary artery    She had 2 stents placed in 2/2019.  She has had CAD and bypasses in the past in 2017.      Thyroid disease     ----------------------------    Angiogram, coronary, with left heart catheterization    Procedure: Left Heart Cath w/Graft study;  Surgeon: Jeff Guzman MD;  Location: [facility] CATH;  Service: Cardiology;;    Aortography    Procedure: AO Root angiogram;  Surgeon: Jeff Guzman MD;  Location: [facility] CATH;  Service: Cardiology;  Laterality: N/A;    Arteriography of aortic root    Procedure: ARTERIOGRAM, AORTIC ROOT;  Surgeon: Sujit Chaudhry MD;  Location: [facility] CATH;  Service: Cardiology;  Laterality: N/A;    Axillary node dissection    Procedure: LYMPHADENECTOMY, AXILLARY-Left;  Surgeon: Rosa Maradiaga MD;  Location: [facility] OR;  Service: General;  Laterality: Left;    Breast biopsy    Breast reconstruction    bilateral mastectomy    Cardiac catheterization    stents x 2    Cardiac surgery    Aortic valve replacement , CABG 2 vessel    Cardiac valve replacement    aortic valve, bovine per pt    Coronary angiography    Procedure: ANGIOGRAM, CORONARY ARTERY;  Surgeon: Sujit Chaudhry MD;  Location: [facility] CATH;  Service: Cardiology;  Laterality: N/A;    Coronary angiography    Procedure: ANGIOGRAM, CORONARY ARTERY;  Surgeon: Sujit DEE  MD Richa;  Location: [facility] CATH;  Service: Cardiology;  Laterality: N/A;    Coronary bypass graft angiography    Procedure: Bypass graft study;  Surgeon: Sujit Chaudhry MD;  [facility];  Service: Cardiology;;    Cosmetic surgery    bereast reconstruction    Esophagogastroduodenoscopy    Procedure: EGD (ESOPHAGOGASTRODUODENOSCOPY);  Surgeon: Tracy Flower MD;  Location: [facility] ENDO;  Service: Endoscopy;  Laterality: N/A;    Esophagogastroduodenoscopy    Procedure: EGD (ESOPHAGOGASTRODUODENOSCOPY);  Surgeon: Tracy Flower MD;  Location: [facility];  Service: Endoscopy;  Laterality: N/A;    Esophagogastroduodenoscopy    Procedure: EGD (ESOPHAGOGASTRODUODENOSCOPY);  Surgeon: Tracy Flower MD;  Location: [facility];  Service: Endoscopy;  Laterality: N/A;    Esophagogastroduodenoscopy    Procedure: EGD (ESOPHAGOGASTRODUODENOSCOPY);  Surgeon: Mk Sanchez MD;  Location: [facility] ([address] [place] [address]);  Service: Endoscopy;  Laterality: N/A;  inst via portal  cardiac clearance-see encounter dated 5/31/23  precall confirmed 6/2 EB    Eye surgery    eye lids    Fractional flow reserve (ffr), coronary    Procedure: (IFR) Ramus;  Surgeon: Jeff Guzman MD;  Location: [facility] CATH;  Service: Cardiology;;    Implantation of biventricular heart pacemaker    Procedure: INSERTION, PACEMAKER, BIVENTRICULAR;  Surgeon: Palomo Gill MD;  Location: [facility] EP LAB;  Service: Cardiology;  Laterality: N/A;  DCM, LBBB, Right sided CRT-P vs LBAP lead, MDT, MAC, SK, 3 Prep *Venogram 2/19/24*    Injection for sentinel node identification    Procedure: INJECTION, FOR SENTINEL NODE IDENTIFICATION-Left;  Surgeon: Rosa Maradiaga MD;  Location: [facility] OR;  Service: General;  Laterality: Left;    Instantaneous wave-free ratio (ifr)    Procedure: (IFR) LCX;  Surgeon: Jeff Guzman MD;  Location: [facility] CATH;  Service: Cardiology;;    Left heart catheterization    Procedure: Left heart cath;  Surgeon: Sujit Chaudhry MD;  Location: [facility] CATH;  Service:  Cardiology;  Laterality: Right;    Left heart catheterization    Procedure: Left heart cath;  Surgeon: Sujit Chaudhry MD;  Location: [facility] CATH;  Service: Cardiology;  Laterality: Left;    Left heart catheterization    Procedure: Left heart cath;  Surgeon: Jeff Guzman MD;  Location: [facility] CATH;  Service: Cardiology;;    Lumbar laminectomy with discectomy    Procedure: LAMINECTOMY, SPINE, LUMBAR, WITH DISCECTOMY;  Surgeon: Vimal Villegas MD;  Location: [facility] OR;  Service: Neurosurgery;  Laterality: N/A;  left L5-S1  Laminectomy L4-5    Lymphadenectomy    Mastectomy    Mastectomy, partial    Procedure: MASTECTOMY, PARTIAL-Left ultrasound guided;  Surgeon: Rosa Maradiaga MD;  Location: [facility] OR;  Service: General;  Laterality: Left;    Right heart catheterization    Procedure: INSERTION, CATHETER, RIGHT HEART;  Surgeon: Sujit Chaudhry MD;  Location: [facility] CATH;  Service: Cardiology;  Laterality: Right;    Right heart catheterization    Procedure: Right heart cath;  Surgeon: Jeff Guzman MD;  Location: [facility] CATH;  Service: Cardiology;;    Right heart catheterization    Procedure: Right heart cath;  Surgeon: Jeff Guzman MD;  Location: [facility] CATH;  Service: Cardiology;;    [place] lymph node biopsy    Procedure: BIOPSY, LYMPH NODE, SENTINEL-Left;  Surgeon: Rosa Maradiaga MD;  Location: [facility] OR;  Service: General;  Laterality: Left;    Skin biopsy    Splenectomy, total    Transforaminal epidural injection of steroid    Procedure: Left L5/S1 TF SKIP with local;  Surgeon: Canelo Niño MD;  Location: [facility] PAIN MGT;  Service: Pain Management;  Laterality: Left;    Tumor removal    neck- lymphoma    Venogram, ep lab    Procedure: Venogram, EP Lab;  Surgeon: Palomo Gill MD;  Location: [facility] EP LAB;  Service: Cardiology;  Laterality: N/A;  DCM, CHF, Venogram- Bilateral, SK, 3 Prep *hx of bilateral mastectomy* *Contrast Prep*        Family History   Problem Relation Name Age of Onset    Hypertension Mother      Hypertension  Father      Cancer Neg Hx       Social History     Tobacco Use    Smoking status: Former     Current packs/day: 0.00     Average packs/day: 1 pack/day for 20.0 years (20.0 ttl pk-yrs)     Types: Cigarettes     Start date: 1961     Quit date: 1981     Years since quittin.9    Smokeless tobacco: Never   Substance Use Topics    Alcohol use: No     Comment: 2 drinks/month; none 72 hrs prior to surgery    Drug use: No       Current Outpatient Medications   Medication Sig Dispense Refill    acetaminophen (TYLENOL) 500 MG tablet Take 1,000 mg by mouth daily as needed for Pain.      azaTHIOprine (IMURAN) 50 mg Tab Take 50 mg by mouth once daily.      brimonidine 0.2% (ALPHAGAN) 0.2 % Drop Place 1 drop into both eyes 2 (two) times a day.      calcium carbonate/vitamin D3 (CALCIUM WITH VITAMIN D3 ORAL) Take 1 Dose by mouth 2 (two) times a day. 1 dose = 1 gummy      cholecalciferol, vitamin D3, (VITAMIN D3 ORAL) Take 1 Dose by mouth 2 (two) times a day. 1 dose = 1 gummy      clopidogreL (PLAVIX) 75 mg tablet Take 1 tablet (75 mg total) by mouth once daily. 90 tablet 3    diphenhydrAMINE-acetaminophen (TYLENOL PM)  mg Tab Take 1 tablet by mouth nightly as needed (for sleep).      empagliflozin (JARDIANCE) 10 mg tablet Take 1 tablet (10 mg total) by mouth once daily. 90 tablet 3    EScitalopram oxalate (LEXAPRO) 10 MG tablet Take 1 tablet (10 mg total) by mouth once daily. 90 tablet 3    levothyroxine (SYNTHROID) 75 MCG tablet Take 1 tablet (75 mcg total) by mouth before breakfast. 90 tablet 3    metoprolol succinate (TOPROL-XL) 25 MG 24 hr tablet TAKE 1 TABLET EVERY EVENING (Patient taking differently: Take 12.5 mg by mouth once daily.) 90 tablet 3    mv-min/iron/folic/calcium/vitK (WOMEN'S MULTIVITAMIN ORAL) Take 1 Dose by mouth once daily. 1 dose = 1 gummy      pantoprazole (PROTONIX) 40 MG tablet Take 1 tablet (40 mg total) by mouth once daily. 30 tablet 11    rosuvastatin (CRESTOR) 20 MG tablet Take 0.5  tablets (10 mg total) by mouth once daily. 90 tablet 3    torsemide (DEMADEX) 10 MG Tab Take 1 tablet (10 mg total) by mouth once daily. 30 tablet 11    valsartan (DIOVAN) 40 MG tablet Take 1 tablet (40 mg total) by mouth 2 (two) times daily. (Patient taking differently: Take 20 mg by mouth every evening.) 60 tablet 0    xylitol (XYLIMELTS MM) 2 tablets by Mucous Membrane route every evening.      digoxin (LANOXIN) 125 mcg tablet Take 1 tablet (125 mcg total) by mouth once daily. 30 tablet 11     No current facility-administered medications for this visit.        Objective:      Physical Exam  General:  No acute distress, frail and thin   Lungs:  Mild rales left base  Heart:  Regular rate and rhythm.  3/6 late-peaking systolic ejection murmur over the upper right sternal border with preserved S2   Abdomen:  Soft, nontender, nondistended   Extremities:  No pitting edema   Neuro:  Moving all extremities x4      Lab Review   Lab Results   Component Value Date    WBC 9.32 10/02/2024    HGB 13.9 10/02/2024    HCT 41.9 10/02/2024    MCV 94 10/02/2024     10/02/2024         BMP  Lab Results   Component Value Date     10/04/2024    K 3.7 10/04/2024    CL 99 10/04/2024    CO2 29 10/04/2024    BUN 24 (H) 10/04/2024    CREATININE 0.9 10/04/2024    CALCIUM 10.0 10/04/2024    ANIONGAP 12 10/04/2024    ESTGFRAFRICA >60 04/01/2022    EGFRNONAA >60 04/01/2022       Lab Results   Component Value Date    LABPROT 11.4 02/22/2024    ALBUMIN 5.1 09/30/2024       Lab Results   Component Value Date    ALT 27 09/30/2024    AST 53 (H) 09/30/2024    ALKPHOS 81 09/30/2024    BILITOT 1.4 (H) 09/30/2024       Lab Results   Component Value Date    TSH 1.570 10/02/2024       Lab Results   Component Value Date    CHOL 141 10/02/2024    CHOL 112 (L) 02/24/2023    CHOL 130 02/22/2022     Lab Results   Component Value Date    HDL 45 10/02/2024    HDL 39 (L) 02/24/2023    HDL 30 (L) 02/22/2022     Lab Results   Component Value Date     LDLCALC 81.8 10/02/2024    LDLCALC 58.8 (L) 2023    LDLCALC 76.2 2022     Lab Results   Component Value Date    TRIG 71 10/02/2024    TRIG 71 2023    TRIG 119 2022     Lab Results   Component Value Date    CHOLHDL 31.9 10/02/2024    CHOLHDL 34.8 2023    CHOLHDL 23.1 2022      LVOT VTI AoV VTI AoV MG JORI      0 11.1 34.41 11 [id number]  LVOT area 2.8   5 13.4 35.7 10 1.57002      10 16.3 41.9 14 1.23320      15 16 57 22 0.731771      20 20.4 62.5 29 0.32513      reserve 0.892454 0.663259           [facility] Reference Range & Units 18 11:18 23 10:29 23 10:55 24 13:29   BNP 0 - 99 pg/mL 156 (H) 787 (H) 1,077 (H) 1,515 (H)   (H): Data is abnormally high    EKG 2023:  Normal sinus rhythm with left bundle-branch block,       EKG 10/1/24:  AsBiVp. PVCs inferior axis and RBBB morphology (looks similar to paced QRS in V1)     Assessment & Plan:     1. Chronic HFrEF (heart failure with reduced ejection fraction)  Stress Echo Which stress agent will be used? Pharmacological; Color Flow Doppler? No    Digoxin Level      2. Prosthetic valve dysfunction, sequela  Stress Echo Which stress agent will be used? Pharmacological; Color Flow Doppler? No    Digoxin Level      3. S/P CABG x 2        4. LBBB (left bundle branch block)        5. S/P AVR (aortic valve replacement)        6. Carotid stenosis, bilateral        7. Presence of cardiac resynchronization therapy pacemaker (CRT-P)               This is a 69 y.o. pleasant  female with NYHA class 3 functional class and severely reduced LVEF.  She is adequately revascularized and on maximum tolerated heart failure medical therapy.  She has severe prosthetic aortic valve dysfunction due to a combination of valve degeneration and patient prosthesis mismatch.  Despite her severely reduced LVEF she was able to mount a maximum velocity of 3.7 and has excellent contractile reserve.  I personally performed  measurements from her dobutamine stress echo and they are summarized above.  The measurements are consistent with adequate contractile reserve and severe obstruction at her aortic valve.     After multidisciplinary discussion we proceeded with CRT-P placement at the end of February.  She noticed early improvement but unfortunately reversed course in the past month or so. Her most recent TTE reveals worsened LV function now with EF 10% and required hospitalization for diuresis. RV and aortic valve parameters remain unchanged.     Assessment & Plan    IMPRESSION:   Considering starting digoxin for heart failure management to reduce hospitalization risk   Contemplating addition of vericiguat as an alternative heart failure medication with potentially less impact on blood pressure   Planning to repeat dobutamine stress echocardiogram to assess contractile reserve, informing decision on potential valve replacement   Weighing risks and benefits of transcatheter aortic valve replacement (TAVR), noting elevated risk due to patient's frailty, immunocompromised status, and valve-in-valve procedure    HEART FAILURE:   Educated on signs of digoxin toxicity, including yellow vision, nausea, and stomach upset.   Explained the mechanism of action for digoxin and vericiguat as heart failure medications that strengthen the heart muscle.   Discussed the differences between newer heart failure medications and traditional ones like metoprolol and valsartan in terms of their effects on blood pressure.   Started digoxin at a low dose.   Take in the morning, around 8-11 AM.   After 4 doses (4 days), get blood level checked before the next dose.   Continued metoprolol at half dose in the morning.   Continued valsartan 20 mg, half tablet at night before bed.   Continued torsemide 10 mg daily.   Ordered digoxin level to be drawn on Friday morning before the dose.   Ordered dobutamine stress echocardiogram.   Follow up in 4 weeks if decision  is made to proceed with valve replacement.   Ms. Root to bring family members to next appointment if valve replacement is being considered.   Get digoxin level checked at [facility] on Friday morning before taking the dose.    MEDICATIONS/SUPPLEMENTS:   Continued Imuran as prescribed.   Continued Plavix as prescribed.   Continued Jardiance as prescribed.                     I appreciate the opportunity to participate in Dulce Root 's care today.        Jeff Guzman MD, [facility]  Interventional Cardiology/Structural Heart Disease  [facility]  Office: [phone]     Parts of this note were completed using voice recognition software. Please excuse any misspellings or syntax errors and reach out to me with questions.

## 2024-10-14 NOTE — TELEPHONE ENCOUNTER
Spoke to pt in clinic about digoxin lab visit. Pt stated she would like to schedule labs in [place], however, the soonest available was after 1pm. Informed pt that she would need to get labs done around 10-11am per Dr. Guzman, and gave pt options for times in [place] lab. Pt stated she would rather [place] lab, and to schedule for soonest time available, and she would call to see if she could come earlier.

## 2024-10-14 NOTE — PATIENT INSTRUCTIONS
Start digoxin 125 mcg by mouth once daily - blood level on Friday morning  Stress echo  Return in 4-6 weeks

## 2024-10-15 LAB
OHS CV AF BURDEN PERCENT: < 1
OHS CV BIV PACING PERCENT: 99.8 %
OHS CV DC REMOTE DEVICE TYPE: NORMAL
OHS CV RV PACING PERCENT: 99.82 %

## 2024-10-15 NOTE — PROGRESS NOTES
Subjective:      Patient ID: Dulce Root is a 69 y.o. female.    Chief Complaint: Hospital Follow Up      The patient was recently hospitalized at [facility]  for chf.  The discharge summary from the hospitalization is copied below for reference and completeness.    History of Present Illness    CHIEF COMPLAINT:  Dulce presents today for follow up.    CARDIOVASCULAR:  She reports recent hospitalization for shortness of breath, requiring elevation of her upper body during sleep. She was admitted to telemetry for further cardiovascular evaluation. She has a pacemaker and is being evaluated for an additional heart valve procedure, pending stress test results. She experiences easy shortness of breath with activity and palpitations. She is aware of her low left ventricular function, aortic valve stenosis, and heart failure diagnosis. Her condition is considered high-risk due to previous procedures, including heart valve surgery in 2017.    MEDICATIONS:  She takes digoxin as prescribed with a level check scheduled for Friday. She was changed from losartan to valsartan 40 mg twice daily but has been unable to tolerate the full dose due to low blood pressure. She takes half a tablet at night as needed, sometimes not at all. She also takes torsemide 10 mg daily and metoprolol, reduced to half of the 25 mg dose in the morning. She denies issues with torsemide or metoprolol at current doses.    BLOOD PRESSURE:  She reports low blood pressure readings at home, with a recent reading of 89/56. She has difficulty tolerating prescribed dosages of blood pressure medications, leading to dose adjustments.    WEIGHT MANAGEMENT:  She monitors her weight daily, with a baseline dry weight of approximately 98 lbs at home.    IMMUNIZATIONS:  She is due for multiple immunizations, including influenza, COVID-19, tetanus, shingles, and RSV vaccines. She has not received these vaccinations recently and is at increased risk due to  frequent hospital visits.    SOCIAL HISTORY:  Her  recently experienced significant health issues and passed away.      ROS:  General: -fever, -chills, -fatigue, -weight gain, -weight loss  Eyes: -vision changes, -redness, -discharge  ENT: -ear pain, -nasal congestion, -sore throat  Cardiovascular: -chest pain, +palpitations, -lower extremity edema  Respiratory: -cough, +shortness of breath, -difficulty breathing  Gastrointestinal: -abdominal pain, -nausea, -vomiting, -diarrhea, -constipation, -blood in stool  Genitourinary: -dysuria, -hematuria, -frequency  Musculoskeletal: -joint pain, -muscle pain  Skin: -rash, -lesion  Neurological: -headache, -dizziness, -numbness, -tingling  Psychiatric: -anxiety, -depression, -sleep difficulty         Transitional Care Note    Family and/or Caretaker present at visit?  No.  Diagnostic tests reviewed/disposition: No diagnosic tests pending after this hospitalization.  Disease/illness education: she understands what she had.  Home health/community services discussion/referrals: Patient does not have home health established from hospital visit.  They do not need home health.  If needed, we will set up home health for the patient.   Establishment or re-establishment of referral orders for community resources: No other necessary community resources.   Discussion with other health care providers: No discussion with other health care providers necessary.   Patient Care Team:  Patrick Hernandez MD as PCP - General (Family Medicine)  Gabe Collazo MD (Inactive) as Consulting Physician (Cardiology)  Sujit Chaudhry MD as Physician (Cardiology)  Patrick Hernandez MD as Hypertension Digital Medicine Responsible Provider (Family Medicine)  Jeannine Yepez as Digital Medicine Health   Rosa Maradiaga MD as Consulting Physician (Surgical Oncology)  Rios Sepulveda MD as Consulting Physician (Hematology and Oncology)  Yanira Capps PA-C as Physician Assistant  (Surgery)  Thierry Rangel, RN as Oncology Navigator  Sandra Polo, PharmD as Hypertension Digital Medicine Clinician (Pharmacist)  Program, Medicare Shared Savings as Hypertension Digital Medicine Contract  DISCHARGE SUMMARY:  Gilmar Holloway MD  Physician  Hospital Medicine     Discharge Summary     Signed     Creation Time: 10/2/2024  1:03 PM       [facility] Medicine  Discharge Summary        Patient Name: Dulce Root  MRN: [id number]  ODALYS: [id number]  Patient Class: OP- Outpatient Recovery  Admission Date: 9/30/2024  Hospital Length of Stay: 0 days  Discharge Date and Time:  10/02/2024 1:03 PM  Attending Physician: Gilmar Holloway MD   Discharging Provider: Gilmar Holloway MD  Primary Care Provider: Patrick Hernandez MD     Primary Care Team: Networked reference to record PCT      HPI:   Ms. Root is a 70 yo female who presented to the ER referred by Dr. Omer for further CV evaluation.   She has been co shortness of breath, MARCH, and orthopnea of 3 pillows worsening in the last weeks.   He had a TTE on 9/27/24 with LVEF 10-15% and worsening aortic valve stenosis w JORI VTI 1 cm2.  AVG 18.   She presented to the Cardiology clinic with acute on chronic HFrEF.  Dr. Guzman is her cardiologist and refer her to ER.   She has a PMHx of 2V CABG (SVG to dist RCA, SVG to LAD) with AVR (medium Perceval pericardial valve, bovine) 8/17; ICM-- EF 15% 9/27/24 (peaked at 55-60% 10/17 and has slowly decreased, 25-30% 5/24); Mod RV dysfunction; Sev AS (JORI 1.0, Peak velocity 2.66, Mean gradient 18, DI 0.28; Mod-Sev MR; Sev TR; Severe Pulmonary HTN; Carotid Stenosis.   In the ER she was found with Acute HFrEF.  Admit to telemetry under consultation w Dr. Guzman.      Procedure(s):  Right heart cath       Hospital Course:   Pt was admitted with HFrEF 15% and severe AoS.  She is schedule for LHC this morning.  O2 sat 94% @ RA.   F/u after LHC.   10/02  Pt is stable for discharge home and to fu with PCP and  Cardiologist in 1-4 wks.   [facility] showd RAP 1 PAWP 12, PAP 40/0.  CI 2.08 PVR 4.4 and Angelo 1.91.   Medications as per reconciliation form.         Goals of Care Treatment Preferences:  Code Status: DNR        SDOH Screening:  The patient was screened for utility difficulties, food insecurity, transport difficulties, housing insecurity, and interpersonal safety and there were no concerns identified this admission.     Consults:   Consults (From admission, onward)            Status Ordering Provider       Inpatient consult to Registered Dietitian/Nutritionist  Once        Provider:  (Not yet assigned)    Completed LIZBETH KAY       Inpatient consult to Palliative Care  Once        Provider:  (Not yet assigned)    Acknowledged NEMESIO MEDINA       Inpatient consult to Cardiology  Once        Provider:  Jeff Guzman MD    Acknowledged LIZBETH KAY       Inpatient consult to Hospital Medicine  Once        Provider:  (Not yet assigned)    Acknowledged THERESA WRIGHT                Cardiac/Vascular  Essential hypertension  Chronic, controlled. Latest blood pressure and vitals reviewed-      Temp:  [97.5 °F (36.4 °C)-98.1 °F (36.7 °C)]   Pulse:  []   Resp:  [14-18]   BP: ()/(45-72)   SpO2:  [94 %-100 %] .   Home meds for hypertension were reviewed and noted below.   Hypertension Medications                    losartan (COZAAR) 25 MG tablet Take 0.5 tablets (12.5 mg total) by mouth once daily.     metoprolol succinate (TOPROL-XL) 25 MG 24 hr tablet Take 1 tablet (25 mg total) by mouth every evening.     torsemide (DEMADEX) 20 MG Tab Take 1 tablet (20 mg total) by mouth once daily.                While in the hospital, will manage blood pressure as follows; Continue home antihypertensive regimen     Will utilize p.r.n. blood pressure medication only if patient's blood pressure greater than 160/100 and she develops symptoms such as worsening chest pain or shortness of breath.              Final Active  Diagnoses:     Diagnosis Date Noted POA    PRINCIPAL PROBLEM:  Acute on chronic systolic heart failure [I50.23] 01/29/2024 Yes    Moderate malnutrition [E44.0] 10/17/2023 Yes    Coronary atherosclerosis of autologous vein bypass graft without angina [I25.810] 08/16/2019 Yes    Carotid stenosis, bilateral [I65.23] 10/13/2017 Yes    S/P AVR (aortic valve replacement) - pericardial valve [Z95.2] 08/21/2017 Not Applicable    Nonrheumatic aortic valve stenosis - Severe, JORI = 0.76 cm2, peak velocity = 3.85 m/s, mean gradient = 35.0 mmHg. [I35.0] 03/23/2017 Yes    ACP (advance care planning) [Z71.89] 03/10/2017 Not Applicable    Essential hypertension [I10] 11/06/2012 Yes       Problems Resolved During this Admission:         Discharged Condition: good     Disposition: Home or Self Care     Follow Up:    Follow-up Information         [facility] TRANSITIONAL CARE Follow up.    Why: someone will call  you within 48, -72 hours to schedule a hospital follow up visit, Please answer a call from [phone]  Contact information:  [address] [place] [address]  [address]  [facility] [address]                 Jeff Guzman MD Follow up on 10/14/2024.    Specialty: Interventional Cardiology  Why: Follow up with Dr Guzman on 10/14/24 at 10 am.  Contact information:  [address] [facility] [place] [address]  [phone]                    Patrick Hernandez MD Follow up in 1 week(s).    Specialty: Family Medicine  Why: Please follow up with Dr Mary BelloDiamond on 10/9/24 at 9am.  Contact information:  [address] [facility] [id number] [phone]                                   Patient Instructions:             Ambulatory referral/consult to Heart Failure Transitional Care Clinic    Standing Status: Future   Referral Priority: Routine Referral Type: Consultation    Referral Reason: Specialty Services Required    Requested Specialty: Cardiology    Number of Visits Requested: 1            Ambulatory referral/consult to  Transitional Care    Standing Status: Future   Referral Priority: Routine Referral Type: Consultation    Referral Reason: Specialty Services Required    Number of Visits Requested: 1       Diet Cardiac      Activity as tolerated         Significant Diagnostic Studies: Labs: All labs within the past 24 hours have been reviewed     Pending Diagnostic Studies:         None             Medications:  Reconciled Home Medications:       Medication List          START taking these medications       valsartan 40 MG tablet  Commonly known as: DIOVAN  Take 1 tablet (40 mg total) by mouth 2 (two) times daily.                CHANGE how you take these medications       rosuvastatin 20 MG tablet  Commonly known as: CRESTOR  Take 0.5 tablets (10 mg total) by mouth once daily.  What changed: when to take this                CONTINUE taking these medications       acetaminophen 500 MG tablet  Commonly known as: TYLENOL  Take 1,000 mg by mouth daily as needed for Pain.      azaTHIOprine 50 mg Tab  Commonly known as: IMURAN  Take 50 mg by mouth once daily.      brimonidine 0.2% 0.2 % Drop  Commonly known as: ALPHAGAN  Place 1 drop into both eyes 2 (two) times a day.      CALCIUM WITH VITAMIN D3 ORAL  Take 1 Dose by mouth 2 (two) times a day. 1 dose = 1 gummy      clopidogreL 75 mg tablet  Commonly known as: PLAVIX  Take 1 tablet (75 mg total) by mouth once daily.      diphenhydrAMINE-acetaminophen  mg Tab  Commonly known as: TYLENOL PM  Take 1 tablet by mouth nightly as needed (for sleep).      empagliflozin 10 mg tablet  Commonly known as: JARDIANCE  Take 1 tablet (10 mg total) by mouth once daily.      EScitalopram oxalate 10 MG tablet  Commonly known as: LEXAPRO  Take 1 tablet (10 mg total) by mouth once daily.      levothyroxine 75 MCG tablet  Commonly known as: SYNTHROID  Take 1 tablet (75 mcg total) by mouth before breakfast.      metoprolol succinate 25 MG 24 hr tablet  Commonly known as: TOPROL-XL  Take 1 tablet (25 mg  "total) by mouth every evening.      pantoprazole 40 MG tablet  Commonly known as: PROTONIX  Take 1 tablet (40 mg total) by mouth once daily.      torsemide 20 MG Tab  Commonly known as: DEMADEX  Take 1 tablet (20 mg total) by mouth once daily.      VITAMIN D3 ORAL  Take 1 Dose by mouth 2 (two) times a day. 1 dose = 1 gummy      WOMEN'S MULTIVITAMIN ORAL  Take 1 Dose by mouth once daily. 1 dose = 1 gummy      XYLIMELTS MM  2 tablets by Mucous Membrane route every evening.                STOP taking these medications       azithromycin 250 MG tablet  Commonly known as: Z-FRANCISCO      CHLO HIST 1-12.5 mg/5 mL Soln  Generic drug: dexbrompheniramn-chlophedianol      LIDOcaine VISCOUS 2 % solution  Generic drug: LIDOcaine viscous HCl 2%      losartan 25 MG tablet  Commonly known as: COZAAR      predniSONE 10 MG tablet  Commonly known as: DELTASONE                   Indwelling Lines/Drains at time of discharge:   Lines/Drains/Airways         None                         Time spent on the discharge of patient: >30 minutes           Gilmar Holloway MD  Department of Hospital Medicine  [facility]      Heart cath done on 10/1:  Cardiac catheterization    Height: 5' 2" (1.575 m)   Weight: 45.6 kg (100 lb 8.5 oz)   Blood Pressure: 90/63   Heart Rate: 85    Date of Study: 10/1/24   Ordering Provider: Madeline Desouza PA   Clinical Indications: HFrEF (heart failure with reduced ejection fraction) [I50.20 (ICD-10-CM)]       Performing Physician  Performing Staff   Primary: Jeff Guzman MD    Documenter: Lena Howard RCS   Radiology Technologist: Mendel Layton   Registered Nurse: Vimal Del Angel RN   Registered Nurse: Tatianna Joy RN        Patient Information    Name MRN Description   Dulce Root [id number] 69 y.o. female     Physicians    Panel Physicians Referring Physician Case Authorizing Physician   Jeff Guzman MD (Primary)  Madeline Desouza PA     Indications    HFrEF (heart failure with reduced " ejection fraction) [I50.20 (ICD-10-CM)]     Summary    Access - Closure:  RFV (7F) - manual pressure     Procedures Performed:  Ultrasound guided vascular access  Right heart cath        Summary findings:  Normal to Low filling pressures: RA 1, PAWP 12  Mild mixed pulmonary hypertension: PA 40/18(25), PVR 4.4  Reduced cardiac index: thermodilution CI 2.08, Angelo 1.91        Recommendations:  Switch to maintenance oral diuresis tomorrow  Anticipate discharge tomorrow and follow-up with myself in office within 2 weeks to decide on definitive [place] vs palliative care           Jeff Guzman MD, [facility]  Interventional Cardiology/Structural Heart Disease  [facility]  Office: [phone]   The procedure log was documented by Documenter: Lena Howard RCS and verified by Jeff Guzman MD.     Date: 10/1/2024  Time: 11:55 AM     Procedures    Right heart cath     View Images Vital Vitrea     Show images for Cardiac catheterization  Pre Procedure Diagnosis    HFrEF (heart failure with reduced ejection fraction) [I50.20]    Post Procedure Diagnosis    HFrEF (heart failure with reduced ejection fraction) [I50.20]      Encounter-Level Documents:    Scan on 10/1/2024  Procedural Details    Dulce Root was counseled regarding the potential benefits, risks, and alternatives to the procedure(s). The patient gave informed consent and consent forms were signed. The patient was brought to the cath lab in a fasting state, prepped, and draped in the usual sterile manner.  PRESSURES AIR REST     Time Systolic (mmHg) Diastolic (mmHg) Mean (mmHg) A Wave (mmHg) V Wave (mmHg) EDP (mmHg)   Right Atrium 11:28 AM   1    3    3       Right Ventricle 11:28 AM 41    -9       -1      Pulmonary Artery 11:20 AM 33    14    22         Pulmonary Wedge 11:21 AM   11    12    10         CARDIAC OUTPUT AIR REST     CO CI ((L/min)/BSA) SV Flow (L/min) Flow Index (L/min) EF DV (L/min) SV (L/min)   Thermal 2.97 L/min     2.08    30           Pulm Flow    2.97    2.08         Systemic Flow    2.97    2.08               Electronically signed by Gilmar Holloway MD at 10/2/2024  1:03 PM  Problem List Items Addressed This Visit       Drug-induced immunodeficiency    Overview     She is on imuran and is at risk of infections due to this but has not had this in a while. She will continue the medicine for now.         Essential hypertension - Primary    Overview     The patient presents with essential hypertension.  The patient is tolerating the medication well and is in excellent compliance.  The patient is experiencing no side effects.  Counseling was offered regarding low salt diets.  The patient has a reduced salt intake.  The patient denies chest pain, palpitations, shortness of breath, dyspnea on exertion, left or murmur neck pain, nausea, vomiting, diaphoresis, paroxysmal nocturnal dyspnea, and orthopnea.  She has had some lower bp's.  She has been asked on the digital medicine program to hold the telmisartan if she has a lower blood pressure.  Hypertension Medications               metoprolol succinate (TOPROL-XL) 25 MG 24 hr tablet Take 1 tablet (25 mg total) by mouth once daily.    telmisartan (MICARDIS) 20 MG Tab Take 1 tablet (20 mg total) by mouth once daily.                   Nonrheumatic aortic valve stenosis - Severe, JORI = 0.76 cm2, peak velocity = 3.85 m/s, mean gradient = 35.0 mmHg.     Other Visit Diagnoses       Congestive heart failure, unspecified HF chronicity, unspecified heart failure type                Past Medical History:  Past Medical History:   Diagnosis Date    Allergy     Anticoagulant long-term use     Plavix    Breast cancer 2008    Carotid stenosis, bilateral 10/13/2017    CHF (congestive heart failure)     Coronary artery disease     Coronary artery disease involving coronary bypass graft of native heart without angina pectoris 08/16/2019 2/19  1.  Left main is a small vessel with a 60%-65%  ostial lesion. 2.  LAD is a medium-sized vessel diffuse 70% proximally  Ramus intermedius is a medium size vessel with a 40%-50% ostial lesion. 3.  Circumflex 60%-70% ostial  remainder of circumflex and obtuse marginal normal in appearance. Right coronary artery is normal size vessel, short discrete 70%mid 4.  Vein graft to right coronary is occ    Coronary artery disease involving native coronary artery of native heart without angina pectoris 06/27/2017    DCIS (ductal carcinoma in situ) of breast 11/06/2012    Encounter for blood transfusion     Epigastric pain 04/01/2023    Essential hypertension 11/06/2012    GERD (gastroesophageal reflux disease)     GI bleed 02/2021    Hematemesis with nausea 05/11/2021    History of pemphigoid     Hodgkin lymphoma     Hx of Hodgkin's disease - s/p chemo and radiation 11/06/2012    Hyperlipidemia     Hyperlipidemia 11/06/2012    The patient presents with hyperlipidemia.  The patient reports tolerating the medication well and is in excellent compliance.  There have been no medication side effects.  The patient denies chest pain, neuropathy, and myalgias.  The patient has reduced fat intake and has been exercising.  Current treatment has included the medications listed in the med card.   Lab Results Component Value Date  CH    Hypertension     LBBB (left bundle branch block) 05/22/2018    Malignant neoplasm of central portion of left breast in female, estrogen receptor negative 11/03/2022    Osteoporosis     Paroxysmal atrial fibrillation - single post-op episode 08/24/2017    Pemphigoid     Pemphigoid     pt receives IVIG infusions    Pulmonary hypertension 01/13/2023    S/P AVR (aortic valve replacement) - pericardial valve 08/21/2017    Perceval aortic tissue valve PVS23. 23mm    serial # [id number]    S/P CABG x 2 08/21/2017 8/17 CABG X 2 with SVG-LAD and SVG-distal RCA  SVG LAD due to absent LIMA after chest radiation and lymph node biopsy     Stented coronary artery     She  had 2 stents placed in 2/2019.  She has had CAD and bypasses in the past in 2017.      Thyroid disease      Past Surgical History:   Procedure Laterality Date    ANGIOGRAM, CORONARY, WITH LEFT HEART CATHETERIZATION  10/16/2023    Procedure: Left Heart Cath w/Graft study;  Surgeon: Jeff Guzman MD;  Location: [facility] CATH;  Service: Cardiology;;    AORTOGRAPHY N/A 10/16/2023    Procedure: AO Root angiogram;  Surgeon: Jeff Guzman MD;  Location: [facility] CATH;  Service: Cardiology;  Laterality: N/A;    ARTERIOGRAPHY OF AORTIC ROOT N/A 02/15/2019    Procedure: ARTERIOGRAM, AORTIC ROOT;  Surgeon: Sujit Chaudhry MD;  Location: [facility] CATH;  Service: Cardiology;  Laterality: N/A;    AXILLARY NODE DISSECTION Left 11/25/2022    Procedure: LYMPHADENECTOMY, AXILLARY-Left;  Surgeon: Rosa Maradiaga MD;  Location: [facility];  Service: General;  Laterality: Left;    BREAST BIOPSY      BREAST RECONSTRUCTION      bilateral mastectomy    CARDIAC CATHETERIZATION      stents x 2    CARDIAC SURGERY  08/2017    Aortic valve replacement , CABG 2 vessel    CARDIAC VALVE REPLACEMENT  08/2017    aortic valve, bovine per pt    CORONARY ANGIOGRAPHY N/A 02/15/2019    Procedure: ANGIOGRAM, CORONARY ARTERY;  Surgeon: Sujit Chaudhry MD;  Location: [facility] CATH;  Service: Cardiology;  Laterality: N/A;    CORONARY ANGIOGRAPHY N/A 4/1/2022    Procedure: ANGIOGRAM, CORONARY ARTERY;  Surgeon: Sujit Chaudhry MD;  Location: [facility] CATH;  Service: Cardiology;  Laterality: N/A;    CORONARY BYPASS GRAFT ANGIOGRAPHY  02/15/2019    Procedure: Bypass graft study;  Surgeon: Sujit Chaudhry MD;  Location: [facility] CATH;  Service: Cardiology;;    COSMETIC SURGERY      bereast reconstruction    ESOPHAGOGASTRODUODENOSCOPY N/A 05/14/2021    Procedure: EGD (ESOPHAGOGASTRODUODENOSCOPY);  Surgeon: Tracy Flower MD;  Location: [facility];  Service: Endoscopy;  Laterality: N/A;    ESOPHAGOGASTRODUODENOSCOPY N/A 11/04/2021    Procedure: EGD (ESOPHAGOGASTRODUODENOSCOPY);  Surgeon: Tracy JANG  MD Mundo;  Location: [facility] ENDO;  Service: Endoscopy;  Laterality: N/A;    ESOPHAGOGASTRODUODENOSCOPY N/A 4/1/2023    Procedure: EGD (ESOPHAGOGASTRODUODENOSCOPY);  Surgeon: Tracy Flower MD;  Location: [facility] ENDO;  Service: Endoscopy;  Laterality: N/A;    ESOPHAGOGASTRODUODENOSCOPY N/A 6/8/2023    Procedure: EGD (ESOPHAGOGASTRODUODENOSCOPY);  Surgeon: Mk Sanchez MD;  Location: [facility] ENDO (2ND FLR);  Service: Endoscopy;  Laterality: N/A;  inst via portal  cardiac clearance-see encounter dated 5/31/23  precall confirmed 6/2     EYE SURGERY      eye lids    FRACTIONAL FLOW RESERVE (FFR), CORONARY  10/20/2023    Procedure: (IFR) Ramus;  Surgeon: Jeff Guzman MD;  Location: [facility] CATH;  Service: Cardiology;;    IMPLANTATION OF BIVENTRICULAR HEART PACEMAKER N/A 2/27/2024    Procedure: INSERTION, PACEMAKER, BIVENTRICULAR;  Surgeon: Palomo Gill MD;  Location: [facility] EP LAB;  Service: Cardiology;  Laterality: N/A;  DCM, LBBB, Right sided CRT-P vs LBAP lead, MDT, MAC, SK, 3 Prep *Venogram 2/19/24*    INJECTION FOR SENTINEL NODE IDENTIFICATION Left 11/25/2022    Procedure: INJECTION, FOR SENTINEL NODE IDENTIFICATION-Left;  Surgeon: Rosa Maradiaga MD;  Location: [facility] OR;  Service: General;  Laterality: Left;    INSTANTANEOUS WAVE-FREE RATIO (IFR)  10/20/2023    Procedure: (IFR) LCX;  Surgeon: Jeff Guzman MD;  Location: [facility] CATH;  Service: Cardiology;;    LEFT HEART CATHETERIZATION Right 02/15/2019    Procedure: Left heart cath;  Surgeon: Sujit Chaudhry MD;  Location: [facility] CATH;  Service: Cardiology;  Laterality: Right;    LEFT HEART CATHETERIZATION Left 4/1/2022    Procedure: Left heart cath;  Surgeon: Sujit Chaudhry MD;  Location: [facility] CATH;  Service: Cardiology;  Laterality: Left;    LEFT HEART CATHETERIZATION  10/20/2023    Procedure: Left heart cath;  Surgeon: Jeff Guzman MD;  Location: [facility] CATH;  Service: Cardiology;;    LUMBAR LAMINECTOMY WITH DISCECTOMY N/A 02/27/2020    Procedure: LAMINECTOMY, SPINE, LUMBAR,  WITH DISCECTOMY;  Surgeon: Vimal Villegas MD;  Location: [facility] OR;  Service: Neurosurgery;  Laterality: N/A;  left L5-S1  Laminectomy L4-5    LYMPHADENECTOMY      MASTECTOMY      MASTECTOMY, PARTIAL Left 11/25/2022    Procedure: MASTECTOMY, PARTIAL-Left ultrasound guided;  Surgeon: Rosa Maradiaga MD;  Location: [facility] OR;  Service: General;  Laterality: Left;    RIGHT HEART CATHETERIZATION Right 4/1/2022    Procedure: INSERTION, CATHETER, RIGHT HEART;  Surgeon: Sujit Chaudhry MD;  Location: [facility] CATH;  Service: Cardiology;  Laterality: Right;    RIGHT HEART CATHETERIZATION  10/16/2023    Procedure: Right heart cath;  Surgeon: Jeff Guzman MD;  Location: [facility] CATH;  Service: Cardiology;;    RIGHT HEART CATHETERIZATION  10/1/2024    Procedure: Right heart cath;  Surgeon: Jeff Guzman MD;  Location: [facility] CATH;  Service: Cardiology;;    SENTINEL LYMPH NODE BIOPSY Left 11/25/2022    Procedure: BIOPSY, LYMPH NODE, SENTINEL-Left;  Surgeon: Rosa Maradiaga MD;  Location: [facility] OR;  Service: General;  Laterality: Left;    SKIN BIOPSY      SPLENECTOMY, TOTAL      TRANSFORAMINAL EPIDURAL INJECTION OF STEROID Left 12/31/2019    Procedure: Left L5/S1 TF SKIP with local;  Surgeon: Canelo Niño MD;  Location: [facility] PAIN MGT;  Service: Pain Management;  Laterality: Left;    TUMOR REMOVAL      neck- lymphoma    VENOGRAM, EP LAB N/A 2/19/2024    Procedure: Venogram, EP Lab;  Surgeon: Palomo Gill MD;  Location: [facility] EP LAB;  Service: Cardiology;  Laterality: N/A;  DCM, CHF, Venogram- Bilateral, SK, 3 Prep *hx of bilateral mastectomy* *Contrast Prep*     Review of patient's allergies indicates:   Allergen Reactions    Amlodipine Shortness Of Breath and Other (See Comments)     unknown  Other reaction(s): Swelling  unknown  unknown  Other reaction(s): Swelling  unknown  Other reaction(s): Swelling  unknown    Levofloxacin Hives and Swelling    Sulfa (sulfonamide antibiotics) Shortness Of Breath, Itching and Other (See Comments)      elevated blood pressure    Ace inhibitors     Ciprofloxacin Itching    Iodinated contrast media     Iodine      Other reaction(s): Unknown    Latex      Other reaction(s): Itching    Latex, natural rubber Itching     Current Outpatient Medications on File Prior to Visit   Medication Sig Dispense Refill    acetaminophen (TYLENOL) 500 MG tablet Take 1,000 mg by mouth daily as needed for Pain.      azaTHIOprine (IMURAN) 50 mg Tab Take 50 mg by mouth once daily.      brimonidine 0.2% (ALPHAGAN) 0.2 % Drop Place 1 drop into both eyes 2 (two) times a day.      calcium carbonate/vitamin D3 (CALCIUM WITH VITAMIN D3 ORAL) Take 1 Dose by mouth 2 (two) times a day. 1 dose = 1 gummy      cholecalciferol, vitamin D3, (VITAMIN D3 ORAL) Take 1 Dose by mouth 2 (two) times a day. 1 dose = 1 gummy      clopidogreL (PLAVIX) 75 mg tablet Take 1 tablet (75 mg total) by mouth once daily. 90 tablet 3    digoxin (LANOXIN) 125 mcg tablet Take 1 tablet (125 mcg total) by mouth once daily. 30 tablet 11    diphenhydrAMINE-acetaminophen (TYLENOL PM)  mg Tab Take 1 tablet by mouth nightly as needed (for sleep).      empagliflozin (JARDIANCE) 10 mg tablet Take 1 tablet (10 mg total) by mouth once daily. 90 tablet 3    EScitalopram oxalate (LEXAPRO) 10 MG tablet Take 1 tablet (10 mg total) by mouth once daily. 90 tablet 3    levothyroxine (SYNTHROID) 75 MCG tablet Take 1 tablet (75 mcg total) by mouth before breakfast. 90 tablet 3    metoprolol succinate (TOPROL-XL) 25 MG 24 hr tablet TAKE 1 TABLET EVERY EVENING (Patient taking differently: Take 12.5 mg by mouth once daily.) 90 tablet 3    mv-min/iron/folic/calcium/vitK (WOMEN'S MULTIVITAMIN ORAL) Take 1 Dose by mouth once daily. 1 dose = 1 gummy      pantoprazole (PROTONIX) 40 MG tablet Take 1 tablet (40 mg total) by mouth once daily. 30 tablet 11    rosuvastatin (CRESTOR) 20 MG tablet Take 0.5 tablets (10 mg total) by mouth once daily. 90 tablet 3    torsemide (DEMADEX) 10 MG Tab Take 1  tablet (10 mg total) by mouth once daily. 30 tablet 11    valsartan (DIOVAN) 40 MG tablet Take 1 tablet (40 mg total) by mouth 2 (two) times daily. (Patient taking differently: Take 20 mg by mouth every evening.) 60 tablet 0    xylitol (XYLIMELTS MM) 2 tablets by Mucous Membrane route every evening.       No current facility-administered medications on file prior to visit.     Social History     Socioeconomic History    Marital status:    Tobacco Use    Smoking status: Former     Current packs/day: 0.00     Average packs/day: 1 pack/day for 20.0 years (20.0 ttl pk-yrs)     Types: Cigarettes     Start date: 1961     Quit date: 1981     Years since quittin.9    Smokeless tobacco: Never   Substance and Sexual Activity    Alcohol use: No     Comment: 2 drinks/month; none 72 hrs prior to surgery    Drug use: No    Sexual activity: Yes     Partners: Male   Social History Narrative    Live w/ spouse and  1 dog      Social Drivers of Health     Financial Resource Strain: Low Risk  (10/1/2024)    Overall Financial Resource Strain (CARDIA)     Difficulty of Paying Living Expenses: Not very hard   Food Insecurity: No Food Insecurity (10/2/2024)    Hunger Vital Sign     Worried About Running Out of Food in the Last Year: Never true     Ran Out of Food in the Last Year: Never true   Transportation Needs: No Transportation Needs (10/2/2024)    TRANSPORTATION NEEDS     Transportation : No   Physical Activity: Unknown (2023)    Exercise Vital Sign     Days of Exercise per Week: 0 days   Stress: No Stress Concern Present (10/1/2024)    [employer] - Occupational Stress Questionnaire     Feeling of Stress : Not at all   Housing Stability: Low Risk  (10/2/2024)    Housing Stability Vital Sign     Unable to Pay for Housing in the Last Year: No     Homeless in the Last Year: No     Family History   Problem Relation Name Age of Onset    Hypertension Mother      Hypertension Father   "    Cancer Neg Hx         Review of Systems   Constitutional:  Negative for chills, fever and unexpected weight change.   Respiratory:  Positive for shortness of breath. Negative for cough.    Cardiovascular:  Negative for chest pain and leg swelling.       Objective:     /67 (BP Location: Right arm, Patient Position: Sitting)   Pulse 79   Resp 16   Ht 5' 1" (1.549 m)   Wt 45.4 kg (100 lb)   SpO2 99%   BMI 18.89 kg/m²     Physical Exam  Constitutional:       General: She is not in acute distress.     Appearance: She is well-developed. She is not diaphoretic.   Cardiovascular:      Rate and Rhythm: Normal rate and regular rhythm.      Heart sounds: Murmur heard.      Systolic murmur is present with a grade of 4/6.      No friction rub. No gallop.   Pulmonary:      Effort: Pulmonary effort is normal. No respiratory distress.      Breath sounds: Normal breath sounds. No wheezing or rales.       Assessment:     1. Essential hypertension    2. Nonrheumatic aortic valve stenosis - Severe, JORI = 0.76 cm2, peak velocity = 3.85 m/s, mean gradient = 35.0 mmHg.    3. Congestive heart failure, unspecified HF chronicity, unspecified heart failure type    4. Drug-induced immunodeficiency        Plan:     Problem List Items Addressed This Visit       Drug-induced immunodeficiency    Essential hypertension - Primary    Nonrheumatic aortic valve stenosis - Severe, JORI = 0.76 cm2, peak velocity = 3.85 m/s, mean gradient = 35.0 mmHg.     Other Visit Diagnoses       Congestive heart failure, unspecified HF chronicity, unspecified heart failure type              No follow-ups on file.  Continue all current meds.  Use a low salt and fluid restricted diet.    Assessment & Plan    Assessed cardiac status post-recent hospitalization for heart failure  Noted left ventricular function at 10-15% with aortic valve stenosis  Evaluated appropriateness of current heart failure management, including fluid balance and medication " "adjustments  Considered palliative care approach as mentioned in Dr. Greenwood's notes    HEART FAILURE:  - Explained relationship between heart rate, cardiac filling, and fluid backup in heart failure.  - Discussed concept of "dry weight" and its importance in managing congestive heart failure.  - Dulce to continue daily weight monitoring, with attention to 2-pound weight gain threshold.  - Dulce to maintain low-salt diet.  - Dulce to monitor for symptoms of fluid retention, particularly shortness of breath, even in absence of visible swelling.    AORTIC STENOSIS:  - Educated on significance of blood pressure management in context of heart failure and aortic stenosis.  - Explained potential benefits and risks of TAVR procedure.    MEDICATIONS/SUPPLEMENTS:  - Continued valsartan 20 mg as needed based on blood pressure, not to exceed 2 times daily.  - Continued metoprolol 12.5 mg in the morning.  - Continued torsemide 10 mg daily as prescribed by Dr. Greenwood.    FOLLOW UP:  - Contact the office if running out of any medications.         "

## 2024-10-16 ENCOUNTER — OFFICE VISIT (OUTPATIENT)
Dept: FAMILY MEDICINE | Facility: CLINIC | Age: 69
End: 2024-10-16
Payer: MEDICARE

## 2024-10-16 VITALS
BODY MASS INDEX: 18.88 KG/M2 | HEIGHT: 61 IN | SYSTOLIC BLOOD PRESSURE: 112 MMHG | OXYGEN SATURATION: 99 % | HEART RATE: 79 BPM | WEIGHT: 100 LBS | DIASTOLIC BLOOD PRESSURE: 67 MMHG | RESPIRATION RATE: 16 BRPM

## 2024-10-16 DIAGNOSIS — D84.821 DRUG-INDUCED IMMUNODEFICIENCY: ICD-10-CM

## 2024-10-16 DIAGNOSIS — I35.0 NONRHEUMATIC AORTIC VALVE STENOSIS: ICD-10-CM

## 2024-10-16 DIAGNOSIS — I10 ESSENTIAL HYPERTENSION: Primary | ICD-10-CM

## 2024-10-16 DIAGNOSIS — Z79.899 DRUG-INDUCED IMMUNODEFICIENCY: ICD-10-CM

## 2024-10-16 DIAGNOSIS — I50.9 CONGESTIVE HEART FAILURE, UNSPECIFIED HF CHRONICITY, UNSPECIFIED HEART FAILURE TYPE: ICD-10-CM

## 2024-10-16 PROCEDURE — 99999 PR PBB SHADOW E&M-EST. PATIENT-LVL V: CPT | Mod: PBBFAC,,, | Performed by: FAMILY MEDICINE

## 2024-10-16 PROCEDURE — 99215 OFFICE O/P EST HI 40 MIN: CPT | Mod: PBBFAC,PO | Performed by: FAMILY MEDICINE

## 2024-10-16 NOTE — PATIENT INSTRUCTIONS
Health Maintenance Due   Topic Date Due    Tetanus Vaccine  Never done    Shingles Vaccine (1 of 2) Never done    RSV Vaccine (1 - Risk 60-74 years 1-dose series) Never done    Influenza (Flu) Vaccine (1) 09/01/2024    COVID-19 Vaccine (4 - 2024-25 season) 09/01/2024

## 2024-10-18 ENCOUNTER — LAB VISIT (OUTPATIENT)
Dept: LAB | Facility: HOSPITAL | Age: 69
End: 2024-10-18
Attending: INTERNAL MEDICINE
Payer: MEDICARE

## 2024-10-18 DIAGNOSIS — I50.22 CHRONIC HFREF (HEART FAILURE WITH REDUCED EJECTION FRACTION): ICD-10-CM

## 2024-10-18 DIAGNOSIS — T82.09XS PROSTHETIC VALVE DYSFUNCTION, SEQUELA: ICD-10-CM

## 2024-10-18 PROCEDURE — 36415 COLL VENOUS BLD VENIPUNCTURE: CPT | Mod: PO | Performed by: INTERNAL MEDICINE

## 2024-10-18 PROCEDURE — 80162 ASSAY OF DIGOXIN TOTAL: CPT | Performed by: INTERNAL MEDICINE

## 2024-10-19 LAB — DIGOXIN SERPL-MCNC: 1.3 NG/ML (ref 0.8–2)

## 2024-10-22 ENCOUNTER — HOSPITAL ENCOUNTER (OUTPATIENT)
Dept: CARDIOLOGY | Facility: HOSPITAL | Age: 69
Discharge: HOME | End: 2024-10-22
Attending: INTERNAL MEDICINE
Payer: MEDICARE

## 2024-10-22 VITALS — BODY MASS INDEX: 18.88 KG/M2 | HEIGHT: 61 IN | WEIGHT: 100 LBS

## 2024-10-22 DIAGNOSIS — T82.09XS PROSTHETIC VALVE DYSFUNCTION, SEQUELA: ICD-10-CM

## 2024-10-22 DIAGNOSIS — I50.22 CHRONIC HFREF (HEART FAILURE WITH REDUCED EJECTION FRACTION): ICD-10-CM

## 2024-10-22 PROCEDURE — 93351 STRESS TTE COMPLETE: CPT | Mod: 26,,, | Performed by: INTERNAL MEDICINE

## 2024-10-22 PROCEDURE — 93351 STRESS TTE COMPLETE: CPT | Mod: PO

## 2024-10-23 ENCOUNTER — TELEPHONE (OUTPATIENT)
Dept: CARDIOLOGY | Facility: CLINIC | Age: 69
End: 2024-10-23
Payer: MEDICARE

## 2024-10-23 DIAGNOSIS — T82.09XS PROSTHETIC VALVE DYSFUNCTION, SEQUELA: ICD-10-CM

## 2024-10-23 DIAGNOSIS — I35.0 AORTIC VALVE STENOSIS, ETIOLOGY OF CARDIAC VALVE DISEASE UNSPECIFIED: Primary | ICD-10-CM

## 2024-10-23 LAB
AV INDEX (PROSTH): 0.25
AV MEAN GRADIENT: 28.7 MMHG
AV PEAK GRADIENT: 49 MMHG
AV VALVE AREA BY VELOCITY RATIO: 0.6 CM²
AV VALVE AREA: 0.7 CM²
AV VELOCITY RATIO: 0.23
BSA FOR ECHO PROCEDURE: 1.4 M2
CV ECHO LV RWT: 0.41 CM
CV STRESS BASE HR: 87 BPM
DIASTOLIC BLOOD PRESSURE: 55 MMHG
DOP CALC AO PEAK VEL: 3.5 M/S
DOP CALC AO VTI: 69.7 CM
DOP CALC LVOT AREA: 2.8 CM2
DOP CALC LVOT DIAMETER: 1.9 CM
DOP CALC LVOT PEAK VEL: 0.8 M/S
DOP CALC LVOT STROKE VOLUME: 49 CM3
DOP CALCLVOT PEAK VEL VTI: 17.3 CM
E WAVE DECELERATION TIME: 127.24 MSEC
E/A RATIO: 1.53
E/E' RATIO: 33.14 M/S
ECHO LV POSTERIOR WALL: 1.1 CM (ref 0.6–1.1)
FRACTIONAL SHORTENING: 3.7 % (ref 28–44)
INTERVENTRICULAR SEPTUM: 1.1 CM (ref 0.6–1.1)
IVRT: 49.48 MSEC
LEFT ATRIUM AREA SYSTOLIC (APICAL 2 CHAMBER): 15.38 CM2
LEFT ATRIUM AREA SYSTOLIC (APICAL 4 CHAMBER): 13.14 CM2
LEFT ATRIUM VOLUME INDEX MOD: 24.5 ML/M2
LEFT ATRIUM VOLUME MOD: 34.52 ML
LEFT INTERNAL DIMENSION IN SYSTOLE: 5.2 CM (ref 2.1–4)
LEFT VENTRICLE END SYSTOLIC VOLUME APICAL 2 CHAMBER: 40.65 ML
LEFT VENTRICLE END SYSTOLIC VOLUME APICAL 4 CHAMBER: 27.26 ML
LEFT VENTRICLE MASS INDEX: 166.5 G/M2
LEFT VENTRICULAR INTERNAL DIMENSION IN DIASTOLE: 5.4 CM (ref 3.5–6)
LEFT VENTRICULAR MASS: 234.8 G
LV LATERAL E/E' RATIO: 38.67 M/S
LV SEPTAL E/E' RATIO: 29 M/S
LVOT MG: 1.94 MMHG
LVOT MV: 0.64 CM/S
LVOT STOKE VOLUME INDEX: 35 ML/M2
MV PEAK A VEL: 0.76 M/S
MV PEAK E VEL: 1.16 M/S
MV STENOSIS PRESSURE HALF TIME: 36.9 MS
MV VALVE AREA P 1/2 METHOD: 5.96 CM2
OHS CV CPX 85 PERCENT MAX PREDICTED HEART RATE MALE: 129
OHS CV CPX MAX PREDICTED HEART RATE: 152
OHS CV CPX PATIENT IS FEMALE: 1
OHS CV CPX PATIENT IS MALE: 0
OHS CV CPX PEAK DIASTOLIC BLOOD PRESSURE: 73 MMHG
OHS CV CPX PEAK HEAR RATE: 103 BPM
OHS CV CPX PEAK RATE PRESSURE PRODUCT: ABNORMAL
OHS CV CPX PEAK SYSTOLIC BLOOD PRESSURE: 140 MMHG
OHS CV CPX PERCENT MAX PREDICTED HEART RATE ACHIEVED: 70
OHS CV CPX RATE PRESSURE PRODUCT PRESENTING: ABNORMAL
OHS CV INITIAL DOSE: 5 MCG/KG/MIN
OHS CV PEAK DOSE: 15 MCG/KG/MIN
PISA MRMAX VEL: 5.56 M/S
PISA TR MAX VEL: 3.74 M/S
SYSTOLIC BLOOD PRESSURE: 118 MMHG
TDI LATERAL: 0.03 M/S
TDI SEPTAL: 0.04 M/S
TDI: 0.04 M/S
TR MAX PG: 56 MMHG
TRICUSPID ANNULAR PLANE SYSTOLIC EXCURSION: 1.35 CM
Z-SCORE OF LEFT VENTRICULAR DIMENSION IN END DIASTOLE: 2.16
Z-SCORE OF LEFT VENTRICULAR DIMENSION IN END SYSTOLE: 5.17

## 2024-10-23 RX ORDER — SODIUM CHLORIDE 9 MG/ML
INJECTION, SOLUTION INTRAVENOUS ONCE
OUTPATIENT
Start: 2024-10-23 | End: 2024-10-23

## 2024-10-23 RX ORDER — SODIUM CHLORIDE 0.9 % (FLUSH) 0.9 %
10 SYRINGE (ML) INJECTION
Status: SHIPPED | OUTPATIENT
Start: 2024-10-23

## 2024-10-23 NOTE — TELEPHONE ENCOUNTER
Spoke with patient and informed her of Dr. Guzman's recommendations. Appointment made to see Dr. Guzman on 10/24/24 at 3:20 pm. Angiogram scheduled on 10/25/24. Patient verbalized understanding.

## 2024-10-23 NOTE — PROGRESS NOTES
Based on our last conversation this is consistent with surgical valve severe stenosis and likely benefit from valve-in-valve [place]NA. We will need a coronary angiogram and an OV with family please. Tomorrow at 1520 for OV if she and family are available. If we can schedule angiogram on Friday after my last case that would be great.

## 2024-10-24 ENCOUNTER — OFFICE VISIT (OUTPATIENT)
Dept: CARDIOLOGY | Facility: CLINIC | Age: 69
End: 2024-10-24
Payer: MEDICARE

## 2024-10-24 VITALS
HEIGHT: 61 IN | BODY MASS INDEX: 18.61 KG/M2 | DIASTOLIC BLOOD PRESSURE: 72 MMHG | WEIGHT: 98.56 LBS | HEART RATE: 92 BPM | SYSTOLIC BLOOD PRESSURE: 114 MMHG

## 2024-10-24 DIAGNOSIS — Z95.0 PRESENCE OF CARDIAC RESYNCHRONIZATION THERAPY PACEMAKER (CRT-P): ICD-10-CM

## 2024-10-24 DIAGNOSIS — Z95.2 S/P AVR (AORTIC VALVE REPLACEMENT): ICD-10-CM

## 2024-10-24 DIAGNOSIS — R54 FRAILTY: ICD-10-CM

## 2024-10-24 DIAGNOSIS — I50.22 CHRONIC HFREF (HEART FAILURE WITH REDUCED EJECTION FRACTION): ICD-10-CM

## 2024-10-24 DIAGNOSIS — I65.23 CAROTID STENOSIS, BILATERAL: ICD-10-CM

## 2024-10-24 DIAGNOSIS — Z95.1 S/P CABG X 2: ICD-10-CM

## 2024-10-24 DIAGNOSIS — I44.7 LBBB (LEFT BUNDLE BRANCH BLOCK): ICD-10-CM

## 2024-10-24 DIAGNOSIS — T82.09XS PROSTHETIC VALVE DYSFUNCTION, SEQUELA: Primary | ICD-10-CM

## 2024-10-24 PROCEDURE — 99214 OFFICE O/P EST MOD 30 MIN: CPT | Mod: PBBFAC,PO | Performed by: INTERNAL MEDICINE

## 2024-10-24 PROCEDURE — 99999 PR PBB SHADOW E&M-EST. PATIENT-LVL IV: CPT | Mod: PBBFAC,,, | Performed by: INTERNAL MEDICINE

## 2024-10-24 NOTE — PATIENT INSTRUCTIONS
Angiogram    Arrive for procedure at: [facility]    You will receive a phone call from [facility] Pre-Op Department with further instructions and exact arrival time prior to your scheduled procedure.    Notify the nurse if you are ALLERGIC TO IODINE.    FASTING: You MAY NOT have anything to eat or drink AFTER MIDNIGHT the day before your procedure. If your procedure is scheduled in the afternoon, you may have a LIGHT BREAKFAST 6-8 hours prior to your procedure.  For example: Two slices of toast; black coffee or black tea.    MEDICATIONS: You may take your regular morning medications with water. If there are any medications that you should not take, you will be instructed to hold them for that morning.    CARDIOLOGY PRE-PROCEDURE MEDICATION ORDERS:  ** Please hold any medications that are checked below:    HOLD   # OF DAYS TO HOLD    Jardiance                    morning of procedure  Torsemide                   morning of procedure      CONTINUE the Following Medications   Plavix      Effient     Aspirin    WHAT TO EXPECT:    How long will the procedure take?  The procedure will take an average of 1 - 2 hours to perform.  After the procedure, you will need to lay flat for around 4 - 6 hours to minimize bleeding from the puncture site. If the wrist is accessed you will need to keep your arm still as instructed by the nurse.    When can I go home?  You may be able to be discharged home that same afternoon if there were no complications.  If you have one of the following: balloon; stent; pacemaker or defibrillator procedures, you may spend one night for observation.  Your doctor will determine your discharge based upon your progress.  The results of your procedure will be discussed with you before you are discharged.  Any further testing or procedures will be scheduled for you either before you leave or you will be instructed to call for a future appointment.      TRANSPORTATION:  PLEASE ARRANGE TO HAVE  SOMEONE DRIVE YOU HOME FOLLOWING YOUR PROCEDURE, YOU WILL NOT BE ALLOWED TO DRIVE.

## 2024-10-24 NOTE — PROGRESS NOTES
Structural Cardiology Clinic Note  Date: 10/24/24    Patient: Dulce Root, 1955, [id number]  Primary Care Provider: Patrick Hernandez MD     Chief Complaint/Reason for Referral: prosthetic valve dysfunction     Subjective:        History of Present Illness    Ms. Root presents today for follow-up regarding heart valve issues and potential TAVR procedure. Daughter, Kaylah, accompanies today    She underwent heart surgery in 2017, which included two bypasses and a valve replacement. Currently, one bypass remains open while the other is blocked. The replaced valve has progressed to severe stenosis, with recent measurements in the mid-40s, meeting criteria for severe aortic stenosis in a replaced valve. Her heart function has deteriorated over time from previously normal levels to a current EF of 20-25% (normal being 55%). She notes slight improvement in heart function with recent medication adjustments.    She has a history of lung disease and possible heart muscle injury from radiation. She also has significant carotid artery stenosis, with the left carotid artery approximately 80% narrowed, increasing her risk of stroke, particularly if blood pressure drops too low during procedures. She is described as frail with fragile tissues due to radiation exposure.      ROS:  10-system ROS is negative unless otherwise indicated in the HPI.          Focused Past History includes:  CAD status post CABG and AVR with Medium Perceval in 2017 (at [facility]):   Cath October 2023: Severely elevated right and left filling pressures with severely reduced cardiac output.  Widely patent SVG that supplies most of the LAD distribution; occluded SVG to RCA (Widely patent RCA).  Equivocal ISR in the ostial circumflex, negative by IFR.    Dobutamine stress echo December 2023:  LVEF 15%.  Maximum stroke volume index reached 34.5.  Severe aortic stenosis with area 0.86, peak velocity 3.7, mean gradient 33, dimensionless index 0.3.  Achieved 78%  MPHR  Discussed with Dr. Reynoso and Dr. Gill.  We decided to proceed with CRT P first to improve EF and monitor the surgical aortic valve function.  Planning for [place]-GE[place]NA has been completed  TTE 6/2024: EF 25-30%. Moderate RV dysfunction. YIN Vmax 3.14, MG 21, DI 0.28, AT 97, trace AR. Moderate MR. Moderate TR. PASP 65  TTE September 2024:  EF 10-15%.  Moderate RV dysfunction. YIN peak velocity 2.7, mean gradient 18, DI 0.28, trace AR.  Moderate-to-severe MR and TR.  PASP 65  RHC 10/2024:  RA 1, wedge 12, PA 40/18 with a mean of 25, PVR 4.4, cardiac index 2.08 by thermodilution, 1.91 by Angelo  DSE 10/2024:  Resting EF 20 25% with upper normal LV size.  Normal RV size with mild dysfunction.  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 moderate to severe MR  Status post CRT-P 02/27/2024 (Dr. Gill)  Chronic HFrEF  History of Hodgkin's lymphoma status post chemo and radiation - 1991  Carotid stenosis.   Duplex ultrasound 1/2024:  ANNA 50-69%, LICA 70-99%. 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 Oral.   Former smoker 15 p-y, quit in 1981     Review of Systems  Constitutional: negative for fevers, night sweats, and weight loss  Eyes: negative for visual disturbance, diplopia  Respiratory: negative for cough, hemoptysis, sputum, and wheezing  Cardiovascular: see HPI  Gastrointestinal: negative for abdominal pain, bright red blood per rectum, change in bowel habits, dysphagia, melena, and reflux symptoms  Genitourinary:negative for dysuria, frequency, and hematuria  Hematologic/lymphatic: negative for bleeding, easy bruising, and lymphadenopathy  Musculoskeletal:negative for arthralgias, back pain, and myalgias  Neurological: negative for gait problems, paresthesia, speech problems, vertigo, and  weakness  Behavioral/Psych: negative for excessive alcohol consumption, illegal drug usage, and sleep disturbance    -------------------------------------    Allergy    Anticoagulant long-term use    Plavix    Breast cancer    Carotid stenosis, bilateral    CHF (congestive heart failure)    Coronary artery disease    Coronary artery disease involving coronary bypass graft of native heart without angina pectoris    2/19  1.  Left main is a small vessel with a 60%-65% ostial lesion. 2.  LAD is a medium-sized vessel diffuse 70% proximally  Ramus intermedius is a medium size vessel with a 40%-50% ostial lesion. 3.  Circumflex 60%-70% ostial  remainder of circumflex and obtuse marginal normal in appearance. Right coronary artery is normal size vessel, short discrete 70%mid 4.  Vein graft to right coronary is occ    Coronary artery disease involving native coronary artery of native heart without angina pectoris    DCIS (ductal carcinoma in situ) of breast    Encounter for blood transfusion    Epigastric pain    Essential hypertension    GERD (gastroesophageal reflux disease)    GI bleed    Hematemesis with nausea    History of pemphigoid    Hodgkin lymphoma    Hx of Hodgkin's disease - s/p chemo and radiation    Hyperlipidemia    Hyperlipidemia    The patient presents with hyperlipidemia.  The patient reports tolerating the medication well and is in excellent compliance.  There have been no medication side effects.  The patient denies chest pain, neuropathy, and myalgias.  The patient has reduced fat intake and has been exercising.  Current treatment has included the medications listed in the med card.   Lab Results Component Value Date  CH    Hypertension    LBBB (left bundle branch block)    Malignant neoplasm of central portion of left breast in female, estrogen receptor negative    Osteoporosis    Paroxysmal atrial fibrillation - single post-op episode    Pemphigoid    Pemphigoid    pt receives IVIG infusions     Pulmonary hypertension    S/P AVR (aortic valve replacement) - pericardial valve    Perceval aortic tissue valve PVS23. 23mm    serial # [id number]    S/P CABG x 2    8/17 CABG X 2 with SVG-LAD and SVG-distal RCA  SVG LAD due to absent LIMA after chest radiation and lymph node biopsy     Stented coronary artery    She had 2 stents placed in 2/2019.  She has had CAD and bypasses in the past in 2017.      Thyroid disease     ----------------------------    Angiogram with stents    x3    Angiogram, coronary, with left heart catheterization    Procedure: Left Heart Cath w/Graft study;  Surgeon: Jeff Guzman MD;  Location: [facility] CATH;  Service: Cardiology;;    Aortography    Procedure: AO Root angiogram;  Surgeon: Jeff Guzman MD;  Location: [facility] CATH;  Service: Cardiology;  Laterality: N/A;    Arteriography of aortic root    Procedure: ARTERIOGRAM, AORTIC ROOT;  Surgeon: Sujit Chaudhry MD;  Location: [facility] CATH;  Service: Cardiology;  Laterality: N/A;    Axillary node dissection    Procedure: LYMPHADENECTOMY, AXILLARY-Left;  Surgeon: Rosa Maradiaga MD;  Location: [facility] OR;  Service: General;  Laterality: Left;    Breast biopsy    Breast reconstruction    bilateral mastectomy    Cardiac catheterization    stents x 2    Cardiac surgery    Aortic valve replacement , CABG 2 vessel    Cardiac valve replacement    aortic valve, bovine per pt    Coronary angiography    Procedure: ANGIOGRAM, CORONARY ARTERY;  Surgeon: Sujit Chaudhry MD;  Location: [facility] CATH;  Service: Cardiology;  Laterality: N/A;    Coronary angiography    Procedure: ANGIOGRAM, CORONARY ARTERY;  Surgeon: Sujit Chaudhry MD;  Location: [facility] CATH;  Service: Cardiology;  Laterality: N/A;    Coronary bypass graft angiography    Procedure: Bypass graft study;  Surgeon: Sujit Chaudhry MD;  Location: [facility] CATH;  Service: Cardiology;;    Cosmetic surgery    bereast reconstruction    Esophagogastroduodenoscopy    Procedure: EGD (ESOPHAGOGASTRODUODENOSCOPY);  Surgeon: Tracy JANG  MD Mundo;  Location: [facility] ENDO;  Service: Endoscopy;  Laterality: N/A;    Esophagogastroduodenoscopy    Procedure: EGD (ESOPHAGOGASTRODUODENOSCOPY);  Surgeon: Tracy Flower MD;  Location: [facility] ENDO;  Service: Endoscopy;  Laterality: N/A;    Esophagogastroduodenoscopy    Procedure: EGD (ESOPHAGOGASTRODUODENOSCOPY);  Surgeon: Tracy Flower MD;  Location: [facility] ENDO;  Service: Endoscopy;  Laterality: N/A;    Esophagogastroduodenoscopy    Procedure: EGD (ESOPHAGOGASTRODUODENOSCOPY);  Surgeon: Mk Sanchez MD;  Location: [facility] ENDO (2ND FLR);  Service: Endoscopy;  Laterality: N/A;  inst via portal  cardiac clearance-see encounter dated 5/31/23  precall confirmed 6/2 EB    Eye surgery    eye lids    Fractional flow reserve (ffr), coronary    Procedure: (IFR) Ramus;  Surgeon: Jeff Guzman MD;  Location: [facility] CATH;  Service: Cardiology;;    Implantation of biventricular heart pacemaker    Procedure: INSERTION, PACEMAKER, BIVENTRICULAR;  Surgeon: Palomo Gill MD;  Location: [facility] EP LAB;  Service: Cardiology;  Laterality: N/A;  DCM, LBBB, Right sided CRT-P vs LBAP lead, MDT, MAC, SK, 3 Prep *Venogram 2/19/24*    Injection for sentinel node identification    Procedure: INJECTION, FOR SENTINEL NODE IDENTIFICATION-Left;  Surgeon: Rosa Maradiaga MD;  Location: [facility] OR;  Service: General;  Laterality: Left;    Instantaneous wave-free ratio (ifr)    Procedure: (IFR) LCX;  Surgeon: Jeff Guzman MD;  Location: [facility] CATH;  Service: Cardiology;;    Left heart catheterization    Procedure: Left heart cath;  Surgeon: Sujit Chaudhry MD;  Location: [facility] CATH;  Service: Cardiology;  Laterality: Right;    Left heart catheterization    Procedure: Left heart cath;  Surgeon: Sujit Chaudhry MD;  Location: [facility] CATH;  Service: Cardiology;  Laterality: Left;    Left heart catheterization    Procedure: Left heart cath;  Surgeon: Jeff Guzman MD;  Location: [facility] CATH;  Service: Cardiology;;    Lumbar laminectomy with discectomy     Procedure: LAMINECTOMY, SPINE, LUMBAR, WITH DISCECTOMY;  Surgeon: Vimal Villegas MD;  Location: [facility] OR;  Service: Neurosurgery;  Laterality: N/A;  left L5-S1  Laminectomy L4-5    Lymphadenectomy    Mastectomy    Mastectomy, partial    Procedure: MASTECTOMY, PARTIAL-Left ultrasound guided;  Surgeon: Rosa Maradiaga MD;  Location: [facility] OR;  Service: General;  Laterality: Left;    Right heart catheterization    Procedure: INSERTION, CATHETER, RIGHT HEART;  Surgeon: Sujit Chaudhry MD;  Location: [facility] CATH;  Service: Cardiology;  Laterality: Right;    Right heart catheterization    Procedure: Right heart cath;  Surgeon: Jeff Guzman MD;  Location: [facility] CATH;  Service: Cardiology;;    Right heart catheterization    Procedure: Right heart cath;  Surgeon: Jeff Gumzan MD;  Location: [facility] CATH;  Service: Cardiology;;    [place] lymph node biopsy    Procedure: BIOPSY, LYMPH NODE, SENTINEL-Left;  Surgeon: Rosa Maradiaga MD;  Location: [facility] OR;  Service: General;  Laterality: Left;    Skin biopsy    Splenectomy, total    Transcatheter aortic valve replacement (tavr)    2017    Transforaminal epidural injection of steroid    Procedure: Left L5/S1 TF SKIP with local;  Surgeon: Canelo Niño MD;  Location: [facility] PAIN MGT;  Service: Pain Management;  Laterality: Left;    Tumor removal    neck- lymphoma    Venogram, ep lab    Procedure: Venogram, EP Lab;  Surgeon: Palomo Gill MD;  Location: [facility] EP LAB;  Service: Cardiology;  Laterality: N/A;  DCM, CHF, Venogram- Bilateral, SK, 3 Prep *hx of bilateral mastectomy* *Contrast Prep*        Family History   Problem Relation Name Age of Onset    Hypertension Mother      Hypertension Father      Cancer Neg Hx       Social History     Tobacco Use    Smoking status: Former     Current packs/day: 0.00     Average packs/day: 1 pack/day for 20.0 years (20.0 ttl pk-yrs)     Types: Cigarettes     Start date: 1961     Quit date: 1981     Years since quittin.0    Smokeless  tobacco: Never   Substance Use Topics    Alcohol use: No     Comment: 2 drinks/month; none 72 hrs prior to surgery    Drug use: No       Current Outpatient Medications   Medication Sig Dispense Refill    acetaminophen (TYLENOL) 500 MG tablet Take 1,000 mg by mouth daily as needed for Pain.      azaTHIOprine (IMURAN) 50 mg Tab Take 50 mg by mouth once daily.      calcium carbonate/vitamin D3 (CALCIUM WITH VITAMIN D3 ORAL) Take 1 Dose by mouth 2 (two) times a day. 1 dose = 1 gummy      cholecalciferol, vitamin D3, (VITAMIN D3 ORAL) Take 1 Dose by mouth 2 (two) times a day. 1 dose = 1 gummy      clopidogreL (PLAVIX) 75 mg tablet Take 1 tablet (75 mg total) by mouth once daily. 90 tablet 3    digoxin (LANOXIN) 125 mcg tablet Take 1 tablet (125 mcg total) by mouth once daily. 30 tablet 11    diphenhydrAMINE-acetaminophen (TYLENOL PM)  mg Tab Take 1 tablet by mouth nightly as needed (for sleep).      empagliflozin (JARDIANCE) 10 mg tablet Take 1 tablet (10 mg total) by mouth once daily. 90 tablet 3    EScitalopram oxalate (LEXAPRO) 10 MG tablet Take 1 tablet (10 mg total) by mouth once daily. 90 tablet 3    levothyroxine (SYNTHROID) 75 MCG tablet Take 1 tablet (75 mcg total) by mouth before breakfast. 90 tablet 3    metoprolol succinate (TOPROL-XL) 25 MG 24 hr tablet TAKE 1 TABLET EVERY EVENING (Patient taking differently: Take 12.5 mg by mouth once daily.) 90 tablet 3    mv-min/iron/folic/calcium/vitK (WOMEN'S MULTIVITAMIN ORAL) Take 1 Dose by mouth once daily. 1 dose = 1 gummy      pantoprazole (PROTONIX) 40 MG tablet Take 1 tablet (40 mg total) by mouth once daily. 30 tablet 11    rosuvastatin (CRESTOR) 20 MG tablet Take 0.5 tablets (10 mg total) by mouth once daily. 90 tablet 3    torsemide (DEMADEX) 10 MG Tab Take 1 tablet (10 mg total) by mouth once daily. 30 tablet 11    valsartan (DIOVAN) 40 MG tablet Take 1 tablet (40 mg total) by mouth 2 (two) times daily. (Patient taking differently: Take 20 mg by mouth  every evening.) 60 tablet 0    xylitol (XYLIMELTS MM) 2 tablets by Mucous Membrane route every evening.       Current Facility-Administered Medications   Medication Dose Route Frequency Provider Last Rate Last Admin    sodium chloride 0.9% flush 10 mL  10 mL Intravenous PRN Jeff Guzman MD            Objective:      Physical Exam  General:  No acute distress, frail and thin   Lungs:  Mild rales left base  Heart:  Regular rate and rhythm.  3/6 late-peaking systolic ejection murmur over the upper right sternal border with preserved S2   Abdomen:  Soft, nontender, nondistended   Extremities:  No pitting edema   Neuro:  Moving all extremities x4      Lab Review   Lab Results   Component Value Date    WBC 9.32 10/02/2024    HGB 13.9 10/02/2024    HCT 41.9 10/02/2024    MCV 94 10/02/2024     10/02/2024         BMP  Lab Results   Component Value Date     10/04/2024    K 3.7 10/04/2024    CL 99 10/04/2024    CO2 29 10/04/2024    BUN 24 (H) 10/04/2024    CREATININE 0.9 10/04/2024    CALCIUM 10.0 10/04/2024    ANIONGAP 12 10/04/2024    ESTGFRAFRICA >60 04/01/2022    EGFRNONAA >60 04/01/2022       Lab Results   Component Value Date    LABPROT 11.4 02/22/2024    ALBUMIN 5.1 09/30/2024       Lab Results   Component Value Date    ALT 27 09/30/2024    AST 53 (H) 09/30/2024    ALKPHOS 81 09/30/2024    BILITOT 1.4 (H) 09/30/2024       Lab Results   Component Value Date    TSH 1.570 10/02/2024       Lab Results   Component Value Date    CHOL 141 10/02/2024    CHOL 112 (L) 02/24/2023    CHOL 130 02/22/2022     Lab Results   Component Value Date    HDL 45 10/02/2024    HDL 39 (L) 02/24/2023    HDL 30 (L) 02/22/2022     Lab Results   Component Value Date    LDLCALC 81.8 10/02/2024    LDLCALC 58.8 (L) 02/24/2023    LDLCALC 76.2 02/22/2022     Lab Results   Component Value Date    TRIG 71 10/02/2024    TRIG 71 02/24/2023    TRIG 119 02/22/2022     Lab Results   Component Value Date    CHOLHDL 31.9 10/02/2024    CHOLHDL 34.8  2023    CHOLHDL 23.1 2022      LVOT VTI AoV VTI AoV MG JORI      0 11.1 34.41 11 [id number]  LVOT area 2.8   5 13.4 35.7 10 1.70021      10 16.3 41.9 14 1.48080      15 16 57 22 0.388632      20 20.4 62.5 29 0.05515      reserve 0.459265 0.933281           Latest Reference Range & Units 18 11:18 23 10:29 23 10:55 24 13:29   BNP 0 - 99 pg/mL 156 (H) 787 (H) 1,077 (H) 1,515 (H)   (H): Data is abnormally high    EKG 2023:  Normal sinus rhythm with left bundle-branch block,       EKG 10/1/24:  AsBiVp. PVCs inferior axis and RBBB morphology (looks similar to paced QRS in V1)     Assessment & Plan:     1. LBBB (left bundle branch block)  CV Ultrasound Bilateral Doppler Carotid    Complete PFT w/ bronchodilator      2. Prosthetic valve dysfunction, sequela  CV Ultrasound Bilateral Doppler Carotid    Complete PFT w/ bronchodilator      3. Chronic HFrEF (heart failure with reduced ejection fraction)  CV Ultrasound Bilateral Doppler Carotid    Complete PFT w/ bronchodilator      4. S/P AVR (aortic valve replacement)  CV Ultrasound Bilateral Doppler Carotid    Complete PFT w/ bronchodilator      5. S/P CABG x 2  CV Ultrasound Bilateral Doppler Carotid    Complete PFT w/ bronchodilator      6. Carotid stenosis, bilateral  CV Ultrasound Bilateral Doppler Carotid    Complete PFT w/ bronchodilator      7. Presence of cardiac resynchronization therapy pacemaker (CRT-P)  CV Ultrasound Bilateral Doppler Carotid    Complete PFT w/ bronchodilator      8. Frailty  CV Ultrasound Bilateral Doppler Carotid    Complete PFT w/ bronchodilator               This is a 69 y.o. pleasant  female with NYHA class 3 functional class and severely reduced LVEF.  She has a history of chest radiation despite which she underwent a two-vessel CABG and Lagrange AVR in 2017.  Over the past 2 years her EF has declined progressively and she has developed symptomatic heart failure.  To complicate issues  more she has left ICA greater than 70% stenosis with bilateral external carotid stenoses as well.  She still maintains a quality of life that is worth preserving despite her frailty and severe comorbidities     After multidisciplinary discussion we proceeded with CRT-P placement at the end of February.  She noticed early improvement but unfortunately reversed course in the past month or so. Her most recent TTE reveals worsened LV function now with EF 10% and required hospitalization for diuresis. RV and aortic valve parameters remain unchanged.  We performed a right heart catheterization and optimized her medications further.    Dobutamine stress echo now demonstrates severe fixed prosthetic stenosis with a valve area of 0.6 and a peak velocity of 4.3 with mean gradient 45 on dobutamine and present, albeit somewhat inadequate, contractile reserve.    She is certainly indicated for valve in valve GEOR[place]IANA and her CTA demonstrated an anatomy amenable to 23 mm CRYSTAL 3 from a transfemoral approach with a moderate risk of left coronary obstruction (LAD is fortunately bypassed).    I discussed the options with her and her family today at length.  Conservative management has not been successful and we would expect on average 6-12 months of life expectancy with that approach.  GEOR[place]IANA on the other hand would be quite high-risk; SAVR needless to say would be prohibitive risk.  The concerns are periprocedural stroke from severe carotid stenosis and hypotension, vascular injury, intraoperative death due to pump failure and coronary obstruction.  She understands those risks and would like to proceed.    I recommend the following:   Repeat Coronary angiogram, carotid duplex, dental eval and PFTs  Discuss with vascular surgery the risk benefit ratio of pre GE[place]NA left ICA stenting versus surgical repair versus expectant management  At the time of valve in valve GEORGIANA:   Left coronary protection  Maintain systolic pressure over 130  Low  threshold for intra-aortic balloon pump after closure for 24-48 hours  No surgical bail-out      I appreciate the opportunity to participate in Dulce Root 's care today.        Jeff Guzman MD, [facility]  Interventional Cardiology/Structural Heart Disease  [facility]  Office: [phone]     Parts of this note were completed using voice recognition software. Please excuse any misspellings or syntax errors and reach out to me with questions.

## 2024-10-30 ENCOUNTER — PATIENT MESSAGE (OUTPATIENT)
Dept: CARDIOLOGY | Facility: CLINIC | Age: 69
End: 2024-10-30
Payer: MEDICARE

## 2024-10-30 DIAGNOSIS — E03.9 ACQUIRED HYPOTHYROIDISM: ICD-10-CM

## 2024-10-30 RX ORDER — LEVOTHYROXINE SODIUM 75 UG/1
75 TABLET ORAL
Qty: 90 TABLET | Refills: 3 | Status: SHIPPED | OUTPATIENT
Start: 2024-10-30

## 2024-10-31 DIAGNOSIS — I50.20 HFREF (HEART FAILURE WITH REDUCED EJECTION FRACTION): ICD-10-CM

## 2024-10-31 RX ORDER — EMPAGLIFLOZIN 10 MG/1
10 TABLET, FILM COATED ORAL
Qty: 90 TABLET | Refills: 3 | Status: SHIPPED | OUTPATIENT
Start: 2024-10-31

## 2024-11-04 NOTE — TELEPHONE ENCOUNTER
[employer] alert remote transmission received this AM for 1 or more monitored VT episodes detected.  [employer] CRT-P device - followed by Dr. Gill    VT episode recorded on 11/4/24 beginning at 1:00AM. Duration listed as 11 mins 59 secs, but termination not seen. Concerning presenting EGM noted from 1:15AM on 11/4/24.    8:17AM - Attempted to reach the patient at mobile number listed, no answer    8:20AM - Attempted to reach the patient at home number listed, no answer    8:21AM - Attempted to reach the emergency contact, patient's daughter, at number listed - no answer    8:29AM - Made contact with patient's daughter, Kaylah. She states that an ambulance is at her mother's house currently. She is unsure of the outcome at this time.       Presenting EGM on 11/4/24 at 1:15AM:          VT episode beginning on 11/4/24 at 1:00AM:

## 2024-11-06 DIAGNOSIS — Z51.81 MEDICATION MONITORING ENCOUNTER: ICD-10-CM

## 2024-11-06 DIAGNOSIS — M81.0 AGE RELATED OSTEOPOROSIS, UNSPECIFIED PATHOLOGICAL FRACTURE PRESENCE: Primary | ICD-10-CM

## 2024-11-13 DIAGNOSIS — F41.9 ANXIETY: ICD-10-CM

## 2024-11-13 RX ORDER — ESCITALOPRAM OXALATE 10 MG/1
10 TABLET ORAL
Qty: 90 TABLET | Refills: 3 | OUTPATIENT
Start: 2024-11-13

## 2024-12-30 NOTE — PROGRESS NOTES
New Patient   [facility]  Department of Surgery      REFERRING PROVIDER: No referring provider defined for this encounter.    Chief Complaint: No chief complaint on file.      Subjective:      Patient ID: Dulce Root is a 67 y.o. female who presents with for evaluation of non-healing wound of her left breast flap reconstruction ongoing for ~ 6 months with occasional bleeding. Patient states the area looked like a small scab at onset, but she picked it and now the wound will not heal. Otherwise without other breast complaints of palpable masses or pain.    Patient has a history of bilateral ductal carcinoma in situ and a remote history of Hodgkin's disease. Patient is known to Dr. Sepulveda. Per his note, her history is as follows: She developed ductal carcinoma in situ of the right breast in 09/2006, treated with lumpectomy  and radiation therapy.  In 08/2009, she was diagnosed with ductal carcinoma in situ of the left breast and in 08/2009, she had bilateral mastectomy. The left breast showed widespread high-grade DCIS and the right breast was    without evidence of malignancy.    She has a remote history of Hodgkin's disease, originally diagnosed in 1991, treated with radiation therapy and then treated with salvage chemotherapy with ABVD on protocol [id number] in 1996.      Past Medical History:   Diagnosis Date    Allergy     Anticoagulant long-term use     Plavix    Breast cancer 2008    Carotid stenosis, bilateral 10/13/2017    Coronary artery disease     Coronary artery disease involving coronary bypass graft of native heart without angina pectoris 08/16/2019 2/19  1.  Left main is a small vessel with a 60%-65% ostial lesion. 2.  LAD is a medium-sized vessel diffuse 70% proximally  Ramus intermedius is a medium size vessel with a 40%-50% ostial lesion. 3.  Circumflex 60%-70% ostial  remainder of circumflex and obtuse marginal normal in appearance. Right coronary artery is normal size vessel, short  discrete 70%mid 4.  Vein graft to right coronary is occ    Coronary artery disease involving native coronary artery of native heart without angina pectoris 06/27/2017    DCIS (ductal carcinoma in situ) of breast 11/06/2012    Encounter for blood transfusion     Essential hypertension 11/06/2012    GERD (gastroesophageal reflux disease)     GI bleed 02/2021    Hodgkin lymphoma     Hx of Hodgkin's disease - s/p chemo and radiation 11/06/2012    Hyperlipidemia     Hyperlipidemia 11/06/2012    The patient presents with hyperlipidemia.  The patient reports tolerating the medication well and is in excellent compliance.  There have been no medication side effects.  The patient denies chest pain, neuropathy, and myalgias.  The patient has reduced fat intake and has been exercising.  Current treatment has included the medications listed in the med card.   Lab Results Component Value Date  CH    Hypertension     LBBB (left bundle branch block) 05/22/2018    Osteoporosis     Paroxysmal atrial fibrillation - single post-op episode 08/24/2017    Pemphigoid     Pemphigoid     pt receives IVIG infusions    S/P AVR (aortic valve replacement) - pericardial valve 08/21/2017    Perceval aortic tissue valve PVS23. 23mm    serial # [id number]    S/P CABG x 2 08/21/2017 8/17 CABG X 2 with SVG-LAD and SVG-distal RCA  SVG LAD due to absent LIMA after chest radiation and lymph node biopsy     Stented coronary artery     She had 2 stents placed in 2/2019.  She has had CAD and bypasses in the past in 2017.      Thyroid disease      Past Surgical History:   Procedure Laterality Date    ARTERIOGRAPHY OF AORTIC ROOT N/A 02/15/2019    Procedure: ARTERIOGRAM, AORTIC ROOT;  Surgeon: Sujit Chaudhry MD;  Location: [facility];  Service: Cardiology;  Laterality: N/A;    BREAST BIOPSY      BREAST RECONSTRUCTION      bilateral mastectomy    CARDIAC CATHETERIZATION      stents x 2    CARDIAC SURGERY  08/2017    Aortic valve replacement , CABG 2 vessel     CARDIAC VALVE REPLACEMENT  08/2017    aortic valve, bovine per pt    CORONARY ANGIOGRAPHY N/A 02/15/2019    Procedure: ANGIOGRAM, CORONARY ARTERY;  Surgeon: Sujit Chaudhry MD;  [facility];  Service: Cardiology;  Laterality: N/A;    CORONARY ANGIOGRAPHY N/A 4/1/2022    Procedure: ANGIOGRAM, CORONARY ARTERY;  Surgeon: Sujit Chaudhry MD;  Location: [facility] CATH;  Service: Cardiology;  Laterality: N/A;    CORONARY BYPASS GRAFT ANGIOGRAPHY  02/15/2019    Procedure: Bypass graft study;  Surgeon: Sujit Chaudhry MD;  [facility];  Service: Cardiology;;    COSMETIC SURGERY      bereast reconstruction    ESOPHAGOGASTRODUODENOSCOPY N/A 05/14/2021    Procedure: EGD (ESOPHAGOGASTRODUODENOSCOPY);  Surgeon: Tracy Flower MD;  Location: [facility] ENDO;  Service: Endoscopy;  Laterality: N/A;    ESOPHAGOGASTRODUODENOSCOPY N/A 11/04/2021    Procedure: EGD (ESOPHAGOGASTRODUODENOSCOPY);  Surgeon: Tracy Flower MD;  Location: [facility] ENDO;  Service: Endoscopy;  Laterality: N/A;    EYE SURGERY      eye lids    LEFT HEART CATHETERIZATION Right 02/15/2019    Procedure: Left heart cath;  Surgeon: Sujit Chaudhry MD;  Location: [facility] CATH;  Service: Cardiology;  Laterality: Right;    LEFT HEART CATHETERIZATION Left 4/1/2022    Procedure: Left heart cath;  Surgeon: Sujit Chaudhry MD;  Location: [facility] CATH;  Service: Cardiology;  Laterality: Left;    LUMBAR LAMINECTOMY WITH DISCECTOMY N/A 02/27/2020    Procedure: LAMINECTOMY, SPINE, LUMBAR, WITH DISCECTOMY;  Surgeon: Vimal Villegas MD;  Location: [facility] OR;  Service: Neurosurgery;  Laterality: N/A;  left L5-S1  Laminectomy L4-5    LYMPHADENECTOMY      MASTECTOMY      RIGHT HEART CATHETERIZATION Right 4/1/2022    Procedure: INSERTION, CATHETER, RIGHT HEART;  Surgeon: Sujit Chaudhry MD;  Location: [facility] CATH;  Service: Cardiology;  Laterality: Right;    SKIN BIOPSY      SPLENECTOMY, TOTAL      TRANSFORAMINAL EPIDURAL INJECTION OF STEROID Left 12/31/2019    Procedure: Left L5/S1 TF  SKIP with local;  Surgeon: Canelo Niño MD;  Location: [facility];  Service: Pain Management;  Laterality: Left;    TUMOR REMOVAL      neck- lymphoma     Current Outpatient Medications on File Prior to Visit   Medication Sig Dispense Refill    abaloparatide (TYMLOS) 80 mcg (3,120 mcg/1.56 mL) PnIj Inject 80 mcg into the skin once daily. 1 each 11    ALPRAZolam (XANAX) 1 MG tablet       aspirin (ECOTRIN) 81 MG EC tablet Take 1 tablet (81 mg total) by mouth once daily. 90 tablet 12    azaTHIOprine (IMURAN) 50 mg Tab       BANOPHEN 50 mg capsule TAKE 1 CAPSULE EVERY 6 HOURS, TAKE 3 DOSES BEFORE AND 1 DOSE AFTER SCAN      brimonidine 0.2% (ALPHAGAN) 0.2 % Drop       calcium carbonate (OS-CALVIN) 600 mg (1,500 mg) Tab Take 600 mg by mouth 2 (two) times daily with meals.      chlorhexidine (PERIDEX) 0.12 % solution SMARTSI Tablespoon By Mouth Twice Daily      cholecalciferol, vitamin D3, (VITAMIN D3) 100 mcg (4,000 unit) Cap Take by mouth.      clopidogreL (PLAVIX) 75 mg tablet Take 1 tablet (75 mg total) by mouth once daily. 90 tablet 4    dexAMETHasone (DECADRON) 0.5 mg/5 mL Elix SWISH 3.75MLS IN MOUTH FOR 30 SECONDS AND SWALLOW TWICE DAILY AS NEEDED FOR LICHEN PLANUS FLARE UPS      EScitalopram oxalate (LEXAPRO) 10 MG tablet TAKE 1 TABLET BY MOUTH EVERY DAY 90 tablet 3    famotidine (PEPCID) 20 MG tablet TAKE 1 TABLET EVERY 6 HOURS TAKE 3 DOSES BEFORE AND 1 DOSE AFTER SCAN      FLOWFLEX COVID-19 AG HOME TEST Kit use as directed      fluconazole (DIFLUCAN) 200 MG Tab TAKE 1 TABLET ORALLY EVERY OTHER DAY X 4 DOSES, THEN 1 WEEKLY FOR 30 DAYS      hydroCHLOROthiazide (HYDRODIURIL) 25 MG tablet Take 1 tablet (25 mg total) by mouth every morning. 90 tablet 6    levothyroxine (SYNTHROID) 75 MCG tablet TAKE 1 TABLET BY MOUTH EVERY DAY BEFORE BREAKFAST 30 tablet 0    losartan (COZAAR) 50 MG tablet Take 1 tablet (50 mg total) by mouth every evening. 90 tablet 3    metoprolol succinate (TOPROL-XL) 25 MG 24 hr tablet Take 1  tablet (25 mg total) by mouth 2 (two) times daily. 90 tablet 3    pantoprazole (PROTONIX) 20 MG tablet Take 1 tablet (20 mg total) by mouth once daily. 90 tablet 3    predniSONE (DELTASONE) 50 MG Tab TAKE 1 TABLET EVERY 6 HOURS , TAKE 3 DOSES BEFORE AND 1 DOSE AFTER SCAN      PREVIDENT 5000 BOOSTER PLUS 1.1 % Pste BRUSH MORNING AND NIGHT      rosuvastatin (CRESTOR) 20 MG tablet TAKE 1 TABLET EVERY OTHER NIGHT 45 tablet 3    valACYclovir (VALTREX) 1000 MG tablet TAKE 1 TABLET EVERY 24 HOURS; prn 30 tablet 2     Current Facility-Administered Medications on File Prior to Visit   Medication Dose Route Frequency Provider Last Rate Last Admin    albuterol inhaler 2 puff  2 puff Inhalation 1 time in Clinic/HOD Sujit Chaudhry MD         Social History     Socioeconomic History    Marital status:    Tobacco Use    Smoking status: Former     Packs/day: 1.00     Years: 20.00     Pack years: 20.00     Types: Cigarettes     Quit date: 1981     Years since quittin.9    Smokeless tobacco: Never   Substance and Sexual Activity    Alcohol use: No     Comment: 2 drinks/month; none 72 hrs prior to surgery    Drug use: No    Sexual activity: Yes     Partners: Male   Social History Narrative    Live w/ spouse and  1 dog      Social Determinants of Health     Financial Resource Strain: Low Risk     Difficulty of Paying Living Expenses: Not hard at all   Food Insecurity: No Food Insecurity    Worried About Running Out of Food in the Last Year: Never true    Ran Out of Food in the Last Year: Never true   Transportation Needs: No Transportation Needs    Lack of Transportation (Medical): No    Lack of Transportation (Non-Medical): No   Physical Activity: Insufficiently Active    Days of Exercise per Week: 3 days    Minutes of Exercise per Session: 30 min   Stress: No Stress Concern Present    Feeling of Stress : Only a little   Social Connections: Moderately Integrated    Frequency of Communication with Friends and Family:  "More than three times a week    Frequency of Social Gatherings with Friends and Family: Once a week    Attends Taoism Services: More than 4 times per year    Active Member of Clubs or Organizations: No    Attends Club or Organization Meetings: Never    Marital Status:    Housing Stability: Low Risk     Unable to Pay for Housing in the Last Year: No    Number of Places Lived in the Last Year: 1    Unstable Housing in the Last Year: No     Family History   Problem Relation Age of Onset    Hypertension Mother     Hypertension Father     Cancer Neg Hx         Review of Systems   Constitutional:  Negative for appetite change, chills, fever and unexpected weight change.   HENT:  Negative for facial swelling, postnasal drip and sore throat.    Eyes:  Negative for redness and itching.   Respiratory:  Negative for chest tightness and shortness of breath.    Cardiovascular:  Negative for chest pain and palpitations.   Gastrointestinal:  Negative for blood in stool, diarrhea, nausea and vomiting.   Genitourinary:  Negative for difficulty urinating and dysuria.   Musculoskeletal:  Negative for arthralgias and joint swelling.   Skin:  Negative for rash and wound.   Neurological:  Negative for dizziness and syncope.   Hematological:  Negative for adenopathy.   Psychiatric/Behavioral:  Negative for agitation. The patient is not nervous/anxious.    Objective:   /69 (BP Location: Right arm, Patient Position: Sitting, BP Method: Medium (Automatic))   Pulse 76   Ht 5' 3" (1.6 m)   Wt 64.4 kg (142 lb)   BMI 25.15 kg/m²     Physical Exam   Vitals reviewed.  Constitutional: She is oriented to person, place, and time.   HENT:   Head: Normocephalic and atraumatic.   Nose: Nose normal.   Eyes: Pupils are equal, round, and reactive to light. Right eye exhibits no discharge. Left eye exhibits no discharge.   Pulmonary/Chest: Effort normal and breath sounds normal. No stridor. No respiratory distress. She exhibits no mass, " no tenderness and no edema. Right breast exhibits no inverted nipple, no mass, no nipple discharge, no skin change and no tenderness. Left breast exhibits inverted nipple and skin change. Left breast exhibits no mass, no nipple discharge and no tenderness. No breast swelling or bleeding. Breasts are symmetrical.       Abdominal: Normal appearance.   Genitourinary: No breast swelling or bleeding.   Neurological: She is alert and oriented to person, place, and time.   Skin: Skin is warm and dry.     Psychiatric: Her behavior is normal. Mood, judgment and thought content normal.     Punch Biopsy Procedure Note    Pre-operative Diagnosis: left breast skin thickening    Post-operative Diagnosis: same    Location: left breast    Anesthesia: 1% plain lidocaine    Procedure Details   The Procedure, risks and complications have been discussed in detail (including, but not limited to pain, infection, bleeding) with the patient, and the patient has signed consent to have the surgery completed.    The skin was sterilely prepped and draped over the affected area in the usual fashion.  Local anesthetic was injected in the affected area.  Next, using a 4 mm punch, a small skin sample was obtained and sent to pathology for permanent sectioning.  A 4-0 nylon was used to approximate the skin edges.  Dressing was applied.  Patient tolerated well.     EBL: minimal    Condition:  Stable    Complications:  none.      Assessment:       1. Breast wound, initial encounter    2. Personal history of breast cancer    3. S/P mastectomy, bilateral        Plan:   1. On examination, there is a poorly healing wound directly adjacent to her left nipple. S/p bilateral nipple sparing CHARMAINE flap reconstruction.   2. Discussed concern for recurrence in her skin based on examination findings. Will perform punch biopsy today, but also recommend imaging work up. Radiology will call to schedule.   3. Punch performed with good tolerance. Wound care  instructions given.   4. Will contact with results of biopsy and imaging work up to plan next steps.   5. Patient verbalized understanding of plan. All questions asked and answered. Advised to call our office with any new or worsening sxs.       Total time spent with the patient: 45.  30 minutes of face to face consultation and 15 minutes of chart review and coordination of care.    Yanira Capps PA-C  Breast Surgery            No
